# Patient Record
Sex: FEMALE | Race: WHITE | NOT HISPANIC OR LATINO | URBAN - METROPOLITAN AREA
[De-identification: names, ages, dates, MRNs, and addresses within clinical notes are randomized per-mention and may not be internally consistent; named-entity substitution may affect disease eponyms.]

---

## 2017-06-06 ENCOUNTER — EMERGENCY (EMERGENCY)
Facility: HOSPITAL | Age: 52
LOS: 1 days | Discharge: HOME | End: 2017-06-06
Attending: EMERGENCY MEDICINE
Payer: MEDICAID

## 2017-06-06 VITALS
SYSTOLIC BLOOD PRESSURE: 123 MMHG | OXYGEN SATURATION: 98 % | HEIGHT: 63 IN | RESPIRATION RATE: 20 BRPM | HEART RATE: 87 BPM | DIASTOLIC BLOOD PRESSURE: 73 MMHG | TEMPERATURE: 100 F | WEIGHT: 160.06 LBS

## 2017-06-06 DIAGNOSIS — B34.9 VIRAL INFECTION, UNSPECIFIED: ICD-10-CM

## 2017-06-06 DIAGNOSIS — R52 PAIN, UNSPECIFIED: ICD-10-CM

## 2017-06-06 DIAGNOSIS — R50.9 FEVER, UNSPECIFIED: ICD-10-CM

## 2017-06-06 DIAGNOSIS — R05 COUGH: ICD-10-CM

## 2017-06-06 DIAGNOSIS — Z88.0 ALLERGY STATUS TO PENICILLIN: ICD-10-CM

## 2017-06-06 PROCEDURE — 99284 EMERGENCY DEPT VISIT MOD MDM: CPT

## 2017-06-06 NOTE — ED ADULT NURSE NOTE - OBJECTIVE STATEMENT
pt came in with c/o hand, shoulder and back pain s/p suffering for flu weeks ago , not in any  distress ,took 2tabs of aleve about 815pm before coming to ER.

## 2017-06-07 VITALS
OXYGEN SATURATION: 97 % | HEART RATE: 80 BPM | SYSTOLIC BLOOD PRESSURE: 99 MMHG | TEMPERATURE: 97 F | DIASTOLIC BLOOD PRESSURE: 53 MMHG | RESPIRATION RATE: 18 BRPM

## 2017-06-07 LAB
ALBUMIN SERPL ELPH-MCNC: 3.5 G/DL — SIGNIFICANT CHANGE UP (ref 3.3–5)
ALP SERPL-CCNC: 79 U/L — SIGNIFICANT CHANGE UP (ref 40–120)
ALT FLD-CCNC: 32 U/L — SIGNIFICANT CHANGE UP (ref 12–78)
ANION GAP SERPL CALC-SCNC: 9 MMOL/L — SIGNIFICANT CHANGE UP (ref 5–17)
AST SERPL-CCNC: 17 U/L — SIGNIFICANT CHANGE UP (ref 15–37)
BASOPHILS # BLD AUTO: 0.2 K/UL — SIGNIFICANT CHANGE UP (ref 0–0.2)
BASOPHILS NFR BLD AUTO: 1.9 % — SIGNIFICANT CHANGE UP (ref 0–2)
BILIRUB SERPL-MCNC: 0.4 MG/DL — SIGNIFICANT CHANGE UP (ref 0.2–1.2)
BUN SERPL-MCNC: 8 MG/DL — SIGNIFICANT CHANGE UP (ref 7–23)
CALCIUM SERPL-MCNC: 8.1 MG/DL — LOW (ref 8.5–10.1)
CHLORIDE SERPL-SCNC: 108 MMOL/L — SIGNIFICANT CHANGE UP (ref 96–108)
CO2 SERPL-SCNC: 26 MMOL/L — SIGNIFICANT CHANGE UP (ref 22–31)
CREAT SERPL-MCNC: 0.55 MG/DL — SIGNIFICANT CHANGE UP (ref 0.5–1.3)
EOSINOPHIL # BLD AUTO: 0.2 K/UL — SIGNIFICANT CHANGE UP (ref 0–0.5)
EOSINOPHIL NFR BLD AUTO: 2.3 % — SIGNIFICANT CHANGE UP (ref 0–6)
GLUCOSE SERPL-MCNC: 129 MG/DL — HIGH (ref 70–99)
HCT VFR BLD CALC: 32.8 % — LOW (ref 34.5–45)
HGB BLD-MCNC: 11.6 G/DL — SIGNIFICANT CHANGE UP (ref 11.5–15.5)
LYMPHOCYTES # BLD AUTO: 1.9 K/UL — SIGNIFICANT CHANGE UP (ref 1–3.3)
LYMPHOCYTES # BLD AUTO: 20.8 % — SIGNIFICANT CHANGE UP (ref 13–44)
MCHC RBC-ENTMCNC: 30.3 PG — SIGNIFICANT CHANGE UP (ref 27–34)
MCHC RBC-ENTMCNC: 35.3 GM/DL — SIGNIFICANT CHANGE UP (ref 32–36)
MCV RBC AUTO: 85.8 FL — SIGNIFICANT CHANGE UP (ref 80–100)
MONOCYTES # BLD AUTO: 0.7 K/UL — SIGNIFICANT CHANGE UP (ref 0–0.9)
MONOCYTES NFR BLD AUTO: 7.8 % — SIGNIFICANT CHANGE UP (ref 2–14)
NEUTROPHILS # BLD AUTO: 6.1 K/UL — SIGNIFICANT CHANGE UP (ref 1.8–7.4)
NEUTROPHILS NFR BLD AUTO: 67.2 % — SIGNIFICANT CHANGE UP (ref 43–77)
PLATELET # BLD AUTO: 322 K/UL — SIGNIFICANT CHANGE UP (ref 150–400)
POTASSIUM SERPL-MCNC: 3.9 MMOL/L — SIGNIFICANT CHANGE UP (ref 3.5–5.3)
POTASSIUM SERPL-SCNC: 3.9 MMOL/L — SIGNIFICANT CHANGE UP (ref 3.5–5.3)
PROT SERPL-MCNC: 6.6 GM/DL — SIGNIFICANT CHANGE UP (ref 6–8.3)
RBC # BLD: 3.82 M/UL — SIGNIFICANT CHANGE UP (ref 3.8–5.2)
RBC # FLD: 11.2 % — SIGNIFICANT CHANGE UP (ref 11–15)
SODIUM SERPL-SCNC: 143 MMOL/L — SIGNIFICANT CHANGE UP (ref 135–145)
TSH SERPL-MCNC: 2.66 UU/ML — SIGNIFICANT CHANGE UP (ref 0.36–3.74)
WBC # BLD: 9.2 K/UL — SIGNIFICANT CHANGE UP (ref 3.8–10.5)
WBC # FLD AUTO: 9.2 K/UL — SIGNIFICANT CHANGE UP (ref 3.8–10.5)

## 2017-06-07 RX ORDER — KETOROLAC TROMETHAMINE 30 MG/ML
30 SYRINGE (ML) INJECTION ONCE
Qty: 0 | Refills: 0 | Status: DISCONTINUED | OUTPATIENT
Start: 2017-06-07 | End: 2017-06-07

## 2017-06-07 RX ORDER — SODIUM CHLORIDE 9 MG/ML
1000 INJECTION INTRAMUSCULAR; INTRAVENOUS; SUBCUTANEOUS ONCE
Qty: 0 | Refills: 0 | Status: COMPLETED | OUTPATIENT
Start: 2017-06-07 | End: 2017-06-07

## 2017-06-07 NOTE — ED PROVIDER NOTE - MEDICAL DECISION MAKING DETAILS
Patient with viral syndrome. labs reviewed. patient received toradol and ivfs. She is currently discharged with care instructions.

## 2017-06-07 NOTE — ED PROVIDER NOTE - OBJECTIVE STATEMENT
Pertinent PMH/PSH/FHx/SHx and Review of Systems contained within:  51 yo f with PMH of hyperthyroidism s/p thyroidectomy on synthroid presents in ED c/o 7 day history of generalized body ache associated with subjective fever and cough. Patient reports she no longer has cough. No aggravating or relieving factors, No fever/chills, No photophobia/eye pain/changes in vision, No ear pain/sore throat/dysphagia, No chest pain/palpitations, no SOB/cough/wheeze/stridor, No abdominal pain, No N/V/D, no dysuria/frequency/discharge, No neck/back pain, no rash, no changes in neurological status/function.

## 2017-08-10 ENCOUNTER — RECORD ABSTRACTING (OUTPATIENT)
Age: 52
End: 2017-08-10

## 2017-08-10 DIAGNOSIS — Z80.9 FAMILY HISTORY OF MALIGNANT NEOPLASM, UNSPECIFIED: ICD-10-CM

## 2017-08-10 DIAGNOSIS — Z78.9 OTHER SPECIFIED HEALTH STATUS: ICD-10-CM

## 2017-08-16 ENCOUNTER — APPOINTMENT (OUTPATIENT)
Dept: CARDIOLOGY | Facility: CLINIC | Age: 52
End: 2017-08-16
Payer: MEDICAID

## 2017-08-16 VITALS
HEART RATE: 78 BPM | BODY MASS INDEX: 26.42 KG/M2 | WEIGHT: 151 LBS | OXYGEN SATURATION: 98 % | RESPIRATION RATE: 17 BRPM | HEIGHT: 63.5 IN

## 2017-08-16 VITALS — DIASTOLIC BLOOD PRESSURE: 70 MMHG | SYSTOLIC BLOOD PRESSURE: 124 MMHG

## 2017-08-16 VITALS — SYSTOLIC BLOOD PRESSURE: 140 MMHG | DIASTOLIC BLOOD PRESSURE: 60 MMHG

## 2017-08-16 DIAGNOSIS — Z86.19 PERSONAL HISTORY OF OTHER INFECTIOUS AND PARASITIC DISEASES: ICD-10-CM

## 2017-08-16 DIAGNOSIS — Z87.2 PERSONAL HISTORY OF DISEASES OF THE SKIN AND SUBCUTANEOUS TISSUE: ICD-10-CM

## 2017-08-16 DIAGNOSIS — D23.9 OTHER BENIGN NEOPLASM OF SKIN, UNSPECIFIED: ICD-10-CM

## 2017-08-16 PROCEDURE — 99214 OFFICE O/P EST MOD 30 MIN: CPT

## 2017-08-16 PROCEDURE — 93000 ELECTROCARDIOGRAM COMPLETE: CPT

## 2017-08-16 RX ORDER — BLOOD SUGAR DIAGNOSTIC
STRIP MISCELLANEOUS
Qty: 50 | Refills: 0 | Status: DISCONTINUED | COMMUNITY
Start: 2017-03-16

## 2017-08-16 RX ORDER — DOXYCYCLINE HYCLATE 100 MG/1
100 CAPSULE ORAL
Qty: 14 | Refills: 0 | Status: DISCONTINUED | COMMUNITY
Start: 2017-07-08

## 2017-08-16 RX ORDER — AZITHROMYCIN 250 MG/1
250 TABLET, FILM COATED ORAL
Qty: 6 | Refills: 0 | Status: DISCONTINUED | COMMUNITY
Start: 2017-05-31

## 2017-08-16 RX ORDER — ACARBOSE 25 MG/1
25 TABLET ORAL
Refills: 0 | Status: DISCONTINUED | COMMUNITY
End: 2017-08-16

## 2017-08-16 RX ORDER — LEVOTHYROXINE SODIUM 112 UG/1
112 TABLET ORAL
Refills: 0 | Status: DISCONTINUED | COMMUNITY
End: 2017-08-16

## 2017-08-16 RX ORDER — CLARITHROMYCIN 500 MG/1
500 TABLET, FILM COATED ORAL
Qty: 20 | Refills: 0 | Status: DISCONTINUED | COMMUNITY
Start: 2017-07-06

## 2017-08-16 RX ORDER — OSELTAMIVIR PHOSPHATE 75 MG/1
75 CAPSULE ORAL
Qty: 10 | Refills: 0 | Status: DISCONTINUED | COMMUNITY
Start: 2017-04-07

## 2017-08-16 RX ORDER — BENZONATATE 100 MG/1
100 CAPSULE ORAL
Qty: 30 | Refills: 0 | Status: DISCONTINUED | COMMUNITY
Start: 2017-06-11

## 2017-09-01 LAB
BASOPHILS # BLD AUTO: 0.01 K/UL
BASOPHILS NFR BLD AUTO: 0.2 %
CHOLEST SERPL-MCNC: 157 MG/DL
CHOLEST/HDLC SERPL: 3.7 RATIO
EOSINOPHIL # BLD AUTO: 0.06 K/UL
EOSINOPHIL NFR BLD AUTO: 1.3 %
HBA1C MFR BLD HPLC: 6.3 %
HCT VFR BLD CALC: 36.3 %
HDLC SERPL-MCNC: 43 MG/DL
HGB BLD-MCNC: 11.3 G/DL
IMM GRANULOCYTES NFR BLD AUTO: 0.2 %
LDLC SERPL CALC-MCNC: 60 MG/DL
LYMPHOCYTES # BLD AUTO: 2.06 K/UL
LYMPHOCYTES NFR BLD AUTO: 43.8 %
MAN DIFF?: NORMAL
MCHC RBC-ENTMCNC: 28.3 PG
MCHC RBC-ENTMCNC: 31.1 GM/DL
MCV RBC AUTO: 91 FL
MONOCYTES # BLD AUTO: 0.49 K/UL
MONOCYTES NFR BLD AUTO: 10.4 %
NEUTROPHILS # BLD AUTO: 2.07 K/UL
NEUTROPHILS NFR BLD AUTO: 44.1 %
PLATELET # BLD AUTO: 259 K/UL
RBC # BLD: 3.99 M/UL
RBC # FLD: 13.9 %
TRIGL SERPL-MCNC: 268 MG/DL
WBC # FLD AUTO: 4.7 K/UL

## 2017-09-05 ENCOUNTER — APPOINTMENT (OUTPATIENT)
Dept: INTERNAL MEDICINE | Facility: CLINIC | Age: 52
End: 2017-09-05
Payer: MEDICAID

## 2017-09-05 VITALS
RESPIRATION RATE: 18 BRPM | HEIGHT: 63 IN | OXYGEN SATURATION: 98 % | SYSTOLIC BLOOD PRESSURE: 118 MMHG | BODY MASS INDEX: 26.75 KG/M2 | WEIGHT: 151 LBS | TEMPERATURE: 99 F | HEART RATE: 72 BPM | DIASTOLIC BLOOD PRESSURE: 52 MMHG

## 2017-09-05 PROBLEM — Z00.00 ENCOUNTER FOR PREVENTIVE HEALTH EXAMINATION: Noted: 2017-09-05

## 2017-09-05 LAB
ALBUMIN SERPL ELPH-MCNC: 4.2 G/DL
ALP BLD-CCNC: 70 U/L
ALT SERPL-CCNC: 31 U/L
ANION GAP SERPL CALC-SCNC: 15 MMOL/L
AST SERPL-CCNC: 23 U/L
BILIRUB SERPL-MCNC: 0.4 MG/DL
BUN SERPL-MCNC: 12 MG/DL
CALCIUM SERPL-MCNC: 8.7 MG/DL
CHLORIDE SERPL-SCNC: 101 MMOL/L
CO2 SERPL-SCNC: 25 MMOL/L
CREAT SERPL-MCNC: 0.57 MG/DL
GLUCOSE SERPL-MCNC: 110 MG/DL
POTASSIUM SERPL-SCNC: 5 MMOL/L
PROT SERPL-MCNC: 6.6 G/DL
SODIUM SERPL-SCNC: 141 MMOL/L

## 2017-09-05 PROCEDURE — 99214 OFFICE O/P EST MOD 30 MIN: CPT

## 2017-09-18 ENCOUNTER — APPOINTMENT (OUTPATIENT)
Dept: OTOLARYNGOLOGY | Facility: CLINIC | Age: 52
End: 2017-09-18
Payer: MEDICAID

## 2017-09-18 VITALS
DIASTOLIC BLOOD PRESSURE: 81 MMHG | HEIGHT: 63.5 IN | TEMPERATURE: 98.9 F | HEART RATE: 80 BPM | WEIGHT: 150 LBS | SYSTOLIC BLOOD PRESSURE: 135 MMHG | BODY MASS INDEX: 26.25 KG/M2

## 2017-09-18 PROCEDURE — 99203 OFFICE O/P NEW LOW 30 MIN: CPT

## 2017-09-27 ENCOUNTER — APPOINTMENT (OUTPATIENT)
Dept: CT IMAGING | Facility: CLINIC | Age: 52
End: 2017-09-27
Payer: MEDICAID

## 2017-09-27 ENCOUNTER — OUTPATIENT (OUTPATIENT)
Dept: OUTPATIENT SERVICES | Facility: HOSPITAL | Age: 52
LOS: 1 days | End: 2017-09-27
Payer: MEDICAID

## 2017-09-27 DIAGNOSIS — R59.0 LOCALIZED ENLARGED LYMPH NODES: ICD-10-CM

## 2017-09-27 PROCEDURE — 70491 CT SOFT TISSUE NECK W/DYE: CPT

## 2017-09-27 PROCEDURE — 70491 CT SOFT TISSUE NECK W/DYE: CPT | Mod: 26

## 2017-10-09 ENCOUNTER — APPOINTMENT (OUTPATIENT)
Dept: OTOLARYNGOLOGY | Facility: CLINIC | Age: 52
End: 2017-10-09

## 2017-10-09 ENCOUNTER — MEDICATION RENEWAL (OUTPATIENT)
Age: 52
End: 2017-10-09

## 2017-10-14 ENCOUNTER — RX RENEWAL (OUTPATIENT)
Age: 52
End: 2017-10-14

## 2017-10-24 ENCOUNTER — CHART COPY (OUTPATIENT)
Age: 52
End: 2017-10-24

## 2017-10-25 ENCOUNTER — OUTPATIENT (OUTPATIENT)
Dept: OUTPATIENT SERVICES | Facility: HOSPITAL | Age: 52
LOS: 1 days | End: 2017-10-25
Payer: COMMERCIAL

## 2017-10-25 ENCOUNTER — RESULT REVIEW (OUTPATIENT)
Age: 52
End: 2017-10-25

## 2017-10-25 PROCEDURE — 10022: CPT

## 2017-10-25 PROCEDURE — 88305 TISSUE EXAM BY PATHOLOGIST: CPT

## 2017-10-25 PROCEDURE — 76942 ECHO GUIDE FOR BIOPSY: CPT | Mod: 26

## 2017-10-25 PROCEDURE — 88173 CYTOPATH EVAL FNA REPORT: CPT

## 2017-10-25 PROCEDURE — 76942 ECHO GUIDE FOR BIOPSY: CPT

## 2017-12-04 ENCOUNTER — RX RENEWAL (OUTPATIENT)
Age: 52
End: 2017-12-04

## 2018-01-03 ENCOUNTER — RX RENEWAL (OUTPATIENT)
Age: 53
End: 2018-01-03

## 2018-01-24 ENCOUNTER — RX RENEWAL (OUTPATIENT)
Age: 53
End: 2018-01-24

## 2018-02-06 ENCOUNTER — MEDICATION RENEWAL (OUTPATIENT)
Age: 53
End: 2018-02-06

## 2018-04-02 ENCOUNTER — APPOINTMENT (OUTPATIENT)
Dept: OTOLARYNGOLOGY | Facility: CLINIC | Age: 53
End: 2018-04-02
Payer: COMMERCIAL

## 2018-04-02 VITALS
TEMPERATURE: 99 F | OXYGEN SATURATION: 99 % | SYSTOLIC BLOOD PRESSURE: 127 MMHG | DIASTOLIC BLOOD PRESSURE: 77 MMHG | HEART RATE: 66 BPM | WEIGHT: 152 LBS | BODY MASS INDEX: 26.93 KG/M2 | HEIGHT: 63 IN

## 2018-04-02 PROCEDURE — 99213 OFFICE O/P EST LOW 20 MIN: CPT

## 2018-05-14 ENCOUNTER — NON-APPOINTMENT (OUTPATIENT)
Age: 53
End: 2018-05-14

## 2018-05-14 ENCOUNTER — APPOINTMENT (OUTPATIENT)
Dept: CARDIOLOGY | Facility: CLINIC | Age: 53
End: 2018-05-14
Payer: COMMERCIAL

## 2018-05-14 VITALS — WEIGHT: 160 LBS | BODY MASS INDEX: 28.35 KG/M2 | HEIGHT: 63 IN | HEART RATE: 80 BPM | OXYGEN SATURATION: 98 %

## 2018-05-14 VITALS — DIASTOLIC BLOOD PRESSURE: 60 MMHG | SYSTOLIC BLOOD PRESSURE: 120 MMHG

## 2018-05-14 PROCEDURE — 93000 ELECTROCARDIOGRAM COMPLETE: CPT

## 2018-05-14 PROCEDURE — 99213 OFFICE O/P EST LOW 20 MIN: CPT

## 2018-05-21 LAB
25(OH)D3 SERPL-MCNC: 25.1 NG/ML
ALBUMIN SERPL ELPH-MCNC: 4.3 G/DL
ALP BLD-CCNC: 86 U/L
ALT SERPL-CCNC: 24 U/L
ANION GAP SERPL CALC-SCNC: 12 MMOL/L
APPEARANCE: CLEAR
AST SERPL-CCNC: 17 U/L
BACTERIA: NEGATIVE
BASOPHILS # BLD AUTO: 0.02 K/UL
BASOPHILS NFR BLD AUTO: 0.4 %
BILIRUB SERPL-MCNC: 0.4 MG/DL
BILIRUBIN URINE: NEGATIVE
BLOOD URINE: NEGATIVE
BUN SERPL-MCNC: 8 MG/DL
CALCIUM SERPL-MCNC: 9.1 MG/DL
CHLORIDE SERPL-SCNC: 105 MMOL/L
CHOLEST SERPL-MCNC: 176 MG/DL
CHOLEST/HDLC SERPL: 2.8 RATIO
CO2 SERPL-SCNC: 27 MMOL/L
COLOR: YELLOW
CREAT SERPL-MCNC: 0.62 MG/DL
EOSINOPHIL # BLD AUTO: 0.11 K/UL
EOSINOPHIL NFR BLD AUTO: 2.2 %
GLUCOSE QUALITATIVE U: NEGATIVE MG/DL
GLUCOSE SERPL-MCNC: 131 MG/DL
HBA1C MFR BLD HPLC: 6.3 %
HCT VFR BLD CALC: 40.8 %
HDLC SERPL-MCNC: 64 MG/DL
HGB BLD-MCNC: 12.8 G/DL
IMM GRANULOCYTES NFR BLD AUTO: 0 %
KETONES URINE: NEGATIVE
LDLC SERPL CALC-MCNC: 81 MG/DL
LEUKOCYTE ESTERASE URINE: NEGATIVE
LYMPHOCYTES # BLD AUTO: 2.24 K/UL
LYMPHOCYTES NFR BLD AUTO: 44.4 %
MAN DIFF?: NORMAL
MCHC RBC-ENTMCNC: 29.2 PG
MCHC RBC-ENTMCNC: 31.4 GM/DL
MCV RBC AUTO: 92.9 FL
MICROSCOPIC-UA: NORMAL
MONOCYTES # BLD AUTO: 0.55 K/UL
MONOCYTES NFR BLD AUTO: 10.9 %
NEUTROPHILS # BLD AUTO: 2.13 K/UL
NEUTROPHILS NFR BLD AUTO: 42.1 %
NITRITE URINE: NEGATIVE
PH URINE: 7
PLATELET # BLD AUTO: 246 K/UL
POTASSIUM SERPL-SCNC: 4.6 MMOL/L
PROT SERPL-MCNC: 6.7 G/DL
PROTEIN URINE: NEGATIVE MG/DL
RBC # BLD: 4.39 M/UL
RBC # FLD: 13.4 %
RED BLOOD CELLS URINE: 1 /HPF
SODIUM SERPL-SCNC: 144 MMOL/L
SPECIFIC GRAVITY URINE: 1.01
SQUAMOUS EPITHELIAL CELLS: 3 /HPF
TRIGL SERPL-MCNC: 156 MG/DL
TSH SERPL-ACNC: 0.48 UIU/ML
UROBILINOGEN URINE: NEGATIVE MG/DL
WBC # FLD AUTO: 5.05 K/UL
WHITE BLOOD CELLS URINE: 1 /HPF

## 2018-05-22 ENCOUNTER — APPOINTMENT (OUTPATIENT)
Dept: INTERNAL MEDICINE | Facility: CLINIC | Age: 53
End: 2018-05-22
Payer: COMMERCIAL

## 2018-05-22 VITALS
DIASTOLIC BLOOD PRESSURE: 70 MMHG | SYSTOLIC BLOOD PRESSURE: 140 MMHG | WEIGHT: 161 LBS | HEART RATE: 74 BPM | TEMPERATURE: 98.3 F | OXYGEN SATURATION: 98 % | BODY MASS INDEX: 28.53 KG/M2 | HEIGHT: 63 IN

## 2018-05-22 DIAGNOSIS — Z87.898 PERSONAL HISTORY OF OTHER SPECIFIED CONDITIONS: ICD-10-CM

## 2018-05-22 PROCEDURE — G0444 DEPRESSION SCREEN ANNUAL: CPT | Mod: 59

## 2018-05-22 PROCEDURE — 99214 OFFICE O/P EST MOD 30 MIN: CPT | Mod: 25

## 2018-05-22 NOTE — ASSESSMENT
[FreeTextEntry1] : A 53 yrs old  pt presents for F/U and discussion of recent labs\par 1) HTN; Controlled on current meds\par \par 2) Hypothyroid; On replacement Synthroid\par \par 3) Prediabetes; Will CTN diet\par Does not want to go on Metformin\par \par 4) Palpitations; Doing better on current meds\par \par 5) Anxiety; The pt is counselled re Psych F/U and she agreed to look into this\par Does not want to go on "meds"\par Alternate approaches such as meditation/essential ouils discussed

## 2018-05-22 NOTE — HEALTH RISK ASSESSMENT
[No falls in past year] : Patient reported no falls in the past year [3] : 2) Feeling down, depressed, or hopeless for nearly every day (3) [] : No [[id number]] : 6 [[id number]] : 11

## 2018-05-22 NOTE — HISTORY OF PRESENT ILLNESS
[de-identified] : The pt presents for F/U re recent labs\par Feels well overall\par \par Is noticing worsening anxiety re to personal and work issues

## 2018-05-22 NOTE — PHYSICAL EXAM
[Well Nourished] : well nourished [PERRL] : pupils equal round and reactive to light [Normal Oropharynx] : the oropharynx was normal [Supple] : supple [No Lymphadenopathy] : no lymphadenopathy [Normal Rate] : normal rate  [Regular Rhythm] : with a regular rhythm [Normal S1, S2] : normal S1 and S2

## 2018-05-22 NOTE — COUNSELING
[Behavioral health counseling provided] : behavioral health  [Low Salt Diet] : Low salt diet [Engage in a relaxing activity] : Engage in a relaxing activity [ - Annual Depression Screening] : Annual Depression Screening

## 2018-06-04 ENCOUNTER — MEDICATION RENEWAL (OUTPATIENT)
Age: 53
End: 2018-06-04

## 2018-06-04 RX ORDER — BLOOD SUGAR DIAGNOSTIC
STRIP MISCELLANEOUS DAILY
Qty: 1 | Refills: 1 | Status: ACTIVE | COMMUNITY
Start: 2018-06-04 | End: 1900-01-01

## 2018-06-11 ENCOUNTER — RX RENEWAL (OUTPATIENT)
Age: 53
End: 2018-06-11

## 2018-06-11 ENCOUNTER — MEDICATION RENEWAL (OUTPATIENT)
Age: 53
End: 2018-06-11

## 2018-06-12 ENCOUNTER — RX RENEWAL (OUTPATIENT)
Age: 53
End: 2018-06-12

## 2018-06-12 RX ORDER — BLOOD-GLUCOSE METER
KIT MISCELLANEOUS
Qty: 1 | Refills: 0 | Status: ACTIVE | COMMUNITY
Start: 2018-06-12 | End: 1900-01-01

## 2018-06-12 RX ORDER — LANCETS 28 GAUGE
EACH MISCELLANEOUS
Qty: 90 | Refills: 0 | Status: ACTIVE | COMMUNITY
Start: 2018-06-12 | End: 1900-01-01

## 2018-07-05 ENCOUNTER — MEDICATION RENEWAL (OUTPATIENT)
Age: 53
End: 2018-07-05

## 2018-07-06 ENCOUNTER — RX RENEWAL (OUTPATIENT)
Age: 53
End: 2018-07-06

## 2018-08-04 ENCOUNTER — RX RENEWAL (OUTPATIENT)
Age: 53
End: 2018-08-04

## 2018-08-07 ENCOUNTER — APPOINTMENT (OUTPATIENT)
Dept: INTERNAL MEDICINE | Facility: CLINIC | Age: 53
End: 2018-08-07
Payer: COMMERCIAL

## 2018-08-07 VITALS
WEIGHT: 162 LBS | HEIGHT: 63 IN | TEMPERATURE: 97.6 F | BODY MASS INDEX: 28.7 KG/M2 | DIASTOLIC BLOOD PRESSURE: 70 MMHG | SYSTOLIC BLOOD PRESSURE: 120 MMHG | HEART RATE: 64 BPM | OXYGEN SATURATION: 98 %

## 2018-08-07 PROCEDURE — G0444 DEPRESSION SCREEN ANNUAL: CPT

## 2018-08-07 PROCEDURE — 99396 PREV VISIT EST AGE 40-64: CPT | Mod: 25,Q5

## 2018-08-07 PROCEDURE — 36415 COLL VENOUS BLD VENIPUNCTURE: CPT

## 2018-08-07 RX ORDER — METOPROLOL SUCCINATE 100 MG/1
100 TABLET, EXTENDED RELEASE ORAL DAILY
Qty: 90 | Refills: 1 | Status: DISCONTINUED | COMMUNITY
Start: 2017-12-04 | End: 2018-08-07

## 2018-08-07 NOTE — PHYSICAL EXAM
[Well Nourished] : well nourished [PERRL] : pupils equal round and reactive to light [Normal Oropharynx] : the oropharynx was normal [No Lymphadenopathy] : no lymphadenopathy [Clear to Auscultation] : lungs were clear to auscultation bilaterally [Regular Rhythm] : with a regular rhythm [Normal S1, S2] : normal S1 and S2 [No Carotid Bruits] : no carotid bruits [No Abdominal Bruit] : a ~M bruit was not heard ~T in the abdomen [No Varicosities] : no varicosities [Pedal Pulses Present] : the pedal pulses are present [No Edema] : there was no peripheral edema [Normal Appearance] : normal in appearance [No Masses] : no palpable masses [No Nipple Discharge] : no nipple discharge [Soft] : abdomen soft [Non Tender] : non-tender [Non-distended] : non-distended [Normal Posterior Cervical Nodes] : no posterior cervical lymphadenopathy [Normal Anterior Cervical Nodes] : no anterior cervical lymphadenopathy [No CVA Tenderness] : no CVA  tenderness [No Spinal Tenderness] : no spinal tenderness [Grossly Normal Strength/Tone] : grossly normal strength/tone [de-identified] : Minimal bogginess noted in both inf turbinates [de-identified] : The Rt claviculosternal joint is enlarged but not tender [de-identified] : Rt sternoclavicular region is prominent with a knobby enlargement/Not painful

## 2018-08-07 NOTE — ASSESSMENT
[FreeTextEntry1] : A 53 yrs old pt presents for CPE\par Current active problems\par 1) Pre Diabetes' Has elevated  A1C over past year or so\par Has tried diet/exercise with little help\par Was on Metformin in the past\par Consider restarting on Metformin\par \par 2) Enlarged Rt Sternoclavicular joint; Was seen by Ortho\par Advised MRI which is pending\par \par 3) Hypothyroid; CTN current dose of Synthroid\par \par 4) Allergic rhinitis; CTN OTC nasal steroids/antihistamines\par \par 5) HTN; controlled\par \par 6) Hyperlipidemia; Controlled on current meds\par \par 7) Health maintenance; Discussed current USPSTF guidelines for preventative care [place]

## 2018-08-07 NOTE — HISTORY OF PRESENT ILLNESS
[de-identified] : a 53 yrs old pt presents for CPE\par Is noticing worsening C/O nasal congestion\par Is noticing recurrence of planter fascitis\par \par AM glucose is in 135mgs/range\par \par Has C/O re enlarging Rt claviculosternal joint

## 2018-08-07 NOTE — HEALTH RISK ASSESSMENT
[Fair] : ~his/her~ current health as fair  [Poor] : ~his/her~ mood as  poor [No falls in past year] : Patient reported no falls in the past year [1] : 2) Feeling down, depressed, or hopeless for several days (1) [Patient reported mammogram was normal] : Patient reported mammogram was normal [Patient reported PAP Smear was normal] : Patient reported PAP Smear was normal [Patient reported colonoscopy was normal] : Patient reported colonoscopy was normal [HIV test declined] : HIV test declined [Hepatitis C test declined] : Hepatitis C test declined [Learning/Retaining New Information] : difficulty learning/retaining new information [None] : None [With Significant Other] : lives with significant other [Employed] : employed [College] : College [Feels Safe at Home] : Feels safe at home [Fully functional (bathing, dressing, toileting, transferring, walking, feeding)] : Fully functional (bathing, dressing, toileting, transferring, walking, feeding) [Fully functional (using the telephone, shopping, preparing meals, housekeeping, doing laundry, using] : Fully functional and needs no help or supervision to perform IADLs (using the telephone, shopping, preparing meals, housekeeping, doing laundry, using transportation, managing medications and managing finances) [Reports changes in hearing] : Reports changes in hearing [Smoke Detector] : smoke detector [Carbon Monoxide Detector] : carbon monoxide detector [Safety elements used in home] : safety elements used in home [Seat Belt] :  uses seat belt [Designated Healthcare Proxy] : Designated healthcare proxy [Name: ___] : Health Care Proxy's Name: [unfilled]  [Relationship: ___] : Relationship: [unfilled] [] : No [[id number]] : 2 [Change in mental status noted] : No change in mental status noted [Language] : denies difficulty with language [Behavior] : denies difficulty with behavior [Handling Complex Tasks] : denies difficulty handling complex tasks [Reasoning] : denies difficulty with reasoning [Spatial Ability and Orientation] : denies difficulty with spatial ability and orientation [Reports changes in vision] : Reports no changes in vision [Reports changes in dental health] : Reports no changes in dental health [Sunscreen] : does not use sunscreen [Travel to Developing Areas] : does not  travel to developing areas [TB Exposure] : is not being exposed to tuberculosis [Caregiver Concerns] : does not have caregiver concerns [MammogramDate] : 12/2017 [PapSmearDate] : 01/2016 [BoneDensityComments] : Pt reports never having a bone scan. [ColonoscopyDate] : 01/2014 [FreeTextEntry3] : In in relationship [de-identified] : Pt reports the significant other reports a change in her hearing.

## 2018-08-07 NOTE — COUNSELING
[Weight management counseling provided] : Weight management [Healthy eating counseling provided] : healthy eating [Activity counseling provided] : activity [Low Salt Diet] : Low salt diet [ - Annual Depression Screening] : Annual Depression Screening

## 2018-08-07 NOTE — REVIEW OF SYSTEMS
[Joint Pain] : joint pain [Negative] : Heme/Lymph [FreeTextEntry2] : Notes stress re to her job [FreeTextEntry3] : Nasal congestion

## 2018-08-09 LAB — HBA1C MFR BLD HPLC: 6.5 %

## 2018-08-14 ENCOUNTER — RX RENEWAL (OUTPATIENT)
Age: 53
End: 2018-08-14

## 2018-09-10 ENCOUNTER — RX RENEWAL (OUTPATIENT)
Age: 53
End: 2018-09-10

## 2018-09-17 ENCOUNTER — APPOINTMENT (OUTPATIENT)
Dept: CARDIOLOGY | Facility: CLINIC | Age: 53
End: 2018-09-17
Payer: COMMERCIAL

## 2018-09-17 VITALS — DIASTOLIC BLOOD PRESSURE: 70 MMHG | SYSTOLIC BLOOD PRESSURE: 126 MMHG

## 2018-09-17 VITALS — OXYGEN SATURATION: 98 % | HEART RATE: 73 BPM | HEIGHT: 63 IN

## 2018-09-17 PROCEDURE — 99213 OFFICE O/P EST LOW 20 MIN: CPT

## 2018-09-19 ENCOUNTER — RX RENEWAL (OUTPATIENT)
Age: 53
End: 2018-09-19

## 2018-09-19 ENCOUNTER — MEDICATION RENEWAL (OUTPATIENT)
Age: 53
End: 2018-09-19

## 2018-10-26 ENCOUNTER — MEDICATION RENEWAL (OUTPATIENT)
Age: 53
End: 2018-10-26

## 2018-10-29 ENCOUNTER — APPOINTMENT (OUTPATIENT)
Dept: OTOLARYNGOLOGY | Facility: CLINIC | Age: 53
End: 2018-10-29
Payer: COMMERCIAL

## 2018-10-29 VITALS
OXYGEN SATURATION: 97 % | HEART RATE: 77 BPM | WEIGHT: 162 LBS | HEIGHT: 63 IN | DIASTOLIC BLOOD PRESSURE: 77 MMHG | BODY MASS INDEX: 28.7 KG/M2 | SYSTOLIC BLOOD PRESSURE: 127 MMHG

## 2018-10-29 PROCEDURE — 31575 DIAGNOSTIC LARYNGOSCOPY: CPT

## 2018-10-29 PROCEDURE — 99213 OFFICE O/P EST LOW 20 MIN: CPT | Mod: 25

## 2018-12-09 ENCOUNTER — RX RENEWAL (OUTPATIENT)
Age: 53
End: 2018-12-09

## 2019-01-08 ENCOUNTER — RX RENEWAL (OUTPATIENT)
Age: 54
End: 2019-01-08

## 2019-02-06 ENCOUNTER — RX RENEWAL (OUTPATIENT)
Age: 54
End: 2019-02-06

## 2019-02-21 ENCOUNTER — APPOINTMENT (OUTPATIENT)
Dept: CARDIOLOGY | Facility: CLINIC | Age: 54
End: 2019-02-21
Payer: COMMERCIAL

## 2019-02-21 ENCOUNTER — NON-APPOINTMENT (OUTPATIENT)
Age: 54
End: 2019-02-21

## 2019-02-21 VITALS
OXYGEN SATURATION: 98 % | WEIGHT: 160 LBS | HEART RATE: 77 BPM | BODY MASS INDEX: 28.35 KG/M2 | RESPIRATION RATE: 16 BRPM | HEIGHT: 63 IN

## 2019-02-21 VITALS — DIASTOLIC BLOOD PRESSURE: 74 MMHG | SYSTOLIC BLOOD PRESSURE: 120 MMHG

## 2019-02-21 PROCEDURE — 93000 ELECTROCARDIOGRAM COMPLETE: CPT

## 2019-02-21 PROCEDURE — 99214 OFFICE O/P EST MOD 30 MIN: CPT

## 2019-02-21 NOTE — DISCUSSION/SUMMARY
[FreeTextEntry1] : I told her to take metoprolol in the morning along with felodipine to see if it makes any difference with the palpitation.  If it persists then I may have to increase the dose of metoprolol.  Lymph node seems to be fairly benign and isolated.  If it does not go away, I told her to follow-up with her oncologist.  She had a thyroidectomy in the past along with parathyroidectomy for suspected thyroid cancer.  Biopsy of the thyroid and 2 lymph nodes was negative.\par \par She has not had a dental checkup a cleaning for a long time.  I told her that it would be a good idea to get it done particularly of the presence of lymph node.  She also has a mild earaches.  I also told her to follow-up with the ENT.

## 2019-02-21 NOTE — PHYSICAL EXAM
[General Appearance - Well Developed] : well developed [Normal Appearance] : normal appearance [Well Groomed] : well groomed [General Appearance - Well Nourished] : well nourished [No Deformities] : no deformities [General Appearance - In No Acute Distress] : no acute distress [Normal Conjunctiva] : the conjunctiva exhibited no abnormalities [Eyelids - No Xanthelasma] : the eyelids demonstrated no xanthelasmas [Normal Oral Mucosa] : normal oral mucosa [No Oral Pallor] : no oral pallor [No Oral Cyanosis] : no oral cyanosis [Normal Jugular Venous A Waves Present] : normal jugular venous A waves present [Normal Jugular Venous V Waves Present] : normal jugular venous V waves present [No Jugular Venous Sousa A Waves] : no jugular venous sousa A waves [FreeTextEntry1] : There is a small firm somewhat mobile lymph node in the upper cervical region.  It is not tender. [Respiration, Rhythm And Depth] : normal respiratory rhythm and effort [Exaggerated Use Of Accessory Muscles For Inspiration] : no accessory muscle use [Auscultation Breath Sounds / Voice Sounds] : lungs were clear to auscultation bilaterally [Heart Rate And Rhythm] : heart rate and rhythm were normal [Heart Sounds] : normal S1 and S2 [Murmurs] : no murmurs present [Abdomen Soft] : soft [Abdomen Tenderness] : non-tender [Abdomen Mass (___ Cm)] : no abdominal mass palpated [Abnormal Walk] : normal gait [Gait - Sufficient For Exercise Testing] : the gait was sufficient for exercise testing [Nail Clubbing] : no clubbing of the fingernails [Cyanosis, Localized] : no localized cyanosis [Petechial Hemorrhages (___cm)] : no petechial hemorrhages [Skin Color & Pigmentation] : normal skin color and pigmentation [] : no rash [No Venous Stasis] : no venous stasis [Skin Lesions] : no skin lesions [No Skin Ulcers] : no skin ulcer [No Xanthoma] : no  xanthoma was observed [Oriented To Time, Place, And Person] : oriented to person, place, and time [Affect] : the affect was normal [Mood] : the mood was normal [No Anxiety] : not feeling anxious

## 2019-02-21 NOTE — ASSESSMENT
[FreeTextEntry1] : Her blood pressure is well controlled.  Palpitations in the evening just prior to taking her metoprolol may be because of trough level of the medication at that time.

## 2019-02-21 NOTE — HISTORY OF PRESENT ILLNESS
[FreeTextEntry1] : Patient is doing well at this time. She states that this week she has noticed her palpitations more often. She noticed that she feels the palpitations more often when she finishes work, right before she takes the medications. She describes the palpitations as a "start and stop" sensation and feels as if her heart is skipping a beat. The palpitations last from 30 min to an hour. Previously, prior to starting Metoprolol she would feel the palpitations constantly, they have improved. \par \par She started yoga two weeks ago.\par \par She noticed a lymph node behind the angle of the jaw in November.  In January it had gone away.  She had frequent colds in December and January.  She noticed it recently once again.  It is approximately half to 1 cm in diameter.\par \par

## 2019-02-21 NOTE — REASON FOR VISIT
[Follow-Up - Clinic] : a clinic follow-up of [Hyperlipidemia] : hyperlipidemia [Hypertension] : hypertension [Palpitations] : palpitations

## 2019-03-25 ENCOUNTER — APPOINTMENT (OUTPATIENT)
Dept: OTOLARYNGOLOGY | Facility: CLINIC | Age: 54
End: 2019-03-25
Payer: COMMERCIAL

## 2019-03-25 VITALS
SYSTOLIC BLOOD PRESSURE: 115 MMHG | TEMPERATURE: 98.5 F | HEIGHT: 64 IN | BODY MASS INDEX: 27.31 KG/M2 | HEART RATE: 70 BPM | WEIGHT: 160 LBS | DIASTOLIC BLOOD PRESSURE: 75 MMHG

## 2019-03-25 PROCEDURE — 99213 OFFICE O/P EST LOW 20 MIN: CPT

## 2019-03-26 NOTE — HISTORY OF PRESENT ILLNESS
[de-identified] : 53 yo F here for evaluation of left occipital lymph node which her boyfriend first noticed in December of 2018.  She reports that it has waxed and waned and now has decreased to being barely palpable.  She has had no scalp or skin lesions, no new hair products, no recent URI, she does have associated left sided otalgia, no hearing loss or change. \par \par She reports that her sternoclavicular joint feels much better and she has little to no pain in that region

## 2019-03-26 NOTE — REVIEW OF SYSTEMS
[As Noted in HPI] : as noted in HPI [Negative] : Psychiatric [Hoarseness] : no hoarseness [Throat Pain] : no throat pain [Fever] : no fever [Chills] : no chills

## 2019-03-26 NOTE — ASSESSMENT
[FreeTextEntry1] : 55 yo F with resolving reactive appearing left occipital lymph node that is barely perceptible with normal EAC, tympanic membrane and middle ear exam\par -node has resolved, no indication for biopsy at this time, pt will return or call if the node returns to enlarged state\par

## 2019-03-26 NOTE — PHYSICAL EXAM
[Normal] : orientation to person, place, and time: normal [de-identified] : well healed thyroidectomy scar site [de-identified] : no erythema or tenderness of the auricle [de-identified] : normal TM b/l [de-identified] : there is small, mobile left occipital lymph node which is approximately 5mm

## 2019-04-25 ENCOUNTER — OTHER (OUTPATIENT)
Age: 54
End: 2019-04-25

## 2019-05-02 LAB — HBV SURFACE AB SER QL: NONREACTIVE

## 2019-05-03 LAB
MEV IGG FLD QL IA: 23.4 AU/ML
MEV IGG+IGM SER-IMP: NEGATIVE
MUV AB SER-ACNC: POSITIVE
MUV IGG SER QL IA: 21.6 AU/ML
RUBV IGG FLD-ACNC: 6.8 INDEX
RUBV IGG SER-IMP: POSITIVE

## 2019-05-04 ENCOUNTER — MED ADMIN CHARGE (OUTPATIENT)
Age: 54
End: 2019-05-04

## 2019-05-04 ENCOUNTER — APPOINTMENT (OUTPATIENT)
Dept: INTERNAL MEDICINE | Facility: CLINIC | Age: 54
End: 2019-05-04
Payer: COMMERCIAL

## 2019-05-04 VITALS
WEIGHT: 162 LBS | HEIGHT: 64 IN | OXYGEN SATURATION: 98 % | TEMPERATURE: 97.6 F | BODY MASS INDEX: 27.66 KG/M2 | HEART RATE: 71 BPM

## 2019-05-04 PROCEDURE — 99212 OFFICE O/P EST SF 10 MIN: CPT | Mod: 25

## 2019-05-04 PROCEDURE — 90707 MMR VACCINE SC: CPT

## 2019-05-04 PROCEDURE — 90471 IMMUNIZATION ADMIN: CPT

## 2019-05-04 PROCEDURE — 36415 COLL VENOUS BLD VENIPUNCTURE: CPT

## 2019-05-04 NOTE — HISTORY OF PRESENT ILLNESS
[FreeTextEntry1] : needs MMR [de-identified] : 54 yr old presents today for above, feels well overall. 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 Chronic medications noted, will check Thyroid level today.

## 2019-05-04 NOTE — ASSESSMENT
[FreeTextEntry1] : 54 yr old presents MMR\par #1 Needs MMR - Given today by myself, copy labs given\par #2 Hypothyroidism- Check Thyroid level\par Has upcoming CPE Dr. Luque

## 2019-05-04 NOTE — HISTORY OF PRESENT ILLNESS
[FreeTextEntry1] : needs MMR [de-identified] : 54 yr old presents today for above, feels well overall. 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 Chronic medications noted, will check Thyroid level today.

## 2019-05-05 ENCOUNTER — TRANSCRIPTION ENCOUNTER (OUTPATIENT)
Age: 54
End: 2019-05-05

## 2019-05-06 ENCOUNTER — RX RENEWAL (OUTPATIENT)
Age: 54
End: 2019-05-06

## 2019-05-06 LAB — TSH SERPL-ACNC: 0.56 UIU/ML

## 2019-05-07 ENCOUNTER — RX RENEWAL (OUTPATIENT)
Age: 54
End: 2019-05-07

## 2019-05-15 ENCOUNTER — APPOINTMENT (OUTPATIENT)
Dept: CARDIOLOGY | Facility: CLINIC | Age: 54
End: 2019-05-15

## 2019-06-08 ENCOUNTER — RX RENEWAL (OUTPATIENT)
Age: 54
End: 2019-06-08

## 2019-06-23 ENCOUNTER — RX RENEWAL (OUTPATIENT)
Age: 54
End: 2019-06-23

## 2019-08-05 ENCOUNTER — RX RENEWAL (OUTPATIENT)
Age: 54
End: 2019-08-05

## 2019-08-05 RX ORDER — BLOOD SUGAR DIAGNOSTIC
STRIP MISCELLANEOUS
Qty: 100 | Refills: 0 | Status: ACTIVE | COMMUNITY
Start: 2019-08-05 | End: 1900-01-01

## 2019-08-06 ENCOUNTER — RX RENEWAL (OUTPATIENT)
Age: 54
End: 2019-08-06

## 2019-08-13 ENCOUNTER — LABORATORY RESULT (OUTPATIENT)
Age: 54
End: 2019-08-13

## 2019-08-13 ENCOUNTER — APPOINTMENT (OUTPATIENT)
Dept: INTERNAL MEDICINE | Facility: CLINIC | Age: 54
End: 2019-08-13
Payer: COMMERCIAL

## 2019-08-13 VITALS
OXYGEN SATURATION: 99 % | DIASTOLIC BLOOD PRESSURE: 70 MMHG | WEIGHT: 160 LBS | BODY MASS INDEX: 27.31 KG/M2 | HEIGHT: 64 IN | HEART RATE: 82 BPM | TEMPERATURE: 97.8 F | SYSTOLIC BLOOD PRESSURE: 128 MMHG

## 2019-08-13 DIAGNOSIS — Z86.79 PERSONAL HISTORY OF OTHER DISEASES OF THE CIRCULATORY SYSTEM: ICD-10-CM

## 2019-08-13 DIAGNOSIS — Z87.898 PERSONAL HISTORY OF OTHER SPECIFIED CONDITIONS: ICD-10-CM

## 2019-08-13 PROCEDURE — 99408 AUDIT/DAST 15-30 MIN: CPT | Mod: 25

## 2019-08-13 PROCEDURE — 99396 PREV VISIT EST AGE 40-64: CPT | Mod: 25

## 2019-08-13 PROCEDURE — 99497 ADVNCD CARE PLAN 30 MIN: CPT | Mod: 25

## 2019-08-13 PROCEDURE — 36415 COLL VENOUS BLD VENIPUNCTURE: CPT

## 2019-08-13 NOTE — HEALTH RISK ASSESSMENT
[Good] : ~his/her~ current health as good [Monthly or less (1 pt)] : Monthly or less (1 point) [Yes] : Yes [1 or 2 (0 pts)] : 1 or 2 (0 points) [Never (0 pts)] : Never (0 points) [No falls in past year] : Patient reported no falls in the past year [0] : 1) Little interest or pleasure doing things: Not at all (0) [1] : 2) Feeling down, depressed, or hopeless for several days (1) [Patient reported colonoscopy was normal] : Patient reported colonoscopy was normal [Patient reported PAP Smear was normal] : Patient reported PAP Smear was normal [Hepatitis C test declined] : Hepatitis C test declined [HIV test declined] : HIV test declined [With Significant Other] : lives with significant other [Significant Other] : lives with significant other [Employed] : employed [Reports changes in hearing] : Reports changes in hearing [Feels Safe at Home] : Feels safe at home [Smoke Detector] : smoke detector [Carbon Monoxide Detector] : carbon monoxide detector [Seat Belt] :  uses seat belt [Sunscreen] : uses sunscreen [Patient reported mammogram was normal] : Patient reported mammogram was normal [Designated Healthcare Proxy] : Designated healthcare proxy [With Patient/Caregiver] : With Patient/Caregiver [Name: ___] : Health Care Proxy's Name: [unfilled]  [Relationship: ___] : Relationship: [unfilled] [] : No [Audit-CScore] : 1 [de-identified] : Bike riding [[id number]] : 1 [Reports changes in vision] : Reports no changes in vision [Reports changes in dental health] : Reports no changes in dental health [MammogramDate] : 05/19 [PapSmearDate] : 05/19 [ColonoscopyDate] : 01/14 [AdvancecareDate] : 08/19

## 2019-08-13 NOTE — ASSESSMENT
[FreeTextEntry1] : A 54 yrs old pt presents for CPE\par Discussed current USPSTF guidelines for preventative care

## 2019-08-13 NOTE — HISTORY OF PRESENT ILLNESS
[de-identified] : The pt presents for CPE\par WAs sick over Memorial Day and had  lingering cough that responded to Qvar\par The cough has now subsided\par Checks blood sugar at home and am readings are 130mg range\par Was advised to use Metformin for elevated A1C but she is not doing so

## 2019-08-14 ENCOUNTER — RX RENEWAL (OUTPATIENT)
Age: 54
End: 2019-08-14

## 2019-08-14 LAB
25(OH)D3 SERPL-MCNC: 29.5 NG/ML
ALBUMIN SERPL ELPH-MCNC: 4.8 G/DL
ALP BLD-CCNC: 88 U/L
ALT SERPL-CCNC: 31 U/L
ANION GAP SERPL CALC-SCNC: 14 MMOL/L
AST SERPL-CCNC: 20 U/L
BASOPHILS # BLD AUTO: 0.03 K/UL
BASOPHILS NFR BLD AUTO: 0.5 %
BILIRUB SERPL-MCNC: 0.4 MG/DL
BUN SERPL-MCNC: 12 MG/DL
CALCIUM SERPL-MCNC: 9.8 MG/DL
CHLORIDE SERPL-SCNC: 103 MMOL/L
CHOLEST SERPL-MCNC: 204 MG/DL
CHOLEST/HDLC SERPL: 3.5 RATIO
CO2 SERPL-SCNC: 26 MMOL/L
CREAT SERPL-MCNC: 0.64 MG/DL
EOSINOPHIL # BLD AUTO: 0.06 K/UL
EOSINOPHIL NFR BLD AUTO: 1 %
ESTIMATED AVERAGE GLUCOSE: 134 MG/DL
GLUCOSE SERPL-MCNC: 117 MG/DL
HBA1C MFR BLD HPLC: 6.3 %
HCT VFR BLD CALC: 42.4 %
HDLC SERPL-MCNC: 59 MG/DL
HGB BLD-MCNC: 13.3 G/DL
IMM GRANULOCYTES NFR BLD AUTO: 0.2 %
LDLC SERPL CALC-MCNC: 74 MG/DL
LYMPHOCYTES # BLD AUTO: 2.52 K/UL
LYMPHOCYTES NFR BLD AUTO: 40.6 %
MAN DIFF?: NORMAL
MCHC RBC-ENTMCNC: 29.6 PG
MCHC RBC-ENTMCNC: 31.4 GM/DL
MCV RBC AUTO: 94.4 FL
MONOCYTES # BLD AUTO: 0.51 K/UL
MONOCYTES NFR BLD AUTO: 8.2 %
NEUTROPHILS # BLD AUTO: 3.07 K/UL
NEUTROPHILS NFR BLD AUTO: 49.5 %
PLATELET # BLD AUTO: 275 K/UL
POTASSIUM SERPL-SCNC: 5.3 MMOL/L
PROT SERPL-MCNC: 7.2 G/DL
RBC # BLD: 4.49 M/UL
RBC # FLD: 13.2 %
SODIUM SERPL-SCNC: 143 MMOL/L
TRIGL SERPL-MCNC: 353 MG/DL
TSH SERPL-ACNC: 0.67 UIU/ML
WBC # FLD AUTO: 6.2 K/UL

## 2019-08-22 LAB
A ALTERNATA IGE QN: NORMAL
A FUMIGATUS IGE QN: <0.1 KUA/L
BERMUDA GRASS IGE QN: <0.1 KUA/L
BOXELDER IGE QN: <0.1 KUA/L
C HERBARUM IGE QN: 0.12 KUA/L
CALIF WALNUT IGE QN: NORMAL
CAT DANDER IGE QN: <0.1 KUA/L
CMN PIGWEED IGE QN: <0.1 KUA/L
COMMON RAGWEED IGE QN: <0.1 KUA/L
COTTONWOOD IGE QN: <0.1 KUA/L
D FARINAE IGE QN: <0.1 KUA/L
D PTERONYSS IGE QN: <0.1 KUA/L
DEPRECATED A ALTERNATA IGE RAST QL: NORMAL
DEPRECATED A FUMIGATUS IGE RAST QL: 0
DEPRECATED BERMUDA GRASS IGE RAST QL: 0
DEPRECATED BOXELDER IGE RAST QL: 0
DEPRECATED C HERBARUM IGE RAST QL: NORMAL
DEPRECATED CAT DANDER IGE RAST QL: 0
DEPRECATED COMMON PIGWEED IGE RAST QL: 0
DEPRECATED COMMON RAGWEED IGE RAST QL: 0
DEPRECATED COTTONWOOD IGE RAST QL: 0
DEPRECATED D FARINAE IGE RAST QL: 0
DEPRECATED D PTERONYSS IGE RAST QL: 0
DEPRECATED DOG DANDER IGE RAST QL: 0
DEPRECATED GOOSEFOOT IGE RAST QL: 0
DEPRECATED LONDON PLANE IGE RAST QL: NORMAL
DEPRECATED MUGWORT IGE RAST QL: NORMAL
DEPRECATED P NOTATUM IGE RAST QL: 0
DEPRECATED RED CEDAR IGE RAST QL: NORMAL
DEPRECATED ROACH IGE RAST QL: 0
DEPRECATED SHEEP SORREL IGE RAST QL: NORMAL
DEPRECATED SILVER BIRCH IGE RAST QL: 0
DEPRECATED TIMOTHY IGE RAST QL: NORMAL
DEPRECATED WHITE ASH IGE RAST QL: NORMAL
DEPRECATED WHITE OAK IGE RAST QL: NORMAL
DOG DANDER IGE QN: <0.1 KUA/L
GOOSEFOOT IGE QN: <0.1 KUA/L
LONDON PLANE IGE QN: NORMAL
MUGWORT IGE QN: NORMAL
MULBERRY (T70) CLASS: NORMAL
MULBERRY (T70) CONC: NORMAL
P NOTATUM IGE QN: <0.1 KUA/L
RED CEDAR IGE QN: NORMAL
ROACH IGE QN: <0.1 KUA/L
SHEEP SORREL IGE QN: NORMAL
SILVER BIRCH IGE QN: <0.1 KUA/L
TIMOTHY IGE QN: NORMAL
TREE ALLERG MIX1 IGE QL: NORMAL
WHITE ASH IGE QN: NORMAL
WHITE ELM IGE QN: 0
WHITE ELM IGE QN: <0.1 KUA/L
WHITE OAK IGE QN: NORMAL

## 2019-09-05 ENCOUNTER — RX RENEWAL (OUTPATIENT)
Age: 54
End: 2019-09-05

## 2019-10-02 ENCOUNTER — APPOINTMENT (OUTPATIENT)
Dept: CARDIOLOGY | Facility: CLINIC | Age: 54
End: 2019-10-02
Payer: COMMERCIAL

## 2019-10-02 ENCOUNTER — NON-APPOINTMENT (OUTPATIENT)
Age: 54
End: 2019-10-02

## 2019-10-02 VITALS — BODY MASS INDEX: 27.31 KG/M2 | HEIGHT: 64 IN | WEIGHT: 160 LBS | HEART RATE: 75 BPM | OXYGEN SATURATION: 98 %

## 2019-10-02 VITALS — DIASTOLIC BLOOD PRESSURE: 60 MMHG | SYSTOLIC BLOOD PRESSURE: 120 MMHG

## 2019-10-02 PROCEDURE — 99214 OFFICE O/P EST MOD 30 MIN: CPT

## 2019-10-02 PROCEDURE — 93000 ELECTROCARDIOGRAM COMPLETE: CPT

## 2019-10-02 NOTE — HISTORY OF PRESENT ILLNESS
[FreeTextEntry1] : Within the last of bloody she had 2 episodes of chest pain.  One was sharp and the other was dull lasting seconds.  It was unrelated to exertion or respiration. A few days ago she also had back pain in between shoulder blades lasting couple of minutes.  She has been having reflux. She has also been sweating profusely and feeling hot.  She thinks that she may be going through menopause.\par \par Palpitation is infrequent

## 2019-10-02 NOTE — PHYSICAL EXAM
[General Appearance - Well Developed] : well developed [Normal Appearance] : normal appearance [Well Groomed] : well groomed [General Appearance - Well Nourished] : well nourished [No Deformities] : no deformities [General Appearance - In No Acute Distress] : no acute distress [Normal Conjunctiva] : the conjunctiva exhibited no abnormalities [Eyelids - No Xanthelasma] : the eyelids demonstrated no xanthelasmas [Normal Oral Mucosa] : normal oral mucosa [No Oral Pallor] : no oral pallor [No Oral Cyanosis] : no oral cyanosis [Normal Jugular Venous A Waves Present] : normal jugular venous A waves present [Normal Jugular Venous V Waves Present] : normal jugular venous V waves present [No Jugular Venous Sousa A Waves] : no jugular venous sousa A waves [Respiration, Rhythm And Depth] : normal respiratory rhythm and effort [Exaggerated Use Of Accessory Muscles For Inspiration] : no accessory muscle use [Auscultation Breath Sounds / Voice Sounds] : lungs were clear to auscultation bilaterally [Heart Rate And Rhythm] : heart rate and rhythm were normal [Heart Sounds] : normal S1 and S2 [Murmurs] : no murmurs present [Abdomen Soft] : soft [Abdomen Tenderness] : non-tender [Abdomen Mass (___ Cm)] : no abdominal mass palpated [Gait - Sufficient For Exercise Testing] : the gait was sufficient for exercise testing [Abnormal Walk] : normal gait [Nail Clubbing] : no clubbing of the fingernails [Cyanosis, Localized] : no localized cyanosis [Petechial Hemorrhages (___cm)] : no petechial hemorrhages [Skin Color & Pigmentation] : normal skin color and pigmentation [] : no rash [Skin Lesions] : no skin lesions [No Venous Stasis] : no venous stasis [No Skin Ulcers] : no skin ulcer [No Xanthoma] : no  xanthoma was observed [Oriented To Time, Place, And Person] : oriented to person, place, and time [Affect] : the affect was normal [Mood] : the mood was normal [No Anxiety] : not feeling anxious [FreeTextEntry1] : There is a small firm somewhat mobile lymph node in the upper cervical region.  It is not tender.

## 2019-10-02 NOTE — ASSESSMENT
[FreeTextEntry1] : Cardiac wise she is stable.  She requests an echo to be done because of history of VPCs and also it has been a very long time since she had one done that there was no problem with left-ventricular size and function.

## 2019-10-02 NOTE — REASON FOR VISIT
[Follow-Up - Clinic] : a clinic follow-up of [Chest Pain] : chest pain [Hypertension] : hypertension [Palpitations] : palpitations [FreeTextEntry1] : VPCs,

## 2019-10-02 NOTE — DISCUSSION/SUMMARY
[FreeTextEntry1] : As she is stable, I didn't make any changes in her treatment.  I reassured her that her chest pain was atypical and unlikely to be from the heart.  Because of history of palpitation and VPCsI really get a follow-up echocardiogram.

## 2019-10-07 ENCOUNTER — RX RENEWAL (OUTPATIENT)
Age: 54
End: 2019-10-07

## 2019-10-17 ENCOUNTER — RX RENEWAL (OUTPATIENT)
Age: 54
End: 2019-10-17

## 2019-10-21 ENCOUNTER — APPOINTMENT (OUTPATIENT)
Dept: OTOLARYNGOLOGY | Facility: CLINIC | Age: 54
End: 2019-10-21

## 2019-10-22 ENCOUNTER — APPOINTMENT (OUTPATIENT)
Dept: CARDIOLOGY | Facility: CLINIC | Age: 54
End: 2019-10-22
Payer: COMMERCIAL

## 2019-10-22 PROCEDURE — 93306 TTE W/DOPPLER COMPLETE: CPT

## 2019-11-05 ENCOUNTER — RX RENEWAL (OUTPATIENT)
Age: 54
End: 2019-11-05

## 2019-11-11 ENCOUNTER — APPOINTMENT (OUTPATIENT)
Dept: OTOLARYNGOLOGY | Facility: CLINIC | Age: 54
End: 2019-11-11
Payer: COMMERCIAL

## 2019-11-11 VITALS
HEART RATE: 88 BPM | BODY MASS INDEX: 27.31 KG/M2 | HEIGHT: 64 IN | SYSTOLIC BLOOD PRESSURE: 109 MMHG | OXYGEN SATURATION: 98 % | WEIGHT: 160 LBS | DIASTOLIC BLOOD PRESSURE: 70 MMHG

## 2019-11-11 DIAGNOSIS — R59.0 LOCALIZED ENLARGED LYMPH NODES: ICD-10-CM

## 2019-11-11 PROCEDURE — 99213 OFFICE O/P EST LOW 20 MIN: CPT

## 2019-11-12 PROBLEM — R59.0 LYMPHADENOPATHY, CERVICAL: Status: RESOLVED | Noted: 2017-09-05 | Resolved: 2019-11-12

## 2019-11-12 NOTE — ASSESSMENT
[FreeTextEntry1] : 53 yo F with hx of reactive appearing left occipital lymph node that has resolved.\par \par - Advised for SC joint pain, treatment would be PT, NSAIDS, possible steroid injection.  If symptoms worsen, follow up with PCP.\par - f/u prn.\par - Pt verbalized understanding of above.

## 2019-11-12 NOTE — PHYSICAL EXAM
[Normal] : orientation to person, place, and time: normal [de-identified] : well healed thyroidectomy scar site

## 2019-11-22 ENCOUNTER — RX RENEWAL (OUTPATIENT)
Age: 54
End: 2019-11-22

## 2019-12-04 ENCOUNTER — RX RENEWAL (OUTPATIENT)
Age: 54
End: 2019-12-04

## 2019-12-06 ENCOUNTER — RX RENEWAL (OUTPATIENT)
Age: 54
End: 2019-12-06

## 2019-12-10 ENCOUNTER — RX RENEWAL (OUTPATIENT)
Age: 54
End: 2019-12-10

## 2019-12-16 ENCOUNTER — RX RENEWAL (OUTPATIENT)
Age: 54
End: 2019-12-16

## 2019-12-23 ENCOUNTER — RX RENEWAL (OUTPATIENT)
Age: 54
End: 2019-12-23

## 2019-12-31 ENCOUNTER — RX RENEWAL (OUTPATIENT)
Age: 54
End: 2019-12-31

## 2020-01-06 ENCOUNTER — RX RENEWAL (OUTPATIENT)
Age: 55
End: 2020-01-06

## 2020-01-06 ENCOUNTER — MOBILE ON CALL (OUTPATIENT)
Age: 55
End: 2020-01-06

## 2020-01-06 ENCOUNTER — CHART COPY (OUTPATIENT)
Age: 55
End: 2020-01-06

## 2020-01-08 ENCOUNTER — APPOINTMENT (OUTPATIENT)
Dept: INTERNAL MEDICINE | Facility: CLINIC | Age: 55
End: 2020-01-08
Payer: COMMERCIAL

## 2020-01-08 VITALS
WEIGHT: 160 LBS | HEART RATE: 81 BPM | TEMPERATURE: 97.7 F | SYSTOLIC BLOOD PRESSURE: 109 MMHG | DIASTOLIC BLOOD PRESSURE: 73 MMHG | BODY MASS INDEX: 27.31 KG/M2 | HEIGHT: 64 IN | OXYGEN SATURATION: 97 %

## 2020-01-08 DIAGNOSIS — E03.4 ATROPHY OF THYROID (ACQUIRED): ICD-10-CM

## 2020-01-08 DIAGNOSIS — I87.2 VENOUS INSUFFICIENCY (CHRONIC) (PERIPHERAL): ICD-10-CM

## 2020-01-08 DIAGNOSIS — M25.811 OTHER SPECIFIED JOINT DISORDERS, RIGHT SHOULDER: ICD-10-CM

## 2020-01-08 DIAGNOSIS — E78.00 PURE HYPERCHOLESTEROLEMIA, UNSPECIFIED: ICD-10-CM

## 2020-01-08 DIAGNOSIS — I49.3 VENTRICULAR PREMATURE DEPOLARIZATION: ICD-10-CM

## 2020-01-08 DIAGNOSIS — R07.89 OTHER CHEST PAIN: ICD-10-CM

## 2020-01-08 PROCEDURE — 99214 OFFICE O/P EST MOD 30 MIN: CPT

## 2020-01-08 RX ORDER — FELODIPINE 10 MG/1
10 TABLET, EXTENDED RELEASE ORAL
Qty: 30 | Refills: 1 | Status: COMPLETED | COMMUNITY
Start: 2018-01-03 | End: 2020-01-08

## 2020-01-08 NOTE — HISTORY OF PRESENT ILLNESS
[FreeTextEntry1] : says legs swell off and on fro 5+ years but never this bad. was ill in dec with gastroenteritis and says really bad since then [de-identified] : father had Varicose veins, swelling down in am, worse at night\par no h/o chf,live disease or renal disesase. labs reviewed\par also stoppe dt the atorvastitin and joint pain and muscle pain went away. not sure she wants to take it any longer but is worried about her triglycerides

## 2020-01-08 NOTE — ASSESSMENT
[FreeTextEntry1] : venous insuff, complicated by felodipine\par side effects ?? of atorvastatin , concern with hi trig

## 2020-01-08 NOTE — PHYSICAL EXAM
[Normal] : no respiratory distress, lungs were clear to auscultation bilaterally and no accessory muscle use [de-identified] : 2-3+ non pitting edema of lower ext to mid calf, no obvious varicoase veins from no calf tenderness

## 2020-01-08 NOTE — REVIEW OF SYSTEMS
[Negative] : Gastrointestinal [FreeTextEntry5] : says legs were swollen on the amlodipine and that metoprolol is for palps,  [FreeTextEntry9] : see above

## 2020-01-08 NOTE — PLAN
[FreeTextEntry1] : stop felodipine\par labs. reviewed\par lisinopril hctz, follow bp \par venous doppler\par consider vascular doc\par \par also advised her to reconsider the chol question by stopping the atorvastatin for a full 6 weeks and going for blood work off the emds and also follow sx of muscle pain.

## 2020-01-08 NOTE — HEALTH RISK ASSESSMENT
[No falls in past year] : Patient reported no falls in the past year [No] : No [0] : 2) Feeling down, depressed, or hopeless: Not at all (0) [] : No [de-identified] : cardiologist  [Audit-CScore] : 0 [de-identified] : none [de-identified] : no meat  [[id number]] : 0

## 2020-02-03 ENCOUNTER — RX RENEWAL (OUTPATIENT)
Age: 55
End: 2020-02-03

## 2020-02-04 ENCOUNTER — RX RENEWAL (OUTPATIENT)
Age: 55
End: 2020-02-04

## 2020-02-05 ENCOUNTER — OUTPATIENT (OUTPATIENT)
Dept: OUTPATIENT SERVICES | Facility: HOSPITAL | Age: 55
LOS: 1 days | End: 2020-02-05

## 2020-02-05 ENCOUNTER — APPOINTMENT (OUTPATIENT)
Dept: ULTRASOUND IMAGING | Facility: CLINIC | Age: 55
End: 2020-02-05

## 2020-02-05 DIAGNOSIS — Z00.8 ENCOUNTER FOR OTHER GENERAL EXAMINATION: ICD-10-CM

## 2020-02-24 LAB
CHOLEST SERPL-MCNC: 260 MG/DL
CHOLEST/HDLC SERPL: 5.4 RATIO
ESTIMATED AVERAGE GLUCOSE: 137 MG/DL
HBA1C MFR BLD HPLC: 6.4 %
HDLC SERPL-MCNC: 48 MG/DL
LDLC SERPL CALC-MCNC: 156 MG/DL
TRIGL SERPL-MCNC: 279 MG/DL

## 2020-03-02 ENCOUNTER — RX RENEWAL (OUTPATIENT)
Age: 55
End: 2020-03-02

## 2020-03-11 ENCOUNTER — TRANSCRIPTION ENCOUNTER (OUTPATIENT)
Age: 55
End: 2020-03-11

## 2020-03-12 ENCOUNTER — TRANSCRIPTION ENCOUNTER (OUTPATIENT)
Age: 55
End: 2020-03-12

## 2020-03-14 ENCOUNTER — RX RENEWAL (OUTPATIENT)
Age: 55
End: 2020-03-14

## 2020-03-25 ENCOUNTER — TRANSCRIPTION ENCOUNTER (OUTPATIENT)
Age: 55
End: 2020-03-25

## 2020-03-26 ENCOUNTER — TRANSCRIPTION ENCOUNTER (OUTPATIENT)
Age: 55
End: 2020-03-26

## 2020-03-30 ENCOUNTER — TRANSCRIPTION ENCOUNTER (OUTPATIENT)
Age: 55
End: 2020-03-30

## 2020-03-31 ENCOUNTER — TRANSCRIPTION ENCOUNTER (OUTPATIENT)
Age: 55
End: 2020-03-31

## 2020-04-01 ENCOUNTER — TRANSCRIPTION ENCOUNTER (OUTPATIENT)
Age: 55
End: 2020-04-01

## 2020-04-07 ENCOUNTER — TRANSCRIPTION ENCOUNTER (OUTPATIENT)
Age: 55
End: 2020-04-07

## 2020-04-14 ENCOUNTER — RX RENEWAL (OUTPATIENT)
Age: 55
End: 2020-04-14

## 2020-04-17 ENCOUNTER — TRANSCRIPTION ENCOUNTER (OUTPATIENT)
Age: 55
End: 2020-04-17

## 2020-04-20 ENCOUNTER — TRANSCRIPTION ENCOUNTER (OUTPATIENT)
Age: 55
End: 2020-04-20

## 2020-04-22 ENCOUNTER — TRANSCRIPTION ENCOUNTER (OUTPATIENT)
Age: 55
End: 2020-04-22

## 2020-04-24 ENCOUNTER — APPOINTMENT (OUTPATIENT)
Dept: CARDIOLOGY | Facility: CLINIC | Age: 55
End: 2020-04-24
Payer: COMMERCIAL

## 2020-04-24 PROCEDURE — 99442: CPT

## 2020-04-24 NOTE — HISTORY OF PRESENT ILLNESS
[Verbal consent obtained from patient] : the patient, [unfilled] [FreeTextEntry1] : Either on 21 March a few days earlier she started having high fever of up to 104 degrees, anosmia diarrhea, confusion and subsequently some shortness of breath.  Patient symptom got worse she went to [facility] on 29 March.  Blood pressure was 92/45.  Chest x-ray showed double pneumonia.  COVID test was done and she was told to stay in the hospital.  However she did not think it was a good idea for her to be in the hospital and she signed out from the ER.  2 days later she got report that her COVID test was positive.\par \par She did not think it was a good idea for her to take felodipine and she stopped taking it on 30 March.  She continued taking metoprolol.  She had been checking her blood pressure regularly since then and it has been normal.  This morning it was 110/70.\par \par At the recommendation of HR she had Covid test repeated 2 days ago.  She got the result today that it was inconclusive.  Apparently she has to take it once again.  If that test is positive she will not be allowed to go back to work.  She needed to become COVID negative.  It was not antibody test.\par \par She has been afebrile for 3 weeks.  She feels very weak.  She also has a lingering cough.  Otherwise she feels okay.  She had isolated herself for 3 weeks.\par \par I told her to stay off of felodipine for the time being.  I will follow-up in 1 month and then make further recommendations.  I told her to continue with metoprolol. [Time Spent: ___ minutes] : I have spent [unfilled] minutes with the patient on the telephone

## 2020-04-27 ENCOUNTER — TRANSCRIPTION ENCOUNTER (OUTPATIENT)
Age: 55
End: 2020-04-27

## 2020-04-28 ENCOUNTER — RX RENEWAL (OUTPATIENT)
Age: 55
End: 2020-04-28

## 2020-05-04 ENCOUNTER — TRANSCRIPTION ENCOUNTER (OUTPATIENT)
Age: 55
End: 2020-05-04

## 2020-05-19 ENCOUNTER — TRANSCRIPTION ENCOUNTER (OUTPATIENT)
Age: 55
End: 2020-05-19

## 2020-05-28 ENCOUNTER — TRANSCRIPTION ENCOUNTER (OUTPATIENT)
Age: 55
End: 2020-05-28

## 2020-06-06 ENCOUNTER — RX RENEWAL (OUTPATIENT)
Age: 55
End: 2020-06-06

## 2020-06-11 ENCOUNTER — TRANSCRIPTION ENCOUNTER (OUTPATIENT)
Age: 55
End: 2020-06-11

## 2020-06-12 ENCOUNTER — TRANSCRIPTION ENCOUNTER (OUTPATIENT)
Age: 55
End: 2020-06-12

## 2020-06-12 RX ORDER — BUDESONIDE AND FORMOTEROL FUMARATE DIHYDRATE 160; 4.5 UG/1; UG/1
160-4.5 AEROSOL RESPIRATORY (INHALATION) TWICE DAILY
Qty: 1 | Refills: 1 | Status: DISCONTINUED | COMMUNITY
Start: 2020-03-23 | End: 2020-06-12

## 2020-06-19 ENCOUNTER — NON-APPOINTMENT (OUTPATIENT)
Age: 55
End: 2020-06-19

## 2020-06-19 ENCOUNTER — APPOINTMENT (OUTPATIENT)
Dept: CARDIOLOGY | Facility: CLINIC | Age: 55
End: 2020-06-19
Payer: COMMERCIAL

## 2020-06-19 VITALS — SYSTOLIC BLOOD PRESSURE: 144 MMHG | DIASTOLIC BLOOD PRESSURE: 80 MMHG

## 2020-06-19 VITALS — HEIGHT: 64 IN | WEIGHT: 160 LBS | BODY MASS INDEX: 27.31 KG/M2 | OXYGEN SATURATION: 97 % | HEART RATE: 80 BPM

## 2020-06-19 VITALS — DIASTOLIC BLOOD PRESSURE: 80 MMHG | SYSTOLIC BLOOD PRESSURE: 144 MMHG

## 2020-06-19 DIAGNOSIS — I87.2 VENOUS INSUFFICIENCY (CHRONIC) (PERIPHERAL): ICD-10-CM

## 2020-06-19 PROCEDURE — 93000 ELECTROCARDIOGRAM COMPLETE: CPT

## 2020-06-19 PROCEDURE — 99214 OFFICE O/P EST MOD 30 MIN: CPT

## 2020-06-19 NOTE — HISTORY OF PRESENT ILLNESS
[FreeTextEntry1] : Towards the end of March she developed fever and shortness of breath and cough.  She went to the emergency room at [facility].  It was it was extremely hectic and chaotic.  She signed out AMA and 9 hours later.  Blood pressure in the ER was 92/45 and O2 saturation was 88% on room air.  With oxygen it had gone up to 100.\par \par Now she is having problem breathing if it is very humid.  She was given albuterol and Symbicort.  They did not help.  Now she is taking Qvar.\par \par Because of low blood pressure in the ER she stopped taking felodipine.  Once in a while she gets skipping of heartbeat.

## 2020-06-19 NOTE — ASSESSMENT
[FreeTextEntry1] : Her ambulatory blood pressure is high.  Apparently at home it is in the 116 range.  She also has a trigger finger in her left hand now.  Without any significant hemodynamic symptoms.

## 2020-06-19 NOTE — DISCUSSION/SUMMARY
[FreeTextEntry1] : I restarted felodipine but at the lower dose of 5 mg.  I will follow-up in a month.  At that time I will recheck the accuracy of her blood pressure machine.  If she has any symptoms on 5 mg of blood pressure goes too low then I will cut down the dose to 2.5 mg.

## 2020-06-19 NOTE — PHYSICAL EXAM
[General Appearance - Well Developed] : well developed [Normal Appearance] : normal appearance [Well Groomed] : well groomed [General Appearance - Well Nourished] : well nourished [No Deformities] : no deformities [General Appearance - In No Acute Distress] : no acute distress [Normal Conjunctiva] : the conjunctiva exhibited no abnormalities [Eyelids - No Xanthelasma] : the eyelids demonstrated no xanthelasmas [Normal Oral Mucosa] : normal oral mucosa [No Oral Pallor] : no oral pallor [No Oral Cyanosis] : no oral cyanosis [Normal Jugular Venous A Waves Present] : normal jugular venous A waves present [No Jugular Venous Sousa A Waves] : no jugular venous sousa A waves [Normal Jugular Venous V Waves Present] : normal jugular venous V waves present [FreeTextEntry1] : There is a small firm somewhat mobile lymph node in the upper cervical region.  It is not tender. [Respiration, Rhythm And Depth] : normal respiratory rhythm and effort [Exaggerated Use Of Accessory Muscles For Inspiration] : no accessory muscle use [Auscultation Breath Sounds / Voice Sounds] : lungs were clear to auscultation bilaterally [Heart Sounds] : normal S1 and S2 [Heart Rate And Rhythm] : heart rate and rhythm were normal [Murmurs] : no murmurs present [Abdomen Soft] : soft [Abdomen Tenderness] : non-tender [Abdomen Mass (___ Cm)] : no abdominal mass palpated [Abnormal Walk] : normal gait [Gait - Sufficient For Exercise Testing] : the gait was sufficient for exercise testing [Nail Clubbing] : no clubbing of the fingernails [Cyanosis, Localized] : no localized cyanosis [Petechial Hemorrhages (___cm)] : no petechial hemorrhages [Skin Color & Pigmentation] : normal skin color and pigmentation [] : no rash [No Venous Stasis] : no venous stasis [No Skin Ulcers] : no skin ulcer [Skin Lesions] : no skin lesions [No Xanthoma] : no  xanthoma was observed [Oriented To Time, Place, And Person] : oriented to person, place, and time [Affect] : the affect was normal [Mood] : the mood was normal [No Anxiety] : not feeling anxious

## 2020-06-23 ENCOUNTER — TRANSCRIPTION ENCOUNTER (OUTPATIENT)
Age: 55
End: 2020-06-23

## 2020-06-28 ENCOUNTER — TRANSCRIPTION ENCOUNTER (OUTPATIENT)
Age: 55
End: 2020-06-28

## 2020-06-29 ENCOUNTER — TRANSCRIPTION ENCOUNTER (OUTPATIENT)
Age: 55
End: 2020-06-29

## 2020-06-29 ENCOUNTER — RX RENEWAL (OUTPATIENT)
Age: 55
End: 2020-06-29

## 2020-06-30 ENCOUNTER — TRANSCRIPTION ENCOUNTER (OUTPATIENT)
Age: 55
End: 2020-06-30

## 2020-07-14 ENCOUNTER — RX RENEWAL (OUTPATIENT)
Age: 55
End: 2020-07-14

## 2020-07-22 ENCOUNTER — APPOINTMENT (OUTPATIENT)
Dept: CARDIOLOGY | Facility: CLINIC | Age: 55
End: 2020-07-22
Payer: COMMERCIAL

## 2020-07-22 VITALS — SYSTOLIC BLOOD PRESSURE: 118 MMHG | DIASTOLIC BLOOD PRESSURE: 60 MMHG

## 2020-07-22 VITALS — OXYGEN SATURATION: 98 % | BODY MASS INDEX: 26.98 KG/M2 | HEART RATE: 71 BPM | HEIGHT: 64 IN | WEIGHT: 158 LBS

## 2020-07-22 PROCEDURE — 99214 OFFICE O/P EST MOD 30 MIN: CPT

## 2020-07-22 NOTE — PHYSICAL EXAM
[General Appearance - Well Developed] : well developed [Normal Appearance] : normal appearance [Well Groomed] : well groomed [General Appearance - Well Nourished] : well nourished [No Deformities] : no deformities [General Appearance - In No Acute Distress] : no acute distress [Normal Conjunctiva] : the conjunctiva exhibited no abnormalities [Eyelids - No Xanthelasma] : the eyelids demonstrated no xanthelasmas [Normal Oral Mucosa] : normal oral mucosa [No Oral Pallor] : no oral pallor [No Oral Cyanosis] : no oral cyanosis [Normal Jugular Venous A Waves Present] : normal jugular venous A waves present [Normal Jugular Venous V Waves Present] : normal jugular venous V waves present [FreeTextEntry1] : There is a small firm somewhat mobile lymph node in the upper cervical region.  It is not tender. [No Jugular Venous Sousa A Waves] : no jugular venous sousa A waves [Respiration, Rhythm And Depth] : normal respiratory rhythm and effort [Exaggerated Use Of Accessory Muscles For Inspiration] : no accessory muscle use [Auscultation Breath Sounds / Voice Sounds] : lungs were clear to auscultation bilaterally [Heart Sounds] : normal S1 and S2 [Heart Rate And Rhythm] : heart rate and rhythm were normal [Murmurs] : no murmurs present [Abdomen Soft] : soft [Abdomen Mass (___ Cm)] : no abdominal mass palpated [Abdomen Tenderness] : non-tender [Gait - Sufficient For Exercise Testing] : the gait was sufficient for exercise testing [Abnormal Walk] : normal gait [Cyanosis, Localized] : no localized cyanosis [Petechial Hemorrhages (___cm)] : no petechial hemorrhages [Nail Clubbing] : no clubbing of the fingernails [Skin Color & Pigmentation] : normal skin color and pigmentation [No Venous Stasis] : no venous stasis [] : no rash [No Skin Ulcers] : no skin ulcer [Skin Lesions] : no skin lesions [Oriented To Time, Place, And Person] : oriented to person, place, and time [No Xanthoma] : no  xanthoma was observed [Affect] : the affect was normal [No Anxiety] : not feeling anxious [Mood] : the mood was normal

## 2020-07-22 NOTE — HISTORY OF PRESENT ILLNESS
[FreeTextEntry1] : She continues to get intermittent shortness of breath.  It is also difficult with the mask on.  Palpitation is better.  She also started taking 250 mg of magnesium daily.

## 2020-07-22 NOTE — ASSESSMENT
[FreeTextEntry1] : Her blood pressure is better with 5 mg of felodipine.  There are no obvious side effects.  It is important to rule out any lung condition following recent coinfection as she sometimes still gets exertional dyspnea.

## 2020-07-22 NOTE — REASON FOR VISIT
[Follow-Up - Clinic] : a clinic follow-up of [Hyperlipidemia] : hyperlipidemia [Hypertension] : hypertension [Dyspnea] : dyspnea [FreeTextEntry1] : preDM, h/o covid inf

## 2020-07-22 NOTE — DISCUSSION/SUMMARY
[FreeTextEntry1] : I did not make any changes at this time.  Chest x-ray was ordered to rule out any residual infiltrates or fibrosis etc.  She was keen to have pulmonary evaluation.  I agree and therefore I referred her to pulmonologist for evaluation.

## 2020-07-29 ENCOUNTER — RX RENEWAL (OUTPATIENT)
Age: 55
End: 2020-07-29

## 2020-08-15 ENCOUNTER — RX RENEWAL (OUTPATIENT)
Age: 55
End: 2020-08-15

## 2020-08-25 ENCOUNTER — TRANSCRIPTION ENCOUNTER (OUTPATIENT)
Age: 55
End: 2020-08-25

## 2020-08-25 ENCOUNTER — RX RENEWAL (OUTPATIENT)
Age: 55
End: 2020-08-25

## 2020-09-16 ENCOUNTER — APPOINTMENT (OUTPATIENT)
Dept: CARDIOLOGY | Facility: CLINIC | Age: 55
End: 2020-09-16
Payer: COMMERCIAL

## 2020-09-16 VITALS — DIASTOLIC BLOOD PRESSURE: 50 MMHG | SYSTOLIC BLOOD PRESSURE: 110 MMHG

## 2020-09-16 VITALS — WEIGHT: 156 LBS | HEART RATE: 78 BPM | OXYGEN SATURATION: 98 % | BODY MASS INDEX: 26.78 KG/M2

## 2020-09-16 PROCEDURE — 99214 OFFICE O/P EST MOD 30 MIN: CPT

## 2020-09-16 NOTE — REASON FOR VISIT
[Follow-Up - Clinic] : a clinic follow-up of [Hyperlipidemia] : hyperlipidemia [Hypertension] : hypertension [FreeTextEntry1] : Prediabetes, exertional dyspnea

## 2020-09-16 NOTE — HISTORY OF PRESENT ILLNESS
[FreeTextEntry1] : She gets short of breath if she climbs about 4 or 5 flights of steps.  Walking on flat ground is okay unless she is rushing.  She had seen Dr. Neff for exertional dyspnea.  At that time d-dimer was elevated and CTPA was done which was negative.  There has not been much change in her shortness of breath.  She had COVID infection in March.  Subsequent chest x-rays and pulmonary function tests were normal.  Pulmonologist suggested that she get cardiology follow-up and work-up.  Also considered possibility of her being out of shape causing the shortness of breath.\par \par He always has reflux.  She has nausea as well.  Upper endoscopy has been scheduled for 8 October.

## 2020-09-16 NOTE — DISCUSSION/SUMMARY
[FreeTextEntry1] : Because of above I schedule her for an echocardiogram.  I have no hesitation in clearing her for an upper endoscopy.

## 2020-09-16 NOTE — ASSESSMENT
[FreeTextEntry1] : Her exertional dyspnea could be from not being in good physical shape or residual effect of her recent COVID infection.  She is concerned about possibility of COVID involvement of the heart causing some of her symptoms and requested that an echocardiogram be done.

## 2020-09-16 NOTE — PHYSICAL EXAM
[General Appearance - Well Developed] : well developed [Normal Appearance] : normal appearance [Well Groomed] : well groomed [General Appearance - Well Nourished] : well nourished [No Deformities] : no deformities [General Appearance - In No Acute Distress] : no acute distress [Normal Conjunctiva] : the conjunctiva exhibited no abnormalities [Normal Oral Mucosa] : normal oral mucosa [Eyelids - No Xanthelasma] : the eyelids demonstrated no xanthelasmas [No Oral Pallor] : no oral pallor [No Oral Cyanosis] : no oral cyanosis [Normal Jugular Venous A Waves Present] : normal jugular venous A waves present [No Jugular Venous Sousa A Waves] : no jugular venous sousa A waves [Normal Jugular Venous V Waves Present] : normal jugular venous V waves present [FreeTextEntry1] : There is a small firm somewhat mobile lymph node in the upper cervical region.  It is not tender. [Respiration, Rhythm And Depth] : normal respiratory rhythm and effort [Exaggerated Use Of Accessory Muscles For Inspiration] : no accessory muscle use [Auscultation Breath Sounds / Voice Sounds] : lungs were clear to auscultation bilaterally [Heart Rate And Rhythm] : heart rate and rhythm were normal [Heart Sounds] : normal S1 and S2 [Murmurs] : no murmurs present [Abdomen Soft] : soft [Abdomen Tenderness] : non-tender [Abdomen Mass (___ Cm)] : no abdominal mass palpated [Gait - Sufficient For Exercise Testing] : the gait was sufficient for exercise testing [Abnormal Walk] : normal gait [Nail Clubbing] : no clubbing of the fingernails [Petechial Hemorrhages (___cm)] : no petechial hemorrhages [Cyanosis, Localized] : no localized cyanosis [Skin Color & Pigmentation] : normal skin color and pigmentation [No Venous Stasis] : no venous stasis [] : no rash [Skin Lesions] : no skin lesions [No Skin Ulcers] : no skin ulcer [No Xanthoma] : no  xanthoma was observed [Oriented To Time, Place, And Person] : oriented to person, place, and time [Affect] : the affect was normal [Mood] : the mood was normal [No Anxiety] : not feeling anxious

## 2020-09-25 ENCOUNTER — APPOINTMENT (OUTPATIENT)
Dept: CARDIOLOGY | Facility: CLINIC | Age: 55
End: 2020-09-25
Payer: COMMERCIAL

## 2020-09-25 PROCEDURE — 93306 TTE W/DOPPLER COMPLETE: CPT

## 2020-09-30 ENCOUNTER — RX RENEWAL (OUTPATIENT)
Age: 55
End: 2020-09-30

## 2020-10-05 ENCOUNTER — TRANSCRIPTION ENCOUNTER (OUTPATIENT)
Age: 55
End: 2020-10-05

## 2020-10-29 ENCOUNTER — TRANSCRIPTION ENCOUNTER (OUTPATIENT)
Age: 55
End: 2020-10-29

## 2020-11-01 ENCOUNTER — RX RENEWAL (OUTPATIENT)
Age: 55
End: 2020-11-01

## 2020-11-12 ENCOUNTER — APPOINTMENT (OUTPATIENT)
Dept: INTERNAL MEDICINE | Facility: CLINIC | Age: 55
End: 2020-11-12
Payer: COMMERCIAL

## 2020-11-12 VITALS
WEIGHT: 153 LBS | HEIGHT: 63.5 IN | DIASTOLIC BLOOD PRESSURE: 70 MMHG | OXYGEN SATURATION: 98 % | TEMPERATURE: 98.1 F | HEART RATE: 81 BPM | BODY MASS INDEX: 26.77 KG/M2 | SYSTOLIC BLOOD PRESSURE: 120 MMHG

## 2020-11-12 DIAGNOSIS — J45.991 COUGH VARIANT ASTHMA: ICD-10-CM

## 2020-11-12 DIAGNOSIS — U07.1 COVID-19: ICD-10-CM

## 2020-11-12 DIAGNOSIS — Z00.00 ENCOUNTER FOR GENERAL ADULT MEDICAL EXAMINATION W/OUT ABNORMAL FINDINGS: ICD-10-CM

## 2020-11-12 PROCEDURE — 99072 ADDL SUPL MATRL&STAF TM PHE: CPT

## 2020-11-12 PROCEDURE — 90732 PPSV23 VACC 2 YRS+ SUBQ/IM: CPT

## 2020-11-12 PROCEDURE — G0009: CPT

## 2020-11-12 PROCEDURE — 36415 COLL VENOUS BLD VENIPUNCTURE: CPT

## 2020-11-12 PROCEDURE — G0444 DEPRESSION SCREEN ANNUAL: CPT

## 2020-11-12 PROCEDURE — 99396 PREV VISIT EST AGE 40-64: CPT | Mod: 25

## 2020-11-12 RX ORDER — BLOOD SUGAR DIAGNOSTIC
STRIP MISCELLANEOUS 3 TIMES DAILY
Qty: 270 | Refills: 1 | Status: DISCONTINUED | COMMUNITY
Start: 2018-06-11 | End: 2020-11-12

## 2020-11-12 NOTE — HEALTH RISK ASSESSMENT
[Good] : ~his/her~  mood as  good [No] : In the past 12 months have you used drugs other than those required for medical reasons? No [One fall no injury in past year] : Patient reported one fall in the past year without injury [0] : 2) Feeling down, depressed, or hopeless: Not at all (0) [Patient reported mammogram was normal] : Patient reported mammogram was normal [Patient reported colonoscopy was normal] : Patient reported colonoscopy was normal [HIV test declined] : HIV test declined [Hepatitis C test declined] : Hepatitis C test declined [With Significant Other] : lives with significant other [# of Members in Household ___] :  household currently consist of [unfilled] member(s) [Employed] : employed [Significant Other] : lives with significant other [Feels Safe at Home] : Feels safe at home [Reports changes in dental health] : Reports changes in dental health [Smoke Detector] : smoke detector [Carbon Monoxide Detector] : carbon monoxide detector [Seat Belt] :  uses seat belt [Sunscreen] : uses sunscreen [With Patient/Caregiver] : With Patient/Caregiver [] : No [de-identified] : Pulmonology [Audit-CScore] : 0 [de-identified] : None [de-identified] : Normal [[id number]] : 0 [Patient reported PAP Smear was normal] : Patient reported PAP Smear was normal [Change in mental status noted] : No change in mental status noted [Reports changes in hearing] : Reports no changes in hearing [Reports changes in vision] : Reports no changes in vision [MammogramDate] : 05/19 [PapSmearDate] : 5/20 [ColonoscopyDate] : 11/19 [AdvancecareDate] : 11/20

## 2020-11-12 NOTE — ASSESSMENT
[FreeTextEntry1] : A 55 yrs old pt presents for CPE \par Discussed current USPSTF guidelines for preventative care \par Please see my impression of assessed problems

## 2020-11-12 NOTE — PHYSICAL EXAM
[Well Nourished] : well nourished [PERRL] : pupils equal round and reactive to light [Normal Oropharynx] : the oropharynx was normal [No Lymphadenopathy] : no lymphadenopathy [Supple] : supple [No Respiratory Distress] : no respiratory distress  [Clear to Auscultation] : lungs were clear to auscultation bilaterally [Normal Rate] : normal rate  [Regular Rhythm] : with a regular rhythm [Normal S1, S2] : normal S1 and S2 [No Murmur] : no murmur heard [No Carotid Bruits] : no carotid bruits [Pedal Pulses Present] : the pedal pulses are present [No Edema] : there was no peripheral edema [Soft] : abdomen soft [Non Tender] : non-tender [Non-distended] : non-distended [No Masses] : no abdominal mass palpated [No HSM] : no HSM [Normal Posterior Cervical Nodes] : no posterior cervical lymphadenopathy [Normal Anterior Cervical Nodes] : no anterior cervical lymphadenopathy [No CVA Tenderness] : no CVA  tenderness [No Spinal Tenderness] : no spinal tenderness [Grossly Normal Strength/Tone] : grossly normal strength/tone [No Rash] : no rash [No Focal Deficits] : no focal deficits [Normal Gait] : normal gait [Normal Affect] : the affect was normal [Normal Insight/Judgement] : insight and judgment were intact [de-identified] : Recent breast exam by another provider  [FreeTextEntry1] : No FHx of breast/ovarian/colon Ca

## 2020-11-12 NOTE — HISTORY OF PRESENT ILLNESS
[de-identified] : The pt presents for CPE\par Did experience COVID 19 early in the pandemic\par She CTNs to experience short term memory loss\par Has minimal exertional dyspnea\par Recent CT angio WNL\par Is working 3 1/2 days \par \par

## 2020-11-13 ENCOUNTER — TRANSCRIPTION ENCOUNTER (OUTPATIENT)
Age: 55
End: 2020-11-13

## 2020-11-13 LAB
25(OH)D3 SERPL-MCNC: 42.5 NG/ML
ALBUMIN SERPL ELPH-MCNC: 4.6 G/DL
ALP BLD-CCNC: 85 U/L
ALT SERPL-CCNC: 22 U/L
ANION GAP SERPL CALC-SCNC: 18 MMOL/L
AST SERPL-CCNC: 18 U/L
BASOPHILS # BLD AUTO: 0.03 K/UL
BASOPHILS NFR BLD AUTO: 0.4 %
BILIRUB SERPL-MCNC: 0.3 MG/DL
BUN SERPL-MCNC: 12 MG/DL
CALCIUM SERPL-MCNC: 9.7 MG/DL
CHLORIDE SERPL-SCNC: 103 MMOL/L
CHOLEST SERPL-MCNC: 195 MG/DL
CO2 SERPL-SCNC: 22 MMOL/L
CREAT SERPL-MCNC: 0.54 MG/DL
CRP SERPL-MCNC: 0.17 MG/DL
DEPRECATED D DIMER PPP IA-ACNC: 228 NG/ML DDU
EOSINOPHIL # BLD AUTO: 0.03 K/UL
EOSINOPHIL NFR BLD AUTO: 0.4 %
ESTIMATED AVERAGE GLUCOSE: 146 MG/DL
GLUCOSE SERPL-MCNC: 133 MG/DL
HBA1C MFR BLD HPLC: 6.7 %
HCT VFR BLD CALC: 42 %
HDLC SERPL-MCNC: 62 MG/DL
HGB BLD-MCNC: 13.4 G/DL
IMM GRANULOCYTES NFR BLD AUTO: 0.3 %
LDLC SERPL CALC-MCNC: 94 MG/DL
LYMPHOCYTES # BLD AUTO: 2.07 K/UL
LYMPHOCYTES NFR BLD AUTO: 30 %
MAN DIFF?: NORMAL
MCHC RBC-ENTMCNC: 30.3 PG
MCHC RBC-ENTMCNC: 31.9 GM/DL
MCV RBC AUTO: 95 FL
MONOCYTES # BLD AUTO: 0.42 K/UL
MONOCYTES NFR BLD AUTO: 6.1 %
NEUTROPHILS # BLD AUTO: 4.32 K/UL
NEUTROPHILS NFR BLD AUTO: 62.8 %
NONHDLC SERPL-MCNC: 132 MG/DL
PLATELET # BLD AUTO: 310 K/UL
POTASSIUM SERPL-SCNC: 4.2 MMOL/L
PROT SERPL-MCNC: 6.6 G/DL
RBC # BLD: 4.42 M/UL
RBC # FLD: 12.8 %
SARS-COV-2 IGG SERPL IA-ACNC: 137 INDEX
SARS-COV-2 IGG SERPL QL IA: POSITIVE
SODIUM SERPL-SCNC: 143 MMOL/L
TRIGL SERPL-MCNC: 193 MG/DL
TSH SERPL-ACNC: 0.58 UIU/ML
WBC # FLD AUTO: 6.89 K/UL

## 2020-11-30 ENCOUNTER — RX RENEWAL (OUTPATIENT)
Age: 55
End: 2020-11-30

## 2020-12-16 ENCOUNTER — APPOINTMENT (OUTPATIENT)
Dept: CARDIOLOGY | Facility: CLINIC | Age: 55
End: 2020-12-16

## 2020-12-21 ENCOUNTER — RX RENEWAL (OUTPATIENT)
Age: 55
End: 2020-12-21

## 2021-03-12 ENCOUNTER — TRANSCRIPTION ENCOUNTER (OUTPATIENT)
Age: 56
End: 2021-03-12

## 2021-03-17 ENCOUNTER — APPOINTMENT (OUTPATIENT)
Dept: INTERNAL MEDICINE | Facility: CLINIC | Age: 56
End: 2021-03-17
Payer: COMMERCIAL

## 2021-03-17 ENCOUNTER — NON-APPOINTMENT (OUTPATIENT)
Age: 56
End: 2021-03-17

## 2021-03-17 ENCOUNTER — APPOINTMENT (OUTPATIENT)
Dept: CARDIOLOGY | Facility: CLINIC | Age: 56
End: 2021-03-17
Payer: COMMERCIAL

## 2021-03-17 VITALS
BODY MASS INDEX: 24.27 KG/M2 | HEIGHT: 63 IN | WEIGHT: 137 LBS | SYSTOLIC BLOOD PRESSURE: 110 MMHG | OXYGEN SATURATION: 98 % | HEART RATE: 74 BPM | DIASTOLIC BLOOD PRESSURE: 60 MMHG

## 2021-03-17 VITALS
TEMPERATURE: 98.3 F | BODY MASS INDEX: 24.5 KG/M2 | SYSTOLIC BLOOD PRESSURE: 110 MMHG | HEIGHT: 63.5 IN | HEART RATE: 87 BPM | DIASTOLIC BLOOD PRESSURE: 70 MMHG | OXYGEN SATURATION: 98 % | WEIGHT: 140 LBS

## 2021-03-17 DIAGNOSIS — J12.82 COVID-19: ICD-10-CM

## 2021-03-17 DIAGNOSIS — R06.00 DYSPNEA, UNSPECIFIED: ICD-10-CM

## 2021-03-17 DIAGNOSIS — U07.1 COVID-19: ICD-10-CM

## 2021-03-17 PROCEDURE — 36415 COLL VENOUS BLD VENIPUNCTURE: CPT

## 2021-03-17 PROCEDURE — 99072 ADDL SUPL MATRL&STAF TM PHE: CPT

## 2021-03-17 PROCEDURE — 99213 OFFICE O/P EST LOW 20 MIN: CPT | Mod: 25

## 2021-03-17 PROCEDURE — 93000 ELECTROCARDIOGRAM COMPLETE: CPT

## 2021-03-17 PROCEDURE — 99214 OFFICE O/P EST MOD 30 MIN: CPT

## 2021-03-17 RX ORDER — METFORMIN HYDROCHLORIDE 1000 MG/1
1000 TABLET, FILM COATED, EXTENDED RELEASE ORAL DAILY
Qty: 30 | Refills: 0 | Status: DISCONTINUED | COMMUNITY
Start: 2020-11-13 | End: 2021-03-17

## 2021-03-17 RX ORDER — METOPROLOL SUCCINATE 50 MG/1
50 TABLET, EXTENDED RELEASE ORAL
Qty: 90 | Refills: 1 | Status: DISCONTINUED | COMMUNITY
Start: 2018-09-10 | End: 2021-03-17

## 2021-03-17 NOTE — HISTORY OF PRESENT ILLNESS
[FreeTextEntry8] : The pt has developed C/O GERD in Nov and worsening \par Was seen by GI and underwent Endoscopy\par Is using famotidine with little relief\par Is now on PPI with better control\par Lost 15 lbs since Nov as she is not earing as much due to GERD

## 2021-03-17 NOTE — DISCUSSION/SUMMARY
[FreeTextEntry1] : At her request I discontinued temporarily metoprolol 50 mg tablets so the dose was decreased from 150 to 100 mg daily.  She wanted to see if she will do okay with a lower dose.  There is a possibility that her palpitation may come back.

## 2021-03-17 NOTE — HISTORY OF PRESENT ILLNESS
[FreeTextEntry1] : She denies any cardiac complaints.  Over the last few months she is having severe issues with GERD.  Famotidine did not help.  Omeprazole seems to be helping.  She is going to have endoscopy done on 6 April.  Her gastroenterologist had an done upper endoscopy in November.  There was a question of beginning of Richter's esophagus.

## 2021-03-17 NOTE — ASSESSMENT
[FreeTextEntry1] : She is doing well from cardiovascular point of view.  Blood pressure is very well controlled.  She has no cardiac symptoms.

## 2021-03-17 NOTE — PHYSICAL EXAM
[Well Nourished] : well nourished [Normal Sclera/Conjunctiva] : normal sclera/conjunctiva [Normal Oropharynx] : the oropharynx was normal [No Lymphadenopathy] : no lymphadenopathy [Supple] : supple [Clear to Auscultation] : lungs were clear to auscultation bilaterally [Normal Rate] : normal rate  [Regular Rhythm] : with a regular rhythm [Normal S1, S2] : normal S1 and S2 [Soft] : abdomen soft [Non Tender] : non-tender [Non-distended] : non-distended

## 2021-03-18 LAB
ALBUMIN SERPL ELPH-MCNC: 4.5 G/DL
ALP BLD-CCNC: 87 U/L
ALT SERPL-CCNC: 19 U/L
ANION GAP SERPL CALC-SCNC: 16 MMOL/L
AST SERPL-CCNC: 17 U/L
BASOPHILS # BLD AUTO: 0.03 K/UL
BASOPHILS NFR BLD AUTO: 0.5 %
BILIRUB SERPL-MCNC: 0.4 MG/DL
BUN SERPL-MCNC: 12 MG/DL
CALCIUM SERPL-MCNC: 9.8 MG/DL
CHLORIDE SERPL-SCNC: 104 MMOL/L
CO2 SERPL-SCNC: 24 MMOL/L
CREAT SERPL-MCNC: 0.59 MG/DL
EOSINOPHIL # BLD AUTO: 0.05 K/UL
EOSINOPHIL NFR BLD AUTO: 0.8 %
ESTIMATED AVERAGE GLUCOSE: 137 MG/DL
GLUCOSE SERPL-MCNC: 129 MG/DL
HBA1C MFR BLD HPLC: 6.4 %
HCT VFR BLD CALC: 41.7 %
HGB BLD-MCNC: 13.3 G/DL
IMM GRANULOCYTES NFR BLD AUTO: 0.2 %
LYMPHOCYTES # BLD AUTO: 2.16 K/UL
LYMPHOCYTES NFR BLD AUTO: 36.2 %
MAN DIFF?: NORMAL
MCHC RBC-ENTMCNC: 29.9 PG
MCHC RBC-ENTMCNC: 31.9 GM/DL
MCV RBC AUTO: 93.7 FL
MONOCYTES # BLD AUTO: 0.56 K/UL
MONOCYTES NFR BLD AUTO: 9.4 %
NEUTROPHILS # BLD AUTO: 3.16 K/UL
NEUTROPHILS NFR BLD AUTO: 52.9 %
PLATELET # BLD AUTO: 289 K/UL
POTASSIUM SERPL-SCNC: 4.8 MMOL/L
PROT SERPL-MCNC: 6.7 G/DL
RBC # BLD: 4.45 M/UL
RBC # FLD: 13.2 %
SODIUM SERPL-SCNC: 144 MMOL/L
TSH SERPL-ACNC: 1.49 UIU/ML
WBC # FLD AUTO: 5.97 K/UL

## 2021-03-22 ENCOUNTER — TRANSCRIPTION ENCOUNTER (OUTPATIENT)
Age: 56
End: 2021-03-22

## 2021-04-13 ENCOUNTER — APPOINTMENT (OUTPATIENT)
Dept: CARDIOLOGY | Facility: CLINIC | Age: 56
End: 2021-04-13

## 2021-04-26 ENCOUNTER — APPOINTMENT (OUTPATIENT)
Dept: SURGERY | Facility: CLINIC | Age: 56
End: 2021-04-26

## 2021-04-26 ENCOUNTER — RX RENEWAL (OUTPATIENT)
Age: 56
End: 2021-04-26

## 2021-04-30 ENCOUNTER — RX RENEWAL (OUTPATIENT)
Age: 56
End: 2021-04-30

## 2021-05-08 ENCOUNTER — RX RENEWAL (OUTPATIENT)
Age: 56
End: 2021-05-08

## 2021-05-08 RX ORDER — BLOOD SUGAR DIAGNOSTIC
STRIP MISCELLANEOUS DAILY
Qty: 1 | Refills: 0 | Status: ACTIVE | COMMUNITY
Start: 2021-05-08 | End: 1900-01-01

## 2021-05-11 ENCOUNTER — RX RENEWAL (OUTPATIENT)
Age: 56
End: 2021-05-11

## 2021-05-12 ENCOUNTER — APPOINTMENT (OUTPATIENT)
Dept: CARDIOLOGY | Facility: CLINIC | Age: 56
End: 2021-05-12
Payer: COMMERCIAL

## 2021-05-12 ENCOUNTER — NON-APPOINTMENT (OUTPATIENT)
Age: 56
End: 2021-05-12

## 2021-05-12 VITALS — HEART RATE: 85 BPM | BODY MASS INDEX: 22.85 KG/M2 | WEIGHT: 129 LBS | OXYGEN SATURATION: 98 %

## 2021-05-12 VITALS — DIASTOLIC BLOOD PRESSURE: 60 MMHG | SYSTOLIC BLOOD PRESSURE: 104 MMHG

## 2021-05-12 PROCEDURE — 93000 ELECTROCARDIOGRAM COMPLETE: CPT

## 2021-05-12 PROCEDURE — 99072 ADDL SUPL MATRL&STAF TM PHE: CPT

## 2021-05-12 PROCEDURE — 99215 OFFICE O/P EST HI 40 MIN: CPT

## 2021-05-12 RX ORDER — FELODIPINE 5 MG/1
5 TABLET, EXTENDED RELEASE ORAL DAILY
Qty: 90 | Refills: 1 | Status: DISCONTINUED | COMMUNITY
Start: 2020-03-31 | End: 2021-05-12

## 2021-05-12 NOTE — REVIEW OF SYSTEMS
[Weight Loss (___ Lbs)] : [unfilled] ~Ulb weight loss [Nausea] : nausea [Change in Appetite] : change in appetite [Negative] : Heme/Lymph

## 2021-05-12 NOTE — ASSESSMENT
[FreeTextEntry1] : She is feeling so saying that she actually took medical leave.  In the last 3 months she lost about 25 pounds weight. I do not think her symptoms on a cardiac basis.  I wonder if gastritis and reflux playing a major role.  Her blood pressures through the lower side.

## 2021-05-12 NOTE — CARDIOLOGY SUMMARY
[de-identified] : Sinus rhythm with minor nonspecific ST change in the inferior leads. there is no significant change when compared to previous EKG.

## 2021-05-12 NOTE — DISCUSSION/SUMMARY
[FreeTextEntry1] : I think the lightheadedness on standing is probably due to blood pressure being on the low side.  I told her to discontinue felodipine and cut down the dose of metoprolol to 50 mg daily.  Initially she used to take then 50 mg daily and the dose was decreased to 50 mg without any adverse effects as far as palpitation is concerned.\par \par I told her to discuss with PCP and consider increasing dose of omeprazole to 40 mg daily.  Perhaps addition of sucralfate may be helpful.  I also told her to consider using blocks at the head end of the table to help with the reflux.\par \par HIDA scan was negative.  She is to get CT of abdomen with contrast.  If all these tests are negative, she should consider having a second opinion from another gastroenterologist regarding her persisting symptoms which have been debilitating to her.

## 2021-05-12 NOTE — HISTORY OF PRESENT ILLNESS
[FreeTextEntry1] : She continues to lose weight.  She has been frequent to constant nausea.  Her appetite is low.  She has got an extensive GI workup and so far everything is negative.  Endoscopy showed chronic gastritis and she has reflux. Cough drop in any event.  She gets bouts of them and once she starts coughing it is difficult for her to stop.  Presently she is on omeprazole 20 mg once a day.she says any discomfort soon after she eats.\par \par She also feels lightheaded for a couple of minutes when she stands up.  Intermittently over the last 1 month she had a squeezing in the left side of the chest on a couple of occasions lasting less than a minute.  She cannot tell if it was related to inspiration or movement.  She does not know if there are any localized tenderness at that time.  She was standing in the lab at work when this thing happened.

## 2021-06-01 ENCOUNTER — RX RENEWAL (OUTPATIENT)
Age: 56
End: 2021-06-01

## 2021-06-21 ENCOUNTER — APPOINTMENT (OUTPATIENT)
Dept: CARDIOLOGY | Facility: CLINIC | Age: 56
End: 2021-06-21
Payer: COMMERCIAL

## 2021-06-21 VITALS — DIASTOLIC BLOOD PRESSURE: 50 MMHG | SYSTOLIC BLOOD PRESSURE: 98 MMHG

## 2021-06-21 VITALS — OXYGEN SATURATION: 99 % | TEMPERATURE: 97.7 F | BODY MASS INDEX: 21.79 KG/M2 | WEIGHT: 123 LBS | HEART RATE: 85 BPM

## 2021-06-21 DIAGNOSIS — J30.9 ALLERGIC RHINITIS, UNSPECIFIED: ICD-10-CM

## 2021-06-21 PROCEDURE — 99072 ADDL SUPL MATRL&STAF TM PHE: CPT

## 2021-06-21 PROCEDURE — 99214 OFFICE O/P EST MOD 30 MIN: CPT

## 2021-06-21 RX ORDER — BECLOMETHASONE DIPROPIONATE HFA 80 UG/1
80 AEROSOL, METERED RESPIRATORY (INHALATION) TWICE DAILY
Qty: 3 | Refills: 1 | Status: DISCONTINUED | COMMUNITY
Start: 2020-06-12 | End: 2021-06-21

## 2021-06-21 NOTE — ASSESSMENT
[FreeTextEntry1] : She continues to cough.  She has lost about 30 pounds weight.  She is trying to get a second opinion with GI.  She also wanted to see ID.  I am not sure if the hypotension is entirely due to metoprolol.

## 2021-06-21 NOTE — DISCUSSION/SUMMARY
[FreeTextEntry1] : She feels exhausted all the time.  Because of her symptoms and low blood pressure I reduce the dose of metoprolol to 25 mg daily.  I told to call me if she continues to have symptoms in which case I will discontinue the medication.  I encouraged her to drink plenty of water and potassium consider some salty food if she feels dizzy.  She has not fully recovered from the Covid infection.  She was being referred to the cares program for long ashish.  At her request I gave her referral to Dr. Saravia of the ID specialist.

## 2021-06-21 NOTE — REASON FOR VISIT
[Symptom and Test Evaluation] : symptom and test evaluation [Hyperlipidemia] : hyperlipidemia [Hypertension] : hypertension [FreeTextEntry1] : She continues to get postural dizziness in spite of reducing metoprolol to 50 mg daily.  Sometimes she has to hold onto something to prevent falling down.  It passes very quickly on standing.\par \par She saw a new pulmonologist Dr. Mohan Cruz, affiliated with [facility].  He changed her inhaler to Symbicort BID with a spacer a few days ago.  Her  told her that she is coughing a little less.  Qvar was discontinued.  He was not sure as to the reason for the cough.  He felt there were multiple possibilities.\par \par

## 2021-06-23 RX ORDER — METOPROLOL SUCCINATE 25 MG/1
25 TABLET, EXTENDED RELEASE ORAL DAILY
Qty: 90 | Refills: 0 | Status: DISCONTINUED | COMMUNITY
Start: 2019-05-07 | End: 2021-06-23

## 2021-06-29 ENCOUNTER — APPOINTMENT (OUTPATIENT)
Dept: INTERNAL MEDICINE | Facility: CLINIC | Age: 56
End: 2021-06-29
Payer: COMMERCIAL

## 2021-06-29 VITALS
HEART RATE: 104 BPM | HEIGHT: 63 IN | DIASTOLIC BLOOD PRESSURE: 70 MMHG | BODY MASS INDEX: 21.79 KG/M2 | OXYGEN SATURATION: 98 % | SYSTOLIC BLOOD PRESSURE: 110 MMHG | TEMPERATURE: 98 F | WEIGHT: 123 LBS

## 2021-06-29 PROCEDURE — 99214 OFFICE O/P EST MOD 30 MIN: CPT | Mod: 25

## 2021-06-29 PROCEDURE — 36415 COLL VENOUS BLD VENIPUNCTURE: CPT

## 2021-06-29 PROCEDURE — 99072 ADDL SUPL MATRL&STAF TM PHE: CPT

## 2021-06-29 NOTE — PHYSICAL EXAM
[Normal Sclera/Conjunctiva] : normal sclera/conjunctiva [Normal Oropharynx] : the oropharynx was normal [No Lymphadenopathy] : no lymphadenopathy [Supple] : supple [Clear to Auscultation] : lungs were clear to auscultation bilaterally [Normal Rate] : normal rate  [Regular Rhythm] : with a regular rhythm [Normal S1, S2] : normal S1 and S2 [No Edema] : there was no peripheral edema [de-identified] : Appears chronically ill

## 2021-06-29 NOTE — ASSESSMENT
[FreeTextEntry1] : The pt presents with a # of complaints \par Please see my impression of assessed problems

## 2021-06-29 NOTE — HISTORY OF PRESENT ILLNESS
[de-identified] : The pt returns for a follow up \par States she is unable to eat as she feels "full" \par Has developed worsening constipation\par Followed by GI /EGD essentially normal Last Colonoscopy in 2019 \par Has lost 30 lbs since January\par She had COVID in March 2020 /She recovered went back to work \par However received COVID VACCINE in JAN and since then she has CTN to experience her current symptoms \par No diarrhea/vomiting\par Had to come off all BP meds due to low BP readings\par She feels tired

## 2021-06-30 LAB
25(OH)D3 SERPL-MCNC: 34.2 NG/ML
ALBUMIN SERPL ELPH-MCNC: 4.2 G/DL
ALP BLD-CCNC: 90 U/L
ALT SERPL-CCNC: 16 U/L
ANION GAP SERPL CALC-SCNC: 16 MMOL/L
AST SERPL-CCNC: 15 U/L
BASOPHILS # BLD AUTO: 0.04 K/UL
BASOPHILS NFR BLD AUTO: 0.6 %
BILIRUB SERPL-MCNC: 0.5 MG/DL
BUN SERPL-MCNC: 6 MG/DL
CALCIUM SERPL-MCNC: 9.5 MG/DL
CHLORIDE SERPL-SCNC: 101 MMOL/L
CHOLEST SERPL-MCNC: 168 MG/DL
CO2 SERPL-SCNC: 23 MMOL/L
COVID-19 SPIKE DOMAIN ANTIBODY INTERPRETATION: POSITIVE
CREAT SERPL-MCNC: 0.62 MG/DL
EOSINOPHIL # BLD AUTO: 0.04 K/UL
EOSINOPHIL NFR BLD AUTO: 0.6 %
ESTIMATED AVERAGE GLUCOSE: 140 MG/DL
GLUCOSE SERPL-MCNC: 106 MG/DL
HBA1C MFR BLD HPLC: 6.5 %
HCT VFR BLD CALC: 42.9 %
HDLC SERPL-MCNC: 58 MG/DL
HGB BLD-MCNC: 13.2 G/DL
HIV1+2 AB SPEC QL IA.RAPID: NONREACTIVE
IMM GRANULOCYTES NFR BLD AUTO: 0.2 %
LDLC SERPL CALC-MCNC: 80 MG/DL
LYMPHOCYTES # BLD AUTO: 2.18 K/UL
LYMPHOCYTES NFR BLD AUTO: 33.5 %
MAN DIFF?: NORMAL
MCHC RBC-ENTMCNC: 29.2 PG
MCHC RBC-ENTMCNC: 30.8 GM/DL
MCV RBC AUTO: 94.9 FL
MONOCYTES # BLD AUTO: 0.45 K/UL
MONOCYTES NFR BLD AUTO: 6.9 %
NEUTROPHILS # BLD AUTO: 3.79 K/UL
NEUTROPHILS NFR BLD AUTO: 58.2 %
NONHDLC SERPL-MCNC: 110 MG/DL
PLATELET # BLD AUTO: 259 K/UL
POTASSIUM SERPL-SCNC: 4.3 MMOL/L
PROT SERPL-MCNC: 6.1 G/DL
RBC # BLD: 4.52 M/UL
RBC # FLD: 13.3 %
SARS-COV-2 AB SERPL IA-ACNC: >250 U/ML
SODIUM SERPL-SCNC: 141 MMOL/L
TRIGL SERPL-MCNC: 152 MG/DL
TSH SERPL-ACNC: 1.58 UIU/ML
WBC # FLD AUTO: 6.51 K/UL

## 2021-07-09 ENCOUNTER — APPOINTMENT (OUTPATIENT)
Dept: CARDIOLOGY | Facility: CLINIC | Age: 56
End: 2021-07-09
Payer: COMMERCIAL

## 2021-07-09 VITALS — BODY MASS INDEX: 21.61 KG/M2 | WEIGHT: 122 LBS | HEART RATE: 128 BPM | OXYGEN SATURATION: 98 %

## 2021-07-09 VITALS — DIASTOLIC BLOOD PRESSURE: 60 MMHG | SYSTOLIC BLOOD PRESSURE: 90 MMHG

## 2021-07-09 VITALS — SYSTOLIC BLOOD PRESSURE: 90 MMHG | DIASTOLIC BLOOD PRESSURE: 60 MMHG

## 2021-07-09 PROCEDURE — 99072 ADDL SUPL MATRL&STAF TM PHE: CPT

## 2021-07-09 PROCEDURE — 99214 OFFICE O/P EST MOD 30 MIN: CPT

## 2021-07-15 ENCOUNTER — TRANSCRIPTION ENCOUNTER (OUTPATIENT)
Age: 56
End: 2021-07-15

## 2021-07-16 ENCOUNTER — TRANSCRIPTION ENCOUNTER (OUTPATIENT)
Age: 56
End: 2021-07-16

## 2021-07-19 ENCOUNTER — TRANSCRIPTION ENCOUNTER (OUTPATIENT)
Age: 56
End: 2021-07-19

## 2021-07-19 ENCOUNTER — LABORATORY RESULT (OUTPATIENT)
Age: 56
End: 2021-07-19

## 2021-07-22 ENCOUNTER — TRANSCRIPTION ENCOUNTER (OUTPATIENT)
Age: 56
End: 2021-07-22

## 2021-07-23 LAB
B BURGDOR AB SER-IMP: NEGATIVE
B BURGDOR IGM PATRN SER IB-IMP: NEGATIVE
B BURGDOR18KD IGG SER QL IB: NORMAL
B BURGDOR23KD IGG SER QL IB: NORMAL
B BURGDOR23KD IGM SER QL IB: NORMAL
B BURGDOR28KD IGG SER QL IB: NORMAL
B BURGDOR30KD IGG SER QL IB: NORMAL
B BURGDOR31KD IGG SER QL IB: NORMAL
B BURGDOR39KD IGG SER QL IB: NORMAL
B BURGDOR39KD IGM SER QL IB: NORMAL
B BURGDOR41KD IGG SER QL IB: NORMAL
B BURGDOR41KD IGM SER QL IB: NORMAL
B BURGDOR45KD IGG SER QL IB: NORMAL
B BURGDOR58KD IGG SER QL IB: NORMAL
B BURGDOR66KD IGG SER QL IB: NORMAL
B BURGDOR93KD IGG SER QL IB: NORMAL
EBV EA AB SER IA-ACNC: 51.1 U/ML
EBV EA AB TITR SER IF: NEGATIVE
EBV EA IGG SER QL IA: 6.8 U/ML
EBV EA IGG SER-ACNC: POSITIVE
EBV EA IGM SER IA-ACNC: NEGATIVE
EBV PATRN SPEC IB-IMP: NORMAL
EBV VCA IGG SER IA-ACNC: >750 U/ML
EBV VCA IGM SER QL IA: <10 U/ML
EPSTEIN-BARR VIRUS CAPSID ANTIGEN IGG: POSITIVE

## 2021-07-24 LAB
CELIAC DISEASE INTERPRETATION: NORMAL
CELIAC GENE PAIRS PRESENT: YES
DQ ALPHA 1: NORMAL
DQ BETA 1: NORMAL
IMMUNOGLOBULIN A (IGA): 63 MG/DL

## 2021-08-04 ENCOUNTER — APPOINTMENT (OUTPATIENT)
Dept: NEUROLOGY | Facility: CLINIC | Age: 56
End: 2021-08-04
Payer: COMMERCIAL

## 2021-08-04 VITALS — SYSTOLIC BLOOD PRESSURE: 59 MMHG | HEART RATE: 132 BPM | DIASTOLIC BLOOD PRESSURE: 41 MMHG

## 2021-08-04 VITALS — HEART RATE: 105 BPM | SYSTOLIC BLOOD PRESSURE: 102 MMHG | DIASTOLIC BLOOD PRESSURE: 69 MMHG

## 2021-08-04 VITALS — SYSTOLIC BLOOD PRESSURE: 83 MMHG | HEART RATE: 120 BPM | DIASTOLIC BLOOD PRESSURE: 57 MMHG

## 2021-08-04 VITALS
SYSTOLIC BLOOD PRESSURE: 94 MMHG | DIASTOLIC BLOOD PRESSURE: 65 MMHG | OXYGEN SATURATION: 97 % | WEIGHT: 120 LBS | BODY MASS INDEX: 21.26 KG/M2 | HEART RATE: 117 BPM | HEIGHT: 63 IN

## 2021-08-04 PROCEDURE — 99205 OFFICE O/P NEW HI 60 MIN: CPT

## 2021-08-04 NOTE — PHYSICAL EXAM
[FreeTextEntry1] : PHYSICAL EXAM\par Constitutional: Alert, no acute distress \par Neck: Full range of motion\par Psychiatric: appropriate affect and mood\par Pulmonary: No respiratory distress, stable on room air\par \par NEUROLOGICAL EXAM\par Mental status: The patient is alert, attentive, and oriented x 3.\par Speech/language: Clear and fluent with good repetition, comprehension, and naming\par Cranial nerves:\par CN II: Visual fields are full to confrontation. Pupil size equal and briskly reactive to light. \par CN III, IV, VI: EOMI, no nystagmus, no ptosis\par CN V: Facial sensation is intact to pinprick in all 3 divisions bilaterally.\par CN VII: Face is symmetric with normal eye closure and smile.\par CN VII: Hearing is normal to rubbing fingers\par CN IX, X: Palate elevates symmetrically. Phonation is normal.\par CN XI: Head turning and shoulder shrug are intact\par CN XII: Tongue is midline with normal movements and no atrophy.\par Motor: There is no pronator drift of out-stretched arms. Muscle bulk and tone are normal. Strength is full bilaterally. 5/5 muscle power at bilat: Deltoid, Biceps, Triceps, Wrist ext, Finger abd, Hip flex, Hip ext, Knee flex, Knee ext, Ankle flex, Ankle ext\par Reflexes: Reflexes are 2+ and symmetric at the biceps, knees, and ankles. Plantar responses are flexor.\par Sensory: Intact sensations to all sensory modalities in upper and lower extremities; vibration > 20 seconds at toes b/l\par Coordination: Rapid alternating movements and fine finger movements are intact. There is no dysmetria on finger-to-nose and heel-knee-shin. There are no abnormal or extraneous movements. \par Gait/Stance: Posture is normal. Gait is steady with normal steps, base, arm swing, and turning. Heel and toe walking are normal. Tandem gait is normal. Romberg is absent.\par \par \par \par \par

## 2021-08-04 NOTE — HISTORY OF PRESENT ILLNESS
[FreeTextEntry1] : HPI (initial visit Aug 04, 2021)- Tonia is a 56 year old RH female with a hx of HTN, HLD, PreDM, Prior COVID-19 infection 3/2020 is referred to neurology for post covid syndrome. \par \par She reports she had presented to ED in March for cough and high grade fevers x 1 week (103F). In ED she was found to be hypoxic (in 80's) but she left AMA. She does not recall the 2 weeks she was at home, "I had brain fog". "I had double pneumonia". 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 She had persistent fatigue and cough and exertional SOB. \par \par She thought she was turning the corner, "I was okay thanksgiving and [place]". IT all went down hill after COVID vaccine 2021. Tonia has been experiencing early satiety and bloating since Jan 2021 and has lost 30 pounds since. Her GI  symptoms began after received the COVID vaccine in Jan 2021. She denies any diarrhea or vomiting. She experiences constipation and can go 8 days without a bowel movement. She is followed by GI with no clear explanation of symptoms. Her symptoms were thought to be related to IBS and GERD. She is on amitriptyline, omeprazole and pepcid- this helps with bloating and eating. She has tried several therapies for constipation with only modest benefit. She has been tested for Celiac and was negative. \par \par She also reports postural dizziness x 2-3 months. Followed by Cardiology. Her blood pressure has been running low. She has stopped her Bp meds. She has also been tachycardic. There is a concern for possible POTS. She has had 1 pass out spell 1 week ago. Symptoms always occur when standing up. Symptoms are more frequent. She has recommended a high salt diet. \par \par She has been out of work since MAy 2021.\par \par HbA1c elevated 6.5 ( 6/30/2021)- has a referral to see endocrinologist end of August.

## 2021-08-04 NOTE — ASSESSMENT
[FreeTextEntry1] : Assessment/Plan:\par  56 year old RH female with a hx of HTN, HLD, PreDM, Prior COVID-19 infection 3/2020 and now with post COVID syndrome with long haul symptoms of brain fog, fatigue, altered sense of smell/taste, exertional shortness of breath and now worsening symptoms since receiving the COVID vaccination in 1/2021 with new onset early satiety, bloating, constipation, postural hypotension and tachycardia (? POTS) concerning for post COVID autonomic nervous system dysfunction. \par \par Neurological exam is normal.\par \par Given autonomic nervous system dysfunction, will refer her to the dysautonomia center at [facility] (Dr. Anshul Spann). \par \par Continue to follow with cardiology and GI specialist for symptomatic management of symptoms. \par \par Will order MRI brain w/o contrast for baseline\par \par Will call patient back with results of brain MRI.\par \par Available imaging studies and/or blood work up were reviewed. A detailed chart review was completed prior to visit.\par \par The above plan was discussed with ROLF MARQUEZ in great detail.  ROLF MARQUEZ verbalized understanding and agrees with plan as detailed above. Patient was provided education and counselling on current diagnosis/symptoms and diagnostic work up. She was advised to call our clinic at [phone] for any new or worsening symptoms, or with any questions or concerns. In case of acute onset of neurological symptoms or worsening presentation, patient was advised to present to nearest emergency room for further evaluation. ROLF MARQUEZ expressed understanding and all her questions/concerns were addressed.\par \par Shaunna Bruce M.D\par

## 2021-08-05 ENCOUNTER — TRANSCRIPTION ENCOUNTER (OUTPATIENT)
Age: 56
End: 2021-08-05

## 2021-08-12 ENCOUNTER — TRANSCRIPTION ENCOUNTER (OUTPATIENT)
Age: 56
End: 2021-08-12

## 2021-08-18 ENCOUNTER — APPOINTMENT (OUTPATIENT)
Dept: CARDIOLOGY | Facility: CLINIC | Age: 56
End: 2021-08-18
Payer: COMMERCIAL

## 2021-08-18 ENCOUNTER — NON-APPOINTMENT (OUTPATIENT)
Age: 56
End: 2021-08-18

## 2021-08-18 VITALS — WEIGHT: 118 LBS | OXYGEN SATURATION: 99 % | BODY MASS INDEX: 20.9 KG/M2 | HEART RATE: 113 BPM

## 2021-08-18 VITALS — SYSTOLIC BLOOD PRESSURE: 66 MMHG | DIASTOLIC BLOOD PRESSURE: 70 MMHG

## 2021-08-18 PROCEDURE — 99214 OFFICE O/P EST MOD 30 MIN: CPT

## 2021-08-18 PROCEDURE — 93000 ELECTROCARDIOGRAM COMPLETE: CPT

## 2021-08-18 NOTE — DISCUSSION/SUMMARY
[FreeTextEntry1] : She has been having postural hypotension.  I am also concerned about her tachycardia.  I do not know if this is some  variant of POTS. \par \par I told her to continue with the medication for irritable bowel syndrome and to get the endocrinology evaluation.  Her symptoms continue will refer to EP.  Encouraged to drink a lot of water

## 2021-08-18 NOTE — DISCUSSION/SUMMARY
pt c/o midsternal chest pain x yesterday. pt stated she went to urgent care, had blood test done d-dimer elevated. pt also stated she is +covid
[FreeTextEntry1] : For treatment of above which has been disabling, I added Florinef 0.1 mg daily and also prescribed compression stockings.  I will follow-up in 2 weeks.  If necessary I will increase the dose of Florinef.  If condition persists then I may add midodrine.

## 2021-08-18 NOTE — REVIEW OF SYSTEMS
[Change in Appetite] : change in appetite [Constipation] : constipation [Dizziness] : dizziness [Negative] : Heme/Lymph [FreeTextEntry7] : Abdominal fullness with early satiety etc.

## 2021-08-18 NOTE — HISTORY OF PRESENT ILLNESS
[FreeTextEntry1] : She continues to feel very tired and dizzy.  She cannot be for more than an hour before she starts feeling weak and tired.  The only time she feels good is when she is supine.\par \par She saw her neurologist Dr. Bruce, who felt that she had autonomic disorder.  She was noted to have severe postural hypotension where supine blood pressure was around 102/69 with pulse of 105, sitting was 83/57 with pulse of 120.  On standing it dropped to 59/41 with pulse of 132.  She was referred to dysautonomia center at [facility].  She is awaiting an appointment.  No medications were prescribed.  MRI of the head is pending.

## 2021-08-18 NOTE — ASSESSMENT
[FreeTextEntry1] : She does seem to have autonomic disorder following Covid infection.  There is significant postural hypotension.  She has been off on antihypertensive medications.

## 2021-08-18 NOTE — ASSESSMENT
[FreeTextEntry1] : With her blood pressure medications her blood pressure still on the low side.  She is still getting postural dizziness.  Heart rate has slightly increased.  I wonder if it is due to amitriptyline.  Echocardiogram showed ejection fraction of 55 to 60%.\par \par CT abdomen and pelvis found 2 cm focus of mildly diminished attenuation in the right hepatic lobe abutting the falciform ligament.  It is a characteristic location for focal fatty infiltration.  If needed MRI of abdomen was recommended.  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

## 2021-08-18 NOTE — HISTORY OF PRESENT ILLNESS
[FreeTextEntry1] : Manually I got blood pressure of 90/60 in both arms.  With her digital manometer she got 93/70 and when I readjusted the cuff she got 98/72.  Her pulse was 116.\par \par She saw another gastroenterologist in second opinion and he felt that she probably has irritable bowel syndrome and gave her an antidepressant.  She discussed his recommendations with her regular gastroenterologist.  He was waiting to get give it a try.  She just started taking amitriptyline 25 mg at bedtime 2 nights ago.  It is too early to see any difference.  In the past she could not tolerate Linzess.  It was giving her watery diarrhea.\par \par TSH was 1.58.  She also referred to an endocrinologist because hemoglobin A1c.

## 2021-08-23 ENCOUNTER — APPOINTMENT (OUTPATIENT)
Dept: ENDOCRINOLOGY | Facility: CLINIC | Age: 56
End: 2021-08-23
Payer: COMMERCIAL

## 2021-08-23 ENCOUNTER — LABORATORY RESULT (OUTPATIENT)
Age: 56
End: 2021-08-23

## 2021-08-23 VITALS
HEART RATE: 105 BPM | DIASTOLIC BLOOD PRESSURE: 60 MMHG | HEIGHT: 63 IN | WEIGHT: 118.5 LBS | BODY MASS INDEX: 21 KG/M2 | SYSTOLIC BLOOD PRESSURE: 96 MMHG | OXYGEN SATURATION: 98 %

## 2021-08-23 LAB — GLUCOSE BLDC GLUCOMTR-MCNC: 111

## 2021-08-23 PROCEDURE — 99204 OFFICE O/P NEW MOD 45 MIN: CPT | Mod: 25

## 2021-08-23 PROCEDURE — 36415 COLL VENOUS BLD VENIPUNCTURE: CPT

## 2021-08-23 PROCEDURE — 82962 GLUCOSE BLOOD TEST: CPT

## 2021-08-23 RX ORDER — MONTELUKAST 10 MG/1
10 TABLET, FILM COATED ORAL
Qty: 90 | Refills: 1 | Status: DISCONTINUED | COMMUNITY
Start: 2017-12-04 | End: 2021-08-23

## 2021-08-23 NOTE — REASON FOR VISIT
[Initial Evaluation] : an initial evaluation [Hypothyroidism] : hypothyroidism [FreeTextEntry2] : Dr. Luque

## 2021-08-23 NOTE — ASSESSMENT
[Carbohydrate Consistent Diet] : carbohydrate consistent diet [Importance of Diet and Exercise] : importance of diet and exercise to improve glycemic control, achieve weight loss and improve cardiovascular health [Self Monitoring of Blood Glucose] : self monitoring of blood glucose [Injection Technique, Storage, Sharps Disposal] : injection technique, storage, and sharps disposal [Levothyroxine] : The patient was instructed to take Levothyroxine on an empty stomach, separate from vitamins, and wait at least 30 minutes before eating [FreeTextEntry1] : 1. Pre-diabetes - The weight loss she experienced is due to the lack of appetite. However, when she does eat she does tend to have more carbs than she did in the past, as it is easier on her stomach and she is not as nauseous. I explained to her that this is the likely cause of her A1C remaining stable. Also, she is more sedentary. Will check insulin level and c-peptide just in case. Continue following a carb balanced diet.\par 2. Hypothyroidism - She will get me a copy of her pathology, surgical report, sono, etc. Will check TFTs and Thyroid Ab. \par 3. History of parathyroid surgery - Will review operative report. Will check calcium and pth level. \par \par Will also check for any causes of her postural hypotension and autonomic nervous dysfunction. \par \par Labs done today in the office, will call with results. \par \par f/u in 3 months \par Can f/u via telehealth in between if needed.

## 2021-08-23 NOTE — PHYSICAL EXAM
[Alert] : alert [Well Nourished] : well nourished [No Acute Distress] : no acute distress [Well Developed] : well developed [Normal Sclera/Conjunctiva] : normal sclera/conjunctiva [EOMI] : extra ocular movement intact [No Proptosis] : no proptosis [Thyroid Not Enlarged] : the thyroid was not enlarged [No Thyroid Nodules] : no palpable thyroid nodules [Well Healed Scar] : well healed scar [No Respiratory Distress] : no respiratory distress [No Accessory Muscle Use] : no accessory muscle use [Clear to Auscultation] : lungs were clear to auscultation bilaterally [Normal S1, S2] : normal S1 and S2 [Normal Rate] : heart rate was normal [Regular Rhythm] : with a regular rhythm [No Edema] : no peripheral edema [Pedal Pulses Normal] : the pedal pulses are present [Normal Bowel Sounds] : normal bowel sounds [Not Tender] : non-tender [Not Distended] : not distended [Soft] : abdomen soft [No Spinal Tenderness] : no spinal tenderness [Spine Straight] : spine straight [No Rash] : no rash [Acanthosis Nigricans] : no acanthosis nigricans [Oriented x3] : oriented to person, place, and time

## 2021-08-23 NOTE — PAST MEDICAL HISTORY
[Postmenopausal] : The patient is postmenopausal [Menarche Age ____] : age at menarche was [unfilled] [Menopause Age____] : age at menopause was [unfilled] [Irregular Cycle Intervals] : are  irregular [Total Preg ___] : G[unfilled]

## 2021-08-23 NOTE — REVIEW OF SYSTEMS
[Fatigue] : fatigue [Recent Weight Loss (___ Lbs)] : recent weight loss: [unfilled] lbs [Chest Pain] : chest pain [SOB on Exertion] : shortness of breath on exertion [Constipation] : constipation [Joint Pain] : joint pain [Joint Stiffness] : joint stiffness [Muscle Cramps] : muscle cramps [Dizziness] : dizziness [Pain/Numbness of Digits] : pain/numbness of digits [Negative] : Heme/Lymph [All other systems negative] : All other systems negative [FreeTextEntry2] : over the last 6 months

## 2021-08-24 ENCOUNTER — LABORATORY RESULT (OUTPATIENT)
Age: 56
End: 2021-08-24

## 2021-08-25 ENCOUNTER — OUTPATIENT (OUTPATIENT)
Dept: OUTPATIENT SERVICES | Facility: HOSPITAL | Age: 56
LOS: 1 days | End: 2021-08-25
Payer: COMMERCIAL

## 2021-08-25 ENCOUNTER — APPOINTMENT (OUTPATIENT)
Dept: MRI IMAGING | Facility: CLINIC | Age: 56
End: 2021-08-25
Payer: COMMERCIAL

## 2021-08-25 DIAGNOSIS — Z00.8 ENCOUNTER FOR OTHER GENERAL EXAMINATION: ICD-10-CM

## 2021-08-25 PROCEDURE — 70551 MRI BRAIN STEM W/O DYE: CPT | Mod: 26

## 2021-08-25 PROCEDURE — 70551 MRI BRAIN STEM W/O DYE: CPT

## 2021-08-27 ENCOUNTER — TRANSCRIPTION ENCOUNTER (OUTPATIENT)
Age: 56
End: 2021-08-27

## 2021-08-28 ENCOUNTER — TRANSCRIPTION ENCOUNTER (OUTPATIENT)
Age: 56
End: 2021-08-28

## 2021-08-31 LAB
25(OH)D3 SERPL-MCNC: 31.8 NG/ML
ALBUMIN SERPL ELPH-MCNC: 4.2 G/DL
ALP BLD-CCNC: 77 U/L
ALT SERPL-CCNC: 27 U/L
ANION GAP SERPL CALC-SCNC: 9 MMOL/L
APPEARANCE: CLEAR
AST SERPL-CCNC: 19 U/L
BASOPHILS # BLD AUTO: 0.04 K/UL
BASOPHILS NFR BLD AUTO: 0.9 %
BILIRUB SERPL-MCNC: 0.6 MG/DL
BILIRUBIN URINE: NEGATIVE
BLOOD URINE: NEGATIVE
BUN SERPL-MCNC: 8 MG/DL
C PEPTIDE SERPL-MCNC: 2.3 NG/ML
CALCIUM SERPL-MCNC: 9.4 MG/DL
CALCIUM SERPL-MCNC: 9.4 MG/DL
CHLORIDE SERPL-SCNC: 100 MMOL/L
CO2 SERPL-SCNC: 28 MMOL/L
COLOR: NORMAL
CREAT SERPL-MCNC: 0.65 MG/DL
CREAT SPEC-SCNC: 127 MG/DL
EOSINOPHIL # BLD AUTO: 0.03 K/UL
EOSINOPHIL NFR BLD AUTO: 0.6 %
FOLATE SERPL-MCNC: >20 NG/ML
GLUCOSE QUALITATIVE U: NEGATIVE
GLUCOSE SERPL-MCNC: 111 MG/DL
HCT VFR BLD CALC: 38.6 %
HGB BLD-MCNC: 12.8 G/DL
IMM GRANULOCYTES NFR BLD AUTO: 0 %
INSULIN SERPL-MCNC: 12.9 UU/ML
KETONES URINE: NEGATIVE
LEUKOCYTE ESTERASE URINE: ABNORMAL
LYMPHOCYTES # BLD AUTO: 1.83 K/UL
LYMPHOCYTES NFR BLD AUTO: 39.5 %
MAN DIFF?: NORMAL
MCHC RBC-ENTMCNC: 30 PG
MCHC RBC-ENTMCNC: 33.2 GM/DL
MCV RBC AUTO: 90.6 FL
MICROALBUMIN 24H UR DL<=1MG/L-MCNC: 1.8 MG/DL
MICROALBUMIN/CREAT 24H UR-RTO: 14 MG/G
MONOCYTES # BLD AUTO: 0.5 K/UL
MONOCYTES NFR BLD AUTO: 10.8 %
NEUTROPHILS # BLD AUTO: 2.23 K/UL
NEUTROPHILS NFR BLD AUTO: 48.2 %
NITRITE URINE: NEGATIVE
PARATHYROID HORMONE INTACT: 28 PG/ML
PH URINE: 7
PLATELET # BLD AUTO: 295 K/UL
POTASSIUM SERPL-SCNC: 5.1 MMOL/L
PROT SERPL-MCNC: 6 G/DL
PROTEIN URINE: NORMAL
RBC # BLD: 4.26 M/UL
RBC # FLD: 12.2 %
SODIUM SERPL-SCNC: 137 MMOL/L
SPECIFIC GRAVITY URINE: 1.01
T4 FREE SERPL-MCNC: 1.8 NG/DL
T4 SERPL-MCNC: 10.1 UG/DL
THYROGLOB AB SERPL-ACNC: <20 IU/ML
THYROPEROXIDASE AB SERPL IA-ACNC: <10 IU/ML
TSH SERPL-ACNC: 1.38 UIU/ML
UROBILINOGEN URINE: NORMAL
VIT B12 SERPL-MCNC: 379 PG/ML
WBC # FLD AUTO: 4.63 K/UL

## 2021-09-01 ENCOUNTER — RX RENEWAL (OUTPATIENT)
Age: 56
End: 2021-09-01

## 2021-09-01 ENCOUNTER — APPOINTMENT (OUTPATIENT)
Dept: CARDIOLOGY | Facility: CLINIC | Age: 56
End: 2021-09-01
Payer: COMMERCIAL

## 2021-09-01 VITALS — HEART RATE: 95 BPM | OXYGEN SATURATION: 97 % | WEIGHT: 116 LBS | BODY MASS INDEX: 20.55 KG/M2

## 2021-09-01 VITALS — DIASTOLIC BLOOD PRESSURE: 60 MMHG | SYSTOLIC BLOOD PRESSURE: 86 MMHG

## 2021-09-01 VITALS — DIASTOLIC BLOOD PRESSURE: 70 MMHG | SYSTOLIC BLOOD PRESSURE: 100 MMHG

## 2021-09-01 PROCEDURE — 99214 OFFICE O/P EST MOD 30 MIN: CPT

## 2021-09-01 NOTE — HISTORY OF PRESENT ILLNESS
[FreeTextEntry1] : He continues to have severe postural dizziness.  She is still awaiting appointment with Dr. Anshul Hays.  She has sent all the paperwork and is awaiting a call from his office.\par \par So far she has not noticed any significant improvement with 0.1 mg of Florinef.\par \par She is now getting metallic taste in the mouth and the food does not taste good and she is losing her appetite.  She is on the Cares program.\par \par Because of above she has not been driving much.  She has been lying in bed most of the time.  That is when she feels better.

## 2021-09-01 NOTE — ASSESSMENT
[FreeTextEntry1] : He continues to be very symptomatic.  Postural dizziness continues.  She has not had adequate response with 0.1 mg of Florinef.

## 2021-09-01 NOTE — DISCUSSION/SUMMARY
[FreeTextEntry1] : I reviewed the case with Dr. Healy and he was able to give her 2 additional names of doctors who specializes in autonomic dysfunction.  In the meantime I told her to increase Florinef to 0.2 mg daily and in addition I gave her a trial of low-dose midodrine 2.5 mg 3 times a day.  Based upon her response further adjustments will follow.

## 2021-09-09 RX ORDER — MIDODRINE HYDROCHLORIDE 2.5 MG/1
2.5 TABLET ORAL 3 TIMES DAILY
Qty: 30 | Refills: 0 | Status: DISCONTINUED | COMMUNITY
Start: 2021-09-01 | End: 2021-09-09

## 2021-09-13 ENCOUNTER — APPOINTMENT (OUTPATIENT)
Dept: INTERNAL MEDICINE | Facility: CLINIC | Age: 56
End: 2021-09-13
Payer: COMMERCIAL

## 2021-09-13 VITALS — SYSTOLIC BLOOD PRESSURE: 60 MMHG | DIASTOLIC BLOOD PRESSURE: 40 MMHG

## 2021-09-13 VITALS
OXYGEN SATURATION: 97 % | HEART RATE: 107 BPM | WEIGHT: 116 LBS | TEMPERATURE: 98.1 F | BODY MASS INDEX: 20.55 KG/M2 | SYSTOLIC BLOOD PRESSURE: 108 MMHG | HEIGHT: 63 IN | DIASTOLIC BLOOD PRESSURE: 70 MMHG

## 2021-09-13 VITALS — DIASTOLIC BLOOD PRESSURE: 60 MMHG | SYSTOLIC BLOOD PRESSURE: 78 MMHG

## 2021-09-13 PROCEDURE — 99214 OFFICE O/P EST MOD 30 MIN: CPT

## 2021-09-13 NOTE — HISTORY OF PRESENT ILLNESS
[de-identified] : The pt presents for F/U \par The pts symptoms of dysautonomia are worsening \par She has had 3falls in past month \par She is essentially in bed most of the times due to orthostatic hypotension\par Was recently started on Midronon as well\par Notes a metallic taste in her mouth for past month or so\par Also has lost 30 lbs in past 4/5 months

## 2021-09-13 NOTE — PHYSICAL EXAM
[Ill-Appearing] : ill-appearing [Normal Sclera/Conjunctiva] : normal sclera/conjunctiva [Normal Oropharynx] : the oropharynx was normal [No Lymphadenopathy] : no lymphadenopathy [Clear to Auscultation] : lungs were clear to auscultation bilaterally [Normal Rate] : normal rate  [Regular Rhythm] : with a regular rhythm [Normal S1, S2] : normal S1 and S2 [de-identified] : The pt is in emotional distress re to her present medical condition

## 2021-09-13 NOTE — ASSESSMENT
[FreeTextEntry1] : A 56n yrs old pt who has developed progressive dysautonomia which has essentially lead to near total disability \par I will get her enrolled into [facility] program for further help with dysautonomia

## 2021-09-27 ENCOUNTER — RX RENEWAL (OUTPATIENT)
Age: 56
End: 2021-09-27

## 2021-10-01 ENCOUNTER — TRANSCRIPTION ENCOUNTER (OUTPATIENT)
Age: 56
End: 2021-10-01

## 2021-10-05 ENCOUNTER — APPOINTMENT (OUTPATIENT)
Dept: CARDIOLOGY | Facility: CLINIC | Age: 56
End: 2021-10-05
Payer: COMMERCIAL

## 2021-10-05 VITALS — DIASTOLIC BLOOD PRESSURE: 50 MMHG | SYSTOLIC BLOOD PRESSURE: 60 MMHG

## 2021-10-05 VITALS
BODY MASS INDEX: 19.67 KG/M2 | HEIGHT: 63 IN | OXYGEN SATURATION: 98 % | HEART RATE: 111 BPM | TEMPERATURE: 97.7 F | WEIGHT: 111 LBS

## 2021-10-05 VITALS — DIASTOLIC BLOOD PRESSURE: 70 MMHG | SYSTOLIC BLOOD PRESSURE: 114 MMHG

## 2021-10-05 PROCEDURE — 99214 OFFICE O/P EST MOD 30 MIN: CPT

## 2021-10-05 NOTE — ASSESSMENT
[FreeTextEntry1] : Continues to have significant postural hypotension.  In the office when the BP dropped to 60s she had minimal symptoms.  She seems to have side effects from midodrine.\par \par She had to cancel the original appointment with autonomic disease specialist because he did not accept her insurance and wanted $2400 down payment.  He has made an appointment with another doctor and was available appointment is a virtual appointment in January 2022.

## 2021-10-05 NOTE — HISTORY OF PRESENT ILLNESS
[FreeTextEntry1] : He continues to have significant postural dizziness and hypotension.  She is now taking 10 mg of midodrine 3 times a day and 2 mg of Florinef.  There is no difference or perhaps minimal improvement.  She is getting a metallic taste from the midodrine.

## 2021-10-05 NOTE — DISCUSSION/SUMMARY
[FreeTextEntry1] : I told her to progressively increase the dose of Florinef.  Also because of side effects of midodrine I told her to go back to 5 mg 3 times a day.  I will discontinue it once she goes to 1.5 mg of Florinef daily.\par \par I also told her to do try physical therapy and also to see if there are any specialists available in [place] or [place] that she can go and see before the third week of January.\par \par Also told her to switch from knee-high compression stockings to mid thigh are even all the way up to the waist.  I also recommend that she consider using a high compression insert of medium.

## 2021-10-06 PROBLEM — U07.1 PNEUMONIA DUE TO COVID-19 VIRUS: Status: RESOLVED | Noted: 2020-04-24 | Resolved: 2021-03-17

## 2021-10-21 ENCOUNTER — TRANSCRIPTION ENCOUNTER (OUTPATIENT)
Age: 56
End: 2021-10-21

## 2021-10-24 ENCOUNTER — TRANSCRIPTION ENCOUNTER (OUTPATIENT)
Age: 56
End: 2021-10-24

## 2021-11-02 ENCOUNTER — RX RENEWAL (OUTPATIENT)
Age: 56
End: 2021-11-02

## 2021-11-08 ENCOUNTER — LABORATORY RESULT (OUTPATIENT)
Age: 56
End: 2021-11-08

## 2021-11-08 ENCOUNTER — RESULT CHARGE (OUTPATIENT)
Age: 56
End: 2021-11-08

## 2021-11-08 ENCOUNTER — APPOINTMENT (OUTPATIENT)
Dept: ENDOCRINOLOGY | Facility: CLINIC | Age: 56
End: 2021-11-08
Payer: COMMERCIAL

## 2021-11-08 VITALS — SYSTOLIC BLOOD PRESSURE: 94 MMHG | DIASTOLIC BLOOD PRESSURE: 67 MMHG | HEART RATE: 103 BPM

## 2021-11-08 VITALS
BODY MASS INDEX: 19.31 KG/M2 | WEIGHT: 109 LBS | SYSTOLIC BLOOD PRESSURE: 113 MMHG | DIASTOLIC BLOOD PRESSURE: 75 MMHG | HEART RATE: 103 BPM | HEIGHT: 63 IN | OXYGEN SATURATION: 96 %

## 2021-11-08 LAB
GLUCOSE BLDC GLUCOMTR-MCNC: 139
HBA1C MFR BLD HPLC: 5.9

## 2021-11-08 PROCEDURE — 99214 OFFICE O/P EST MOD 30 MIN: CPT | Mod: 25

## 2021-11-08 PROCEDURE — 36415 COLL VENOUS BLD VENIPUNCTURE: CPT

## 2021-11-08 PROCEDURE — 82962 GLUCOSE BLOOD TEST: CPT

## 2021-11-09 ENCOUNTER — NON-APPOINTMENT (OUTPATIENT)
Age: 56
End: 2021-11-09

## 2021-11-09 ENCOUNTER — APPOINTMENT (OUTPATIENT)
Dept: CARDIOLOGY | Facility: CLINIC | Age: 56
End: 2021-11-09
Payer: COMMERCIAL

## 2021-11-09 VITALS — DIASTOLIC BLOOD PRESSURE: 60 MMHG | SYSTOLIC BLOOD PRESSURE: 100 MMHG

## 2021-11-09 VITALS — SYSTOLIC BLOOD PRESSURE: 60 MMHG | DIASTOLIC BLOOD PRESSURE: 40 MMHG

## 2021-11-09 VITALS
HEIGHT: 63 IN | HEART RATE: 97 BPM | WEIGHT: 109 LBS | OXYGEN SATURATION: 97 % | TEMPERATURE: 98.1 F | BODY MASS INDEX: 19.31 KG/M2

## 2021-11-09 VITALS — SYSTOLIC BLOOD PRESSURE: 108 MMHG | DIASTOLIC BLOOD PRESSURE: 60 MMHG

## 2021-11-09 PROCEDURE — 99214 OFFICE O/P EST MOD 30 MIN: CPT

## 2021-11-09 PROCEDURE — 93000 ELECTROCARDIOGRAM COMPLETE: CPT

## 2021-11-09 NOTE — ASSESSMENT
[Carbohydrate Consistent Diet] : carbohydrate consistent diet [Importance of Diet and Exercise] : importance of diet and exercise to improve glycemic control, achieve weight loss and improve cardiovascular health [Self Monitoring of Blood Glucose] : self monitoring of blood glucose [Injection Technique, Storage, Sharps Disposal] : injection technique, storage, and sharps disposal [Levothyroxine] : The patient was instructed to take Levothyroxine on an empty stomach, separate from vitamins, and wait at least 30 minutes before eating [FreeTextEntry1] : 1. Pre-diabetes - Continue to follow a healthy lifestyle. It seems the changes she made to her diet, helped improve her A1C. \par 2. Hypothyroidism - She will get me a copy of her pathology, surgical report, sono, etc. TFTs were previously stable, Thyoid Ab were negative. Will repeat. \par 3. History of parathyroid surgery - Will review operative report. PTH and calcium previously stable.\par 4. UTI - Will check UA and UCx\par \par \par Labs done today in the office, will call with results. \par \par f/u in 3 months \par Can f/u via telehealth in between if needed.

## 2021-11-09 NOTE — HISTORY OF PRESENT ILLNESS
[FreeTextEntry1] : 56 year old female presents for follow up of pre-diabetes and hypothyroidism. She reports that about 5 years ago she was diagnosed with pre-diabetes and was started on Metformin. She experienced severe GI upset and it was stopped. She has been controlling it with diet and exercise. Recently she did lose 20+ lbs. Her A1C is now down to 5.9, from 6.5.\par Of note, she was found to have thyroid cancer in 12/2010 and underwent a total thyroidectomy and partial parathyroidectomy in 2/2011. Final pathology was benign according to the pt. She did not receive any further treatment. Presently she is on Levothyroxine 112 mcg daily.\par She was recently diagnosed with autonomic nervous system dysfunction. She reports that she had COVID 3/2020 and had long haul symptoms, which seemed to have worsened since her COVID vaccination 1/2021. She is currently being evaluated by Neurology. She has a difficult time sitting up and can only be supine for 1 hour before she begins to experience hypotension and dizziness. Had episode of hypotension today and fell. Denies injury. \par Reports burning and discomfort when urinating. Was rx Cipro for 3 days, but feels the sx are back.

## 2021-11-09 NOTE — PHYSICAL EXAM
[Alert] : alert [Well Nourished] : well nourished [No Acute Distress] : no acute distress [Well Developed] : well developed [Normal Sclera/Conjunctiva] : normal sclera/conjunctiva [EOMI] : extra ocular movement intact [No Proptosis] : no proptosis [Thyroid Not Enlarged] : the thyroid was not enlarged [No Thyroid Nodules] : no palpable thyroid nodules [Well Healed Scar] : well healed scar [No Respiratory Distress] : no respiratory distress [No Accessory Muscle Use] : no accessory muscle use [Clear to Auscultation] : lungs were clear to auscultation bilaterally [Normal S1, S2] : normal S1 and S2 [Normal Rate] : heart rate was normal [Regular Rhythm] : with a regular rhythm [No Edema] : no peripheral edema [Pedal Pulses Normal] : the pedal pulses are present [Normal Bowel Sounds] : normal bowel sounds [Not Tender] : non-tender [Not Distended] : not distended [Soft] : abdomen soft [No Spinal Tenderness] : no spinal tenderness [Spine Straight] : spine straight [No Rash] : no rash [Oriented x3] : oriented to person, place, and time [Acanthosis Nigricans] : no acanthosis nigricans

## 2021-11-14 LAB
25(OH)D3 SERPL-MCNC: 29.2 NG/ML
ALBUMIN SERPL ELPH-MCNC: 3.8 G/DL
ALP BLD-CCNC: 62 U/L
ALT SERPL-CCNC: 19 U/L
ANION GAP SERPL CALC-SCNC: 9 MMOL/L
APPEARANCE: ABNORMAL
AST SERPL-CCNC: 14 U/L
BACTERIA UR CULT: NORMAL
BASOPHILS # BLD AUTO: 0.04 K/UL
BASOPHILS NFR BLD AUTO: 0.7 %
BILIRUB SERPL-MCNC: 0.5 MG/DL
BILIRUBIN URINE: NEGATIVE
BLOOD URINE: ABNORMAL
BUN SERPL-MCNC: 5 MG/DL
CALCIUM SERPL-MCNC: 9.1 MG/DL
CHLORIDE SERPL-SCNC: 97 MMOL/L
CHOLEST SERPL-MCNC: 123 MG/DL
CO2 SERPL-SCNC: 29 MMOL/L
COLOR: ABNORMAL
CREAT SERPL-MCNC: 0.56 MG/DL
CREAT SPEC-SCNC: 17 MG/DL
EOSINOPHIL # BLD AUTO: 0.04 K/UL
EOSINOPHIL NFR BLD AUTO: 0.7 %
GLUCOSE QUALITATIVE U: NEGATIVE
GLUCOSE SERPL-MCNC: 115 MG/DL
HCT VFR BLD CALC: 35.1 %
HDLC SERPL-MCNC: 56 MG/DL
HGB BLD-MCNC: 11.6 G/DL
IMM GRANULOCYTES NFR BLD AUTO: 0.2 %
KETONES URINE: NEGATIVE
LDLC SERPL CALC-MCNC: 48 MG/DL
LEUKOCYTE ESTERASE URINE: ABNORMAL
LYMPHOCYTES # BLD AUTO: 1.23 K/UL
LYMPHOCYTES NFR BLD AUTO: 22.2 %
MAN DIFF?: NORMAL
MCHC RBC-ENTMCNC: 29.7 PG
MCHC RBC-ENTMCNC: 33 GM/DL
MCV RBC AUTO: 90 FL
MICROALBUMIN 24H UR DL<=1MG/L-MCNC: 2.4 MG/DL
MICROALBUMIN/CREAT 24H UR-RTO: 142 MG/G
MONOCYTES # BLD AUTO: 0.45 K/UL
MONOCYTES NFR BLD AUTO: 8.1 %
NEUTROPHILS # BLD AUTO: 3.78 K/UL
NEUTROPHILS NFR BLD AUTO: 68.1 %
NITRITE URINE: NEGATIVE
NONHDLC SERPL-MCNC: 67 MG/DL
PH URINE: 7
PLATELET # BLD AUTO: 264 K/UL
POTASSIUM SERPL-SCNC: 3.6 MMOL/L
PROT SERPL-MCNC: 5.4 G/DL
PROTEIN URINE: ABNORMAL
RBC # BLD: 3.9 M/UL
RBC # FLD: 12.7 %
SODIUM SERPL-SCNC: 135 MMOL/L
SPECIFIC GRAVITY URINE: 1
T4 FREE SERPL-MCNC: 1.8 NG/DL
T4 SERPL-MCNC: 9.9 UG/DL
TRIGL SERPL-MCNC: 96 MG/DL
TSH SERPL-ACNC: 2.88 UIU/ML
UROBILINOGEN URINE: NORMAL
WBC # FLD AUTO: 5.55 K/UL

## 2021-11-15 ENCOUNTER — TRANSCRIPTION ENCOUNTER (OUTPATIENT)
Age: 56
End: 2021-11-15

## 2021-11-16 ENCOUNTER — NON-APPOINTMENT (OUTPATIENT)
Age: 56
End: 2021-11-16

## 2021-11-16 DIAGNOSIS — R82.90 UNSPECIFIED ABNORMAL FINDINGS IN URINE: ICD-10-CM

## 2021-11-17 ENCOUNTER — NON-APPOINTMENT (OUTPATIENT)
Age: 56
End: 2021-11-17

## 2021-11-18 ENCOUNTER — TRANSCRIPTION ENCOUNTER (OUTPATIENT)
Age: 56
End: 2021-11-18

## 2021-11-22 ENCOUNTER — APPOINTMENT (OUTPATIENT)
Dept: UROLOGY | Facility: CLINIC | Age: 56
End: 2021-11-22
Payer: COMMERCIAL

## 2021-11-22 VITALS
OXYGEN SATURATION: 96 % | DIASTOLIC BLOOD PRESSURE: 66 MMHG | HEART RATE: 106 BPM | SYSTOLIC BLOOD PRESSURE: 99 MMHG | WEIGHT: 107 LBS | BODY MASS INDEX: 18.96 KG/M2 | RESPIRATION RATE: 15 BRPM | HEIGHT: 63 IN | TEMPERATURE: 97.8 F

## 2021-11-22 LAB
APPEARANCE: CLEAR
BILIRUBIN URINE: NEGATIVE
BLOOD URINE: NEGATIVE
COLOR: NORMAL
GLUCOSE QUALITATIVE U: NEGATIVE
KETONES URINE: NEGATIVE
LEUKOCYTE ESTERASE URINE: NEGATIVE
NITRITE URINE: NEGATIVE
PH URINE: 7
PROTEIN URINE: NEGATIVE
SPECIFIC GRAVITY URINE: 1.01
UROBILINOGEN URINE: NORMAL

## 2021-11-22 PROCEDURE — 99203 OFFICE O/P NEW LOW 30 MIN: CPT

## 2021-11-22 RX ORDER — CIPROFLOXACIN HYDROCHLORIDE 250 MG/1
250 TABLET, FILM COATED ORAL
Qty: 6 | Refills: 0 | Status: DISCONTINUED | COMMUNITY
Start: 2021-10-21 | End: 2021-11-22

## 2021-11-22 NOTE — DISCUSSION/SUMMARY
[FreeTextEntry1] : I told her to continue with progressively increasing Florinef as prescribed earlier.  I also told her to get better fitting compression stockings and to keep up an appointment with autonomic dysfunction specialist.  Based on her response from adjustments of medications will follow.

## 2021-11-22 NOTE — PHYSICAL EXAM
[General Appearance - Well Developed] : well developed [Normal Appearance] : normal appearance [Well Groomed] : well groomed [General Appearance - In No Acute Distress] : no acute distress [Edema] : no peripheral edema [Respiration, Rhythm And Depth] : normal respiratory rhythm and effort [Exaggerated Use Of Accessory Muscles For Inspiration] : no accessory muscle use [Abdomen Soft] : soft [Abdomen Tenderness] : non-tender [Costovertebral Angle Tenderness] : no ~M costovertebral angle tenderness [FreeTextEntry1] : Bladder not distended [] : no rash [Oriented To Time, Place, And Person] : oriented to person, place, and time [Affect] : the affect was normal [Mood] : the mood was normal [Not Anxious] : not anxious [Inguinal Lymph Nodes Enlarged Bilaterally] : inguinal

## 2021-11-22 NOTE — REVIEW OF SYSTEMS
[Chills] : chills [Feeling Tired] : feeling tired [Chest Pain] : chest pain [Palpitations] : palpitations [Shortness Of Breath] : shortness of breath [Cough] : cough [Abdominal Pain] : abdominal pain [Vomiting] : vomiting [Constipation] : constipation [Diarrhea] : diarrhea [Heartburn] : heartburn [Urine Infection (bladder/kidney)] : bladder/kidney infection [Pain during urination] : pain during urination [Wake up at night to urinate  How many times?  ___] : wakes up to urinate [unfilled] times during the night [Strong urge to urinate] : strong urge to urinate [Interrupted urine stream] : interrupted urine stream [Leakage of urine with straining, coughing, laughing] : leakage of urine with straining, coughing, laughing [Joint Pain] : joint pain [Dizziness] : dizziness [Hot Flashes] : hot flashes [Feelings Of Weakness] : feelings of weakness [Swollen Glands] : swollen glands [Negative] : Psychiatric

## 2021-11-22 NOTE — ASSESSMENT
[FreeTextEntry1] : It appears that she had a urinary tract infection with Enterococcus which was treated with Cipro.  Then urinalysis showed white blood cells and urine culture was contaminated.  Repeat urinalysis was normal and urine culture is pending.  She does not have significant symptoms at this time.  Residual urine volume in the office today appears minimal.  She tries to remain hydrated and therefore has nocturia 3-4 times which does not bother her significantly.  Recommend renal ultrasound for work-up of UTI and pyuria.  If symptoms recur or pyuria returns may consider cystoscopy also for completion of work-up.  She will contact me for the results by the phone.\par \par Jose Vital MD, FACS\par [facility]\par  of Urology\par \par [address], [address]\[place] [address]\par \par [address], [address]\[place] [address]\par \par Tel: [phone]\par Fax: [phone]

## 2021-11-22 NOTE — ASSESSMENT
[FreeTextEntry1] : She continues to have significant postural hypotension.  However her symptoms seem to have decreased a little bit.  Currently she is taking 0.8 mg of fludrocortisone.  She is not taking midodrine because of the metallic taste.

## 2021-11-22 NOTE — HISTORY OF PRESENT ILLNESS
[FreeTextEntry1] : On the current medication she felt a little better.  The dizziness was a little less.  She did well for 3 weeks and yesterday she had a fall in the driveway.\par \par She has an appointment to see autonomic disease specialist in January.  Recently she saw a neurologist in [place] and she ordered EEG and a carotid Doppler.  I am not sure about the indications.\par \par I recommended that she get compression stockings.  The one she got were  too loose and she did not get smaller size so far.\par \par While sitting BP was 108 systolic.  Subsequently I had her lie down and his supine blood pressure was 100/60.  On standing she felt a "rush".  She did not feel dizzy or faint.  BP was systolic 60.  Diastolic was probably around 40.  I am not certain the sounds were faint.

## 2021-11-22 NOTE — HISTORY OF PRESENT ILLNESS
[FreeTextEntry1] : She is a 56-year-old woman who is seen today for initial visit.  Patient states that in August 2021, routine laboratory values showed urinary tract infection.  At that time she was asymptomatic.  Urine culture had shown Enterococcus.  Then in October 2021 she had dysuria and cloudy urine.  She was treated with Cipro.  Then urinalysis was repeated in November 2021 which showed significant amount of white blood cells but urine culture was contaminated.  Repeat urinalysis in November 2021 was normal and she believes that urine culture result is still pending.  She tries to remain hydrated and therefore nocturia 3-4 times.  It generally does not bother her significantly.  Residual urine volume was minimal today.  She does not have history of kidney stones.

## 2021-11-26 LAB — BACTERIA UR CULT: ABNORMAL

## 2021-11-27 ENCOUNTER — APPOINTMENT (OUTPATIENT)
Dept: ULTRASOUND IMAGING | Facility: CLINIC | Age: 56
End: 2021-11-27
Payer: COMMERCIAL

## 2021-11-27 PROCEDURE — 76775 US EXAM ABDO BACK WALL LIM: CPT

## 2021-11-30 ENCOUNTER — TRANSCRIPTION ENCOUNTER (OUTPATIENT)
Age: 56
End: 2021-11-30

## 2021-12-06 ENCOUNTER — APPOINTMENT (OUTPATIENT)
Dept: INTERNAL MEDICINE | Facility: CLINIC | Age: 56
End: 2021-12-06
Payer: COMMERCIAL

## 2021-12-06 VITALS — SYSTOLIC BLOOD PRESSURE: 90 MMHG | DIASTOLIC BLOOD PRESSURE: 60 MMHG

## 2021-12-06 VITALS
HEIGHT: 63 IN | BODY MASS INDEX: 19.14 KG/M2 | WEIGHT: 108 LBS | SYSTOLIC BLOOD PRESSURE: 92 MMHG | DIASTOLIC BLOOD PRESSURE: 70 MMHG | HEART RATE: 98 BPM | OXYGEN SATURATION: 100 % | TEMPERATURE: 97.9 F

## 2021-12-06 DIAGNOSIS — U09.9 POST COVID-19 CONDITION, UNSPECIFIED: ICD-10-CM

## 2021-12-06 PROCEDURE — 99214 OFFICE O/P EST MOD 30 MIN: CPT | Mod: 25

## 2021-12-06 PROCEDURE — 36415 COLL VENOUS BLD VENIPUNCTURE: CPT

## 2021-12-06 RX ORDER — MIDODRINE HYDROCHLORIDE 5 MG/1
5 TABLET ORAL 3 TIMES DAILY
Qty: 90 | Refills: 1 | Status: DISCONTINUED | COMMUNITY
Start: 2021-09-09 | End: 2021-12-06

## 2021-12-06 NOTE — HISTORY OF PRESENT ILLNESS
[Spouse] : spouse [FreeTextEntry1] : Post Covid syndrome [de-identified] : The pt presents for ongoing C/O re to her post Covid syndrome\par Has lost more wt re to lack of taste/bad taste in her mouth\par Has CTN complaints re to dysautonomia\par Will F/U with Neuro specializing in this \par Will be also starting a home PT program next week

## 2021-12-06 NOTE — ASSESSMENT
[FreeTextEntry1] : A 56 yrs old pt presents with her spouse for C/O ongoing complaints re to Post Covid syndrome\par Please see my impression of assessed problems

## 2021-12-06 NOTE — REVIEW OF SYSTEMS
[Fatigue] : fatigue [Recent Change In Weight] : ~T recent weight change [Dizziness] : dizziness [Negative] : Heme/Lymph [FreeTextEntry9] : Leg cramps

## 2021-12-06 NOTE — PHYSICAL EXAM
[Normal Sclera/Conjunctiva] : normal sclera/conjunctiva [Normal Oropharynx] : the oropharynx was normal [No Lymphadenopathy] : no lymphadenopathy [Supple] : supple [Thyroid Normal, No Nodules] : the thyroid was normal and there were no nodules present [Clear to Auscultation] : lungs were clear to auscultation bilaterally [Normal Rate] : normal rate  [Regular Rhythm] : with a regular rhythm [Normal S1, S2] : normal S1 and S2 [No Murmur] : no murmur heard [de-identified] : The pt appears frail

## 2021-12-06 NOTE — HEALTH RISK ASSESSMENT
[No] : In the past 12 months have you used drugs other than those required for medical reasons? No [No falls in past year] : Patient reported no falls in the past year [0] : 2) Feeling down, depressed, or hopeless: Not at all (0) [] : No [de-identified] : No [de-identified] : Cardiologist Dr. Dr. Rebel Wolfe, Neurologist Dr. Jerry, Urologist Dr. Vital  [de-identified] : None [de-identified] : eating little,  Patient can not  taste  food  [[id number]] : 0

## 2021-12-07 ENCOUNTER — LABORATORY RESULT (OUTPATIENT)
Age: 56
End: 2021-12-07

## 2021-12-07 LAB
CD16+CD56+ CELLS # BLD: 112 /UL
CD16+CD56+ CELLS NFR BLD: 8 %
CD19 CELLS NFR BLD: 81 /UL
CD3 CELLS # BLD: 1203 /UL
CD3 CELLS NFR BLD: 85 %
CD3+CD4+ CELLS # BLD: 650 /UL
CD3+CD4+ CELLS NFR BLD: 46 %
CD3+CD4+ CELLS/CD3+CD8+ CLL SPEC: 1.3 RATIO
CD3+CD8+ CELLS # SPEC: 499 /UL
CD3+CD8+ CELLS NFR BLD: 35 %
CELLS.CD3-CD19+/CELLS IN BLOOD: 6 %
DEPRECATED KAPPA LC FREE/LAMBDA SER: 0.11 RATIO
IGA SER QL IEP: 53 MG/DL
IGG SER QL IEP: 552 MG/DL
IGM SER QL IEP: 16 MG/DL
KAPPA LC CSF-MCNC: 8.73 MG/DL
KAPPA LC SERPL-MCNC: 0.92 MG/DL

## 2021-12-08 ENCOUNTER — TRANSCRIPTION ENCOUNTER (OUTPATIENT)
Age: 56
End: 2021-12-08

## 2021-12-08 ENCOUNTER — LABORATORY RESULT (OUTPATIENT)
Age: 56
End: 2021-12-08

## 2021-12-10 ENCOUNTER — TRANSCRIPTION ENCOUNTER (OUTPATIENT)
Age: 56
End: 2021-12-10

## 2021-12-27 ENCOUNTER — RX RENEWAL (OUTPATIENT)
Age: 56
End: 2021-12-27

## 2021-12-28 LAB
ALBUMIN MFR SERPL ELPH: 62.1 %
ALBUMIN SERPL-MCNC: 3.7 G/DL
ALBUMIN/GLOB SERPL: 1.6 RATIO
ALBUPE: 46.6 %
ALPHA1 GLOB MFR SERPL ELPH: 5.8 %
ALPHA1 GLOB SERPL ELPH-MCNC: 0.3 G/DL
ALPHA1UPE: 24.4 %
ALPHA2 GLOB MFR SERPL ELPH: 12.7 %
ALPHA2 GLOB SERPL ELPH-MCNC: 0.8 G/DL
ALPHA2UPE: 15.8 %
B-GLOBULIN MFR SERPL ELPH: 11.1 %
B-GLOBULIN SERPL ELPH-MCNC: 0.7 G/DL
BETAUPE: 9.6 %
CREAT 24H UR-MCNC: NORMAL G/24 H
CREATININE UR (MAYO): 32 MG/DL
GAMMA GLOB FLD ELPH-MCNC: 0.5 G/DL
GAMMA GLOB MFR SERPL ELPH: 8.3 %
GAMMAUPE: 3.6 %
IGA 24H UR QL IFE: NORMAL
INTERPRETATION SERPL IEP-IMP: NORMAL
KAPPA LC 24H UR QL: NORMAL
M PROTEIN SPEC IFE-MCNC: NORMAL
PROT PATTERN 24H UR ELPH-IMP: NORMAL
PROT SERPL-MCNC: 6 G/DL
PROT SERPL-MCNC: 6 G/DL
PROT UR-MCNC: 9 MG/DL
PROT UR-MCNC: 9 MG/DL
SPECIMEN VOL 24H UR: NORMAL ML

## 2022-01-17 ENCOUNTER — OUTPATIENT (OUTPATIENT)
Dept: OUTPATIENT SERVICES | Facility: HOSPITAL | Age: 57
LOS: 1 days | Discharge: HOME | End: 2022-01-17

## 2022-01-17 DIAGNOSIS — D47.2 MONOCLONAL GAMMOPATHY: ICD-10-CM

## 2022-01-19 ENCOUNTER — APPOINTMENT (OUTPATIENT)
Dept: HEMATOLOGY ONCOLOGY | Facility: CLINIC | Age: 57
End: 2022-01-19
Payer: COMMERCIAL

## 2022-01-19 PROCEDURE — 99205 OFFICE O/P NEW HI 60 MIN: CPT | Mod: 95

## 2022-01-19 PROCEDURE — 99204 OFFICE O/P NEW MOD 45 MIN: CPT | Mod: 95

## 2022-01-19 RX ORDER — LINACLOTIDE 145 UG/1
145 CAPSULE, GELATIN COATED ORAL
Refills: 0 | Status: DISCONTINUED | COMMUNITY
End: 2022-01-19

## 2022-01-19 RX ORDER — ATORVASTATIN CALCIUM 20 MG/1
20 TABLET, FILM COATED ORAL
Qty: 90 | Refills: 1 | Status: DISCONTINUED | COMMUNITY
Start: 2018-01-03 | End: 2022-01-19

## 2022-01-19 RX ORDER — INHALER,ASSIST DEVICE,LG MASK
SPACER (EA) MISCELLANEOUS
Qty: 1 | Refills: 0 | Status: DISCONTINUED | COMMUNITY
Start: 2021-06-15 | End: 2022-01-19

## 2022-01-19 RX ORDER — BUDESONIDE AND FORMOTEROL FUMARATE DIHYDRATE 80; 4.5 UG/1; UG/1
80-4.5 AEROSOL RESPIRATORY (INHALATION)
Qty: 10 | Refills: 0 | Status: DISCONTINUED | COMMUNITY
Start: 2021-06-15 | End: 2022-01-19

## 2022-01-19 RX ORDER — AMITRIPTYLINE HYDROCHLORIDE 25 MG/1
25 TABLET, FILM COATED ORAL
Refills: 0 | Status: DISCONTINUED | COMMUNITY
End: 2022-01-19

## 2022-01-19 NOTE — HISTORY OF PRESENT ILLNESS
[Home] : at home, [unfilled] , at the time of the visit. [Medical Office: ([facility])___] : at the medical office located in  [Verbal consent obtained from patient] : the patient, [unfilled] [de-identified] : 55 y/o F with hx of thyroidectomy in 2011 (benign pathology) on thyroid replacement since then, and with COVID pneumonia back in March of 2020 ultimately signed herself out AMA, chronic issues thereafter, ended up going back to work in July 2020. In January-February 2021 had Pfizer vaccines. Started having issues starting in March 2021, she was having GI issues, endoscopy showed chronic gastritis. Additionally diagnosed with IBS at that point. She has been having issues with Postural Orthostatic Tachycardia Syndrome - her blood pressure goes down when she stands up from a seated position. She has fainted "too many times to count." She ends up with bruises here and there but nothing severe where she would need to go to the hospital. She saw two neurologists who did extensive evaluations without any success in finding diagnostic lesions. She reports she is seeing specialist for dysautonomia, Dr. Colón at [facility] (neurology). She said overall her POTS has improved but she has a had a couple of episodes of fainting in the last couple of weeks. She started fludrocortisone in September 2021 and has titrated up the dose. She is now on 1 mg total (probably 0.5 mg twice daily). The only way she feels completely normal is when she is laying down. BP gets very low on standing up, it has registered as low as 50s over 40s. She reports when she is standing or sitting for too long, she will get a chest tightness, but not quite a pain. It gets to the point where she feels like she is having trouble breathing and then she has to sit down. She has muscle aches in her legs which improves with warm bath, and then some burning pain in her arms which reminds her of a sunburn. She also now is having issues with her mouth - very difficult to eat as it feels like she has burnt her mouth. She has lost 47 pounds since March 2021. She is not really able to get much down due to these feelings, although she does have an appetite. She denies any night-sweats, she does always feel cold though. Her bowel movements "swing" from constipation to diarrhea. She was on Linzess recently but was taken off it. She reports that she is urinating normally, in fact a lot because she drinks a fair amount of water daily. She has no swelling in her legs at this time. She had in the past at work when she was standing a lot and was on amlodipine.

## 2022-01-19 NOTE — PHYSICAL EXAM
[de-identified] : Cannot perform physical exam due to nature of telemedicine visit, however on telemedicine patient appears to not be in any acute distress

## 2022-01-19 NOTE — REVIEW OF SYSTEMS
[Fatigue] : fatigue [Recent Change In Weight] : ~T recent weight change [SOB on Exertion] : shortness of breath during exertion [Constipation] : constipation [Diarrhea] : diarrhea [Dizziness] : dizziness [Fainting] : fainting [Negative] : Psychiatric [Fever] : no fever [Chills] : no chills [Night Sweats] : no night sweats [Chest Pain] : no chest pain [Lower Ext Edema] : no lower extremity edema [Wheezing] : no wheezing [Cough] : no cough [Dysuria] : no dysuria [FreeTextEntry2] : weight loss as per HPI.  [FreeTextEntry4] : mouth burning - as if scalded her tongue. No taste.  [de-identified] : SEE HPI

## 2022-01-19 NOTE — ASSESSMENT
[FreeTextEntry1] : 57 y/o F previously healthy but with recently developing chronic postural orthostatic tachycardia syndrome (POTS) and autonomic postural hypotension, here for finding of elevated free light chains. \par \par -On review of patient's recent illness, defer to neurology regarding the full extent of the differential diagnosis, however certainly autoimmune etiologies should be considered. \par -Serum SPEP without a monoclonal fraction, Urine SPEP without monoclonal protein, immunofixation normal. \par -However Lambda serum FLCs 8.73 and kappa 0.92, for a ratio of 0.11 (or 9.5). \par -Monoclonal gammopathy workup has been unremarkable at this time outside of this finding. However, given the elevated ratio this is concerning for a monoclonal process. \par -Constellation of symptoms is suspicious for systemic light chain amyloidosis. Recently saw cardiology and had echocardiogram done, will send us results. Renal ultrasound recently showed right parapelvic cyst versus renal pelvic fullness without adis hydronephrosis.\par -Recommend abdominal fat pad biopsy to rule out systemic light chain amyloidosis. \par -In addition, will check 24 hour urine to quantify urine light chain ratio and protein / bence richard protein content. \par -Follow up in 1 month. \par -Patient understands and agrees with plan. All information explained to the best of my ability.\par \par \par The visit was completed via tele-medicine visit due to the restrictions of the COVID-19 pandemic. All issues as above were discussed and addressed but no physical exam was performed. If it was felt that the patient should be evaluated in the clinic then they were directed there. The patient verbally consented to visit.\par \par

## 2022-02-02 ENCOUNTER — LABORATORY RESULT (OUTPATIENT)
Age: 57
End: 2022-02-02

## 2022-02-04 LAB
ALBUPE: 27.2 %
ALPHA1UPE: 26.2 %
ALPHA2UPE: 20.5 %
BENCE JONES EXCRETION: 0 MG/24HR
BETAUPE: 11.8 %
CREAT 24H UR-MCNC: 0.6 G/24 H
CREAT 24H UR-MCNC: 0.6 G/24 H
CREAT ?TM UR-MCNC: 20 MG/DL
CREATININE UR (MAYO): 20 MG/DL
FREE KAPPA URINE: 0.6 MG/L
FREE KAPPA/LAMDA RATIO: NORMAL
FREE LAMDA URINE: <0.74 MG/L
GAMMAUPE: 14.3 %
IGA 24H UR QL IFE: NORMAL
KAPPA LC 24H UR QL: 0 MG/DL
M PROTEIN 24H MFR UR ELPH: 0 %
PROT 24H UR-MRATE: 6 MG/DL
PROT ?TM UR-MCNC: 24 HR
PROT ?TM UR-MCNC: 24 HR
PROT PATTERN 24H UR ELPH-IMP: NORMAL
PROT UR-MCNC: 180 MG/24 H
PROT UR-MCNC: 5 MG/DL
PROT UR-MCNC: 5 MG/DL
SPECIMEN VOL 24H UR: 3000 ML
SPECIMEN VOL 24H UR: 3000 ML
U PROTEIN QNT CALCULATION: 150 MG/24 H

## 2022-02-07 ENCOUNTER — APPOINTMENT (OUTPATIENT)
Dept: ENDOCRINOLOGY | Facility: CLINIC | Age: 57
End: 2022-02-07
Payer: COMMERCIAL

## 2022-02-07 VITALS — WEIGHT: 100 LBS | BODY MASS INDEX: 17.71 KG/M2

## 2022-02-07 PROCEDURE — 99442: CPT

## 2022-02-07 RX ORDER — FLUDROCORTISONE ACETATE 0.1 MG/1
0.1 TABLET ORAL DAILY
Qty: 900 | Refills: 1 | Status: DISCONTINUED | COMMUNITY
Start: 2021-08-18 | End: 2022-02-07

## 2022-02-07 NOTE — HISTORY OF PRESENT ILLNESS
[FreeTextEntry1] : 56 year old female presents for follow up of pre-diabetes and hypothyroidism. She reports that about 5 years ago she was diagnosed with pre-diabetes and was started on Metformin. She experienced severe GI upset and it was stopped. She has been controlling it with diet and exercise. Recently she did lose 20+ lbs. Her A1C WAS down to 5.9, from 6.5.\par Of note, she was thought to have thyroid cancer in 12/2010 and underwent a total thyroidectomy and partial parathyroidectomy in 2/2011. Final pathology was benign according to the pt. She did not receive any further treatment. Presently she is on Levothyroxine 112 mcg daily. Has lost more weight and is currently around 100 lbs. \par She was recently diagnosed with autonomic nervous system dysfunction. She reports that she had COVID 3/2020 and had long haul symptoms, which seemed to have worsened since her COVID vaccination 1/2021. She is under Neurology care for POTS. She has a difficult time sitting up and can only be supine for 1 hour before she begins to experience hypotension and dizziness. Reports 2 episodes already this morning. Denies injury. Reports fludrocortisone was stopped and she was restarted on Midodrine. \par Saw Urology who felt that her symptoms of UTI were r/t incomplete bladder emptying. \par She is also now having an evaluation with [facility] for abnormal Light chains.

## 2022-02-07 NOTE — ASSESSMENT
[Carbohydrate Consistent Diet] : carbohydrate consistent diet [Importance of Diet and Exercise] : importance of diet and exercise to improve glycemic control, achieve weight loss and improve cardiovascular health [Self Monitoring of Blood Glucose] : self monitoring of blood glucose [Injection Technique, Storage, Sharps Disposal] : injection technique, storage, and sharps disposal [Levothyroxine] : The patient was instructed to take Levothyroxine on an empty stomach, separate from vitamins, and wait at least 30 minutes before eating [FreeTextEntry1] : 1. Pre-diabetes - Continue to follow a healthy lifestyle. She can have more protein and fat to help with weight. \par 2. Hypothyroidism - She will get me a copy of her pathology, surgical report, sono, etc. TFTs were previously stable, Thyoid Ab were negative. Will repeat, especially since she had lost more weight.\par 3. History of parathyroid surgery - Will review operative report. PTH and calcium previously stable.\par \par Patient to complete labs, will call with the results. \par \par f/u in 3 months \par Can f/u via telehealth in between if needed.

## 2022-02-07 NOTE — REASON FOR VISIT
[Follow - Up] : a follow-up visit [Hypothyroidism] : hypothyroidism [Home] : at home, [unfilled] , at the time of the visit. [Medical Office: ([facility])___] : at the medical office located in  [Verbal consent obtained from patient] : the patient, [unfilled]

## 2022-02-08 ENCOUNTER — RESULT REVIEW (OUTPATIENT)
Age: 57
End: 2022-02-08

## 2022-02-08 ENCOUNTER — APPOINTMENT (OUTPATIENT)
Dept: ULTRASOUND IMAGING | Facility: IMAGING CENTER | Age: 57
End: 2022-02-08
Payer: COMMERCIAL

## 2022-02-08 ENCOUNTER — OUTPATIENT (OUTPATIENT)
Dept: OUTPATIENT SERVICES | Facility: HOSPITAL | Age: 57
LOS: 1 days | End: 2022-02-08
Payer: COMMERCIAL

## 2022-02-08 DIAGNOSIS — R76.8 OTHER SPECIFIED ABNORMAL IMMUNOLOGICAL FINDINGS IN SERUM: ICD-10-CM

## 2022-02-08 PROCEDURE — 88305 TISSUE EXAM BY PATHOLOGIST: CPT

## 2022-02-08 PROCEDURE — 88313 SPECIAL STAINS GROUP 2: CPT | Mod: 26

## 2022-02-08 PROCEDURE — 20206 BIOPSY MUSCLE PERQ NEEDLE: CPT

## 2022-02-08 PROCEDURE — 88305 TISSUE EXAM BY PATHOLOGIST: CPT | Mod: 26

## 2022-02-08 PROCEDURE — 76942 ECHO GUIDE FOR BIOPSY: CPT

## 2022-02-08 PROCEDURE — 88313 SPECIAL STAINS GROUP 2: CPT

## 2022-02-08 PROCEDURE — 76942 ECHO GUIDE FOR BIOPSY: CPT | Mod: 26

## 2022-02-09 LAB — SURGICAL PATHOLOGY STUDY: SIGNIFICANT CHANGE UP

## 2022-02-10 ENCOUNTER — NON-APPOINTMENT (OUTPATIENT)
Age: 57
End: 2022-02-10

## 2022-02-14 ENCOUNTER — NON-APPOINTMENT (OUTPATIENT)
Age: 57
End: 2022-02-14

## 2022-02-15 LAB
CHOLEST SERPL-MCNC: 213 MG/DL
HDLC SERPL-MCNC: 63 MG/DL
LDLC SERPL CALC-MCNC: 118 MG/DL
NONHDLC SERPL-MCNC: 151 MG/DL
TRIGL SERPL-MCNC: 164 MG/DL

## 2022-02-18 ENCOUNTER — TRANSCRIPTION ENCOUNTER (OUTPATIENT)
Age: 57
End: 2022-02-18

## 2022-02-21 ENCOUNTER — OUTPATIENT (OUTPATIENT)
Dept: OUTPATIENT SERVICES | Facility: HOSPITAL | Age: 57
LOS: 1 days | Discharge: HOME | End: 2022-02-21

## 2022-02-21 DIAGNOSIS — D47.2 MONOCLONAL GAMMOPATHY: ICD-10-CM

## 2022-02-23 ENCOUNTER — TRANSCRIPTION ENCOUNTER (OUTPATIENT)
Age: 57
End: 2022-02-23

## 2022-02-23 LAB
25(OH)D3 SERPL-MCNC: 25 NG/ML
ALBUMIN SERPL ELPH-MCNC: 4.1 G/DL
ALP BLD-CCNC: 62 U/L
ALT SERPL-CCNC: 25 U/L
ANION GAP SERPL CALC-SCNC: 10 MMOL/L
AST SERPL-CCNC: 16 U/L
BILIRUB SERPL-MCNC: 0.9 MG/DL
BUN SERPL-MCNC: 7 MG/DL
CALCIUM SERPL-MCNC: 9.2 MG/DL
CHLORIDE SERPL-SCNC: 97 MMOL/L
CHOLEST SERPL-MCNC: 203 MG/DL
CO2 SERPL-SCNC: 27 MMOL/L
CREAT SERPL-MCNC: 0.57 MG/DL
ESTIMATED AVERAGE GLUCOSE: 120 MG/DL
FOLATE SERPL-MCNC: 13.1 NG/ML
GLUCOSE SERPL-MCNC: 97 MG/DL
HBA1C MFR BLD HPLC: 5.8 %
HDLC SERPL-MCNC: 61 MG/DL
LDLC SERPL CALC-MCNC: 111 MG/DL
NONHDLC SERPL-MCNC: 143 MG/DL
POTASSIUM SERPL-SCNC: 4.8 MMOL/L
PROT SERPL-MCNC: 5.7 G/DL
SODIUM SERPL-SCNC: 134 MMOL/L
T4 FREE SERPL-MCNC: 1.8 NG/DL
T4 SERPL-MCNC: 9.4 UG/DL
TRIGL SERPL-MCNC: 158 MG/DL
TSH SERPL-ACNC: 13.2 UIU/ML
VIT B12 SERPL-MCNC: 321 PG/ML

## 2022-02-24 ENCOUNTER — RESULT REVIEW (OUTPATIENT)
Age: 57
End: 2022-02-24

## 2022-02-24 ENCOUNTER — APPOINTMENT (OUTPATIENT)
Dept: HEMATOLOGY ONCOLOGY | Facility: CLINIC | Age: 57
End: 2022-02-24
Payer: COMMERCIAL

## 2022-02-24 VITALS
BODY MASS INDEX: 18.12 KG/M2 | HEART RATE: 70 BPM | RESPIRATION RATE: 13 BRPM | OXYGEN SATURATION: 100 % | DIASTOLIC BLOOD PRESSURE: 75 MMHG | SYSTOLIC BLOOD PRESSURE: 120 MMHG | HEIGHT: 63 IN | WEIGHT: 102.29 LBS | TEMPERATURE: 97.7 F

## 2022-02-24 LAB
BASOPHILS # BLD AUTO: 0.04 K/UL — SIGNIFICANT CHANGE UP (ref 0–0.2)
BASOPHILS NFR BLD AUTO: 0.5 % — SIGNIFICANT CHANGE UP (ref 0–2)
EOSINOPHIL # BLD AUTO: 0.04 K/UL — SIGNIFICANT CHANGE UP (ref 0–0.5)
EOSINOPHIL NFR BLD AUTO: 0.5 % — SIGNIFICANT CHANGE UP (ref 0–6)
HCT VFR BLD CALC: 38.3 % — SIGNIFICANT CHANGE UP (ref 34.5–45)
HGB BLD-MCNC: 12.9 G/DL — SIGNIFICANT CHANGE UP (ref 11.5–15.5)
IMM GRANULOCYTES NFR BLD AUTO: 0.4 % — SIGNIFICANT CHANGE UP (ref 0–1.5)
LYMPHOCYTES # BLD AUTO: 1.65 K/UL — SIGNIFICANT CHANGE UP (ref 1–3.3)
LYMPHOCYTES # BLD AUTO: 20.5 % — SIGNIFICANT CHANGE UP (ref 13–44)
MCHC RBC-ENTMCNC: 30.7 PG — SIGNIFICANT CHANGE UP (ref 27–34)
MCHC RBC-ENTMCNC: 33.7 G/DL — SIGNIFICANT CHANGE UP (ref 32–36)
MCV RBC AUTO: 91.2 FL — SIGNIFICANT CHANGE UP (ref 80–100)
MONOCYTES # BLD AUTO: 0.41 K/UL — SIGNIFICANT CHANGE UP (ref 0–0.9)
MONOCYTES NFR BLD AUTO: 5.1 % — SIGNIFICANT CHANGE UP (ref 2–14)
NEUTROPHILS # BLD AUTO: 5.86 K/UL — SIGNIFICANT CHANGE UP (ref 1.8–7.4)
NEUTROPHILS NFR BLD AUTO: 73 % — SIGNIFICANT CHANGE UP (ref 43–77)
NRBC # BLD: 0 /100 WBCS — SIGNIFICANT CHANGE UP (ref 0–0)
PLATELET # BLD AUTO: 252 K/UL — SIGNIFICANT CHANGE UP (ref 150–400)
RBC # BLD: 4.2 M/UL — SIGNIFICANT CHANGE UP (ref 3.8–5.2)
RBC # FLD: 12.3 % — SIGNIFICANT CHANGE UP (ref 10.3–14.5)
WBC # BLD: 8.03 K/UL — SIGNIFICANT CHANGE UP (ref 3.8–10.5)
WBC # FLD AUTO: 8.03 K/UL — SIGNIFICANT CHANGE UP (ref 3.8–10.5)

## 2022-02-24 PROCEDURE — 99215 OFFICE O/P EST HI 40 MIN: CPT

## 2022-02-25 ENCOUNTER — RX RENEWAL (OUTPATIENT)
Age: 57
End: 2022-02-25

## 2022-02-26 NOTE — REVIEW OF SYSTEMS
[Fatigue] : fatigue [Recent Change In Weight] : ~T recent weight change [SOB on Exertion] : shortness of breath during exertion [Constipation] : constipation [Diarrhea] : diarrhea [Dizziness] : dizziness [Fainting] : fainting [Negative] : Psychiatric [Fever] : no fever [Chills] : no chills [Night Sweats] : no night sweats [Chest Pain] : no chest pain [Lower Ext Edema] : no lower extremity edema [Wheezing] : no wheezing [Cough] : no cough [Dysuria] : no dysuria [FreeTextEntry2] : weight loss as per HPI.  [FreeTextEntry4] : mouth burning - as if scalded her tongue. No taste.  [de-identified] : SEE HPI

## 2022-02-26 NOTE — PHYSICAL EXAM
[Thin] : thin [Normal] : affect appropriate [de-identified] : possible macroglossia, with lateral scalloping of tongue.

## 2022-02-26 NOTE — REASON FOR VISIT
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

## 2022-02-26 NOTE — HISTORY OF PRESENT ILLNESS
[de-identified] : 55 y/o F with hx of thyroidectomy in 2011 (benign pathology) on thyroid replacement since then, and with COVID pneumonia back in March of 2020 ultimately signed herself out AMA, chronic issues thereafter, ended up going back to work in July 2020. In January-February 2021 had Pfizer vaccines. Started having issues starting in March 2021, she was having GI issues, endoscopy showed chronic gastritis. Additionally diagnosed with IBS at that point. She has been having issues with Postural Orthostatic Tachycardia Syndrome - her blood pressure goes down when she stands up from a seated position. She has fainted "too many times to count." She ends up with bruises here and there but nothing severe where she would need to go to the hospital. She saw two neurologists who did extensive evaluations without any success in finding diagnostic lesions. She reports she is seeing specialist for dysautonomia, Dr. Colón at [facility] (neurology). She said overall her POTS has improved but she has a had a couple of episodes of fainting in the last couple of weeks. She started fludrocortisone in September 2021 and has titrated up the dose. She is now on 1 mg total (probably 0.5 mg twice daily). The only way she feels completely normal is when she is laying down. BP gets very low on standing up, it has registered as low as 50s over 40s. She reports when she is standing or sitting for too long, she will get a chest tightness, but not quite a pain. It gets to the point where she feels like she is having trouble breathing and then she has to sit down. She has muscle aches in her legs which improves with warm bath, and then some burning pain in her arms which reminds her of a sunburn. She also now is having issues with her mouth - very difficult to eat as it feels like she has burnt her mouth. She has lost 47 pounds since March 2021. She is not really able to get much down due to these feelings, although she does have an appetite. She denies any night-sweats, she does always feel cold though. Her bowel movements "swing" from constipation to diarrhea. She was on Linzess recently but was taken off it. She reports that she is urinating normally, in fact a lot because she drinks a fair amount of water daily. She has no swelling in her legs at this time. She had in the past at work when she was standing a lot and was on amlodipine. She was sent to me for evaluation of elevated free light chains. 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 She was found with no monoclonal protein on SPEP and hemoglobin normal, normal renal function, and no hypercalcemia. Fat pad biopsy showed positive congo red staining in rare blood vessels and positive for polarization. 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 [de-identified] : Patient reports that since the biopsy results came back she is very anxious about her diagnosis. She reported no major changes in her constellation of symptoms otherwise. Still with a lot of pain in her mouth which makes it very difficult for her to gain weight. Is aware she will need a bone marrow biopsy and was initially interested in doing it under anesthesia, but after prolonged discussion of the unnecessary risks of anesthesia willing to proceed with BMBx with benzodiazepene. She otherwise has no new complaints today.

## 2022-02-26 NOTE — ASSESSMENT
[FreeTextEntry1] : 57 y/o F previously healthy but with recently developing chronic postural orthostatic tachycardia syndrome (POTS) and autonomic postural hypotension, here for finding of elevated free light chains. \par \par -Serum SPEP without a monoclonal fraction, Urine SPEP without monoclonal protein, immunofixation normal. \par -However Lambda serum FLCs 8.73 and kappa 0.92, for a ratio of 0.11 (or 9.5). \par -Monoclonal gammopathy workup has been unremarkable at this time outside of this finding. However, given the elevated ratio this was concerning for a monoclonal process. Remainder of workup had been unremarkable, however fat pad biopsy done and was POSITIVE for congo red, with positive polarization.  \par -Constellation of symptoms plus fat pad biopsy results are suspicious for systemic light chain amyloidosis. Recently saw cardiology and had echocardiogram done which was largely unremarkable outside of a thickened interventricular septum. Renal ultrasound recently showed right parapelvic cyst versus renal pelvic fullness without adis hydronephrosis.\par -Urine showing proteinuria (although not nephrotic range)\par -Systemic light chain amyloidosis is classically associated with a thickened interventricular septum and proteinuria. Also associated with neurologic sequelae such as that she has. \par -Patient will be following up with GI for EGD/colonoscopy given persistent GI symptoms, will also check for congo red on those biopsies as well. Discussed with gastroenterologist. \par -In addition, will schedule bone marrow biopsy to rule out underlying MM. \par -Discussed likely plan with patient, which will be daratumumab plus CyBorD with a plan for autologous bone marrow transplant. Will discuss further on follow up after bone marrow biopsy. \par -Follow up in 2 weeks .\par -Patient understands and agrees with plan. All information explained to the best of my ability.

## 2022-03-01 ENCOUNTER — LABORATORY RESULT (OUTPATIENT)
Age: 57
End: 2022-03-01

## 2022-03-01 ENCOUNTER — RESULT REVIEW (OUTPATIENT)
Age: 57
End: 2022-03-01

## 2022-03-01 ENCOUNTER — APPOINTMENT (OUTPATIENT)
Dept: HEMATOLOGY ONCOLOGY | Facility: CLINIC | Age: 57
End: 2022-03-01
Payer: COMMERCIAL

## 2022-03-01 ENCOUNTER — TRANSCRIPTION ENCOUNTER (OUTPATIENT)
Age: 57
End: 2022-03-01

## 2022-03-01 VITALS
BODY MASS INDEX: 18.08 KG/M2 | DIASTOLIC BLOOD PRESSURE: 79 MMHG | SYSTOLIC BLOOD PRESSURE: 121 MMHG | RESPIRATION RATE: 14 BRPM | WEIGHT: 102.07 LBS | OXYGEN SATURATION: 99 % | HEART RATE: 72 BPM | TEMPERATURE: 97 F

## 2022-03-01 LAB
BASOPHILS # BLD AUTO: 0.05 K/UL — SIGNIFICANT CHANGE UP (ref 0–0.2)
BASOPHILS NFR BLD AUTO: 0.9 % — SIGNIFICANT CHANGE UP (ref 0–2)
EOSINOPHIL # BLD AUTO: 0.03 K/UL — SIGNIFICANT CHANGE UP (ref 0–0.5)
EOSINOPHIL NFR BLD AUTO: 0.6 % — SIGNIFICANT CHANGE UP (ref 0–6)
HCT VFR BLD CALC: 33.6 % — LOW (ref 34.5–45)
HGB BLD-MCNC: 11.6 G/DL — SIGNIFICANT CHANGE UP (ref 11.5–15.5)
IMM GRANULOCYTES NFR BLD AUTO: 0.4 % — SIGNIFICANT CHANGE UP (ref 0–1.5)
LYMPHOCYTES # BLD AUTO: 1.71 K/UL — SIGNIFICANT CHANGE UP (ref 1–3.3)
LYMPHOCYTES # BLD AUTO: 31.8 % — SIGNIFICANT CHANGE UP (ref 13–44)
MCHC RBC-ENTMCNC: 30.1 PG — SIGNIFICANT CHANGE UP (ref 27–34)
MCHC RBC-ENTMCNC: 34.5 G/DL — SIGNIFICANT CHANGE UP (ref 32–36)
MCV RBC AUTO: 87.3 FL — SIGNIFICANT CHANGE UP (ref 80–100)
MONOCYTES # BLD AUTO: 0.41 K/UL — SIGNIFICANT CHANGE UP (ref 0–0.9)
MONOCYTES NFR BLD AUTO: 7.6 % — SIGNIFICANT CHANGE UP (ref 2–14)
NEUTROPHILS # BLD AUTO: 3.15 K/UL — SIGNIFICANT CHANGE UP (ref 1.8–7.4)
NEUTROPHILS NFR BLD AUTO: 58.7 % — SIGNIFICANT CHANGE UP (ref 43–77)
NRBC # BLD: 0 /100 WBCS — SIGNIFICANT CHANGE UP (ref 0–0)
PLATELET # BLD AUTO: 228 K/UL — SIGNIFICANT CHANGE UP (ref 150–400)
RBC # BLD: 3.85 M/UL — SIGNIFICANT CHANGE UP (ref 3.8–5.2)
RBC # FLD: 11.9 % — SIGNIFICANT CHANGE UP (ref 10.3–14.5)
WBC # BLD: 5.37 K/UL — SIGNIFICANT CHANGE UP (ref 3.8–10.5)
WBC # FLD AUTO: 5.37 K/UL — SIGNIFICANT CHANGE UP (ref 3.8–10.5)

## 2022-03-01 PROCEDURE — 38222 DX BONE MARROW BX & ASPIR: CPT | Mod: LT

## 2022-03-01 NOTE — REASON FOR VISIT
[Bone Marrow Biopsy] : bone marrow biopsy [Bone Marrow Aspiration] : bone marrow aspiration [Spouse] : spouse [FreeTextEntry2] : 57 yo F with PMHx of serum free light chains, fat pad biopsy (+) congo red stain r/o systemic light chain amyloidosis

## 2022-03-01 NOTE — PROCEDURE
[Bone Marrow Biopsy] : bone marrow biopsy [Bone Marrow Aspiration] : bone marrow aspiration  [Patient] : the patient [Verbal Consent Obtained] : verbal consent was obtained prior to the procedure [Patient identification verified] : patient identification verified [Procedure verified and consent obtained] : procedure verified and consent obtained [Laterality verified and correct site marked] : laterality verified and correct site marked [Correct positioning] : correct positioning [Correct implant and/ or special equipment obtained] : correct impact and/ or special equipment obtained [Prone] : prone [Superior iliac spine was identified] : the superior iliac spine was identified. [Lidocaine was injected and into the periosteum overlying the site.] : Lidocaine was injected and into the periosteum overlying the site. [Aspirate] : aspirate [Cytogenetics] : cytogenetics [FISH] : FISH [Biopsy] : biopsy [Flow Cytometry] : flow cytometry [] : The patient was instructed to remove the bandage the following AM. The patient may bathe. Acetaminophen may be taken for discomfort, as per package directions.If there are any other problems, the patient was instructed to call the office. The patient verbalized understanding, and is aware of the office contact numbers. [Left] : site: left [The left posterior iliac crest was prepped with betadine and draped, using sterile technique.] : The left posterior iliac crest was prepped with betadine and draped, using sterile technique. [FreeTextEntry1] : 55 yo F with PMHx of serum free light chains, fat pad biopsy (+) congo red stain r/o systemic light chain amyloidosis [FreeTextEntry2] : WBC: 5.37\par Hgb: 11.6\par PLT: 228K\par \par Bone marrow biopsy and aspiration. Patient tolerated procedure well.

## 2022-03-02 ENCOUNTER — LABORATORY RESULT (OUTPATIENT)
Age: 57
End: 2022-03-02

## 2022-03-02 RX ORDER — ALPRAZOLAM 0.5 MG/1
0.5 TABLET ORAL DAILY
Qty: 10 | Refills: 0 | Status: DISCONTINUED | COMMUNITY
Start: 2022-02-24 | End: 2022-03-02

## 2022-03-03 ENCOUNTER — NON-APPOINTMENT (OUTPATIENT)
Age: 57
End: 2022-03-03

## 2022-03-08 LAB
ALBUMIN MFR SERPL ELPH: 64.7 %
ALBUMIN SERPL ELPH-MCNC: 4.5 G/DL
ALBUMIN SERPL-MCNC: 4 G/DL
ALBUMIN/GLOB SERPL: 1.8 RATIO
ALP BLD-CCNC: 76 U/L
ALPHA1 GLOB MFR SERPL ELPH: 5.7 %
ALPHA1 GLOB SERPL ELPH-MCNC: 0.4 G/DL
ALPHA2 GLOB MFR SERPL ELPH: 11 %
ALPHA2 GLOB SERPL ELPH-MCNC: 0.7 G/DL
ALT SERPL-CCNC: 33 U/L
ANION GAP SERPL CALC-SCNC: 13 MMOL/L
AST SERPL-CCNC: 15 U/L
B-GLOBULIN MFR SERPL ELPH: 10.9 %
B-GLOBULIN SERPL ELPH-MCNC: 0.7 G/DL
BILIRUB SERPL-MCNC: 0.8 MG/DL
BUN SERPL-MCNC: 10 MG/DL
CALCIUM SERPL-MCNC: 9.8 MG/DL
CHLORIDE SERPL-SCNC: 98 MMOL/L
CO2 SERPL-SCNC: 26 MMOL/L
CREAT SERPL-MCNC: 0.6 MG/DL
DEPRECATED KAPPA LC FREE/LAMBDA SER: 0.09 RATIO
GAMMA GLOB FLD ELPH-MCNC: 0.5 G/DL
GAMMA GLOB MFR SERPL ELPH: 7.7 %
GLUCOSE SERPL-MCNC: 101 MG/DL
IGA SER QL IEP: 54 MG/DL
IGG SER QL IEP: 510 MG/DL
IGM SER QL IEP: 15 MG/DL
INTERPRETATION SERPL IEP-IMP: NORMAL
KAPPA LC CSF-MCNC: 10.92 MG/DL
KAPPA LC SERPL-MCNC: 1 MG/DL
M PROTEIN SPEC IFE-MCNC: NORMAL
POTASSIUM SERPL-SCNC: 4.8 MMOL/L
PROT SERPL-MCNC: 6.2 G/DL
SODIUM SERPL-SCNC: 137 MMOL/L

## 2022-03-15 ENCOUNTER — RESULT REVIEW (OUTPATIENT)
Age: 57
End: 2022-03-15

## 2022-03-15 ENCOUNTER — APPOINTMENT (OUTPATIENT)
Dept: HEMATOLOGY ONCOLOGY | Facility: CLINIC | Age: 57
End: 2022-03-15
Payer: COMMERCIAL

## 2022-03-15 VITALS — SYSTOLIC BLOOD PRESSURE: 87 MMHG | DIASTOLIC BLOOD PRESSURE: 52 MMHG

## 2022-03-15 VITALS
HEART RATE: 75 BPM | DIASTOLIC BLOOD PRESSURE: 50 MMHG | BODY MASS INDEX: 17.85 KG/M2 | OXYGEN SATURATION: 99 % | SYSTOLIC BLOOD PRESSURE: 78 MMHG | HEIGHT: 63 IN | TEMPERATURE: 97.9 F | WEIGHT: 100.75 LBS | RESPIRATION RATE: 14 BRPM

## 2022-03-15 LAB
BASOPHILS # BLD AUTO: 0.05 K/UL — SIGNIFICANT CHANGE UP (ref 0–0.2)
BASOPHILS NFR BLD AUTO: 0.8 % — SIGNIFICANT CHANGE UP (ref 0–2)
EOSINOPHIL # BLD AUTO: 0.04 K/UL — SIGNIFICANT CHANGE UP (ref 0–0.5)
EOSINOPHIL NFR BLD AUTO: 0.6 % — SIGNIFICANT CHANGE UP (ref 0–6)
HCT VFR BLD CALC: 35.4 % — SIGNIFICANT CHANGE UP (ref 34.5–45)
HGB BLD-MCNC: 12.1 G/DL — SIGNIFICANT CHANGE UP (ref 11.5–15.5)
IMM GRANULOCYTES NFR BLD AUTO: 0.5 % — SIGNIFICANT CHANGE UP (ref 0–1.5)
LYMPHOCYTES # BLD AUTO: 1.7 K/UL — SIGNIFICANT CHANGE UP (ref 1–3.3)
LYMPHOCYTES # BLD AUTO: 25.8 % — SIGNIFICANT CHANGE UP (ref 13–44)
MCHC RBC-ENTMCNC: 29.9 PG — SIGNIFICANT CHANGE UP (ref 27–34)
MCHC RBC-ENTMCNC: 34.2 G/DL — SIGNIFICANT CHANGE UP (ref 32–36)
MCV RBC AUTO: 87.4 FL — SIGNIFICANT CHANGE UP (ref 80–100)
MONOCYTES # BLD AUTO: 0.39 K/UL — SIGNIFICANT CHANGE UP (ref 0–0.9)
MONOCYTES NFR BLD AUTO: 5.9 % — SIGNIFICANT CHANGE UP (ref 2–14)
NEUTROPHILS # BLD AUTO: 4.38 K/UL — SIGNIFICANT CHANGE UP (ref 1.8–7.4)
NEUTROPHILS NFR BLD AUTO: 66.4 % — SIGNIFICANT CHANGE UP (ref 43–77)
NRBC # BLD: 0 /100 WBCS — SIGNIFICANT CHANGE UP (ref 0–0)
PLATELET # BLD AUTO: 242 K/UL — SIGNIFICANT CHANGE UP (ref 150–400)
RBC # BLD: 4.05 M/UL — SIGNIFICANT CHANGE UP (ref 3.8–5.2)
RBC # FLD: 12.2 % — SIGNIFICANT CHANGE UP (ref 10.3–14.5)
WBC # BLD: 6.59 K/UL — SIGNIFICANT CHANGE UP (ref 3.8–10.5)
WBC # FLD AUTO: 6.59 K/UL — SIGNIFICANT CHANGE UP (ref 3.8–10.5)

## 2022-03-15 PROCEDURE — 99214 OFFICE O/P EST MOD 30 MIN: CPT

## 2022-03-18 NOTE — PHYSICAL EXAM
[Thin] : thin [Normal] : affect appropriate [de-identified] : possible macroglossia, with lateral scalloping of tongue.

## 2022-03-18 NOTE — REVIEW OF SYSTEMS
[Fatigue] : fatigue [Recent Change In Weight] : ~T recent weight change [SOB on Exertion] : shortness of breath during exertion [Constipation] : constipation [Diarrhea] : diarrhea [Dizziness] : dizziness [Fainting] : fainting [Negative] : Psychiatric [Fever] : no fever [Chills] : no chills [Night Sweats] : no night sweats [Chest Pain] : no chest pain [Lower Ext Edema] : no lower extremity edema [Wheezing] : no wheezing [Cough] : no cough [Dysuria] : no dysuria [FreeTextEntry2] : weight loss as per HPI.  [de-identified] : SEE HPI [FreeTextEntry4] : mouth burning - as if scalded her tongue. No taste.

## 2022-03-18 NOTE — ASSESSMENT
[FreeTextEntry1] : 57 y/o F previously healthy but with recently developing chronic postural orthostatic tachycardia syndrome (POTS) and autonomic postural hypotension, here for finding of elevated free light chains. \par \par -Serum SPEP without a monoclonal fraction, Urine SPEP without monoclonal protein, immunofixation normal. \par -However Lambda serum FLCs 8.73 and kappa 0.92, for a ratio of 0.11 (or 9.5). \par -Monoclonal gammopathy workup has been unremarkable at this time outside of this finding. However, given the elevated ratio this was concerning for a monoclonal process. Remainder of workup had been unremarkable, however fat pad biopsy done and was POSITIVE for congo red, with positive polarization. \par -As per discussion with pathology, unable to send this for mass spectrometry to confirm amyloid type. However, now s/p BMBx which is showing ~35% plasmacytosis (monoclonal) c/w plasma cell neoplasm. 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 Is NOT meeting MM criteria. \par -Recently saw cardiology and had echocardiogram done which was largely unremarkable outside of a thickened interventricular septum. Recommend continued cardiology follow up. Renal ultrasound recently showed right parapelvic cyst versus renal pelvic fullness without adis hydronephrosis.\par -Urine showing proteinuria (although not nephrotic range)\par -Systemic light chain amyloidosis is classically associated with a thickened interventricular septum and proteinuria. Also associated with neurologic sequelae such as that she has. \par -Would forgo further pathologic workup with EGD/colonoscopy at this point. \par -Currently with C. Diff colitis. Will follow up next week to see if resolved symptoms. At that point, if resolved, plan will be for treatment with poncho-CyBorD.  \par -Discussed likely plan with patient, which will be daratumumab plus CyBorD with a plan for autologous bone marrow transplant. Discussed possible side effects including fatigue and neuropathy, as well as toxicity such as pancytopenia and increased susceptibility to infection. \par -Patient signed consent in the office. \par -Patient understands and agrees with plan. All information explained to the best of my ability.

## 2022-03-18 NOTE — HISTORY OF PRESENT ILLNESS
[de-identified] : 55 y/o F with hx of thyroidectomy in 2011 (benign pathology) on thyroid replacement since then, and with COVID pneumonia back in March of 2020 ultimately signed herself out AMA, chronic issues thereafter, ended up going back to work in July 2020. In January-February 2021 had Pfizer vaccines. Started having issues starting in March 2021, she was having GI issues, endoscopy showed chronic gastritis. Additionally diagnosed with IBS at that point. She has been having issues with Postural Orthostatic Tachycardia Syndrome - her blood pressure goes down when she stands up from a seated position. She has fainted "too many times to count." She ends up with bruises here and there but nothing severe where she would need to go to the hospital. She saw two neurologists who did extensive evaluations without any success in finding diagnostic lesions. She reports she is seeing specialist for dysautonomia, Dr. Colón at [place] [facility] (neurology). She said overall her POTS has improved but she has a had a couple of episodes of fainting in the last couple of weeks. She started fludrocortisone in September 2021 and has titrated up the dose. She is now on 1 mg total (probably 0.5 mg twice daily). The only way she feels completely normal is when she is laying down. BP gets very low on standing up, it has registered as low as 50s over 40s. She reports when she is standing or sitting for too long, she will get a chest tightness, but not quite a pain. It gets to the point where she feels like she is having trouble breathing and then she has to sit down. She has muscle aches in her legs which improves with warm bath, and then some burning pain in her arms which reminds her of a sunburn. She also now is having issues with her mouth - very difficult to eat as it feels like she has burnt her mouth. She has lost 47 pounds since March 2021. She is not really able to get much down due to these feelings, although she does have an appetite. She denies any night-sweats, she does always feel cold though. Her bowel movements "swing" from constipation to diarrhea. She was on Linzess recently but was taken off it. She reports that she is urinating normally, in fact a lot because she drinks a fair amount of water daily. She has no swelling in her legs at this time. She had in the past at work when she was standing a lot and was on amlodipine. She was sent to me for evaluation of elevated free light chains. 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 She was found with no monoclonal protein on SPEP and hemoglobin normal, normal renal function, and no hypercalcemia. Fat pad biopsy showed positive congo red staining in rare blood vessels and positive for polarization. 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 [de-identified] : Patient has had a bone marrow biopsy since her initial visit which showed evidence of plasma cell neoplasm. She does NOT meet criteria for MM at this time. She reported that since her last visit she had been diagnosed with C. Diff colitis and she was started on oral vancomycin. During day of this follow up visit she had been on 4 days of therapy with interval improvement of diarrhea (not complete resolution). Denied any fevers or chills. Otherwise she feels stable. BP soft in office today.

## 2022-03-18 NOTE — RESULTS/DATA
[FreeTextEntry1] : BMBx 3/1/2022\par Final Diagnosis :\par 1, 2. Bone marrow biopsy and bone marrow aspirate\par      - Plasma cell myeloma, 25-35% of cellularity by immunohistochemistry\par      - Trilineage hematopoiesis with maturation, mild eosinophilia, serous\par  atrophy, and iron stores present\par \par Diagnostic Note:\par As per chart review, the patient was referred for elevated free light\par  chains. Her light chain ratio was found to be very low at 0.11 and was\par  found to have hypogammaglobulinemia with no monoclonal protein on SPEP,\par  hemoglobin normal, normal renal function, and no hypercalcemia. Fat\par  pad biopsy showed positive congo red staining in rare blood vessels\par  and positive for polarization. Bone marrow to evaluate for plasma cell\par  myeloma. The bone marrow shows a significant plasma cell infiltrate by\par  immunohistochemistry (25-35% of cellularity), also positive with cyclin\par  D1, with monotypic plasma cells by flow cytometry. Congo red stain is\par  negative.\par Correlation with studies for myeloma-related organ dysfunction is\par  necessary for definitive classification (smoldering multiple myeloma\par \par  versus plasma cell myeloma).\par \par Please note findings of a   normal female karyotype and positive MULTIPLE\par  MYELOMA FISH panel, atypical CCND1/IGH fusion pattern detected (3.5%)    .\par  Based on the additional findings, the diagnosis has not changed.\par

## 2022-03-22 ENCOUNTER — NON-APPOINTMENT (OUTPATIENT)
Age: 57
End: 2022-03-22

## 2022-03-22 ENCOUNTER — OUTPATIENT (OUTPATIENT)
Dept: OUTPATIENT SERVICES | Facility: HOSPITAL | Age: 57
LOS: 1 days | Discharge: HOME | End: 2022-03-22

## 2022-03-22 DIAGNOSIS — E85.9 AMYLOIDOSIS, UNSPECIFIED: ICD-10-CM

## 2022-03-22 DIAGNOSIS — D47.2 MONOCLONAL GAMMOPATHY: ICD-10-CM

## 2022-03-23 NOTE — HISTORY OF PRESENT ILLNESS
[de-identified] : 55 y/o F with hx of thyroidectomy in 2011 (benign pathology) on thyroid replacement since then, and with COVID pneumonia back in March of 2020 ultimately signed herself out AMA, chronic issues thereafter, ended up going back to work in July 2020. In January-February 2021 had Pfizer vaccines. Started having issues starting in March 2021, she was having GI issues, endoscopy showed chronic gastritis. Additionally diagnosed with IBS at that point. She has been having issues with Postural Orthostatic Tachycardia Syndrome - her blood pressure goes down when she stands up from a seated position. She has fainted "too many times to count." She ends up with bruises here and there but nothing severe where she would need to go to the hospital. She saw two neurologists who did extensive evaluations without any success in finding diagnostic lesions. She reports she is seeing specialist for dysautonomia, Dr. Colón at [facility] (neurology). She said overall her POTS has improved but she has a had a couple of episodes of fainting in the last couple of weeks. She started fludrocortisone in September 2021 and has titrated up the dose. She is now on 1 mg total (probably 0.5 mg twice daily). The only way she feels completely normal is when she is laying down. BP gets very low on standing up, it has registered as low as 50s over 40s. She reports when she is standing or sitting for too long, she will get a chest tightness, but not quite a pain. It gets to the point where she feels like she is having trouble breathing and then she has to sit down. She has muscle aches in her legs which improves with warm bath, and then some burning pain in her arms which reminds her of a sunburn. She also now is having issues with her mouth - very difficult to eat as it feels like she has burnt her mouth. She has lost 47 pounds since March 2021. She is not really able to get much down due to these feelings, although she does have an appetite. She denies any night-sweats, she does always feel cold though. Her bowel movements "swing" from constipation to diarrhea. She was on Linzess recently but was taken off it. She reports that she is urinating normally, in fact a lot because she drinks a fair amount of water daily. She has no swelling in her legs at this time. She had in the past at work when she was standing a lot and was on amlodipine. She was sent to me for evaluation of elevated free light chains. 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 She was found with no monoclonal protein on SPEP and hemoglobin normal, normal renal function, and no hypercalcemia. Fat pad biopsy showed positive congo red staining in rare blood vessels and positive for polarization. 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 [de-identified] : Patient has had a bone marrow biopsy since her initial visit which showed evidence of plasma cell neoplasm. She does NOT meet criteria for MM at this time. She reported that since her last visit she had been diagnosed with C. Diff colitis and she was started on oral vancomycin. During day of this follow up visit she had been on 4 days of therapy with interval improvement of diarrhea (not complete resolution). Denied any fevers or chills. Otherwise she feels stable. BP soft in office today.

## 2022-03-23 NOTE — REVIEW OF SYSTEMS
[Fever] : no fever [Chills] : no chills [Night Sweats] : no night sweats [Fatigue] : fatigue [Recent Change In Weight] : ~T recent weight change [Chest Pain] : no chest pain [Lower Ext Edema] : no lower extremity edema [Wheezing] : no wheezing [Cough] : no cough [SOB on Exertion] : shortness of breath during exertion [Constipation] : constipation [Diarrhea] : diarrhea [Dysuria] : no dysuria [Dizziness] : dizziness [Fainting] : fainting [Negative] : Psychiatric [FreeTextEntry2] : weight loss as per HPI.  [FreeTextEntry4] : mouth burning - as if scalded her tongue. No taste.  [de-identified] : SEE HPI

## 2022-03-23 NOTE — ASSESSMENT
[FreeTextEntry1] : 55 y/o F previously healthy but with recently developing chronic postural orthostatic tachycardia syndrome (POTS) and autonomic postural hypotension, here for finding of elevated free light chains. \par \par -Serum SPEP without a monoclonal fraction, Urine SPEP without monoclonal protein, immunofixation normal. \par -However Lambda serum FLCs 8.73 and kappa 0.92, for a ratio of 0.11 (or 9.5). \par -Monoclonal gammopathy workup has been unremarkable at this time outside of this finding. However, given the elevated ratio this was concerning for a monoclonal process. Remainder of workup had been unremarkable, however fat pad biopsy done and was POSITIVE for congo red, with positive polarization. \par -As per discussion with pathology, unable to send this for mass spectrometry to confirm amyloid type. However, now s/p BMBx which is showing ~35% plasmacytosis (monoclonal) c/w plasma cell neoplasm. 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 Is NOT meeting MM criteria. \par -Recently saw cardiology and had echocardiogram done which was largely unremarkable outside of a thickened interventricular septum. Recommend continued cardiology follow up. Renal ultrasound recently showed right parapelvic cyst versus renal pelvic fullness without adis hydronephrosis.\par -Urine showing proteinuria (although not nephrotic range)\par -Systemic light chain amyloidosis is classically associated with a thickened interventricular septum and proteinuria. Also associated with neurologic sequelae such as that she has. \par -Would forgo further pathologic workup with EGD/colonoscopy at this point. \par -Currently with C. Diff colitis. Will follow up next week to see if resolved symptoms. At that point, if resolved, plan will be for treatment with poncho-CyBorD.  \par -Discussed likely plan with patient, which will be daratumumab plus CyBorD with a plan for autologous bone marrow transplant. Discussed possible side effects including fatigue and neuropathy, as well as toxicity such as pancytopenia and increased susceptibility to infection. \par -Patient signed consent in the office. \par -Patient understands and agrees with plan. All information explained to the best of my ability.

## 2022-03-24 ENCOUNTER — APPOINTMENT (OUTPATIENT)
Dept: HEMATOLOGY ONCOLOGY | Facility: CLINIC | Age: 57
End: 2022-03-24

## 2022-03-25 ENCOUNTER — INPATIENT (INPATIENT)
Facility: HOSPITAL | Age: 57
LOS: 9 days | Discharge: HOME | DRG: 982 | End: 2022-04-04
Attending: INTERNAL MEDICINE | Admitting: HOSPITALIST
Payer: COMMERCIAL

## 2022-03-25 VITALS
DIASTOLIC BLOOD PRESSURE: 77 MMHG | WEIGHT: 116.4 LBS | RESPIRATION RATE: 17 BRPM | HEIGHT: 63 IN | OXYGEN SATURATION: 94 % | HEART RATE: 81 BPM | SYSTOLIC BLOOD PRESSURE: 126 MMHG | TEMPERATURE: 98 F

## 2022-03-25 DIAGNOSIS — R22.9 LOCALIZED SWELLING, MASS AND LUMP, UNSPECIFIED: ICD-10-CM

## 2022-03-25 PROCEDURE — 99223 1ST HOSP IP/OBS HIGH 75: CPT

## 2022-03-25 RX ORDER — LACTOBACILLUS ACIDOPHILUS 100MM CELL
1 CAPSULE ORAL DAILY
Refills: 0 | Status: DISCONTINUED | OUTPATIENT
Start: 2022-03-26 | End: 2022-03-30

## 2022-03-25 RX ORDER — ATORVASTATIN CALCIUM 80 MG/1
40 TABLET, FILM COATED ORAL AT BEDTIME
Refills: 0 | Status: DISCONTINUED | OUTPATIENT
Start: 2022-03-25 | End: 2022-04-04

## 2022-03-25 RX ORDER — LORATADINE 10 MG/1
10 TABLET ORAL DAILY
Refills: 0 | Status: DISCONTINUED | OUTPATIENT
Start: 2022-03-26 | End: 2022-04-04

## 2022-03-25 RX ORDER — FLUTICASONE PROPIONATE 50 MCG
1 SPRAY, SUSPENSION NASAL
Refills: 0 | Status: DISCONTINUED | OUTPATIENT
Start: 2022-03-25 | End: 2022-04-04

## 2022-03-25 RX ORDER — MAGNESIUM HYDROXIDE 400 MG/1
30 TABLET, CHEWABLE ORAL AT BEDTIME
Refills: 0 | Status: DISCONTINUED | OUTPATIENT
Start: 2022-03-25 | End: 2022-03-29

## 2022-03-25 RX ORDER — FAMOTIDINE 10 MG/ML
20 INJECTION INTRAVENOUS
Refills: 0 | Status: DISCONTINUED | OUTPATIENT
Start: 2022-03-25 | End: 2022-04-04

## 2022-03-25 RX ORDER — INFLUENZA VIRUS VACCINE 15; 15; 15; 15 UG/.5ML; UG/.5ML; UG/.5ML; UG/.5ML
0.5 SUSPENSION INTRAMUSCULAR ONCE
Refills: 0 | Status: DISCONTINUED | OUTPATIENT
Start: 2022-03-25 | End: 2022-04-04

## 2022-03-25 RX ORDER — MIDODRINE HYDROCHLORIDE 2.5 MG/1
20 TABLET ORAL EVERY 8 HOURS
Refills: 0 | Status: DISCONTINUED | OUTPATIENT
Start: 2022-03-25 | End: 2022-03-25

## 2022-03-25 RX ORDER — ACETAMINOPHEN 500 MG
650 TABLET ORAL EVERY 6 HOURS
Refills: 0 | Status: DISCONTINUED | OUTPATIENT
Start: 2022-03-25 | End: 2022-03-29

## 2022-03-25 RX ORDER — SIMETHICONE 80 MG/1
80 TABLET, CHEWABLE ORAL EVERY 6 HOURS
Refills: 0 | Status: DISCONTINUED | OUTPATIENT
Start: 2022-03-25 | End: 2022-04-04

## 2022-03-25 RX ORDER — VANCOMYCIN HCL 1 G
125 VIAL (EA) INTRAVENOUS EVERY 6 HOURS
Refills: 0 | Status: COMPLETED | OUTPATIENT
Start: 2022-03-25 | End: 2022-03-26

## 2022-03-25 RX ORDER — FLUDROCORTISONE ACETATE 0.1 MG/1
0.2 TABLET ORAL DAILY
Refills: 0 | Status: DISCONTINUED | OUTPATIENT
Start: 2022-03-26 | End: 2022-04-04

## 2022-03-25 RX ORDER — GABAPENTIN 400 MG/1
100 CAPSULE ORAL EVERY 8 HOURS
Refills: 0 | Status: DISCONTINUED | OUTPATIENT
Start: 2022-03-25 | End: 2022-04-04

## 2022-03-25 RX ORDER — ENOXAPARIN SODIUM 100 MG/ML
40 INJECTION SUBCUTANEOUS EVERY 24 HOURS
Refills: 0 | Status: DISCONTINUED | OUTPATIENT
Start: 2022-03-26 | End: 2022-03-29

## 2022-03-25 RX ORDER — ASPIRIN/CALCIUM CARB/MAGNESIUM 324 MG
81 TABLET ORAL DAILY
Refills: 0 | Status: DISCONTINUED | OUTPATIENT
Start: 2022-03-26 | End: 2022-04-04

## 2022-03-25 RX ORDER — LEVOTHYROXINE SODIUM 125 MCG
137 TABLET ORAL DAILY
Refills: 0 | Status: DISCONTINUED | OUTPATIENT
Start: 2022-03-25 | End: 2022-04-04

## 2022-03-25 RX ORDER — MIDODRINE HYDROCHLORIDE 2.5 MG/1
20 TABLET ORAL EVERY 8 HOURS
Refills: 0 | Status: DISCONTINUED | OUTPATIENT
Start: 2022-03-25 | End: 2022-03-29

## 2022-03-25 RX ADMIN — ATORVASTATIN CALCIUM 40 MILLIGRAM(S): 80 TABLET, FILM COATED ORAL at 23:36

## 2022-03-25 RX ADMIN — Medication 125 MILLIGRAM(S): at 23:49

## 2022-03-25 RX ADMIN — SIMETHICONE 80 MILLIGRAM(S): 80 TABLET, CHEWABLE ORAL at 23:36

## 2022-03-25 RX ADMIN — MIDODRINE HYDROCHLORIDE 20 MILLIGRAM(S): 2.5 TABLET ORAL at 23:36

## 2022-03-25 RX ADMIN — GABAPENTIN 100 MILLIGRAM(S): 400 CAPSULE ORAL at 23:36

## 2022-03-25 NOTE — H&P ADULT - PROBLEM SELECTOR PLAN 1
Pt presented to OSH for multiple recurrent syncopal episodes.   Multifactorial in s/o known POTS, orthostatic hypotension associated with likely amyloidosis/myeloma autonomic dysfunction, hypovolemia 2/2 c.diff colitis and diarrhea    Work up from OSH:   - CT head no acute pathology   - TTE (non bubble) normal LV size and function with EF 55%-60%. No regional wall motion abnormalities. Moderate concentric LVH.  - ECG 1st degree AV block  - Tele with sinus pauses 2/2 vagal episodes. Bradycardia possibly 2/2 midodrine and or autonomic dysfunction associated with amyloidosis and myeloma Pt underwent fat pad bx in Feb 2022 and diagnosed with amyloidosis. 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 No typing of amyloidosis was done given as sample was not enough for mass spec.     - Will discuss with onc team re plans for inpt chemo. Potentially starting daratumumab plus CyBorD +/- autologous bone marrow transplant.    # Autonomic dysfunction   - Suspect autonomic dysfunction and AV block may be associated with amyloidosis   - Tx orthostatic hypotension and POTS (see above/below)     # Rule out cardiac involvement  - TTE 3/16/2022 with concentric LVH. Unclear if this is associated with cardiac amyloidosis and deposition? Pt reports TTE in 01/2022 was normal with mild valvular regurgitation   - LVH is very classically seen in light chain amyloidosis and plasma cell neoplasm   - Repeat ECG here in [facility]   - Cards and EP consult. Likely need Cardiac MRI & PPM eval   - C/w tele monitoring

## 2022-03-25 NOTE — H&P ADULT - NSHPSOCIALHISTORY_GEN_ALL_CORE
. 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   Denies drug/ETOH/tobacco use.

## 2022-03-25 NOTE — PATIENT PROFILE ADULT - FALL HARM RISK - HARM RISK INTERVENTIONS
Assistance with ambulation/Assistance OOB with selected safe patient handling equipment/Communicate Risk of Fall with Harm to all staff/Discuss with provider need for PT consult/Monitor gait and stability/Reinforce activity limits and safety measures with patient and family/Sit up slowly, dangle for a short time, stand at bedside before walking/Tailored Fall Risk Interventions/Visual Cue: Yellow wristband and red socks/Bed in lowest position, wheels locked, appropriate side rails in place/Call bell, personal items and telephone in reach/Instruct patient to call for assistance before getting out of bed or chair/Non-slip footwear when patient is out of bed/[place] to call system/Physically safe environment - no spills, clutter or unnecessary equipment/Purposeful Proactive Rounding/Room/bathroom lighting operational, light cord in reach

## 2022-03-25 NOTE — H&P ADULT - PROBLEM/PLAN-8
Father states observes \"mild anxiety\" - reports mom and sister have been known to have anxiety    Denies antipyretic use   DISPLAY PLAN FREE TEXT

## 2022-03-25 NOTE — H&P ADULT - NSICDXPASTMEDICALHX_GEN_ALL_CORE_FT
PAST MEDICAL HISTORY:  Amyloidosis     Clostridium difficile colitis     Hypothyroidism     POTS (postural orthostatic tachycardia syndrome)     Smoldering myeloma

## 2022-03-25 NOTE — H&P ADULT - NSHPLABSRESULTS_GEN_ALL_CORE
OSH records:     3/24/2022  CBC: WBC 5.7 Hgb 9.6 Plt 263  BMP: Na 139 K 3.8 Cl 107 Co2 27.6 BUN 7 Cr 0.66 Glucose 122 Ca 8.1    SARSCOV2/ Flu A&B/ RSV PCR neg    CT head 3/16/2022   NO acute pathology    CXR 3/16/2022  Clear lungs    TTE 3/16/2022   normal LV size and function with EF 55%-60%. No regional wall motion abnormalities. Moderate concentric LVH.

## 2022-03-25 NOTE — H&P ADULT - NSHPPHYSICALEXAM_GEN_ALL_CORE
T(C): 36.8 (03-25-22 @ 19:45), Max: 36.8 (03-25-22 @ 19:45)  HR: 80 (03-25-22 @ 23:32) (80 - 81)  BP: 102/60 (03-25-22 @ 23:32) (102/60 - 126/77)  RR: 17 (03-25-22 @ 23:32) (17 - 17)  SpO2: 94% (03-25-22 @ 23:32) (94% - 94%)  General: NAD, comfortable, thin   Eyes: no conjunctival erythema  ENT: MMM  Neck: Neck supple, No JVD  Respiratory: Bibasilar crackles, No wheezing, rales, rhonchi  CV: RRR, mid-systolic murmur   Abdominal: Soft, NT, ND +BS  MSK: no focal weakness  Extremities: No edema, 2+ peripheral pulses  Neurology: A&Ox3, nonfocal, MARX x 4  Skin: No Rashes, Hematoma, Ecchymosis  Psych: Calm and appropriate

## 2022-03-25 NOTE — H&P ADULT - ASSESSMENT
57 y.o. F with PMH of POTS, orthostatic hypotension, recently diagnosed with smoldering myeloma (March 2022) and amyloidosis (Feb 2022), mouth burning syndrome, recently diagnosed c.diff colitis (March 2022), hyperthyroidism s/p thyroidectomy, admitted to [facility] for multiple recurrent syncopal episodes thought to be associated with autonomic dysfunction 2/2 amyloidosis, transferred to [facility] for further amyloidosis management.

## 2022-03-25 NOTE — H&P ADULT - PROBLEM SELECTOR PLAN 6
Pt found to have c.diff colitis per outpt GI in March, presented to OSH and began oral vanco 3/16/2022   - Complete 10 day course 3/26/2022   - Monitor for stool, currently more formed   - No leukocytosis

## 2022-03-25 NOTE — H&P ADULT - PROBLEM SELECTOR PLAN 7
S/p thyroidectomy for hyperthyroidism  - Outpt TSH elevated but FT4 wnl   - Will c/w Levothyroxine 137 mcg qD  - Repeat TFTs 6-8 wks (May 2022)

## 2022-03-25 NOTE — H&P ADULT - PROBLEM SELECTOR PLAN 3
Pt underwent fat pad bx in Feb 2022 and diagnosed with amyloidosis  - Suspect autonomic dysfunction and AV block may be associated with amyloidosis   - Tx orthostatic hypotension and POTS (see above/below)   - Will discuss with onc team re plans for inpt chemo? as pt states Dr. Goldberg recommended transfer to [facility] for tx   - TTE 3/16/2022 with concentric LVH. Unclear if this is associated with cardiac amyloidosis and deposition? Pt reports TTE in 01/2022 was normal with mild valvular regurgitation   - Repeat ECG and TTE here in [facility]   - Cardiac MRI  - C/w tele monitoring Pt underwent fat pad bx in Feb 2022 and diagnosed with amyloidosis (amyloid type not confirmed)  - Suspect autonomic dysfunction and AV block may be associated with amyloidosis   - Tx orthostatic hypotension and POTS (see above/below)   - Will discuss with onc team re plans for inpt chemo? as pt states Dr. Goldberg recommended transfer to [facility] for tx   - TTE 3/16/2022 with concentric LVH. Unclear if this is associated with cardiac amyloidosis and deposition? Pt reports TTE in 01/2022 was normal with mild valvular regurgitation   - Repeat ECG and TTE here in [facility]   - Cardiac MRI  - C/w tele monitoring Pt underwent fat pad bx in Feb 2022 and diagnosed with amyloidosis. 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 No typing of amyloidosis was done given as sample was not enough for mass spec.     - Will discuss with onc team re plans for inpt chemo. Potentially starting daratumumab plus CyBorD +/- autologous bone marrow transplant.    # Autonomic dysfunction   - Suspect autonomic dysfunction and AV block may be associated with amyloidosis   - Tx orthostatic hypotension and POTS (see above/below)     # Rule out cardiac involvement  - TTE 3/16/2022 with concentric LVH. Unclear if this is associated with cardiac amyloidosis and deposition? Pt reports TTE in 01/2022 was normal with mild valvular regurgitation   - Per ourpt oncologist, LVH is very classically seen in light chain amyloidosis and plasma cell neoplasm   - Repeat ECG and TTE here in [facility]   - Consider Cardiac MRI  - C/w tele monitoring C/w midodrine 20mg with more lenient parameters (SBP >150 and HR <40) given pt had multiple RRT in OSH when midodrine was held  - If midodrine needs to be held, consider decreasing dose to 10 or 5mg instead of omiting dose entirely   - C/w with fludrocortisone 0.2mg qD   - BP monitoring

## 2022-03-25 NOTE — H&P ADULT - ATTENDING COMMENTS
This is a 57 y.o. F with PMH of POTS, orthostatic hypotension, recently diagnosed with smoldering myeloma (March 2022) and amyloidosis (Feb 2022), mouth burning syndrome, recently diagnosed c.diff colitis (March 2022), hyperthyroidism s/p thyroidectomy, admitted to [facility] for multiple recurrent syncopal episodes. The patient had a complicated stay in the CCU and ICU for work up of syncope. The patient had multiple episodes of syncope and episodes of hypotension requiring pressors and close monitoring, had sinus pauses on tele monitoring. On their work up, cause of syncope is likely due to amyloidosis causing conduction abnormalities. EP evaluation at [facility] did not feel the patient needed a PPM at that time. The patient also had c diff colitis and had been on treatment for it since the 16th. Today is the last day. The patient reports some loose stools but better than before. Denies abdominal pain. Her oncologist wanted the patient to be transferred to [facility] for possible inpatient chemotherapy for the amyloidosis and MM.    Physical exam shows an elderly female, NAD, comfortably sleeping at bedside, cardiac s1s2 RRR no murmurs, Lungs CTA b/l no wheezing, abdomen is soft nontender to palpation and nondistended, no LE edema noted    Labs significant for hyperkalemia. EKG pending. per outpatient records, found to have LVH on echo.     The patient is admitted for syncope secondary to amyloidosis. Given hx of LVH on echo, likely cardiac conduction is affected in the setting of amyloidosis causing syncope. Will continue with fludrocortisone and midodrine for BP support. The patient will benefit from EP evaluation for pacemaker. The patient was deemed not a candidate at [facility] for PPM however I disagree since she has symptomatic syncope secondary to possible conduction abnormality. Will obtain EKG, c/w tele monitoring. Today is the patient's last day of treatment of C diff colitis. Will maintain contact precautions and complete PO vancomycin. Would call oncology consult for MM and amyloidosis. Will call cardiologist consult for continued work up of cardiac amyloidosis. C/w synthroid.

## 2022-03-25 NOTE — H&P ADULT - PROBLEM SELECTOR PLAN 5
Detail Level: Zone Include Location In Plan?: No Last saw Dr. 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 Last saw Dr. 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   Potentially starting daratumumab plus CyBorD +/- autologous bone marrow transplant.

## 2022-03-25 NOTE — H&P ADULT - PROBLEM SELECTOR PLAN 2
C/w midodrine 20mg with more lenient parameters (SBP >150 and HR <40) given pt had multiple RRT in OSH when midodrine was held  - If midodrine needs to be held, consider decreasing dose to 10 or 5mg instead of omiting dose entirely   - C/w with fludrocortisone 0.2mg qD   - BP monitoring Pt presented to OSH for multiple recurrent syncopal episodes.   Multifactorial in s/o known POTS, orthostatic hypotension associated with likely amyloidosis/myeloma autonomic dysfunction, hypovolemia 2/2 c.diff colitis and diarrhea    Work up from OSH:   - CT head no acute pathology   - TTE (non bubble) normal LV size and function with EF 55%-60%. No regional wall motion abnormalities. Moderate concentric LVH.  - ECG 1st degree AV block  - Tele with sinus pauses 2/2 vagal episodes. Bradycardia possibly 2/2 midodrine and or autonomic dysfunction associated with amyloidosis and myeloma

## 2022-03-25 NOTE — H&P ADULT - NSHPREVIEWOFSYSTEMS_GEN_ALL_CORE
REVIEW OF SYSTEMS:    CONSTITUTIONAL: No weakness, fevers or chills  EYES: no blurry vision or eye pain.   ENT: No throat pain. No dysphagia.    NECK: No pain or stiffness  RESPIRATORY: No cough, wheezing, hemoptysis; No shortness of breath  CARDIOVASCULAR: No chest pain or palpitations.  GASTROINTESTINAL: No abdominal pain. No nausea or vomiting; No diarrhea or constipation. No melena or hematochezia.  GENITOURINARY: No dysuria, frequency or hematuria  NEUROLOGICAL: No numbness or weakness. (+) dizziness (+) syncope    SKIN: No itching, burning, rashes, or lesions.   LYMPHATIC: No masses or swelling.   All other review of systems is negative unless indicated above.

## 2022-03-26 ENCOUNTER — TRANSCRIPTION ENCOUNTER (OUTPATIENT)
Age: 57
End: 2022-03-26

## 2022-03-26 DIAGNOSIS — R55 SYNCOPE AND COLLAPSE: ICD-10-CM

## 2022-03-26 DIAGNOSIS — I95.1 ORTHOSTATIC HYPOTENSION: ICD-10-CM

## 2022-03-26 DIAGNOSIS — E85.9 AMYLOIDOSIS, UNSPECIFIED: ICD-10-CM

## 2022-03-26 DIAGNOSIS — I49.8 OTHER SPECIFIED CARDIAC ARRHYTHMIAS: ICD-10-CM

## 2022-03-26 DIAGNOSIS — D47.2 MONOCLONAL GAMMOPATHY: ICD-10-CM

## 2022-03-26 DIAGNOSIS — Z29.9 ENCOUNTER FOR PROPHYLACTIC MEASURES, UNSPECIFIED: ICD-10-CM

## 2022-03-26 DIAGNOSIS — I10 ESSENTIAL (PRIMARY) HYPERTENSION: ICD-10-CM

## 2022-03-26 DIAGNOSIS — E03.9 HYPOTHYROIDISM, UNSPECIFIED: ICD-10-CM

## 2022-03-26 DIAGNOSIS — A04.72 ENTEROCOLITIS DUE TO CLOSTRIDIUM DIFFICILE, NOT SPECIFIED AS RECURRENT: ICD-10-CM

## 2022-03-26 LAB
ALBUMIN SERPL ELPH-MCNC: 3.2 G/DL — LOW (ref 3.3–5)
ALBUMIN SERPL ELPH-MCNC: 3.2 G/DL — LOW (ref 3.3–5)
ALP SERPL-CCNC: 72 U/L — SIGNIFICANT CHANGE UP (ref 40–120)
ALP SERPL-CCNC: 76 U/L — SIGNIFICANT CHANGE UP (ref 40–120)
ALT FLD-CCNC: 48 U/L — HIGH (ref 10–45)
ALT FLD-CCNC: 49 U/L — HIGH (ref 10–45)
ANION GAP SERPL CALC-SCNC: 10 MMOL/L — SIGNIFICANT CHANGE UP (ref 5–17)
ANION GAP SERPL CALC-SCNC: 12 MMOL/L — SIGNIFICANT CHANGE UP (ref 5–17)
APTT BLD: 25.1 SEC — LOW (ref 27.5–35.5)
AST SERPL-CCNC: 22 U/L — SIGNIFICANT CHANGE UP (ref 10–40)
AST SERPL-CCNC: 23 U/L — SIGNIFICANT CHANGE UP (ref 10–40)
BASOPHILS # BLD AUTO: 0.08 K/UL — SIGNIFICANT CHANGE UP (ref 0–0.2)
BASOPHILS NFR BLD AUTO: 1.3 % — SIGNIFICANT CHANGE UP (ref 0–2)
BILIRUB SERPL-MCNC: 0.6 MG/DL — SIGNIFICANT CHANGE UP (ref 0.2–1.2)
BILIRUB SERPL-MCNC: 0.6 MG/DL — SIGNIFICANT CHANGE UP (ref 0.2–1.2)
BLD GP AB SCN SERPL QL: NEGATIVE — SIGNIFICANT CHANGE UP
BUN SERPL-MCNC: 5 MG/DL — LOW (ref 7–23)
BUN SERPL-MCNC: 7 MG/DL — SIGNIFICANT CHANGE UP (ref 7–23)
CALCIUM SERPL-MCNC: 8.3 MG/DL — LOW (ref 8.4–10.5)
CALCIUM SERPL-MCNC: 8.3 MG/DL — LOW (ref 8.4–10.5)
CHLORIDE SERPL-SCNC: 102 MMOL/L — SIGNIFICANT CHANGE UP (ref 96–108)
CHLORIDE SERPL-SCNC: 102 MMOL/L — SIGNIFICANT CHANGE UP (ref 96–108)
CO2 SERPL-SCNC: 24 MMOL/L — SIGNIFICANT CHANGE UP (ref 22–31)
CO2 SERPL-SCNC: 28 MMOL/L — SIGNIFICANT CHANGE UP (ref 22–31)
CREAT SERPL-MCNC: 0.55 MG/DL — SIGNIFICANT CHANGE UP (ref 0.5–1.3)
CREAT SERPL-MCNC: 0.89 MG/DL — SIGNIFICANT CHANGE UP (ref 0.5–1.3)
EGFR: 106 ML/MIN/1.73M2 — SIGNIFICANT CHANGE UP
EGFR: 75 ML/MIN/1.73M2 — SIGNIFICANT CHANGE UP
EOSINOPHIL # BLD AUTO: 0.1 K/UL — SIGNIFICANT CHANGE UP (ref 0–0.5)
EOSINOPHIL NFR BLD AUTO: 1.7 % — SIGNIFICANT CHANGE UP (ref 0–6)
GLUCOSE SERPL-MCNC: 117 MG/DL — HIGH (ref 70–99)
GLUCOSE SERPL-MCNC: 138 MG/DL — HIGH (ref 70–99)
HCT VFR BLD CALC: 30.2 % — LOW (ref 34.5–45)
HCT VFR BLD CALC: 31.3 % — LOW (ref 34.5–45)
HCV AB S/CO SERPL IA: 0.08 S/CO — SIGNIFICANT CHANGE UP (ref 0–0.99)
HCV AB SERPL-IMP: SIGNIFICANT CHANGE UP
HGB BLD-MCNC: 10.3 G/DL — LOW (ref 11.5–15.5)
HGB BLD-MCNC: 9.9 G/DL — LOW (ref 11.5–15.5)
IMM GRANULOCYTES NFR BLD AUTO: 0.2 % — SIGNIFICANT CHANGE UP (ref 0–1.5)
INR BLD: 1.01 RATIO — SIGNIFICANT CHANGE UP (ref 0.88–1.16)
LYMPHOCYTES # BLD AUTO: 1.8 K/UL — SIGNIFICANT CHANGE UP (ref 1–3.3)
LYMPHOCYTES # BLD AUTO: 29.8 % — SIGNIFICANT CHANGE UP (ref 13–44)
MAGNESIUM SERPL-MCNC: 1.8 MG/DL — SIGNIFICANT CHANGE UP (ref 1.6–2.6)
MAGNESIUM SERPL-MCNC: 1.9 MG/DL — SIGNIFICANT CHANGE UP (ref 1.6–2.6)
MCHC RBC-ENTMCNC: 29.6 PG — SIGNIFICANT CHANGE UP (ref 27–34)
MCHC RBC-ENTMCNC: 29.7 PG — SIGNIFICANT CHANGE UP (ref 27–34)
MCHC RBC-ENTMCNC: 32.8 GM/DL — SIGNIFICANT CHANGE UP (ref 32–36)
MCHC RBC-ENTMCNC: 32.9 GM/DL — SIGNIFICANT CHANGE UP (ref 32–36)
MCV RBC AUTO: 90.2 FL — SIGNIFICANT CHANGE UP (ref 80–100)
MCV RBC AUTO: 90.4 FL — SIGNIFICANT CHANGE UP (ref 80–100)
MONOCYTES # BLD AUTO: 0.46 K/UL — SIGNIFICANT CHANGE UP (ref 0–0.9)
MONOCYTES NFR BLD AUTO: 7.6 % — SIGNIFICANT CHANGE UP (ref 2–14)
NEUTROPHILS # BLD AUTO: 3.59 K/UL — SIGNIFICANT CHANGE UP (ref 1.8–7.4)
NEUTROPHILS NFR BLD AUTO: 59.4 % — SIGNIFICANT CHANGE UP (ref 43–77)
NRBC # BLD: 0 /100 WBCS — SIGNIFICANT CHANGE UP (ref 0–0)
NRBC # BLD: 0 /100 WBCS — SIGNIFICANT CHANGE UP (ref 0–0)
PHOSPHATE SERPL-MCNC: 3.1 MG/DL — SIGNIFICANT CHANGE UP (ref 2.5–4.5)
PHOSPHATE SERPL-MCNC: 3.6 MG/DL — SIGNIFICANT CHANGE UP (ref 2.5–4.5)
PLATELET # BLD AUTO: 279 K/UL — SIGNIFICANT CHANGE UP (ref 150–400)
PLATELET # BLD AUTO: 322 K/UL — SIGNIFICANT CHANGE UP (ref 150–400)
POTASSIUM SERPL-MCNC: 3.2 MMOL/L — LOW (ref 3.5–5.3)
POTASSIUM SERPL-MCNC: 3.5 MMOL/L — SIGNIFICANT CHANGE UP (ref 3.5–5.3)
POTASSIUM SERPL-SCNC: 3.2 MMOL/L — LOW (ref 3.5–5.3)
POTASSIUM SERPL-SCNC: 3.5 MMOL/L — SIGNIFICANT CHANGE UP (ref 3.5–5.3)
PROT SERPL-MCNC: 4.7 G/DL — LOW (ref 6–8.3)
PROT SERPL-MCNC: 4.8 G/DL — LOW (ref 6–8.3)
PROTHROM AB SERPL-ACNC: 11.7 SEC — SIGNIFICANT CHANGE UP (ref 10.5–13.4)
RBC # BLD: 3.34 M/UL — LOW (ref 3.8–5.2)
RBC # BLD: 3.47 M/UL — LOW (ref 3.8–5.2)
RBC # FLD: 12.9 % — SIGNIFICANT CHANGE UP (ref 10.3–14.5)
RBC # FLD: 13.1 % — SIGNIFICANT CHANGE UP (ref 10.3–14.5)
RH IG SCN BLD-IMP: POSITIVE — SIGNIFICANT CHANGE UP
SODIUM SERPL-SCNC: 138 MMOL/L — SIGNIFICANT CHANGE UP (ref 135–145)
SODIUM SERPL-SCNC: 140 MMOL/L — SIGNIFICANT CHANGE UP (ref 135–145)
WBC # BLD: 6.04 K/UL — SIGNIFICANT CHANGE UP (ref 3.8–10.5)
WBC # BLD: 7.18 K/UL — SIGNIFICANT CHANGE UP (ref 3.8–10.5)
WBC # FLD AUTO: 6.04 K/UL — SIGNIFICANT CHANGE UP (ref 3.8–10.5)
WBC # FLD AUTO: 7.18 K/UL — SIGNIFICANT CHANGE UP (ref 3.8–10.5)

## 2022-03-26 PROCEDURE — 99233 SBSQ HOSP IP/OBS HIGH 50: CPT | Mod: GC

## 2022-03-26 PROCEDURE — 99222 1ST HOSP IP/OBS MODERATE 55: CPT | Mod: GC

## 2022-03-26 PROCEDURE — 99223 1ST HOSP IP/OBS HIGH 75: CPT | Mod: GC

## 2022-03-26 RX ORDER — POTASSIUM CHLORIDE 20 MEQ
10 PACKET (EA) ORAL
Refills: 0 | Status: COMPLETED | OUTPATIENT
Start: 2022-03-26 | End: 2022-03-26

## 2022-03-26 RX ORDER — SODIUM CHLORIDE 9 MG/ML
1000 INJECTION, SOLUTION INTRAVENOUS
Refills: 0 | Status: DISCONTINUED | OUTPATIENT
Start: 2022-03-26 | End: 2022-03-29

## 2022-03-26 RX ORDER — POTASSIUM CHLORIDE 20 MEQ
40 PACKET (EA) ORAL ONCE
Refills: 0 | Status: DISCONTINUED | OUTPATIENT
Start: 2022-03-26 | End: 2022-03-26

## 2022-03-26 RX ADMIN — FAMOTIDINE 20 MILLIGRAM(S): 10 INJECTION INTRAVENOUS at 04:27

## 2022-03-26 RX ADMIN — Medication 100 MILLIEQUIVALENT(S): at 04:29

## 2022-03-26 RX ADMIN — ENOXAPARIN SODIUM 40 MILLIGRAM(S): 100 INJECTION SUBCUTANEOUS at 05:57

## 2022-03-26 RX ADMIN — GABAPENTIN 100 MILLIGRAM(S): 400 CAPSULE ORAL at 04:25

## 2022-03-26 RX ADMIN — MIDODRINE HYDROCHLORIDE 20 MILLIGRAM(S): 2.5 TABLET ORAL at 04:24

## 2022-03-26 RX ADMIN — SIMETHICONE 80 MILLIGRAM(S): 80 TABLET, CHEWABLE ORAL at 22:12

## 2022-03-26 RX ADMIN — SODIUM CHLORIDE 75 MILLILITER(S): 9 INJECTION, SOLUTION INTRAVENOUS at 12:44

## 2022-03-26 RX ADMIN — Medication 125 MILLIGRAM(S): at 04:26

## 2022-03-26 RX ADMIN — Medication 125 MILLIGRAM(S): at 12:43

## 2022-03-26 RX ADMIN — LORATADINE 10 MILLIGRAM(S): 10 TABLET ORAL at 12:43

## 2022-03-26 RX ADMIN — MIDODRINE HYDROCHLORIDE 20 MILLIGRAM(S): 2.5 TABLET ORAL at 12:43

## 2022-03-26 RX ADMIN — Medication 137 MICROGRAM(S): at 04:25

## 2022-03-26 RX ADMIN — Medication 1 TABLET(S): at 12:43

## 2022-03-26 RX ADMIN — Medication 81 MILLIGRAM(S): at 12:43

## 2022-03-26 RX ADMIN — FLUDROCORTISONE ACETATE 0.2 MILLIGRAM(S): 0.1 TABLET ORAL at 04:25

## 2022-03-26 RX ADMIN — MIDODRINE HYDROCHLORIDE 20 MILLIGRAM(S): 2.5 TABLET ORAL at 22:11

## 2022-03-26 RX ADMIN — ATORVASTATIN CALCIUM 40 MILLIGRAM(S): 80 TABLET, FILM COATED ORAL at 22:10

## 2022-03-26 RX ADMIN — Medication 100 MILLIEQUIVALENT(S): at 03:48

## 2022-03-26 RX ADMIN — FAMOTIDINE 20 MILLIGRAM(S): 10 INJECTION INTRAVENOUS at 17:39

## 2022-03-26 RX ADMIN — SIMETHICONE 80 MILLIGRAM(S): 80 TABLET, CHEWABLE ORAL at 17:39

## 2022-03-26 RX ADMIN — GABAPENTIN 100 MILLIGRAM(S): 400 CAPSULE ORAL at 12:43

## 2022-03-26 RX ADMIN — SIMETHICONE 80 MILLIGRAM(S): 80 TABLET, CHEWABLE ORAL at 12:43

## 2022-03-26 RX ADMIN — Medication 1 SPRAY(S): at 17:39

## 2022-03-26 RX ADMIN — SIMETHICONE 80 MILLIGRAM(S): 80 TABLET, CHEWABLE ORAL at 04:26

## 2022-03-26 RX ADMIN — Medication 125 MILLIGRAM(S): at 17:39

## 2022-03-26 RX ADMIN — Medication 100 MILLIEQUIVALENT(S): at 05:57

## 2022-03-26 RX ADMIN — GABAPENTIN 100 MILLIGRAM(S): 400 CAPSULE ORAL at 22:10

## 2022-03-26 NOTE — PROGRESS NOTE ADULT - PROBLEM SELECTOR PLAN 3
C/w midodrine 20mg with more lenient parameters (SBP >150 and HR <40) given pt had multiple RRT in OSH when midodrine was held  - If midodrine needs to be held, consider decreasing dose to 10 or 5mg instead of omiting dose entirely   - C/w with fludrocortisone 0.2mg qD   - BP monitoring C/w midodrine 20mg with more lenient parameters (SBP >150 and HR <40) given pt had multiple RRT in OSH when midodrine was held  - If midodrine needs to be held, consider decreasing dose to 10 or 5mg instead of omiting dose entirely   - C/w with fludrocortisone 0.2mg qD   - mIVF LR @ 75  - BP monitoring

## 2022-03-26 NOTE — DISCHARGE NOTE PROVIDER - NSDCFUSCHEDAPPT_GEN_ALL_CORE_FT
ROLF MARQUEZ ; 03/28/2022 ; FLACO [facility]  ROLF MARQUEZ ; 05/02/2022 ; FLACO Intmed [id number] [facility] ROLF MARQUEZ ; 04/11/2022 ; [facility] [address] ROLF Tsang ; 05/02/2022 ; NPP Intmed [id number] [facility] ROLF MARQUEZ ; 04/06/2022 ; FLACO [facility]  ROLF MARQUEZ ; 04/11/2022 ; [facility] [address] ROLF Tsang ; 05/02/2022 ; NPP Intmed [id number] [facility]

## 2022-03-26 NOTE — CONSULT NOTE ADULT - SUBJECTIVE AND OBJECTIVE BOX
Patient seen and evaluated at bedside    Chief Complaint: admitted for chemotherapy initiation     HPI:  56 yo F with PMH of POTS, orthostatic hypotension with frequent and persistent episodes, recent dx of smoldering myeloma and amyloidosis on fat pad bx (+ congo red, no sufficient tissue for differentiation) c/b neurologic sequelaes, who was recently admitted to [facility] for recurrent syncopal episode. During her hospitalization, she had RRTs and was admitted to CCU for levophed support briefly. It was further c/b CDiff infection for which she is recovering and remains on PO antibiotic. During her hospitalization, telemetry revealed frequent sinus pauses correlated to her symptoms. EP consulted for PPM evaluation.       PMHx:   No pertinent past medical history    Hypothyroidism    POTS (postural orthostatic tachycardia syndrome)    Clostridium difficile colitis    Syncope    Amyloidosis    Smoldering myeloma    Need for prophylactic measure        PSHx:   No significant past surgical history        Allergies:  penicillins (Unknown)      Home Meds:    Current Medications:   acetaminophen     Tablet .. 650 milliGRAM(s) Oral every 6 hours PRN  aluminum hydroxide/magnesium hydroxide/simethicone Suspension 30 milliLiter(s) Oral every 4 hours PRN  aspirin  chewable 81 milliGRAM(s) Oral daily  atorvastatin 40 milliGRAM(s) Oral at bedtime  enoxaparin Injectable 40 milliGRAM(s) SubCutaneous every 24 hours  famotidine    Tablet 20 milliGRAM(s) Oral two times a day  fludroCORTISONE 0.2 milliGRAM(s) Oral daily  fluticasone propionate 50 MICROgram(s)/spray Nasal Spray 1 Spray(s) Both Nostrils two times a day  gabapentin 100 milliGRAM(s) Oral every 8 hours  influenza   Vaccine 0.5 milliLiter(s) IntraMuscular once  lactobacillus acidophilus 1 Tablet(s) Oral daily  levothyroxine 137 MICROGram(s) Oral daily  loratadine 10 milliGRAM(s) Oral daily  magnesium hydroxide Suspension 30 milliLiter(s) Oral at bedtime PRN  midodrine 20 milliGRAM(s) Oral every 8 hours  simethicone 80 milliGRAM(s) Chew every 6 hours  vancomycin    Solution 125 milliGRAM(s) Oral every 6 hours      FAMILY HISTORY:      Social History: Personally reviewed   No tobacco, EtOH or IVDU     REVIEW OF SYSTEMS:  Constitutional:     [x ] negative [ ] fevers [ ] chills [ ] weight loss [ ] weight gain  HEENT:                  [x ] negative [ ] dry eyes [ ] eye irritation [ ] postnasal drip [ ] nasal congestion  CV:                         [ x] negative  [ ] chest pain [ ] orthopnea [ ] palpitations [ ] murmur  Resp:                     [x ] negative [ ] cough [ ] shortness of breath [ ] dyspnea [ ] wheezing [ ] sputum [ ]hemoptysis  GI:                          [ x] negative [ ] nausea [ ] vomiting [ ] diarrhea [ ] constipation [ ] abd pain [ ] dysphagia   :                        [ x] negative [ ] dysuria [ ] nocturia [ ] hematuria [ ] increased urinary frequency  Musculoskeletal: [x ] negative [ ] back pain [ ] myalgias [ ] arthralgias [ ] fracture  Skin:                       [ x] negative [ ] rash [ ] itch  Neurological:        [ x] negative [ ] headache [ ] dizziness [ ] syncope [ ] weakness [ ] numbness  Psychiatric:           [ x] negative [ ] anxiety [ ] depression  Endocrine:            [ x] negative [ ] diabetes [ ] thyroid problem  Heme/Lymph:      [ x] negative [ ] anemia [ ] bleeding problem  Allergic/Immune: [ x] negative [ ] itchy eyes [ ] nasal discharge [ ] hives [ ] angioedema    [ x] All other systems negative  [ ] Unable to assess ROS due to      Physical Exam:  T(F): 98 (03-26), Max: 98.2 (03-25)  HR: 79 (03-26) (64 - 81)  BP: 110/67 (03-26) (70/48 - 126/77)  RR: 18 (03-26)  SpO2: 92% (03-26)    GENERAL: No acute distress, well-developed  HEAD:  Atraumatic, Normocephalic  ENT: EOMI, PERRLA, conjunctiva and sclera clear, Neck supple, No JVD, moist mucosa  CHEST/LUNG: Clear to auscultation bilaterally; No wheeze, equal breath sounds bilaterally   BACK: No spinal tenderness  HEART: Regular rate and rhythm; holosystolic murmur at the apex   ABDOMEN: Soft, Nontender, Nondistended; Bowel sounds present  EXTREMITIES:  No clubbing, cyanosis, or edema  PSYCH: Nl behavior, nl affect  NEUROLOGY: AAOx3, non-focal, cranial nerves intact  SKIN: Normal color, No rashes or lesions      Cardiovascular Diagnostic Testing:      Imaging:      Labs: Personally reviewed                        10.3   7.18  )-----------( 322      ( 26 Mar 2022 05:20 )             31.3     03-26    138  |  102  |  7   ----------------------------<  138<H>  3.5   |  24  |  0.89    Ca    8.3<L>      26 Mar 2022 05:20  Phos  3.6     03-26  Mg     1.8     03-26    TPro  4.8<L>  /  Alb  3.2<L>  /  TBili  0.6  /  DBili  x   /  AST  23  /  ALT  48<H>  /  AlkPhos  76  03-26    PT/INR - ( 26 Mar 2022 05:20 )   PT: 11.7 sec;   INR: 1.01 ratio         PTT - ( 26 Mar 2022 05:20 )  PTT:25.1 sec

## 2022-03-26 NOTE — CONSULT NOTE ADULT - ATTENDING COMMENTS
58 yo F with PMH of POTS, orthostatic hypotension with frequent and persistent episodes, recent dx of smoldering myeloma and amyloidosis on fat pad bx (+ congo red, no sufficient tissue for differentiation) c/b neurologic sequelae, who was recently admitted to [facility] for recurrent syncopal episode. During her hospitalization, she had RRTs and was admitted to CCU for levophed support briefly. It was further c/b CDiff infection for which she is recovering and remains on PO antibiotic. During her hospitalization, telemetry revealed frequent sinus pauses correlated to her symptoms. EP consulted for PPM evaluation. Given her recurrent and reproducible symptoms (mostly triggered by BMs and positional changes) that is refractory to medical therapy, would consider PPM; the decision with regard to dual chamber versus Micra PPM will be challenging given with pacemaker with lead will be more susceptible to infection, but more functional with regard to maintain AV synchrony.  Leadless pacemaker is unlikely to become infected, however. Given her recent CDiff infection and decreased PO intake, would ensure she is adequately hydrated with +/- IVF.    Cont current midodrine 20 mg TID and Florinef 0.2 mg QD and avoid triggers. cMRI pending to further evaluate for amyloidosis. Telemetry monitoring and maintain goal K~4.5 and Mg~2.5. Chemotherapy initiation per Heme/Onc team.
Patient with newly diagnosed light chain amyloid transferred from [facility] for cardiac management.    Patient c/o diarrhea; being treated for C. diff.    Treatment for underlying amyloid on hold pending cardiac interventions and clearance of active infection.    Supportive management.      Butch Lam MD (Weekend Coverage)   [phone]

## 2022-03-26 NOTE — PROGRESS NOTE ADULT - PROBLEM SELECTOR PLAN 2
Pt presented to OSH for multiple recurrent syncopal episodes.   Multifactorial in s/o known POTS, orthostatic hypotension associated with likely amyloidosis/myeloma autonomic dysfunction, hypovolemia 2/2 c.diff colitis and diarrhea    Work up from OSH:   - CT head no acute pathology   - TTE (non bubble) normal LV size and function with EF 55%-60%. No regional wall motion abnormalities. Moderate concentric LVH.  - ECG 1st degree AV block  - Tele with sinus pauses 2/2 vagal episodes. Bradycardia possibly 2/2 midodrine and or autonomic dysfunction associated with amyloidosis and myeloma

## 2022-03-26 NOTE — PROGRESS NOTE ADULT - SUBJECTIVE AND OBJECTIVE BOX
OVERNIGHT EVENTS: No acute events overnight.    SUBJECTIVE: Patient seen and examined at bedside. Patient reports no complaints at this time.       MEDICATIONS  (STANDING):  aspirin  chewable 81 milliGRAM(s) Oral daily  atorvastatin 40 milliGRAM(s) Oral at bedtime  enoxaparin Injectable 40 milliGRAM(s) SubCutaneous every 24 hours  famotidine    Tablet 20 milliGRAM(s) Oral two times a day  fludroCORTISONE 0.2 milliGRAM(s) Oral daily  fluticasone propionate 50 MICROgram(s)/spray Nasal Spray 1 Spray(s) Both Nostrils two times a day  gabapentin 100 milliGRAM(s) Oral every 8 hours  influenza   Vaccine 0.5 milliLiter(s) IntraMuscular once  lactobacillus acidophilus 1 Tablet(s) Oral daily  levothyroxine 137 MICROGram(s) Oral daily  loratadine 10 milliGRAM(s) Oral daily  midodrine 20 milliGRAM(s) Oral every 8 hours  simethicone 80 milliGRAM(s) Chew every 6 hours  vancomycin    Solution 125 milliGRAM(s) Oral every 6 hours    MEDICATIONS  (PRN):  acetaminophen     Tablet .. 650 milliGRAM(s) Oral every 6 hours PRN Temp greater or equal to 38C (100.4F), Mild Pain (1 - 3), Moderate Pain (4 - 6)  aluminum hydroxide/magnesium hydroxide/simethicone Suspension 30 milliLiter(s) Oral every 4 hours PRN Dyspepsia  magnesium hydroxide Suspension 30 milliLiter(s) Oral at bedtime PRN Constipation      T(F): 98 (03-26-22 @ 04:27), Max: 98.2 (03-25-22 @ 19:45)  HR: 79 (03-26-22 @ 05:14) (64 - 81)  BP: 110/67 (03-26-22 @ 05:14) (70/48 - 126/77)  BP(mean): --  RR: 17 (03-26-22 @ 05:14) (17 - 18)  SpO2: 92% (03-26-22 @ 05:14) (92% - 98%)    PHYSICAL EXAM:   General: thin, middle-aged woman in NAD; awake, lying comfortably in bed  HEENT: nc/at, clear conjunctiva, moist mucous membranes  Neck: supple, no JVD  Heart: RRR, +systolic murmur   Chest/lungs: bibasilar rales; no wheezing or rhonchi  ABD: soft, non-tender, non-distended; no guarding or rebound; bowel sounds present  Extremities: no edema, erythema, or tenderness to palpation  Skin: clear, dry  Neuro: A&Ox3, FROM of all 4 extremities; no focal deficits; grossly intact      LABS:                        10.3   7.18  )-----------( 322      ( 26 Mar 2022 05:20 )             31.3     03-26    138  |  102  |  7   ----------------------------<  138<H>  3.5   |  24  |  0.89    Ca    8.3<L>      26 Mar 2022 05:20  Phos  3.6     03-26  Mg     1.8     03-26    TPro  4.8<L>  /  Alb  3.2<L>  /  TBili  0.6  /  DBili  x   /  AST  23  /  ALT  48<H>  /  AlkPhos  76  03-26      PT/INR - ( 26 Mar 2022 05:20 )   PT: 11.7 sec;   INR: 1.01 ratio         PTT - ( 26 Mar 2022 05:20 )  PTT:25.1 sec    Creatinine Trend: 0.89<--, 0.55<--  I&O's Summary    25 Mar 2022 07:01  -  26 Mar 2022 07:00  --------------------------------------------------------  IN: 490 mL / OUT: 0 mL / NET: 490 mL        RADIOLOGY & ADDITIONAL STUDIES: Reviewed   OVERNIGHT EVENTS: No acute events overnight.    SUBJECTIVE: Patient seen and examined at bedside. Patient reports feeling well, and has no complaints at this time other than chronic diarrhea 2/2 C diff. Denies chest pain, SOB, cough, nausea/vomiting, abdominal pain, or lower extremity pain.       MEDICATIONS  (STANDING):  aspirin  chewable 81 milliGRAM(s) Oral daily  atorvastatin 40 milliGRAM(s) Oral at bedtime  enoxaparin Injectable 40 milliGRAM(s) SubCutaneous every 24 hours  famotidine    Tablet 20 milliGRAM(s) Oral two times a day  fludroCORTISONE 0.2 milliGRAM(s) Oral daily  fluticasone propionate 50 MICROgram(s)/spray Nasal Spray 1 Spray(s) Both Nostrils two times a day  gabapentin 100 milliGRAM(s) Oral every 8 hours  influenza   Vaccine 0.5 milliLiter(s) IntraMuscular once  lactobacillus acidophilus 1 Tablet(s) Oral daily  levothyroxine 137 MICROGram(s) Oral daily  loratadine 10 milliGRAM(s) Oral daily  midodrine 20 milliGRAM(s) Oral every 8 hours  simethicone 80 milliGRAM(s) Chew every 6 hours  vancomycin    Solution 125 milliGRAM(s) Oral every 6 hours    MEDICATIONS  (PRN):  acetaminophen     Tablet .. 650 milliGRAM(s) Oral every 6 hours PRN Temp greater or equal to 38C (100.4F), Mild Pain (1 - 3), Moderate Pain (4 - 6)  aluminum hydroxide/magnesium hydroxide/simethicone Suspension 30 milliLiter(s) Oral every 4 hours PRN Dyspepsia  magnesium hydroxide Suspension 30 milliLiter(s) Oral at bedtime PRN Constipation      T(F): 98 (03-26-22 @ 04:27), Max: 98.2 (03-25-22 @ 19:45)  HR: 79 (03-26-22 @ 05:14) (64 - 81)  BP: 110/67 (03-26-22 @ 05:14) (70/48 - 126/77)  BP(mean): --  RR: 17 (03-26-22 @ 05:14) (17 - 18)  SpO2: 92% (03-26-22 @ 05:14) (92% - 98%)    PHYSICAL EXAM:   General: thin, middle-aged woman in NAD; awake, lying comfortably in bed  HEENT: nc/at, clear conjunctiva, moist mucous membranes  Neck: supple, no JVD  Heart: RRR, +systolic murmur   Chest/lungs: mild bibasilar rales; no wheezing or rhonchi  ABD: soft, non-tender, non-distended; no guarding or rebound; bowel sounds present  Extremities: no edema, erythema, or tenderness to palpation  Skin: clear, dry  Neuro: A&Ox3, FROM of all 4 extremities; no focal deficits; grossly intact      LABS:                        10.3   7.18  )-----------( 322      ( 26 Mar 2022 05:20 )             31.3     03-26    138  |  102  |  7   ----------------------------<  138<H>  3.5   |  24  |  0.89    Ca    8.3<L>      26 Mar 2022 05:20  Phos  3.6     03-26  Mg     1.8     03-26    TPro  4.8<L>  /  Alb  3.2<L>  /  TBili  0.6  /  DBili  x   /  AST  23  /  ALT  48<H>  /  AlkPhos  76  03-26      PT/INR - ( 26 Mar 2022 05:20 )   PT: 11.7 sec;   INR: 1.01 ratio         PTT - ( 26 Mar 2022 05:20 )  PTT:25.1 sec    Creatinine Trend: 0.89<--, 0.55<--  I&O's Summary    25 Mar 2022 07:01  -  26 Mar 2022 07:00  --------------------------------------------------------  IN: 490 mL / OUT: 0 mL / NET: 490 mL        RADIOLOGY & ADDITIONAL STUDIES: Reviewed

## 2022-03-26 NOTE — DISCHARGE NOTE PROVIDER - NSDCCPTREATMENT_GEN_ALL_CORE_FT
PRINCIPAL PROCEDURE  Procedure: MRI cardiac function  Findings and Treatment: PROCEDURE DATE:  03/28/2022    INTERPRETATION:  EXAMINATION:  MR CARDIAC MORPHOLOGY FUNCTION WITHOUT AND   WITH IV CONTRAST  CLINICAL INDICATION: amyloidosis  TECHNIQUE:  Multi-sequential, multi-planar cardiac MR was performed   before and after the intravenous administration of 7.5 ml  Gadavist.   Resting myocardial perfusion imaging was performed.   Delayed viability   imaging was performed. The indexed values were calculated based on a BSA   of 2.08 m2.  COMPARISON: There is no prior study available for comparison.  FINDINGS:  CHAMBERS:  Normal right ventricular size and function. Normal biventricular size and   function. Inferior lateral basal myocardial wall with subtle late   gadolinium enhancement.  Biatrial enlargement with associated late gadolinium enhancement.  LV EDV = 160 ml  LV EDVi = 77 ml/m2  LV ESV = 60 ml  LV ESVi = 29 ml/m2  LV SV = 99 ml  LV EF = 62 %  VALVES:  There is no evidence of valvular abnormality.  VESSELS:  The thoracic aorta has normal caliber.  There is a left aortic arch with   typical branching.  The central pulmonary arteries are normal in caliber.  There is typical pulmonary venous return.  There istypical systemic venous return.  THORAX:  There is no adenopathy in the thorax.  Small pericardial and bilateral pleural effusions.  UPPER ABDOMEN:  Images of the upper abdomen demonstrate no abnormality.  IMPRESSION:  Small pericardial and bilateral pleural effusions. Subtle inferior   lateral basal myocardial wall with gadolinium enhancement.  Biatrial   enlargement with associated late gadolinium enhancement. These findings   may represent amyloidosis.  --- End of Report ---

## 2022-03-26 NOTE — CONSULT NOTE ADULT - SUBJECTIVE AND OBJECTIVE BOX
56 yo F with a PMH of POTS with orthostatic hypotension, hyperthyroidism s/p thyroidectomy leading to hypothyroidism, mouth burning syndrome, recently diagnosed light chain amyloidosis, and recently diagnosed C diff colitis who was admitted on 3/15/22 to [facility] for multiple recurrent syncopal episodes, transferred for further cardiac workup and for possible chemotherapy (per patient).    While admitted at [facility], pt's course was complicated by multiple unresponsive episodes prompting RRTs, during which pt found to be hypotensive and bradycardic. She required Levophed for BP support and was in CCU as well as ICU with each RRT. BP remained stable while on Midodrine 20mg q8 and Fludrocortisone 0.2mg q24. CT head was negative on admission. Pt noted to intermittently have sinus pauses (2-3.8 seconds) thought to be 2/2 vagal episodes, and asymptomatic bradycardia overnight on tele. ECG noted NSR but 1st degree AV block. EP was consulted, no PPM indications at that time. TTE 3/16/22 showed normal LV size and function with EF 55%-60% and no regional wall motion abnormalities, but did have moderate concentric LVH. Pt was pending cardiac MRI for cardiac amyloidosis work up. Pt was also seen by Endocrinology for elevated TSH (normal FT4), and Levothyroxine was up-titrated to 137 mcg.  Patient was also diagnosed with C diff shortly before hospitalization and had been taking her PO Vancomycin once or twice a day instead of QID, but has been on QID since hospitalization. However, she is still havign non-bloody diarrhea, including 3 BMs yesterday and 2 today thus far.  She completed ceftriaxone for UTI at [facility].    Of note, she was diagnosed with LC amyloidosis after being followed for elevated free light chains. SPEP/UPEP and serum/urine immunofixation did not show a monoclonal band/M-spike and immunoglobulins were normal, but lambda free light chains were elevated at 8.73 with a K/L ratio of 0.11 on 12/6/21, and 10.92 with a ratio of 0.09 on repeat on 2/24/22. She had an abdominal fat pat biopsy on 2/8/22 showing Congo Red positivity in rare small blood vessels and positive for polarization. She had a bone marrow biopsy on 3/1/22 showing 25-35% monoclonal plasmacytosis. However, patient did not meet CRAB criteria for multiple myeloma. Congo Red stain was negative on the BMBx, and there was not enough tissue from the fat bad to type the amyloidosis, but given the clinical evidence and free light chain studies, this was consistent with light chain amyloidosis.        Allergies  penicillins (Unknown)        MEDICATIONS  (STANDING):  aspirin  chewable 81 milliGRAM(s) Oral daily  atorvastatin 40 milliGRAM(s) Oral at bedtime  enoxaparin Injectable 40 milliGRAM(s) SubCutaneous every 24 hours  famotidine    Tablet 20 milliGRAM(s) Oral two times a day  fludroCORTISONE 0.2 milliGRAM(s) Oral daily  fluticasone propionate 50 MICROgram(s)/spray Nasal Spray 1 Spray(s) Both Nostrils two times a day  gabapentin 100 milliGRAM(s) Oral every 8 hours  influenza   Vaccine 0.5 milliLiter(s) IntraMuscular once  lactated ringers. 1000 milliLiter(s) (75 mL/Hr) IV Continuous <Continuous>  lactobacillus acidophilus 1 Tablet(s) Oral daily  levothyroxine 137 MICROGram(s) Oral daily  loratadine 10 milliGRAM(s) Oral daily  midodrine 20 milliGRAM(s) Oral every 8 hours  simethicone 80 milliGRAM(s) Chew every 6 hours  vancomycin    Solution 125 milliGRAM(s) Oral every 6 hours    MEDICATIONS  (PRN):  acetaminophen     Tablet .. 650 milliGRAM(s) Oral every 6 hours PRN Temp greater or equal to 38C (100.4F), Mild Pain (1 - 3), Moderate Pain (4 - 6)  aluminum hydroxide/magnesium hydroxide/simethicone Suspension 30 milliLiter(s) Oral every 4 hours PRN Dyspepsia  magnesium hydroxide Suspension 30 milliLiter(s) Oral at bedtime PRN Constipation      PAST MEDICAL & SURGICAL HISTORY:  Hyperthyroidism s/p thyroidectomy --> Hypothyroidism    POTS (postural orthostatic tachycardia syndrome)    Clostridium difficile colitis    Amyloidosis    Total Thyroidectomy        FAMILY HISTORY:  Mother - lung cancer (smoker)  Maternal Grandfather - colon cancer (age 41)  Maternal Grandmother - unknown cancer  Paternal aunts - breast cancer, lung cancer, uterine cancer      SOCIAL HISTORY: No alcohol, tobacco, or drug use history    REVIEW OF SYSTEMS:  CONSTITUTIONAL: no fever  EYES/ENT: No visual changes; no throat pain   NECK: No pain or stiffness  RESPIRATORY: no SOB  CARDIOVASCULAR: No chest pain or palpitations  GASTROINTESTINAL: + diarrhea. No abdominal pain. No N/V/C or blood in stool  GENITOURINARY: No dysuria or hematuria  NEUROLOGICAL: No numbness or focal weakness  SKIN: No itching, burning, rashes, or lesions   Psych: No depression  MSK: no joint pain  Allergy: no urticaria     Height (cm): 160 (03-25 @ 19:45)  Weight (kg): 52.8 (03-25 @ 19:45)  BMI (kg/m2): 20.6 (03-25 @ 19:45)  BSA (m2): 1.54 (03-25 @ 19:45)    T(F): 98.4 (03-26-22 @ 12:15), Max: 98.4 (03-26-22 @ 12:15)  HR: 80 (03-26-22 @ 12:15)  BP: 134/78 (03-26-22 @ 12:15)  RR: 18 (03-26-22 @ 12:15)  SpO2: 92% (03-26-22 @ 12:15)  Wt(kg): --    GENERAL: NAD  HEENT: EOMI, MMM, no oropharyngeal lesions or erythema appreciated  Pulm: no increased WOB, CTAB/L  CV: RRR, S1, S2, no m/g/r  ABDOMEN: soft, NT, ND, no masses felt, no HSM  MSK: nl ROM  EXTREMITIES: no appreciable edema in b/l LE  Neuro: A&Ox3, no focal deficits  SKIN: warm and dry, no visible rash                          10.3   7.18  )-----------( 322      ( 26 Mar 2022 05:20 )             31.3       03-26    138  |  102  |  7   ----------------------------<  138<H>  3.5   |  24  |  0.89    Ca    8.3<L>      26 Mar 2022 05:20  Phos  3.6     03-26  Mg     1.8     03-26    TPro  4.8<L>  /  Alb  3.2<L>  /  TBili  0.6  /  DBili  x   /  AST  23  /  ALT  48<H>  /  AlkPhos  76  03-26      Magnesium, Serum: 1.8 mg/dL (03-26 @ 05:20)  Phosphorus Level, Serum: 3.6 mg/dL (03-26 @ 05:20)  Magnesium, Serum: 1.9 mg/dL (03-25 @ 23:51)  Phosphorus Level, Serum: 3.1 mg/dL (03-25 @ 23:51)       58 yo F with a PMH of POTS with orthostatic hypotension, hyperthyroidism s/p thyroidectomy leading to hypothyroidism, mouth burning syndrome, recently diagnosed light chain amyloidosis, and recently diagnosed C diff colitis who was admitted on 3/15/22 to [facility] for multiple recurrent syncopal episodes, transferred for further cardiac workup and for possible chemotherapy (per patient).    While admitted at [facility], pt's course was complicated by multiple unresponsive episodes prompting RRTs, during which pt found to be hypotensive and bradycardic. She required Levophed for BP support and was in CCU as well as ICU with each RRT. BP remained stable while on Midodrine 20mg q8 and Fludrocortisone 0.2mg q24. CT head was negative on admission. Pt noted to intermittently have sinus pauses (2-3.8 seconds) thought to be 2/2 vagal episodes, and asymptomatic bradycardia overnight on tele. ECG noted NSR but 1st degree AV block. EP was consulted, no PPM indications at that time. TTE 3/16/22 showed normal LV size and function with EF 55%-60% and no regional wall motion abnormalities, but did have moderate concentric LVH. Pt was pending cardiac MRI for cardiac amyloidosis work up. Pt was also seen by Endocrinology for elevated TSH (normal FT4), and Levothyroxine was up-titrated to 137 mcg.  Patient was also diagnosed with C diff shortly before hospitalization and had been taking her PO Vancomycin once or twice a day instead of QID, but has been on QID since hospitalization. However, she is still havign non-bloody diarrhea, including 3 BMs yesterday and 2 today thus far.  She completed Ceftriaxone for UTI at [facility].    Of note, she was diagnosed with LC amyloidosis after being followed for elevated free light chains. SPEP/UPEP and serum/urine immunofixation did not show a monoclonal band/M-spike and immunoglobulins were normal, but lambda free light chains were elevated at 8.73 with a K/L ratio of 0.11 on 12/6/21, and 10.92 with a ratio of 0.09 on repeat on 2/24/22. She had an abdominal fat pat biopsy on 2/8/22 showing Congo Red positivity in rare small blood vessels and positive for polarization. 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 However, patient did not meet CRAB criteria for multiple myeloma. Congo Red stain was negative on the BMBx, and there was not enough tissue from the fat bad to type the amyloidosis, but given the clinical evidence and free light chain studies, this was consistent with light chain amyloidosis.        Allergies  penicillins (Unknown)        MEDICATIONS  (STANDING):  aspirin  chewable 81 milliGRAM(s) Oral daily  atorvastatin 40 milliGRAM(s) Oral at bedtime  enoxaparin Injectable 40 milliGRAM(s) SubCutaneous every 24 hours  famotidine    Tablet 20 milliGRAM(s) Oral two times a day  fludroCORTISONE 0.2 milliGRAM(s) Oral daily  fluticasone propionate 50 MICROgram(s)/spray Nasal Spray 1 Spray(s) Both Nostrils two times a day  gabapentin 100 milliGRAM(s) Oral every 8 hours  influenza   Vaccine 0.5 milliLiter(s) IntraMuscular once  lactated ringers. 1000 milliLiter(s) (75 mL/Hr) IV Continuous <Continuous>  lactobacillus acidophilus 1 Tablet(s) Oral daily  levothyroxine 137 MICROGram(s) Oral daily  loratadine 10 milliGRAM(s) Oral daily  midodrine 20 milliGRAM(s) Oral every 8 hours  simethicone 80 milliGRAM(s) Chew every 6 hours  vancomycin    Solution 125 milliGRAM(s) Oral every 6 hours    MEDICATIONS  (PRN):  acetaminophen     Tablet .. 650 milliGRAM(s) Oral every 6 hours PRN Temp greater or equal to 38C (100.4F), Mild Pain (1 - 3), Moderate Pain (4 - 6)  aluminum hydroxide/magnesium hydroxide/simethicone Suspension 30 milliLiter(s) Oral every 4 hours PRN Dyspepsia  magnesium hydroxide Suspension 30 milliLiter(s) Oral at bedtime PRN Constipation      PAST MEDICAL & SURGICAL HISTORY:  Hyperthyroidism s/p thyroidectomy --> Hypothyroidism    POTS (postural orthostatic tachycardia syndrome)    Clostridium difficile colitis    Amyloidosis    Total Thyroidectomy        FAMILY HISTORY:  Mother - lung cancer (smoker)  Maternal Grandfather - colon cancer (age 41)  Maternal Grandmother - unknown cancer  Paternal aunts - breast cancer, lung cancer, uterine cancer      SOCIAL HISTORY: No alcohol, tobacco, or drug use history    REVIEW OF SYSTEMS:  CONSTITUTIONAL: no fever  EYES/ENT: No visual changes; no throat pain   NECK: No pain or stiffness  RESPIRATORY: no SOB  CARDIOVASCULAR: No chest pain or palpitations  GASTROINTESTINAL: + diarrhea. No abdominal pain. No N/V/C or blood in stool  GENITOURINARY: No dysuria or hematuria  NEUROLOGICAL: No numbness or focal weakness  SKIN: No itching, burning, rashes, or lesions   Psych: No depression  MSK: no joint pain  Allergy: no urticaria     Height (cm): 160 (03-25 @ 19:45)  Weight (kg): 52.8 (03-25 @ 19:45)  BMI (kg/m2): 20.6 (03-25 @ 19:45)  BSA (m2): 1.54 (03-25 @ 19:45)    T(F): 98.4 (03-26-22 @ 12:15), Max: 98.4 (03-26-22 @ 12:15)  HR: 80 (03-26-22 @ 12:15)  BP: 134/78 (03-26-22 @ 12:15)  RR: 18 (03-26-22 @ 12:15)  SpO2: 92% (03-26-22 @ 12:15)  Wt(kg): --    GENERAL: NAD  HEENT: EOMI, MMM, no oropharyngeal lesions or erythema appreciated  Pulm: no increased WOB, CTAB/L  CV: RRR, S1, S2, no m/g/r  ABDOMEN: soft, NT, ND, no masses felt, no HSM  MSK: nl ROM  EXTREMITIES: no appreciable edema in b/l LE  Neuro: A&Ox3, no focal deficits  SKIN: warm and dry, no visible rash                          10.3   7.18  )-----------( 322      ( 26 Mar 2022 05:20 )             31.3       03-26    138  |  102  |  7   ----------------------------<  138<H>  3.5   |  24  |  0.89    Ca    8.3<L>      26 Mar 2022 05:20  Phos  3.6     03-26  Mg     1.8     03-26    TPro  4.8<L>  /  Alb  3.2<L>  /  TBili  0.6  /  DBili  x   /  AST  23  /  ALT  48<H>  /  AlkPhos  76  03-26      Magnesium, Serum: 1.8 mg/dL (03-26 @ 05:20)  Phosphorus Level, Serum: 3.6 mg/dL (03-26 @ 05:20)  Magnesium, Serum: 1.9 mg/dL (03-25 @ 23:51)  Phosphorus Level, Serum: 3.1 mg/dL (03-25 @ 23:51)       58 yo F with a PMH of POTS with orthostatic hypotension, hyperthyroidism s/p thyroidectomy leading to hypothyroidism, mouth burning syndrome, recently diagnosed light chain amyloidosis, and recently diagnosed C diff colitis who was admitted on 3/15/22 to [facility] for multiple recurrent syncopal episodes, transferred for further cardiac workup and for possible chemotherapy (per patient).    While admitted at [facility], pt's course was complicated by multiple unresponsive episodes prompting RRTs, during which pt found to be hypotensive and bradycardic. She required Levophed for BP support and was in CCU as well as ICU with each RRT. BP remained stable while on Midodrine 20mg q8 and Fludrocortisone 0.2mg q24. CT head was negative on admission. Pt noted to intermittently have sinus pauses (2-3.8 seconds) thought to be 2/2 vagal episodes, and asymptomatic bradycardia overnight on tele. ECG noted NSR but 1st degree AV block. EP was consulted, no PPM indications at that time. TTE 3/16/22 showed normal LV size and function with EF 55%-60% and no regional wall motion abnormalities, but did have moderate concentric LVH. Pt was pending cardiac MRI for cardiac amyloidosis work up. Pt was also seen by Endocrinology for elevated TSH (normal FT4), and Levothyroxine was up-titrated to 137 mcg.  Patient was also diagnosed with C diff shortly before hospitalization and had been taking her PO Vancomycin once or twice a day instead of QID, but has been on QID since hospitalization. However, she is still havign non-bloody diarrhea, including 3 BMs yesterday and 2 today thus far.  She completed Ceftriaxone for UTI at [facility].    Of note, she was diagnosed with LC amyloidosis after being followed for elevated free light chains. SPEP/UPEP and serum/urine immunofixation did not show a monoclonal band/M-spike and immunoglobulins were normal, but lambda free light chains were elevated at 8.73 with a K/L ratio of 0.11 on 12/6/21, and 10.92 with a ratio of 0.09 on repeat on 2/24/22. She had an abdominal fat pat biopsy on 2/8/22 showing Congo Red positivity in rare small blood vessels and positive for polarization. 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 However, patient did not meet CRAB criteria for multiple myeloma. Congo Red stain was negative on the BMBx, and there was not enough tissue from the fat bad to type the amyloidosis, but given the clinical evidence and free light chain studies, this was consistent with light chain amyloidosis.    Allergies  penicillins (Unknown)    MEDICATIONS  (STANDING):  aspirin  chewable 81 milliGRAM(s) Oral daily  atorvastatin 40 milliGRAM(s) Oral at bedtime  enoxaparin Injectable 40 milliGRAM(s) SubCutaneous every 24 hours  famotidine    Tablet 20 milliGRAM(s) Oral two times a day  fludroCORTISONE 0.2 milliGRAM(s) Oral daily  fluticasone propionate 50 MICROgram(s)/spray Nasal Spray 1 Spray(s) Both Nostrils two times a day  gabapentin 100 milliGRAM(s) Oral every 8 hours  influenza   Vaccine 0.5 milliLiter(s) IntraMuscular once  lactated ringers. 1000 milliLiter(s) (75 mL/Hr) IV Continuous <Continuous>  lactobacillus acidophilus 1 Tablet(s) Oral daily  levothyroxine 137 MICROGram(s) Oral daily  loratadine 10 milliGRAM(s) Oral daily  midodrine 20 milliGRAM(s) Oral every 8 hours  simethicone 80 milliGRAM(s) Chew every 6 hours  vancomycin    Solution 125 milliGRAM(s) Oral every 6 hours    MEDICATIONS  (PRN):  acetaminophen     Tablet .. 650 milliGRAM(s) Oral every 6 hours PRN Temp greater or equal to 38C (100.4F), Mild Pain (1 - 3), Moderate Pain (4 - 6)  aluminum hydroxide/magnesium hydroxide/simethicone Suspension 30 milliLiter(s) Oral every 4 hours PRN Dyspepsia  magnesium hydroxide Suspension 30 milliLiter(s) Oral at bedtime PRN Constipation      PAST MEDICAL & SURGICAL HISTORY:  Hyperthyroidism s/p thyroidectomy --> Hypothyroidism    POTS (postural orthostatic tachycardia syndrome)    Clostridium difficile colitis    Amyloidosis    Total Thyroidectomy        FAMILY HISTORY:  Mother - lung cancer (smoker)  Maternal Grandfather - colon cancer (age 41)  Maternal Grandmother - unknown cancer  Paternal aunts - breast cancer, lung cancer, uterine cancer      SOCIAL HISTORY: No alcohol, tobacco, or drug use history    REVIEW OF SYSTEMS:  CONSTITUTIONAL: no fever  EYES/ENT: No visual changes; no throat pain   NECK: No pain or stiffness  RESPIRATORY: no SOB  CARDIOVASCULAR: No chest pain or palpitations  GASTROINTESTINAL: + diarrhea. No abdominal pain. No N/V/C or blood in stool  GENITOURINARY: No dysuria or hematuria  NEUROLOGICAL: No numbness or focal weakness  SKIN: No itching, burning, rashes, or lesions   Psych: No depression  MSK: no joint pain  Allergy: no urticaria     Height (cm): 160 (03-25 @ 19:45)  Weight (kg): 52.8 (03-25 @ 19:45)  BMI (kg/m2): 20.6 (03-25 @ 19:45)  BSA (m2): 1.54 (03-25 @ 19:45)    T(F): 98.4 (03-26-22 @ 12:15), Max: 98.4 (03-26-22 @ 12:15)  HR: 80 (03-26-22 @ 12:15)  BP: 134/78 (03-26-22 @ 12:15)  RR: 18 (03-26-22 @ 12:15)  SpO2: 92% (03-26-22 @ 12:15)  Wt(kg): --    GENERAL: NAD  HEENT: EOMI, MMM, no oropharyngeal lesions or erythema appreciated  Pulm: no increased WOB, CTAB/L  CV: RRR, S1, S2, no m/g/r  ABDOMEN: soft, NT, ND, no masses felt, no HSM  MSK: nl ROM  EXTREMITIES: no appreciable edema in b/l LE  Neuro: A&Ox3, no focal deficits  SKIN: warm and dry, no visible rash                          10.3   7.18  )-----------( 322      ( 26 Mar 2022 05:20 )             31.3       03-26    138  |  102  |  7   ----------------------------<  138<H>  3.5   |  24  |  0.89    Ca    8.3<L>      26 Mar 2022 05:20  Phos  3.6     03-26  Mg     1.8     03-26    TPro  4.8<L>  /  Alb  3.2<L>  /  TBili  0.6  /  DBili  x   /  AST  23  /  ALT  48<H>  /  AlkPhos  76  03-26      Magnesium, Serum: 1.8 mg/dL (03-26 @ 05:20)  Phosphorus Level, Serum: 3.6 mg/dL (03-26 @ 05:20)  Magnesium, Serum: 1.9 mg/dL (03-25 @ 23:51)  Phosphorus Level, Serum: 3.1 mg/dL (03-25 @ 23:51)

## 2022-03-26 NOTE — PROGRESS NOTE ADULT - PROBLEM SELECTOR PLAN 5
Last saw Dr. 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   Potentially starting daratumumab plus CyBorD +/- autologous bone marrow transplant.

## 2022-03-26 NOTE — PROGRESS NOTE ADULT - ASSESSMENT
57 y.o. F with PMH of POTS, orthostatic hypotension, recently diagnosed with smoldering myeloma (March 2022) and amyloidosis (Feb 2022), mouth burning syndrome, recently diagnosed c.diff colitis (March 2022), hyperthyroidism s/p thyroidectomy, admitted to [facility] for multiple recurrent syncopal episodes thought to be associated with autonomic dysfunction 2/2 amyloidosis, transferred to [facility] for further amyloidosis management.

## 2022-03-26 NOTE — DISCHARGE NOTE PROVIDER - NSDCMRMEDTOKEN_GEN_ALL_CORE_FT
acetaminophen 325 mg oral tablet: 2 tab(s) orally every 6 hours, As Needed  aluminum hydroxide-magnesium hydroxide 200 mg-200 mg/5 mL oral suspension: 30 milliliter(s) orally every 4 hours, As Needed  aspirin 81 mg oral tablet: 1 tab(s) orally once a day  atorvastatin 40 mg oral tablet: 1 tab(s) orally once a day  enoxaparin 40 mg/0.4 mL injectable solution: 40 milligram(s) injectable once a day  famotidine 20 mg oral tablet: 1 tab(s) orally 2 times a day  fludrocortisone 0.1 mg oral tablet: 2 tab(s) orally once a day  fluticasone 0.5 mg/2 mL inhalation suspension: 1 spray(s) inhaled 2 times a day  gabapentin 100 mg oral tablet: 1 tab(s) orally every 8 hours  lactobacillus acidophilus oral tablet: 2 tab(s) orally 2 times a day  levothyroxine 112 mcg (0.112 mg) oral tablet: 1 tab(s) orally once a day  levothyroxine 25 mcg (0.025 mg) oral tablet: 1 tab(s) orally once a day  loratadine 10 mg oral tablet: 1 tab(s) orally once a day  magnesium hydroxide 8% oral suspension: 30 milliliter(s) orally once a day (at bedtime), As Needed  midodrine 10 mg oral tablet: 2 tab(s) orally every 8 hours  simethicone 80 mg oral tablet: 1 tab(s) orally every 6 hours  vancomycin 250 mg/5 mL oral liquid: 250 milligram(s) orally 4 times a day   acetaminophen 325 mg oral tablet: 2 tab(s) orally every 6 hours, As needed, Mild Pain (1 - 3), Moderate Pain (4 - 6)  Acidophilus oral capsule: 1 cap(s) orally 2 times a day   aluminum hydroxide-magnesium hydroxide 200 mg-200 mg/5 mL oral suspension: 30 milliliter(s) orally every 4 hours, As Needed  aspirin 81 mg oral tablet, chewable: 1 tab(s) orally once a day  atorvastatin 40 mg oral tablet: 1 tab(s) orally once a day (at bedtime)  famotidine 20 mg oral tablet: 1 tab(s) orally 2 times a day  Flonase 50 mcg/inh nasal spray: 1 spray(s) nasal 2 times a day  fludrocortisone 0.1 mg oral tablet: 2 tab(s) orally once a day  gabapentin 100 mg oral tablet: 1 tab(s) orally every 8 hours  levothyroxine 137 mcg (0.137 mg) oral tablet: 1 tab(s) orally once a day  loratadine 10 mg oral tablet: 1 tab(s) orally once a day  midodrine 10 mg oral tablet: 2 tab(s) orally 3 times a day  simethicone 80 mg oral tablet, chewable: 1 tab(s) orally every 6 hours  Valtrex 500 mg oral tablet: 1 tab(s) orally 2 times a day

## 2022-03-26 NOTE — DISCHARGE NOTE PROVIDER - HOSPITAL COURSE
57 y.o. F with PMH of POTS, orthostatic hypotension, recently diagnosed with smoldering myeloma (March 2022) and amyloidosis (Feb 2022), mouth burning syndrome, recently diagnosed c.diff colitis (March 2022), hyperthyroidism s/p thyroidectomy, admitted to [facility] for multiple recurrent syncopal episodes.     While admitted, pt's course was complicated by multiple unresponsive episodes prompting RRTs, during which pt found to be hypotensive and bradycardic. She required levophed for BP support and was in CCU as well as ICU with each RRT. BP remained stable while on Midodrine 20mg q8 and Fludrocortisone 0.2mg q24. CT head was negative on admission. Pt noted to intermittently have sinus pauses (2-3.8 seconds) thought to be 2/2 vagal episodes, and asymptomatic bradycardia overnight on tele. ECG noted NSR but 1st degree AV block. EP was consulted, no PPM indications. TTE 3/16/22 showed normal LV size and function with EF 55%-60%. No regional wall motion abnormalities. Moderate concentric LVH. Pt was pending cardiac MRI for cardiac amyloidosis work up. Pt was also seen by Endocrinology for elevated TSH (normal FT4), levothyroxine was uptitrated to 137mcg. Pt also started on oral vanco 250mg q6 (3/16- ) and completed ceftriaxone for UTI.     Pt states her outpatient oncologist Dr. Goldberg recommended transfer to [facility] for inpatient chemo given concerns for cardiac and autonomic dysfunction 2/2 amyloidosis. Cardiology and EP consulted, recommended Micra PPM placement.    Cardiac MRI showed ______    Oncology consulted, recommended 57 y.o. F with PMH of POTS, orthostatic hypotension, recently diagnosed with smoldering myeloma (March 2022) and amyloidosis (Feb 2022), mouth burning syndrome, recently diagnosed c.diff colitis (March 2022), hyperthyroidism s/p thyroidectomy, admitted to [facility] for multiple recurrent syncopal episodes.     While admitted, pt's course was complicated by multiple unresponsive episodes prompting RRTs, during which pt found to be hypotensive and bradycardic. She required levophed for BP support and was in CCU as well as ICU with each RRT. BP remained stable while on Midodrine 20mg q8 and Fludrocortisone 0.2mg q24. CT head was negative on admission. Pt noted to intermittently have sinus pauses (2-3.8 seconds) thought to be 2/2 vagal episodes, and asymptomatic bradycardia overnight on tele. ECG noted NSR but 1st degree AV block. EP was consulted, no PPM indications. TTE 3/16/22 showed normal LV size and function with EF 55%-60%. No regional wall motion abnormalities. Moderate concentric LVH. Pt was pending cardiac MRI for cardiac amyloidosis work up. Pt was also seen by Endocrinology for elevated TSH (normal FT4), levothyroxine was uptitrated to 137mcg. Pt also started on oral vanco 250mg q6 (3/16- ) and completed ceftriaxone for UTI.     Pt states her outpatient oncologist Dr. Goldberg recommended transfer to [facility] for inpatient chemo given concerns for cardiac and autonomic dysfunction 2/2 amyloidosis. Cardiology and EP consulted, recommended Micra PPM placement.    Cardiac MRI showed ______    Oncology consulted, recommended ______ 57 y.o. F with PMH of POTS, orthostatic hypotension, recently diagnosed with smoldering myeloma (March 2022) and amyloidosis (Feb 2022), mouth burning syndrome, recently diagnosed c.diff colitis (March 2022), hyperthyroidism s/p thyroidectomy, admitted to [facility] for multiple recurrent syncopal episodes.     While admitted, pt's course was complicated by multiple unresponsive episodes prompting RRTs, during which pt found to be hypotensive and bradycardic. She required levophed for BP support and was in CCU as well as ICU with each RRT. BP remained stable while on Midodrine 20mg q8 and Fludrocortisone 0.2mg q24. CT head was negative on admission. Pt noted to intermittently have sinus pauses (2-3.8 seconds) thought to be 2/2 vagal episodes, and asymptomatic bradycardia overnight on tele. ECG noted NSR but 1st degree AV block. EP was consulted, no PPM indications. TTE 3/16/22 showed normal LV size and function with EF 55%-60%. No regional wall motion abnormalities. Moderate concentric LVH. Pt was pending cardiac MRI for cardiac amyloidosis work up. Pt was also seen by Endocrinology for elevated TSH (normal FT4), levothyroxine was uptitrated to 137mcg. Pt also started on oral vanco 250mg q6 (3/16- ) and completed ceftriaxone for UTI.     Pt states her outpatient oncologist Dr. Goldberg recommended transfer to [facility] for inpatient chemo given concerns for cardiac and autonomic dysfunction 2/2 amyloidosis. Cardiology and EP consulted, recommended Micra PPM placement.    Cardiac MRI showed ______    Oncology consulted. Although the patient was planned to start chemotherapy, it was held at the moment given the patient's persistent diarrhea suspected 2/2 C diff. Upon improvement of the diarrhea, the patient is planned to be started on Daratumumab + CyBorD (inpatient vs outpatient), following up with her hematologist. Dr. Goldberg. 57 y.o. F with PMH of POTS, orthostatic hypotension, recently diagnosed with smoldering myeloma (March 2022) and amyloidosis (Feb 2022), mouth burning syndrome, recently diagnosed c.diff colitis (March 2022), hyperthyroidism s/p thyroidectomy, admitted to [facility] for multiple recurrent syncopal episodes.     While admitted at [facility], pt's course was complicated by multiple unresponsive episodes prompting RRTs, during which pt found to be hypotensive and bradycardic. She required levophed for BP support and was in CCU as well as ICU with each RRT. BP remained stable while on Midodrine 20mg q8 and Fludrocortisone 0.2mg q24. CT head was negative on admission. Pt noted to intermittently have sinus pauses (2-3.8 seconds) thought to be 2/2 vagal episodes, and asymptomatic bradycardia overnight on tele. ECG noted NSR but 1st degree AV block. EP was consulted, no PPM indications. TTE 3/16/22 showed normal LV size and function with EF 55%-60%. No regional wall motion abnormalities. Moderate concentric LVH. Pt was pending cardiac MRI for cardiac amyloidosis work up. Pt was also seen by Endocrinology for elevated TSH (normal FT4), levothyroxine was uptitrated to 137mcg. Pt also started on oral vanco 250mg q6 (3/16- ) for C.diff and completed ceftriaxone for UTI.     Pt states her outpatient oncologist Dr. Goldberg recommended transfer to [facility] for inpatient chemo given concerns for cardiac and autonomic dysfunction 2/2 amyloidosis. Cardiology and EP consulted, recommended Micra PPM placement. Cardiac MRI showed amyloid involvement. Pt had an RRT for hypotension and symptomatic bradycardia to 30s, was on pressor support until she received Micra PPM. Pt also continued her C.diff treatment and chemotherapy was placed on hold until the diarrhea cleared. She was then started on Daratumumab + CyBorD inpatient, and tolerated it well. She was placed on Valcyclovir for prophylaxis. As she tolerated her first chemotherapy treatment, she was discharged to continue treatment outpatient.     At time of discharge, pt was stable and her symptoms were well controlled.      57 y.o. F with PMH of POTS, orthostatic hypotension, recently diagnosed with smoldering myeloma (March 2022) and amyloidosis (Feb 2022), mouth burning syndrome, recently diagnosed c.diff colitis (March 2022), hyperthyroidism s/p thyroidectomy, admitted to [facility] for multiple recurrent syncopal episodes.     While admitted at [facility], pt's course was complicated by multiple unresponsive episodes prompting RRTs, during which pt found to be hypotensive and bradycardic. She required levophed for BP support and was in CCU as well as ICU with each RRT. BP remained stable while on Midodrine 20mg q8 and Fludrocortisone 0.2mg q24. CT head was negative on admission. Pt noted to intermittently have sinus pauses (2-3.8 seconds) thought to be 2/2 vagal episodes, and asymptomatic bradycardia overnight on tele. ECG noted NSR but 1st degree AV block. EP was consulted, no PPM indications. TTE 3/16/22 showed normal LV size and function with EF 55%-60%. No regional wall motion abnormalities. Moderate concentric LVH. Pt was pending cardiac MRI for cardiac amyloidosis work up. Pt was also seen by Endocrinology for elevated TSH (normal FT4), levothyroxine was uptitrated to 137mcg. Pt also started on oral vanco 250mg q6 (3/16- ) for C.diff and completed ceftriaxone for UTI.     Pt states her outpatient oncologist Dr. Goldberg recommended transfer to [facility] for inpatient chemo given concerns for cardiac and autonomic dysfunction 2/2 amyloidosis. Cardiology and EP consulted, recommended Micra PPM placement. Cardiac MRI showed amyloid involvement. Pt had an RRT for hypotension and symptomatic bradycardia to 30s, was on pressor support until she received a dual chamber PPM. Pt also continued her C.diff treatment and chemotherapy was placed on hold until the diarrhea cleared. She was then started on Daratumumab + CyBorD inpatient, and tolerated it well ; she completed 2 cycles of chemotherapy with plans to follow up with her hematologist/oncologist outpatient. She was placed on Valcyclovir for prophylaxis.  Patient is now hemodynamically stable and medically optimized for discharge home with referrals to appropriate clinics.

## 2022-03-26 NOTE — CONSULT NOTE ADULT - ASSESSMENT
58 yo F with PMH of POTS, orthostatic hypotension with frequent and persistent episodes, recent dx of smoldering myeloma and amyloidosis on fat pad bx (+ congo red, no sufficient tissue for differentiation) c/b neurologic sequelaes, who was recently admitted to [facility] for recurrent syncopal episode. During her hospitalization, she had RRTs and was admitted to CCU for levophed support briefly. It was further c/b CDiff infection for which she is recovering and remains on PO antibiotic. During her hospitalization, telemetry revealed frequent sinus pauses correlated to her symptoms. EP consulted for PPM evaluation.     Given her recurrent and reproducible symptoms (mostly triggered by BMs and positional changes) that is refractory to medical therapy, would favor PPM, preferentially Micra PPM. Will discuss the timing with EP attending   Given her recent CDiff infection and decreased PO intake, would ensure she is adequately hydrated with +/- IVF    Cont current midodrine 20 mg TID and Florinef 0.2 mg QD and avoid triggers   cMRI pending to further evaluate for amyloidosis    Telemetry monitoring and maintain goal K~4.5 and Mg~2.5  Chemotherapy initiation per Heme/Onc team     Discussed with Dr. Healy   Thank you for allowing us to participate in the care of your patient. If you have any questions or concerns please do not hesitate to contact us 24/7.   All Cardiology service information can be found 24/7 on [email or web address], password: [email or web address] Da Silva MD  PGY-5 Cardiology Fellow, [facility]MARIA T

## 2022-03-26 NOTE — PROGRESS NOTE ADULT - PROBLEM SELECTOR PLAN 1
Pt underwent fat pad bx in Feb 2022 and diagnosed with amyloidosis. 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 No typing of amyloidosis was done given as sample was not enough for mass spec.     - Will discuss with onc team re plans for inpt chemo. Potentially starting daratumumab plus CyBorD +/- autologous bone marrow transplant.    # Autonomic dysfunction   - Suspect autonomic dysfunction and AV block may be associated with amyloidosis   - Tx orthostatic hypotension and POTS (see above/below)     # Rule out cardiac involvement  - TTE 3/16/2022 with concentric LVH. Unclear if this is associated with cardiac amyloidosis and deposition? Pt reports TTE in 01/2022 was normal with mild valvular regurgitation   - LVH is very classically seen in light chain amyloidosis and plasma cell neoplasm   - Repeat ECG here in [facility]   - Cards and EP consult. Likely need Cardiac MRI & PPM eval   - C/w tele monitoring

## 2022-03-26 NOTE — DISCHARGE NOTE PROVIDER - CARE PROVIDER_API CALL
Goldberg, Bradley H (MD)  Hematology; Internal Medicine; Medical Oncology  [address] [place] [address]  Phone: [phone]  Fax: [phone]  Follow Up Time:     Rebel Wolfe  Phone: (   )    -  Fax: (   )    -  Follow Up Time:

## 2022-03-26 NOTE — CONSULT NOTE ADULT - ASSESSMENT
56 yo F with a PMH of POTS with orthostatic hypotension, hyperthyroidism s/p thyroidectomy leading to hypothyroidism, mouth burning syndrome, recently diagnosed light chain amyloidosis, and recently diagnosed C diff colitis who was admitted on 3/15/22 to [facility] for multiple recurrent syncopal episodes, transferred for further cardiac workup and for possible chemotherapy.    #Light Chain Amyloidosis  - Patient was diagnosed based on multiple correlative tests  - No monoclonal protein found on SPEP/UPEP/S-MANUEL/U-MANUEL or elevated immunoglobulin, but with elevated lambda FLC, 10.92 with K/L ratio 0.09 on 2/24  - 2/8/22 abdominal fat pad biopsy showed positive Congo Red staining in rare some vessels with positive polarization  - 3/1/22 BMBx showed 25-35% plasma cells, positive for cyclin D1, with monotypic plasma cells by flow cytometry. Congo Red negative on BMBx. Atypical CCND1/IGH fusion pattern detected (3.5%) on FISH  - Does not meet CRAB criteria for multiple myeloma  - Therefore, diagnosis consistent with light chain amyloidosis  - May have cardiac involvement as noted below, possibly contributing to patient's recurrent syncope, heart block, and POTS  - Was planned to start chemotherapy, but will likely hold at least for now, given patient has persistent diarrhea suspected to be from C diff  - If diarrhea improves, will likely plan for Daratumumab + CyBorD (inpatient vs outpatient). Will f/u with Dr. Goldberg (patient's Hematologist)    #POTS/Syncope  - Patient has had POTS with orthostatic hypotension and now has recurrent syncopal episodes (required multiple RRTs) and also has at least first degree heart block  - Seen by EP, likely plan for PPM placement  - Obtaining cardiac MRI to evaluate for amyloid deposits  - Otherwise, amyloid treatment as above      Aryles Hedjar, MD, PGY-4  Hematology/Oncology Fellow  [facility]  Pager: [phone]  Covering only for 3/26/22 56 yo F with a PMH of POTS with orthostatic hypotension, hyperthyroidism s/p thyroidectomy leading to hypothyroidism, mouth burning syndrome, recently diagnosed lambda light chain amyloidosis, and recently diagnosed C diff colitis who was admitted on 3/15/22 to [facility] for multiple recurrent syncopal episodes, transferred for further cardiac workup and for possible chemotherapy.    #Light Chain Amyloidosis  - Patient was diagnosed based on multiple correlative tests  - No monoclonal protein found on SPEP/UPEP/S-MANUEL/U-MANUEL or elevated immunoglobulin, but with elevated lambda FLC, 10.92 with K/L ratio 0.09 on 2/24  - 2/8/22 abdominal fat pad biopsy showed positive Congo Red staining in rare some vessels with positive polarization  - 3/1/22 BMBx showed 25-35% plasma cells, positive for cyclin D1, with monotypic plasma cells by flow cytometry. Congo Red negative on BMBx. Atypical CCND1/IGH fusion pattern detected (3.5%) on FISH  - Does not meet CRAB criteria for multiple myeloma  - Therefore, diagnosis consistent with lambda light chain amyloidosis  - May have cardiac involvement as noted below, possibly contributing to patient's recurrent syncope, heart block, and POTS  - Was planned to start chemotherapy, but will likely hold at least for now, given patient has persistent diarrhea suspected to be from C diff  - If diarrhea improves, will likely plan for Daratumumab + CyBorD (inpatient vs outpatient). Will f/u with Dr. Goldberg (patient's Hematologist)    #POTS/Syncope  - Patient has had POTS with orthostatic hypotension and now has recurrent syncopal episodes (required multiple RRTs) and also has at least first degree heart block  - Seen by EP, likely plan for PPM placement  - Obtaining cardiac MRI to evaluate for amyloid deposits  - Otherwise, amyloid treatment as above      Aryles Hedjar, MD, PGY-4  Hematology/Oncology Fellow  [facility]  Pager: [phone]  Covering only for 3/26/22

## 2022-03-26 NOTE — PROGRESS NOTE ADULT - PROBLEM SELECTOR PLAN 4
No tachycardia on tele at this time, continue to monitor  - PO & salt intake encouraged No tachycardia on tele at this time, continue to monitor  - PO & salt intake encouraged  - mIVF LR @ 75

## 2022-03-26 NOTE — DISCHARGE NOTE PROVIDER - PROVIDER TOKENS
PROVIDER:[TOKEN:[[id number]]],FREE:[LAST:[Aiden],FIRST:[Rebel],PHONE:[(   )    -],FAX:[(   )    -]]

## 2022-03-26 NOTE — DISCHARGE NOTE PROVIDER - NSDCCPCAREPLAN_GEN_ALL_CORE_FT
PRINCIPAL DISCHARGE DIAGNOSIS  Diagnosis: Smoldering myeloma  Assessment and Plan of Treatment:       SECONDARY DISCHARGE DIAGNOSES  Diagnosis: History of amyloidosis  Assessment and Plan of Treatment:     Diagnosis: Orthostatic hypotension  Assessment and Plan of Treatment:      PRINCIPAL DISCHARGE DIAGNOSIS  Diagnosis: Smoldering myeloma  Assessment and Plan of Treatment: You were diagnosed with smoldering myeloma in March 2022 after confirmation with several blood tests and a bone marrow biopsy. Following your hospitalization at [facility] for your low blood pressure and heart rate, you were transferred to [facility] per the recommendation of Dr. Goldberg to start inpatient chemotherapy. You were seen by the oncology team at this hospital and _____      SECONDARY DISCHARGE DIAGNOSES  Diagnosis: History of amyloidosis  Assessment and Plan of Treatment: You were diagnosed with amyloidosis in February 2022 after undergoing a fat pad biopsy, which subsequently led to the bone marrow biopsy and myeloma diagnosis. Following your hospitalization at [facility] for your low blood pressure and heart rate, you were transferred to [facility] per the recommendation of Dr. Goldberg to start inpatient chemotherapy, as the amyloidosis may be a contributing factor to your recent cardiac conditions. You underwent a cardiac MRI, which showed _____    Diagnosis: Orthostatic hypotension  Assessment and Plan of Treatment:   You were hospitalized in the Cardiac Care Unit (CCU) at [facility] for low blood pressure and low heart rate, likely contributed in part by your recent diagnoses of amyloidosis and myeloma. Following a course of IV pressor and steroid medications to raise your blood pressure and heart rate, your medications were transitioned to pill forms, known as Midodrine and Fludrocortisone. Please continue to take these medications, and return to the hospital if you experience any more fainting/near-fainting episodes, dizziness, or weakness.     PRINCIPAL DISCHARGE DIAGNOSIS  Diagnosis: Smoldering myeloma  Assessment and Plan of Treatment: You were diagnosed with smoldering myeloma in March 2022 after confirmation with several blood tests and a bone marrow biopsy. Following your hospitalization at [facility] for your low blood pressure and heart rate, you were transferred to [facility] per the recommendation of Dr. Goldberg to start chemotherapy either in the inpatient or outpatient setting. You were seen by the oncology team at this hospital, and will be started on chemotherapy. Please return to the hospital for any weakness, nausea/vomiting, fever, night sweats, or weight loss.      SECONDARY DISCHARGE DIAGNOSES  Diagnosis: History of amyloidosis  Assessment and Plan of Treatment: You were diagnosed with amyloidosis in February 2022 after undergoing a fat pad biopsy, which subsequently led to the bone marrow biopsy and myeloma diagnosis. Following your hospitalization at [facility] for your low blood pressure and heart rate, you were transferred to [facility] per the recommendation of Dr. Goldberg to start inpatient chemotherapy, as the amyloidosis may be a contributing factor to your recent cardiac conditions. You underwent a cardiac MRI, which showed _____    Diagnosis: Orthostatic hypotension  Assessment and Plan of Treatment:   You were hospitalized in the Cardiac Care Unit (CCU) at [facility] for low blood pressure and low heart rate, likely contributed in part by your recent diagnoses of amyloidosis and myeloma. Following a course of IV pressor and steroid medications to raise your blood pressure and heart rate, your medications were transitioned to pill forms, known as Midodrine and Fludrocortisone. Please continue to take these medications, and return to the hospital if you experience any more fainting/near-fainting episodes, dizziness, or weakness.     PRINCIPAL DISCHARGE DIAGNOSIS  Diagnosis: Smoldering myeloma  Assessment and Plan of Treatment: You were diagnosed with smoldering myeloma in March 2022 after confirmation with several blood tests and a bone marrow biopsy. Following your hospitalization at [facility] for your low blood pressure and heart rate, you were transferred to [facility] per the recommendation of Dr. Goldberg to start chemotherapy either in the inpatient or outpatient setting. You were seen by the oncology team at this hospital and were started on chemotherapy inpatient. You tolerated it well, so you are able to further complete the treatment outpatient.  Please seek medical care for weakness, nausea/vomiting, fever, night sweats, or weight loss.      SECONDARY DISCHARGE DIAGNOSES  Diagnosis: History of amyloidosis  Assessment and Plan of Treatment: You were diagnosed with amyloidosis in February 2022 after undergoing a fat pad biopsy, which subsequently led to the bone marrow biopsy and myeloma diagnosis. Following your hospitalization at [facility] for your low blood pressure and heart rate, you were transferred to [facility] per the recommendation of Dr. Goldberg to start inpatient chemotherapy, as the amyloidosis may be a contributing factor to your recent cardiac conditions. You underwent a cardiac MRI, which showed amyloid deposits in your heart. These were likely contributing to your low heart rate and blood pressure, so you received a pacemaker to prevent your heart rate from becoming too low. You were started on a chemotherapy regimen inpatient and will continue your regimen outpatient after discharge.       Diagnosis: Orthostatic hypotension  Assessment and Plan of Treatment: You were hospitalized in the Cardiac Care Unit (CCU) at [facility] for low blood pressure and low heart rate, likely contributed in part by your recent diagnoses of amyloidosis and myeloma. Following a course of IV pressor and steroid medications to raise your blood pressure and heart rate, your medications were transitioned to pill forms, known as Midodrine and Fludrocortisone. Please continue to take these medications, and return to the hospital if you experience any more fainting/near-fainting episodes, dizziness, or weakness.

## 2022-03-27 LAB
ALBUMIN SERPL ELPH-MCNC: 3.1 G/DL — LOW (ref 3.3–5)
ALP SERPL-CCNC: 65 U/L — SIGNIFICANT CHANGE UP (ref 40–120)
ALT FLD-CCNC: 42 U/L — SIGNIFICANT CHANGE UP (ref 10–45)
ANION GAP SERPL CALC-SCNC: 7 MMOL/L — SIGNIFICANT CHANGE UP (ref 5–17)
AST SERPL-CCNC: 17 U/L — SIGNIFICANT CHANGE UP (ref 10–40)
BILIRUB SERPL-MCNC: 0.5 MG/DL — SIGNIFICANT CHANGE UP (ref 0.2–1.2)
BUN SERPL-MCNC: 6 MG/DL — LOW (ref 7–23)
CALCIUM SERPL-MCNC: 8.4 MG/DL — SIGNIFICANT CHANGE UP (ref 8.4–10.5)
CHLORIDE SERPL-SCNC: 105 MMOL/L — SIGNIFICANT CHANGE UP (ref 96–108)
CO2 SERPL-SCNC: 30 MMOL/L — SIGNIFICANT CHANGE UP (ref 22–31)
CREAT SERPL-MCNC: 0.73 MG/DL — SIGNIFICANT CHANGE UP (ref 0.5–1.3)
EGFR: 95 ML/MIN/1.73M2 — SIGNIFICANT CHANGE UP
GLUCOSE SERPL-MCNC: 102 MG/DL — HIGH (ref 70–99)
HCT VFR BLD CALC: 30.6 % — LOW (ref 34.5–45)
HGB BLD-MCNC: 10.1 G/DL — LOW (ref 11.5–15.5)
MAGNESIUM SERPL-MCNC: 1.9 MG/DL — SIGNIFICANT CHANGE UP (ref 1.6–2.6)
MCHC RBC-ENTMCNC: 29.8 PG — SIGNIFICANT CHANGE UP (ref 27–34)
MCHC RBC-ENTMCNC: 33 GM/DL — SIGNIFICANT CHANGE UP (ref 32–36)
MCV RBC AUTO: 90.3 FL — SIGNIFICANT CHANGE UP (ref 80–100)
NRBC # BLD: 0 /100 WBCS — SIGNIFICANT CHANGE UP (ref 0–0)
PHOSPHATE SERPL-MCNC: 4.2 MG/DL — SIGNIFICANT CHANGE UP (ref 2.5–4.5)
PLATELET # BLD AUTO: 272 K/UL — SIGNIFICANT CHANGE UP (ref 150–400)
POTASSIUM SERPL-MCNC: 3.5 MMOL/L — SIGNIFICANT CHANGE UP (ref 3.5–5.3)
POTASSIUM SERPL-SCNC: 3.5 MMOL/L — SIGNIFICANT CHANGE UP (ref 3.5–5.3)
PROT SERPL-MCNC: 4.7 G/DL — LOW (ref 6–8.3)
RBC # BLD: 3.39 M/UL — LOW (ref 3.8–5.2)
RBC # FLD: 13.1 % — SIGNIFICANT CHANGE UP (ref 10.3–14.5)
SODIUM SERPL-SCNC: 142 MMOL/L — SIGNIFICANT CHANGE UP (ref 135–145)
WBC # BLD: 4.82 K/UL — SIGNIFICANT CHANGE UP (ref 3.8–10.5)
WBC # FLD AUTO: 4.82 K/UL — SIGNIFICANT CHANGE UP (ref 3.8–10.5)

## 2022-03-27 PROCEDURE — 99232 SBSQ HOSP IP/OBS MODERATE 35: CPT | Mod: GC

## 2022-03-27 PROCEDURE — 99233 SBSQ HOSP IP/OBS HIGH 50: CPT | Mod: GC

## 2022-03-27 RX ADMIN — Medication 1 SPRAY(S): at 06:59

## 2022-03-27 RX ADMIN — GABAPENTIN 100 MILLIGRAM(S): 400 CAPSULE ORAL at 06:59

## 2022-03-27 RX ADMIN — Medication 1 TABLET(S): at 13:27

## 2022-03-27 RX ADMIN — MIDODRINE HYDROCHLORIDE 20 MILLIGRAM(S): 2.5 TABLET ORAL at 13:26

## 2022-03-27 RX ADMIN — MIDODRINE HYDROCHLORIDE 20 MILLIGRAM(S): 2.5 TABLET ORAL at 06:59

## 2022-03-27 RX ADMIN — SIMETHICONE 80 MILLIGRAM(S): 80 TABLET, CHEWABLE ORAL at 17:15

## 2022-03-27 RX ADMIN — LORATADINE 10 MILLIGRAM(S): 10 TABLET ORAL at 13:27

## 2022-03-27 RX ADMIN — SIMETHICONE 80 MILLIGRAM(S): 80 TABLET, CHEWABLE ORAL at 13:27

## 2022-03-27 RX ADMIN — FAMOTIDINE 20 MILLIGRAM(S): 10 INJECTION INTRAVENOUS at 17:15

## 2022-03-27 RX ADMIN — SIMETHICONE 80 MILLIGRAM(S): 80 TABLET, CHEWABLE ORAL at 23:45

## 2022-03-27 RX ADMIN — Medication 137 MICROGRAM(S): at 06:59

## 2022-03-27 RX ADMIN — GABAPENTIN 100 MILLIGRAM(S): 400 CAPSULE ORAL at 21:11

## 2022-03-27 RX ADMIN — ATORVASTATIN CALCIUM 40 MILLIGRAM(S): 80 TABLET, FILM COATED ORAL at 21:11

## 2022-03-27 RX ADMIN — MIDODRINE HYDROCHLORIDE 20 MILLIGRAM(S): 2.5 TABLET ORAL at 21:11

## 2022-03-27 RX ADMIN — GABAPENTIN 100 MILLIGRAM(S): 400 CAPSULE ORAL at 13:28

## 2022-03-27 RX ADMIN — FAMOTIDINE 20 MILLIGRAM(S): 10 INJECTION INTRAVENOUS at 06:58

## 2022-03-27 RX ADMIN — ENOXAPARIN SODIUM 40 MILLIGRAM(S): 100 INJECTION SUBCUTANEOUS at 06:58

## 2022-03-27 RX ADMIN — Medication 81 MILLIGRAM(S): at 13:27

## 2022-03-27 RX ADMIN — FLUDROCORTISONE ACETATE 0.2 MILLIGRAM(S): 0.1 TABLET ORAL at 06:58

## 2022-03-27 RX ADMIN — SIMETHICONE 80 MILLIGRAM(S): 80 TABLET, CHEWABLE ORAL at 06:59

## 2022-03-27 NOTE — PROGRESS NOTE ADULT - ATTENDING COMMENTS
57 y.o. F with PMH of POTS, orthostatic hypotension, recently diagnosed smoldering myeloma (March 2022) and amyloidosis (Feb 2022), mouth burning syndrome, recently diagnosed c.diff colitis (March 2022), hyperthyroidism s/p thyroidectomy, admitted to [facility] for multiple recurrent syncopal episodes thought to be associated with autonomic dysfunction 2/2 amyloidosis, transferred to [facility] for further amyloidosis management.     The patient had multiple episodes of syncope and episodes of hypotension there requiring pressors and close monitoring, had sinus pauses on tele monitoring. On their work up, cause of syncope is likely due to amyloidosis causing conduction abnormalities. EP evaluation at [facility] did not feel the patient needed a PPM at that time.     Given hx of LVH on echo, likely cardiac conduction is affected in the setting of amyloidosis causing syncope. Continue with fludrocortisone and midodrine for BP support. Cardiac MRI pending.     Completed C diff treatment.

## 2022-03-27 NOTE — PROGRESS NOTE ADULT - PROBLEM SELECTOR PLAN 6
Pt found to have c.diff colitis per outpt GI in March, presented to OSH and began oral vanco 3/16/2022   - Complete 10 day course 3/26/2022   - Monitor for stool, currently more formed   - No leukocytosis Pt found to have c.diff colitis per outpt GI in March, presented to OSH and began oral vanco 3/16/2022   - s/p 10 day course 3/26/2022   - Monitor for stool, currently more formed   - No leukocytosis

## 2022-03-27 NOTE — PROGRESS NOTE ADULT - ASSESSMENT
57 y.o. F with PMH of POTS, orthostatic hypotension, recently diagnosed with smoldering myeloma (March 2022) and amyloidosis (Feb 2022), mouth burning syndrome, recently diagnosed c.diff colitis (March 2022), hyperthyroidism s/p thyroidectomy, admitted to [facility] for multiple recurrent syncopal episodes thought to be associated with autonomic dysfunction 2/2 amyloidosis, transferred to [facility] for further amyloidosis management.

## 2022-03-27 NOTE — PROGRESS NOTE ADULT - SUBJECTIVE AND OBJECTIVE BOX
OVERNIGHT EVENTS: No acute events overnight.    SUBJECTIVE: Patient seen and examined at bedside. Patient reports feeling well, and has no complaints other than persistent diarrhea.       MEDICATIONS  (STANDING):  aspirin  chewable 81 milliGRAM(s) Oral daily  atorvastatin 40 milliGRAM(s) Oral at bedtime  enoxaparin Injectable 40 milliGRAM(s) SubCutaneous every 24 hours  famotidine    Tablet 20 milliGRAM(s) Oral two times a day  fludroCORTISONE 0.2 milliGRAM(s) Oral daily  fluticasone propionate 50 MICROgram(s)/spray Nasal Spray 1 Spray(s) Both Nostrils two times a day  gabapentin 100 milliGRAM(s) Oral every 8 hours  influenza   Vaccine 0.5 milliLiter(s) IntraMuscular once  lactated ringers. 1000 milliLiter(s) (75 mL/Hr) IV Continuous <Continuous>  lactobacillus acidophilus 1 Tablet(s) Oral daily  levothyroxine 137 MICROGram(s) Oral daily  loratadine 10 milliGRAM(s) Oral daily  midodrine 20 milliGRAM(s) Oral every 8 hours  simethicone 80 milliGRAM(s) Chew every 6 hours    MEDICATIONS  (PRN):  acetaminophen     Tablet .. 650 milliGRAM(s) Oral every 6 hours PRN Temp greater or equal to 38C (100.4F), Mild Pain (1 - 3), Moderate Pain (4 - 6)  aluminum hydroxide/magnesium hydroxide/simethicone Suspension 30 milliLiter(s) Oral every 4 hours PRN Dyspepsia  magnesium hydroxide Suspension 30 milliLiter(s) Oral at bedtime PRN Constipation      T(F): 98 (03-27-22 @ 06:02), Max: 98.4 (03-26-22 @ 12:15)  HR: 65 (03-27-22 @ 06:02) (65 - 80)  BP: 110/60 (03-27-22 @ 06:02) (98/57 - 134/78)  BP(mean): --  RR: 16 (03-27-22 @ 06:02) (16 - 18)  SpO2: 93% (03-27-22 @ 06:02) (92% - 93%)    PHYSICAL EXAM:   General: thin, middle-aged woman in NAD; awake, lying comfortably in bed  HEENT: nc/at, clear conjunctiva, moist mucous membranes  Neck: supple, no JVD  Heart: RRR, +systolic murmur   Chest/lungs: mild bibasilar rales; no wheezing or rhonchi  ABD: soft, non-tender, non-distended; no guarding or rebound; bowel sounds present  Extremities: no edema, erythema, or tenderness to palpation  Skin: clear, dry  Neuro: A&Ox3, FROM of all 4 extremities; no focal deficits; grossly intact      LABS:                        10.1   4.82  )-----------( 272      ( 27 Mar 2022 05:56 )             30.6     03-27    142  |  105  |  6<L>  ----------------------------<  102<H>  3.5   |  30  |  0.73    Ca    8.4      27 Mar 2022 05:56  Phos  4.2     03-27  Mg     1.9     03-27    TPro  4.7<L>  /  Alb  3.1<L>  /  TBili  0.5  /  DBili  x   /  AST  17  /  ALT  42  /  AlkPhos  65  03-27        PT/INR - ( 26 Mar 2022 05:20 )   PT: 11.7 sec;   INR: 1.01 ratio         PTT - ( 26 Mar 2022 05:20 )  PTT:25.1 sec    Creatinine Trend: 0.73<--, 0.89<--, 0.55<--  I&O's Summary    26 Mar 2022 07:01  -  27 Mar 2022 07:00  --------------------------------------------------------  IN: 1515 mL / OUT: 800 mL / NET: 715 mL      BNP    RADIOLOGY & ADDITIONAL STUDIES: Reviewed   OVERNIGHT EVENTS: No acute events overnight.    SUBJECTIVE: Patient seen and examined at bedside. Patient reports feeling well, and has no complaints. Denies weakness or diarrhea.       MEDICATIONS  (STANDING):  aspirin  chewable 81 milliGRAM(s) Oral daily  atorvastatin 40 milliGRAM(s) Oral at bedtime  enoxaparin Injectable 40 milliGRAM(s) SubCutaneous every 24 hours  famotidine    Tablet 20 milliGRAM(s) Oral two times a day  fludroCORTISONE 0.2 milliGRAM(s) Oral daily  fluticasone propionate 50 MICROgram(s)/spray Nasal Spray 1 Spray(s) Both Nostrils two times a day  gabapentin 100 milliGRAM(s) Oral every 8 hours  influenza   Vaccine 0.5 milliLiter(s) IntraMuscular once  lactated ringers. 1000 milliLiter(s) (75 mL/Hr) IV Continuous <Continuous>  lactobacillus acidophilus 1 Tablet(s) Oral daily  levothyroxine 137 MICROGram(s) Oral daily  loratadine 10 milliGRAM(s) Oral daily  midodrine 20 milliGRAM(s) Oral every 8 hours  simethicone 80 milliGRAM(s) Chew every 6 hours    MEDICATIONS  (PRN):  acetaminophen     Tablet .. 650 milliGRAM(s) Oral every 6 hours PRN Temp greater or equal to 38C (100.4F), Mild Pain (1 - 3), Moderate Pain (4 - 6)  aluminum hydroxide/magnesium hydroxide/simethicone Suspension 30 milliLiter(s) Oral every 4 hours PRN Dyspepsia  magnesium hydroxide Suspension 30 milliLiter(s) Oral at bedtime PRN Constipation      T(F): 98 (03-27-22 @ 06:02), Max: 98.4 (03-26-22 @ 12:15)  HR: 65 (03-27-22 @ 06:02) (65 - 80)  BP: 110/60 (03-27-22 @ 06:02) (98/57 - 134/78)  BP(mean): --  RR: 16 (03-27-22 @ 06:02) (16 - 18)  SpO2: 93% (03-27-22 @ 06:02) (92% - 93%)    PHYSICAL EXAM:   General: thin, middle-aged woman in NAD; awake, sitting comfortably in bed  HEENT: nc/at, clear conjunctiva, moist mucous membranes  Neck: supple, no JVD  Heart: RRR, +systolic murmur   Chest/lungs: mild bibasilar rales; no wheezing or rhonchi  ABD: soft, non-tender, non-distended; no guarding or rebound; bowel sounds present  Extremities: no edema, erythema, or tenderness to palpation  Skin: clear, dry  Neuro: A&Ox3, FROM of all 4 extremities; no focal deficits; grossly intact      LABS:                        10.1   4.82  )-----------( 272      ( 27 Mar 2022 05:56 )             30.6     03-27    142  |  105  |  6<L>  ----------------------------<  102<H>  3.5   |  30  |  0.73    Ca    8.4      27 Mar 2022 05:56  Phos  4.2     03-27  Mg     1.9     03-27    TPro  4.7<L>  /  Alb  3.1<L>  /  TBili  0.5  /  DBili  x   /  AST  17  /  ALT  42  /  AlkPhos  65  03-27        PT/INR - ( 26 Mar 2022 05:20 )   PT: 11.7 sec;   INR: 1.01 ratio         PTT - ( 26 Mar 2022 05:20 )  PTT:25.1 sec    Creatinine Trend: 0.73<--, 0.89<--, 0.55<--  I&O's Summary    26 Mar 2022 07:01  -  27 Mar 2022 07:00  --------------------------------------------------------  IN: 1515 mL / OUT: 800 mL / NET: 715 mL      BNP    RADIOLOGY & ADDITIONAL STUDIES: Reviewed   OVERNIGHT EVENTS: No acute events overnight.    SUBJECTIVE: Patient seen and examined at bedside. Patient reports feeling well, and has no complaints. Denies weakness or diarrhea.       MEDICATIONS  (STANDING):  aspirin  chewable 81 milliGRAM(s) Oral daily  atorvastatin 40 milliGRAM(s) Oral at bedtime  enoxaparin Injectable 40 milliGRAM(s) SubCutaneous every 24 hours  famotidine    Tablet 20 milliGRAM(s) Oral two times a day  fludroCORTISONE 0.2 milliGRAM(s) Oral daily  fluticasone propionate 50 MICROgram(s)/spray Nasal Spray 1 Spray(s) Both Nostrils two times a day  gabapentin 100 milliGRAM(s) Oral every 8 hours  influenza   Vaccine 0.5 milliLiter(s) IntraMuscular once  lactated ringers. 1000 milliLiter(s) (75 mL/Hr) IV Continuous <Continuous>  lactobacillus acidophilus 1 Tablet(s) Oral daily  levothyroxine 137 MICROGram(s) Oral daily  loratadine 10 milliGRAM(s) Oral daily  midodrine 20 milliGRAM(s) Oral every 8 hours  simethicone 80 milliGRAM(s) Chew every 6 hours    MEDICATIONS  (PRN):  acetaminophen     Tablet .. 650 milliGRAM(s) Oral every 6 hours PRN Temp greater or equal to 38C (100.4F), Mild Pain (1 - 3), Moderate Pain (4 - 6)  aluminum hydroxide/magnesium hydroxide/simethicone Suspension 30 milliLiter(s) Oral every 4 hours PRN Dyspepsia  magnesium hydroxide Suspension 30 milliLiter(s) Oral at bedtime PRN Constipation      T(F): 98 (03-27-22 @ 06:02), Max: 98.4 (03-26-22 @ 12:15)  HR: 65 (03-27-22 @ 06:02) (65 - 80)  BP: 110/60 (03-27-22 @ 06:02) (98/57 - 134/78)  BP(mean): --  RR: 16 (03-27-22 @ 06:02) (16 - 18)  SpO2: 93% (03-27-22 @ 06:02) (92% - 93%)    PHYSICAL EXAM:   General: thin, middle-aged woman in NAD; awake, sitting comfortably in bed  HEENT: nc/at, clear conjunctiva, moist mucous membranes  Neck: supple, no JVD  Heart: RRR, +systolic murmur   Chest/lungs: mild bibasilar rales; no wheezing or rhonchi  ABD: soft, non-tender, non-distended; no guarding or rebound; bowel sounds present  Extremities: no edema, erythema, or tenderness to palpation  Skin: clear, dry  Neuro: A&Ox4, FROM of all 4 extremities; no focal deficits; grossly intact      LABS:                        10.1   4.82  )-----------( 272      ( 27 Mar 2022 05:56 )             30.6     03-27    142  |  105  |  6<L>  ----------------------------<  102<H>  3.5   |  30  |  0.73    Ca    8.4      27 Mar 2022 05:56  Phos  4.2     03-27  Mg     1.9     03-27    TPro  4.7<L>  /  Alb  3.1<L>  /  TBili  0.5  /  DBili  x   /  AST  17  /  ALT  42  /  AlkPhos  65  03-27        PT/INR - ( 26 Mar 2022 05:20 )   PT: 11.7 sec;   INR: 1.01 ratio         PTT - ( 26 Mar 2022 05:20 )  PTT:25.1 sec    Creatinine Trend: 0.73<--, 0.89<--, 0.55<--  I&O's Summary    26 Mar 2022 07:01  -  27 Mar 2022 07:00  --------------------------------------------------------  IN: 1515 mL / OUT: 800 mL / NET: 715 mL      BNP    RADIOLOGY & ADDITIONAL STUDIES: Reviewed

## 2022-03-27 NOTE — PROGRESS NOTE ADULT - PROBLEM SELECTOR PLAN 3
C/w midodrine 20mg with more lenient parameters (SBP >150 and HR <40) given pt had multiple RRT in OSH when midodrine was held  - If midodrine needs to be held, consider decreasing dose to 10 or 5mg instead of omiting dose entirely   - C/w with fludrocortisone 0.2mg qD   - mIVF LR @ 75  - BP monitoring

## 2022-03-27 NOTE — PROGRESS NOTE ADULT - PROBLEM SELECTOR PLAN 5
Last saw Dr. 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   Potentially starting daratumumab plus CyBorD +/- autologous bone marrow transplant.    Plan:  -heme/onc consulted, recs appreciated: was planned to start chemotherapy, but will likely hold at least for now, given patient has persistent diarrhea suspected to be from C diff  -if diarrhea improves, will likely plan for Daratumumab + CyBorD (inpatient vs outpatient). Will f/u with Dr. Goldberg (patient's Hematologist)

## 2022-03-27 NOTE — PROGRESS NOTE ADULT - PROBLEM SELECTOR PLAN 4
No tachycardia on tele at this time, continue to monitor  - PO & salt intake encouraged  - mIVF LR @ 75

## 2022-03-27 NOTE — PROGRESS NOTE ADULT - SUBJECTIVE AND OBJECTIVE BOX
still has diarrhea, noticed LE edema, no syncope    VS reviewed  Exam:  Gen: in NAD  CVS: nl S1/S2, no mrg, rrr  Lungs CTAB  LE 3+ edema     Tele sinus rhythm, at night intermittent sinus bradycardia with HR in 40-50s but no pauses, no sustained arrhythmias  Labs/Imaging reviewed

## 2022-03-27 NOTE — PROGRESS NOTE ADULT - ATTENDING COMMENTS
This is a 58 yo F with PMH of ?possible POTS, orthostatic hypotension, frequent episodes of syncope, recent dx of smoldering myeloma and amyloidosis on fat pad bx (+ congo red, no sufficient tissue for differentiation) c/b neurologic sequelaes. She was recently admitted to [facility] for recurrent syncopal episodes, had RRTs and was admitted to CCU for levophed support briefly. It was further c/b CDiff infection for which she is recovering and remains on PO antibiotic. During her hospitalization, telemetry revealed sinus pauses correlated to her symptoms. EP consulted for PPM evaluation.     # Possible POTS vs vasovagal syncope:  - her frequent symptoms and syncope with having bowel movement and positional changes could suggest vasovagal syncope, but will have to review how she was diagnosed with POTS. The patient thinks her symptoms of low blood pressure and pre-syncope have improved on the current medical regimen. We discussed the pathophysiology of both POTS and vasovagal syncope and the challenges with diagnosis and treatment options. She is not interested in pacemaker implant at this time especially as she is will probably start chemotherapy and be immunocompromised soon. She is in favor of trying more medical therapy for now and defer pacemaker implant.   - she is still recovering from C.Diff, encouraged water and salt intake, and resume current Midodrine and Florinef dose. She has LE edema, might need different dosing of Florinef, and please consider compression stockings.  - cMRI pending, Telemetry monitoring and maintain goal K~4.5 and Mg~2.5  - Chemotherapy initiation per Heme/Onc team

## 2022-03-27 NOTE — PROGRESS NOTE ADULT - ASSESSMENT
This is a 58 yo F with PMH of ?possible POTS, orthostatic hypotension, frequent episodes of syncope, recent dx of smoldering myeloma and amyloidosis on fat pad bx (+ congo red, no sufficient tissue for differentiation) c/b neurologic sequelaes. She was recently admitted to [facility] for recurrent syncopal episodes, had RRTs and was admitted to CCU for levophed support briefly. It was further c/b CDiff infection for which she is recovering and remains on PO antibiotic. During her hospitalization, telemetry revealed sinus pauses correlated to her symptoms. EP consulted for PPM evaluation.     # Possible POTS vs vasovagal syncope:  - her frequent symptoms and syncope with having bowel movement and positional changes could suggest vasovagal syncope, but will have to review how she was diagnosed with POTS. The patient thinks her symptoms of low blood pressure and pre-syncope have improved on the current medical regimen. We discussed the pathophysiology of both POTS and vasovagal syncope adn the challenges with diagnosis and treatment options. She is not interested in pacemaker implant at this time especially as she is will probably start chemotherapy and be immunocompromised soon. She is in favor of trying more medical therapy for now and defer pacemaker implant.   - she is still recovering from C.Diff, encouraged water and salt intake, and resume current Midodrine and FLorinef dose. She has LE edema, might need different dosing of Florinef, and please consider compression stockings.  - cMRI pending, Telemetry monitoring and maintain goal K~4.5 and Mg~2.5  - Chemotherapy initiation per Heme/Onc team     d/w Dr. Healy

## 2022-03-27 NOTE — PROGRESS NOTE ADULT - PROBLEM SELECTOR PLAN 2
Pt presented to OSH for multiple recurrent syncopal episodes.   Multifactorial in s/o known POTS, orthostatic hypotension associated with likely amyloidosis/myeloma autonomic dysfunction, hypovolemia 2/2 c.diff colitis and diarrhea    Work up from OSH:   - CT head no acute pathology   - TTE (non bubble) normal LV size and function with EF 55%-60%. No regional wall motion abnormalities. Moderate concentric LVH.  - ECG 1st degree AV block  - Tele with sinus pauses 2/2 vagal episodes. Bradycardia possibly 2/2 midodrine and or autonomic dysfunction associated with amyloidosis and myeloma    Plan:  -EP consulted: patient is a good candidate for Micra PPM  -C/w tele monitoring  -f/u cardiac MRI

## 2022-03-27 NOTE — PROGRESS NOTE ADULT - PROBLEM SELECTOR PLAN 1
Pt underwent fat pad bx in Feb 2022 and diagnosed with amyloidosis. 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 No typing of amyloidosis was done given as sample was not enough for mass spec.     - onc team consulted re plans for inpt chemo. Potentially starting daratumumab plus CyBorD +/- autologous bone marrow transplant.    # Autonomic dysfunction   - Suspect autonomic dysfunction and AV block may be associated with amyloidosis   - Tx orthostatic hypotension and POTS (see above/below)     # Rule out cardiac involvement  - TTE 3/16/2022 with concentric LVH. Unclear if this is associated with cardiac amyloidosis and deposition? Pt reports TTE in 01/2022 was normal with mild valvular regurgitation   - LVH is very classically seen in light chain amyloidosis and plasma cell neoplasm   - f/u cardiac MRI  - Cards and EP consult, recs appreciated: good candidate for Micra PPM  - C/w tele monitoring

## 2022-03-28 ENCOUNTER — NON-APPOINTMENT (OUTPATIENT)
Age: 57
End: 2022-03-28

## 2022-03-28 ENCOUNTER — APPOINTMENT (OUTPATIENT)
Dept: HEMATOLOGY ONCOLOGY | Facility: CLINIC | Age: 57
End: 2022-03-28

## 2022-03-28 LAB
ALBUMIN SERPL ELPH-MCNC: 3.2 G/DL — LOW (ref 3.3–5)
ALP SERPL-CCNC: 69 U/L — SIGNIFICANT CHANGE UP (ref 40–120)
ALT FLD-CCNC: 46 U/L — HIGH (ref 10–45)
ANION GAP SERPL CALC-SCNC: 9 MMOL/L — SIGNIFICANT CHANGE UP (ref 5–17)
AST SERPL-CCNC: 28 U/L — SIGNIFICANT CHANGE UP (ref 10–40)
BASOPHILS # BLD AUTO: 0.06 K/UL — SIGNIFICANT CHANGE UP (ref 0–0.2)
BASOPHILS NFR BLD AUTO: 1.3 % — SIGNIFICANT CHANGE UP (ref 0–2)
BILIRUB SERPL-MCNC: 0.8 MG/DL — SIGNIFICANT CHANGE UP (ref 0.2–1.2)
BUN SERPL-MCNC: 5 MG/DL — LOW (ref 7–23)
CALCIUM SERPL-MCNC: 8.5 MG/DL — SIGNIFICANT CHANGE UP (ref 8.4–10.5)
CHLORIDE SERPL-SCNC: 103 MMOL/L — SIGNIFICANT CHANGE UP (ref 96–108)
CO2 SERPL-SCNC: 29 MMOL/L — SIGNIFICANT CHANGE UP (ref 22–31)
CREAT SERPL-MCNC: 0.6 MG/DL — SIGNIFICANT CHANGE UP (ref 0.5–1.3)
EGFR: 104 ML/MIN/1.73M2 — SIGNIFICANT CHANGE UP
EOSINOPHIL # BLD AUTO: 0.12 K/UL — SIGNIFICANT CHANGE UP (ref 0–0.5)
EOSINOPHIL NFR BLD AUTO: 2.6 % — SIGNIFICANT CHANGE UP (ref 0–6)
GLUCOSE BLDC GLUCOMTR-MCNC: 124 MG/DL — HIGH (ref 70–99)
GLUCOSE SERPL-MCNC: 89 MG/DL — SIGNIFICANT CHANGE UP (ref 70–99)
HCT VFR BLD CALC: 28.1 % — LOW (ref 34.5–45)
HGB BLD-MCNC: 9.4 G/DL — LOW (ref 11.5–15.5)
IMM GRANULOCYTES NFR BLD AUTO: 0.4 % — SIGNIFICANT CHANGE UP (ref 0–1.5)
LYMPHOCYTES # BLD AUTO: 1.86 K/UL — SIGNIFICANT CHANGE UP (ref 1–3.3)
LYMPHOCYTES # BLD AUTO: 40.1 % — SIGNIFICANT CHANGE UP (ref 13–44)
MAGNESIUM SERPL-MCNC: 1.9 MG/DL — SIGNIFICANT CHANGE UP (ref 1.6–2.6)
MCHC RBC-ENTMCNC: 29.6 PG — SIGNIFICANT CHANGE UP (ref 27–34)
MCHC RBC-ENTMCNC: 33.5 GM/DL — SIGNIFICANT CHANGE UP (ref 32–36)
MCV RBC AUTO: 88.4 FL — SIGNIFICANT CHANGE UP (ref 80–100)
MONOCYTES # BLD AUTO: 0.35 K/UL — SIGNIFICANT CHANGE UP (ref 0–0.9)
MONOCYTES NFR BLD AUTO: 7.5 % — SIGNIFICANT CHANGE UP (ref 2–14)
NEUTROPHILS # BLD AUTO: 2.23 K/UL — SIGNIFICANT CHANGE UP (ref 1.8–7.4)
NEUTROPHILS NFR BLD AUTO: 48.1 % — SIGNIFICANT CHANGE UP (ref 43–77)
NRBC # BLD: 0 /100 WBCS — SIGNIFICANT CHANGE UP (ref 0–0)
PHOSPHATE SERPL-MCNC: 3.9 MG/DL — SIGNIFICANT CHANGE UP (ref 2.5–4.5)
PLATELET # BLD AUTO: 274 K/UL — SIGNIFICANT CHANGE UP (ref 150–400)
POTASSIUM SERPL-MCNC: 3.5 MMOL/L — SIGNIFICANT CHANGE UP (ref 3.5–5.3)
POTASSIUM SERPL-SCNC: 3.5 MMOL/L — SIGNIFICANT CHANGE UP (ref 3.5–5.3)
PROT SERPL-MCNC: 4.7 G/DL — LOW (ref 6–8.3)
RBC # BLD: 3.18 M/UL — LOW (ref 3.8–5.2)
RBC # FLD: 12.9 % — SIGNIFICANT CHANGE UP (ref 10.3–14.5)
SODIUM SERPL-SCNC: 141 MMOL/L — SIGNIFICANT CHANGE UP (ref 135–145)
WBC # BLD: 4.64 K/UL — SIGNIFICANT CHANGE UP (ref 3.8–10.5)
WBC # FLD AUTO: 4.64 K/UL — SIGNIFICANT CHANGE UP (ref 3.8–10.5)

## 2022-03-28 PROCEDURE — 99232 SBSQ HOSP IP/OBS MODERATE 35: CPT

## 2022-03-28 PROCEDURE — 99232 SBSQ HOSP IP/OBS MODERATE 35: CPT | Mod: GC

## 2022-03-28 PROCEDURE — 99233 SBSQ HOSP IP/OBS HIGH 50: CPT | Mod: GC

## 2022-03-28 PROCEDURE — 93010 ELECTROCARDIOGRAM REPORT: CPT

## 2022-03-28 PROCEDURE — 75561 CARDIAC MRI FOR MORPH W/DYE: CPT | Mod: 26

## 2022-03-28 RX ORDER — POTASSIUM CHLORIDE 20 MEQ
40 PACKET (EA) ORAL ONCE
Refills: 0 | Status: DISCONTINUED | OUTPATIENT
Start: 2022-03-28 | End: 2022-03-28

## 2022-03-28 RX ORDER — POTASSIUM CHLORIDE 20 MEQ
40 PACKET (EA) ORAL ONCE
Refills: 0 | Status: COMPLETED | OUTPATIENT
Start: 2022-03-28 | End: 2022-03-28

## 2022-03-28 RX ORDER — POTASSIUM CHLORIDE 20 MEQ
10 PACKET (EA) ORAL
Refills: 0 | Status: COMPLETED | OUTPATIENT
Start: 2022-03-28 | End: 2022-03-29

## 2022-03-28 RX ADMIN — MIDODRINE HYDROCHLORIDE 20 MILLIGRAM(S): 2.5 TABLET ORAL at 13:36

## 2022-03-28 RX ADMIN — MIDODRINE HYDROCHLORIDE 20 MILLIGRAM(S): 2.5 TABLET ORAL at 05:03

## 2022-03-28 RX ADMIN — ENOXAPARIN SODIUM 40 MILLIGRAM(S): 100 INJECTION SUBCUTANEOUS at 05:03

## 2022-03-28 RX ADMIN — Medication 137 MICROGRAM(S): at 05:03

## 2022-03-28 RX ADMIN — FAMOTIDINE 20 MILLIGRAM(S): 10 INJECTION INTRAVENOUS at 05:02

## 2022-03-28 RX ADMIN — Medication 100 MILLIEQUIVALENT(S): at 23:08

## 2022-03-28 RX ADMIN — GABAPENTIN 100 MILLIGRAM(S): 400 CAPSULE ORAL at 21:27

## 2022-03-28 RX ADMIN — SIMETHICONE 80 MILLIGRAM(S): 80 TABLET, CHEWABLE ORAL at 21:30

## 2022-03-28 RX ADMIN — FAMOTIDINE 20 MILLIGRAM(S): 10 INJECTION INTRAVENOUS at 17:06

## 2022-03-28 RX ADMIN — Medication 1 TABLET(S): at 12:16

## 2022-03-28 RX ADMIN — SIMETHICONE 80 MILLIGRAM(S): 80 TABLET, CHEWABLE ORAL at 12:17

## 2022-03-28 RX ADMIN — GABAPENTIN 100 MILLIGRAM(S): 400 CAPSULE ORAL at 05:02

## 2022-03-28 RX ADMIN — SIMETHICONE 80 MILLIGRAM(S): 80 TABLET, CHEWABLE ORAL at 17:06

## 2022-03-28 RX ADMIN — SIMETHICONE 80 MILLIGRAM(S): 80 TABLET, CHEWABLE ORAL at 05:03

## 2022-03-28 RX ADMIN — Medication 81 MILLIGRAM(S): at 12:17

## 2022-03-28 RX ADMIN — FLUDROCORTISONE ACETATE 0.2 MILLIGRAM(S): 0.1 TABLET ORAL at 05:02

## 2022-03-28 RX ADMIN — ATORVASTATIN CALCIUM 40 MILLIGRAM(S): 80 TABLET, FILM COATED ORAL at 21:27

## 2022-03-28 RX ADMIN — GABAPENTIN 100 MILLIGRAM(S): 400 CAPSULE ORAL at 13:36

## 2022-03-28 RX ADMIN — MIDODRINE HYDROCHLORIDE 20 MILLIGRAM(S): 2.5 TABLET ORAL at 21:27

## 2022-03-28 NOTE — PROGRESS NOTE ADULT - PROBLEM SELECTOR PLAN 5
Last saw Dr. 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   Potentially starting daratumumab plus CyBorD +/- autologous bone marrow transplant.    Plan:  -heme/onc consulted, recs appreciated: was planned to start chemotherapy, but will likely hold at least for now, given patient has persistent diarrhea suspected to be from C diff  -if diarrhea improves, will likely plan for Daratumumab + CyBorD (inpatient vs outpatient). Will f/u with Dr. Goldberg (patient's Hematologist) Last saw Dr. 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   Potentially starting daratumumab plus CyBorD +/- autologous bone marrow transplant.    Plan:  -heme/onc consulted, recs appreciated: will likely start Daratumumab + CyBorD inpatient possibly Wed or Thurs, pending insurance approval   - Will f/u with Dr. Goldberg after discharge (patient's Hematologist)

## 2022-03-28 NOTE — PROGRESS NOTE ADULT - PROBLEM SELECTOR PLAN 1
Pt underwent fat pad bx in Feb 2022 and diagnosed with amyloidosis. 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 No typing of amyloidosis was done given as sample was not enough for mass spec.     - onc team consulted re plans for inpt chemo. Potentially starting daratumumab plus CyBorD +/- autologous bone marrow transplant.    # Autonomic dysfunction   - Suspect autonomic dysfunction and AV block may be associated with amyloidosis   - Tx orthostatic hypotension and POTS (see above/below)     # Rule out cardiac involvement  - TTE 3/16/2022 with concentric LVH. Unclear if this is associated with cardiac amyloidosis and deposition? Pt reports TTE in 01/2022 was normal with mild valvular regurgitation   - LVH is very classically seen in light chain amyloidosis and plasma cell neoplasm   - f/u cardiac MRI  - Cards and EP consult, recs appreciated: good candidate for Micra PPM  - C/w tele monitoring Pt underwent fat pad bx in Feb 2022 and diagnosed with amyloidosis. 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 No typing of amyloidosis was done given as sample was not enough for mass spec.     - onc team consulted re plans for inpt chemo. Potentially starting daratumumab plus CyBorD +/- autologous bone marrow transplant.    # Autonomic dysfunction   - Suspect autonomic dysfunction and AV block may be associated with amyloidosis   - Tx orthostatic hypotension and POTS (see above/below)     # Rule out cardiac involvement  - TTE 3/16/2022 with concentric LVH. Unclear if this is associated with cardiac amyloidosis and deposition? Pt reports TTE in 01/2022 was normal with mild valvular regurgitation   - LVH is very classically seen in light chain amyloidosis and plasma cell neoplasm   - Cardiac MRI 3/28 w evidence of cardiac amyloidosis   - Cards and EP consult, recs appreciated: good candidate for Micra PPM  - C/w tele monitoring

## 2022-03-28 NOTE — PROGRESS NOTE ADULT - NS ATTEND AMEND GEN_ALL_CORE FT
Agree with above ACP note. Recurrent syncope in the setting of likely cardiac amyloid and likely dysautonomia. Episodes undoubtedly exacerbated by UTI and resolving C Diff infection. cMRI with biatrial LGE and subtle basal LGE, as well. Preserved LV function. Will monitor on telemetry for associated bradyarrhythmic source with plans for extended outpt monitoring should none be seen.

## 2022-03-28 NOTE — PROGRESS NOTE ADULT - SUBJECTIVE AND OBJECTIVE BOX
SUBJECTIVE AND OBJECTIVE:  56 yo F with a PMH of POTS with orthostatic hypotension, hyperthyroidism s/p thyroidectomy leading to hypothyroidism, mouth burning syndrome, recently diagnosed light chain amyloidosis, and recently diagnosed C diff colitis who was admitted on 3/15/22 to [facility] for multiple recurrent syncopal episodes, transferred for further cardiac workup and for possible chemotherapy (per patient).    While admitted at [facility], pt's course was complicated by multiple unresponsive episodes prompting RRTs, during which pt found to be hypotensive and bradycardic. She required Levophed for BP support and was in CCU as well as ICU with each RRT. BP remained stable while on Midodrine 20mg q8 and Fludrocortisone 0.2mg q24. CT head was negative on admission. Pt noted to intermittently have sinus pauses (2-3.8 seconds) thought to be 2/2 vagal episodes, and asymptomatic bradycardia overnight on tele. ECG noted NSR but 1st degree AV block. EP was consulted, no PPM indications at that time. TTE 3/16/22 showed normal LV size and function with EF 55%-60% and no regional wall motion abnormalities, but did have moderate concentric LVH. Pt was pending cardiac MRI for cardiac amyloidosis work up. Pt was also seen by Endocrinology for elevated TSH (normal FT4), and Levothyroxine was up-titrated to 137 mcg.  Patient was also diagnosed with C diff shortly before hospitalization and had been taking her PO Vancomycin once or twice a day instead of QID, but has been on QID since hospitalization. However, she is still havign non-bloody diarrhea, including 3 BMs yesterday and 2 today thus far.  She completed Ceftriaxone for UTI at [facility].    Of note, she was diagnosed with LC amyloidosis after being followed for elevated free light chains. SPEP/UPEP and serum/urine immunofixation did not show a monoclonal band/M-spike and immunoglobulins were normal, but lambda free light chains were elevated at 8.73 with a K/L ratio of 0.11 on 12/6/21, and 10.92 with a ratio of 0.09 on repeat on 2/24/22. She had an abdominal fat pat biopsy on 2/8/22 showing Congo Red positivity in rare small blood vessels and positive for polarization. 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 However, patient did not meet CRAB criteria for multiple myeloma. Congo Red stain was negative on the BMBx, and there was not enough tissue from the fat bad to type the amyloidosis, but given the clinical evidence and free light chain studies, this was consistent with light chain amyloidosis.    INTERVAL HPI/OVERNIGHT EVENTS:  No overnight events.     MEDICATIONS  (STANDING):  aspirin  chewable 81 milliGRAM(s) Oral daily  atorvastatin 40 milliGRAM(s) Oral at bedtime  enoxaparin Injectable 40 milliGRAM(s) SubCutaneous every 24 hours  famotidine    Tablet 20 milliGRAM(s) Oral two times a day  fludroCORTISONE 0.2 milliGRAM(s) Oral daily  fluticasone propionate 50 MICROgram(s)/spray Nasal Spray 1 Spray(s) Both Nostrils two times a day  gabapentin 100 milliGRAM(s) Oral every 8 hours  influenza   Vaccine 0.5 milliLiter(s) IntraMuscular once  lactated ringers. 1000 milliLiter(s) (75 mL/Hr) IV Continuous <Continuous>  lactobacillus acidophilus 1 Tablet(s) Oral daily  levothyroxine 137 MICROGram(s) Oral daily  loratadine 10 milliGRAM(s) Oral daily  midodrine 20 milliGRAM(s) Oral every 8 hours  simethicone 80 milliGRAM(s) Chew every 6 hours    MEDICATIONS  (PRN):  acetaminophen     Tablet .. 650 milliGRAM(s) Oral every 6 hours PRN Temp greater or equal to 38C (100.4F), Mild Pain (1 - 3), Moderate Pain (4 - 6)  aluminum hydroxide/magnesium hydroxide/simethicone Suspension 30 milliLiter(s) Oral every 4 hours PRN Dyspepsia  magnesium hydroxide Suspension 30 milliLiter(s) Oral at bedtime PRN Constipation    Allergies    penicillins (Unknown)    Intolerances          VITAL SIGNS:  T(F): 98.1 (03-28-22 @ 04:44)  HR: 75 (03-28-22 @ 04:44)  BP: 116/70 (03-28-22 @ 04:44)  RR: 18 (03-28-22 @ 04:44)  SpO2: 94% (03-28-22 @ 04:44)  Wt(kg): --    PHYSICAL EXAM:    Constitutional: NAD, lying comfortably in bed  Eyes: EOMI, PERRLA  Neck: supple, no masses, no JVD  Respiratory: CTAB; no r/r/w  Cardiovascular: RRR, no M/R/G  Gastrointestinal: +BS, soft, NTND, no hepatosplenomegaly  Extremities: no c/c/e  Neurological: AAOx3, nonfocal    LABS:                        9.4    4.64  )-----------( 274      ( 28 Mar 2022 06:36 )             28.1     03-28    141  |  103  |  5<L>  ----------------------------<  89  3.5   |  29  |  0.60    Ca    8.5      28 Mar 2022 06:35  Phos  3.9     03-28  Mg     1.9     03-28    TPro  4.7<L>  /  Alb  3.2<L>  /  TBili  0.8  /  DBili  x   /  AST  28  /  ALT  46<H>  /  AlkPhos  69  03-28          RADIOLOGY & ADDITIONAL TESTS:  Studies reviewed.     SUBJECTIVE AND OBJECTIVE:  Patient seen this morning at bedside. She is no longer on contact precautions for C diff and had a formed bowel movement yesterday. She continues to have poor PO intake due to burning sensation in her mouth and food tasting metallic. Patient completed cMRI today. She denies syncope, CP, SOB, headache, abdominal pain, urinary symptoms, diarrhea, constipation.     HPI:  56 yo F with a PMH of POTS with orthostatic hypotension, hyperthyroidism s/p thyroidectomy leading to hypothyroidism, mouth burning syndrome, recently diagnosed light chain amyloidosis, and recently diagnosed C diff colitis who was admitted on 3/15/22 to [facility] for multiple recurrent syncopal episodes, transferred for further cardiac workup and for possible chemotherapy (per patient).    While admitted at [facility], pt's course was complicated by multiple unresponsive episodes prompting RRTs, during which pt found to be hypotensive and bradycardic. She required Levophed for BP support and was in CCU as well as ICU with each RRT. BP remained stable while on Midodrine 20mg q8 and Fludrocortisone 0.2mg q24. CT head was negative on admission. Pt noted to intermittently have sinus pauses (2-3.8 seconds) thought to be 2/2 vagal episodes, and asymptomatic bradycardia overnight on tele. ECG noted NSR but 1st degree AV block. EP was consulted, no PPM indications at that time. TTE 3/16/22 showed normal LV size and function with EF 55%-60% and no regional wall motion abnormalities, but did have moderate concentric LVH. Pt was pending cardiac MRI for cardiac amyloidosis work up. Pt was also seen by Endocrinology for elevated TSH (normal FT4), and Levothyroxine was up-titrated to 137 mcg.    Patient was also diagnosed with C diff shortly before hospitalization and had been taking her PO Vancomycin once or twice a day instead of QID, but has been on QID since hospitalization. However, she is still havign non-bloody diarrhea, including 3 BMs yesterday and 2 today thus far.  She completed Ceftriaxone for UTI at [facility].    Of note, she was diagnosed with LC amyloidosis after being followed for elevated free light chains. SPEP/UPEP and serum/urine immunofixation did not show a monoclonal band/M-spike and immunoglobulins were normal, but lambda free light chains were elevated at 8.73 with a K/L ratio of 0.11 on 12/6/21, and 10.92 with a ratio of 0.09 on repeat on 2/24/22. She had an abdominal fat pat biopsy on 2/8/22 showing Congo Red positivity in rare small blood vessels and positive for polarization. 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 However, patient did not meet CRAB criteria for multiple myeloma. Congo Red stain was negative on the BMBx, and there was not enough tissue from the fat bad to type the amyloidosis, but given the clinical evidence and free light chain studies, this was consistent with light chain amyloidosis.    INTERVAL HPI/OVERNIGHT EVENTS:  No overnight events.     MEDICATIONS  (STANDING):  aspirin  chewable 81 milliGRAM(s) Oral daily  atorvastatin 40 milliGRAM(s) Oral at bedtime  enoxaparin Injectable 40 milliGRAM(s) SubCutaneous every 24 hours  famotidine    Tablet 20 milliGRAM(s) Oral two times a day  fludroCORTISONE 0.2 milliGRAM(s) Oral daily  fluticasone propionate 50 MICROgram(s)/spray Nasal Spray 1 Spray(s) Both Nostrils two times a day  gabapentin 100 milliGRAM(s) Oral every 8 hours  influenza   Vaccine 0.5 milliLiter(s) IntraMuscular once  lactated ringers. 1000 milliLiter(s) (75 mL/Hr) IV Continuous <Continuous>  lactobacillus acidophilus 1 Tablet(s) Oral daily  levothyroxine 137 MICROGram(s) Oral daily  loratadine 10 milliGRAM(s) Oral daily  midodrine 20 milliGRAM(s) Oral every 8 hours  simethicone 80 milliGRAM(s) Chew every 6 hours    MEDICATIONS  (PRN):  acetaminophen     Tablet .. 650 milliGRAM(s) Oral every 6 hours PRN Temp greater or equal to 38C (100.4F), Mild Pain (1 - 3), Moderate Pain (4 - 6)  aluminum hydroxide/magnesium hydroxide/simethicone Suspension 30 milliLiter(s) Oral every 4 hours PRN Dyspepsia  magnesium hydroxide Suspension 30 milliLiter(s) Oral at bedtime PRN Constipation    Allergies    penicillins (Unknown)    Intolerances          VITAL SIGNS:  T(F): 98.1 (03-28-22 @ 04:44)  HR: 75 (03-28-22 @ 04:44)  BP: 116/70 (03-28-22 @ 04:44)  RR: 18 (03-28-22 @ 04:44)  SpO2: 94% (03-28-22 @ 04:44)  Wt(kg): --    PHYSICAL EXAM:    Constitutional: NAD, lying comfortably in bed  Eyes: EOMI, PERRLA  Neck: supple, no masses, no JVD  Respiratory: CTAB; no r/r/w  Cardiovascular: RRR, no M/R/G  Gastrointestinal: +BS, soft, NTND, no hepatosplenomegaly  Extremities: no c/c/e  Neurological: AAOx3, nonfocal    LABS:                        9.4    4.64  )-----------( 274      ( 28 Mar 2022 06:36 )             28.1     03-28    141  |  103  |  5<L>  ----------------------------<  89  3.5   |  29  |  0.60    Ca    8.5      28 Mar 2022 06:35  Phos  3.9     03-28  Mg     1.9     03-28    TPro  4.7<L>  /  Alb  3.2<L>  /  TBili  0.8  /  DBili  x   /  AST  28  /  ALT  46<H>  /  AlkPhos  69  03-28          RADIOLOGY & ADDITIONAL TESTS:  Studies reviewed.

## 2022-03-28 NOTE — PROGRESS NOTE ADULT - ASSESSMENT
57 y.o. F with PMH of POTS, orthostatic hypotension, recently diagnosed with smoldering myeloma (March 2022) and amyloidosis (Feb 2022), mouth burning syndrome, recently diagnosed c.diff colitis (March 2022), hyperthyroidism s/p thyroidectomy, admitted to [facility] for multiple recurrent syncopal episodes thought to be associated with autonomic dysfunction 2/2 amyloidosis, transferred to [facility] for further amyloidosis management.

## 2022-03-28 NOTE — PROGRESS NOTE ADULT - PROBLEM SELECTOR PLAN 2
Pt presented to OSH for multiple recurrent syncopal episodes.   Multifactorial in s/o known POTS, orthostatic hypotension associated with likely amyloidosis/myeloma autonomic dysfunction, hypovolemia 2/2 c.diff colitis and diarrhea    Work up from OSH:   - CT head no acute pathology   - TTE (non bubble) normal LV size and function with EF 55%-60%. No regional wall motion abnormalities. Moderate concentric LVH.  - ECG 1st degree AV block  - Tele with sinus pauses 2/2 vagal episodes. Bradycardia possibly 2/2 midodrine and or autonomic dysfunction associated with amyloidosis and myeloma    Plan:  -EP consulted: patient is a good candidate for Micra PPM  -C/w tele monitoring  -f/u cardiac MRI Pt presented to OSH for multiple recurrent syncopal episodes.   Multifactorial in s/o known POTS, orthostatic hypotension associated with likely amyloidosis/myeloma autonomic dysfunction, hypovolemia 2/2 c.diff colitis and diarrhea    Work up from OSH:   - CT head no acute pathology   - TTE (non bubble) normal LV size and function with EF 55%-60%. No regional wall motion abnormalities. Moderate concentric LVH.  - ECG 1st degree AV block  - Tele with sinus pauses 2/2 vagal episodes. Bradycardia possibly 2/2 midodrine and or autonomic dysfunction associated with amyloidosis and myeloma    Plan:  -EP consulted: patient is a good candidate for Micra PPM  -C/w tele monitoring

## 2022-03-28 NOTE — PROGRESS NOTE ADULT - PROBLEM SELECTOR PLAN 3
C/w midodrine 20mg with more lenient parameters (SBP >150 and HR <40) given pt had multiple RRT in OSH when midodrine was held  - If midodrine needs to be held, consider decreasing dose to 10 or 5mg instead of omiting dose entirely   - C/w with fludrocortisone 0.2mg qD   - mIVF LR @ 75  - BP monitoring C/w midodrine 20mg with more lenient parameters (SBP >150 and HR <40) given pt had multiple RRT in OSH when midodrine was held  - If midodrine needs to be held, consider decreasing dose to 10 or 5mg instead of omiting dose entirely   - C/w with fludrocortisone   - mIVF LR @ 75  - BP monitoring

## 2022-03-28 NOTE — PROGRESS NOTE ADULT - SUBJECTIVE AND OBJECTIVE BOX
OVERNIGHT EVENTS: No acute events overnight.    SUBJECTIVE: Patient seen and examined at bedside. Patient reports feeling well, and has no complaints. Denies weakness or diarrhea.       MEDICATIONS  (STANDING):  aspirin  chewable 81 milliGRAM(s) Oral daily  atorvastatin 40 milliGRAM(s) Oral at bedtime  enoxaparin Injectable 40 milliGRAM(s) SubCutaneous every 24 hours  famotidine    Tablet 20 milliGRAM(s) Oral two times a day  fludroCORTISONE 0.2 milliGRAM(s) Oral daily  fluticasone propionate 50 MICROgram(s)/spray Nasal Spray 1 Spray(s) Both Nostrils two times a day  gabapentin 100 milliGRAM(s) Oral every 8 hours  influenza   Vaccine 0.5 milliLiter(s) IntraMuscular once  lactated ringers. 1000 milliLiter(s) (75 mL/Hr) IV Continuous <Continuous>  lactobacillus acidophilus 1 Tablet(s) Oral daily  levothyroxine 137 MICROGram(s) Oral daily  loratadine 10 milliGRAM(s) Oral daily  midodrine 20 milliGRAM(s) Oral every 8 hours  simethicone 80 milliGRAM(s) Chew every 6 hours    MEDICATIONS  (PRN):  acetaminophen     Tablet .. 650 milliGRAM(s) Oral every 6 hours PRN Temp greater or equal to 38C (100.4F), Mild Pain (1 - 3), Moderate Pain (4 - 6)  aluminum hydroxide/magnesium hydroxide/simethicone Suspension 30 milliLiter(s) Oral every 4 hours PRN Dyspepsia  magnesium hydroxide Suspension 30 milliLiter(s) Oral at bedtime PRN Constipation      T(F): 98 (03-27-22 @ 06:02), Max: 98.4 (03-26-22 @ 12:15)  HR: 65 (03-27-22 @ 06:02) (65 - 80)  BP: 110/60 (03-27-22 @ 06:02) (98/57 - 134/78)  BP(mean): --  RR: 16 (03-27-22 @ 06:02) (16 - 18)  SpO2: 93% (03-27-22 @ 06:02) (92% - 93%)    PHYSICAL EXAM:   General: thin, middle-aged woman in NAD; awake, sitting comfortably in bed  HEENT: nc/at, clear conjunctiva, moist mucous membranes  Neck: supple, no JVD  Heart: RRR, +systolic murmur   Chest/lungs: mild bibasilar rales; no wheezing or rhonchi  ABD: soft, non-tender, non-distended; no guarding or rebound; bowel sounds present  Extremities: no edema, erythema, or tenderness to palpation  Skin: clear, dry  Neuro: A&Ox4, FROM of all 4 extremities; no focal deficits; grossly intact      LABS:                        10.1   4.82  )-----------( 272      ( 27 Mar 2022 05:56 )             30.6     03-27    142  |  105  |  6<L>  ----------------------------<  102<H>  3.5   |  30  |  0.73    Ca    8.4      27 Mar 2022 05:56  Phos  4.2     03-27  Mg     1.9     03-27    TPro  4.7<L>  /  Alb  3.1<L>  /  TBili  0.5  /  DBili  x   /  AST  17  /  ALT  42  /  AlkPhos  65  03-27        PT/INR - ( 26 Mar 2022 05:20 )   PT: 11.7 sec;   INR: 1.01 ratio         PTT - ( 26 Mar 2022 05:20 )  PTT:25.1 sec    Creatinine Trend: 0.73<--, 0.89<--, 0.55<--  I&O's Summary    26 Mar 2022 07:01  -  27 Mar 2022 07:00  --------------------------------------------------------  IN: 1515 mL / OUT: 800 mL / NET: 715 mL      BNP    RADIOLOGY & ADDITIONAL STUDIES: Reviewed   OVERNIGHT EVENTS: No acute events overnight.    SUBJECTIVE: Patient seen and examined at bedside. Patient reports feeling well, and has no complaints. Denies weakness. Stools more formed now.     MEDICATIONS  (STANDING):  aspirin  chewable 81 milliGRAM(s) Oral daily  atorvastatin 40 milliGRAM(s) Oral at bedtime  enoxaparin Injectable 40 milliGRAM(s) SubCutaneous every 24 hours  famotidine    Tablet 20 milliGRAM(s) Oral two times a day  fludroCORTISONE 0.2 milliGRAM(s) Oral daily  fluticasone propionate 50 MICROgram(s)/spray Nasal Spray 1 Spray(s) Both Nostrils two times a day  gabapentin 100 milliGRAM(s) Oral every 8 hours  influenza   Vaccine 0.5 milliLiter(s) IntraMuscular once  lactated ringers. 1000 milliLiter(s) (75 mL/Hr) IV Continuous <Continuous>  lactobacillus acidophilus 1 Tablet(s) Oral daily  levothyroxine 137 MICROGram(s) Oral daily  loratadine 10 milliGRAM(s) Oral daily  midodrine 20 milliGRAM(s) Oral every 8 hours  simethicone 80 milliGRAM(s) Chew every 6 hours    MEDICATIONS  (PRN):  acetaminophen     Tablet .. 650 milliGRAM(s) Oral every 6 hours PRN Temp greater or equal to 38C (100.4F), Mild Pain (1 - 3), Moderate Pain (4 - 6)  aluminum hydroxide/magnesium hydroxide/simethicone Suspension 30 milliLiter(s) Oral every 4 hours PRN Dyspepsia  magnesium hydroxide Suspension 30 milliLiter(s) Oral at bedtime PRN Constipation      Vital Signs Last 24 Hrs  T(C): 36.7 (28 Mar 2022 12:27), Max: 36.7 (27 Mar 2022 21:11)  T(F): 98 (28 Mar 2022 12:27), Max: 98.1 (27 Mar 2022 21:11)  HR: 77 (28 Mar 2022 13:53) (71 - 89)  BP: 122/73 (28 Mar 2022 13:53) (108/71 - 126/77)  BP(mean): --  RR: 18 (28 Mar 2022 12:27) (16 - 18)  SpO2: 98% (28 Mar 2022 12:27) (91% - 98%)    PHYSICAL EXAM:   General: thin, middle-aged woman in NAD; awake, sitting comfortably in bed  HEENT: nc/at, clear conjunctiva, moist mucous membranes  Neck: supple, no JVD  Heart: RRR, +systolic murmur   Chest/lungs: mild bibasilar rales; no wheezing or rhonchi  ABD: soft, non-tender, non-distended; no guarding or rebound; bowel sounds present  Extremities: no edema, erythema, or tenderness to palpation  Skin: clear, dry  Neuro: A&Ox4, FROM of all 4 extremities; no focal deficits; grossly intact      LABS:                        9.4    4.64  )-----------( 274      ( 28 Mar 2022 06:36 )             28.1     03-28    141  |  103  |  5<L>  ----------------------------<  89  3.5   |  29  |  0.60    Ca    8.5      28 Mar 2022 06:35  Phos  3.9     03-28  Mg     1.9     03-28    TPro  4.7<L>  /  Alb  3.2<L>  /  TBili  0.8  /  DBili  x   /  AST  28  /  ALT  46<H>  /  AlkPhos  69  03-28

## 2022-03-28 NOTE — PROGRESS NOTE ADULT - ATTENDING COMMENTS
57 y.o. F with PMH of POTS, orthostatic hypotension, recently diagnosed smoldering myeloma (March 2022) and amyloidosis (Feb 2022), mouth burning syndrome, recently diagnosed c.diff colitis (March 2022), hyperthyroidism s/p thyroidectomy, admitted to [facility] for multiple recurrent syncopal episodes thought to be associated with autonomic dysfunction 2/2 amyloidosis, transferred to [facility] for further amyloidosis management.     The patient had multiple episodes of syncope and episodes of hypotension there requiring pressors and close monitoring, had sinus pauses on tele monitoring. On their work up, cause of syncope is likely due to amyloidosis causing conduction abnormalities. EP evaluation at [facility] did not feel the patient needed a PPM at that time.     Given hx of LVH on echo, likely cardiac conduction is affected in the setting of amyloidosis causing syncope. Continue with fludrocortisone and midodrine for BP support. Cardiac MRI was completed today results pending    Completed C diff treatment. 57 y.o. F with PMH of POTS, orthostatic hypotension, recently diagnosed smoldering myeloma (March 2022) and amyloidosis (Feb 2022), mouth burning syndrome, recently diagnosed c.diff colitis (March 2022), hyperthyroidism s/p thyroidectomy, admitted to [facility] for multiple recurrent syncopal episodes thought to be associated with autonomic dysfunction 2/2 amyloidosis, transferred to [facility] for further amyloidosis management.     The patient had multiple episodes of syncope and episodes of hypotension there requiring pressors and close monitoring, had sinus pauses on tele monitoring. On their work up, cause of syncope is likely due to amyloidosis causing conduction abnormalities. EP evaluation at [facility] did not feel the patient needed a PPM at that time.     Given hx of LVH on echo, likely cardiac conduction is affected in the setting of amyloidosis causing syncope. Continue with fludrocortisone and midodrine for BP support. Cardiac MRI was completed today results pending    Completed C diff treatment.  Will get heme f/u to see it the still plan to start chemo therapy; inpaltient

## 2022-03-28 NOTE — PROGRESS NOTE ADULT - ASSESSMENT
58 yo F with a PMH of POTS with orthostatic hypotension, hyperthyroidism s/p thyroidectomy leading to hypothyroidism, mouth burning syndrome, recently diagnosed lambda light chain amyloidosis, and recently diagnosed C diff colitis who was admitted on 3/15/22 to [facility] for multiple recurrent syncopal episodes, transferred for further cardiac workup and for possible chemotherapy.    #Light Chain Amyloidosis  - Patient was diagnosed based on multiple correlative tests  - No monoclonal protein found on SPEP/UPEP/S-MANUEL/U-MANUEL or elevated immunoglobulin, but with elevated lambda FLC, 10.92 with K/L ratio 0.09 on 2/24  - 2/8/22 abdominal fat pad biopsy showed positive Congo Red staining in rare some vessels with positive polarization  - 3/1/22 BMBx showed 25-35% plasma cells, positive for cyclin D1, with monotypic plasma cells by flow cytometry. Congo Red negative on BMBx. Atypical CCND1/IGH fusion pattern detected (3.5%) on FISH  - Does not meet CRAB criteria for multiple myeloma  - Therefore, diagnosis consistent with lambda light chain amyloidosis  - May have cardiac involvement as noted below, possibly contributing to patient's recurrent syncope, heart block, and POTS  - Was planned to start chemotherapy, but will likely hold at least for now, given patient has persistent diarrhea suspected to be from C diff  - If diarrhea improves, will likely plan for Daratumumab + CyBorD (inpatient vs outpatient). Will f/u with Dr. Goldberg (patient's Hematologist)    #POTS/Syncope  - Patient has had POTS with orthostatic hypotension and now has recurrent syncopal episodes (required multiple RRTs) and also has at least first degree heart block  - Seen by EP, likely plan for PPM placement  - Obtaining cardiac MRI to evaluate for amyloid deposits  - Otherwise, amyloid treatment as above    ***************************************************************  Sydni Bueno, PGY2  Internal Medicine   pager: NS: [phone] [facility]: [phone]  ***************************************************************     56 yo F with a PMH of POTS with orthostatic hypotension, hyperthyroidism s/p thyroidectomy leading to hypothyroidism, mouth burning syndrome, recently diagnosed lambda light chain amyloidosis, and recently diagnosed C diff colitis who was admitted on 3/15/22 to [facility] for multiple recurrent syncopal episodes, transferred for further cardiac workup and for possible chemotherapy.    #Light Chain Amyloidosis  - Patient was diagnosed based on multiple correlative tests  - No monoclonal protein found on SPEP/UPEP/S-MANUEL/U-MANUEL or elevated immunoglobulin, but with elevated lambda FLC, 10.92 with K/L ratio 0.11 on 3/1  - 2/8/22 abdominal fat pad biopsy showed positive Congo Red staining in rare some vessels with positive polarization  - 3/1/22 BMBx showed 25-35% plasma cells, positive for cyclin D1, with monotypic plasma cells by flow cytometry c/w plasma cell neoplasm. Congo Red negative on BMBx. Atypical CCND1/IGH fusion pattern detected (3.5%) on FISH  - Does not meet CRAB criteria for multiple myeloma  - Therefore, diagnosis consistent with lambda light chain amyloidosis  - May have cardiac involvement as noted below, possibly contributing to patient's recurrent syncope, first-degree heart block, and POTS (on fludrocortisone 0.5 mg BID at home)  - Renal ultrasound showed R parapelvic cyst vs fullness without hydronephrosis  - Was planned to start chemotherapy, but will likely hold at least for now, given patient has persistent diarrhea suspected to be from C diff  - If diarrhea improves, will likely plan for Daratumumab + CyBorD (inpatient vs outpatient). Will f/u with Dr. Goldberg (patient's Hematologist)  - Patient aware of side effects of chemotherapy including fatigue, neuropathy, pancytopenias, susceptibility to infection    #POTS/Syncope  - Patient has had POTS with orthostatic hypotension and now has recurrent syncopal episodes (required multiple RRTs) and also has at least first degree heart block  - Seen by EP, likely plan for PPM placement  - Obtaining cardiac MRI to evaluate for amyloid deposits  - Otherwise, amyloid treatment as above    ***************************************************************  Sydni Bueno, PGY2  Internal Medicine   pager: NS: [phone] [facility]: [phone]  ***************************************************************     56 yo F with a PMH of POTS with orthostatic hypotension, hyperthyroidism s/p thyroidectomy leading to hypothyroidism, mouth burning syndrome, recently diagnosed lambda light chain amyloidosis, and recently diagnosed C diff colitis who was admitted on 3/15/22 to [facility] for multiple recurrent syncopal episodes, transferred for further cardiac workup and for possible chemotherapy.    #Light Chain Amyloidosis  - Patient was diagnosed based on multiple correlative tests  - No monoclonal protein found on SPEP/UPEP/S-MANUEL/U-MANUEL or elevated immunoglobulin, but with elevated lambda FLC, 10.92 with K/L ratio 0.11 on 3/1  - 2/8/22 abdominal fat pad biopsy showed positive Congo Red staining in rare some vessels with positive polarization  - 3/1/22 BMBx showed 25-35% plasma cells, positive for cyclin D1, with monotypic plasma cells by flow cytometry c/w plasma cell neoplasm. Congo Red negative on BMBx. Atypical CCND1/IGH fusion pattern detected (3.5%) on FISH  - Does not meet CRAB criteria for multiple myeloma  - Therefore, diagnosis consistent with lambda light chain amyloidosis  - May have cardiac involvement as noted below, possibly contributing to patient's recurrent syncope, first-degree heart block, and POTS (on fludrocortisone 0.5 mg BID at home, now on 0.2 mg daily)-patient deferring PPM placement at this time in favor of GDMT  - Renal ultrasound showed R parapelvic cyst vs fullness without hydronephrosis  - Was planned to start chemotherapy, but will likely hold at least for now, given patient has persistent diarrhea suspected to be from C diff  - If diarrhea improves, will likely plan for Daratumumab + CyBorD (inpatient vs outpatient). Will f/u with Dr. Goldberg (patient's Hematologist)  - Patient aware of side effects of chemotherapy including fatigue, neuropathy, pancytopenias, susceptibility to infection  - c/w medical management of hypotension with midodrine 20 mg TID and fludrocortisone 0.2 mg daily per primary team    #POTS/Syncope  - Patient has had POTS with orthostatic hypotension and now has recurrent syncopal episodes (required multiple RRTs) and also has at least first degree heart block  - Seen by EP, likely plan for PPM placement  - Obtaining cardiac MRI to evaluate for amyloid deposits  - Otherwise, amyloid treatment as above    ***************************************************************  Sydni Bueno, PGY2  Internal Medicine   pager: NS: [phone] [facility]: [id number]  ***************************************************************     58 yo F with a PMH of POTS with orthostatic hypotension, hyperthyroidism s/p thyroidectomy leading to hypothyroidism, mouth burning syndrome, recently diagnosed lambda light chain amyloidosis, and recently diagnosed C diff colitis who was admitted on 3/15/22 to [facility] for multiple recurrent syncopal episodes, transferred for further cardiac workup and for possible chemotherapy.    #Light Chain Amyloidosis  - Patient was diagnosed based on multiple correlative tests  - No monoclonal protein found on SPEP/UPEP/S-MANUEL/U-MANUEL or elevated immunoglobulin, but with elevated lambda FLC, 10.92 with K/L ratio 0.11 on 3/1  - 2/8/22 abdominal fat pad biopsy showed positive Congo Red staining in rare some vessels with positive polarization  - 3/1/22 BMBx showed 25-35% plasma cells, positive for cyclin D1, with monotypic plasma cells by flow cytometry c/w plasma cell neoplasm. Congo Red negative on BMBx. Atypical CCND1/IGH fusion pattern detected (3.5%) on FISH  - Does not meet CRAB criteria for multiple myeloma  - Therefore, diagnosis consistent with lambda light chain amyloidosis  - May have cardiac involvement as noted below, possibly contributing to patient's recurrent syncope, first-degree heart block, and POTS (on fludrocortisone 0.5 mg BID at home, now on 0.2 mg daily)-patient deferring PPM placement at this time in favor of GDMT. cMRI pending.  - Renal ultrasound showed R parapelvic cyst vs fullness without hydronephrosis  - Was planned to start chemotherapy, but will likely hold at least for now, given patient has persistent diarrhea suspected to be from C diff  - If diarrhea improves, will likely plan for Daratumumab + CyBorD (inpatient vs outpatient). Will f/u with Dr. Goldberg (patient's Hematologist)  - Patient aware of side effects of chemotherapy including fatigue, neuropathy, pancytopenias, susceptibility to infection  - c/w medical management of hypotension with midodrine 20 mg TID and fludrocortisone 0.2 mg daily per primary team    #POTS/Syncope  - Patient has had POTS with orthostatic hypotension and now has recurrent syncopal episodes (required multiple RRTs) and also has at least first degree heart block  - Seen by EP, likely plan for PPM placement  - Obtaining cardiac MRI to evaluate for amyloid deposits  - Otherwise, amyloid treatment as above    ***************************************************************  Sydni Bueno, PGY2  Internal Medicine   pager: NS: [phone] [facility]: [phone]  ***************************************************************     58 yo F with a PMH of POTS with orthostatic hypotension, hyperthyroidism s/p thyroidectomy leading to hypothyroidism, mouth burning syndrome, recently diagnosed lambda light chain amyloidosis, and recently diagnosed C diff colitis who was admitted on 3/15/22 to [facility] for multiple recurrent syncopal episodes, transferred for further cardiac workup and for possible chemotherapy.    #Light Chain Amyloidosis  - Patient was diagnosed based on multiple correlative tests  - No monoclonal protein found on SPEP/UPEP/S-MANUEL/U-MANUEL or elevated immunoglobulin, but with elevated lambda FLC, 10.92 with K/L ratio 0.11 on 3/1  - 2/8/22 abdominal fat pad biopsy showed positive Congo Red staining in rare some vessels with positive polarization  - 3/1/22 BMBx showed 25-35% plasma cells, positive for cyclin D1, with monotypic plasma cells by flow cytometry c/w plasma cell neoplasm. Congo Red negative on BMBx. Atypical CCND1/IGH fusion pattern detected (3.5%) on FISH  - Does not meet CRAB criteria for multiple myeloma  - Therefore, diagnosis consistent with lambda light chain amyloidosis  - May have cardiac involvement as noted below, possibly contributing to patient's recurrent syncope, first-degree heart block, and POTS (on fludrocortisone 0.5 mg BID at home, now on 0.2 mg daily)-patient deferring PPM placement at this time in favor of GDMT. cMRI completed 3/28, results pending.  - Renal ultrasound showed R parapelvic cyst vs fullness without hydronephrosis  - Was planned to start chemotherapy inpatient - will plan for Daratumumab + CyBorD.   - Will f/u with Dr. Goldberg (patient's Hematologist)  - Patient aware of side effects of chemotherapy including fatigue, neuropathy, pancytopenias, susceptibility to infection.  - c/w medical management of hypotension with midodrine 20 mg TID and fludrocortisone 0.2 mg daily per primary team    #POTS/Syncope  - Patient has had POTS with orthostatic hypotension and now has recurrent syncopal episodes (required multiple RRTs at outside hospital) and also has at least first degree heart block  - Telemetry monitoring and maintain goal K~4.5 and Mg~2.5 per EP  - Seen by EP, likely plan for PPM placement  - Obtaining cardiac MRI to evaluate for amyloid deposits  - Otherwise, amyloid treatment as above    ***************************************************************  Sydni Bueno, PGY2  Internal Medicine   pager: NS: [phone] [facility]: [phone]  ***************************************************************     58 yo F with a PMH of POTS with orthostatic hypotension, hyperthyroidism s/p thyroidectomy leading to hypothyroidism, mouth burning syndrome, recently diagnosed lambda light chain amyloidosis, and recently diagnosed C diff colitis who was admitted on 3/15/22 to [facility] for multiple recurrent syncopal episodes, transferred for further cardiac workup and for possible chemotherapy.    #Light Chain Amyloidosis  - Patient was diagnosed based on multiple correlative tests  - No monoclonal protein found on SPEP/UPEP/S-MANUEL/U-MANUEL or elevated immunoglobulin, but with elevated lambda FLC, 10.92 with K/L ratio 0.11 on 3/1  - 2/8/22 abdominal fat pad biopsy showed positive Congo Red staining in rare some vessels with positive polarization  - 3/1/22 BMBx showed 25-35% plasma cells, positive for cyclin D1, with monotypic plasma cells by flow cytometry c/w plasma cell neoplasm. Congo Red negative on BMBx. Atypical CCND1/IGH fusion pattern detected (3.5%) on FISH  - Does not meet CRAB criteria for multiple myeloma  - Therefore, diagnosis consistent with lambda light chain amyloidosis  - May have cardiac involvement as noted below, possibly contributing to patient's recurrent syncope, first-degree heart block, and POTS (on fludrocortisone 0.5 mg BID at home, now on 0.2 mg daily)-patient deferring PPM placement at this time in favor of GDMT. cMRI completed 3/28, results pending.  - Renal ultrasound showed R parapelvic cyst vs fullness without hydronephrosis  - Was planned to start chemotherapy inpatient - will plan for Daratumumab + CyBorD w/ plan for autologous bone marrow transplant later  - Will f/u with Dr. Goldberg (patient's Hematologist)  - Patient aware of side effects of chemotherapy including fatigue, neuropathy, pancytopenias, susceptibility to infection.  - c/w medical management of hypotension with midodrine 20 mg TID and fludrocortisone 0.2 mg daily per primary team    #POTS/Syncope  - Patient has had POTS with orthostatic hypotension and now has recurrent syncopal episodes (required multiple RRTs at outside hospital) and also has at least first degree heart block  - Telemetry monitoring and maintain goal K~4.5 and Mg~2.5 per EP  - Seen by EP, likely plan for PPM placement  - Obtaining cardiac MRI to evaluate for amyloid deposits  - Otherwise, amyloid treatment as above    ***************************************************************  Sydni Ximena, PGY2  Internal Medicine   pager: NS: [phone] [facility]: [id number]  ***************************************************************

## 2022-03-28 NOTE — PROGRESS NOTE ADULT - ATTENDING COMMENTS
MARIBELL Hassan is a 57 year old female recent hypotension and evaluation of cardiac status. She has had cardiac MR today; she refused pacemaker placement.  In the outpatient setting she has met with Dr Goldberg and has had discussion with him regarding treatment with cyclophosphamide bortezomib dexamethasone and daratumumab. She is currently awaiting approval for daratumumab treatment as an inpatient.  Patient's current physical examination show her to be normotensive at bed rest and to have clear lungs and regular cardiac rhythm.  She has completed treatment for C decile

## 2022-03-28 NOTE — PROGRESS NOTE ADULT - ASSESSMENT
This is a 58 yo F with PMH of ?possible POTS, orthostatic hypotension, frequent episodes of syncope, recent dx of smoldering myeloma and amyloidosis on fat pad bx (+ congo red, no sufficient tissue for differentiation) c/b neurologic sequelaes. She was recently admitted to [facility] for recurrent syncopal episodes, had RRTs and was admitted to CCU for levophed support briefly. It was further c/b CDiff infection for which she is recovering and remains on PO antibiotic. During her hospitalization, telemetry revealed sinus pauses correlated to her symptoms. EP consulted for PPM evaluation.     #Autonomic dysfunction  - her frequent symptoms and syncope with having bowel movement and positional changes could suggest vasovagal syncope, but will have to review how she was diagnosed with POTS.   - Tele with SR, brief junctional rhythm overnight. Pt asymptomatic. Rates ~70-100s. No further pauses on tele.   #Light chain amyloidosis  - cMRI today, will f/u on any cardiac involvement.   - TTE 3/16/2022 with concentric LVH. EF 55-60%, no SWMA.   - Plans to initiate chemotherapy inpatient - Daratumumab + CyBorD w/ plan for autologous bone marrow transplant later. Pt's outpatient heme: Dr. Goldberg.   - she is still recovering from C.Diff, states diarrhea has improved.   #?POTS  - On Midodrine and Fludrocortisone   - Orthostatics negative   - Telemetry monitoring and maintain goal K~4.5 and Mg~2.5         This is a 58 yo F with PMH of ?possible POTS, orthostatic hypotension, frequent episodes of syncope, recent dx of smoldering myeloma and amyloidosis on fat pad bx (+ congo red, no sufficient tissue for differentiation) c/b neurologic sequelaes. She was recently admitted to [facility] for recurrent syncopal episodes, had RRTs and was admitted to CCU for levophed support briefly. It was further c/b CDiff infection for which she is recovering and remains on PO antibiotic. During her hospitalization, telemetry revealed sinus pauses correlated to her symptoms. EP consulted for PPM evaluation.     #Autonomic dysfunction  - her frequent symptoms and syncope with having bowel movement and positional changes could suggest vasovagal syncope, but will have to review how she was diagnosed with POTS.   - Tele with SR, brief junctional rhythm overnight. Pt asymptomatic. Rates ~70-100s. No further pauses on tele.   #Light chain amyloidosis  - cMRI today, will f/u on any cardiac involvement.   - TTE 3/16/2022 with concentric LVH. EF 55-60%, no SWMA.   - Plans to initiate chemotherapy inpatient - Daratumumab + CyBorD w/ plan for autologous bone marrow transplant later. Pt's outpatient heme: Dr. Goldberg.   - she is still recovering from C.Diff, states diarrhea has improved.   #?POTS  - On Midodrine and Fludrocortisone   - Orthostatics negative   - Telemetry monitoring and maintain goal K~4.5 and Mg~2.5    Addendum:  cMRI results:  IMPRESSION:  Small pericardial and bilateral pleural effusions. Subtle inferior   lateral basal myocardial wall with gadolinium enhancement.  Biatrial   enlargement with associated late gadolinium enhancement. These findings   may represent amyloidosis.  LVEF 62%    Will continue monitoring on tele. No further pauses on tele here.   Pt's syncopal episodes are frequent (>10 episodes/month). Would recommend 30-day event monitor on discharge to evaluate any arrhythmia during syncopal episodes.

## 2022-03-28 NOTE — PROGRESS NOTE ADULT - PROBLEM SELECTOR PLAN 6
Pt found to have c.diff colitis per outpt GI in March, presented to OSH and began oral vanco 3/16/2022   - s/p 10 day course 3/26/2022   - Monitor for stool, currently more formed   - No leukocytosis Pt found to have c.diff colitis per outpt GI in March  - s/p 10 day course ending 3/26/2022   - Monitor for stool, currently more formed   - No leukocytosis

## 2022-03-28 NOTE — PROGRESS NOTE ADULT - SUBJECTIVE AND OBJECTIVE BOX
24H hour events:     MEDICATIONS:  aspirin  chewable 81 milliGRAM(s) Oral daily  enoxaparin Injectable 40 milliGRAM(s) SubCutaneous every 24 hours  midodrine 20 milliGRAM(s) Oral every 8 hours  loratadine 10 milliGRAM(s) Oral daily  acetaminophen     Tablet .. 650 milliGRAM(s) Oral every 6 hours PRN  gabapentin 100 milliGRAM(s) Oral every 8 hours  aluminum hydroxide/magnesium hydroxide/simethicone Suspension 30 milliLiter(s) Oral every 4 hours PRN  famotidine    Tablet 20 milliGRAM(s) Oral two times a day  magnesium hydroxide Suspension 30 milliLiter(s) Oral at bedtime PRN  simethicone 80 milliGRAM(s) Chew every 6 hours  atorvastatin 40 milliGRAM(s) Oral at bedtime  fludroCORTISONE 0.2 milliGRAM(s) Oral daily  levothyroxine 137 MICROGram(s) Oral daily  fluticasone propionate 50 MICROgram(s)/spray Nasal Spray 1 Spray(s) Both Nostrils two times a day  influenza   Vaccine 0.5 milliLiter(s) IntraMuscular once  lactated ringers. 1000 milliLiter(s) IV Continuous <Continuous>  potassium chloride    Tablet ER 40 milliEquivalent(s) Oral once    REVIEW OF SYSTEMS:  Complete 10point ROS negative.    PHYSICAL EXAM:  T(C): 36.7 (03-28-22 @ 04:44), Max: 36.7 (03-27-22 @ 21:11)  HR: 75 (03-28-22 @ 04:44) (71 - 81)  BP: 116/70 (03-28-22 @ 04:44) (103/63 - 126/77)  RR: 18 (03-28-22 @ 04:44) (16 - 18)  SpO2: 94% (03-28-22 @ 04:44) (91% - 96%)  Wt(kg): --  I&O's Summary    27 Mar 2022 07:01  -  28 Mar 2022 07:00  --------------------------------------------------------  IN: 360 mL / OUT: 500 mL / NET: -140 mL        Appearance: Normal	  Cardiovascular: Normal S1 S2, No JVD, No murmurs  Respiratory: Lungs clear to auscultation	  Psychiatry: A & O x 3, Mood & affect appropriate    Neurologic: Non-focal  Extremities: Normal range of motion, No clubbing, cyanosis or edema  Vascular: Peripheral pulses palpable 2+ bilaterally        LABS:	 	    CBC Full  -  ( 28 Mar 2022 06:36 )  WBC Count : 4.64 K/uL  Hemoglobin : 9.4 g/dL  Hematocrit : 28.1 %  Platelet Count - Automated : 274 K/uL  Mean Cell Volume : 88.4 fl  Mean Cell Hemoglobin : 29.6 pg  Mean Cell Hemoglobin Concentration : 33.5 gm/dL  Auto Neutrophil # : 2.23 K/uL  Auto Lymphocyte # : 1.86 K/uL  Auto Monocyte # : 0.35 K/uL  Auto Eosinophil # : 0.12 K/uL  Auto Basophil # : 0.06 K/uL  Auto Neutrophil % : 48.1 %  Auto Lymphocyte % : 40.1 %  Auto Monocyte % : 7.5 %  Auto Eosinophil % : 2.6 %  Auto Basophil % : 1.3 %    03-28    141  |  103  |  5<L>  ----------------------------<  89  3.5   |  29  |  0.60  03-27    142  |  105  |  6<L>  ----------------------------<  102<H>  3.5   |  30  |  0.73    Ca    8.5      28 Mar 2022 06:35  Ca    8.4      27 Mar 2022 05:56  Phos  3.9     03-28  Phos  4.2     03-27  Mg     1.9     03-28  Mg     1.9     03-27    TPro  4.7<L>  /  Alb  3.2<L>  /  TBili  0.8  /  DBili  x   /  AST  28  /  ALT  46<H>  /  AlkPhos  69  03-28  TPro  4.7<L>  /  Alb  3.1<L>  /  TBili  0.5  /  DBili  x   /  AST  17  /  ALT  42  /  AlkPhos  65  03-27      proBNP:   Lipid Profile:   HgA1c:   TSH:       CARDIAC MARKERS:          TELEMETRY: 	    ECG:  	  RADIOLOGY:  OTHER: 	    PREVIOUS DIAGNOSTIC TESTING:    [ ] Echocardiogram:    [ ]  Catheterization:  [ ] Stress Test:  	  	  ASSESSMENT/PLAN: 	     24H hour events: Tele with SR ~70-100s, junctional escape at ~48bpm overnight. Pt denies palpitations, SOB, dizziness. She is unsure if she was awake or sleeping during event overnight but denies any symptoms.     MEDICATIONS:  aspirin  chewable 81 milliGRAM(s) Oral daily  enoxaparin Injectable 40 milliGRAM(s) SubCutaneous every 24 hours  midodrine 20 milliGRAM(s) Oral every 8 hours  loratadine 10 milliGRAM(s) Oral daily  acetaminophen     Tablet .. 650 milliGRAM(s) Oral every 6 hours PRN  gabapentin 100 milliGRAM(s) Oral every 8 hours  aluminum hydroxide/magnesium hydroxide/simethicone Suspension 30 milliLiter(s) Oral every 4 hours PRN  famotidine    Tablet 20 milliGRAM(s) Oral two times a day  magnesium hydroxide Suspension 30 milliLiter(s) Oral at bedtime PRN  simethicone 80 milliGRAM(s) Chew every 6 hours  atorvastatin 40 milliGRAM(s) Oral at bedtime  fludroCORTISONE 0.2 milliGRAM(s) Oral daily  levothyroxine 137 MICROGram(s) Oral daily  fluticasone propionate 50 MICROgram(s)/spray Nasal Spray 1 Spray(s) Both Nostrils two times a day  influenza   Vaccine 0.5 milliLiter(s) IntraMuscular once  lactated ringers. 1000 milliLiter(s) IV Continuous <Continuous>  potassium chloride    Tablet ER 40 milliEquivalent(s) Oral once    REVIEW OF SYSTEMS:  Complete 10point ROS negative.    PHYSICAL EXAM:  T(C): 36.7 (03-28-22 @ 04:44), Max: 36.7 (03-27-22 @ 21:11)  HR: 75 (03-28-22 @ 04:44) (71 - 81)  BP: 116/70 (03-28-22 @ 04:44) (103/63 - 126/77)  RR: 18 (03-28-22 @ 04:44) (16 - 18)  SpO2: 94% (03-28-22 @ 04:44) (91% - 96%)  Wt(kg): --  I&O's Summary    27 Mar 2022 07:01  -  28 Mar 2022 07:00  --------------------------------------------------------  IN: 360 mL / OUT: 500 mL / NET: -140 mL        Appearance: Normal	  Cardiovascular: Normal S1 S2, No JVD, No murmurs  Respiratory: Lungs clear to auscultation	  Psychiatry: A & O x 3, Mood & affect appropriate  Neurologic: Non-focal  Extremities: No BLE edema    LABS:	 	    CBC Full  -  ( 28 Mar 2022 06:36 )  WBC Count : 4.64 K/uL  Hemoglobin : 9.4 g/dL  Hematocrit : 28.1 %  Platelet Count - Automated : 274 K/uL  Mean Cell Volume : 88.4 fl  Mean Cell Hemoglobin : 29.6 pg  Mean Cell Hemoglobin Concentration : 33.5 gm/dL  Auto Neutrophil # : 2.23 K/uL  Auto Lymphocyte # : 1.86 K/uL  Auto Monocyte # : 0.35 K/uL  Auto Eosinophil # : 0.12 K/uL  Auto Basophil # : 0.06 K/uL  Auto Neutrophil % : 48.1 %  Auto Lymphocyte % : 40.1 %  Auto Monocyte % : 7.5 %  Auto Eosinophil % : 2.6 %  Auto Basophil % : 1.3 %    03-28    141  |  103  |  5<L>  ----------------------------<  89  3.5   |  29  |  0.60  03-27    142  |  105  |  6<L>  ----------------------------<  102<H>  3.5   |  30  |  0.73    Ca    8.5      28 Mar 2022 06:35  Ca    8.4      27 Mar 2022 05:56  Phos  3.9     03-28  Phos  4.2     03-27  Mg     1.9     03-28  Mg     1.9     03-27    TPro  4.7<L>  /  Alb  3.2<L>  /  TBili  0.8  /  DBili  x   /  AST  28  /  ALT  46<H>  /  AlkPhos  69  03-28  TPro  4.7<L>  /  Alb  3.1<L>  /  TBili  0.5  /  DBili  x   /  AST  17  /  ALT  42  /  AlkPhos  65  03-27      TSH: Thyroid Stimulating Hormone, Serum (06.07.17 @ 01:02)    Thyroid Stimulating Hormone, Serum: 2.660 uU/mL      TELEMETRY: 	 SR ~70-100s

## 2022-03-29 LAB
ALBUMIN SERPL ELPH-MCNC: 3.3 G/DL — SIGNIFICANT CHANGE UP (ref 3.3–5)
ALP SERPL-CCNC: 72 U/L — SIGNIFICANT CHANGE UP (ref 40–120)
ALT FLD-CCNC: 49 U/L — HIGH (ref 10–45)
ANION GAP SERPL CALC-SCNC: 9 MMOL/L — SIGNIFICANT CHANGE UP (ref 5–17)
AST SERPL-CCNC: 24 U/L — SIGNIFICANT CHANGE UP (ref 10–40)
BASOPHILS # BLD AUTO: 0.05 K/UL — SIGNIFICANT CHANGE UP (ref 0–0.2)
BASOPHILS NFR BLD AUTO: 1 % — SIGNIFICANT CHANGE UP (ref 0–2)
BILIRUB SERPL-MCNC: 0.6 MG/DL — SIGNIFICANT CHANGE UP (ref 0.2–1.2)
BUN SERPL-MCNC: 6 MG/DL — LOW (ref 7–23)
CALCIUM SERPL-MCNC: 8.8 MG/DL — SIGNIFICANT CHANGE UP (ref 8.4–10.5)
CHLORIDE SERPL-SCNC: 102 MMOL/L — SIGNIFICANT CHANGE UP (ref 96–108)
CO2 SERPL-SCNC: 30 MMOL/L — SIGNIFICANT CHANGE UP (ref 22–31)
CREAT SERPL-MCNC: 0.66 MG/DL — SIGNIFICANT CHANGE UP (ref 0.5–1.3)
EGFR: 102 ML/MIN/1.73M2 — SIGNIFICANT CHANGE UP
EOSINOPHIL # BLD AUTO: 0.11 K/UL — SIGNIFICANT CHANGE UP (ref 0–0.5)
EOSINOPHIL NFR BLD AUTO: 2.1 % — SIGNIFICANT CHANGE UP (ref 0–6)
FERRITIN SERPL-MCNC: 86 NG/ML — SIGNIFICANT CHANGE UP (ref 15–150)
GLUCOSE BLDC GLUCOMTR-MCNC: 146 MG/DL — HIGH (ref 70–99)
GLUCOSE BLDC GLUCOMTR-MCNC: 147 MG/DL — HIGH (ref 70–99)
GLUCOSE SERPL-MCNC: 137 MG/DL — HIGH (ref 70–99)
HAV IGM SER-ACNC: SIGNIFICANT CHANGE UP
HBV CORE AB SER-ACNC: SIGNIFICANT CHANGE UP
HBV CORE IGM SER-ACNC: SIGNIFICANT CHANGE UP
HBV SURFACE AG SER-ACNC: SIGNIFICANT CHANGE UP
HCT VFR BLD CALC: 29.9 % — LOW (ref 34.5–45)
HCV AB S/CO SERPL IA: 0.06 S/CO — SIGNIFICANT CHANGE UP (ref 0–0.99)
HCV AB SERPL-IMP: SIGNIFICANT CHANGE UP
HGB BLD-MCNC: 10.1 G/DL — LOW (ref 11.5–15.5)
IMM GRANULOCYTES NFR BLD AUTO: 0.4 % — SIGNIFICANT CHANGE UP (ref 0–1.5)
IRON SATN MFR SERPL: 14 % — SIGNIFICANT CHANGE UP (ref 14–50)
IRON SATN MFR SERPL: 32 UG/DL — SIGNIFICANT CHANGE UP (ref 30–160)
LYMPHOCYTES # BLD AUTO: 1.63 K/UL — SIGNIFICANT CHANGE UP (ref 1–3.3)
LYMPHOCYTES # BLD AUTO: 31.6 % — SIGNIFICANT CHANGE UP (ref 13–44)
MAGNESIUM SERPL-MCNC: 1.8 MG/DL — SIGNIFICANT CHANGE UP (ref 1.6–2.6)
MCHC RBC-ENTMCNC: 30.1 PG — SIGNIFICANT CHANGE UP (ref 27–34)
MCHC RBC-ENTMCNC: 33.8 GM/DL — SIGNIFICANT CHANGE UP (ref 32–36)
MCV RBC AUTO: 89.3 FL — SIGNIFICANT CHANGE UP (ref 80–100)
MONOCYTES # BLD AUTO: 0.45 K/UL — SIGNIFICANT CHANGE UP (ref 0–0.9)
MONOCYTES NFR BLD AUTO: 8.7 % — SIGNIFICANT CHANGE UP (ref 2–14)
NEUTROPHILS # BLD AUTO: 2.9 K/UL — SIGNIFICANT CHANGE UP (ref 1.8–7.4)
NEUTROPHILS NFR BLD AUTO: 56.2 % — SIGNIFICANT CHANGE UP (ref 43–77)
NRBC # BLD: 0 /100 WBCS — SIGNIFICANT CHANGE UP (ref 0–0)
PHOSPHATE SERPL-MCNC: 4.1 MG/DL — SIGNIFICANT CHANGE UP (ref 2.5–4.5)
PLATELET # BLD AUTO: 333 K/UL — SIGNIFICANT CHANGE UP (ref 150–400)
POTASSIUM SERPL-MCNC: 3.9 MMOL/L — SIGNIFICANT CHANGE UP (ref 3.5–5.3)
POTASSIUM SERPL-SCNC: 3.9 MMOL/L — SIGNIFICANT CHANGE UP (ref 3.5–5.3)
PROT SERPL-MCNC: 4.8 G/DL — LOW (ref 6–8.3)
RBC # BLD: 3.35 M/UL — LOW (ref 3.8–5.2)
RBC # FLD: 13.2 % — SIGNIFICANT CHANGE UP (ref 10.3–14.5)
SODIUM SERPL-SCNC: 141 MMOL/L — SIGNIFICANT CHANGE UP (ref 135–145)
TIBC SERPL-MCNC: 228 UG/DL — SIGNIFICANT CHANGE UP (ref 220–430)
TRANSFERRIN SERPL-MCNC: 218 MG/DL — SIGNIFICANT CHANGE UP (ref 200–360)
UIBC SERPL-MCNC: 196 UG/DL — SIGNIFICANT CHANGE UP (ref 110–370)
WBC # BLD: 5.16 K/UL — SIGNIFICANT CHANGE UP (ref 3.8–10.5)
WBC # FLD AUTO: 5.16 K/UL — SIGNIFICANT CHANGE UP (ref 3.8–10.5)

## 2022-03-29 PROCEDURE — 99233 SBSQ HOSP IP/OBS HIGH 50: CPT | Mod: GC

## 2022-03-29 PROCEDURE — 99292 CRITICAL CARE ADDL 30 MIN: CPT

## 2022-03-29 PROCEDURE — 99291 CRITICAL CARE FIRST HOUR: CPT | Mod: 25

## 2022-03-29 PROCEDURE — 93010 ELECTROCARDIOGRAM REPORT: CPT | Mod: 76

## 2022-03-29 PROCEDURE — 93010 ELECTROCARDIOGRAM REPORT: CPT | Mod: 77

## 2022-03-29 RX ORDER — MIDODRINE HYDROCHLORIDE 2.5 MG/1
20 TABLET ORAL THREE TIMES A DAY
Refills: 0 | Status: DISCONTINUED | OUTPATIENT
Start: 2022-03-29 | End: 2022-03-31

## 2022-03-29 RX ORDER — MIDODRINE HYDROCHLORIDE 2.5 MG/1
20 TABLET ORAL THREE TIMES A DAY
Refills: 0 | Status: DISCONTINUED | OUTPATIENT
Start: 2022-03-29 | End: 2022-03-29

## 2022-03-29 RX ORDER — MAGNESIUM SULFATE 500 MG/ML
2 VIAL (ML) INJECTION ONCE
Refills: 0 | Status: COMPLETED | OUTPATIENT
Start: 2022-03-29 | End: 2022-03-29

## 2022-03-29 RX ORDER — DOPAMINE HYDROCHLORIDE 40 MG/ML
2.5 INJECTION, SOLUTION, CONCENTRATE INTRAVENOUS
Qty: 400 | Refills: 0 | Status: DISCONTINUED | OUTPATIENT
Start: 2022-03-29 | End: 2022-03-30

## 2022-03-29 RX ORDER — CHLORHEXIDINE GLUCONATE 213 G/1000ML
1 SOLUTION TOPICAL
Refills: 0 | Status: DISCONTINUED | OUTPATIENT
Start: 2022-03-29 | End: 2022-03-30

## 2022-03-29 RX ADMIN — Medication 25 GRAM(S): at 13:44

## 2022-03-29 RX ADMIN — Medication 137 MICROGRAM(S): at 05:13

## 2022-03-29 RX ADMIN — SIMETHICONE 80 MILLIGRAM(S): 80 TABLET, CHEWABLE ORAL at 12:43

## 2022-03-29 RX ADMIN — LORATADINE 10 MILLIGRAM(S): 10 TABLET ORAL at 12:46

## 2022-03-29 RX ADMIN — FAMOTIDINE 20 MILLIGRAM(S): 10 INJECTION INTRAVENOUS at 19:11

## 2022-03-29 RX ADMIN — SIMETHICONE 80 MILLIGRAM(S): 80 TABLET, CHEWABLE ORAL at 05:14

## 2022-03-29 RX ADMIN — DOPAMINE HYDROCHLORIDE 4.95 MICROGRAM(S)/KG/MIN: 40 INJECTION, SOLUTION, CONCENTRATE INTRAVENOUS at 21:32

## 2022-03-29 RX ADMIN — ENOXAPARIN SODIUM 40 MILLIGRAM(S): 100 INJECTION SUBCUTANEOUS at 05:14

## 2022-03-29 RX ADMIN — FAMOTIDINE 20 MILLIGRAM(S): 10 INJECTION INTRAVENOUS at 05:13

## 2022-03-29 RX ADMIN — Medication 100 MILLIEQUIVALENT(S): at 00:50

## 2022-03-29 RX ADMIN — Medication 1 SPRAY(S): at 19:11

## 2022-03-29 RX ADMIN — FLUDROCORTISONE ACETATE 0.2 MILLIGRAM(S): 0.1 TABLET ORAL at 05:13

## 2022-03-29 RX ADMIN — Medication 100 MILLIEQUIVALENT(S): at 02:19

## 2022-03-29 RX ADMIN — GABAPENTIN 100 MILLIGRAM(S): 400 CAPSULE ORAL at 21:31

## 2022-03-29 RX ADMIN — MIDODRINE HYDROCHLORIDE 20 MILLIGRAM(S): 2.5 TABLET ORAL at 05:13

## 2022-03-29 RX ADMIN — ATORVASTATIN CALCIUM 40 MILLIGRAM(S): 80 TABLET, FILM COATED ORAL at 21:31

## 2022-03-29 RX ADMIN — MIDODRINE HYDROCHLORIDE 20 MILLIGRAM(S): 2.5 TABLET ORAL at 13:13

## 2022-03-29 RX ADMIN — Medication 81 MILLIGRAM(S): at 12:44

## 2022-03-29 RX ADMIN — Medication 1 TABLET(S): at 12:43

## 2022-03-29 RX ADMIN — SIMETHICONE 80 MILLIGRAM(S): 80 TABLET, CHEWABLE ORAL at 19:11

## 2022-03-29 RX ADMIN — MIDODRINE HYDROCHLORIDE 20 MILLIGRAM(S): 2.5 TABLET ORAL at 21:35

## 2022-03-29 RX ADMIN — GABAPENTIN 100 MILLIGRAM(S): 400 CAPSULE ORAL at 13:14

## 2022-03-29 RX ADMIN — GABAPENTIN 100 MILLIGRAM(S): 400 CAPSULE ORAL at 05:14

## 2022-03-29 RX ADMIN — MAGNESIUM HYDROXIDE 30 MILLILITER(S): 400 TABLET, CHEWABLE ORAL at 12:44

## 2022-03-29 NOTE — PROGRESS NOTE ADULT - ATTENDING COMMENTS
57 y.o. F with PMH of POTS, orthostatic hypotension, recently diagnosed smoldering myeloma (March 2022) and amyloidosis (Feb 2022), mouth burning syndrome, recently diagnosed c.diff colitis (March 2022), hyperthyroidism s/p thyroidectomy, admitted to [facility] for multiple recurrent syncopal episodes thought to be associated with autonomic dysfunction 2/2 amyloidosis, transferred to [facility] for further amyloidosis management.     The patient had multiple episodes of syncope and episodes of hypotension there requiring pressors and close monitoring, had sinus pauses on tele monitoring. On their work up, cause of syncope is likely due to amyloidosis causing conduction abnormalities. EP evaluation at [facility] did not feel the patient needed a PPM at that time. Pt seen today by EP team, MICHAEL Godoy,  they do not believe that the patient will require a PPM at this time.    Given hx of LVH on echo, likely cardiac conduction is affected in the setting of amyloidosis causing syncope. Continue with fludrocortisone and midodrine for BP support. Cardiac MRI was completed with results c/w Amyloid.  Will get Card Amyloid specialist to see the patient, Dr. Nieves will see her in AM.    Completed C diff treatment.    Myeloma: heme awaiting approval to start Daratumumab before starting chemo therapy.

## 2022-03-29 NOTE — PROGRESS NOTE ADULT - SUBJECTIVE AND OBJECTIVE BOX
ROLF MARQUEZ  MRN-[id number]  Patient is a 57y old  Female who presents with a chief complaint of syncope (29 Mar 2022 09:40)    HPI:  57 y.o. F with PMH of POTS, orthostatic hypotension, recently diagnosed with smoldering myeloma (March 2022) and amyloidosis (Feb 2022), mouth burning syndrome, recently diagnosed c.diff colitis (March 2022), hyperthyroidism s/p thyroidectomy, admitted to [facility] for multiple recurrent syncopal episodes.     While admitted, pt's course was complicated by multiple unresponsive episodes prompting RRTs, during which pt found to be hypotensive and bradycardic. She required levophed for BP support and was in CCU as well as ICU with each RRT. BP remained stable while on Midodrine 20mg q8 and Fludrocortisone 0.2mg q24. CT head was negative on admission. Pt noted to intermittently have sinus pauses (2-3.8 seconds) thought to be 2/2 vagal episodes, and asymptomatic bradycardia overnight on tele. ECG noted NSR but 1st degree AV block. EP was consulted, no PPM indications. TTE 3/16/22 showed normal LV size and function with EF 55%-60%. No regional wall motion abnormalities. Moderate concentric LVH. Pt was pending cardiac MRI for cardiac amyloidosis work up. Pt was also seen by Endocrinology for elevated TSH (normal FT4), levothyroxine was uptitrated to 137mcg. Pt also started on oral vanco 250mg q6 (3/16- ) and completed ceftriaxone for UTI.     Pt states her outpatient oncologist Dr. Goldberg recommended transfer to [facility] for inpatient chemo given concerns for cardiac and autonomic dysfunction 2/2 amyloidosis.    (25 Mar 2022 22:36)      24 HOUR EVENTS:    REVIEW OF SYSTEMS:    CONSTITUTIONAL: No weakness, fevers or chills  EYES/ENT: No visual changes;  No vertigo or throat pain   NECK: No pain or stiffness  RESPIRATORY: No cough, wheezing, hemoptysis; No shortness of breath  CARDIOVASCULAR: No chest pain or palpitations  GASTROINTESTINAL: No abdominal or epigastric pain. No nausea, vomiting, or hematemesis; No diarrhea or constipation. No melena or hematochezia.  GENITOURINARY: No dysuria, frequency or hematuria  NEUROLOGICAL: No numbness or weakness  SKIN: No itching, rashes      ICU Vital Signs Last 24 Hrs  T(C): 36.7 (29 Mar 2022 17:42), Max: 37 (29 Mar 2022 04:45)  T(F): 98 (29 Mar 2022 17:42), Max: 98.6 (29 Mar 2022 04:45)  HR: 91 (29 Mar 2022 18:00) (48 - 91)  BP: 112/62 (29 Mar 2022 18:00) (53/38 - 112/62)  BP(mean): 82 (29 Mar 2022 18:00) (72 - 82)  ABP: --  ABP(mean): --  RR: 23 (29 Mar 2022 18:00) (16 - 35)  SpO2: 95% (29 Mar 2022 18:00) (87% - 95%)      CVP(mm Hg): --  CO: --  CI: --  PA: --  PA(mean): --  PA(direct): --  PCWP: --  LA: --  RA: --  SVR: --  SVRI: --  PVR: --  PVRI: --  I&O's Summary    28 Mar 2022 07:01  -  29 Mar 2022 07:00  --------------------------------------------------------  IN: 725 mL / OUT: 500 mL / NET: 225 mL    29 Mar 2022 07:01  -  29 Mar 2022 19:39  --------------------------------------------------------  IN: 620 mL / OUT: 1050 mL / NET: -430 mL        CAPILLARY BLOOD GLUCOSE    CAPILLARY BLOOD GLUCOSE      POCT Blood Glucose.: 147 mg/dL (29 Mar 2022 16:49)      PHYSICAL EXAM:  GENERAL: No acute distress, well-developed  HEAD:  Atraumatic, Normocephalic  EYES: EOMI, PERRLA, conjunctiva and sclera clear  NECK: Supple, no lymphadenopathy, no JVD  CHEST/LUNG: CTAB; No wheezes, rales, or rhonchi  HEART: Regular rate and rhythm. Normal S1/S2. No murmurs, rubs, or gallops  ABDOMEN: Soft, non-tender, non-distended; normal bowel sounds, no organomegaly  EXTREMITIES:  2+ peripheral pulses b/l, No clubbing, cyanosis, or edema  NEUROLOGY: A&O x 3, no focal deficits  SKIN: No rashes or lesions    ============================I/O===========================   I&O's Detail    28 Mar 2022 07:01  -  29 Mar 2022 07:00  --------------------------------------------------------  IN:    Oral Fluid: 725 mL  Total IN: 725 mL    OUT:    Voided (mL): 500 mL  Total OUT: 500 mL    Total NET: 225 mL      29 Mar 2022 07:01  -  29 Mar 2022 19:39  --------------------------------------------------------  IN:    DOPamine Infusion: 5 mL    Lactated Ringers: 250 mL    Oral Fluid: 365 mL  Total IN: 620 mL    OUT:    Voided (mL): 1050 mL  Total OUT: 1050 mL    Total NET: -430 mL        ============================ LABS =========================                        10.1   5.16  )-----------( 333      ( 29 Mar 2022 06:13 )             29.9     03-29    141  |  102  |  6<L>  ----------------------------<  137<H>  3.9   |  30  |  0.66    Ca    8.8      29 Mar 2022 06:13  Phos  4.1     03-29  Mg     1.8     03-29    TPro  4.8<L>  /  Alb  3.3  /  TBili  0.6  /  DBili  x   /  AST  24  /  ALT  49<H>  /  AlkPhos  72  03-29                LIVER FUNCTIONS - ( 29 Mar 2022 06:13 )  Alb: 3.3 g/dL / Pro: 4.8 g/dL / ALK PHOS: 72 U/L / ALT: 49 U/L / AST: 24 U/L / GGT: x                   ======================Micro/Rad/Cardio=================  Telemetry: Reviewed   EKG: Reviewed  CXR: Reviewed  Culture: Reviewed   Echo:   Cath:   ======================================================  PAST MEDICAL & SURGICAL HISTORY:  Hypothyroidism    POTS (postural orthostatic tachycardia syndrome)    Clostridium difficile colitis    Amyloidosis    Smoldering myeloma    No significant past surgical history            ======================= LINES/TUBES  =====================  [place]: (Date placed)  Central Line: (Date placed)  HD Catheter: (Date placed)  Arterial Line: (Date placed)  Endotracheal Tube: (Date placed)  Vila: (Date placed)  ======================= DISPOSITION  =====================  [X] Critical   [ ] Guarded    [ ] Stable    [X] Maintain in CICU  [ ] Downgrade to Telemtry  [ ] Discharge Home    Patient requires continuous monitoring with bedside rhythm monitoring, pulse ox monitoring, and intermittent blood gas analysis. Care plan discussed with ICU care team. Patient remained critical and at risk for life threatening decompensation.  Patient seen, examined and plan discussed with CCU team during rounds.     I have personally provided 30 minutes of critical care time excluding time spent on separate procedures, in addition to initial critical care time provided by the CICU Attending, Dr. Pérez/ Gwyn/ Chavo/ Ezequiel/ Dinorah/ William .       [facility] [phone]      ROLF MARQUEZ  MRN-[id number]  Patient is a 57y old  Female who presents with a chief complaint of syncope (29 Mar 2022 09:40)    HPI: 57F Hx POTS, orthostatic hypotension, recently diagnosed with smoldering myeloma (March 2022) and amyloidosis (Feb 2022), mouth burning syndrome, recently diagnosed c.diff colitis (March 2022), hyperthyroidism s/p thyroidectomy, admitted to [facility] for multiple recurrent syncopal episodes.     While admitted at [facility], pt's course was complicated by multiple unresponsive episodes prompting RRTs, during which pt found to be hypotensive and bradycardic. She required levophed for BP support and was in CCU as well as ICU with each RRT. BP remained stable while on Midodrine 20mg q8 and Fludrocortisone 0.2mg q24. CT head was negative on admission. Pt noted to intermittently have sinus pauses (2-3.8 seconds) thought to be 2/2 vagal episodes, and asymptomatic bradycardia overnight on tele. ECG noted NSR but 1st degree AV block. EP was consulted, no PPM indications. TTE 3/16/22 showed normal LV size and function with EF 55%-60%. No regional wall motion abnormalities. Moderate concentric LVH. Pt was pending cardiac MRI for cardiac amyloidosis work up. Pt was also seen by Endocrinology for elevated TSH (normal FT4), levothyroxine was uptitrated to 137mcg. Pt completed oral vanco 250mg q6 (3/16-3/26) and completed ceftriaxone for UTI.     Pt states her outpatient oncologist Dr. Goldberg recommended transfer to [facility] for inpatient chemo given concerns for cardiac and autonomic dysfunction 2/2 amyloidosis.      Hospital Course:  Cardiology and EP consulted, recommended Micra PPM placement, although patient initially deferred pacemaker in favor of medical therapy. Cardiac MRI showed inferolateral enhancement and late biatrial enlargement with late gadolinium uptake, may suggest cardiac amyloid. Oncology consulted. Although the patient was planned to start chemotherapy, it was held at the moment given the patient's persistent diarrhea suspected 2/2 C diff. Upon improvement of the diarrhea, the patient is planned to be started on Daratumumab + CyBorD (inpatient vs outpatient), following up with her hematologist. Dr. Goldberg.    Transferred to CICU after RRT earlier today for bradycardia and hypotension, requiring atropine x2 and initiation of dopamine gtt. Assessed at bedside. The patient is currently asymptomatic. She reports remembering events from rapid response, says she felt a little nauseous at that time, but no lightheadedness, dizziness, chest pain, shortness of breath, or palpitations. She reports symptoms of dizziness usually occur during bowel movements. She has been independently ambulatory this admission. The patient reports she would be amenable to PPM if recommended by EP cardiology team.   REVIEW OF SYSTEMS:  CONSTITUTIONAL: No weakness, fevers or chills  EYES/ENT: No visual changes;  No vertigo or throat pain   NECK: No pain or stiffness  RESPIRATORY: No cough, wheezing, hemoptysis; No shortness of breath  CARDIOVASCULAR: No chest pain or palpitations  GASTROINTESTINAL: No abdominal or epigastric pain  No nausea, vomiting, or hematemesis; No diarrhea or constipation. No melena or hematochezia.  GENITOURINARY: No dysuria, frequency or hematuria  NEUROLOGICAL: No numbness or weakness  SKIN: No itching, rashes      ICU Vital Signs Last 24 Hrs  T(C): 36.7 (29 Mar 2022 17:42), Max: 37 (29 Mar 2022 04:45)  T(F): 98 (29 Mar 2022 17:42), Max: 98.6 (29 Mar 2022 04:45)  HR: 91 (29 Mar 2022 18:00) (48 - 91)  BP: 112/62 (29 Mar 2022 18:00) (53/38 - 112/62)  BP(mean): 82 (29 Mar 2022 18:00) (72 - 82)  RR: 23 (29 Mar 2022 18:00) (16 - 35)  SpO2: 95% (29 Mar 2022 18:00) (87% - 95%)      I&O's Summary    28 Mar 2022 07:01  -  29 Mar 2022 07:00  --------------------------------------------------------  IN: 725 mL / OUT: 500 mL / NET: 225 mL    29 Mar 2022 07:01  -  29 Mar 2022 19:39  --------------------------------------------------------  IN: 620 mL / OUT: 1050 mL / NET: -430 mL        CAPILLARY BLOOD GLUCOSE  POCT Blood Glucose.: 147 mg/dL (29 Mar 2022 16:49)  ============================I/O===========================   I&O's Detail    28 Mar 2022 07:01  -  29 Mar 2022 07:00  --------------------------------------------------------  IN:    Oral Fluid: 725 mL  Total IN: 725 mL    OUT:    Voided (mL): 500 mL  Total OUT: 500 mL    Total NET: 225 mL      29 Mar 2022 07:01  -  29 Mar 2022 19:39  --------------------------------------------------------  IN:    DOPamine Infusion: 5 mL    Lactated Ringers: 250 mL    Oral Fluid: 365 mL  Total IN: 620 mL    OUT:    Voided (mL): 1050 mL  Total OUT: 1050 mL    Total NET: -430 mL  ============================ LABS =========================                        10.1   5.16  )-----------( 333      ( 29 Mar 2022 06:13 )             29.9     03-29    141  |  102  |  6<L>  ----------------------------<  137<H>  3.9   |  30  |  0.66    Ca    8.8      29 Mar 2022 06:13  Phos  4.1     03-29  Mg     1.8     03-29    TPro  4.8<L>  /  Alb  3.3  /  TBili  0.6  /  DBili  x   /  AST  24  /  ALT  49<H>  /  AlkPhos  72  03-29    LIVER FUNCTIONS - ( 29 Mar 2022 06:13 )  Alb: 3.3 g/dL / Pro: 4.8 g/dL / ALK PHOS: 72 U/L / ALT: 49 U/L / AST: 24 U/L / GGT: x         ======================Micro/Rad/Cardio=================  Telemetry: Reviewed   EKG: Reviewed  CXR: Reviewed  Culture: Reviewed   ======================================================  PAST MEDICAL & SURGICAL HISTORY:  Hypothyroidism    POTS (postural orthostatic tachycardia syndrome)    Clostridium difficile colitis    Amyloidosis    Smoldering myeloma    No significant past surgical history

## 2022-03-29 NOTE — PROGRESS NOTE ADULT - SUBJECTIVE AND OBJECTIVE BOX
24H hour events: Syncopal episodes overnight after BM    MEDICATIONS:  aspirin  chewable 81 milliGRAM(s) Oral daily  enoxaparin Injectable 40 milliGRAM(s) SubCutaneous every 24 hours  midodrine 20 milliGRAM(s) Oral every 8 hours  loratadine 10 milliGRAM(s) Oral daily  acetaminophen     Tablet .. 650 milliGRAM(s) Oral every 6 hours PRN  gabapentin 100 milliGRAM(s) Oral every 8 hours  aluminum hydroxide/magnesium hydroxide/simethicone Suspension 30 milliLiter(s) Oral every 4 hours PRN  famotidine    Tablet 20 milliGRAM(s) Oral two times a day  magnesium hydroxide Suspension 30 milliLiter(s) Oral at bedtime PRN  simethicone 80 milliGRAM(s) Chew every 6 hours  atorvastatin 40 milliGRAM(s) Oral at bedtime  fludroCORTISONE 0.2 milliGRAM(s) Oral daily  levothyroxine 137 MICROGram(s) Oral daily  fluticasone propionate 50 MICROgram(s)/spray Nasal Spray 1 Spray(s) Both Nostrils two times a day  influenza   Vaccine 0.5 milliLiter(s) IntraMuscular once  lactated ringers. 1000 milliLiter(s) IV Continuous <Continuous>      REVIEW OF SYSTEMS:  See HPI, otherwise ROS negative.    PHYSICAL EXAM:  T(C): 37 (03-29-22 @ 04:45), Max: 37 (03-29-22 @ 04:45)  HR: 71 (03-29-22 @ 04:45) (48 - 89)  BP: 99/61 (03-29-22 @ 04:45) (53/38 - 122/73)  RR: 17 (03-29-22 @ 04:45) (16 - 18)  SpO2: 94% (03-29-22 @ 04:45) (92% - 98%)  Wt(kg): --  I&O's Summary    28 Mar 2022 07:01  -  29 Mar 2022 07:00  --------------------------------------------------------  IN: 725 mL / OUT: 500 mL / NET: 225 mL        Appearance: Alert. NAD	  Cardiovascular: +S1S2 RRR no m/g/r  Respiratory: CTA B/L	  Extremities: No edema BLE  Vascular: Peripheral pulses palpable 2+ bilaterally      LABS:	 	    CBC Full  -  ( 29 Mar 2022 06:13 )  WBC Count : 5.16 K/uL  Hemoglobin : 10.1 g/dL  Hematocrit : 29.9 %  Platelet Count - Automated : 333 K/uL  Mean Cell Volume : 89.3 fl  Mean Cell Hemoglobin : 30.1 pg  Mean Cell Hemoglobin Concentration : 33.8 gm/dL  Auto Neutrophil # : 2.90 K/uL  Auto Lymphocyte # : 1.63 K/uL  Auto Monocyte # : 0.45 K/uL  Auto Eosinophil # : 0.11 K/uL  Auto Basophil # : 0.05 K/uL  Auto Neutrophil % : 56.2 %  Auto Lymphocyte % : 31.6 %  Auto Monocyte % : 8.7 %  Auto Eosinophil % : 2.1 %  Auto Basophil % : 1.0 %    03-29    141  |  102  |  6<L>  ----------------------------<  137<H>  3.9   |  30  |  0.66  03-28    141  |  103  |  5<L>  ----------------------------<  89  3.5   |  29  |  0.60    Ca    8.8      29 Mar 2022 06:13  Ca    8.5      28 Mar 2022 06:35  Phos  4.1     03-29  Phos  3.9     03-28  Mg     1.8     03-29  Mg     1.9     03-28    TPro  4.8<L>  /  Alb  3.3  /  TBili  0.6  /  DBili  x   /  AST  24  /  ALT  49<H>  /  AlkPhos  72  03-29  TPro  4.7<L>  /  Alb  3.2<L>  /  TBili  0.8  /  DBili  x   /  AST  28  /  ALT  46<H>  /  AlkPhos  69  03-28      TELEMETRY: SR 70-80 BPM  	    ECG:  SR @ 84 BPM

## 2022-03-29 NOTE — PROGRESS NOTE ADULT - PROBLEM SELECTOR PLAN 3
C/w midodrine 20mg with more lenient parameters (SBP >150 and HR <40) given pt had multiple RRT in OSH when midodrine was held  - If midodrine needs to be held, consider decreasing dose to 10 or 5mg instead of omiting dose entirely   - C/w with fludrocortisone   - mIVF LR @ 75  - BP monitoring

## 2022-03-29 NOTE — PROGRESS NOTE ADULT - PROBLEM SELECTOR PLAN 5
Last saw Dr. 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   Potentially starting daratumumab plus CyBorD +/- autologous bone marrow transplant.    Plan:  -heme/onc consulted, recs appreciated: will likely start Daratumumab + CyBorD inpatient possibly Wed or Thurs, pending insurance approval   - Will f/u with Dr. Goldberg after discharge (patient's Hematologist)

## 2022-03-29 NOTE — RAPID RESPONSE TEAM SUMMARY - NSSITUATIONBACKGROUNDRRT_GEN_ALL_CORE
57F cardiac amyloid with bradycardia to 30s, BP 60/30, RR 12, SaO2 91% on 2L NC with slight dizziness. EKG appears junctional rhythm. Atropine 0.5 x 2 attempted, dopamine gtt started and titrated to 10 mcg/kg/min before HR > 60. BP improved when HR improved. CCU consulted and accepted. 
This is a 57yr old female with PMHx of POTS, orthostatic hypotension, recently diagnosed with smoldering myeloma (March 2022) and amyloidosis (Feb 2022), recently diagnosed c.diff colitis (March 2022), hyperthyroidism s/p thyroidectomy, admitted to [facility] for multiple recurrent syncopal episodes thought to be associated with autonomic dysfunction 2/2 amyloidosis, transferred to [facility] for further amyloidosis management. RRT called for syncopal episode. On arrival to the RRT, pt in bed A&O x 3. As per bedside RN, pt was on the bedside commode moving bowels and had a witnessed syncopal episode. Pt acknowledges that she had these episodes in the past and being worked upon. Pt hemodynamically stable, afebrile, BG in 120s. Possible vasovagal episode. EKG with no significant change. EP on board. 
This is a 57yr old female with PMHx of POTS, orthostatic hypotension, recently diagnosed with smoldering myeloma (March 2022) and amyloidosis (Feb 2022), recently diagnosed c.diff colitis (March 2022), hyperthyroidism s/p thyroidectomy, admitted to [facility] for multiple recurrent syncopal episodes thought to be associated with autonomic dysfunction 2/2 amyloidosis, transferred to [facility] for further amyloidosis management. RRT called for hypotension SBP in 50s and 60s. On arrival to RRT, pt in bed A&O x3. When BP recycled, SBP in 100s and 120s. Pt Hemodynamically stable. RRT ended.

## 2022-03-29 NOTE — PROGRESS NOTE ADULT - ASSESSMENT
This is a 58 yo F with PMH of ?possible POTS, orthostatic hypotension, frequent episodes of syncope, recent dx of smoldering myeloma and amyloidosis on fat pad bx (+ congo red, no sufficient tissue for differentiation) c/b neurologic sequelaes. She was recently admitted to [facility] for recurrent syncopal episodes, had RRTs and was admitted to CCU for levophed support briefly. It was further c/b CDiff infection for which she is recovering and remains on PO antibiotic. During her hospitalization, telemetry revealed sinus pauses correlated to her symptoms. EP consulted for PPM evaluation.     #Autonomic dysfunction  - her frequent symptoms and syncope with having bowel movement and positional changes could suggest vasovagal syncope  - Tele - SR @70-100s. No further pauses on tele.   - Syncopal episode overnight 3/28 during BM, telemetry reviewed    #Light chain amyloidosis  - Plans to initiate chemotherapy inpatient - Daratumumab + CyBorD w/ plan for autologous bone marrow transplant later. Pt's outpatient heme: Dr. Goldberg.     MICHAEL KruegerC  #[id number]         This is a 58 yo F with PMH of ?possible POTS, orthostatic hypotension, frequent episodes of syncope, recent dx of smoldering myeloma and amyloidosis on fat pad bx (+ congo red, no sufficient tissue for differentiation) c/b neurologic sequelaes. She was recently admitted to [facility] for recurrent syncopal episodes, had RRTs and was admitted to CCU for levophed support briefly. It was further c/b CDiff infection for which she is recovering and remains on PO antibiotic. During her hospitalization, telemetry revealed sinus pauses correlated to her symptoms. EP consulted for PPM evaluation.     #Autonomic dysfunction    - Frequent symptoms and syncope with having bowel movement and positional changes could suggest vasovagal syncope  - Tele - SR @70-100s. No further pauses on tele.   - Syncopal episode overnight 3/28 during BM, telemetry reviewed - SR with PACs @ 58 BPM. No evidence of bradyarrhythmia on telemetry.  - No indication for PPM at this time  - EP will sign off. Reconsult PRN.    Jayne Rose PA-C  #[id number]         This is a 58 yo F with PMH of ?possible POTS, orthostatic hypotension, frequent episodes of syncope, recent dx of smoldering myeloma and amyloidosis on fat pad bx (+ congo red, no sufficient tissue for differentiation) c/b neurologic sequelaes. She was recently admitted to [facility] for recurrent syncopal episodes, had RRTs and was admitted to CCU for levophed support briefly. It was further c/b CDiff infection for which she is recovering and remains on PO antibiotic. During her hospitalization, telemetry revealed sinus pauses correlated to her symptoms. EP consulted for PPM evaluation.     #Autonomic dysfunction    - Frequent symptoms and syncope with having bowel movement and positional changes could suggest vasovagal syncope  - Tele - SR @70-100s. No further pauses on tele.   - Syncopal episode overnight 3/28 during BM, telemetry reviewed - SR with PACs @ 58 BPM. No evidence of bradyarrhythmia on telemetry.  - Episode of symptomatic junctional/sinus bradycardia, HR dropped to 28 BPM with associated hypotension and lightheadedness  - S/p Atropine 1 without response, heart rates improved now on dopamine gtt @ 5mg/kg  - Continue IVF  - Transfer to CICU for closer monitoring  - Keep patient NPO after MN for possible PPM    Jayne Rose PA-C  #[id number]

## 2022-03-29 NOTE — CHART NOTE - NSCHARTNOTEFT_GEN_A_CORE
CCU Accept Note    Transfer from: [facility] floor, previously at [facility] for syncopal episodes    Accepting physician: Dr. Pérez    HPI:  57 y.o. F with PMH of POTS, orthostatic hypotension, recently diagnosed with smoldering myeloma (March 2022) and amyloidosis (Feb 2022), mouth burning syndrome, recently diagnosed c.diff colitis (March 2022), hyperthyroidism s/p thyroidectomy, admitted to [facility] for multiple recurrent syncopal episodes.     While admitted, pt's course was complicated by multiple unresponsive episodes prompting RRTs, during which pt found to be hypotensive and bradycardic. She required levophed for BP support and was in CCU as well as ICU with each RRT. BP remained stable while on Midodrine 20mg q8 and Fludrocortisone 0.2mg q24. CT head was negative on admission. Pt noted to intermittently have sinus pauses (2-3.8 seconds) thought to be 2/2 vagal episodes, and asymptomatic bradycardia overnight on tele. ECG noted NSR but 1st degree AV block. EP was consulted, no PPM indications. TTE 3/16/22 showed normal LV size and function with EF 55%-60%. No regional wall motion abnormalities. Moderate concentric LVH. Pt was pending cardiac MRI for cardiac amyloidosis work up. Pt was also seen by Endocrinology for elevated TSH (normal FT4), levothyroxine was uptitrated to 137mcg. Pt also started on oral vanco 250mg q6 (3/16- ) and completed ceftriaxone for UTI.     Pt states her outpatient oncologist Dr. Goldberg recommended transfer to [facility] for inpatient chemo given concerns for cardiac and autonomic dysfunction 2/2 amyloidosis.          Hospital Course:    OBJECTIVE DATA:    Vital Signs Last 24 Hrs  T(C): 37 (29 Mar 2022 04:45), Max: 37 (29 Mar 2022 04:45)  T(F): 98.6 (29 Mar 2022 04:45), Max: 98.6 (29 Mar 2022 04:45)  HR: 63 (29 Mar 2022 13:32) (48 - 81)  BP: 106/72 (29 Mar 2022 13:32) (53/38 - 106/72)  BP(mean): --  RR: 17 (29 Mar 2022 04:45) (16 - 18)  SpO2: 94% (29 Mar 2022 04:45) (92% - 94%)  I&O's Summary    28 Mar 2022 07:01  -  29 Mar 2022 07:00  --------------------------------------------------------  IN: 725 mL / OUT: 500 mL / NET: 225 mL    29 Mar 2022 07:01  -  29 Mar 2022 17:39  --------------------------------------------------------  IN: 365 mL / OUT: 400 mL / NET: -35 mL        Allergies:      MEDICATIONS  (STANDING):  aspirin  chewable 81 milliGRAM(s) Oral daily  atorvastatin 40 milliGRAM(s) Oral at bedtime  enoxaparin Injectable 40 milliGRAM(s) SubCutaneous every 24 hours  famotidine    Tablet 20 milliGRAM(s) Oral two times a day  fludroCORTISONE 0.2 milliGRAM(s) Oral daily  fluticasone propionate 50 MICROgram(s)/spray Nasal Spray 1 Spray(s) Both Nostrils two times a day  gabapentin 100 milliGRAM(s) Oral every 8 hours  influenza   Vaccine 0.5 milliLiter(s) IntraMuscular once  lactated ringers. 1000 milliLiter(s) (75 mL/Hr) IV Continuous <Continuous>  lactobacillus acidophilus 1 Tablet(s) Oral daily  levothyroxine 137 MICROGram(s) Oral daily  loratadine 10 milliGRAM(s) Oral daily  midodrine 20 milliGRAM(s) Oral every 8 hours  simethicone 80 milliGRAM(s) Chew every 6 hours    MEDICATIONS  (PRN):  acetaminophen     Tablet .. 650 milliGRAM(s) Oral every 6 hours PRN Temp greater or equal to 38C (100.4F), Mild Pain (1 - 3), Moderate Pain (4 - 6)  aluminum hydroxide/magnesium hydroxide/simethicone Suspension 30 milliLiter(s) Oral every 4 hours PRN Dyspepsia  magnesium hydroxide Suspension 30 milliLiter(s) Oral at bedtime PRN Constipation      LABS                                            10.1                  Neurophils% (auto):   56.2   (03-29 @ 06:13):    5.16 )-----------(333          Lymphocytes% (auto):  31.6                                          29.9                   Eosinphils% (auto):   2.1      Manual%: Neutrophils x    ; Lymphocytes x    ; Eosinophils x    ; Bands%: x    ; Blasts x                                    141    |  102    |  6                   Calcium: 8.8   / iCa: x      (03-29 @ 06:13)    ----------------------------<  137       Magnesium: 1.8                              3.9     |  30     |  0.66             Phosphorous: 4.1      TPro  4.8    /  Alb  3.3    /  TBili  0.6    /  DBili  x      /  AST  24     /  ALT  49     /  AlkPhos  72     29 Mar 2022 06:13        EKG:  Telemetry:  Echo:  Cardiac Cath:  Imaging    ASSESSMENT & PLAN: CCU Accept Note    Transfer from: [facility] floor, previously at [facility] for syncopal episodes    Accepting physician: Dr. Pérez    HPI:  57 y.o. F with PMH of POTS, orthostatic hypotension, recently diagnosed with smoldering myeloma (March 2022) and amyloidosis (Feb 2022), mouth burning syndrome, recently diagnosed c.diff colitis (March 2022), hyperthyroidism s/p thyroidectomy, admitted to [facility] for multiple recurrent syncopal episodes.     While admitted at [facility], pt's course was complicated by multiple unresponsive episodes prompting RRTs, during which pt found to be hypotensive and bradycardic. She required levophed for BP support and was in CCU as well as ICU with each RRT. BP remained stable while on Midodrine 20mg q8 and Fludrocortisone 0.2mg q24. CT head was negative on admission. Pt noted to intermittently have sinus pauses (2-3.8 seconds) thought to be 2/2 vagal episodes, and asymptomatic bradycardia overnight on tele. ECG noted NSR but 1st degree AV block. EP was consulted, no PPM indications. TTE 3/16/22 showed normal LV size and function with EF 55%-60%. No regional wall motion abnormalities. Moderate concentric LVH. Pt was pending cardiac MRI for cardiac amyloidosis work up. Pt was also seen by Endocrinology for elevated TSH (normal FT4), levothyroxine was uptitrated to 137mcg. Pt completed oral vanco 250mg q6 (3/16-3/26) and completed ceftriaxone for UTI.     Pt states her outpatient oncologist Dr. Goldberg recommended transfer to [facility] for inpatient chemo given concerns for cardiac and autonomic dysfunction 2/2 amyloidosis.      Hospital Course:  Cardiology and EP consulted, recommended Micra PPM placement, although patient initially deferred pacemaker in favor of medical therapy. Cardiac MRI showed inferolateral enhancement and late biatrial enlargement with late gadolinium uptake, may suggest cardiac amyloid. Oncology consulted. Although the patient was planned to start chemotherapy, it was held at the moment given the patient's persistent diarrhea suspected 2/2 C diff. Upon improvement of the diarrhea, the patient is planned to be started on Daratumumab + CyBorD (inpatient vs outpatient), following up with her hematologist. Dr. Goldberg.    Transferred to CICU after RRT earlier today for bradycardia and hypotension, requiring atropine x2 and initiation of dopamine gtt. Assessed at bedside. The patient is currently asymptomatic. She reports remembering events from rapid response, says she felt a little nauseous at that time, but no lightheadedness, dizziness, chest pain, shortness of breath, or palpitations. She reports symptoms of dizziness usually occur during bowel movements. She has been independently ambulatory this admission. The patient reports she would be amenable to PPM if recommended by EP cardiology team.       OBJECTIVE DATA:    Vital Signs Last 24 Hrs  T(C): 37 (29 Mar 2022 04:45), Max: 37 (29 Mar 2022 04:45)  T(F): 98.6 (29 Mar 2022 04:45), Max: 98.6 (29 Mar 2022 04:45)  HR: 63 (29 Mar 2022 13:32) (48 - 81)  BP: 106/72 (29 Mar 2022 13:32) (53/38 - 106/72)  BP(mean): --  RR: 17 (29 Mar 2022 04:45) (16 - 18)  SpO2: 94% (29 Mar 2022 04:45) (92% - 94%)  I&O's Summary    28 Mar 2022 07:01  -  29 Mar 2022 07:00  --------------------------------------------------------  IN: 725 mL / OUT: 500 mL / NET: 225 mL    29 Mar 2022 07:01  -  29 Mar 2022 17:39  --------------------------------------------------------  IN: 365 mL / OUT: 400 mL / NET: -35 mL        Allergies:      MEDICATIONS  (STANDING):  aspirin  chewable 81 milliGRAM(s) Oral daily  atorvastatin 40 milliGRAM(s) Oral at bedtime  enoxaparin Injectable 40 milliGRAM(s) SubCutaneous every 24 hours  famotidine    Tablet 20 milliGRAM(s) Oral two times a day  fludroCORTISONE 0.2 milliGRAM(s) Oral daily  fluticasone propionate 50 MICROgram(s)/spray Nasal Spray 1 Spray(s) Both Nostrils two times a day  gabapentin 100 milliGRAM(s) Oral every 8 hours  influenza   Vaccine 0.5 milliLiter(s) IntraMuscular once  lactated ringers. 1000 milliLiter(s) (75 mL/Hr) IV Continuous <Continuous>  lactobacillus acidophilus 1 Tablet(s) Oral daily  levothyroxine 137 MICROGram(s) Oral daily  loratadine 10 milliGRAM(s) Oral daily  midodrine 20 milliGRAM(s) Oral every 8 hours  simethicone 80 milliGRAM(s) Chew every 6 hours    MEDICATIONS  (PRN):  acetaminophen     Tablet .. 650 milliGRAM(s) Oral every 6 hours PRN Temp greater or equal to 38C (100.4F), Mild Pain (1 - 3), Moderate Pain (4 - 6)  aluminum hydroxide/magnesium hydroxide/simethicone Suspension 30 milliLiter(s) Oral every 4 hours PRN Dyspepsia  magnesium hydroxide Suspension 30 milliLiter(s) Oral at bedtime PRN Constipation      LABS                                            10.1                  Neurophils% (auto):   56.2   (03-29 @ 06:13):    5.16 )-----------(333          Lymphocytes% (auto):  31.6                                          29.9                   Eosinphils% (auto):   2.1      Manual%: Neutrophils x    ; Lymphocytes x    ; Eosinophils x    ; Bands%: x    ; Blasts x                                    141    |  102    |  6                   Calcium: 8.8   / iCa: x      (03-29 @ 06:13)    ----------------------------<  137       Magnesium: 1.8                              3.9     |  30     |  0.66             Phosphorous: 4.1      TPro  4.8    /  Alb  3.3    /  TBili  0.6    /  DBili  x      /  AST  24     /  ALT  49     /  AlkPhos  72     29 Mar 2022 06:13        EKG: NSR, low voltage  Telemetry:  Echo:   Cardiac Cath:  Imaging    ASSESSMENT & PLAN:  57 y.o. F with PMH of POTS, orthostatic hypotension, recently diagnosed with smoldering myeloma (March 2022) and amyloidosis (Feb 2022), mouth burning syndrome, recently diagnosed c.diff colitis (March 2022), hyperthyroidism s/p thyroidectomy, admitted to [facility] for multiple recurrent syncopal episodes thought to be associated with autonomic dysfunction 2/2 amyloidosis, transferred to [facility] for further amyloidosis management and evaluation for PPM, course complicated by episodes of bradycardia and hypotension, now transferred to CICU on dopamine gtt pending further PPM evaluation.    Neuro  - AOx3, no issues    Respiratory  - currently on 2L NC saturating 90s  - monitor O2 sat    Cardiac  #Possible cardiac amyloidosis  - confirmed amyloidosis s/p fat pad biopsy 2/2022  - OSH workup with TTE (non bubble) normal LV size and function with EF 55%-60%. No regional wall motion abnormalities. Moderate concentric LVH  - cMRI indicative of cardiac amyloid  - f/u closely with EP Cardiology plans for PPM placement    # Orthostatic hypotension  - C/w with fludrocortisone   - mIVF LR @ 75  - BP monitoring    # POTS  - PO & salt intake encouraged    GI  - regular diet  - has mouth burning syndrome which limits PO intake of certain foods  - continue with mIVF LR 75cc/hr for now  - NPO at MN for possible PPM tomorrow      - normal kidney function    Endo  - s/p thyroidectomy for hyperthyroidism  - outpt TSH elevated but FT4 wnl   - continue with levothyroxine 137 mcg qD  - Repeat TFTs 6-8 wks (May 2022)    Heme/Onc  #Amyloidosis  - initially planning for Daratumumab + CyBorD inpatient possibly Wed or Thurs, pending insurance approval, unsure of plan since now transferred to CICU  - Will f/u with Dr. Goldberg after discharge (patient's Hematologist)  - hold lovenox DVT ppx tonight pending decision for PPM placement    ID  - s/p 10 day course PO vanc for c. diff finished 3/26  - s/p ceftriaxone for UTI    Ethics  - spoke with family and best friend at bedside 3/29 CCU Accept Note    Transfer from: [facility] floor, previously at [facility] for syncopal episodes    Accepting physician: Dr. Pérez    HPI:  57 y.o. F with PMH of POTS, orthostatic hypotension, recently diagnosed with smoldering myeloma (March 2022) and amyloidosis (Feb 2022), mouth burning syndrome, recently diagnosed c.diff colitis (March 2022), hyperthyroidism s/p thyroidectomy, admitted to [facility] for multiple recurrent syncopal episodes.     While admitted at [facility], pt's course was complicated by multiple unresponsive episodes prompting RRTs, during which pt found to be hypotensive and bradycardic. She required levophed for BP support and was in CCU as well as ICU with each RRT. BP remained stable while on Midodrine 20mg q8 and Fludrocortisone 0.2mg q24. CT head was negative on admission. Pt noted to intermittently have sinus pauses (2-3.8 seconds) thought to be 2/2 vagal episodes, and asymptomatic bradycardia overnight on tele. ECG noted NSR but 1st degree AV block. EP was consulted, no PPM indications. TTE 3/16/22 showed normal LV size and function with EF 55%-60%. No regional wall motion abnormalities. Moderate concentric LVH. Pt was pending cardiac MRI for cardiac amyloidosis work up. Pt was also seen by Endocrinology for elevated TSH (normal FT4), levothyroxine was uptitrated to 137mcg. Pt completed oral vanco 250mg q6 (3/16-3/26) and completed ceftriaxone for UTI.     Pt states her outpatient oncologist Dr. Goldberg recommended transfer to [facility] for inpatient chemo given concerns for cardiac and autonomic dysfunction 2/2 amyloidosis.      Hospital Course:  Cardiology and EP consulted, recommended Micra PPM placement, although patient initially deferred pacemaker in favor of medical therapy. Cardiac MRI showed inferolateral enhancement and late biatrial enlargement with late gadolinium uptake, may suggest cardiac amyloid. Oncology consulted. Although the patient was planned to start chemotherapy, it was held at the moment given the patient's persistent diarrhea suspected 2/2 C diff. Upon improvement of the diarrhea, the patient is planned to be started on Daratumumab + CyBorD (inpatient vs outpatient), following up with her hematologist. Dr. Goldberg.    Transferred to CICU after RRT earlier today for bradycardia and hypotension, requiring atropine x2 and initiation of dopamine gtt. Assessed at bedside. The patient is currently asymptomatic. She reports remembering events from rapid response, says she felt a little nauseous at that time, but no lightheadedness, dizziness, chest pain, shortness of breath, or palpitations. She reports symptoms of dizziness usually occur during bowel movements. She has been independently ambulatory this admission. The patient reports she would be amenable to PPM if recommended by EP cardiology team.       OBJECTIVE DATA:    Vital Signs Last 24 Hrs  T(C): 37 (29 Mar 2022 04:45), Max: 37 (29 Mar 2022 04:45)  T(F): 98.6 (29 Mar 2022 04:45), Max: 98.6 (29 Mar 2022 04:45)  HR: 63 (29 Mar 2022 13:32) (48 - 81)  BP: 106/72 (29 Mar 2022 13:32) (53/38 - 106/72)  BP(mean): --  RR: 17 (29 Mar 2022 04:45) (16 - 18)  SpO2: 94% (29 Mar 2022 04:45) (92% - 94%)  I&O's Summary    28 Mar 2022 07:01  -  29 Mar 2022 07:00  --------------------------------------------------------  IN: 725 mL / OUT: 500 mL / NET: 225 mL    29 Mar 2022 07:01  -  29 Mar 2022 17:39  --------------------------------------------------------  IN: 365 mL / OUT: 400 mL / NET: -35 mL        Allergies:      MEDICATIONS  (STANDING):  aspirin  chewable 81 milliGRAM(s) Oral daily  atorvastatin 40 milliGRAM(s) Oral at bedtime  enoxaparin Injectable 40 milliGRAM(s) SubCutaneous every 24 hours  famotidine    Tablet 20 milliGRAM(s) Oral two times a day  fludroCORTISONE 0.2 milliGRAM(s) Oral daily  fluticasone propionate 50 MICROgram(s)/spray Nasal Spray 1 Spray(s) Both Nostrils two times a day  gabapentin 100 milliGRAM(s) Oral every 8 hours  influenza   Vaccine 0.5 milliLiter(s) IntraMuscular once  lactated ringers. 1000 milliLiter(s) (75 mL/Hr) IV Continuous <Continuous>  lactobacillus acidophilus 1 Tablet(s) Oral daily  levothyroxine 137 MICROGram(s) Oral daily  loratadine 10 milliGRAM(s) Oral daily  midodrine 20 milliGRAM(s) Oral every 8 hours  simethicone 80 milliGRAM(s) Chew every 6 hours    MEDICATIONS  (PRN):  acetaminophen     Tablet .. 650 milliGRAM(s) Oral every 6 hours PRN Temp greater or equal to 38C (100.4F), Mild Pain (1 - 3), Moderate Pain (4 - 6)  aluminum hydroxide/magnesium hydroxide/simethicone Suspension 30 milliLiter(s) Oral every 4 hours PRN Dyspepsia  magnesium hydroxide Suspension 30 milliLiter(s) Oral at bedtime PRN Constipation      LABS                                            10.1                  Neurophils% (auto):   56.2   (03-29 @ 06:13):    5.16 )-----------(333          Lymphocytes% (auto):  31.6                                          29.9                   Eosinphils% (auto):   2.1      Manual%: Neutrophils x    ; Lymphocytes x    ; Eosinophils x    ; Bands%: x    ; Blasts x                                    141    |  102    |  6                   Calcium: 8.8   / iCa: x      (03-29 @ 06:13)    ----------------------------<  137       Magnesium: 1.8                              3.9     |  30     |  0.66             Phosphorous: 4.1      TPro  4.8    /  Alb  3.3    /  TBili  0.6    /  DBili  x      /  AST  24     /  ALT  49     /  AlkPhos  72     29 Mar 2022 06:13        EKG: NSR, low voltage  Telemetry:  Echo:   Cardiac Cath:  Imaging    ASSESSMENT & PLAN:  57 y.o. F with PMH of POTS, orthostatic hypotension, recently diagnosed with smoldering myeloma (March 2022) and amyloidosis (Feb 2022), mouth burning syndrome, recently diagnosed c.diff colitis (March 2022), hyperthyroidism s/p thyroidectomy, admitted to [facility] for multiple recurrent syncopal episodes thought to be associated with autonomic dysfunction 2/2 amyloidosis, transferred to [facility] for further amyloidosis management and evaluation for PPM, course complicated by episodes of bradycardia and hypotension, now transferred to CICU on dopamine gtt pending further PPM evaluation.    Neuro  - AOx3, no issues    Respiratory  - currently on 2L NC saturating 90s  - monitor O2 sat    Cardiac  #Possible cardiac amyloidosis  - confirmed amyloidosis s/p fat pad biopsy 2/2022  - OSH workup with TTE (non bubble) normal LV size and function with EF 55%-60%. No regional wall motion abnormalities. Moderate concentric LVH  - cMRI indicative of cardiac amyloid  - f/u closely with EP Cardiology plans for PPM placement    # Orthostatic hypotension  - C/w with fludrocortisone   - mIVF LR @ 75  - BP monitoring    # POTS  - PO & salt intake encouraged    GI  - regular diet  - has mouth burning syndrome which limits PO intake of certain foods  - continue with mIVF LR 75cc/hr for now  - NPO at MN for possible PPM tomorrow      - normal kidney function    Endo  - s/p thyroidectomy for hyperthyroidism  - outpt TSH elevated but FT4 wnl   - continue with levothyroxine 137 mcg qD  - Repeat TFTs 6-8 wks (May 2022)    Heme/Onc  #Amyloidosis  - initially planning for Daratumumab + CyBorD inpatient possibly Wed or Thurs, pending insurance approval, unsure of plan since now transferred to CICU  - Will f/u with Dr. Goldberg after discharge (patient's Hematologist)  - hold lovenox DVT ppx tonight pending decision for PPM placement    ID  - s/p 10 day course PO vanc for c. diff finished 3/26  - s/p ceftriaxone for UTI    Ethics  - spoke with family and best friend at bedside 3/29    Attending Addendum:    I have personally seen, examined and participated in the care of this patient. I have reviewed all pertinent clinical information, including history, physical exam, plan and the resident's note. I agree with the resident's note with the following additions:    History of POTS/orthostatic hypotension with recently diagnosed amyloid and multiple myeloma  Rapid response called on the floor for sinus pause requiring Dopamine infusion, EP at bedside  Transferred to CICU  A+Ox3  Hemodynamics acceptable with 1st degree AV block and HRs 90s on Dopamine infusion  EP following, likely will need PPM in AM  Continue Midodrine   O2 sats mid 90s on room air  NPO for potential PPM in AM  Normal renal function, compensated on exam  H/H low but acceptable  Afebrile, no antibiotics  Sugars controlled  No central access    The patient required critical care management and I personally provided 35 minutes of non-continuous care to the patient concurrently with the resident/fellow/nurse practitioner, excluding separate procedures and time spent teaching, in addition to discussing the patient and plan at length with the CICU staff and helping coordinate care.

## 2022-03-29 NOTE — PROGRESS NOTE ADULT - PROBLEM SELECTOR PLAN 4
No tachycardia on tele at this time, continue to monitor  - PO & salt intake encouraged  - mIVF LR @ 75 No tachycardia on tele at this time, continue to monitor  - PO & salt intake encouraged

## 2022-03-29 NOTE — PROGRESS NOTE ADULT - ASSESSMENT
A/P: 4F Hx POTS, orthostatic hypotension, recently diagnosed with smoldering myeloma (March 2022) and amyloidosis (Feb 2022), mouth burning syndrome, recently diagnosed c.diff colitis (March 2022), hyperthyroidism s/p thyroidectomy, admitted to [facility] for multiple recurrent syncopal episodes thought to be associated with autonomic dysfunction 2/2 amyloidosis, transferred to [facility] for further amyloidosis management and evaluation for PPM, course complicated by episodes of bradycardia and hypotension, now transferred to CICU on dopamine gtt pending further PPM evaluation.    Neuro  - AOx3, no issues    Respiratory  - currently on 2L NC saturating 90s  - monitor O2 sat    Cardiac  #Possible cardiac amyloidosis  - confirmed amyloidosis s/p fat pad biopsy 2/2022  - OSH workup with TTE (non bubble) normal LV size and function with EF 55%-60%. No regional wall motion abnormalities. Moderate concentric LVH  - cMRI indicative of cardiac amyloid  - NPO after MN for possible Micra, hold Lovenox     #Orthostatic hypotension  - C/w with fludrocortisone     #POTS  - PO & salt intake encouraged    GI  - regular diet  - has mouth burning syndrome which limits PO intake of certain foods  - continue with mIVF LR 75cc/hr for now  - NPO at MN for possible PPM tomorrow      - normal kidney function    Endo  - s/p thyroidectomy for hyperthyroidism  - outpt TSH elevated but FT4 wnl   - continue with levothyroxine 137 mcg qD  - Repeat TFTs 6-8 wks (May 2022)    Heme/Onc  #Amyloidosis  - initially planning for Daratumumab + CyBorD inpatient possibly Wed or Thurs, pending insurance approval, unsure of plan since now transferred to CICU  - Will f/u with Dr. Goldberg after discharge (patient's Hematologist)  - hold lovenox DVT ppx tonight pending decision for PPM placement    ID  - s/p 10 day course PO vanc for c. diff finished 3/26  - s/p ceftriaxone for UTI  ======================= DISPOSITION  =====================  [X] Critical   [ ] Guarded    [ ] Stable    [X] Maintain in CICU  [ ] Downgrade to Telemetry  [ ] Discharge Home    Patient requires continuous monitoring with bedside rhythm monitoring, pulse ox monitoring, and intermittent blood gas analysis. Care plan discussed with ICU care team. Patient remained critical and at risk for life threatening decompensation.  Patient seen, examined and plan discussed with CCU team during rounds.     I have personally provided 30 minutes of critical care time excluding time spent on separate procedures, in addition to initial critical care time provided by the CICU Attending, Dr. Pérez/ Gwyn/ Chavo/ Ezequiel/ Dinorah/ William .       Evelyn Naranjo [facility] [phone]

## 2022-03-29 NOTE — PROGRESS NOTE ADULT - ASSESSMENT
57 y.o. F with PMH of POTS, orthostatic hypotension, recently diagnosed with smoldering myeloma (March 2022) and amyloidosis (Feb 2022), mouth burning syndrome, recently diagnosed c.diff colitis (March 2022), hyperthyroidism s/p thyroidectomy, admitted to [facility] for multiple recurrent syncopal episodes thought to be associated with autonomic dysfunction 2/2 amyloidosis, transferred to [facility] for further amyloidosis management.

## 2022-03-29 NOTE — PROGRESS NOTE ADULT - PROBLEM SELECTOR PLAN 2
Pt presented to OSH for multiple recurrent syncopal episodes.   Multifactorial in s/o known POTS, orthostatic hypotension associated with likely amyloidosis/myeloma autonomic dysfunction, hypovolemia 2/2 c.diff colitis and diarrhea    Work up from OSH:   - CT head no acute pathology   - TTE (non bubble) normal LV size and function with EF 55%-60%. No regional wall motion abnormalities. Moderate concentric LVH.  - ECG 1st degree AV block  - Tele with sinus pauses 2/2 vagal episodes. Bradycardia possibly 2/2 midodrine and or autonomic dysfunction associated with amyloidosis and myeloma    Plan:  -EP consulted: patient is a good candidate for Micra PPM  -C/w tele monitoring Pt presented to OSH for multiple recurrent syncopal episodes.   Multifactorial in s/o known POTS, orthostatic hypotension associated with likely amyloidosis/myeloma autonomic dysfunction, hypovolemia 2/2 c.diff colitis and diarrhea    Work up from OSH:   - CT head no acute pathology   - TTE (non bubble) normal LV size and function with EF 55%-60%. No regional wall motion abnormalities. Moderate concentric LVH.  - ECG 1st degree AV block  - Tele with sinus pauses 2/2 vagal episodes. Bradycardia possibly 2/2 midodrine and or autonomic dysfunction associated with amyloidosis and myeloma    Plan:  -EP consulted: not a candidate for PPM  -C/w tele monitoring

## 2022-03-29 NOTE — PROGRESS NOTE ADULT - SUBJECTIVE AND OBJECTIVE BOX
OVERNIGHT EVENTS: No acute events overnight.    SUBJECTIVE: Patient seen and examined at bedside. Patient reports feeling well, and has no complaints. Denies weakness. Stools more formed now.     MEDICATIONS  (STANDING):  aspirin  chewable 81 milliGRAM(s) Oral daily  atorvastatin 40 milliGRAM(s) Oral at bedtime  enoxaparin Injectable 40 milliGRAM(s) SubCutaneous every 24 hours  famotidine    Tablet 20 milliGRAM(s) Oral two times a day  fludroCORTISONE 0.2 milliGRAM(s) Oral daily  fluticasone propionate 50 MICROgram(s)/spray Nasal Spray 1 Spray(s) Both Nostrils two times a day  gabapentin 100 milliGRAM(s) Oral every 8 hours  influenza   Vaccine 0.5 milliLiter(s) IntraMuscular once  lactated ringers. 1000 milliLiter(s) (75 mL/Hr) IV Continuous <Continuous>  lactobacillus acidophilus 1 Tablet(s) Oral daily  levothyroxine 137 MICROGram(s) Oral daily  loratadine 10 milliGRAM(s) Oral daily  midodrine 20 milliGRAM(s) Oral every 8 hours  simethicone 80 milliGRAM(s) Chew every 6 hours    MEDICATIONS  (PRN):  acetaminophen     Tablet .. 650 milliGRAM(s) Oral every 6 hours PRN Temp greater or equal to 38C (100.4F), Mild Pain (1 - 3), Moderate Pain (4 - 6)  aluminum hydroxide/magnesium hydroxide/simethicone Suspension 30 milliLiter(s) Oral every 4 hours PRN Dyspepsia  magnesium hydroxide Suspension 30 milliLiter(s) Oral at bedtime PRN Constipation      Vital Signs Last 24 Hrs  T(C): 36.7 (28 Mar 2022 12:27), Max: 36.7 (27 Mar 2022 21:11)  T(F): 98 (28 Mar 2022 12:27), Max: 98.1 (27 Mar 2022 21:11)  HR: 77 (28 Mar 2022 13:53) (71 - 89)  BP: 122/73 (28 Mar 2022 13:53) (108/71 - 126/77)  BP(mean): --  RR: 18 (28 Mar 2022 12:27) (16 - 18)  SpO2: 98% (28 Mar 2022 12:27) (91% - 98%)    PHYSICAL EXAM:   General: thin, middle-aged woman in NAD; awake, sitting comfortably in bed  HEENT: nc/at, clear conjunctiva, moist mucous membranes  Neck: supple, no JVD  Heart: RRR, +systolic murmur   Chest/lungs: mild bibasilar rales; no wheezing or rhonchi  ABD: soft, non-tender, non-distended; no guarding or rebound; bowel sounds present  Extremities: no edema, erythema, or tenderness to palpation  Skin: clear, dry  Neuro: A&Ox4, FROM of all 4 extremities; no focal deficits; grossly intact      LABS:                        9.4    4.64  )-----------( 274      ( 28 Mar 2022 06:36 )             28.1     03-28    141  |  103  |  5<L>  ----------------------------<  89  3.5   |  29  |  0.60    Ca    8.5      28 Mar 2022 06:35  Phos  3.9     03-28  Mg     1.9     03-28    TPro  4.7<L>  /  Alb  3.2<L>  /  TBili  0.8  /  DBili  x   /  AST  28  /  ALT  46<H>  /  AlkPhos  69  03-28             OVERNIGHT EVENTS: No acute events overnight.    SUBJECTIVE: One RRT for syncope and one RRT for hypotension    MEDICATIONS  (STANDING):  aspirin  chewable 81 milliGRAM(s) Oral daily  atorvastatin 40 milliGRAM(s) Oral at bedtime  enoxaparin Injectable 40 milliGRAM(s) SubCutaneous every 24 hours  famotidine    Tablet 20 milliGRAM(s) Oral two times a day  fludroCORTISONE 0.2 milliGRAM(s) Oral daily  fluticasone propionate 50 MICROgram(s)/spray Nasal Spray 1 Spray(s) Both Nostrils two times a day  gabapentin 100 milliGRAM(s) Oral every 8 hours  influenza   Vaccine 0.5 milliLiter(s) IntraMuscular once  lactated ringers. 1000 milliLiter(s) (75 mL/Hr) IV Continuous <Continuous>  lactobacillus acidophilus 1 Tablet(s) Oral daily  levothyroxine 137 MICROGram(s) Oral daily  loratadine 10 milliGRAM(s) Oral daily  midodrine 20 milliGRAM(s) Oral every 8 hours  simethicone 80 milliGRAM(s) Chew every 6 hours    MEDICATIONS  (PRN):  acetaminophen     Tablet .. 650 milliGRAM(s) Oral every 6 hours PRN Temp greater or equal to 38C (100.4F), Mild Pain (1 - 3), Moderate Pain (4 - 6)  aluminum hydroxide/magnesium hydroxide/simethicone Suspension 30 milliLiter(s) Oral every 4 hours PRN Dyspepsia  magnesium hydroxide Suspension 30 milliLiter(s) Oral at bedtime PRN Constipation      Vital Signs Last 24 Hrs  T(C): 36.7 (28 Mar 2022 12:27), Max: 36.7 (27 Mar 2022 21:11)  T(F): 98 (28 Mar 2022 12:27), Max: 98.1 (27 Mar 2022 21:11)  HR: 77 (28 Mar 2022 13:53) (71 - 89)  BP: 122/73 (28 Mar 2022 13:53) (108/71 - 126/77)  BP(mean): --  RR: 18 (28 Mar 2022 12:27) (16 - 18)  SpO2: 98% (28 Mar 2022 12:27) (91% - 98%)    PHYSICAL EXAM:   General: thin, middle-aged woman in NAD; awake, sitting comfortably in bed  HEENT: nc/at, clear conjunctiva, moist mucous membranes  Neck: supple, no JVD  Heart: RRR, +systolic murmur   Chest/lungs: mild bibasilar rales; no wheezing or rhonchi  ABD: soft, non-tender, non-distended; no guarding or rebound; bowel sounds present  Extremities: no edema, erythema, or tenderness to palpation  Skin: clear, dry  Neuro: A&Ox4, FROM of all 4 extremities; no focal deficits; grossly intact      LABS:                        9.4    4.64  )-----------( 274      ( 28 Mar 2022 06:36 )             28.1     03-28    141  |  103  |  5<L>  ----------------------------<  89  3.5   |  29  |  0.60    Ca    8.5      28 Mar 2022 06:35  Phos  3.9     03-28  Mg     1.9     03-28    TPro  4.7<L>  /  Alb  3.2<L>  /  TBili  0.8  /  DBili  x   /  AST  28  /  ALT  46<H>  /  AlkPhos  69  03-28             OVERNIGHT EVENTS: No acute events overnight.    SUBJECTIVE: Pt had an RRT for syncope and one RRT for hypotension. For the syncope, pt had sinus rhythm prior to the episode and premature atrial beats during the time period. During the RRT for hypotension, BP normalized from SBP 50s/60s to 100s/120s.     MEDICATIONS  (STANDING):  aspirin  chewable 81 milliGRAM(s) Oral daily  atorvastatin 40 milliGRAM(s) Oral at bedtime  enoxaparin Injectable 40 milliGRAM(s) SubCutaneous every 24 hours  famotidine    Tablet 20 milliGRAM(s) Oral two times a day  fludroCORTISONE 0.2 milliGRAM(s) Oral daily  fluticasone propionate 50 MICROgram(s)/spray Nasal Spray 1 Spray(s) Both Nostrils two times a day  gabapentin 100 milliGRAM(s) Oral every 8 hours  influenza   Vaccine 0.5 milliLiter(s) IntraMuscular once  lactated ringers. 1000 milliLiter(s) (75 mL/Hr) IV Continuous <Continuous>  lactobacillus acidophilus 1 Tablet(s) Oral daily  levothyroxine 137 MICROGram(s) Oral daily  loratadine 10 milliGRAM(s) Oral daily  midodrine 20 milliGRAM(s) Oral every 8 hours  simethicone 80 milliGRAM(s) Chew every 6 hours    MEDICATIONS  (PRN):  acetaminophen     Tablet .. 650 milliGRAM(s) Oral every 6 hours PRN Temp greater or equal to 38C (100.4F), Mild Pain (1 - 3), Moderate Pain (4 - 6)  aluminum hydroxide/magnesium hydroxide/simethicone Suspension 30 milliLiter(s) Oral every 4 hours PRN Dyspepsia  magnesium hydroxide Suspension 30 milliLiter(s) Oral at bedtime PRN Constipation      Vital Signs Last 24 Hrs  T(C): 37 (29 Mar 2022 04:45), Max: 37 (29 Mar 2022 04:45)  T(F): 98.6 (29 Mar 2022 04:45), Max: 98.6 (29 Mar 2022 04:45)  HR: 63 (29 Mar 2022 13:32) (48 - 81)  BP: 106/72 (29 Mar 2022 13:32) (53/38 - 106/72)  BP(mean): --  RR: 17 (29 Mar 2022 04:45) (16 - 18)  SpO2: 94% (29 Mar 2022 04:45) (92% - 94%)    PHYSICAL EXAM:   General: thin, middle-aged woman in NAD; awake, sitting comfortably in bed  HEENT: nc/at, clear conjunctiva, moist mucous membranes  Neck: supple, no JVD  Heart: RRR, +systolic murmur   Chest/lungs: mild bibasilar rales; no wheezing or rhonchi  ABD: soft, non-tender, non-distended; no guarding or rebound; bowel sounds present  Extremities: no edema, erythema, or tenderness to palpation  Skin: clear, dry  Neuro: A&Ox4, FROM of all 4 extremities; no focal deficits; grossly intact    LABS:                        10.1   5.16  )-----------( 333      ( 29 Mar 2022 06:13 )             29.9     03-29    141  |  102  |  6<L>  ----------------------------<  137<H>  3.9   |  30  |  0.66    Ca    8.8      29 Mar 2022 06:13  Phos  4.1     03-29  Mg     1.8     03-29    TPro  4.8<L>  /  Alb  3.3  /  TBili  0.6  /  DBili  x   /  AST  24  /  ALT  49<H>  /  AlkPhos  72  03-29

## 2022-03-29 NOTE — PROGRESS NOTE ADULT - PROBLEM SELECTOR PLAN 1
Pt underwent fat pad bx in Feb 2022 and diagnosed with amyloidosis. 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 No typing of amyloidosis was done given as sample was not enough for mass spec.     - onc team consulted re plans for inpt chemo. Potentially starting daratumumab plus CyBorD +/- autologous bone marrow transplant.    # Autonomic dysfunction   - Suspect autonomic dysfunction and AV block may be associated with amyloidosis   - Tx orthostatic hypotension and POTS (see above/below)     # Rule out cardiac involvement  - TTE 3/16/2022 with concentric LVH. Unclear if this is associated with cardiac amyloidosis and deposition? Pt reports TTE in 01/2022 was normal with mild valvular regurgitation   - LVH is very classically seen in light chain amyloidosis and plasma cell neoplasm   - Cardiac MRI 3/28 w evidence of cardiac amyloidosis   - Cards and EP consult, recs appreciated: good candidate for Micra PPM  - C/w tele monitoring Pt underwent fat pad bx in Feb 2022 and diagnosed with amyloidosis. 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 No typing of amyloidosis was done given as sample was not enough for mass spec.     - onc team consulted re plans for inpt chemo. Potentially starting daratumumab plus CyBorD +/- autologous bone marrow transplant.    # Autonomic dysfunction   - Suspect autonomic dysfunction and AV block may be associated with amyloidosis   - Tx orthostatic hypotension and POTS (see above/below)     # Rule out cardiac involvement  - TTE 3/16/2022 with concentric LVH. Unclear if this is associated with cardiac amyloidosis and deposition? Pt reports TTE in 01/2022 was normal with mild valvular regurgitation   - LVH is very classically seen in light chain amyloidosis and plasma cell neoplasm   - Cardiac MRI 3/28 w evidence of cardiac amyloidosis   - Cards and EP consult, recs appreciated: not a candidate for PPM   - C/w tele monitoring

## 2022-03-29 NOTE — PROGRESS NOTE ADULT - PROBLEM SELECTOR PLAN 6
Pt found to have c.diff colitis per outpt GI in March  - s/p 10 day course ending 3/26/2022   - Monitor for stool, currently more formed   - No leukocytosis Pt found to have c.diff colitis per outpt GI in March  - s/p 10 day course ending 3/26/2022   - Stools currently formed   - No leukocytosis

## 2022-03-30 PROBLEM — D47.2 MONOCLONAL GAMMOPATHY: Chronic | Status: ACTIVE | Noted: 2022-03-26

## 2022-03-30 PROBLEM — I49.8 OTHER SPECIFIED CARDIAC ARRHYTHMIAS: Chronic | Status: ACTIVE | Noted: 2022-03-26

## 2022-03-30 PROBLEM — E03.9 HYPOTHYROIDISM, UNSPECIFIED: Chronic | Status: ACTIVE | Noted: 2022-03-26

## 2022-03-30 PROBLEM — E85.9 AMYLOIDOSIS, UNSPECIFIED: Chronic | Status: ACTIVE | Noted: 2022-03-26

## 2022-03-30 PROBLEM — A04.72 ENTEROCOLITIS DUE TO CLOSTRIDIUM DIFFICILE, NOT SPECIFIED AS RECURRENT: Chronic | Status: ACTIVE | Noted: 2022-03-26

## 2022-03-30 LAB
ALBUMIN SERPL ELPH-MCNC: 3.6 G/DL — SIGNIFICANT CHANGE UP (ref 3.3–5)
ALP SERPL-CCNC: 78 U/L — SIGNIFICANT CHANGE UP (ref 40–120)
ALT FLD-CCNC: 61 U/L — HIGH (ref 10–45)
ANION GAP SERPL CALC-SCNC: 10 MMOL/L — SIGNIFICANT CHANGE UP (ref 5–17)
APTT BLD: 25.9 SEC — LOW (ref 27.5–35.5)
AST SERPL-CCNC: 34 U/L — SIGNIFICANT CHANGE UP (ref 10–40)
BASOPHILS # BLD AUTO: 0.07 K/UL — SIGNIFICANT CHANGE UP (ref 0–0.2)
BASOPHILS NFR BLD AUTO: 1.3 % — SIGNIFICANT CHANGE UP (ref 0–2)
BILIRUB SERPL-MCNC: 0.8 MG/DL — SIGNIFICANT CHANGE UP (ref 0.2–1.2)
BLD GP AB SCN SERPL QL: NEGATIVE — SIGNIFICANT CHANGE UP
BUN SERPL-MCNC: 5 MG/DL — LOW (ref 7–23)
CALCIUM SERPL-MCNC: 8.5 MG/DL — SIGNIFICANT CHANGE UP (ref 8.4–10.5)
CHLORIDE SERPL-SCNC: 103 MMOL/L — SIGNIFICANT CHANGE UP (ref 96–108)
CO2 SERPL-SCNC: 28 MMOL/L — SIGNIFICANT CHANGE UP (ref 22–31)
CREAT SERPL-MCNC: 0.52 MG/DL — SIGNIFICANT CHANGE UP (ref 0.5–1.3)
EGFR: 108 ML/MIN/1.73M2 — SIGNIFICANT CHANGE UP
EOSINOPHIL # BLD AUTO: 0.07 K/UL — SIGNIFICANT CHANGE UP (ref 0–0.5)
EOSINOPHIL NFR BLD AUTO: 1.3 % — SIGNIFICANT CHANGE UP (ref 0–6)
GLUCOSE SERPL-MCNC: 103 MG/DL — HIGH (ref 70–99)
HCT VFR BLD CALC: 31 % — LOW (ref 34.5–45)
HGB BLD-MCNC: 10.4 G/DL — LOW (ref 11.5–15.5)
IMM GRANULOCYTES NFR BLD AUTO: 0.4 % — SIGNIFICANT CHANGE UP (ref 0–1.5)
INR BLD: 1 RATIO — SIGNIFICANT CHANGE UP (ref 0.88–1.16)
LYMPHOCYTES # BLD AUTO: 1.68 K/UL — SIGNIFICANT CHANGE UP (ref 1–3.3)
LYMPHOCYTES # BLD AUTO: 30.3 % — SIGNIFICANT CHANGE UP (ref 13–44)
MAGNESIUM SERPL-MCNC: 2.2 MG/DL — SIGNIFICANT CHANGE UP (ref 1.6–2.6)
MCHC RBC-ENTMCNC: 29.8 PG — SIGNIFICANT CHANGE UP (ref 27–34)
MCHC RBC-ENTMCNC: 33.5 GM/DL — SIGNIFICANT CHANGE UP (ref 32–36)
MCV RBC AUTO: 88.8 FL — SIGNIFICANT CHANGE UP (ref 80–100)
MONOCYTES # BLD AUTO: 0.43 K/UL — SIGNIFICANT CHANGE UP (ref 0–0.9)
MONOCYTES NFR BLD AUTO: 7.7 % — SIGNIFICANT CHANGE UP (ref 2–14)
NEUTROPHILS # BLD AUTO: 3.28 K/UL — SIGNIFICANT CHANGE UP (ref 1.8–7.4)
NEUTROPHILS NFR BLD AUTO: 59 % — SIGNIFICANT CHANGE UP (ref 43–77)
NRBC # BLD: 0 /100 WBCS — SIGNIFICANT CHANGE UP (ref 0–0)
PHOSPHATE SERPL-MCNC: 3.7 MG/DL — SIGNIFICANT CHANGE UP (ref 2.5–4.5)
PLATELET # BLD AUTO: 321 K/UL — SIGNIFICANT CHANGE UP (ref 150–400)
POTASSIUM SERPL-MCNC: 3.4 MMOL/L — LOW (ref 3.5–5.3)
POTASSIUM SERPL-SCNC: 3.4 MMOL/L — LOW (ref 3.5–5.3)
PROT SERPL-MCNC: 5.3 G/DL — LOW (ref 6–8.3)
PROTHROM AB SERPL-ACNC: 11.6 SEC — SIGNIFICANT CHANGE UP (ref 10.5–13.4)
RBC # BLD: 3.49 M/UL — LOW (ref 3.8–5.2)
RBC # FLD: 13.2 % — SIGNIFICANT CHANGE UP (ref 10.3–14.5)
RH IG SCN BLD-IMP: POSITIVE — SIGNIFICANT CHANGE UP
SARS-COV-2 RNA SPEC QL NAA+PROBE: SIGNIFICANT CHANGE UP
SODIUM SERPL-SCNC: 141 MMOL/L — SIGNIFICANT CHANGE UP (ref 135–145)
WBC # BLD: 5.55 K/UL — SIGNIFICANT CHANGE UP (ref 3.8–10.5)
WBC # FLD AUTO: 5.55 K/UL — SIGNIFICANT CHANGE UP (ref 3.8–10.5)

## 2022-03-30 PROCEDURE — 93356 MYOCRD STRAIN IMG SPCKL TRCK: CPT

## 2022-03-30 PROCEDURE — 33208 INSRT HEART PM ATRIAL & VENT: CPT

## 2022-03-30 PROCEDURE — 93306 TTE W/DOPPLER COMPLETE: CPT | Mod: 26

## 2022-03-30 PROCEDURE — 99292 CRITICAL CARE ADDL 30 MIN: CPT | Mod: 25

## 2022-03-30 PROCEDURE — 99232 SBSQ HOSP IP/OBS MODERATE 35: CPT

## 2022-03-30 PROCEDURE — 99233 SBSQ HOSP IP/OBS HIGH 50: CPT | Mod: GC

## 2022-03-30 PROCEDURE — 93010 ELECTROCARDIOGRAM REPORT: CPT

## 2022-03-30 PROCEDURE — 71045 X-RAY EXAM CHEST 1 VIEW: CPT | Mod: 26

## 2022-03-30 PROCEDURE — 99223 1ST HOSP IP/OBS HIGH 75: CPT

## 2022-03-30 PROCEDURE — 93010 ELECTROCARDIOGRAM REPORT: CPT | Mod: 77

## 2022-03-30 PROCEDURE — 99291 CRITICAL CARE FIRST HOUR: CPT | Mod: 25

## 2022-03-30 RX ORDER — VANCOMYCIN HCL 1 G
750 VIAL (EA) INTRAVENOUS ONCE
Refills: 0 | Status: COMPLETED | OUTPATIENT
Start: 2022-03-30 | End: 2022-03-30

## 2022-03-30 RX ORDER — LACTOBACILLUS ACIDOPHILUS 100MM CELL
2 CAPSULE ORAL DAILY
Refills: 0 | Status: DISCONTINUED | OUTPATIENT
Start: 2022-03-30 | End: 2022-04-04

## 2022-03-30 RX ORDER — VANCOMYCIN HCL 1 G
1000 VIAL (EA) INTRAVENOUS ONCE
Refills: 0 | Status: COMPLETED | OUTPATIENT
Start: 2022-03-30 | End: 2022-03-30

## 2022-03-30 RX ORDER — POTASSIUM CHLORIDE 20 MEQ
20 PACKET (EA) ORAL
Refills: 0 | Status: COMPLETED | OUTPATIENT
Start: 2022-03-30 | End: 2022-03-30

## 2022-03-30 RX ADMIN — GABAPENTIN 100 MILLIGRAM(S): 400 CAPSULE ORAL at 06:06

## 2022-03-30 RX ADMIN — FAMOTIDINE 20 MILLIGRAM(S): 10 INJECTION INTRAVENOUS at 18:08

## 2022-03-30 RX ADMIN — Medication 250 MILLIGRAM(S): at 08:46

## 2022-03-30 RX ADMIN — Medication 81 MILLIGRAM(S): at 12:59

## 2022-03-30 RX ADMIN — GABAPENTIN 100 MILLIGRAM(S): 400 CAPSULE ORAL at 14:26

## 2022-03-30 RX ADMIN — GABAPENTIN 100 MILLIGRAM(S): 400 CAPSULE ORAL at 21:07

## 2022-03-30 RX ADMIN — FLUDROCORTISONE ACETATE 0.2 MILLIGRAM(S): 0.1 TABLET ORAL at 06:07

## 2022-03-30 RX ADMIN — SIMETHICONE 80 MILLIGRAM(S): 80 TABLET, CHEWABLE ORAL at 21:12

## 2022-03-30 RX ADMIN — Medication 50 MILLIEQUIVALENT(S): at 08:47

## 2022-03-30 RX ADMIN — SIMETHICONE 80 MILLIGRAM(S): 80 TABLET, CHEWABLE ORAL at 12:59

## 2022-03-30 RX ADMIN — Medication 2 TABLET(S): at 14:26

## 2022-03-30 RX ADMIN — SIMETHICONE 80 MILLIGRAM(S): 80 TABLET, CHEWABLE ORAL at 06:06

## 2022-03-30 RX ADMIN — ATORVASTATIN CALCIUM 40 MILLIGRAM(S): 80 TABLET, FILM COATED ORAL at 21:07

## 2022-03-30 RX ADMIN — MIDODRINE HYDROCHLORIDE 20 MILLIGRAM(S): 2.5 TABLET ORAL at 18:08

## 2022-03-30 RX ADMIN — MIDODRINE HYDROCHLORIDE 20 MILLIGRAM(S): 2.5 TABLET ORAL at 12:59

## 2022-03-30 RX ADMIN — SIMETHICONE 80 MILLIGRAM(S): 80 TABLET, CHEWABLE ORAL at 18:08

## 2022-03-30 RX ADMIN — FAMOTIDINE 20 MILLIGRAM(S): 10 INJECTION INTRAVENOUS at 06:06

## 2022-03-30 RX ADMIN — Medication 50 MILLIEQUIVALENT(S): at 12:59

## 2022-03-30 RX ADMIN — CHLORHEXIDINE GLUCONATE 1 APPLICATION(S): 213 SOLUTION TOPICAL at 05:31

## 2022-03-30 RX ADMIN — LORATADINE 10 MILLIGRAM(S): 10 TABLET ORAL at 12:59

## 2022-03-30 RX ADMIN — MIDODRINE HYDROCHLORIDE 20 MILLIGRAM(S): 2.5 TABLET ORAL at 06:07

## 2022-03-30 RX ADMIN — Medication 137 MICROGRAM(S): at 06:06

## 2022-03-30 RX ADMIN — Medication 250 MILLIGRAM(S): at 21:08

## 2022-03-30 NOTE — PROGRESS NOTE ADULT - ATTENDING COMMENTS
The patient is seen in CCU post pacemaker placement; we discussed treatment of amyloidosis light chain with the patient and her family in attendance.  family members have brought up possibility of a clinical study using an experimental agent offered at another hospital in [facility].  At this time the patient has states that she does not wish to enter into a study drug program at another hospital and that she would elect to receive her treatment at [facility].  management with chemotherapy discussed and plan of care will be on Friday April 2 2022 ; chemotherapy regimen of cyclophosphamide, bortezomib dexamethasone and daratumumab

## 2022-03-30 NOTE — CHART NOTE - NSCHARTNOTEFT_GEN_A_CORE
CCU Transfer Note  Transfer from: CCU    Transfer to: ( x ) Telemetry 4 CHANDA 429 D    Accepting Physician: Dr. Rosalio Bah   Team covering on floor: Resident service   Signout given to: Hospitalist and MAR [id number]    HPI:  57 y.o. F with PMH of POTS, orthostatic hypotension, recently diagnosed with smoldering myeloma (March 2022) and amyloidosis (Feb 2022), mouth burning syndrome, recently diagnosed c.diff colitis (March 2022), hyperthyroidism s/p thyroidectomy, admitted to [facility] for multiple recurrent syncopal episodes.   While admitted, pt's course was complicated by multiple unresponsive episodes prompting RRTs, during which pt found to be hypotensive and bradycardic. She required levophed for BP support and was in CCU as well as ICU with each RRT. BP remained stable while on Midodrine 20mg q8 and Fludrocortisone 0.2mg q24. CT head was negative on admission. Pt noted to intermittently have sinus pauses (2-3.8 seconds) thought to be 2/2 vagal episodes, and asymptomatic bradycardia overnight on tele. ECG noted NSR but 1st degree AV block. EP was consulted, no PPM indications. TTE 3/16/22 showed normal LV size and function with EF 55%-60%. No regional wall motion abnormalities. Moderate concentric LVH. Pt was pending cardiac MRI for cardiac amyloidosis work up. Pt was also seen by Endocrinology for elevated TSH (normal FT4), levothyroxine was uptitrated to 137mcg. Pt also started on oral vanco 250mg q6 (3/16- ) and completed ceftriaxone for UTI.   Pt states her outpatient oncologist Dr. Goldberg recommended transfer to [facility] for inpatient chemo given concerns for cardiac and autonomic dysfunction 2/2 amyloidosis.     Hospital Course:  Cardiology and EP consulted, recommended Micra PPM placement, although patient initially deferred pacemaker in favor of medical therapy. Cardiac MRI showed inferolateral enhancement and late biatrial enlargement with late gadolinium uptake, may suggest cardiac amyloid. Oncology consulted. Although the patient was planned to start chemotherapy, it was held given the patient's persistent diarrhea suspected 2/2 C diff. Upon improvement of the diarrhea, the patient is planned to be started on Daratumumab + CyBorD (inpatient vs outpatient), following up with her hematologist. Dr. Goldberg.    CICU course:   Patient s/p RRT 3/29 for hypotension and bradycardia. Patient was bradycardic to the 30s (junctional) and hypotensive to SBP 60s. Atropine was given x2 and pt started on dopamine gtt. Patient underwent [employer] dual chamber pacemaker via left cephalic vein without issues, Lower rate at 70. Post CXR without pneumothorax. Per heme/onc plan, Planning to start chemotherapy inpatient, first dose to be given Friday - Daratumumab + CyBorD w/ plan for autologous bone marrow transplant later - consent is in chart for chemotherapy treatment. Stable for tele transfer.         PAST MEDICAL & SURGICAL HISTORY:  Hypothyroidism  POTS postural orthostatic tachycardia syndrome)  Clostridium difficile colitis  Amyloidosis  Smoldering myeloma  No significant past surgical history        Vital Signs Last 24 Hrs  T(C): 36.5 (30 Mar 2022 19:30), Max: 36.9 (30 Mar 2022 16:00)  T(F): 97.7 (30 Mar 2022 19:30), Max: 98.4 (30 Mar 2022 16:00)  HR: 73 (30 Mar 2022 20:00) (69 - 85)  BP: 112/64 (30 Mar 2022 20:00) (88/51 - 141/78)  BP(mean): 83 (30 Mar 2022 20:00) (64 - 102)  RR: 17 (30 Mar 2022 20:00) (12 - 39)  SpO2: 94% (30 Mar 2022 20:00) (90% - 99%)  I&O's Summary    29 Mar 2022 07:01  -  30 Mar 2022 07:00  --------------------------------------------------------  IN: 730 mL / OUT: 2400 mL / NET: -1670 mL    30 Mar 2022 07:01  -  30 Mar 2022 21:19  --------------------------------------------------------  IN: 605 mL / OUT: 1050 mL / NET: -445 mL        MEDICATIONS  (STANDING):  aspirin  chewable 81 milliGRAM(s) Oral daily  atorvastatin 40 milliGRAM(s) Oral at bedtime  famotidine    Tablet 20 milliGRAM(s) Oral two times a day  fludroCORTISONE 0.2 milliGRAM(s) Oral daily  fluticasone propionate 50 MICROgram(s)/spray Nasal Spray 1 Spray(s) Both Nostrils two times a day  gabapentin 100 milliGRAM(s) Oral every 8 hours  influenza   Vaccine 0.5 milliLiter(s) IntraMuscular once  lactobacillus acidophilus 2 Tablet(s) Oral daily  levothyroxine 137 MICROGram(s) Oral daily  loratadine 10 milliGRAM(s) Oral daily  midodrine. 20 milliGRAM(s) Oral three times a day  simethicone 80 milliGRAM(s) Chew every 6 hours                                10.4   5.55  )-----------( 321      ( 30 Mar 2022 04:23 )             31.0     03-30    141  |  103  |  5<L>  ----------------------------<  103<H>  3.4<L>   |  28  |  0.52    Ca    8.5      30 Mar 2022 04:23  Phos  3.7     03-30  Mg     2.2     03-30    TPro  5.3<L>  /  Alb  3.6  /  TBili  0.8  /  DBili  x   /  AST  34  /  ALT  61<H>  /  AlkPhos  78  03-30    PT/INR - ( 30 Mar 2022 04:23 )   PT: 11.6 sec;   INR: 1.00 ratio         PTT - ( 30 Mar 2022 04:23 )  PTT:25.9 sec      ASSESSMENT & PLAN:   57F PMH of POTS, orthostatic hypotension, recently diagnosed with smoldering myeloma (March 2022) and amyloidosis (Feb 2022), mouth burning syndrome, recently diagnosed c.diff colitis (March 2022), hyperthyroidism s/p thyroidectomy, admitted to [facility] for multiple recurrent syncopal episodes thought to be associated with autonomic dysfunction 2/2 amyloidosis, transferred to [facility] for further amyloidosis management and evaluation for PPM, course complicated by episodes of bradycardia and hypotension, transferred to CICU on dopamine gtt, s/p dual chamber PPM placement 3/30.    Neuro  - AOx3, no issues  - Neuro checks per protocol     Respiratory  - SPO2 94% on room air, no acute issues. CXR post PPM without pneumothorax   - f/u PA/lateral CXR in AM     Cardiac  # Cardiac amyloidosis, Symptomatic Junctional/Sinus bradycardia   - confirmed amyloidosis s/p fat pad biopsy 2/2022, concern that current conduction abnormality is secondary to cardiac amyloid involvement  - OSH with EKG showing 3.8sec pause  - OSH workup with TTE (non bubble) normal LV size and function with EF 55%-60%. No regional wall motion abnormalities. Moderate concentric LVH.   - Repeat TTE here with EF 60%, severe right atrial enlargement  - cMRI indicative of cardiac amyloid  - Episode of symptomatic junctional/sinus bradycardia, HR dropped to 28 BPM with associated hypotension and lightheadedness. S/p Atropine 1 without response, heart rates improved now on dopamine gtt @ 5mg/kg. Pt transferred to CCU on dopamine gtt   - s/p PPM 3/30, now stable off dopamine gtt    # Orthostatic hypotension  - C/w with fludrocortisone   - resumed midodrine 20 TID, consider downtitrating to 10 TID and observing patient response  - BP monitoring    # POTS  - PO & salt intake encouraged      GI  - resumed pesco vegetarian diet  - has mouth burning syndrome which limits PO intake of certain foods      - normal kidney function, voiding without issue     Endo  - s/p thyroidectomy for hyperthyroidism  - outpt TSH elevated but FT4 wnl   - continue with levothyroxine 137 mcg qD  - Repeat TFTs 6-8 wks (May 2022)    Heme/Onc  # Light chain amyloidosis with cardiac involvement   - Patient was diagnosed based on multiple correlative tests and does not meet CRAB criteria for multiple myeloma  - Planning to start chemotherapy inpatient, first dose to be given Friday - Daratumumab + CyBorD w/ plan for autologous bone marrow transplant later - consent is in chart for chemotherapy treatment  - f/u heme/onc recs  - Will f/u with Dr. Goldberg after discharge (patient's Hematologist)  - resume DVT ppx after PPM placement once cleared by EP     ID  - s/p 10 day course PO vanc for c. diff finished 3/26  - s/p ceftriaxone for UTI      FOR FOLLOW UP:  []  PA/Lateral CXR in AM post PPM placement   [] Chemo initiation Friday, per heme onc recs   [] f/u EP for resuming DVT ppx       Sydni DeTommaso, CCU PA [phone]

## 2022-03-30 NOTE — PROGRESS NOTE ADULT - ASSESSMENT
57F PMH of POTS, orthostatic hypotension, recently diagnosed with smoldering myeloma (March 2022) and amyloidosis (Feb 2022), mouth burning syndrome, recently diagnosed c.diff colitis (March 2022), hyperthyroidism s/p thyroidectomy, admitted to [facility] for multiple recurrent syncopal episodes thought to be associated with autonomic dysfunction 2/2 amyloidosis, transferred to [facility] for further amyloidosis management and evaluation for PPM, course complicated by episodes of bradycardia and hypotension, transferred to CICU on dopamine gtt, s/p PPM placement 3/30.    Neuro  - AOx3, no issues    Respiratory  - SPO2 94% on room air, no acute issues. CXR post PPM without pneumothorax     Cardiac  #Possible cardiac amyloidosis, Symptomatic Junctional/Sinus bradycardia   - confirmed amyloidosis s/p fat pad biopsy 2/2022, concern that current conduction abnormality is secondary to cardiac amyloid involvement  - OSH with EKG showing 3.8sec pause  - OSH workup with TTE (non bubble) normal LV size and function with EF 55%-60%. No regional wall motion abnormalities. Moderate concentric LVH.   - Repeat TTE here with EF 60%, severe right atrial enlargement  - cMRI indicative of cardiac amyloid  - Episode of symptomatic junctional/sinus bradycardia, HR dropped to 28 BPM with associated hypotension and lightheadedness. S/p Atropine 1 without response, heart rates improved now on dopamine gtt @ 5mg/kg. Pt transferred to CCU on dopamine gtt   - s/p PPM 3/30, now stable off dopamine gtt    # Orthostatic hypotension  - C/w with fludrocortisone   - resumed midodrine 20 TID, consider downtitrating to 10 TID and observing patient response  - BP monitoring    # POTS  - PO & salt intake encouraged      GI  - resumed pesco vegetarian diet  - has mouth burning syndrome which limits PO intake of certain foods      - normal kidney function    Endo  - s/p thyroidectomy for hyperthyroidism  - outpt TSH elevated but FT4 wnl   - continue with levothyroxine 137 mcg qD  - Repeat TFTs 6-8 wks (May 2022)    Heme/Onc  #Amyloidosis  - initially planning for Daratumumab + CyBorD inpatient possibly Wed or Thurs, pending insurance approval  - f/u heme/onc recs  - Will f/u with Dr. Goldberg after discharge (patient's Hematologist)  - resume DVT ppx after PPM placement once cleared by EP     ID  - s/p 10 day course PO vanc for c. diff finished 3/26  - s/p ceftriaxone for UTI      I, Sydni Ann, have personally provided 35 minutes of critical care time excluding time spent on separate procedures, in addition to initial critical care time provided by the CICU Attending, Dr. Pérez.   57F PMH of POTS, orthostatic hypotension, recently diagnosed with smoldering myeloma (March 2022) and amyloidosis (Feb 2022), mouth burning syndrome, recently diagnosed c.diff colitis (March 2022), hyperthyroidism s/p thyroidectomy, admitted to [facility] for multiple recurrent syncopal episodes thought to be associated with autonomic dysfunction 2/2 amyloidosis, transferred to [facility] for further amyloidosis management and evaluation for PPM, course complicated by episodes of bradycardia and hypotension, transferred to CICU on dopamine gtt, s/p PPM placement 3/30.    Neuro  - AOx3, no issues    Respiratory  - SPO2 94% on room air, no acute issues. CXR post PPM without pneumothorax     Cardiac  #Possible cardiac amyloidosis, Symptomatic Junctional/Sinus bradycardia   - confirmed amyloidosis s/p fat pad biopsy 2/2022, concern that current conduction abnormality is secondary to cardiac amyloid involvement  - OSH with EKG showing 3.8sec pause  - OSH workup with TTE (non bubble) normal LV size and function with EF 55%-60%. No regional wall motion abnormalities. Moderate concentric LVH.   - Repeat TTE here with EF 60%, severe right atrial enlargement  - cMRI indicative of cardiac amyloid  - Episode of symptomatic junctional/sinus bradycardia, HR dropped to 28 BPM with associated hypotension and lightheadedness. S/p Atropine 1 without response, heart rates improved now on dopamine gtt @ 5mg/kg. Pt transferred to CCU on dopamine gtt   - s/p PPM 3/30, now stable off dopamine gtt    # Orthostatic hypotension  - C/w with fludrocortisone   - resumed midodrine 20 TID, consider downtitrating to 10 TID and observing patient response  - BP monitoring    # POTS  - PO & salt intake encouraged      GI  - resumed pesco vegetarian diet  - has mouth burning syndrome which limits PO intake of certain foods      - normal kidney function    Endo  - s/p thyroidectomy for hyperthyroidism  - outpt TSH elevated but FT4 wnl   - continue with levothyroxine 137 mcg qD  - Repeat TFTs 6-8 wks (May 2022)    Heme/Onc  #Amyloidosis  - initially planning for Daratumumab + CyBorD inpatient possibly Wed or Thurs, pending insurance approval  - f/u heme/onc recs  - Will f/u with Dr. Goldberg after discharge (patient's Hematologist)  - resume DVT ppx after PPM placement once cleared by EP     ID  - s/p 10 day course PO vanc for c. diff finished 3/26  - s/p ceftriaxone for UTI

## 2022-03-30 NOTE — PROGRESS NOTE ADULT - ATTENDING COMMENTS
History of POTS/orthostatic hypotension with recently diagnosed amyloid and multiple myeloma  Rapid response called on the floor for sinus pause requiring Dopamine infusion, EP at bedside  Transferred to CICU  A+Ox3  Hemodynamics acceptable with 1st degree AV block and HRs 70-80s on Dopamine infusion  EP following, for PPM today  Continue Midodrine   TTE with preserved LVEF  O2 sats mid 90s on room air  NPO for PPM   Normal renal function, compensated on exam  H/H low but acceptable  Afebrile, no antibiotics  Sugars controlled  No central access

## 2022-03-30 NOTE — PROGRESS NOTE ADULT - SUBJECTIVE AND OBJECTIVE BOX
INTERVAL HPI/OVERNIGHT EVENTS:  Last night, patient became bradycardic and hypotensive likely 2/2 cardiac amyloidosis. A RRT was called and she was given atropine 0.5 x 2 and started on a dopamine ggt. She was transferred to the CICU and pending PPM placement per EP.    MEDICATIONS  (STANDING):  aspirin  chewable 81 milliGRAM(s) Oral daily  atorvastatin 40 milliGRAM(s) Oral at bedtime  chlorhexidine 4% Liquid 1 Application(s) Topical <User Schedule>  DOPamine Infusion 2.5 MICROgram(s)/kG/Min (4.95 mL/Hr) IV Continuous <Continuous>  famotidine    Tablet 20 milliGRAM(s) Oral two times a day  fludroCORTISONE 0.2 milliGRAM(s) Oral daily  fluticasone propionate 50 MICROgram(s)/spray Nasal Spray 1 Spray(s) Both Nostrils two times a day  gabapentin 100 milliGRAM(s) Oral every 8 hours  influenza   Vaccine 0.5 milliLiter(s) IntraMuscular once  lactobacillus acidophilus 1 Tablet(s) Oral daily  levothyroxine 137 MICROGram(s) Oral daily  loratadine 10 milliGRAM(s) Oral daily  midodrine. 20 milliGRAM(s) Oral three times a day  potassium chloride  20 mEq/100 mL IVPB 20 milliEquivalent(s) IV Intermittent every 2 hours  simethicone 80 milliGRAM(s) Chew every 6 hours    MEDICATIONS  (PRN):    Allergies    penicillins (Unknown)    Intolerances          VITAL SIGNS:  T(F): 98.3 (03-30-22 @ 04:00)  HR: 85 (03-30-22 @ 07:00)  BP: 120/70 (03-30-22 @ 06:45)  RR: 14 (03-30-22 @ 07:00)  SpO2: 94% (03-30-22 @ 07:00)  Wt(kg): --    PHYSICAL EXAM:    Constitutional: NAD, lying comfortably in bed  Eyes: EOMI, PERRLA  Neck: supple, no masses, no JVD  Respiratory: CTAB; no r/r/w  Cardiovascular: RRR, no M/R/G  Gastrointestinal: +BS, soft, NTND, no hepatosplenomegaly  Extremities: no c/c/e  Neurological: AAOx3, nonfocal    LABS:                        10.4   5.55  )-----------( 321      ( 30 Mar 2022 04:23 )             31.0     03-30    141  |  103  |  5<L>  ----------------------------<  103<H>  3.4<L>   |  28  |  0.52    Ca    8.5      30 Mar 2022 04:23  Phos  3.7     03-30  Mg     2.2     03-30    TPro  5.3<L>  /  Alb  3.6  /  TBili  0.8  /  DBili  x   /  AST  34  /  ALT  61<H>  /  AlkPhos  78  03-30    PT/INR - ( 30 Mar 2022 04:23 )   PT: 11.6 sec;   INR: 1.00 ratio         PTT - ( 30 Mar 2022 04:23 )  PTT:25.9 sec      RADIOLOGY & ADDITIONAL TESTS:  Studies reviewed.   INTERVAL HPI/OVERNIGHT EVENTS:  Last night, patient became bradycardic and hypotensive likely 2/2 cardiac amyloidosis. A RRT was called and she was given atropine 0.5 x 2 and started on a dopamine ggt. She was transferred to the CICU and now s/p PPM placement per EP today. Patient seen after procedure and tolerated it well. Denies fevers, chills, SOB, chest pain, abdominal pain, urinary symptoms.    MEDICATIONS  (STANDING):  aspirin  chewable 81 milliGRAM(s) Oral daily  atorvastatin 40 milliGRAM(s) Oral at bedtime  chlorhexidine 4% Liquid 1 Application(s) Topical <User Schedule>  DOPamine Infusion 2.5 MICROgram(s)/kG/Min (4.95 mL/Hr) IV Continuous <Continuous>  famotidine    Tablet 20 milliGRAM(s) Oral two times a day  fludroCORTISONE 0.2 milliGRAM(s) Oral daily  fluticasone propionate 50 MICROgram(s)/spray Nasal Spray 1 Spray(s) Both Nostrils two times a day  gabapentin 100 milliGRAM(s) Oral every 8 hours  influenza   Vaccine 0.5 milliLiter(s) IntraMuscular once  lactobacillus acidophilus 1 Tablet(s) Oral daily  levothyroxine 137 MICROGram(s) Oral daily  loratadine 10 milliGRAM(s) Oral daily  midodrine. 20 milliGRAM(s) Oral three times a day  potassium chloride  20 mEq/100 mL IVPB 20 milliEquivalent(s) IV Intermittent every 2 hours  simethicone 80 milliGRAM(s) Chew every 6 hours    MEDICATIONS  (PRN):    Allergies    penicillins (Unknown)    Intolerances          VITAL SIGNS:  T(F): 98.3 (03-30-22 @ 04:00)  HR: 85 (03-30-22 @ 07:00)  BP: 120/70 (03-30-22 @ 06:45)  RR: 14 (03-30-22 @ 07:00)  SpO2: 94% (03-30-22 @ 07:00)  Wt(kg): --    PHYSICAL EXAM:    Constitutional: NAD, lying comfortably in bed  Eyes: EOMI, PERRLA  Neck: supple, no masses, no JVD  Respiratory: CTAB; no r/r/w  Cardiovascular: RRR, no M/R/G  Gastrointestinal: +BS, soft, NTND, no hepatosplenomegaly  Extremities: no c/c/e  Neurological: AAOx3, nonfocal    LABS:                        10.4   5.55  )-----------( 321      ( 30 Mar 2022 04:23 )             31.0     03-30    141  |  103  |  5<L>  ----------------------------<  103<H>  3.4<L>   |  28  |  0.52    Ca    8.5      30 Mar 2022 04:23  Phos  3.7     03-30  Mg     2.2     03-30    TPro  5.3<L>  /  Alb  3.6  /  TBili  0.8  /  DBili  x   /  AST  34  /  ALT  61<H>  /  AlkPhos  78  03-30    PT/INR - ( 30 Mar 2022 04:23 )   PT: 11.6 sec;   INR: 1.00 ratio         PTT - ( 30 Mar 2022 04:23 )  PTT:25.9 sec      RADIOLOGY & ADDITIONAL TESTS:  Studies reviewed.

## 2022-03-30 NOTE — PROGRESS NOTE ADULT - SUBJECTIVE AND OBJECTIVE BOX
24H hour events:     MEDICATIONS:  aspirin  chewable 81 milliGRAM(s) Oral daily  DOPamine Infusion 2.5 MICROgram(s)/kG/Min IV Continuous <Continuous>  midodrine. 20 milliGRAM(s) Oral three times a day  loratadine 10 milliGRAM(s) Oral daily  gabapentin 100 milliGRAM(s) Oral every 8 hours  famotidine    Tablet 20 milliGRAM(s) Oral two times a day  simethicone 80 milliGRAM(s) Chew every 6 hours  atorvastatin 40 milliGRAM(s) Oral at bedtime  fludroCORTISONE 0.2 milliGRAM(s) Oral daily  levothyroxine 137 MICROGram(s) Oral daily  chlorhexidine 4% Liquid 1 Application(s) Topical <User Schedule>  fluticasone propionate 50 MICROgram(s)/spray Nasal Spray 1 Spray(s) Both Nostrils two times a day  influenza   Vaccine 0.5 milliLiter(s) IntraMuscular once  potassium chloride  20 mEq/100 mL IVPB 20 milliEquivalent(s) IV Intermittent every 2 hours      REVIEW OF SYSTEMS:  Complete 10point ROS negative.    PHYSICAL EXAM:  T(C): 36.8 (03-30-22 @ 07:30), Max: 36.9 (03-29-22 @ 17:40)  HR: 82 (03-30-22 @ 08:00) (63 - 91)  BP: 118/70 (03-30-22 @ 08:00) (88/51 - 137/56)  RR: 20 (03-30-22 @ 08:00) (12 - 40)  SpO2: 95% (03-30-22 @ 08:00) (87% - 99%)  Wt(kg): --  I&O's Summary    29 Mar 2022 07:01  -  30 Mar 2022 07:00  --------------------------------------------------------  IN: 730 mL / OUT: 2400 mL / NET: -1670 mL    30 Mar 2022 07:01  -  30 Mar 2022 08:23  --------------------------------------------------------  IN: 0 mL / OUT: 450 mL / NET: -450 mL        Appearance: Normal	  HEENT:   Normal oral mucosa, PERRL, EOMI	  Lymphatic: No lymphadenopathy  Cardiovascular: Normal S1 S2, No JVD, No murmurs, No edema  Respiratory: Lungs clear to auscultation	  Psychiatry: A & O x 3, Mood & affect appropriate  Gastrointestinal:  Soft, Non-tender, + BS	  Skin: No rashes, No ecchymoses, No cyanosis	  Neurologic: Non-focal  Extremities: Normal range of motion, No clubbing, cyanosis or edema  Vascular: Peripheral pulses palpable 2+ bilaterally        LABS:	 	    CBC Full  -  ( 30 Mar 2022 04:23 )  WBC Count : 5.55 K/uL  Hemoglobin : 10.4 g/dL  Hematocrit : 31.0 %  Platelet Count - Automated : 321 K/uL  Mean Cell Volume : 88.8 fl  Mean Cell Hemoglobin : 29.8 pg  Mean Cell Hemoglobin Concentration : 33.5 gm/dL  Auto Neutrophil # : 3.28 K/uL  Auto Lymphocyte # : 1.68 K/uL  Auto Monocyte # : 0.43 K/uL  Auto Eosinophil # : 0.07 K/uL  Auto Basophil # : 0.07 K/uL  Auto Neutrophil % : 59.0 %  Auto Lymphocyte % : 30.3 %  Auto Monocyte % : 7.7 %  Auto Eosinophil % : 1.3 %  Auto Basophil % : 1.3 %    03-30    141  |  103  |  5<L>  ----------------------------<  103<H>  3.4<L>   |  28  |  0.52  03-29    141  |  102  |  6<L>  ----------------------------<  137<H>  3.9   |  30  |  0.66    Ca    8.5      30 Mar 2022 04:23  Ca    8.8      29 Mar 2022 06:13  Phos  3.7     03-30  Phos  4.1     03-29  Mg     2.2     03-30  Mg     1.8     03-29    TPro  5.3<L>  /  Alb  3.6  /  TBili  0.8  /  DBili  x   /  AST  34  /  ALT  61<H>  /  AlkPhos  78  03-30  TPro  4.8<L>  /  Alb  3.3  /  TBili  0.6  /  DBili  x   /  AST  24  /  ALT  49<H>  /  AlkPhos  72  03-29      proBNP:   Lipid Profile:   HgA1c:   TSH:       CARDIAC MARKERS:          TELEMETRY: 	    ECG:  	  RADIOLOGY:  OTHER: 	    PREVIOUS DIAGNOSTIC TESTING:    [ ] Echocardiogram:    [ ]  Catheterization:  [ ] Stress Test:  	  	  ASSESSMENT/PLAN: 	     24H hour events: Tele with SR ~70-80s. Pt currently on Dopamine drip. Feeling well. Denies chest pain, SOB, or palpitations.     MEDICATIONS:  aspirin  chewable 81 milliGRAM(s) Oral daily  DOPamine Infusion 2.5 MICROgram(s)/kG/Min IV Continuous <Continuous>  midodrine. 20 milliGRAM(s) Oral three times a day  loratadine 10 milliGRAM(s) Oral daily  gabapentin 100 milliGRAM(s) Oral every 8 hours  famotidine    Tablet 20 milliGRAM(s) Oral two times a day  simethicone 80 milliGRAM(s) Chew every 6 hours  atorvastatin 40 milliGRAM(s) Oral at bedtime  fludroCORTISONE 0.2 milliGRAM(s) Oral daily  levothyroxine 137 MICROGram(s) Oral daily  chlorhexidine 4% Liquid 1 Application(s) Topical <User Schedule>  fluticasone propionate 50 MICROgram(s)/spray Nasal Spray 1 Spray(s) Both Nostrils two times a day  influenza   Vaccine 0.5 milliLiter(s) IntraMuscular once  potassium chloride  20 mEq/100 mL IVPB 20 milliEquivalent(s) IV Intermittent every 2 hours      REVIEW OF SYSTEMS:  Complete 10point ROS negative.    PHYSICAL EXAM:  T(C): 36.8 (03-30-22 @ 07:30), Max: 36.9 (03-29-22 @ 17:40)  HR: 82 (03-30-22 @ 08:00) (63 - 91)  BP: 118/70 (03-30-22 @ 08:00) (88/51 - 137/56)  RR: 20 (03-30-22 @ 08:00) (12 - 40)  SpO2: 95% (03-30-22 @ 08:00) (87% - 99%)  Wt(kg): --  I&O's Summary    29 Mar 2022 07:01  -  30 Mar 2022 07:00  --------------------------------------------------------  IN: 730 mL / OUT: 2400 mL / NET: -1670 mL    30 Mar 2022 07:01  -  30 Mar 2022 08:23  --------------------------------------------------------  IN: 0 mL / OUT: 450 mL / NET: -450 mL        Appearance: Normal	  Cardiovascular: Normal S1 S2, No JVD, No murmurs  Respiratory: Lungs clear to auscultation	  Psychiatry: A & O x 3, Mood & affect appropriate	  Neurologic: Non-focal  Extremities: No BLE edema      LABS:	 	    CBC Full  -  ( 30 Mar 2022 04:23 )  WBC Count : 5.55 K/uL  Hemoglobin : 10.4 g/dL  Hematocrit : 31.0 %  Platelet Count - Automated : 321 K/uL  Mean Cell Volume : 88.8 fl  Mean Cell Hemoglobin : 29.8 pg  Mean Cell Hemoglobin Concentration : 33.5 gm/dL  Auto Neutrophil # : 3.28 K/uL  Auto Lymphocyte # : 1.68 K/uL  Auto Monocyte # : 0.43 K/uL  Auto Eosinophil # : 0.07 K/uL  Auto Basophil # : 0.07 K/uL  Auto Neutrophil % : 59.0 %  Auto Lymphocyte % : 30.3 %  Auto Monocyte % : 7.7 %  Auto Eosinophil % : 1.3 %  Auto Basophil % : 1.3 %    03-30    141  |  103  |  5<L>  ----------------------------<  103<H>  3.4<L>   |  28  |  0.52  03-29    141  |  102  |  6<L>  ----------------------------<  137<H>  3.9   |  30  |  0.66    Ca    8.5      30 Mar 2022 04:23  Ca    8.8      29 Mar 2022 06:13  Phos  3.7     03-30  Phos  4.1     03-29  Mg     2.2     03-30  Mg     1.8     03-29    TPro  5.3<L>  /  Alb  3.6  /  TBili  0.8  /  DBili  x   /  AST  34  /  ALT  61<H>  /  AlkPhos  78  03-30  TPro  4.8<L>  /  Alb  3.3  /  TBili  0.6  /  DBili  x   /  AST  24  /  ALT  49<H>  /  AlkPhos  72  03-29      TSH: Thyroid Stimulating Hormone, Serum (06.07.17 @ 01:02)    Thyroid Stimulating Hormone, Serum: 2.660 uU/mL    TELEMETRY: 	SR ~70-80s  	    < from: MR Cardiac w/wo IV Cont (03.28.22 @ 09:53) >  IMPRESSION:  Small pericardial and bilateral pleural effusions. Subtle inferior   lateral basal myocardial wall with gadolinium enhancement.  Biatrial   enlargement with associated late gadolinium enhancement. These findings   may represent amyloidosis.    < end of copied text >

## 2022-03-30 NOTE — CONSULT NOTE ADULT - ASSESSMENT
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  Patient admitted with syncope and noted to have symptomatic bradycardia on tele.  Plan for PPM today by EP - likely dual chamber device implant.    Follow-up TTE with strain imaging.   PO vanco for C. diff.    Start myeloma/amyloid therapies per hematology.

## 2022-03-30 NOTE — PRE-ANESTHESIA EVALUATION ADULT - NSANTHRISKSRD_GEN_ALL_CORE
ASA of 4, 4E, 5 or 5E [phone] [facility] - [phone]

## 2022-03-30 NOTE — PROGRESS NOTE ADULT - SUBJECTIVE AND OBJECTIVE BOX
Armani Foster, PGY-1  CCU Service  Internal Medicine    PATIENT: ROLF MARQUEZ, MRN: [id number]    CHIEF COMPLAINT: Patient is a 57y old  Female who presents with a chief complaint of syncope (29 Mar 2022 19:38)      INTERVAL HISTORY/OVERNIGHT EVENTS: No overnight events. At bedside, patient has been sleeping and eating well. Denies N/V/D/C. Last BM x1 regular yesterday. Denies abdominal pain. Denies chest pain or SOB, cough. Has been ambulating with assistance. Oriented to person, place, and time. Breathing comfortably on room air.      MEDICATIONS:  MEDICATIONS  (STANDING):  aspirin  chewable 81 milliGRAM(s) Oral daily  atorvastatin 40 milliGRAM(s) Oral at bedtime  chlorhexidine 4% Liquid 1 Application(s) Topical <User Schedule>  DOPamine Infusion 2.5 MICROgram(s)/kG/Min (4.95 mL/Hr) IV Continuous <Continuous>  famotidine    Tablet 20 milliGRAM(s) Oral two times a day  fludroCORTISONE 0.2 milliGRAM(s) Oral daily  fluticasone propionate 50 MICROgram(s)/spray Nasal Spray 1 Spray(s) Both Nostrils two times a day  gabapentin 100 milliGRAM(s) Oral every 8 hours  influenza   Vaccine 0.5 milliLiter(s) IntraMuscular once  lactobacillus acidophilus 1 Tablet(s) Oral daily  levothyroxine 137 MICROGram(s) Oral daily  loratadine 10 milliGRAM(s) Oral daily  midodrine. 20 milliGRAM(s) Oral three times a day  simethicone 80 milliGRAM(s) Chew every 6 hours    MEDICATIONS  (PRN):      ALLERGIES: Allergies    penicillins (Unknown)    Intolerances        OBJECTIVE:  ICU Vital Signs Last 24 Hrs  T(C): 36.8 (30 Mar 2022 04:00), Max: 36.9 (29 Mar 2022 17:40)  T(F): 98.3 (30 Mar 2022 04:00), Max: 98.5 (29 Mar 2022 17:40)  HR: 74 (30 Mar 2022 06:00) (63 - 91)  BP: 119/73 (30 Mar 2022 06:00) (88/51 - 137/56)  BP(mean): 91 (30 Mar 2022 06:00) (64 - 94)  ABP: --  ABP(mean): --  RR: 16 (30 Mar 2022 06:00) (12 - 40)  SpO2: 96% (30 Mar 2022 06:00) (87% - 99%)      Adult Advanced Hemodynamics Last 24 Hrs  CVP(mm Hg): --  CVP(cm H2O): --  CO: --  CI: --  PA: --  PA(mean): --  PCWP: --  SVR: --  SVRI: --  PVR: --  PVRI: --  CAPILLARY BLOOD GLUCOSE      POCT Blood Glucose.: 147 mg/dL (29 Mar 2022 16:49)    CAPILLARY BLOOD GLUCOSE      POCT Blood Glucose.: 147 mg/dL (29 Mar 2022 16:49)    I&O's Summary    28 Mar 2022 07:01  -  29 Mar 2022 07:00  --------------------------------------------------------  IN: 725 mL / OUT: 500 mL / NET: 225 mL    29 Mar 2022 07:01  -  30 Mar 2022 06:39  --------------------------------------------------------  IN: 725 mL / OUT: 2400 mL / NET: -1675 mL      Daily     Daily Weight in k.9 (30 Mar 2022 05:15)    PHYSICAL EXAMINATION:  General: Comfortable, no acute distress, cooperative with exam.  HEENT: PERRLA, EOMI, moist mucous membranes.  Respiratory: CTAB, normal respiratory effort, no coughing, wheezes, crackles, or rales.  CV: RRR, S1S2, no murmurs, rubs or gallops. No JVD. Distal pulses intact.  Abdominal: Soft, nontender, nondistended, no rebound or guarding, normal bowel sounds.  Neurology: AOx3, no focal neuro defects, MARX x 4.  Extremities: No pitting edema, + Peripheral pulses.  Incisions:   Tubes:    LABS:                          10.4   5.55  )-----------( 321      ( 30 Mar 2022 04:23 )             31.0     03-30    141  |  103  |  5<L>  ----------------------------<  103<H>  3.4<L>   |  28  |  0.52    Ca    8.5      30 Mar 2022 04:23  Phos  3.7     03-30  Mg     2.2     0330    TPro  5.3<L>  /  Alb  3.6  /  TBili  0.8  /  DBili  x   /  AST  34  /  ALT  61<H>  /  AlkPhos  78  03-30    LIVER FUNCTIONS - ( 30 Mar 2022 04:23 )  Alb: 3.6 g/dL / Pro: 5.3 g/dL / ALK PHOS: 78 U/L / ALT: 61 U/L / AST: 34 U/L / GGT: x           PT/INR - ( 30 Mar 2022 04:23 )   PT: 11.6 sec;   INR: 1.00 ratio         PTT - ( 30 Mar 2022 04:23 )  PTT:25.9 sec            TELEMETRY:     EKG:     IMAGING:  Armani Foster, PGY-1  CCU Service  Internal Medicine    PATIENT: ROLF MARQUEZ, MRN: [id number]    CHIEF COMPLAINT: Patient is a 57y old  Female who presents with a chief complaint of syncope (29 Mar 2022 19:38)      INTERVAL HISTORY/OVERNIGHT EVENTS: Restarted midodrine overnight at 20 TID. At bedside, patient has been sleeping and eating well. Currently asymptomatic, no chest pain, shortness of breath, or palpitations. Currently on dopamine gtt. Oriented to person, place, and time. Breathing comfortably on 2L NC. No events on tele.     Patient had pacemaker placed this morning.       MEDICATIONS:  MEDICATIONS  (STANDING):  aspirin  chewable 81 milliGRAM(s) Oral daily  atorvastatin 40 milliGRAM(s) Oral at bedtime  chlorhexidine 4% Liquid 1 Application(s) Topical <User Schedule>  DOPamine Infusion 2.5 MICROgram(s)/kG/Min (4.95 mL/Hr) IV Continuous <Continuous>  famotidine    Tablet 20 milliGRAM(s) Oral two times a day  fludroCORTISONE 0.2 milliGRAM(s) Oral daily  fluticasone propionate 50 MICROgram(s)/spray Nasal Spray 1 Spray(s) Both Nostrils two times a day  gabapentin 100 milliGRAM(s) Oral every 8 hours  influenza   Vaccine 0.5 milliLiter(s) IntraMuscular once  lactobacillus acidophilus 1 Tablet(s) Oral daily  levothyroxine 137 MICROGram(s) Oral daily  loratadine 10 milliGRAM(s) Oral daily  midodrine. 20 milliGRAM(s) Oral three times a day  simethicone 80 milliGRAM(s) Chew every 6 hours    MEDICATIONS  (PRN):      ALLERGIES: Allergies    penicillins (Unknown)    Intolerances      OBJECTIVE:  ICU Vital Signs Last 24 Hrs  T(C): 36.8 (30 Mar 2022 04:00), Max: 36.9 (29 Mar 2022 17:40)  T(F): 98.3 (30 Mar 2022 04:00), Max: 98.5 (29 Mar 2022 17:40)  HR: 74 (30 Mar 2022 06:00) (63 - 91)  BP: 119/73 (30 Mar 2022 06:00) (88/51 - 137/56)  BP(mean): 91 (30 Mar 2022 06:00) (64 - 94)  ABP: --  ABP(mean): --  RR: 16 (30 Mar 2022 06:00) (12 - 40)  SpO2: 96% (30 Mar 2022 06:00) (87% - 99%)      Adult Advanced Hemodynamics Last 24 Hrs  CVP(mm Hg): --  CVP(cm H2O): --  CO: --  CI: --  PA: --  PA(mean): --  PCWP: --  SVR: --  SVRI: --  PVR: --  PVRI: --  CAPILLARY BLOOD GLUCOSE      POCT Blood Glucose.: 147 mg/dL (29 Mar 2022 16:49)    CAPILLARY BLOOD GLUCOSE      POCT Blood Glucose.: 147 mg/dL (29 Mar 2022 16:49)    I&O's Summary    28 Mar 2022 07:01  -  29 Mar 2022 07:00  --------------------------------------------------------  IN: 725 mL / OUT: 500 mL / NET: 225 mL    29 Mar 2022 07:01  -  30 Mar 2022 06:39  --------------------------------------------------------  IN: 725 mL / OUT: 2400 mL / NET: -1675 mL      Daily     Daily Weight in k.9 (30 Mar 2022 05:15)    PHYSICAL EXAMINATION:  General: Comfortable, no acute distress, cooperative with exam.  HEENT: PERRLA, EOMI, moist mucous membranes.  Respiratory: CTAB, normal respiratory effort, no coughing, wheezes, crackles, or rales.  CV: RRR, S1S2, no murmurs, rubs or gallops. No JVD. Distal pulses intact.  Abdominal: Soft, nontender, nondistended, no rebound or guarding, normal bowel sounds.  Neurology: AOx3, no focal neuro defects, MARX x 4.  Extremities: No pitting edema, + Peripheral pulses.    LABS:                          10.4   5.55  )-----------( 321      ( 30 Mar 2022 04:23 )             31.0     03-30    141  |  103  |  5<L>  ----------------------------<  103<H>  3.4<L>   |  28  |  0.52    Ca    8.5      30 Mar 2022 04:23  Phos  3.7     03-30  Mg     2.2     03-30    TPro  5.3<L>  /  Alb  3.6  /  TBili  0.8  /  DBili  x   /  AST  34  /  ALT  61<H>  /  AlkPhos  78  03-30    LIVER FUNCTIONS - ( 30 Mar 2022 04:23 )  Alb: 3.6 g/dL / Pro: 5.3 g/dL / ALK PHOS: 78 U/L / ALT: 61 U/L / AST: 34 U/L / GGT: x           PT/INR - ( 30 Mar 2022 04:23 )   PT: 11.6 sec;   INR: 1.00 ratio         PTT - ( 30 Mar 2022 04:23 )  PTT:25.9 sec            TELEMETRY:     EKG:     IMAGING:

## 2022-03-30 NOTE — PRE-ANESTHESIA EVALUATION ADULT - NSANTHOSAYNRD_GEN_A_CORE
No. LETI screening performed.  STOP BANG Legend: 0-2 = LOW Risk; 3-4 = INTERMEDIATE Risk; 5-8 = HIGH Risk

## 2022-03-30 NOTE — PROGRESS NOTE ADULT - ASSESSMENT
58 yo F with a PMH of POTS with orthostatic hypotension, hyperthyroidism s/p thyroidectomy leading to hypothyroidism, mouth burning syndrome, recently diagnosed lambda light chain amyloidosis, and recently diagnosed C diff colitis who was admitted on 3/15/22 to [facility] for multiple recurrent syncopal episodes, transferred for further cardiac workup and for possible chemotherapy.    #Light Chain Amyloidosis  - Patient was diagnosed based on multiple correlative tests  - No monoclonal protein found on SPEP/UPEP/S-MANUEL/U-MANUEL or elevated immunoglobulin, but with elevated lambda FLC, 10.92 with K/L ratio 0.11 on 3/1  - 2/8/22 abdominal fat pad biopsy showed positive Congo Red staining in rare some vessels with positive polarization  - 3/1/22 BMBx showed 25-35% plasma cells, positive for cyclin D1, with monotypic plasma cells by flow cytometry c/w plasma cell neoplasm. Congo Red negative on BMBx. Atypical CCND1/IGH fusion pattern detected (3.5%) on FISH  - Does not meet CRAB criteria for multiple myeloma  - Therefore, diagnosis consistent with lambda light chain amyloidosis  - May have cardiac involvement as noted below, possibly contributing to patient's recurrent syncope, first-degree heart block, and POTS (on fludrocortisone 0.5 mg BID at home, now on 0.2 mg daily)-patient pending possible PPM placement. cMRI completed 3/28, likely c/w amyloidosis.  - Renal ultrasound showed R parapelvic cyst vs fullness without hydronephrosis  - Was planned to start chemotherapy inpatient - will plan for Daratumumab + CyBorD w/ plan for autologous bone marrow transplant later - consent is in chart for inpatient chemotherapy treatment  - Will f/u with Dr. Goldberg (patient's Hematologist)  - Patient aware of side effects of chemotherapy including fatigue, neuropathy, pancytopenias, susceptibility to infection.  - c/w medical management of hypotension with midodrine 20 mg TID and fludrocortisone 0.2 mg daily per primary team    #POTS/Syncope  - Patient has had POTS with orthostatic hypotension and now has recurrent syncopal episodes (required multiple RRTs at outside hospital) and two RRTs at [facility] for hypotension 2/2 bradycardia likely due to cardiac amyloidosis, also has at least first degree heart block, now in CICU  - Continue excellent care in CICU  - Telemetry monitoring and maintain goal K~4.5 and Mg~2.5 per EP  - Seen by EP, likely plan for PPM placement, patient amenable to placement  - cardiac MRI (3/28) likely cardiac amyloidosis  - Treatment for cardiac amyloidosis per heart failure and cardiology - now on dopamine ggt    ***************************************************************  Sydni Bueno, PGY2  Internal Medicine   pager: NS: [phone] [facility]: [id number]  ***************************************************************     58 yo F with a PMH of POTS with orthostatic hypotension, hyperthyroidism s/p thyroidectomy leading to hypothyroidism, mouth burning syndrome, recently diagnosed lambda light chain amyloidosis, and recently diagnosed C diff colitis who was admitted on 3/15/22 to [facility] for multiple recurrent syncopal episodes, transferred for further cardiac workup and for possible chemotherapy.    #Light Chain Amyloidosis  - Patient was diagnosed based on multiple correlative tests  - No monoclonal protein found on SPEP/UPEP/S-MANUEL/U-MANUEL or elevated immunoglobulin, but with elevated lambda FLC, 10.92 with K/L ratio 0.11 on 3/1  - 2/8/22 abdominal fat pad biopsy showed positive Congo Red staining in rare some vessels with positive polarization  - 3/1/22 BMBx showed 25-35% plasma cells, positive for cyclin D1, with monotypic plasma cells by flow cytometry c/w plasma cell neoplasm. Congo Red negative on BMBx. Atypical CCND1/IGH fusion pattern detected (3.5%) on FISH  - Does not meet CRAB criteria for multiple myeloma  - Therefore, diagnosis consistent with lambda light chain amyloidosis  - May have cardiac involvement as noted below, possibly contributing to patient's recurrent syncope, first-degree heart block, and POTS (on fludrocortisone 0.5 mg BID at home, now on 0.2 mg daily)-patient pending possible PPM placement. cMRI completed 3/28, likely c/w amyloidosis  - Renal ultrasound showed R parapelvic cyst vs fullness without hydronephrosis  - Planning to start chemotherapy inpatient, first dose to be given Friday - Daratumumab + CyBorD w/ plan for autologous bone marrow transplant later - consent is in chart for chemotherapy treatment  - Will f/u with Dr. Goldberg (patient's Hematologist)  - Patient aware of side effects of chemotherapy including fatigue, neuropathy, pancytopenias, susceptibility to infection.  - Patient endorsed only wanting to receive FDA approved treatments  - c/w medical management of hypotension with midodrine 20 mg TID and fludrocortisone 0.2 mg daily per primary team    #POTS/Syncope  - Patient has had POTS with orthostatic hypotension and now has recurrent syncopal episodes (required multiple RRTs at outside hospital) and two RRTs at [facility] for hypotension 2/2 bradycardia likely due to cardiac amyloidosis, also has at least first degree heart block, now in CICU  - Continue excellent care in CICU  - Telemetry monitoring and maintain goal K~4.5 and Mg~2.5 per EP  - Seen by EP, s/p PPM (3/30)  - cardiac MRI (3/28) c/f cardiac amyloidosis  - Treatment for cardiac amyloidosis per heart failure and cardiology - s/p dopamine ggt    ***************************************************************  Sydni Bueno, PGY2  Internal Medicine   pager: [phone] [facility]: [phone]  ***************************************************************

## 2022-03-30 NOTE — CONSULT NOTE ADULT - SUBJECTIVE AND OBJECTIVE BOX
CHIEF COMPLAINT: Patient is a 57y old  Female who presents with a chief complaint of syncope (29 Mar 2022 19:38)      INTERVAL HISTORY/OVERNIGHT EVENTS: Restarted midodrine overnight at 20 TID. At bedside, patient has been sleeping and eating well. Currently asymptomatic, no chest pain, shortness of breath, or palpitations. Currently on dopamine gtt. Oriented to person, place, and time. Breathing comfortably on 2L NC. No events on tele.     Patient had pacemaker placed this morning.       MEDICATIONS:  MEDICATIONS  (STANDING):  aspirin  chewable 81 milliGRAM(s) Oral daily  atorvastatin 40 milliGRAM(s) Oral at bedtime  chlorhexidine 4% Liquid 1 Application(s) Topical <User Schedule>  DOPamine Infusion 2.5 MICROgram(s)/kG/Min (4.95 mL/Hr) IV Continuous <Continuous>  famotidine    Tablet 20 milliGRAM(s) Oral two times a day  fludroCORTISONE 0.2 milliGRAM(s) Oral daily  fluticasone propionate 50 MICROgram(s)/spray Nasal Spray 1 Spray(s) Both Nostrils two times a day  gabapentin 100 milliGRAM(s) Oral every 8 hours  influenza   Vaccine 0.5 milliLiter(s) IntraMuscular once  lactobacillus acidophilus 1 Tablet(s) Oral daily  levothyroxine 137 MICROGram(s) Oral daily  loratadine 10 milliGRAM(s) Oral daily  midodrine. 20 milliGRAM(s) Oral three times a day  simethicone 80 milliGRAM(s) Chew every 6 hours    MEDICATIONS  (PRN):      ALLERGIES: Allergies    penicillins (Unknown)    Intolerances      OBJECTIVE:  ICU Vital Signs Last 24 Hrs  T(C): 36.8 (30 Mar 2022 04:00), Max: 36.9 (29 Mar 2022 17:40)  T(F): 98.3 (30 Mar 2022 04:00), Max: 98.5 (29 Mar 2022 17:40)  HR: 74 (30 Mar 2022 06:00) (63 - 91)  BP: 119/73 (30 Mar 2022 06:00) (88/51 - 137/56)  BP(mean): 91 (30 Mar 2022 06:00) (64 - 94)  ABP: --  ABP(mean): --  RR: 16 (30 Mar 2022 06:00) (12 - 40)  SpO2: 96% (30 Mar 2022 06:00) (87% - 99%)      Adult Advanced Hemodynamics Last 24 Hrs  CVP(mm Hg): --  CVP(cm H2O): --  CO: --  CI: --  PA: --  PA(mean): --  PCWP: --  SVR: --  SVRI: --  PVR: --  PVRI: --  CAPILLARY BLOOD GLUCOSE      POCT Blood Glucose.: 147 mg/dL (29 Mar 2022 16:49)    CAPILLARY BLOOD GLUCOSE      POCT Blood Glucose.: 147 mg/dL (29 Mar 2022 16:49)    I&O's Summary    28 Mar 2022 07:01  -  29 Mar 2022 07:00  --------------------------------------------------------  IN: 725 mL / OUT: 500 mL / NET: 225 mL    29 Mar 2022 07:01  -  30 Mar 2022 06:39  --------------------------------------------------------  IN: 725 mL / OUT: 2400 mL / NET: -1675 mL      Daily     Daily Weight in k.9 (30 Mar 2022 05:15)    PHYSICAL EXAMINATION:  General: Comfortable, no acute distress, cooperative with exam.  HEENT: PERRLA, EOMI, moist mucous membranes.  Respiratory: CTAB, normal respiratory effort, no coughing, wheezes, crackles, or rales.  CV: RRR, S1S2, no murmurs, rubs or gallops. No JVD. Distal pulses intact.  Abdominal: Soft, nontender, nondistended, no rebound or guarding, normal bowel sounds.  Neurology: AOx3, no focal neuro defects, MARX x 4.  Extremities: No pitting edema, + Peripheral pulses.    LABS:                          10.4   5.55  )-----------( 321      ( 30 Mar 2022 04:23 )             31.0     03-30    141  |  103  |  5<L>  ----------------------------<  103<H>  3.4<L>   |  28  |  0.52    Ca    8.5      30 Mar 2022 04:23  Phos  3.7     -  Mg     2.2         TPro  5.3<L>  /  Alb  3.6  /  TBili  0.8  /  DBili  x   /  AST  34  /  ALT  61<H>  /  AlkPhos  78      LIVER FUNCTIONS - ( 30 Mar 2022 04:23 )  Alb: 3.6 g/dL / Pro: 5.3 g/dL / ALK PHOS: 78 U/L / ALT: 61 U/L / AST: 34 U/L / GGT: x           PT/INR - ( 30 Mar 2022 04:23 )   PT: 11.6 sec;   INR: 1.00 ratio         PTT - ( 30 Mar 2022 04:23 )  PTT:25.9 sec            TELEMETRY:     EKG:     IMAGING:     Assessment and Plan:   · Assessment	  57 y.o. F with PMH of POTS, orthostatic hypotension, recently diagnosed with smoldering myeloma (2022) and amyloidosis (2022), mouth burning syndrome, recently diagnosed c.diff colitis (2022), hyperthyroidism s/p thyroidectomy, admitted to [facility] for multiple recurrent syncopal episodes thought to be associated with autonomic dysfunction / amyloidosis, transferred to [facility] for further amyloidosis management and evaluation for PPM, course complicated by episodes of bradycardia and hypotension, now transferred to CICU, stable on dopamine gtt, s/p PPM placement 3/30, now stable off dopamine.     Neuro  - AOx3, no issues    Respiratory  - currently on 2L NC saturating 90s, wean as tolerated  - monitor O2 sat    Cardiac  #Possible cardiac amyloidosis  - confirmed amyloidosis s/p fat pad biopsy 2022, concern that current conduction abnormality is secondary to cardiac amyloid involvement  - OSH with EKG showing 3.8sec pause  - OSH workup with TTE (non bubble) normal LV size and function with EF 55%-60%. No regional wall motion abnormalities. Moderate concentric LVH.   - Repeat TTE here with EF 60%, severe right atrial enlargement  - cMRI indicative of cardiac amyloid  - for PPM 3/30, now stable off dopamine gtt    # Orthostatic hypotension  - C/w with fludrocortisone   - resumed midodrine 20 TID, consider downtitrating to 10 TID and observing patient response  - BP monitoring    # POTS  - PO & salt intake encouraged      GI  - resumed pesco vegetarian diet  - has mouth burning syndrome which limits PO intake of certain foods      - normal kidney function    Endo  - s/p thyroidectomy for hyperthyroidism  - outpt TSH elevated but FT4 wnl   - continue with levothyroxine 137 mcg qD  - Repeat TFTs 6-8 wks (May 2022)    Heme/Onc  #Amyloidosis  - initially planning for Daratumumab + CyBorD inpatient possibly Wed or Thurs, pending insurance approval  - f/u heme/onc recs  - Will f/u with Dr. Goldberg after discharge (patient's Hematologist)  - resume DVT ppx after PPM placement    ID  - s/p 10 day course PO vanc for c. diff finished 3/26  - s/p ceftriaxone for UTI    Ethics  - spoke with family bedside 3/30

## 2022-03-30 NOTE — PROGRESS NOTE ADULT - ASSESSMENT
This is a 56 yo F with PMH of ?possible POTS, orthostatic hypotension, frequent episodes of syncope, recent dx of smoldering myeloma and amyloidosis on fat pad bx 2/8/22 (+ congo red, no sufficient tissue for differentiation) c/b neurologic sequelaes. She was recently admitted to [facility] for recurrent syncopal episodes, had RRTs and was admitted to CCU for levophed support briefly. It was further c/b CDiff infection for which she is recovering and remains on PO antibiotic. During her hospitalization, telemetry revealed sinus pauses correlated to her symptoms. EP consulted for PPM evaluation.     #Autonomic dysfunction  - Frequent symptoms and syncope with having bowel movement and positional changes could suggest vasovagal syncope  - Syncopal episode overnight 3/28 during BM, telemetry reviewed - SR with PACs @ 58 BPM. No evidence of bradyarrhythmia on telemetry.  - Episode of symptomatic junctional/sinus bradycardia, HR dropped to 28 BPM with associated hypotension and lightheadedness. S/p Atropine 1 without response, heart rates improved now on dopamine gtt @ 5mg/kg. Pt transferred to CCU  - Tele - SR @70-80s today. No further episodes of junctional rhythm/sinus muna - currently on dopamine gtt 2.5mcg  - K 3.4 this AM s/p IV potassium  - Details/risks/benefits of PPM implant was discussed with patient. All questions/concerns answered. Consent obtained, in chart.   - Keep patient NPO. T&S, COVID active.     -GUADALUPE Torres  [phone]       This is a 56 yo F with PMH of ?possible POTS, orthostatic hypotension, frequent episodes of syncope, recent dx of smoldering myeloma and amyloidosis on fat pad bx 2/8/22 (+ congo red, no sufficient tissue for differentiation) c/b neurologic sequelaes. She was recently admitted to [facility] for recurrent syncopal episodes, had RRTs and was admitted to CCU for levophed support briefly. It was further c/b CDiff infection for which she is recovering and remains on PO antibiotic. During her hospitalization, telemetry revealed sinus pauses correlated to her symptoms. EP consulted for PPM evaluation.     #Autonomic dysfunction  - Frequent symptoms and syncope with having bowel movement and positional changes could suggest vasovagal syncope  - cMRI with likely cardiac amyloidosis - biatrial LGE, subtle basal LGE, normal LVEF. Eventual plan for chemotherapy inpatient - Daratumumab + CyBorD w/ plan for autologous bone marrow transplant later. Heme/onc following  - TTE with strain done today  - Syncopal episode overnight 3/28 during BM, telemetry reviewed - SR with PACs @ 58 BPM. No evidence of bradyarrhythmia on telemetry.  - Episode of symptomatic junctional/sinus bradycardia, HR dropped to 28 BPM with associated hypotension and lightheadedness. S/p Atropine 1 without response, heart rates improved now on dopamine gtt @ 5mg/kg. Pt transferred to CCU  - Tele - SR @70-80s today. No further episodes of junctional rhythm/sinus muna - currently on dopamine gtt 2.5mcg  - K 3.4 this AM s/p IV potassium  - Details/risks/benefits of PPM implant was discussed with patient. All questions/concerns answered. Consent obtained, in chart.   - Keep patient NPO. T&S, COVID active.     -GUADALUPE Torres  [phone]

## 2022-03-30 NOTE — PROGRESS NOTE ADULT - ASSESSMENT
57 y.o. F with PMH of POTS, orthostatic hypotension, recently diagnosed with smoldering myeloma (March 2022) and amyloidosis (Feb 2022), mouth burning syndrome, recently diagnosed c.diff colitis (March 2022), hyperthyroidism s/p thyroidectomy, admitted to [facility] for multiple recurrent syncopal episodes thought to be associated with autonomic dysfunction 2/2 amyloidosis, transferred to [facility] for further amyloidosis management and evaluation for PPM, course complicated by episodes of bradycardia and hypotension, now transferred to CICU on dopamine gtt pending further PPM evaluation.    Neuro  - AOx3, no issues    Respiratory  - currently on 2L NC saturating 90s  - monitor O2 sat    Cardiac  #Possible cardiac amyloidosis  - confirmed amyloidosis s/p fat pad biopsy 2/2022  - OSH workup with TTE (non bubble) normal LV size and function with EF 55%-60%. No regional wall motion abnormalities. Moderate concentric LVH  - cMRI indicative of cardiac amyloid  - f/u closely with EP Cardiology plans for PPM placement    # Orthostatic hypotension  - C/w with fludrocortisone   - mIVF LR @ 75  - BP monitoring    # POTS  - PO & salt intake encouraged    GI  - regular diet  - has mouth burning syndrome which limits PO intake of certain foods  - continue with mIVF LR 75cc/hr for now  - NPO at MN for possible PPM tomorrow      - normal kidney function    Endo  - s/p thyroidectomy for hyperthyroidism  - outpt TSH elevated but FT4 wnl   - continue with levothyroxine 137 mcg qD  - Repeat TFTs 6-8 wks (May 2022)    Heme/Onc  #Amyloidosis  - initially planning for Daratumumab + CyBorD inpatient possibly Wed or Thurs, pending insurance approval, unsure of plan since now transferred to CICU  - Will f/u with Dr. Goldberg after discharge (patient's Hematologist)  - hold lovenox DVT ppx tonight pending decision for PPM placement    ID  - s/p 10 day course PO vanc for c. diff finished 3/26  - s/p ceftriaxone for UTI    Ethics  - spoke with family and best friend at bedside 3/29. 57 y.o. F with PMH of POTS, orthostatic hypotension, recently diagnosed with smoldering myeloma (March 2022) and amyloidosis (Feb 2022), mouth burning syndrome, recently diagnosed c.diff colitis (March 2022), hyperthyroidism s/p thyroidectomy, admitted to [facility] for multiple recurrent syncopal episodes thought to be associated with autonomic dysfunction 2/2 amyloidosis, transferred to [facility] for further amyloidosis management and evaluation for PPM, course complicated by episodes of bradycardia and hypotension, now transferred to CICU, stable on dopamine gtt, s/p PPM placement 3/30, now stable off dopamine.     Neuro  - AOx3, no issues    Respiratory  - currently on 2L NC saturating 90s, wean as tolerated  - monitor O2 sat    Cardiac  #Possible cardiac amyloidosis  - confirmed amyloidosis s/p fat pad biopsy 2/2022, concern that current conduction abnormality is secondary to cardiac amyloid involvement  - OSH with EKG showing 3.8sec pause  - OSH workup with TTE (non bubble) normal LV size and function with EF 55%-60%. No regional wall motion abnormalities. Moderate concentric LVH.   - Repeat TTE here with EF 60%, severe right atrial enlargement  - cMRI indicative of cardiac amyloid  - s/p PPM 3/30, now stable off dopamine gtt    # Orthostatic hypotension  - C/w with fludrocortisone   - resumed midodrine 20 TID, consider downtitrating to 10 TID and observing patient response  - BP monitoring    # POTS  - PO & salt intake encouraged      GI  - resumed pesco vegetarian diet  - has mouth burning syndrome which limits PO intake of certain foods      - normal kidney function    Endo  - s/p thyroidectomy for hyperthyroidism  - outpt TSH elevated but FT4 wnl   - continue with levothyroxine 137 mcg qD  - Repeat TFTs 6-8 wks (May 2022)    Heme/Onc  #Amyloidosis  - initially planning for Daratumumab + CyBorD inpatient possibly Wed or Thurs, pending insurance approval  - f/u heme/onc recs  - Will f/u with Dr. Goldberg after discharge (patient's Hematologist)  - resume DVT ppx after PPM placement    ID  - s/p 10 day course PO vanc for c. diff finished 3/26  - s/p ceftriaxone for UTI    Ethics  - spoke with family bedside 3/30

## 2022-03-30 NOTE — PRE-ANESTHESIA EVALUATION ADULT - NSANTHPMHFT_GEN_ALL_CORE
58 yo F w/ PMHx of POTS, orthostatic hypotension, recently diagnosed with smoldering myeloma (March 2022) and amyloidosis (Feb 2022), mouth burning syndrome, recently diagnosed c.diff colitis (March 2022), hyperthyroidism s/p thyroidectomy, admitted to [facility] for multiple recurrent syncopal episodes thought to be associated with autonomic dysfunction 2/2 amyloidosis, transferred to [facility] for further amyloidosis management and evaluation for PPM, course complicated by episodes of bradycardia and hypotension, now in CICU on dopamine gtt. Presents now for PPM placement.    Denies active CP/SOB/Orthopnea  Denies hx of MI/CHF

## 2022-03-30 NOTE — PROGRESS NOTE ADULT - SUBJECTIVE AND OBJECTIVE BOX
ROLF MARQUEZ  MRN-[id number]  Patient is a 57y old  Female who presents with a chief complaint of syncope (30 Mar 2022 08:23)    HPI:  57 y.o. F with PMH of POTS, orthostatic hypotension, recently diagnosed with smoldering myeloma (March 2022) and amyloidosis (Feb 2022), mouth burning syndrome, recently diagnosed c.diff colitis (March 2022), hyperthyroidism s/p thyroidectomy, admitted to [facility] for multiple recurrent syncopal episodes.     While admitted, pt's course was complicated by multiple unresponsive episodes prompting RRTs, during which pt found to be hypotensive and bradycardic. She required levophed for BP support and was in CCU as well as ICU with each RRT. BP remained stable while on Midodrine 20mg q8 and Fludrocortisone 0.2mg q24. CT head was negative on admission. Pt noted to intermittently have sinus pauses (2-3.8 seconds) thought to be 2/2 vagal episodes, and asymptomatic bradycardia overnight on tele. ECG noted NSR but 1st degree AV block. EP was consulted, no PPM indications. TTE 3/16/22 showed normal LV size and function with EF 55%-60%. No regional wall motion abnormalities. Moderate concentric LVH. Pt was pending cardiac MRI for cardiac amyloidosis work up. Pt was also seen by Endocrinology for elevated TSH (normal FT4), levothyroxine was uptitrated to 137mcg. Pt also started on oral vanco 250mg q6 (3/16- ) and completed ceftriaxone for UTI.     Pt states her outpatient oncologist Dr. Goldberg recommended transfer to [facility] for inpatient chemo given concerns for cardiac and autonomic dysfunction 2/2 amyloidosis.    (25 Mar 2022 22:36)      Overnight events: s/p dual chamber PPM implant. No acute issues. Will receive vanc x1 tonight at 10 PM.       REVIEW OF SYSTEMS:    CONSTITUTIONAL: No weakness, fevers or chills  EYES/ENT: No visual changes;  No vertigo or throat pain   NECK: No pain or stiffness  RESPIRATORY: No cough, wheezing, hemoptysis; No shortness of breath  CARDIOVASCULAR: No chest pain or palpitations  GASTROINTESTINAL: No abdominal or epigastric pain. No nausea, vomiting, or hematemesis; No diarrhea or constipation. No melena or hematochezia.  GENITOURINARY: No dysuria, frequency or hematuria  NEUROLOGICAL: No numbness or weakness  SKIN: No itching, rashes      Vital Signs Last 24 Hrs  T(C): 36.5 (30 Mar 2022 19:30), Max: 36.9 (30 Mar 2022 16:00)  T(F): 97.7 (30 Mar 2022 19:30), Max: 98.4 (30 Mar 2022 16:00)  HR: 73 (30 Mar 2022 20:00) (69 - 85)  BP: 112/64 (30 Mar 2022 20:00) (88/51 - 141/78)  BP(mean): 83 (30 Mar 2022 20:00) (64 - 102)  RR: 17 (30 Mar 2022 20:00) (12 - 39)  SpO2: 94% (30 Mar 2022 20:00) (90% - 99%)      ============================I/O===========================   I&O's Detail    29 Mar 2022 07:01  -  30 Mar 2022 07:00  --------------------------------------------------------  IN:    DOPamine Infusion: 55 mL    Lactated Ringers: 250 mL    Oral Fluid: 425 mL  Total IN: 730 mL    OUT:    Voided (mL): 2400 mL  Total OUT: 2400 mL    Total NET: -1670 mL      30 Mar 2022 07:01  -  30 Mar 2022 20:23  --------------------------------------------------------  IN:    DOPamine Infusion: 15 mL    IV PiggyBack: 350 mL    Oral Fluid: 240 mL  Total IN: 605 mL    OUT:    Voided (mL): 1050 mL  Total OUT: 1050 mL    Total NET: -445 mL        ============================ LABS =========================                        10.4   5.55  )-----------( 321      ( 30 Mar 2022 04:23 )             31.0     03-30    141  |  103  |  5<L>  ----------------------------<  103<H>  3.4<L>   |  28  |  0.52    Ca    8.5      30 Mar 2022 04:23  Phos  3.7     03-30  Mg     2.2     03-30    TPro  5.3<L>  /  Alb  3.6  /  TBili  0.8  /  DBili  x   /  AST  34  /  ALT  61<H>  /  AlkPhos  78  03-30    LIVER FUNCTIONS - ( 30 Mar 2022 04:23 )  Alb: 3.6 g/dL / Pro: 5.3 g/dL / ALK PHOS: 78 U/L / ALT: 61 U/L / AST: 34 U/L / GGT: x           PT/INR - ( 30 Mar 2022 04:23 )   PT: 11.6 sec;   INR: 1.00 ratio         PTT - ( 30 Mar 2022 04:23 )  PTT:25.9 sec      ======================Micro/Rad/Cardio=================  Culture: Reviewed   CXR: Reviewed  Echo:Reviewed  ======================================================  PAST MEDICAL & SURGICAL HISTORY:  Hypothyroidism    POTS (postural orthostatic tachycardia syndrome)    Clostridium difficile colitis    Amyloidosis    Smoldering myeloma    No significant past surgical history

## 2022-03-31 LAB
ANION GAP SERPL CALC-SCNC: 8 MMOL/L — SIGNIFICANT CHANGE UP (ref 5–17)
BUN SERPL-MCNC: 4 MG/DL — LOW (ref 7–23)
CALCIUM SERPL-MCNC: 8.3 MG/DL — LOW (ref 8.4–10.5)
CHLORIDE SERPL-SCNC: 103 MMOL/L — SIGNIFICANT CHANGE UP (ref 96–108)
CO2 SERPL-SCNC: 29 MMOL/L — SIGNIFICANT CHANGE UP (ref 22–31)
CREAT SERPL-MCNC: 0.68 MG/DL — SIGNIFICANT CHANGE UP (ref 0.5–1.3)
EGFR: 101 ML/MIN/1.73M2 — SIGNIFICANT CHANGE UP
GLUCOSE SERPL-MCNC: 100 MG/DL — HIGH (ref 70–99)
HCT VFR BLD CALC: 31.6 % — LOW (ref 34.5–45)
HGB BLD-MCNC: 10.2 G/DL — LOW (ref 11.5–15.5)
MAGNESIUM SERPL-MCNC: 2 MG/DL — SIGNIFICANT CHANGE UP (ref 1.6–2.6)
MCHC RBC-ENTMCNC: 29.2 PG — SIGNIFICANT CHANGE UP (ref 27–34)
MCHC RBC-ENTMCNC: 32.3 GM/DL — SIGNIFICANT CHANGE UP (ref 32–36)
MCV RBC AUTO: 90.5 FL — SIGNIFICANT CHANGE UP (ref 80–100)
NRBC # BLD: 0 /100 WBCS — SIGNIFICANT CHANGE UP (ref 0–0)
PHOSPHATE SERPL-MCNC: 3.7 MG/DL — SIGNIFICANT CHANGE UP (ref 2.5–4.5)
PLATELET # BLD AUTO: 285 K/UL — SIGNIFICANT CHANGE UP (ref 150–400)
POTASSIUM SERPL-MCNC: 3.9 MMOL/L — SIGNIFICANT CHANGE UP (ref 3.5–5.3)
POTASSIUM SERPL-SCNC: 3.9 MMOL/L — SIGNIFICANT CHANGE UP (ref 3.5–5.3)
RBC # BLD: 3.49 M/UL — LOW (ref 3.8–5.2)
RBC # FLD: 13.3 % — SIGNIFICANT CHANGE UP (ref 10.3–14.5)
SODIUM SERPL-SCNC: 140 MMOL/L — SIGNIFICANT CHANGE UP (ref 135–145)
WBC # BLD: 6.97 K/UL — SIGNIFICANT CHANGE UP (ref 3.8–10.5)
WBC # FLD AUTO: 6.97 K/UL — SIGNIFICANT CHANGE UP (ref 3.8–10.5)

## 2022-03-31 PROCEDURE — 99233 SBSQ HOSP IP/OBS HIGH 50: CPT | Mod: GC

## 2022-03-31 PROCEDURE — 71046 X-RAY EXAM CHEST 2 VIEWS: CPT | Mod: 26

## 2022-03-31 RX ORDER — ACETAMINOPHEN 500 MG
650 TABLET ORAL EVERY 6 HOURS
Refills: 0 | Status: DISCONTINUED | OUTPATIENT
Start: 2022-03-31 | End: 2022-04-04

## 2022-03-31 RX ORDER — DIPHENHYDRAMINE HYDROCHLORIDE AND LIDOCAINE HYDROCHLORIDE AND ALUMINUM HYDROXIDE AND MAGNESIUM HYDRO
10 KIT
Refills: 0 | Status: DISCONTINUED | OUTPATIENT
Start: 2022-03-31 | End: 2022-04-04

## 2022-03-31 RX ORDER — MIDODRINE HYDROCHLORIDE 2.5 MG/1
10 TABLET ORAL THREE TIMES A DAY
Refills: 0 | Status: DISCONTINUED | OUTPATIENT
Start: 2022-03-31 | End: 2022-04-01

## 2022-03-31 RX ORDER — MIDODRINE HYDROCHLORIDE 2.5 MG/1
10 TABLET ORAL ONCE
Refills: 0 | Status: COMPLETED | OUTPATIENT
Start: 2022-03-31 | End: 2022-03-31

## 2022-03-31 RX ORDER — VALACYCLOVIR 500 MG/1
500 TABLET, FILM COATED ORAL
Refills: 0 | Status: DISCONTINUED | OUTPATIENT
Start: 2022-03-31 | End: 2022-04-04

## 2022-03-31 RX ADMIN — MIDODRINE HYDROCHLORIDE 10 MILLIGRAM(S): 2.5 TABLET ORAL at 23:56

## 2022-03-31 RX ADMIN — SIMETHICONE 80 MILLIGRAM(S): 80 TABLET, CHEWABLE ORAL at 11:57

## 2022-03-31 RX ADMIN — SIMETHICONE 80 MILLIGRAM(S): 80 TABLET, CHEWABLE ORAL at 23:07

## 2022-03-31 RX ADMIN — Medication 1 SPRAY(S): at 17:45

## 2022-03-31 RX ADMIN — SIMETHICONE 80 MILLIGRAM(S): 80 TABLET, CHEWABLE ORAL at 05:55

## 2022-03-31 RX ADMIN — GABAPENTIN 100 MILLIGRAM(S): 400 CAPSULE ORAL at 21:39

## 2022-03-31 RX ADMIN — ATORVASTATIN CALCIUM 40 MILLIGRAM(S): 80 TABLET, FILM COATED ORAL at 21:39

## 2022-03-31 RX ADMIN — VALACYCLOVIR 500 MILLIGRAM(S): 500 TABLET, FILM COATED ORAL at 10:49

## 2022-03-31 RX ADMIN — VALACYCLOVIR 500 MILLIGRAM(S): 500 TABLET, FILM COATED ORAL at 20:46

## 2022-03-31 RX ADMIN — Medication 81 MILLIGRAM(S): at 11:57

## 2022-03-31 RX ADMIN — Medication 650 MILLIGRAM(S): at 12:45

## 2022-03-31 RX ADMIN — MIDODRINE HYDROCHLORIDE 10 MILLIGRAM(S): 2.5 TABLET ORAL at 17:44

## 2022-03-31 RX ADMIN — Medication 2 TABLET(S): at 11:57

## 2022-03-31 RX ADMIN — GABAPENTIN 100 MILLIGRAM(S): 400 CAPSULE ORAL at 05:55

## 2022-03-31 RX ADMIN — Medication 650 MILLIGRAM(S): at 11:59

## 2022-03-31 RX ADMIN — FLUDROCORTISONE ACETATE 0.2 MILLIGRAM(S): 0.1 TABLET ORAL at 05:55

## 2022-03-31 RX ADMIN — MIDODRINE HYDROCHLORIDE 10 MILLIGRAM(S): 2.5 TABLET ORAL at 11:57

## 2022-03-31 RX ADMIN — FAMOTIDINE 20 MILLIGRAM(S): 10 INJECTION INTRAVENOUS at 17:44

## 2022-03-31 RX ADMIN — GABAPENTIN 100 MILLIGRAM(S): 400 CAPSULE ORAL at 13:46

## 2022-03-31 RX ADMIN — MIDODRINE HYDROCHLORIDE 20 MILLIGRAM(S): 2.5 TABLET ORAL at 05:55

## 2022-03-31 RX ADMIN — FAMOTIDINE 20 MILLIGRAM(S): 10 INJECTION INTRAVENOUS at 05:55

## 2022-03-31 RX ADMIN — SIMETHICONE 80 MILLIGRAM(S): 80 TABLET, CHEWABLE ORAL at 17:44

## 2022-03-31 RX ADMIN — LORATADINE 10 MILLIGRAM(S): 10 TABLET ORAL at 11:57

## 2022-03-31 RX ADMIN — Medication 137 MICROGRAM(S): at 05:55

## 2022-03-31 NOTE — PROGRESS NOTE ADULT - PROBLEM SELECTOR PLAN 8
DVT PPx: Lovenox  Diet: Regular  Full Code  Dispo: home DVT PPx: SCDs  Diet: Regular  Full Code  Dispo: TBD

## 2022-03-31 NOTE — PROGRESS NOTE ADULT - ASSESSMENT
57 y.o. F with PMH of POTS, orthostatic hypotension, recently diagnosed with smoldering myeloma (March 2022) and amyloidosis (Feb 2022), mouth burning syndrome, recently diagnosed c.diff colitis (March 2022), hyperthyroidism s/p thyroidectomy, admitted to [facility] for multiple recurrent syncopal episodes thought to be associated with autonomic dysfunction 2/2 amyloidosis, transferred to [facility] for further amyloidosis management.

## 2022-03-31 NOTE — PROGRESS NOTE ADULT - PROBLEM SELECTOR PLAN 3
C/w midodrine 20mg with more lenient parameters (SBP >150 and HR <40) given pt had multiple RRT in OSH when midodrine was held  - If midodrine needs to be held, consider decreasing dose to 10 or 5mg instead of omiting dose entirely   - C/w with fludrocortisone   - BP monitoring C/w midodrine 20mg with more lenient parameters (SBP >150 and HR <40) given pt had multiple RRT in OSH when midodrine was held  - decreased midodrine to 10mg q8 as pt with pacemaker   - C/w with fludrocortisone   - BP monitoring

## 2022-03-31 NOTE — PROGRESS NOTE ADULT - ASSESSMENT
This is a 58 yo F with PMH of ?possible POTS, orthostatic hypotension, frequent episodes of syncope, recent dx of smoldering myeloma and amyloidosis on fat pad bx 2/8/22 (+ congo red, no sufficient tissue for differentiation) c/b neurologic sequelaes. She was recently admitted to [facility] for recurrent syncopal episodes, had RRTs and was admitted to CCU for levophed support briefly. It was further c/b CDiff infection for which she is recovering and remains on PO antibiotic. During her hospitalization, telemetry revealed sinus pauses correlated to her symptoms. EP consulted for PPM evaluation.     #Autonomic dysfunction  #Amyloidosis w/ likely cardiac involvement    - cMRI with likely cardiac amyloidosis - biatrial LGE, subtle basal LGE, normal LVEF. Eventual plan for chemotherapy inpatient - Daratumumab + CyBorD w/ plan for autologous bone marrow transplant later. Heme/onc following  - Episode of symptomatic junctional/sinus bradycardia, HR dropped to 28 BPM.   - Pt is s/p DC PPM (MDT His lead) 3/30/22. Tele SR ~70-90s.   -- Top dressing removed today. Steri-strips in place, DO NOT REMOVE. Post-procedural instructions reviewed with patient. Written instructions given.   - Pending CXR PA/lat today.   - Keep K>4, Mg>2.   - Pt has wound check appointment scheduled for 4/11/22 at 240PM.     -GUADALUPE Torres  [phone]  [id number]       This is a 58 yo F with PMH of ?possible POTS, orthostatic hypotension, frequent episodes of syncope, recent dx of smoldering myeloma and amyloidosis on fat pad bx 2/8/22 (+ congo red, no sufficient tissue for differentiation) c/b neurologic sequelaes. She was recently admitted to [facility] for recurrent syncopal episodes, had RRTs and was admitted to CCU for levophed support briefly. It was further c/b CDiff infection for which she is recovering and remains on PO antibiotic. During her hospitalization, telemetry revealed sinus pauses correlated to her symptoms. EP consulted for PPM evaluation.     #Autonomic dysfunction  #Amyloidosis w/ likely cardiac involvement    - cMRI with likely cardiac amyloidosis - biatrial LGE, subtle basal LGE, normal LVEF. Eventual plan for chemotherapy inpatient - Daratumumab + CyBorD w/ plan for autologous bone marrow transplant later. Heme/onc following  - Episode of symptomatic junctional/sinus bradycardia, HR dropped to 28 BPM.   - Pt is s/p DC PPM (MDT His lead) 3/30/22. Tele SR ~70-90s.   -- Top dressing removed today. Steri-strips in place, DO NOT REMOVE. Post-procedural instructions reviewed with patient. Written instructions given.   -- Mild tenderness at site. Please give Tylenol now.   - Pending CXR PA/lat today.   - Keep K>4, Mg>2.   - Pt has wound check appointment scheduled for 4/11/22 at 240PM.     -GUADALUPE Torres  [phone]  [phone]       This is a 58 yo F with PMH of ?possible POTS, orthostatic hypotension, frequent episodes of syncope, recent dx of smoldering myeloma and amyloidosis on fat pad bx 2/8/22 (+ congo red, no sufficient tissue for differentiation) c/b neurologic sequelaes. She was recently admitted to [facility] for recurrent syncopal episodes, had RRTs and was admitted to CCU for levophed support briefly. It was further c/b CDiff infection for which she is recovering and remains on PO antibiotic. During her hospitalization, telemetry revealed sinus pauses correlated to her symptoms. EP consulted for PPM evaluation.     #Autonomic dysfunction  #Amyloidosis w/ likely cardiac involvement    - cMRI with likely cardiac amyloidosis - biatrial LGE, subtle basal LGE, normal LVEF. Eventual plan for chemotherapy inpatient - Daratumumab + CyBorD w/ plan for autologous bone marrow transplant later. Heme/onc following  - Episode of symptomatic junctional/sinus bradycardia, HR dropped to 28 BPM.   - Pt is s/p DC PPM (MDT His lead) 3/30/22. Tele SR ~70-90s.   -- Top dressing removed today. Steri-strips in place, DO NOT REMOVE. Post-procedural instructions reviewed with patient. Written instructions given.   -- Mild tenderness at site. Please give Tylenol now.   - Pending CXR PA/lat today.   - Device interrogated, all parameters WNL.  - Keep K>4, Mg>2.   - Pt has wound check appointment scheduled for 4/11/22 at 240PM.     -GUADALUPE Torres  [phone]       This is a 56 yo F with PMH of ?possible POTS, orthostatic hypotension, frequent episodes of syncope, recent dx of smoldering myeloma and amyloidosis on fat pad bx 2/8/22 (+ congo red, no sufficient tissue for differentiation) c/b neurologic sequelaes. She was recently admitted to [facility] for recurrent syncopal episodes, had RRTs and was admitted to CCU for levophed support briefly. It was further c/b CDiff infection for which she is recovering and remains on PO antibiotic. During her hospitalization, telemetry revealed sinus pauses correlated to her symptoms. EP consulted for PPM evaluation.     #Autonomic dysfunction  #Amyloidosis w/ likely cardiac involvement    - cMRI with likely cardiac amyloidosis - biatrial LGE, subtle basal LGE, normal LVEF. Eventual plan for chemotherapy inpatient - Daratumumab + CyBorD w/ plan for autologous bone marrow transplant later. Heme/onc following  - Episode of symptomatic junctional/sinus bradycardia, HR dropped to 28 BPM.   - Pt is s/p DC PPM (MDT His lead) 3/30/22. Tele SR ~70-90s.   -- Top dressing removed today. Steri-strips in place, DO NOT REMOVE. Post-procedural instructions reviewed with patient. Written instructions given.   -- Mild tenderness at site. Please give Tylenol now.   - CXR with leads in appropriate position.   - Device interrogated, all parameters WNL.  - Keep K>4, Mg>2.   - Pt has wound check appointment scheduled for 4/11/22 at 240PM.   - No heparin/lovenox products. SCDs for DVT ppx if needed.   - EP to sign off, please call with any questions.     -GUADALUPE Torres  [phone]

## 2022-03-31 NOTE — PROGRESS NOTE ADULT - ATTENDING COMMENTS
57 y.o. F with PMH of POTS, orthostatic hypotension, recently diagnosed smoldering myeloma (March 2022) and amyloidosis (Feb 2022), mouth burning syndrome, recently diagnosed c.diff colitis (March 2022), hyperthyroidism s/p thyroidectomy, admitted to [facility] for multiple recurrent syncopal episodes thought to be associated with autonomic dysfunction 2/2 amyloidosis, transferred to [facility] for further amyloidosis management.     #Cardiac amyloidosis, Symptomatic Junctional/Sinus bradycardia   - confirmed amyloidosis s/p fat pad biopsy 2/2022, concern that current conduction abnormality is secondary to cardiac amyloid involvement  - OSH workup with TTE (non bubble) normal LV size and function with EF 55%-60%. No regional wall motion abnormalities. Moderate concentric LVH.   - Repeat TTE here with EF 60%, severe right atrial enlargement  - cMRI indicative of cardiac amyloid  - Episode of symptomatic junctional/sinus bradycardia, HR dropped to 28 BPM with associated hypotension and lightheadedness.   - s/p PPM placement    #Light Chain Amyloidosis  - Patient was diagnosed based on multiple correlative tests  - No monoclonal protein found on SPEP/UPEP/S-MANUEL/U-MANUEL or elevated immunoglobulin, but with elevated lambda FLC, 10.92 with K/L ratio 0.11 on 3/1  - 2/8/22 abdominal fat pad biopsy showed positive Congo Red staining in rare some vessels with positive polarization  - 3/1/22 BMBx showed 25-35% plasma cells, positive for cyclin D1, with monotypic plasma cells by flow cytometry c/w plasma cell neoplasm. Congo Red negative on BMBx. Atypical CCND1/IGH fusion pattern detected (3.5%) on FISH  - Heme planning to start chemotherapy inpatient, first dose to be given Friday - Daratumumab + CyBorD w/ plan for autologous bone marrow transplant later - consent is in chart for chemotherapy treatment    # Orthostatic hypotension  - C/w with fludrocortisone   - will try to decrease midodrine to 10mg TID  - BP monitoring

## 2022-03-31 NOTE — PROGRESS NOTE ADULT - PROBLEM SELECTOR PLAN 2
Pt presented to OSH for multiple recurrent syncopal episodes.   Multifactorial in s/o known POTS, orthostatic hypotension associated with likely amyloidosis/myeloma autonomic dysfunction, hypovolemia 2/2 c.diff colitis and diarrhea  - CT head no acute pathology   - TTE (non bubble) normal LV size and function with EF 55%-60%. No regional wall motion abnormalities. Moderate concentric LVH.  - ECG 1st degree AV block  - s/p Micra PPM implant Pt presented to OSH for multiple recurrent syncopal episodes.   Multifactorial in s/o known POTS, orthostatic hypotension associated with likely amyloidosis/myeloma autonomic dysfunction, hypovolemia 2/2 c.diff colitis and diarrhea  - CT head no acute pathology   - TTE (non bubble) normal LV size and function with EF 55%-60%. No regional wall motion abnormalities. Moderate concentric LVH.  - ECG 1st degree AV block, now paced  - s/p Micra PPM implant

## 2022-03-31 NOTE — PROGRESS NOTE ADULT - SUBJECTIVE AND OBJECTIVE BOX
OVERNIGHT EVENTS: No acute events overnight.    SUBJECTIVE:   CICU Course: Patient s/p RRT 3/29 for hypotension and bradycardia. Patient was bradycardic to the 30s (junctional) and hypotensive to SBP 60s. Atropine was given x2 and pt started on dopamine gtt. Patient underwent [employer] dual chamber pacemaker via left cephalic vein without issues, Lower rate at 70. Post CXR without pneumothorax. Per heme/onc plan, Planning to start chemotherapy inpatient, first dose to be given Friday - Daratumumab + CyBorD w/ plan for autologous bone marrow transplant later.      MEDICATIONS  (STANDING):  aspirin  chewable 81 milliGRAM(s) Oral daily  atorvastatin 40 milliGRAM(s) Oral at bedtime  enoxaparin Injectable 40 milliGRAM(s) SubCutaneous every 24 hours  famotidine    Tablet 20 milliGRAM(s) Oral two times a day  fludroCORTISONE 0.2 milliGRAM(s) Oral daily  fluticasone propionate 50 MICROgram(s)/spray Nasal Spray 1 Spray(s) Both Nostrils two times a day  gabapentin 100 milliGRAM(s) Oral every 8 hours  influenza   Vaccine 0.5 milliLiter(s) IntraMuscular once  lactated ringers. 1000 milliLiter(s) (75 mL/Hr) IV Continuous <Continuous>  lactobacillus acidophilus 1 Tablet(s) Oral daily  levothyroxine 137 MICROGram(s) Oral daily  loratadine 10 milliGRAM(s) Oral daily  midodrine 20 milliGRAM(s) Oral every 8 hours  simethicone 80 milliGRAM(s) Chew every 6 hours    MEDICATIONS  (PRN):  acetaminophen     Tablet .. 650 milliGRAM(s) Oral every 6 hours PRN Temp greater or equal to 38C (100.4F), Mild Pain (1 - 3), Moderate Pain (4 - 6)  aluminum hydroxide/magnesium hydroxide/simethicone Suspension 30 milliLiter(s) Oral every 4 hours PRN Dyspepsia  magnesium hydroxide Suspension 30 milliLiter(s) Oral at bedtime PRN Constipation      Vital Signs Last 24 Hrs  T(C): 37 (31 Mar 2022 04:53), Max: 37 (31 Mar 2022 04:53)  T(F): 98.6 (31 Mar 2022 04:53), Max: 98.6 (31 Mar 2022 04:53)  HR: 83 (31 Mar 2022 04:53) (69 - 85)  BP: 99/59 (31 Mar 2022 04:53) (89/53 - 141/78)  BP(mean): 78 (30 Mar 2022 21:00) (66 - 102)  RR: 18 (31 Mar 2022 04:53) (15 - 39)  SpO2: 95% (31 Mar 2022 04:53) (93% - 98%)    PHYSICAL EXAM:   General: thin, middle-aged woman in NAD; awake, sitting comfortably in bed  HEENT: nc/at, clear conjunctiva, moist mucous membranes  Neck: supple, no JVD  Heart: RRR, +systolic murmur   Chest/lungs: mild bibasilar rales; no wheezing or rhonchi  ABD: soft, non-tender, non-distended; no guarding or rebound; bowel sounds present  Extremities: no edema, erythema, or tenderness to palpation  Skin: clear, dry  Neuro: A&Ox4, FROM of all 4 extremities; no focal deficits; grossly intact    LABS:                        10.2   6.97  )-----------( 285      ( 31 Mar 2022 05:19 )             31.6     03-31    140  |  103  |  4<L>  ----------------------------<  100<H>  3.9   |  29  |  0.68    Ca    8.3<L>      31 Mar 2022 05:19  Phos  3.7     03-31  Mg     2.0     03-31    TPro  5.3<L>  /  Alb  3.6  /  TBili  0.8  /  DBili  x   /  AST  34  /  ALT  61<H>  /  AlkPhos  78  03-30    PT/INR - ( 30 Mar 2022 04:23 )   PT: 11.6 sec;   INR: 1.00 ratio         PTT - ( 30 Mar 2022 04:23 )  PTT:25.9 sec

## 2022-03-31 NOTE — CHART NOTE - NSCHARTNOTEFT_GEN_A_CORE
MAR Accept Note  Transfer to:  Medicine Tele  Accepting Attending Physician: Dr. Rosalio Bah   Assigned Room:  [address] [place] [address]    Assessed patient at bedside, patient hemodynamically stable, alert oriented x3, no chest pain/shortness of breath/ lightheadedness, pacemaker incision site dressing clean/dry/intact.     HPI/CCU COURSE:   Please refer to MICU transfer note for full details. Briefly, this is a 57 y.o. F with PMH of POTS, orthostatic hypotension, recently diagnosed with smoldering myeloma (March 2022) and amyloidosis (Feb 2022), mouth burning syndrome, recently diagnosed c.diff colitis (March 2022), hyperthyroidism s/p thyroidectomy, admitted to [facility] for multiple recurrent syncopal episodes. While admitted, pt's course was complicated by multiple unresponsive episodes prompting RRTs, during which pt found to be hypotensive and bradycardic. Patient was transfer to [facility] for inpatient chemo given concerns for cardiac and autonomic dysfunction 2/2 amyloidosis.   While in [facility], Cardiology and EP consulted, recommended Micra PPM placement, although patient initially deferred pacemaker in favor of medical therapy. Cardiac MRI showed inferolateral enhancement and late biatrial enlargement with late gadolinium uptake, may suggest cardiac amyloid. Oncology consulted. Although the patient was planned to start chemotherapy, it was held given the patient's persistent diarrhea suspected 2/2 C diff. Upon improvement of the diarrhea, the patient is planned to be started on Daratumumab + CyBorD (inpatient vs outpatient), following up with her hematologist. Dr. Goldberg.    CICU course:   Patient s/p RRT 3/29 for hypotension and bradycardia. Patient was bradycardic to the 30s (junctional) and hypotensive to SBP 60s. Atropine was given x2 and pt started on dopamine gtt. Patient underwent [employer] dual chamber pacemaker via left cephalic vein without issues, Lower rate at 70. Post CXR without pneumothorax. Per heme/onc plan, Planning to start chemotherapy inpatient, first dose to be given Friday - Daratumumab + CyBorD w/ plan for autologous bone marrow transplant later - consent is in chart for chemotherapy treatment. Stable for tele transfer.     FOR FOLLOW-UP:  []  PA/Lateral CXR in AM post PPM placement   [] Chemo initiation Friday, per heme onc recs   [] f/u EP for resuming DVT ppx

## 2022-03-31 NOTE — PROGRESS NOTE ADULT - SUBJECTIVE AND OBJECTIVE BOX
24H hour events:     MEDICATIONS:  aspirin  chewable 81 milliGRAM(s) Oral daily  midodrine. 10 milliGRAM(s) Oral three times a day  valACYclovir 500 milliGRAM(s) Oral two times a day  loratadine 10 milliGRAM(s) Oral daily  acetaminophen     Tablet .. 650 milliGRAM(s) Oral every 6 hours PRN  gabapentin 100 milliGRAM(s) Oral every 8 hours  famotidine    Tablet 20 milliGRAM(s) Oral two times a day  simethicone 80 milliGRAM(s) Chew every 6 hours  atorvastatin 40 milliGRAM(s) Oral at bedtime  fludroCORTISONE 0.2 milliGRAM(s) Oral daily  levothyroxine 137 MICROGram(s) Oral daily  fluticasone propionate 50 MICROgram(s)/spray Nasal Spray 1 Spray(s) Both Nostrils two times a day  influenza   Vaccine 0.5 milliLiter(s) IntraMuscular once    REVIEW OF SYSTEMS:  Complete 10point ROS negative.    PHYSICAL EXAM:  T(C): 37 (03-31-22 @ 04:53), Max: 37 (03-31-22 @ 04:53)  HR: 83 (03-31-22 @ 04:53) (69 - 83)  BP: 99/59 (03-31-22 @ 04:53) (89/53 - 141/78)  RR: 18 (03-31-22 @ 04:53) (15 - 39)  SpO2: 95% (03-31-22 @ 04:53) (93% - 98%)  Wt(kg): --  I&O's Summary    30 Mar 2022 07:01  -  31 Mar 2022 07:00  --------------------------------------------------------  IN: 605 mL / OUT: 1650 mL / NET: -1045 mL        Appearance: Normal	  Cardiovascular: Normal S1 S2, No JVD, No murmurs  Respiratory: Lungs clear to auscultation	  Psychiatry: A & O x 3, Mood & affect appropriate  Skin: LCW with mild tenderness to palpation. No discharge/swelling/bleeding.  Neurologic: Non-focal  Extremities: No BLE edema        LABS:	 	    CBC Full  -  ( 31 Mar 2022 05:19 )  WBC Count : 6.97 K/uL  Hemoglobin : 10.2 g/dL  Hematocrit : 31.6 %  Platelet Count - Automated : 285 K/uL  Mean Cell Volume : 90.5 fl  Mean Cell Hemoglobin : 29.2 pg  Mean Cell Hemoglobin Concentration : 32.3 gm/dL    03-31    140  |  103  |  4<L>  ----------------------------<  100<H>  3.9   |  29  |  0.68  03-30    141  |  103  |  5<L>  ----------------------------<  103<H>  3.4<L>   |  28  |  0.52    Ca    8.3<L>      31 Mar 2022 05:19  Ca    8.5      30 Mar 2022 04:23  Phos  3.7     03-31  Phos  3.7     03-30  Mg     2.0     03-31  Mg     2.2     03-30    TPro  5.3<L>  /  Alb  3.6  /  TBili  0.8  /  DBili  x   /  AST  34  /  ALT  61<H>  /  AlkPhos  78  03-30      TELEMETRY: SR ~70-90s	    ECG:  	  RADIOLOGY: < from: Xray Chest 1 View- PORTABLE-Urgent (Xray Chest 1 View- PORTABLE-Urgent .) (03.30.22 @ 12:15) >  FINDINGS:  Left chest wall dual-lead pacemaker with lead tips in the right atrium   and right ventricle.  Normal heart size.    No focal infiltrates. No sizable pleural effusion. No pneumothorax.    Impression:  No pneumothorax.    < end of copied text >	    PREVIOUS DIAGNOSTIC TESTING:    [ ] Echocardiogram: < from: TTE with Doppler (w/Cont) (03.30.22 @ 07:58) >  Dimensions:    Normal Values:  LA:     3.6    2.0 - 4.0 cm  Ao:     3.1    2.0 - 3.8 cm  SEPTUM: 1.2    0.6 - 1.2 cm  PWT:    1.2    0.6 - 1.1 cm  LVIDd:  3.0    3.0 - 5.6 cm  LVIDs:  2.5    1.8 - 4.0 cm  Derived variables:  LVMI: 69 g/m2  RWT: 0.80  EF (Visual Estimate): 60 %  Doppler Peak Velocity (m/sec): AoV=1.2 TV=2.2  ------------------------------------------------------------------------  Observations:  Mitral Valve: Normal mitral valve.  Aortic Valve/Aorta: Mildly calcified trileaflet aortic  valve with normal opening. Minimal aortic regurgitation.  Normal aortic root size.  Left Atrium: Moderately dilated left atrium.  Left Ventricle: Normal left ventricular internal dimensions  and wall thickness.  Normal left ventricular systolic function. No segmental  wall motion abnormalities.  Left atrial filling pressure is elevated.  Right Heart: Normal right atrium. Normal right ventricular  size and function.  Normal tricuspid valve. Mild tricuspid regurgitation.  Normal pulmonic valve.  Pericardium/Pleura: Normal pericardium with no pericardial  effusion.  Bilateral pleural effusions.  Hemodynamic: Estimated right atrial pressure is severely  elevated.  No evidence of pulmonary hypertension.  ------------------------------------------------------------------------  Conclusions:  Normal left ventricular systolic function. No segmental  wall motion abnormalities.  Average global longitudinal strain = -14.6%; pattern is  homogeneous (i.e. not suggestive of amyloid).    < end of copied text >     24H hour events: Tele with SR ~70-90s. Pt reports mild tenderness at incision site. Denies palpitations, chest pain, or SOB.     MEDICATIONS:  aspirin  chewable 81 milliGRAM(s) Oral daily  midodrine. 10 milliGRAM(s) Oral three times a day  valACYclovir 500 milliGRAM(s) Oral two times a day  loratadine 10 milliGRAM(s) Oral daily  acetaminophen     Tablet .. 650 milliGRAM(s) Oral every 6 hours PRN  gabapentin 100 milliGRAM(s) Oral every 8 hours  famotidine    Tablet 20 milliGRAM(s) Oral two times a day  simethicone 80 milliGRAM(s) Chew every 6 hours  atorvastatin 40 milliGRAM(s) Oral at bedtime  fludroCORTISONE 0.2 milliGRAM(s) Oral daily  levothyroxine 137 MICROGram(s) Oral daily  fluticasone propionate 50 MICROgram(s)/spray Nasal Spray 1 Spray(s) Both Nostrils two times a day  influenza   Vaccine 0.5 milliLiter(s) IntraMuscular once    REVIEW OF SYSTEMS:  Complete 10point ROS negative.    PHYSICAL EXAM:  T(C): 37 (03-31-22 @ 04:53), Max: 37 (03-31-22 @ 04:53)  HR: 83 (03-31-22 @ 04:53) (69 - 83)  BP: 99/59 (03-31-22 @ 04:53) (89/53 - 141/78)  RR: 18 (03-31-22 @ 04:53) (15 - 39)  SpO2: 95% (03-31-22 @ 04:53) (93% - 98%)  Wt(kg): --  I&O's Summary    30 Mar 2022 07:01  -  31 Mar 2022 07:00  --------------------------------------------------------  IN: 605 mL / OUT: 1650 mL / NET: -1045 mL        Appearance: Normal	  Cardiovascular: Normal S1 S2, No JVD, No murmurs  Respiratory: Lungs clear to auscultation	  Psychiatry: A & O x 3, Mood & affect appropriate  Skin: LCW with mild tenderness to palpation. No discharge/swelling/bleeding.  Neurologic: Non-focal  Extremities: No BLE edema        LABS:	 	    CBC Full  -  ( 31 Mar 2022 05:19 )  WBC Count : 6.97 K/uL  Hemoglobin : 10.2 g/dL  Hematocrit : 31.6 %  Platelet Count - Automated : 285 K/uL  Mean Cell Volume : 90.5 fl  Mean Cell Hemoglobin : 29.2 pg  Mean Cell Hemoglobin Concentration : 32.3 gm/dL    03-31    140  |  103  |  4<L>  ----------------------------<  100<H>  3.9   |  29  |  0.68  03-30    141  |  103  |  5<L>  ----------------------------<  103<H>  3.4<L>   |  28  |  0.52    Ca    8.3<L>      31 Mar 2022 05:19  Ca    8.5      30 Mar 2022 04:23  Phos  3.7     03-31  Phos  3.7     03-30  Mg     2.0     03-31  Mg     2.2     03-30    TPro  5.3<L>  /  Alb  3.6  /  TBili  0.8  /  DBili  x   /  AST  34  /  ALT  61<H>  /  AlkPhos  78  03-30      TELEMETRY: SR ~70-90s	    ECG:  	  RADIOLOGY: < from: Xray Chest 1 View- PORTABLE-Urgent (Xray Chest 1 View- PORTABLE-Urgent .) (03.30.22 @ 12:15) >  FINDINGS:  Left chest wall dual-lead pacemaker with lead tips in the right atrium   and right ventricle.  Normal heart size.    No focal infiltrates. No sizable pleural effusion. No pneumothorax.    Impression:  No pneumothorax.    < end of copied text >	    PREVIOUS DIAGNOSTIC TESTING:    [ ] Echocardiogram: < from: TTE with Doppler (w/Cont) (03.30.22 @ 07:58) >  Dimensions:    Normal Values:  LA:     3.6    2.0 - 4.0 cm  Ao:     3.1    2.0 - 3.8 cm  SEPTUM: 1.2    0.6 - 1.2 cm  PWT:    1.2    0.6 - 1.1 cm  LVIDd:  3.0    3.0 - 5.6 cm  LVIDs:  2.5    1.8 - 4.0 cm  Derived variables:  LVMI: 69 g/m2  RWT: 0.80  EF (Visual Estimate): 60 %  Doppler Peak Velocity (m/sec): AoV=1.2 TV=2.2  ------------------------------------------------------------------------  Observations:  Mitral Valve: Normal mitral valve.  Aortic Valve/Aorta: Mildly calcified trileaflet aortic  valve with normal opening. Minimal aortic regurgitation.  Normal aortic root size.  Left Atrium: Moderately dilated left atrium.  Left Ventricle: Normal left ventricular internal dimensions  and wall thickness.  Normal left ventricular systolic function. No segmental  wall motion abnormalities.  Left atrial filling pressure is elevated.  Right Heart: Normal right atrium. Normal right ventricular  size and function.  Normal tricuspid valve. Mild tricuspid regurgitation.  Normal pulmonic valve.  Pericardium/Pleura: Normal pericardium with no pericardial  effusion.  Bilateral pleural effusions.  Hemodynamic: Estimated right atrial pressure is severely  elevated.  No evidence of pulmonary hypertension.  ------------------------------------------------------------------------  Conclusions:  Normal left ventricular systolic function. No segmental  wall motion abnormalities.  Average global longitudinal strain = -14.6%; pattern is  homogeneous (i.e. not suggestive of amyloid).    < end of copied text >

## 2022-03-31 NOTE — PROGRESS NOTE ADULT - PROBLEM SELECTOR PLAN 6
Pt found to have c.diff colitis per outpt GI in March  - s/p 10 day course ending 3/26/2022   - Stools currently formed   - No leukocytosis

## 2022-03-31 NOTE — PROGRESS NOTE ADULT - PROBLEM SELECTOR PLAN 1
Pt underwent fat pad bx in Feb 2022 and diagnosed with amyloidosis. 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 No typing of amyloidosis was done given as sample was not enough for mass spec.     - onc team consulted re plans for inpt chemo. Potentially starting daratumumab plus CyBorD +/- autologous bone marrow transplant.    # Autonomic dysfunction   - Suspect autonomic dysfunction and AV block may be associated with amyloidosis   - Tx orthostatic hypotension and POTS (see above/below)     # Rule out cardiac involvement  - TTE 3/16/2022 with concentric LVH. Unclear if this is associated with cardiac amyloidosis and deposition? Pt reports TTE in 01/2022 was normal with mild valvular regurgitation   - LVH is very classically seen in light chain amyloidosis and plasma cell neoplasm   - Cardiac MRI 3/28 w evidence of cardiac amyloidosis   - S/p micra PPM   - C/w tele monitoring Pt underwent fat pad bx in Feb 2022 and diagnosed with amyloidosis. 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 No typing of amyloidosis was done given as sample was not enough for mass spec.     - onc team consulted re plans for inpt chemo. Potentially starting daratumumab plus CyBorD +/- autologous bone marrow transplant.    # Autonomic dysfunction   - Suspect autonomic dysfunction and AV block may be associated with amyloidosis   - Tx orthostatic hypotension and POTS (see above/below)     # Rule out cardiac involvement  - TTE 3/16/2022 with concentric LVH. Unclear if this is associated with cardiac amyloidosis and deposition? Pt reports TTE in 01/2022 was normal with mild valvular regurgitation   - LVH is very classically seen in light chain amyloidosis and plasma cell neoplasm   - Cardiac MRI 3/28 w evidence of cardiac amyloidosis   - S/p micra PPM   - plan for inpatient chemo with Daratumumab + CyBorD  Friday with valcyclovir prophylaxis

## 2022-03-31 NOTE — PROGRESS NOTE ADULT - PROBLEM SELECTOR PLAN 5
Last saw Dr. 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   Potentially starting daratumumab plus CyBorD +/- autologous bone marrow transplant.    Plan:  -heme/onc consulted, recs appreciated: will likely start Daratumumab + CyBorD inpatient Friday  - Will f/u with Dr. Goldberg after discharge (patient's Hematologist) Last saw Dr. 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   Potentially starting daratumumab plus CyBorD +/- autologous bone marrow transplant.    Plan:  -heme/onc  will start Daratumumab + CyBorD inpatient Friday  - Will f/u with Dr. Goldberg after discharge (patient's Hematologist)

## 2022-04-01 LAB
ALBUMIN SERPL ELPH-MCNC: 3 G/DL — LOW (ref 3.3–5)
ALP SERPL-CCNC: 57 U/L — SIGNIFICANT CHANGE UP (ref 40–120)
ALT FLD-CCNC: 34 U/L — SIGNIFICANT CHANGE UP (ref 10–45)
ANION GAP SERPL CALC-SCNC: 8 MMOL/L — SIGNIFICANT CHANGE UP (ref 5–17)
AST SERPL-CCNC: 11 U/L — SIGNIFICANT CHANGE UP (ref 10–40)
BASOPHILS # BLD AUTO: 0.07 K/UL — SIGNIFICANT CHANGE UP (ref 0–0.2)
BASOPHILS NFR BLD AUTO: 1.2 % — SIGNIFICANT CHANGE UP (ref 0–2)
BILIRUB SERPL-MCNC: 0.9 MG/DL — SIGNIFICANT CHANGE UP (ref 0.2–1.2)
BUN SERPL-MCNC: 7 MG/DL — SIGNIFICANT CHANGE UP (ref 7–23)
CALCIUM SERPL-MCNC: 8.4 MG/DL — SIGNIFICANT CHANGE UP (ref 8.4–10.5)
CHLORIDE SERPL-SCNC: 101 MMOL/L — SIGNIFICANT CHANGE UP (ref 96–108)
CO2 SERPL-SCNC: 28 MMOL/L — SIGNIFICANT CHANGE UP (ref 22–31)
CREAT SERPL-MCNC: 0.72 MG/DL — SIGNIFICANT CHANGE UP (ref 0.5–1.3)
EGFR: 97 ML/MIN/1.73M2 — SIGNIFICANT CHANGE UP
EOSINOPHIL # BLD AUTO: 0.11 K/UL — SIGNIFICANT CHANGE UP (ref 0–0.5)
EOSINOPHIL NFR BLD AUTO: 1.9 % — SIGNIFICANT CHANGE UP (ref 0–6)
GLUCOSE SERPL-MCNC: 91 MG/DL — SIGNIFICANT CHANGE UP (ref 70–99)
HCT VFR BLD CALC: 28.3 % — LOW (ref 34.5–45)
HGB BLD-MCNC: 9.5 G/DL — LOW (ref 11.5–15.5)
IMM GRANULOCYTES NFR BLD AUTO: 0.2 % — SIGNIFICANT CHANGE UP (ref 0–1.5)
LYMPHOCYTES # BLD AUTO: 1.56 K/UL — SIGNIFICANT CHANGE UP (ref 1–3.3)
LYMPHOCYTES # BLD AUTO: 27.4 % — SIGNIFICANT CHANGE UP (ref 13–44)
MAGNESIUM SERPL-MCNC: 1.9 MG/DL — SIGNIFICANT CHANGE UP (ref 1.6–2.6)
MCHC RBC-ENTMCNC: 30.2 PG — SIGNIFICANT CHANGE UP (ref 27–34)
MCHC RBC-ENTMCNC: 33.6 GM/DL — SIGNIFICANT CHANGE UP (ref 32–36)
MCV RBC AUTO: 89.8 FL — SIGNIFICANT CHANGE UP (ref 80–100)
MONOCYTES # BLD AUTO: 0.43 K/UL — SIGNIFICANT CHANGE UP (ref 0–0.9)
MONOCYTES NFR BLD AUTO: 7.5 % — SIGNIFICANT CHANGE UP (ref 2–14)
NEUTROPHILS # BLD AUTO: 3.52 K/UL — SIGNIFICANT CHANGE UP (ref 1.8–7.4)
NEUTROPHILS NFR BLD AUTO: 61.8 % — SIGNIFICANT CHANGE UP (ref 43–77)
NRBC # BLD: 0 /100 WBCS — SIGNIFICANT CHANGE UP (ref 0–0)
PHOSPHATE SERPL-MCNC: 3.7 MG/DL — SIGNIFICANT CHANGE UP (ref 2.5–4.5)
PLATELET # BLD AUTO: 274 K/UL — SIGNIFICANT CHANGE UP (ref 150–400)
POTASSIUM SERPL-MCNC: 3.4 MMOL/L — LOW (ref 3.5–5.3)
POTASSIUM SERPL-SCNC: 3.4 MMOL/L — LOW (ref 3.5–5.3)
PROT SERPL-MCNC: 4.5 G/DL — LOW (ref 6–8.3)
RBC # BLD: 3.15 M/UL — LOW (ref 3.8–5.2)
RBC # FLD: 13.6 % — SIGNIFICANT CHANGE UP (ref 10.3–14.5)
SODIUM SERPL-SCNC: 137 MMOL/L — SIGNIFICANT CHANGE UP (ref 135–145)
WBC # BLD: 5.7 K/UL — SIGNIFICANT CHANGE UP (ref 3.8–10.5)
WBC # FLD AUTO: 5.7 K/UL — SIGNIFICANT CHANGE UP (ref 3.8–10.5)

## 2022-04-01 PROCEDURE — 99233 SBSQ HOSP IP/OBS HIGH 50: CPT | Mod: GC

## 2022-04-01 PROCEDURE — 99233 SBSQ HOSP IP/OBS HIGH 50: CPT

## 2022-04-01 RX ORDER — POTASSIUM CHLORIDE 20 MEQ
40 PACKET (EA) ORAL EVERY 4 HOURS
Refills: 0 | Status: COMPLETED | OUTPATIENT
Start: 2022-04-01 | End: 2022-04-01

## 2022-04-01 RX ORDER — MONTELUKAST 4 MG/1
10 TABLET, CHEWABLE ORAL ONCE
Refills: 0 | Status: COMPLETED | OUTPATIENT
Start: 2022-04-01 | End: 2022-04-01

## 2022-04-01 RX ORDER — MIDODRINE HYDROCHLORIDE 2.5 MG/1
20 TABLET ORAL THREE TIMES A DAY
Refills: 0 | Status: DISCONTINUED | OUTPATIENT
Start: 2022-04-01 | End: 2022-04-04

## 2022-04-01 RX ORDER — ENOXAPARIN SODIUM 100 MG/ML
40 INJECTION SUBCUTANEOUS EVERY 24 HOURS
Refills: 0 | Status: DISCONTINUED | OUTPATIENT
Start: 2022-04-02 | End: 2022-04-04

## 2022-04-01 RX ORDER — DEXAMETHASONE 0.5 MG/5ML
20 ELIXIR ORAL ONCE
Refills: 0 | Status: COMPLETED | OUTPATIENT
Start: 2022-04-01 | End: 2022-04-01

## 2022-04-01 RX ORDER — MIDODRINE HYDROCHLORIDE 2.5 MG/1
20 TABLET ORAL ONCE
Refills: 0 | Status: COMPLETED | OUTPATIENT
Start: 2022-04-01 | End: 2022-04-01

## 2022-04-01 RX ORDER — DIPHENHYDRAMINE HCL 50 MG
50 CAPSULE ORAL ONCE
Refills: 0 | Status: COMPLETED | OUTPATIENT
Start: 2022-04-01 | End: 2022-04-01

## 2022-04-01 RX ORDER — BORTEZOMIB 2.5 MG/1
2 INJECTION INTRAVENOUS ONCE
Refills: 0 | Status: COMPLETED | OUTPATIENT
Start: 2022-04-01 | End: 2022-04-01

## 2022-04-01 RX ORDER — ACETAMINOPHEN 500 MG
650 TABLET ORAL ONCE
Refills: 0 | Status: COMPLETED | OUTPATIENT
Start: 2022-04-01 | End: 2022-04-01

## 2022-04-01 RX ORDER — DIPHENHYDRAMINE HCL 50 MG
50 CAPSULE ORAL ONCE
Refills: 0 | Status: DISCONTINUED | OUTPATIENT
Start: 2022-04-01 | End: 2022-04-04

## 2022-04-01 RX ORDER — DARATUMUMAB 100 MG/5ML
422 INJECTION, SOLUTION, CONCENTRATE INTRAVENOUS ONCE
Refills: 0 | Status: COMPLETED | OUTPATIENT
Start: 2022-04-01 | End: 2022-04-01

## 2022-04-01 RX ORDER — HYDROCORTISONE 20 MG
100 TABLET ORAL ONCE
Refills: 0 | Status: DISCONTINUED | OUTPATIENT
Start: 2022-04-01 | End: 2022-04-04

## 2022-04-01 RX ORDER — MAGNESIUM SULFATE 500 MG/ML
2 VIAL (ML) INJECTION ONCE
Refills: 0 | Status: COMPLETED | OUTPATIENT
Start: 2022-04-01 | End: 2022-04-01

## 2022-04-01 RX ORDER — MEPERIDINE HYDROCHLORIDE 50 MG/ML
12.5 INJECTION INTRAMUSCULAR; INTRAVENOUS; SUBCUTANEOUS ONCE
Refills: 0 | Status: DISCONTINUED | OUTPATIENT
Start: 2022-04-01 | End: 2022-04-04

## 2022-04-01 RX ORDER — PSYLLIUM SEED (WITH DEXTROSE)
1 POWDER (GRAM) ORAL
Refills: 0 | Status: COMPLETED | OUTPATIENT
Start: 2022-04-01 | End: 2022-04-04

## 2022-04-01 RX ORDER — CYCLOPHOSPHAMIDE 100 MG
450 VIAL (EA) INTRAVENOUS ONCE
Refills: 0 | Status: COMPLETED | OUTPATIENT
Start: 2022-04-01 | End: 2022-04-01

## 2022-04-01 RX ADMIN — ATORVASTATIN CALCIUM 40 MILLIGRAM(S): 80 TABLET, FILM COATED ORAL at 21:24

## 2022-04-01 RX ADMIN — VALACYCLOVIR 500 MILLIGRAM(S): 500 TABLET, FILM COATED ORAL at 17:42

## 2022-04-01 RX ADMIN — Medication 25 GRAM(S): at 07:57

## 2022-04-01 RX ADMIN — BORTEZOMIB 2 MILLIGRAM(S): 2.5 INJECTION INTRAVENOUS at 16:13

## 2022-04-01 RX ADMIN — Medication 450 MILLIGRAM(S): at 16:12

## 2022-04-01 RX ADMIN — Medication 110 MILLIGRAM(S): at 10:27

## 2022-04-01 RX ADMIN — Medication 137 MICROGRAM(S): at 05:26

## 2022-04-01 RX ADMIN — Medication 110 MILLIGRAM(S): at 16:12

## 2022-04-01 RX ADMIN — Medication 81 MILLIGRAM(S): at 11:15

## 2022-04-01 RX ADMIN — Medication 650 MILLIGRAM(S): at 10:27

## 2022-04-01 RX ADMIN — MIDODRINE HYDROCHLORIDE 20 MILLIGRAM(S): 2.5 TABLET ORAL at 11:14

## 2022-04-01 RX ADMIN — SIMETHICONE 80 MILLIGRAM(S): 80 TABLET, CHEWABLE ORAL at 11:15

## 2022-04-01 RX ADMIN — LORATADINE 10 MILLIGRAM(S): 10 TABLET ORAL at 11:15

## 2022-04-01 RX ADMIN — MONTELUKAST 10 MILLIGRAM(S): 4 TABLET, CHEWABLE ORAL at 10:30

## 2022-04-01 RX ADMIN — DIPHENHYDRAMINE HYDROCHLORIDE AND LIDOCAINE HYDROCHLORIDE AND ALUMINUM HYDROXIDE AND MAGNESIUM HYDRO 10 MILLILITER(S): KIT at 18:32

## 2022-04-01 RX ADMIN — SIMETHICONE 80 MILLIGRAM(S): 80 TABLET, CHEWABLE ORAL at 17:42

## 2022-04-01 RX ADMIN — Medication 40 MILLIEQUIVALENT(S): at 07:58

## 2022-04-01 RX ADMIN — SIMETHICONE 80 MILLIGRAM(S): 80 TABLET, CHEWABLE ORAL at 23:33

## 2022-04-01 RX ADMIN — DARATUMUMAB 125 MILLIGRAM(S): 100 INJECTION, SOLUTION, CONCENTRATE INTRAVENOUS at 12:01

## 2022-04-01 RX ADMIN — FLUDROCORTISONE ACETATE 0.2 MILLIGRAM(S): 0.1 TABLET ORAL at 05:25

## 2022-04-01 RX ADMIN — Medication 1 SPRAY(S): at 05:25

## 2022-04-01 RX ADMIN — GABAPENTIN 100 MILLIGRAM(S): 400 CAPSULE ORAL at 21:23

## 2022-04-01 RX ADMIN — FAMOTIDINE 20 MILLIGRAM(S): 10 INJECTION INTRAVENOUS at 05:26

## 2022-04-01 RX ADMIN — Medication 50 MILLIGRAM(S): at 10:26

## 2022-04-01 RX ADMIN — VALACYCLOVIR 500 MILLIGRAM(S): 500 TABLET, FILM COATED ORAL at 05:26

## 2022-04-01 RX ADMIN — Medication 40 MILLIEQUIVALENT(S): at 10:19

## 2022-04-01 RX ADMIN — MIDODRINE HYDROCHLORIDE 10 MILLIGRAM(S): 2.5 TABLET ORAL at 05:26

## 2022-04-01 RX ADMIN — FAMOTIDINE 20 MILLIGRAM(S): 10 INJECTION INTRAVENOUS at 17:46

## 2022-04-01 RX ADMIN — GABAPENTIN 100 MILLIGRAM(S): 400 CAPSULE ORAL at 05:25

## 2022-04-01 RX ADMIN — MIDODRINE HYDROCHLORIDE 20 MILLIGRAM(S): 2.5 TABLET ORAL at 00:43

## 2022-04-01 RX ADMIN — GABAPENTIN 100 MILLIGRAM(S): 400 CAPSULE ORAL at 14:44

## 2022-04-01 RX ADMIN — Medication 2 TABLET(S): at 11:15

## 2022-04-01 RX ADMIN — MIDODRINE HYDROCHLORIDE 20 MILLIGRAM(S): 2.5 TABLET ORAL at 17:42

## 2022-04-01 RX ADMIN — SIMETHICONE 80 MILLIGRAM(S): 80 TABLET, CHEWABLE ORAL at 05:25

## 2022-04-01 NOTE — PROGRESS NOTE ADULT - SUBJECTIVE AND OBJECTIVE BOX
HPI:  Patient seen and examined at bedside on 4 Jomar.  Plan for initiation of Cy-Bor-D and daratumumab today.    Review Of Systems:           Respiratory: No shortness of breath, cough, or wheezing  Cardiovascular: No chest pain or palpitations  10 point review of systems is otherwise negative except as mentioned above        Medications:  acetaminophen     Tablet .. 650 milliGRAM(s) Oral every 6 hours PRN  aspirin  chewable 81 milliGRAM(s) Oral daily  atorvastatin 40 milliGRAM(s) Oral at bedtime  diphenhydrAMINE Injectable 50 milliGRAM(s) IV Push once PRN  famotidine    Tablet 20 milliGRAM(s) Oral two times a day  FIRST- Mouthwash  BLM 10 milliLiter(s) Swish and Spit four times a day PRN  fludroCORTISONE 0.2 milliGRAM(s) Oral daily  fluticasone propionate 50 MICROgram(s)/spray Nasal Spray 1 Spray(s) Both Nostrils two times a day  gabapentin 100 milliGRAM(s) Oral every 8 hours  hydrocortisone sodium succinate Injectable 100 milliGRAM(s) IV Push once PRN  influenza   Vaccine 0.5 milliLiter(s) IntraMuscular once  lactobacillus acidophilus 2 Tablet(s) Oral daily  levothyroxine 137 MICROGram(s) Oral daily  loratadine 10 milliGRAM(s) Oral daily  meperidine     Injectable 12.5 milliGRAM(s) IV Push once PRN  midodrine. 20 milliGRAM(s) Oral three times a day  psyllium Powder 1 Packet(s) Oral two times a day  simethicone 80 milliGRAM(s) Chew every 6 hours  valACYclovir 500 milliGRAM(s) Oral two times a day    PAST MEDICAL & SURGICAL HISTORY:  Hypothyroidism    POTS (postural orthostatic tachycardia syndrome)    Clostridium difficile colitis    Amyloidosis    Smoldering myeloma    No significant past surgical history      Vitals:  T(C): 36.9 (04-01-22 @ 17:41), Max: 37.2 (04-01-22 @ 10:00)  HR: 76 (04-01-22 @ 17:41) (64 - 84)  BP: 107/65 (04-01-22 @ 17:41) (95/57 - 122/72)  BP(mean): --  RR: 18 (04-01-22 @ 17:41) (18 - 18)  SpO2: 93% (04-01-22 @ 17:41) (90% - 95%)  Wt(kg): --  Daily     Daily   I&O's Summary    31 Mar 2022 07:01  -  01 Apr 2022 07:00  --------------------------------------------------------  IN: 240 mL / OUT: 420 mL / NET: -180 mL    01 Apr 2022 07:01  -  01 Apr 2022 21:10  --------------------------------------------------------  IN: 610 mL / OUT: 1170 mL / NET: -560 mL        Physical Exam:  Appearance: No acute distress; well appearing  Eyes: PERRL, EOMI, pink conjunctiva  HENT: Normal oral mucosa  Cardiovascular: RRR, S1, S2, no murmurs, rubs, or gallops; no edema; no JVD  Respiratory: Clear to auscultation bilaterally  Gastrointestinal: soft, non-tender, non-distended with normal bowel sounds  Musculoskeletal: No clubbing; no joint deformity   Neurologic: Non-focal  Lymphatic: No lymphadenopathy  Psychiatry: AAOx3, mood & affect appropriate  Skin: No rashes, ecchymoses, or cyanosis                          9.5    5.70  )-----------( 274      ( 01 Apr 2022 05:48 )             28.3     04-01    137  |  101  |  7   ----------------------------<  91  3.4<L>   |  28  |  0.72    Ca    8.4      01 Apr 2022 05:51  Phos  3.7     04-01  Mg     1.9     04-01    TPro  4.5<L>  /  Alb  3.0<L>  /  TBili  0.9  /  DBili  x   /  AST  11  /  ALT  34  /  AlkPhos  57  04-01      ECG: sinus 75 bpm, low voltage    Echo:  < from: TTE with Doppler (w/Cont) (03.30.22 @ 07:58) >  ------------------------------------------------------------------------  Dimensions:    Normal Values:  LA:     3.6    2.0 - 4.0 cm  Ao:     3.1    2.0 - 3.8 cm  SEPTUM: 1.2    0.6 - 1.2 cm  PWT:    1.2    0.6 - 1.1 cm  LVIDd:  3.0    3.0 - 5.6 cm  LVIDs:  2.5    1.8 - 4.0 cm  Derived variables:  LVMI: 69 g/m2  RWT: 0.80  EF (Visual Estimate): 60 %  Doppler Peak Velocity (m/sec): AoV=1.2 TV=2.2  ------------------------------------------------------------------------  Observations:  Mitral Valve: Normal mitral valve.  Aortic Valve/Aorta: Mildly calcified trileaflet aortic  valve with normal opening. Minimal aortic regurgitation.  Normal aortic root size.  Left Atrium: Moderately dilated left atrium.  Left Ventricle: Normal left ventricular internal dimensions  and wall thickness.  Normal left ventricular systolic function. No segmental  wall motion abnormalities.  Left atrial filling pressure is elevated.  Right Heart: Normal right atrium. Normal right ventricular  size and function.  Normal tricuspid valve. Mild tricuspid regurgitation.  Normal pulmonic valve.  Pericardium/Pleura: Normal pericardium with no pericardial  effusion.  Bilateral pleural effusions.  Hemodynamic: Estimated right atrial pressure is severely  elevated.  No evidence of pulmonary hypertension.  ------------------------------------------------------------------------  Conclusions:  Normal left ventricular systolic function. No segmental  wall motion abnormalities.  Average global longitudinal strain = -14.6%; pattern is  homogeneous (i.e. not suggestive of amyloid).  ------------------------------------------------------------------------  Confirmed on  3/30/2022 - 10:49:49 by Michael Mcdonald MD, FASE  ------------------------------------------------------------------------    < end of copied text >    Interpretation of Telemetry: JOSÉ ANTONIO

## 2022-04-01 NOTE — PROGRESS NOTE ADULT - PROBLEM SELECTOR PLAN 8
DVT PPx: SCDs  Diet: Regular  Full Code  Dispo: TBD DVT PPx: SCDs, DVT prophylaxis to resume tomorrow   Diet: Regular  Full Code  Dispo: TBD

## 2022-04-01 NOTE — PROGRESS NOTE ADULT - SUBJECTIVE AND OBJECTIVE BOX
OVERNIGHT EVENTS: No acute events overnight.    SUBJECTIVE:   CICU Course: Patient s/p RRT 3/29 for hypotension and bradycardia. Patient was bradycardic to the 30s (junctional) and hypotensive to SBP 60s. Atropine was given x2 and pt started on dopamine gtt. Patient underwent [employer] dual chamber pacemaker via left cephalic vein without issues, Lower rate at 70. Post CXR without pneumothorax. Per heme/onc plan, Planning to start chemotherapy inpatient, first dose to be given Friday - Daratumumab + CyBorD w/ plan for autologous bone marrow transplant later.      MEDICATIONS  (STANDING):  aspirin  chewable 81 milliGRAM(s) Oral daily  atorvastatin 40 milliGRAM(s) Oral at bedtime  enoxaparin Injectable 40 milliGRAM(s) SubCutaneous every 24 hours  famotidine    Tablet 20 milliGRAM(s) Oral two times a day  fludroCORTISONE 0.2 milliGRAM(s) Oral daily  fluticasone propionate 50 MICROgram(s)/spray Nasal Spray 1 Spray(s) Both Nostrils two times a day  gabapentin 100 milliGRAM(s) Oral every 8 hours  influenza   Vaccine 0.5 milliLiter(s) IntraMuscular once  lactated ringers. 1000 milliLiter(s) (75 mL/Hr) IV Continuous <Continuous>  lactobacillus acidophilus 1 Tablet(s) Oral daily  levothyroxine 137 MICROGram(s) Oral daily  loratadine 10 milliGRAM(s) Oral daily  midodrine 20 milliGRAM(s) Oral every 8 hours  simethicone 80 milliGRAM(s) Chew every 6 hours    MEDICATIONS  (PRN):  acetaminophen     Tablet .. 650 milliGRAM(s) Oral every 6 hours PRN Temp greater or equal to 38C (100.4F), Mild Pain (1 - 3), Moderate Pain (4 - 6)  aluminum hydroxide/magnesium hydroxide/simethicone Suspension 30 milliLiter(s) Oral every 4 hours PRN Dyspepsia  magnesium hydroxide Suspension 30 milliLiter(s) Oral at bedtime PRN Constipation      Vital Signs Last 24 Hrs  T(C): 37 (31 Mar 2022 04:53), Max: 37 (31 Mar 2022 04:53)  T(F): 98.6 (31 Mar 2022 04:53), Max: 98.6 (31 Mar 2022 04:53)  HR: 83 (31 Mar 2022 04:53) (69 - 85)  BP: 99/59 (31 Mar 2022 04:53) (89/53 - 141/78)  BP(mean): 78 (30 Mar 2022 21:00) (66 - 102)  RR: 18 (31 Mar 2022 04:53) (15 - 39)  SpO2: 95% (31 Mar 2022 04:53) (93% - 98%)    PHYSICAL EXAM:   General: thin, middle-aged woman in NAD; awake, sitting comfortably in bed  HEENT: nc/at, clear conjunctiva, moist mucous membranes  Neck: supple, no JVD  Heart: RRR, +systolic murmur   Chest/lungs: mild bibasilar rales; no wheezing or rhonchi  ABD: soft, non-tender, non-distended; no guarding or rebound; bowel sounds present  Extremities: no edema, erythema, or tenderness to palpation  Skin: clear, dry  Neuro: A&Ox4, FROM of all 4 extremities; no focal deficits; grossly intact    LABS:                        10.2   6.97  )-----------( 285      ( 31 Mar 2022 05:19 )             31.6     03-31    140  |  103  |  4<L>  ----------------------------<  100<H>  3.9   |  29  |  0.68    Ca    8.3<L>      31 Mar 2022 05:19  Phos  3.7     03-31  Mg     2.0     03-31    TPro  5.3<L>  /  Alb  3.6  /  TBili  0.8  /  DBili  x   /  AST  34  /  ALT  61<H>  /  AlkPhos  78  03-30    PT/INR - ( 30 Mar 2022 04:23 )   PT: 11.6 sec;   INR: 1.00 ratio         PTT - ( 30 Mar 2022 04:23 )  PTT:25.9 sec                               OVERNIGHT EVENTS: No acute events overnight.    SUBJECTIVE:   Overnight, pt became hypotensive to 63/40s, asymptomatic. Received 30mg midodrine, after which BP improved. Says she is having loose bowel movements since yesterday. Otherwise denies F/C/N/V, chest pain, palpitations, SOB.     MEDICATIONS  (STANDING):  aspirin  chewable 81 milliGRAM(s) Oral daily  atorvastatin 40 milliGRAM(s) Oral at bedtime  enoxaparin Injectable 40 milliGRAM(s) SubCutaneous every 24 hours  famotidine    Tablet 20 milliGRAM(s) Oral two times a day  fludroCORTISONE 0.2 milliGRAM(s) Oral daily  fluticasone propionate 50 MICROgram(s)/spray Nasal Spray 1 Spray(s) Both Nostrils two times a day  gabapentin 100 milliGRAM(s) Oral every 8 hours  influenza   Vaccine 0.5 milliLiter(s) IntraMuscular once  lactated ringers. 1000 milliLiter(s) (75 mL/Hr) IV Continuous <Continuous>  lactobacillus acidophilus 1 Tablet(s) Oral daily  levothyroxine 137 MICROGram(s) Oral daily  loratadine 10 milliGRAM(s) Oral daily  midodrine 20 milliGRAM(s) Oral every 8 hours  simethicone 80 milliGRAM(s) Chew every 6 hours    MEDICATIONS  (PRN):  acetaminophen     Tablet .. 650 milliGRAM(s) Oral every 6 hours PRN Temp greater or equal to 38C (100.4F), Mild Pain (1 - 3), Moderate Pain (4 - 6)  aluminum hydroxide/magnesium hydroxide/simethicone Suspension 30 milliLiter(s) Oral every 4 hours PRN Dyspepsia  magnesium hydroxide Suspension 30 milliLiter(s) Oral at bedtime PRN Constipation      Vital Signs Last 24 Hrs  T(C): 37.2 (01 Apr 2022 13:00), Max: 37.2 (01 Apr 2022 10:00)  T(F): 98.9 (01 Apr 2022 13:00), Max: 99 (01 Apr 2022 10:00)  HR: 64 (01 Apr 2022 13:00) (64 - 81)  BP: 107/69 (01 Apr 2022 13:00) (63/41 - 122/72)  BP(mean): --  RR: 18 (01 Apr 2022 13:00) (18 - 18)  SpO2: 90% (01 Apr 2022 13:00) (90% - 95%)    PHYSICAL EXAM:   General: thin, middle-aged woman in NAD; awake, sitting comfortably in bed  HEENT: nc/at, clear conjunctiva, moist mucous membranes  Neck: supple, no JVD  Heart: RRR, +systolic murmur   Chest/lungs: mild bibasilar rales; no wheezing or rhonchi  ABD: soft, non-tender, non-distended; no guarding or rebound; bowel sounds present  Extremities: no edema, erythema, or tenderness to palpation  Skin: clear, dry  Neuro: A&Ox4, FROM of all 4 extremities; no focal deficits; grossly intact    LABS:                        9.5    5.70  )-----------( 274      ( 01 Apr 2022 05:48 )             28.3     04-01    137  |  101  |  7   ----------------------------<  91  3.4<L>   |  28  |  0.72    Ca    8.4      01 Apr 2022 05:51  Phos  3.7     04-01  Mg     1.9     04-01    TPro  4.5<L>  /  Alb  3.0<L>  /  TBili  0.9  /  DBili  x   /  AST  11  /  ALT  34  /  AlkPhos  57  04-01

## 2022-04-01 NOTE — PROGRESS NOTE ADULT - SUBJECTIVE AND OBJECTIVE BOX
Hematology Oncology Follow-up    INTERVAL HPI/OVERNIGHT EVENTS:  No o/n events, patient resting comfortably.     VITAL SIGNS:  T(F): 98.3 (04-01-22 @ 04:58)  HR: 80 (04-01-22 @ 04:58)  BP: 107/62 (04-01-22 @ 04:58)  RR: 18 (04-01-22 @ 04:58)  SpO2: 95% (04-01-22 @ 04:58)  Wt(kg): --    03-31-22 @ 07:01  -  04-01-22 @ 07:00  --------------------------------------------------------  IN: 240 mL / OUT: 420 mL / NET: -180 mL        PHYSICAL EXAM:    Constitutional: AAOx3, NAD  Eyes: PERRL, EOMI, sclera non-icteric  Neck: supple, no masses, no JVD, no lymphadenopathy; incision from PPM placement healing well.   Respiratory: CTA b/l, no wheezing, rhonchi, rales, with normal respiratory effort  Cardiovascular: RRR, normal S1S2, no M/R/G  Gastrointestinal: soft, NTND, no masses palpable, BS normal in all four quadrants, no HSM  Extremities:  no edema  MSK: no obvious abnormalities, normal ROM, no lymphadenopathy  Neurological: Grossly intact  Skin: Normal temperature, no rash, no echymoses, no petichiae  Psych: normal affect    MEDICATIONS  (STANDING):  acetaminophen     Tablet .. 650 milliGRAM(s) Oral once  aspirin  chewable 81 milliGRAM(s) Oral daily  atorvastatin 40 milliGRAM(s) Oral at bedtime  daratumumab IVPB (eMAR) 422 milliGRAM(s) IV Intermittent once  dexAMETHasone  IVPB 20 milliGRAM(s) IV Intermittent once  diphenhydrAMINE 50 milliGRAM(s) Oral once  famotidine    Tablet 20 milliGRAM(s) Oral two times a day  fludroCORTISONE 0.2 milliGRAM(s) Oral daily  fluticasone propionate 50 MICROgram(s)/spray Nasal Spray 1 Spray(s) Both Nostrils two times a day  gabapentin 100 milliGRAM(s) Oral every 8 hours  influenza   Vaccine 0.5 milliLiter(s) IntraMuscular once  lactobacillus acidophilus 2 Tablet(s) Oral daily  levothyroxine 137 MICROGram(s) Oral daily  loratadine 10 milliGRAM(s) Oral daily  midodrine. 20 milliGRAM(s) Oral three times a day  montelukast 10 milliGRAM(s) Oral once  potassium chloride    Tablet ER 40 milliEquivalent(s) Oral every 4 hours  simethicone 80 milliGRAM(s) Chew every 6 hours  valACYclovir 500 milliGRAM(s) Oral two times a day    MEDICATIONS  (PRN):  acetaminophen     Tablet .. 650 milliGRAM(s) Oral every 6 hours PRN Mild Pain (1 - 3), Moderate Pain (4 - 6)  diphenhydrAMINE Injectable 50 milliGRAM(s) IV Push once PRN Daratumumab reaction  FIRST- Mouthwash  BLM 10 milliLiter(s) Swish and Spit four times a day PRN 15 minutes before meals  hydrocortisone sodium succinate Injectable 100 milliGRAM(s) IV Push once PRN Daratumumab reaction  meperidine     Injectable 12.5 milliGRAM(s) IV Push once PRN Daratumumab reaction      penicillins (Unknown)      LABS:                        9.5    5.70  )-----------( 274      ( 01 Apr 2022 05:48 )             28.3     04-01    137  |  101  |  7   ----------------------------<  91  3.4<L>   |  28  |  0.72    Ca    8.4      01 Apr 2022 05:51  Phos  3.7     04-01  Mg     1.9     04-01    TPro  4.5<L>  /  Alb  3.0<L>  /  TBili  0.9  /  DBili  x   /  AST  11  /  ALT  34  /  AlkPhos  57  04-01           Ferritin, Serum: 86 ng/mL (03-29-22 @ 10:43)  Iron Total, Serum: 32 ug/dL (03-29-22 @ 08:56)  Unsaturated Iron Binding Capacity: 196 ug/dL (03-29-22 @ 08:56)  Iron - Total Binding Capacity.: 228 ug/dL (03-29-22 @ 08:56)  % Saturation, Iron: 14 % (03-29-22 @ 08:56)            RADIOLOGY & ADDITIONAL TESTS:  Studies reviewed.

## 2022-04-01 NOTE — PROGRESS NOTE ADULT - PROBLEM SELECTOR PLAN 5
Last saw Dr. 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   Potentially starting daratumumab plus CyBorD +/- autologous bone marrow transplant.    Plan:  -heme/onc  will start Daratumumab + CyBorD inpatient Friday  - Will f/u with Dr. Goldberg after discharge (patient's Hematologist) Last saw Dr. 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       Plan:  -heme/onc will start Daratumumab + CyBorD today  - Will f/u with Dr. Goldberg after discharge (patient's Hematologist)

## 2022-04-01 NOTE — PROGRESS NOTE ADULT - PROBLEM SELECTOR PLAN 3
C/w midodrine 20mg with more lenient parameters (SBP >150 and HR <40) given pt had multiple RRT in OSH when midodrine was held  - decreased midodrine to 10mg q8 as pt with pacemaker   - C/w with fludrocortisone   - BP monitoring C/w midodrine 20mg with more lenient parameters (SBP >150 and HR <40) given pt had multiple RRT in OSH when midodrine was held  - c/w midodrine 20mg q8  - C/w with fludrocortisone   - BP monitoring

## 2022-04-01 NOTE — PROGRESS NOTE ADULT - ATTENDING COMMENTS
The patient is seen with family in attendance; she is currently on daratumumab first infusion half dosing regimen today and remainder tomorrow; cyclophosphamide, Steroid and bortezomib also in plan  No nausea no rash and no reactivity to medication at the current time. Patient reminded to keep carbohydrate and sugar intake (candy at bed side) at a minimal level during steroid treatment

## 2022-04-01 NOTE — PROGRESS NOTE ADULT - ASSESSMENT
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  Patient admitted with syncope and noted to have symptomatic bradycardia on tele.  Dual chamber PPM by EP.  PO vanco for C. diff.    Start myeloma/amyloid therapies per hematology.

## 2022-04-01 NOTE — PROGRESS NOTE ADULT - ATTENDING COMMENTS
57F PMH of POTS, orthostatic hypotension, recently diagnosed smoldering myeloma (March 2022) and amyloidosis (Feb 2022), mouth burning syndrome, recently diagnosed c.diff colitis (March 2022), hyperthyroidism s/p thyroidectomy, admitted to [facility] for multiple recurrent syncopal episodes thought to be associated with autonomic dysfunction 2/2 amyloidosis, transferred to [facility] for further amyloidosis management.     #Cardiac amyloidosis, Symptomatic Junctional/Sinus bradycardia   - confirmed amyloidosis s/p fat pad biopsy 2/2022, concern that current conduction abnormality is secondary to cardiac amyloid involvement  - OSH workup with TTE (non bubble) normal LV size and function with EF 55%-60%. No regional wall motion abnormalities. Moderate concentric LVH.   - Repeat TTE here with EF 60%, severe right atrial enlargement  - cMRI indicative of cardiac amyloid  - Episode of symptomatic junctional/sinus bradycardia, HR dropped to 28 BPM with associated hypotension and lightheadedness.   - s/p PPM placement    #Light Chain Amyloidosis  - Patient was diagnosed based on multiple correlative tests  - No monoclonal protein found on SPEP/UPEP/S-MANUEL/U-MANUEL or elevated immunoglobulin, but with elevated lambda FLC, 10.92 with K/L ratio 0.11 on 3/1  - 2/8/22 abdominal fat pad biopsy showed positive Congo Red staining in rare some vessels with positive polarization  - 3/1/22 BMBx showed 25-35% plasma cells, positive for cyclin D1, with monotypic plasma cells by flow cytometry c/w plasma cell neoplasm. Congo Red negative on BMBx. Atypical CCND1/IGH fusion pattern detected (3.5%) on FISH  - Heme planning to start chemotherapy inpatient, first dose to be given today- Daratumumab + CyBorD w/ plan for autologous bone marrow transplant later    # Orthostatic hypotension  - C/w with fludrocortisone   - we tried to decrease midodrine to 10mg TID but she had an episode of hypotension --> will increase back to 20mg TID  - BP monitoring    Dispo: Discharge pending Heme Clearance, likely discharge after chemo is completed.

## 2022-04-01 NOTE — PROGRESS NOTE ADULT - ASSESSMENT
58 yo F with a PMH of POTS with orthostatic hypotension, hyperthyroidism s/p thyroidectomy leading to hypothyroidism, mouth burning syndrome, recently diagnosed lambda light chain amyloidosis, and recently diagnosed C diff colitis who was admitted on 3/15/22 to [facility] for multiple recurrent syncopal episodes, transferred for further cardiac workup and for possible chemotherapy.    #Light Chain Amyloidosis  - Follows with Dr Goldberg at [facility]   - No monoclonal protein found on SPEP/UPEP/S-MANUEL/U-MANUEL or elevated immunoglobulin, but with elevated lambda FLC, 10.92 with K/L ratio 0.11 on 3/1  - 2/8/22 abdominal fat pad biopsy showed positive Congo Red staining in rare some vessels with positive polarization  - 3/1/22 BMBx showed 25-35% plasma cells, positive for cyclin D1, with monotypic plasma cells by flow cytometry c/w plasma cell neoplasm. Congo Red negative on BMBx. Atypical CCND1/IGH fusion pattern detected (3.5%) on FISH  - Diagnosis consistent with lambda light chain amyloidosis  - Cardiac involvement, cMRI completed 3/28, likely c/w amyloidosis. Likely contributing to patient's recurrent syncope, first-degree heart block, and POTS S/P PPM on 3/30  - Renal ultrasound showed R parapelvic cyst vs fullness without hydronephrosis  - Planning to start chemotherapy inpatient today, Daratumumab (16mg split into 2 doses on Day 1 and Day2 given recent PPM placement to decrease risk of reaction) + CyBorD ( given on Day 1). Consent is in chart for chemotherapy treatment  - Will f/u with Dr. Goldberg (patient's Hematologist) as outpatient for remainder of cycle and treatment.   - Patient endorsed only wanting to receive FDA approved treatments at this time,     #POTS/Syncope  - Patient has had POTS with orthostatic hypotension and now has recurrent syncopal episodes (required multiple RRTs at outside hospital) and two RRTs at [facility] for hypotension 2/2 bradycardia likely due to cardiac amyloidosis, also has at least first degree heart block, now in CICU  - c/w medical management of hypotension with midodrine 20 mg TID and fludrocortisone 0.2 mg daily per primary team  - Continue excellent care in CICU  - Telemetry monitoring and maintain goal K~4.5 and Mg~2.5 per EP  - Seen by EP, s/p PPM (3/30)   - Treatment for cardiac amyloidosis per heart failure and cardiology - s/p dopamine ggt    Charlene Tirado MD  Hematology Oncology fellow  pager: NS: [phone]; please contact oncall fellow from 5pm to 8am and weekends.        58 yo F with a PMH of POTS with orthostatic hypotension, hyperthyroidism s/p thyroidectomy leading to hypothyroidism, mouth burning syndrome, recently diagnosed lambda light chain amyloidosis, and recently diagnosed C diff colitis who was admitted on 3/15/22 to [facility] for multiple recurrent syncopal episodes, transferred for further cardiac workup and for possible chemotherapy.    #Light Chain Amyloidosis  - Follows with Dr Goldberg at [facility]   - No monoclonal protein found on SPEP/UPEP/S-MANUEL/U-MANUEL or elevated immunoglobulin, but with elevated lambda FLC, 10.92 with K/L ratio 0.11 on 3/1  - 2/8/22 abdominal fat pad biopsy showed positive Congo Red staining in rare some vessels with positive polarization  - 3/1/22 BMBx showed 25-35% plasma cells, positive for cyclin D1, with monotypic plasma cells by flow cytometry c/w plasma cell neoplasm. Congo Red negative on BMBx. Atypical CCND1/IGH fusion pattern detected (3.5%) on FISH  - Diagnosis consistent with lambda light chain amyloidosis  - Cardiac involvement, cMRI completed 3/28, likely c/w amyloidosis. Likely contributing to patient's recurrent syncope, first-degree heart block, and POTS S/P PPM on 3/30  - Renal ultrasound showed R parapelvic cyst vs fullness without hydronephrosis  - Planning to start chemotherapy inpatient today, Daratumumab (16mg split into 2 doses on Day 1 and Day2 given recent PPM placement to decrease risk of reaction) + CyBorD ( given on Day 1). Additionally, will receive 20mg dexamethasone PO post Danyell infusion on 4/3 and 4/4. Consent is in chart for chemotherapy treatment.   - Will f/u with Dr. Goldberg (patient's Hematologist) as outpatient for remainder of cycle and treatment.   - Patient endorsed only wanting to receive FDA approved treatments at this time,     #POTS/Syncope  - Patient has had POTS with orthostatic hypotension and now has recurrent syncopal episodes (required multiple RRTs at outside hospital) and two RRTs at [facility] for hypotension 2/2 bradycardia likely due to cardiac amyloidosis, also has at least first degree heart block, now in CICU  - c/w medical management of hypotension with midodrine 20 mg TID and fludrocortisone 0.2 mg daily per primary team  - Continue excellent care in CICU  - Telemetry monitoring and maintain goal K~4.5 and Mg~2.5 per EP  - Seen by EP, s/p PPM (3/30)   - Treatment for cardiac amyloidosis per heart failure and cardiology - s/p dopamine ggt    Charlene Tirado MD  Hematology Oncology fellow  pager: NS: [phone]; please contact oncall fellow from 5pm to 8am and weekends.        58 yo F with a PMH of POTS with orthostatic hypotension, hyperthyroidism s/p thyroidectomy leading to hypothyroidism, mouth burning syndrome, recently diagnosed lambda light chain amyloidosis, and recently diagnosed C diff colitis who was admitted on 3/15/22 to [facility] for multiple recurrent syncopal episodes, transferred for further cardiac workup and for possible chemotherapy.    #Light Chain Amyloidosis  - Follows with Dr Goldberg at [facility]   - No monoclonal protein found on SPEP/UPEP/S-MANUEL/U-MANUEL or elevated immunoglobulin, but with elevated lambda FLC, 10.92 with K/L ratio 0.11 on 3/1  - 2/8/22 abdominal fat pad biopsy showed positive Congo Red staining in rare some vessels with positive polarization  - 3/1/22 BMBx showed 25-35% plasma cells, positive for cyclin D1, with monotypic plasma cells by flow cytometry c/w plasma cell neoplasm. Congo Red negative on BMBx. Atypical CCND1/IGH fusion pattern detected (3.5%) on FISH  - Diagnosis consistent with lambda light chain amyloidosis  - Cardiac involvement, cMRI completed 3/28, likely c/w amyloidosis. Likely contributing to patient's recurrent syncope, first-degree heart block, and POTS S/P PPM on 3/30  - Renal ultrasound showed R parapelvic cyst vs fullness without hydronephrosis  - Planning to start chemotherapy inpatient today, Daratumumab (16mg split into 2 doses on Day 1 and Day2 given recent PPM placement to decrease risk of reaction) + CyBorD ( given on Day 1). Additionally, will receive 20mg prednisone PO post Danyell infusion on 4/3 and 4/4. Consent is in chart for chemotherapy treatment.   - Will f/u with Dr. Goldberg (patient's Hematologist) as outpatient for remainder of cycle and treatment.   - Patient endorsed only wanting to receive FDA approved treatments at this time,     #POTS/Syncope  - Patient has had POTS with orthostatic hypotension and now has recurrent syncopal episodes (required multiple RRTs at outside hospital) and two RRTs at [facility] for hypotension 2/2 bradycardia likely due to cardiac amyloidosis, also has at least first degree heart block, now in CICU  - c/w medical management of hypotension with midodrine 20 mg TID and fludrocortisone 0.2 mg daily per primary team  - Continue excellent care in CICU  - Telemetry monitoring and maintain goal K~4.5 and Mg~2.5 per EP  - Seen by EP, s/p PPM (3/30)   - Treatment for cardiac amyloidosis per heart failure and cardiology - s/p dopamine ggt    Charlene Tirado MD  Hematology Oncology fellow  pager: NS: [phone]; please contact oncall fellow from 5pm to 8am and weekends.

## 2022-04-01 NOTE — PROGRESS NOTE ADULT - ASSESSMENT
57 y.o. F with PMH of POTS, orthostatic hypotension, recently diagnosed with smoldering myeloma (March 2022) and amyloidosis (Feb 2022), mouth burning syndrome, recently diagnosed c.diff colitis (March 2022), hyperthyroidism s/p thyroidectomy, admitted to [facility] for multiple recurrent syncopal episodes thought to be associated with autonomic dysfunction 2/2 amyloidosis, transferred to [facility] for further amyloidosis management.

## 2022-04-01 NOTE — ADVANCED PRACTICE NURSE CONSULT - ASSESSMENT
Patient received in bed, alert and oriented x 4, capable of expressing all needs to staff. Cycle 1, day 1/2,Height and weight verified. Consent in chart. Lab results as per Md shiela aware of same. Vital signs stable prior to chemotherapy and within acceptable parameters, see sunrise. Pt and  educated on the importance of saving urine, verbalizes good understanding. Pt and  education done re chemo regimen, drug effects and potential side effects, written materials provided, pt states understanding. Pt with left 20g PIV line, site free from signs and symptoms of infection. Pre- medicated with Decadron 20mg IV; Benadryl 50mg PO; Tylenol 650mg PO; Singular 10mg PO. Daratumamab 8mg/kg = 422mg IV administered per protocol. Infusing started at a rate of 50cc/hr and increased every hour to a max rate of 200cc/hr. Vital signs monitored every hour prior to rate increase. Completed at 1600. Cytoxan 300mg/m2 = 462mg (Rounded to 450mg) given PO x 1 dose. Velcade 1.3mg/m2 = 2mg capped dose given SubQ to RLQ. Decadron 20mg IV x 1 dose given over 30min. Pt tolerated well No adverse reaction noted. Completed at 1630. Report given to area nurse.

## 2022-04-01 NOTE — PROGRESS NOTE ADULT - PROBLEM SELECTOR PLAN 2
Pt presented to OSH for multiple recurrent syncopal episodes.   Multifactorial in s/o known POTS, orthostatic hypotension associated with likely amyloidosis/myeloma autonomic dysfunction, hypovolemia 2/2 c.diff colitis and diarrhea  - CT head no acute pathology   - TTE (non bubble) normal LV size and function with EF 55%-60%. No regional wall motion abnormalities. Moderate concentric LVH.  - ECG 1st degree AV block, now paced  - s/p Micra PPM implant Pt presented to OSH for multiple recurrent syncopal episodes.   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 No regional wall motion abnormalities. Moderate concentric LVH.  - ECG 1st degree AV block, now paced  - Cardiac MRI w/ amyloid deposits   - s/p Micra PPM implant

## 2022-04-01 NOTE — PROGRESS NOTE ADULT - PROBLEM SELECTOR PLAN 1
Pt underwent fat pad bx in Feb 2022 and diagnosed with amyloidosis. 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 No typing of amyloidosis was done given as sample was not enough for mass spec.     - onc team consulted re plans for inpt chemo. Potentially starting daratumumab plus CyBorD +/- autologous bone marrow transplant.    # Autonomic dysfunction   - Suspect autonomic dysfunction and AV block may be associated with amyloidosis   - Tx orthostatic hypotension and POTS (see above/below)     # Rule out cardiac involvement  - TTE 3/16/2022 with concentric LVH. Unclear if this is associated with cardiac amyloidosis and deposition? Pt reports TTE in 01/2022 was normal with mild valvular regurgitation   - LVH is very classically seen in light chain amyloidosis and plasma cell neoplasm   - Cardiac MRI 3/28 w evidence of cardiac amyloidosis   - S/p micra PPM   - plan for inpatient chemo with Daratumumab + CyBorD  Friday with valcyclovir prophylaxis Pt underwent fat pad bx in Feb 2022 and diagnosed with amyloidosis. 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 No typing of amyloidosis was done given as sample was not enough for mass spec.     # Autonomic dysfunction   - Suspect autonomic dysfunction and AV block may be associated with amyloidosis   - Tx orthostatic hypotension and POTS (see above/below)     # Rule out cardiac involvement  - TTE 3/16/2022 with concentric LVH. LVH is classically seen in light chain amyloidosis and plasma cell neoplasm   - Cardiac MRI 3/28 w evidence of cardiac amyloidosis   - S/p micra PPM   - Day 1 daratumumab and CyBorD today and day 2 tomorrow.   - will receive 20mg prednisone PO post Danyell infusion on 4/3 and 4/4  - If pt tolerates chemo, will d/c next week and continue as outpatient

## 2022-04-02 LAB
ALBUMIN SERPL ELPH-MCNC: 3.8 G/DL — SIGNIFICANT CHANGE UP (ref 3.3–5)
ALP SERPL-CCNC: 69 U/L — SIGNIFICANT CHANGE UP (ref 40–120)
ALT FLD-CCNC: 33 U/L — SIGNIFICANT CHANGE UP (ref 10–45)
ANION GAP SERPL CALC-SCNC: 13 MMOL/L — SIGNIFICANT CHANGE UP (ref 5–17)
AST SERPL-CCNC: 14 U/L — SIGNIFICANT CHANGE UP (ref 10–40)
BILIRUB SERPL-MCNC: 0.6 MG/DL — SIGNIFICANT CHANGE UP (ref 0.2–1.2)
BLD GP AB SCN SERPL QL: POSITIVE — SIGNIFICANT CHANGE UP
BUN SERPL-MCNC: 10 MG/DL — SIGNIFICANT CHANGE UP (ref 7–23)
CALCIUM SERPL-MCNC: 9.3 MG/DL — SIGNIFICANT CHANGE UP (ref 8.4–10.5)
CHLORIDE SERPL-SCNC: 101 MMOL/L — SIGNIFICANT CHANGE UP (ref 96–108)
CO2 SERPL-SCNC: 24 MMOL/L — SIGNIFICANT CHANGE UP (ref 22–31)
CREAT SERPL-MCNC: 0.52 MG/DL — SIGNIFICANT CHANGE UP (ref 0.5–1.3)
EGFR: 108 ML/MIN/1.73M2 — SIGNIFICANT CHANGE UP
GLUCOSE SERPL-MCNC: 200 MG/DL — HIGH (ref 70–99)
HCT VFR BLD CALC: 36.2 % — SIGNIFICANT CHANGE UP (ref 34.5–45)
HGB BLD-MCNC: 11.6 G/DL — SIGNIFICANT CHANGE UP (ref 11.5–15.5)
MAGNESIUM SERPL-MCNC: 2.3 MG/DL — SIGNIFICANT CHANGE UP (ref 1.6–2.6)
MCHC RBC-ENTMCNC: 29.1 PG — SIGNIFICANT CHANGE UP (ref 27–34)
MCHC RBC-ENTMCNC: 32 GM/DL — SIGNIFICANT CHANGE UP (ref 32–36)
MCV RBC AUTO: 90.7 FL — SIGNIFICANT CHANGE UP (ref 80–100)
NRBC # BLD: 0 /100 WBCS — SIGNIFICANT CHANGE UP (ref 0–0)
PHOSPHATE SERPL-MCNC: 3.7 MG/DL — SIGNIFICANT CHANGE UP (ref 2.5–4.5)
PLATELET # BLD AUTO: 349 K/UL — SIGNIFICANT CHANGE UP (ref 150–400)
POTASSIUM SERPL-MCNC: 5.1 MMOL/L — SIGNIFICANT CHANGE UP (ref 3.5–5.3)
POTASSIUM SERPL-SCNC: 5.1 MMOL/L — SIGNIFICANT CHANGE UP (ref 3.5–5.3)
PROT SERPL-MCNC: 5.9 G/DL — LOW (ref 6–8.3)
RBC # BLD: 3.99 M/UL — SIGNIFICANT CHANGE UP (ref 3.8–5.2)
RBC # FLD: 13.8 % — SIGNIFICANT CHANGE UP (ref 10.3–14.5)
RH IG SCN BLD-IMP: POSITIVE — SIGNIFICANT CHANGE UP
SODIUM SERPL-SCNC: 138 MMOL/L — SIGNIFICANT CHANGE UP (ref 135–145)
WBC # BLD: 7.56 K/UL — SIGNIFICANT CHANGE UP (ref 3.8–10.5)
WBC # FLD AUTO: 7.56 K/UL — SIGNIFICANT CHANGE UP (ref 3.8–10.5)

## 2022-04-02 PROCEDURE — 86077 PHYS BLOOD BANK SERV XMATCH: CPT

## 2022-04-02 PROCEDURE — 99233 SBSQ HOSP IP/OBS HIGH 50: CPT

## 2022-04-02 PROCEDURE — 99233 SBSQ HOSP IP/OBS HIGH 50: CPT | Mod: GC

## 2022-04-02 RX ORDER — ACETAMINOPHEN 500 MG
650 TABLET ORAL ONCE
Refills: 0 | Status: COMPLETED | OUTPATIENT
Start: 2022-04-02 | End: 2022-04-02

## 2022-04-02 RX ORDER — DEXAMETHASONE 0.5 MG/5ML
20 ELIXIR ORAL ONCE
Refills: 0 | Status: COMPLETED | OUTPATIENT
Start: 2022-04-02 | End: 2022-04-02

## 2022-04-02 RX ORDER — DIPHENHYDRAMINE HCL 50 MG
50 CAPSULE ORAL ONCE
Refills: 0 | Status: COMPLETED | OUTPATIENT
Start: 2022-04-02 | End: 2022-04-02

## 2022-04-02 RX ORDER — MONTELUKAST 4 MG/1
10 TABLET, CHEWABLE ORAL ONCE
Refills: 0 | Status: COMPLETED | OUTPATIENT
Start: 2022-04-02 | End: 2022-04-02

## 2022-04-02 RX ORDER — DARATUMUMAB 100 MG/5ML
422 INJECTION, SOLUTION, CONCENTRATE INTRAVENOUS ONCE
Refills: 0 | Status: COMPLETED | OUTPATIENT
Start: 2022-04-02 | End: 2022-04-02

## 2022-04-02 RX ADMIN — MIDODRINE HYDROCHLORIDE 20 MILLIGRAM(S): 2.5 TABLET ORAL at 13:17

## 2022-04-02 RX ADMIN — DIPHENHYDRAMINE HYDROCHLORIDE AND LIDOCAINE HYDROCHLORIDE AND ALUMINUM HYDROXIDE AND MAGNESIUM HYDRO 10 MILLILITER(S): KIT at 10:57

## 2022-04-02 RX ADMIN — GABAPENTIN 100 MILLIGRAM(S): 400 CAPSULE ORAL at 21:42

## 2022-04-02 RX ADMIN — MIDODRINE HYDROCHLORIDE 20 MILLIGRAM(S): 2.5 TABLET ORAL at 18:48

## 2022-04-02 RX ADMIN — Medication 50 MILLIGRAM(S): at 11:13

## 2022-04-02 RX ADMIN — GABAPENTIN 100 MILLIGRAM(S): 400 CAPSULE ORAL at 05:26

## 2022-04-02 RX ADMIN — FAMOTIDINE 20 MILLIGRAM(S): 10 INJECTION INTRAVENOUS at 18:48

## 2022-04-02 RX ADMIN — Medication 137 MICROGRAM(S): at 05:26

## 2022-04-02 RX ADMIN — ATORVASTATIN CALCIUM 40 MILLIGRAM(S): 80 TABLET, FILM COATED ORAL at 21:42

## 2022-04-02 RX ADMIN — GABAPENTIN 100 MILLIGRAM(S): 400 CAPSULE ORAL at 13:18

## 2022-04-02 RX ADMIN — DARATUMUMAB 422 MILLIGRAM(S): 100 INJECTION, SOLUTION, CONCENTRATE INTRAVENOUS at 13:13

## 2022-04-02 RX ADMIN — SIMETHICONE 80 MILLIGRAM(S): 80 TABLET, CHEWABLE ORAL at 13:18

## 2022-04-02 RX ADMIN — SIMETHICONE 80 MILLIGRAM(S): 80 TABLET, CHEWABLE ORAL at 18:49

## 2022-04-02 RX ADMIN — VALACYCLOVIR 500 MILLIGRAM(S): 500 TABLET, FILM COATED ORAL at 18:50

## 2022-04-02 RX ADMIN — MIDODRINE HYDROCHLORIDE 20 MILLIGRAM(S): 2.5 TABLET ORAL at 05:27

## 2022-04-02 RX ADMIN — MONTELUKAST 10 MILLIGRAM(S): 4 TABLET, CHEWABLE ORAL at 11:13

## 2022-04-02 RX ADMIN — LORATADINE 10 MILLIGRAM(S): 10 TABLET ORAL at 13:17

## 2022-04-02 RX ADMIN — FLUDROCORTISONE ACETATE 0.2 MILLIGRAM(S): 0.1 TABLET ORAL at 05:25

## 2022-04-02 RX ADMIN — FAMOTIDINE 20 MILLIGRAM(S): 10 INJECTION INTRAVENOUS at 05:26

## 2022-04-02 RX ADMIN — SIMETHICONE 80 MILLIGRAM(S): 80 TABLET, CHEWABLE ORAL at 05:25

## 2022-04-02 RX ADMIN — Medication 650 MILLIGRAM(S): at 11:12

## 2022-04-02 RX ADMIN — Medication 110 MILLIGRAM(S): at 11:13

## 2022-04-02 RX ADMIN — SIMETHICONE 80 MILLIGRAM(S): 80 TABLET, CHEWABLE ORAL at 22:35

## 2022-04-02 RX ADMIN — VALACYCLOVIR 500 MILLIGRAM(S): 500 TABLET, FILM COATED ORAL at 05:27

## 2022-04-02 RX ADMIN — Medication 81 MILLIGRAM(S): at 13:17

## 2022-04-02 RX ADMIN — Medication 2 TABLET(S): at 13:17

## 2022-04-02 NOTE — PROGRESS NOTE ADULT - PROBLEM SELECTOR PLAN 5
Last saw Dr. 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       Plan:  -heme/onc will start Daratumumab + CyBorD today  - Will f/u with Dr. Goldberg after discharge (patient's Hematologist) Last saw Dr. 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       Plan:  -heme/onc start Daratumumab + CyBorD yesterday with second dose of Daratumumab today  - Will f/u with Dr. Goldberg after discharge (patient's Hematologist)

## 2022-04-02 NOTE — PROGRESS NOTE ADULT - PROBLEM SELECTOR PLAN 1
Pt underwent fat pad bx in Feb 2022 and diagnosed with amyloidosis. 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 No typing of amyloidosis was done given as sample was not enough for mass spec.     # Autonomic dysfunction   - Suspect autonomic dysfunction and AV block may be associated with amyloidosis   - Tx orthostatic hypotension and POTS (see above/below)     # Rule out cardiac involvement  - TTE 3/16/2022 with concentric LVH. LVH is classically seen in light chain amyloidosis and plasma cell neoplasm   - Cardiac MRI 3/28 w evidence of cardiac amyloidosis   - S/p micra PPM   - Day 1 daratumumab and CyBorD today and day 2 tomorrow.   - will receive 20mg prednisone PO post Danyell infusion on 4/3 and 4/4  - If pt tolerates chemo, will d/c next week and continue as outpatient Pt underwent fat pad bx in Feb 2022 and diagnosed with amyloidosis. 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 No typing of amyloidosis was done given as sample was not enough for mass spec.     # Autonomic dysfunction   - Suspect autonomic dysfunction and AV block may be associated with amyloidosis   - Tx orthostatic hypotension and POTS (see above/below)     # Rule out cardiac involvement  - TTE 3/16/2022 with concentric LVH. LVH is classically seen in light chain amyloidosis and plasma cell neoplasm   - Cardiac MRI 3/28 w evidence of cardiac amyloidosis   - S/p micra PPM   - Day 1 daratumumab and CyBorD (4/1) and day 2 daratumumab today (4/2)  - will receive 20mg prednisone PO post Danyell infusion on 4/3 and 4/4  - If pt tolerates chemo, will d/c next week and continue as outpatient

## 2022-04-02 NOTE — PROGRESS NOTE ADULT - SUBJECTIVE AND OBJECTIVE BOX
Hematology Follow-up    INTERVAL HPI/OVERNIGHT EVENTS: tolerated chemotherapy: C1 D1 of treatment. Did notice more neuropathy over the toes after treatment but has history of numbness over the toes for years and denies any affect on activities. She denies any cough or SOB sensation. Only 1 BM yesterday: small.     VITAL SIGNS:  T(F): 98.7 (04-02-22 @ 15:14)  HR: 83 (04-02-22 @ 15:14)  BP: 116/68 (04-02-22 @ 15:14)  RR: 18 (04-02-22 @ 15:14)  SpO2: 93% (04-02-22 @ 15:14)  Wt(kg): --    PHYSICAL EXAM:    Constitutional: NAD, lying in bed   Neck: supple  Respiratory: CTA anteriorly   Cardiovascular: RRR, normal S1S2  Gastrointestinal: soft, NTND, no masses palpable, BS normal  Extremities:  1+ pedal edema   Neurological: Grossly intact  Skin: No rash     MEDICATIONS  (STANDING):  aspirin  chewable 81 milliGRAM(s) Oral daily  atorvastatin 40 milliGRAM(s) Oral at bedtime  enoxaparin Injectable 40 milliGRAM(s) SubCutaneous every 24 hours  famotidine    Tablet 20 milliGRAM(s) Oral two times a day  fludroCORTISONE 0.2 milliGRAM(s) Oral daily  fluticasone propionate 50 MICROgram(s)/spray Nasal Spray 1 Spray(s) Both Nostrils two times a day  gabapentin 100 milliGRAM(s) Oral every 8 hours  influenza   Vaccine 0.5 milliLiter(s) IntraMuscular once  lactobacillus acidophilus 2 Tablet(s) Oral daily  levothyroxine 137 MICROGram(s) Oral daily  loratadine 10 milliGRAM(s) Oral daily  midodrine. 20 milliGRAM(s) Oral three times a day  psyllium Powder 1 Packet(s) Oral two times a day  simethicone 80 milliGRAM(s) Chew every 6 hours  valACYclovir 500 milliGRAM(s) Oral two times a day    MEDICATIONS  (PRN):  acetaminophen     Tablet .. 650 milliGRAM(s) Oral every 6 hours PRN Mild Pain (1 - 3), Moderate Pain (4 - 6)  diphenhydrAMINE Injectable 50 milliGRAM(s) IV Push once PRN Daratumumab reaction  FIRST- Mouthwash  BLM 10 milliLiter(s) Swish and Spit four times a day PRN 15 minutes before meals  hydrocortisone sodium succinate Injectable 100 milliGRAM(s) IV Push once PRN Daratumumab reaction  meperidine     Injectable 12.5 milliGRAM(s) IV Push once PRN Daratumumab reaction      penicillins (Unknown)      LABS:                        11.6   7.56  )-----------( 349      ( 02 Apr 2022 07:41 )             36.2     04-02    138  |  101  |  10  ----------------------------<  200<H>  5.1   |  24  |  0.52    Ca    9.3      02 Apr 2022 07:41  Phos  3.7     04-02  Mg     2.3     04-02    TPro  5.9<L>  /  Alb  3.8  /  TBili  0.6  /  DBili  x   /  AST  14  /  ALT  33  /  AlkPhos  69  04-02           RADIOLOGY & ADDITIONAL TESTS:  Studies reviewed.

## 2022-04-02 NOTE — PROGRESS NOTE ADULT - ATTENDING COMMENTS
Found to have amyloid due to MM  Starting chemo inpt  For 2nd half of poncho today  DC planning once stable per heme perspective

## 2022-04-02 NOTE — PROGRESS NOTE ADULT - ASSESSMENT
58 y/o F with recent diagnosis of lambda chain amyloidosis who had C difficile colitis s/p completion of oral vancomycin. Due to syncope and hypotension, pt was started on treatment for amyloidosis: currently C1 D2 today.

## 2022-04-02 NOTE — PROGRESS NOTE ADULT - SUBJECTIVE AND OBJECTIVE BOX
***************************************************************  Sydni Balderasmann, PGY2  Internal Medicine   pager: NS: [phone] [facility]: [phone]  ***************************************************************    PROGRESS NOTE:     Patient is a 57y old  Female who presents with a chief complaint of syncope (01 Apr 2022 10:05)      SUBJECTIVE / OVERNIGHT EVENTS:      MEDICATIONS  (STANDING):  aspirin  chewable 81 milliGRAM(s) Oral daily  atorvastatin 40 milliGRAM(s) Oral at bedtime  enoxaparin Injectable 40 milliGRAM(s) SubCutaneous every 24 hours  famotidine    Tablet 20 milliGRAM(s) Oral two times a day  fludroCORTISONE 0.2 milliGRAM(s) Oral daily  fluticasone propionate 50 MICROgram(s)/spray Nasal Spray 1 Spray(s) Both Nostrils two times a day  gabapentin 100 milliGRAM(s) Oral every 8 hours  influenza   Vaccine 0.5 milliLiter(s) IntraMuscular once  lactobacillus acidophilus 2 Tablet(s) Oral daily  levothyroxine 137 MICROGram(s) Oral daily  loratadine 10 milliGRAM(s) Oral daily  midodrine. 20 milliGRAM(s) Oral three times a day  psyllium Powder 1 Packet(s) Oral two times a day  simethicone 80 milliGRAM(s) Chew every 6 hours  valACYclovir 500 milliGRAM(s) Oral two times a day    MEDICATIONS  (PRN):  acetaminophen     Tablet .. 650 milliGRAM(s) Oral every 6 hours PRN Mild Pain (1 - 3), Moderate Pain (4 - 6)  diphenhydrAMINE Injectable 50 milliGRAM(s) IV Push once PRN Daratumumab reaction  FIRST- Mouthwash  BLM 10 milliLiter(s) Swish and Spit four times a day PRN 15 minutes before meals  hydrocortisone sodium succinate Injectable 100 milliGRAM(s) IV Push once PRN Daratumumab reaction  meperidine     Injectable 12.5 milliGRAM(s) IV Push once PRN Daratumumab reaction      CAPILLARY BLOOD GLUCOSE        I&O's Summary    31 Mar 2022 07:01  -  01 Apr 2022 07:00  --------------------------------------------------------  IN: 240 mL / OUT: 420 mL / NET: -180 mL    01 Apr 2022 07:01  -  02 Apr 2022 05:52  --------------------------------------------------------  IN: 610 mL / OUT: 1870 mL / NET: -1260 mL        PHYSICAL EXAM:  Vital Signs Last 24 Hrs  T(C): 36.8 (02 Apr 2022 04:41), Max: 37.2 (01 Apr 2022 10:00)  T(F): 98.2 (02 Apr 2022 04:41), Max: 99 (01 Apr 2022 10:00)  HR: 80 (02 Apr 2022 04:41) (64 - 84)  BP: 110/70 (02 Apr 2022 04:41) (95/57 - 122/72)  BP(mean): --  RR: 18 (02 Apr 2022 04:41) (18 - 18)  SpO2: 92% (02 Apr 2022 04:41) (90% - 95%)    CONSTITUTIONAL: NAD, well-developed  RESPIRATORY: Normal respiratory effort; lungs are clear to auscultation bilaterally  CARDIOVASCULAR: Regular rate and rhythm, normal S1 and S2, no murmur/rub/gallop; No lower extremity edema; Peripheral pulses are 2+ bilaterally  ABDOMEN: Nontender to palpation, normoactive bowel sounds, no rebound/guarding; No hepatosplenomegaly  MUSCLOSKELETAL: no clubbing or cyanosis of digits; no joint swelling or tenderness to palpation  NEURO: CN 2-12 grossly intact, moves all limbs spontaneously  PSYCH: A+O to person, place, and time; affect appropriate    LABS:                        9.5    5.70  )-----------( 274      ( 01 Apr 2022 05:48 )             28.3     04-01    137  |  101  |  7   ----------------------------<  91  3.4<L>   |  28  |  0.72    Ca    8.4      01 Apr 2022 05:51  Phos  3.7     04-01  Mg     1.9     04-01    TPro  4.5<L>  /  Alb  3.0<L>  /  TBili  0.9  /  DBili  x   /  AST  11  /  ALT  34  /  AlkPhos  57  04-01                RADIOLOGY & ADDITIONAL TESTS:  Results Reviewed:   Imaging Personally Reviewed:  Electrocardiogram Personally Reviewed:    COORDINATION OF CARE:  Care Discussed with Consultants/Other Providers [Y/N]:  Prior or Outpatient Records Reviewed [Y/N]:   ***************************************************************  Sydni Bueno, PGY2  Internal Medicine   pager: NS: [phone] [facility]: [id number]  ***************************************************************    PROGRESS NOTE:     Patient is a 57y old  Female who presents with a chief complaint of syncope (01 Apr 2022 10:05)      SUBJECTIVE / OVERNIGHT EVENTS:  No acute events overnight. Patient received daratumumab, cyclophosphamide, bortezomib and 20 mg decadron yesterday, her first chemotherapy session for amyloidosis and tolerated it well. She will receive her second dose of daratumumab today.    MEDICATIONS  (STANDING):  aspirin  chewable 81 milliGRAM(s) Oral daily  atorvastatin 40 milliGRAM(s) Oral at bedtime  enoxaparin Injectable 40 milliGRAM(s) SubCutaneous every 24 hours  famotidine    Tablet 20 milliGRAM(s) Oral two times a day  fludroCORTISONE 0.2 milliGRAM(s) Oral daily  fluticasone propionate 50 MICROgram(s)/spray Nasal Spray 1 Spray(s) Both Nostrils two times a day  gabapentin 100 milliGRAM(s) Oral every 8 hours  influenza   Vaccine 0.5 milliLiter(s) IntraMuscular once  lactobacillus acidophilus 2 Tablet(s) Oral daily  levothyroxine 137 MICROGram(s) Oral daily  loratadine 10 milliGRAM(s) Oral daily  midodrine. 20 milliGRAM(s) Oral three times a day  psyllium Powder 1 Packet(s) Oral two times a day  simethicone 80 milliGRAM(s) Chew every 6 hours  valACYclovir 500 milliGRAM(s) Oral two times a day    MEDICATIONS  (PRN):  acetaminophen     Tablet .. 650 milliGRAM(s) Oral every 6 hours PRN Mild Pain (1 - 3), Moderate Pain (4 - 6)  diphenhydrAMINE Injectable 50 milliGRAM(s) IV Push once PRN Daratumumab reaction  FIRST- Mouthwash  BLM 10 milliLiter(s) Swish and Spit four times a day PRN 15 minutes before meals  hydrocortisone sodium succinate Injectable 100 milliGRAM(s) IV Push once PRN Daratumumab reaction  meperidine     Injectable 12.5 milliGRAM(s) IV Push once PRN Daratumumab reaction      CAPILLARY BLOOD GLUCOSE        I&O's Summary    31 Mar 2022 07:01  -  01 Apr 2022 07:00  --------------------------------------------------------  IN: 240 mL / OUT: 420 mL / NET: -180 mL    01 Apr 2022 07:01  -  02 Apr 2022 05:52  --------------------------------------------------------  IN: 610 mL / OUT: 1870 mL / NET: -1260 mL        PHYSICAL EXAM:  Vital Signs Last 24 Hrs  T(C): 36.8 (02 Apr 2022 04:41), Max: 37.2 (01 Apr 2022 10:00)  T(F): 98.2 (02 Apr 2022 04:41), Max: 99 (01 Apr 2022 10:00)  HR: 80 (02 Apr 2022 04:41) (64 - 84)  BP: 110/70 (02 Apr 2022 04:41) (95/57 - 122/72)  BP(mean): --  RR: 18 (02 Apr 2022 04:41) (18 - 18)  SpO2: 92% (02 Apr 2022 04:41) (90% - 95%)    General: thin, middle-aged woman in NAD; awake, sitting comfortably in bed  HEENT: nc/at, clear conjunctiva, moist mucous membranes  Neck: supple, no JVD  Heart: RRR, +systolic murmur   Chest/lungs: mild bibasilar rales; no wheezing or rhonchi  ABD: soft, non-tender, non-distended; no guarding or rebound; bowel sounds present  Extremities: no edema, erythema, or tenderness to palpation  Skin: clear, dry  Neuro: A&Ox4, FROM of all 4 extremities; no focal deficits; grossly intact    LABS:                        9.5    5.70  )-----------( 274      ( 01 Apr 2022 05:48 )             28.3     04-01    137  |  101  |  7   ----------------------------<  91  3.4<L>   |  28  |  0.72    Ca    8.4      01 Apr 2022 05:51  Phos  3.7     04-01  Mg     1.9     04-01    TPro  4.5<L>  /  Alb  3.0<L>  /  TBili  0.9  /  DBili  x   /  AST  11  /  ALT  34  /  AlkPhos  57  04-01                RADIOLOGY & ADDITIONAL TESTS:  Results Reviewed:   Imaging Personally Reviewed:  Electrocardiogram Personally Reviewed:    COORDINATION OF CARE:  Care Discussed with Consultants/Other Providers [Y/N]:  Prior or Outpatient Records Reviewed [Y/N]:   ***************************************************************  Sydni Bueno, PGY2  Internal Medicine   pager: NS: [phone] [facility]: [phone]  ***************************************************************    PROGRESS NOTE:     Patient is a 57y old  Female who presents with a chief complaint of syncope (01 Apr 2022 10:05)      SUBJECTIVE / OVERNIGHT EVENTS:  No acute events overnight. Patient received daratumumab, cyclophosphamide, bortezomib and 20 mg decadron yesterday, her first chemotherapy session for amyloidosis and tolerated it well. No nausea no rash and no reactivity to medication at the current time. She will receive her second dose of daratumumab today.     MEDICATIONS  (STANDING):  aspirin  chewable 81 milliGRAM(s) Oral daily  atorvastatin 40 milliGRAM(s) Oral at bedtime  enoxaparin Injectable 40 milliGRAM(s) SubCutaneous every 24 hours  famotidine    Tablet 20 milliGRAM(s) Oral two times a day  fludroCORTISONE 0.2 milliGRAM(s) Oral daily  fluticasone propionate 50 MICROgram(s)/spray Nasal Spray 1 Spray(s) Both Nostrils two times a day  gabapentin 100 milliGRAM(s) Oral every 8 hours  influenza   Vaccine 0.5 milliLiter(s) IntraMuscular once  lactobacillus acidophilus 2 Tablet(s) Oral daily  levothyroxine 137 MICROGram(s) Oral daily  loratadine 10 milliGRAM(s) Oral daily  midodrine. 20 milliGRAM(s) Oral three times a day  psyllium Powder 1 Packet(s) Oral two times a day  simethicone 80 milliGRAM(s) Chew every 6 hours  valACYclovir 500 milliGRAM(s) Oral two times a day    MEDICATIONS  (PRN):  acetaminophen     Tablet .. 650 milliGRAM(s) Oral every 6 hours PRN Mild Pain (1 - 3), Moderate Pain (4 - 6)  diphenhydrAMINE Injectable 50 milliGRAM(s) IV Push once PRN Daratumumab reaction  FIRST- Mouthwash  BLM 10 milliLiter(s) Swish and Spit four times a day PRN 15 minutes before meals  hydrocortisone sodium succinate Injectable 100 milliGRAM(s) IV Push once PRN Daratumumab reaction  meperidine     Injectable 12.5 milliGRAM(s) IV Push once PRN Daratumumab reaction      CAPILLARY BLOOD GLUCOSE        I&O's Summary    31 Mar 2022 07:01  -  01 Apr 2022 07:00  --------------------------------------------------------  IN: 240 mL / OUT: 420 mL / NET: -180 mL    01 Apr 2022 07:01  -  02 Apr 2022 05:52  --------------------------------------------------------  IN: 610 mL / OUT: 1870 mL / NET: -1260 mL        PHYSICAL EXAM:  Vital Signs Last 24 Hrs  T(C): 36.8 (02 Apr 2022 04:41), Max: 37.2 (01 Apr 2022 10:00)  T(F): 98.2 (02 Apr 2022 04:41), Max: 99 (01 Apr 2022 10:00)  HR: 80 (02 Apr 2022 04:41) (64 - 84)  BP: 110/70 (02 Apr 2022 04:41) (95/57 - 122/72)  BP(mean): --  RR: 18 (02 Apr 2022 04:41) (18 - 18)  SpO2: 92% (02 Apr 2022 04:41) (90% - 95%)    General: thin, middle-aged woman in NAD; awake, sitting comfortably in bed  HEENT: nc/at, clear conjunctiva, moist mucous membranes  Neck: supple, no JVD  Heart: RRR, +systolic murmur   Chest/lungs: mild bibasilar rales; no wheezing or rhonchi  ABD: soft, non-tender, non-distended; no guarding or rebound; bowel sounds present  Extremities: no edema, erythema, or tenderness to palpation  Skin: clear, dry  Neuro: A&Ox4, FROM of all 4 extremities; no focal deficits; grossly intact    LABS:                        9.5    5.70  )-----------( 274      ( 01 Apr 2022 05:48 )             28.3     04-01    137  |  101  |  7   ----------------------------<  91  3.4<L>   |  28  |  0.72    Ca    8.4      01 Apr 2022 05:51  Phos  3.7     04-01  Mg     1.9     04-01    TPro  4.5<L>  /  Alb  3.0<L>  /  TBili  0.9  /  DBili  x   /  AST  11  /  ALT  34  /  AlkPhos  57  04-01                RADIOLOGY & ADDITIONAL TESTS:  Results Reviewed:   Imaging Personally Reviewed:  Electrocardiogram Personally Reviewed:    COORDINATION OF CARE:  Care Discussed with Consultants/Other Providers [Y/N]:  Prior or Outpatient Records Reviewed [Y/N]:   ***************************************************************  Sydni Bueno, PGY2  Internal Medicine   pager: NS: [phone] [facility]: [phone]  ***************************************************************    PROGRESS NOTE:     Patient is a 57y old  Female who presents with a chief complaint of syncope (01 Apr 2022 10:05)      SUBJECTIVE / OVERNIGHT EVENTS:  No acute events overnight. She had some neuropathic pain in the feet and received gabapentin which improved her pain. Patient received daratumumab, cyclophosphamide, bortezomib and 20 mg decadron yesterday, her first chemotherapy session for amyloidosis and tolerated it well. No nausea no rash although she had some swelling at the infusion site and will ask the nurse to have her infusion today at a different site. She will receive her second dose of daratumumab today along with benadryl, decadron 20 mg x1 and tylenol 650 mg.     MEDICATIONS  (STANDING):  aspirin  chewable 81 milliGRAM(s) Oral daily  atorvastatin 40 milliGRAM(s) Oral at bedtime  enoxaparin Injectable 40 milliGRAM(s) SubCutaneous every 24 hours  famotidine    Tablet 20 milliGRAM(s) Oral two times a day  fludroCORTISONE 0.2 milliGRAM(s) Oral daily  fluticasone propionate 50 MICROgram(s)/spray Nasal Spray 1 Spray(s) Both Nostrils two times a day  gabapentin 100 milliGRAM(s) Oral every 8 hours  influenza   Vaccine 0.5 milliLiter(s) IntraMuscular once  lactobacillus acidophilus 2 Tablet(s) Oral daily  levothyroxine 137 MICROGram(s) Oral daily  loratadine 10 milliGRAM(s) Oral daily  midodrine. 20 milliGRAM(s) Oral three times a day  psyllium Powder 1 Packet(s) Oral two times a day  simethicone 80 milliGRAM(s) Chew every 6 hours  valACYclovir 500 milliGRAM(s) Oral two times a day    MEDICATIONS  (PRN):  acetaminophen     Tablet .. 650 milliGRAM(s) Oral every 6 hours PRN Mild Pain (1 - 3), Moderate Pain (4 - 6)  diphenhydrAMINE Injectable 50 milliGRAM(s) IV Push once PRN Daratumumab reaction  FIRST- Mouthwash  BLM 10 milliLiter(s) Swish and Spit four times a day PRN 15 minutes before meals  hydrocortisone sodium succinate Injectable 100 milliGRAM(s) IV Push once PRN Daratumumab reaction  meperidine     Injectable 12.5 milliGRAM(s) IV Push once PRN Daratumumab reaction      CAPILLARY BLOOD GLUCOSE        I&O's Summary    31 Mar 2022 07:01  -  01 Apr 2022 07:00  --------------------------------------------------------  IN: 240 mL / OUT: 420 mL / NET: -180 mL    01 Apr 2022 07:01  -  02 Apr 2022 05:52  --------------------------------------------------------  IN: 610 mL / OUT: 1870 mL / NET: -1260 mL        PHYSICAL EXAM:  Vital Signs Last 24 Hrs  T(C): 36.8 (02 Apr 2022 04:41), Max: 37.2 (01 Apr 2022 10:00)  T(F): 98.2 (02 Apr 2022 04:41), Max: 99 (01 Apr 2022 10:00)  HR: 80 (02 Apr 2022 04:41) (64 - 84)  BP: 110/70 (02 Apr 2022 04:41) (95/57 - 122/72)  BP(mean): --  RR: 18 (02 Apr 2022 04:41) (18 - 18)  SpO2: 92% (02 Apr 2022 04:41) (90% - 95%)    General: thin, middle-aged woman in NAD; awake, sitting comfortably in bed  HEENT: nc/at, clear conjunctiva, moist mucous membranes  Neck: supple, no JVD  Heart: RRR, +systolic murmur   Chest/lungs: mild bibasilar rales; no wheezing or rhonchi  ABD: soft, non-tender, non-distended; no guarding or rebound; bowel sounds present  Extremities: no edema, erythema, or tenderness to palpation  Skin: clear, dry  Neuro: A&Ox4, FROM of all 4 extremities; no focal deficits; grossly intact    LABS:                        9.5    5.70  )-----------( 274      ( 01 Apr 2022 05:48 )             28.3     04-01    137  |  101  |  7   ----------------------------<  91  3.4<L>   |  28  |  0.72    Ca    8.4      01 Apr 2022 05:51  Phos  3.7     04-01  Mg     1.9     04-01    TPro  4.5<L>  /  Alb  3.0<L>  /  TBili  0.9  /  DBili  x   /  AST  11  /  ALT  34  /  AlkPhos  57  04-01                RADIOLOGY & ADDITIONAL TESTS:  Results Reviewed:   Imaging Personally Reviewed:  Electrocardiogram Personally Reviewed:    COORDINATION OF CARE:  Care Discussed with Consultants/Other Providers [Y/N]:  Prior or Outpatient Records Reviewed [Y/N]:   ***************************************************************  Sydni Bueno, PGY2  Internal Medicine   pager: NS: [phone] [facility]: [phone]  ***************************************************************    PROGRESS NOTE:     Patient is a 57y old  Female who presents with a chief complaint of syncope (01 Apr 2022 10:05)      SUBJECTIVE / OVERNIGHT EVENTS:  No acute events overnight. On tele, NSR 60s to 70s overnight. She had some neuropathic pain in the feet and received gabapentin which improved her pain. Patient received daratumumab, cyclophosphamide, bortezomib and 20 mg decadron yesterday, her first chemotherapy session for amyloidosis and tolerated it well. No nausea no rash although she had some swelling at the infusion site and will ask the nurse to have her infusion today at a different site. She will receive her second dose of daratumumab today along with benadryl, decadron 20 mg x1 and tylenol 650 mg.     MEDICATIONS  (STANDING):  aspirin  chewable 81 milliGRAM(s) Oral daily  atorvastatin 40 milliGRAM(s) Oral at bedtime  enoxaparin Injectable 40 milliGRAM(s) SubCutaneous every 24 hours  famotidine    Tablet 20 milliGRAM(s) Oral two times a day  fludroCORTISONE 0.2 milliGRAM(s) Oral daily  fluticasone propionate 50 MICROgram(s)/spray Nasal Spray 1 Spray(s) Both Nostrils two times a day  gabapentin 100 milliGRAM(s) Oral every 8 hours  influenza   Vaccine 0.5 milliLiter(s) IntraMuscular once  lactobacillus acidophilus 2 Tablet(s) Oral daily  levothyroxine 137 MICROGram(s) Oral daily  loratadine 10 milliGRAM(s) Oral daily  midodrine. 20 milliGRAM(s) Oral three times a day  psyllium Powder 1 Packet(s) Oral two times a day  simethicone 80 milliGRAM(s) Chew every 6 hours  valACYclovir 500 milliGRAM(s) Oral two times a day    MEDICATIONS  (PRN):  acetaminophen     Tablet .. 650 milliGRAM(s) Oral every 6 hours PRN Mild Pain (1 - 3), Moderate Pain (4 - 6)  diphenhydrAMINE Injectable 50 milliGRAM(s) IV Push once PRN Daratumumab reaction  FIRST- Mouthwash  BLM 10 milliLiter(s) Swish and Spit four times a day PRN 15 minutes before meals  hydrocortisone sodium succinate Injectable 100 milliGRAM(s) IV Push once PRN Daratumumab reaction  meperidine     Injectable 12.5 milliGRAM(s) IV Push once PRN Daratumumab reaction      CAPILLARY BLOOD GLUCOSE        I&O's Summary    31 Mar 2022 07:01  -  01 Apr 2022 07:00  --------------------------------------------------------  IN: 240 mL / OUT: 420 mL / NET: -180 mL    01 Apr 2022 07:01  -  02 Apr 2022 05:52  --------------------------------------------------------  IN: 610 mL / OUT: 1870 mL / NET: -1260 mL        PHYSICAL EXAM:  Vital Signs Last 24 Hrs  T(C): 36.8 (02 Apr 2022 04:41), Max: 37.2 (01 Apr 2022 10:00)  T(F): 98.2 (02 Apr 2022 04:41), Max: 99 (01 Apr 2022 10:00)  HR: 80 (02 Apr 2022 04:41) (64 - 84)  BP: 110/70 (02 Apr 2022 04:41) (95/57 - 122/72)  BP(mean): --  RR: 18 (02 Apr 2022 04:41) (18 - 18)  SpO2: 92% (02 Apr 2022 04:41) (90% - 95%)    General: thin, middle-aged woman in NAD; awake, sitting comfortably in bed  HEENT: nc/at, clear conjunctiva, moist mucous membranes  Neck: supple, no JVD  Heart: RRR, +systolic murmur   Chest/lungs: mild bibasilar rales; no wheezing or rhonchi  ABD: soft, non-tender, non-distended; no guarding or rebound; bowel sounds present  Extremities: no edema, erythema, or tenderness to palpation  Skin: clear, dry  Neuro: A&Ox4, FROM of all 4 extremities; no focal deficits; grossly intact    LABS:                        9.5    5.70  )-----------( 274      ( 01 Apr 2022 05:48 )             28.3     04-01    137  |  101  |  7   ----------------------------<  91  3.4<L>   |  28  |  0.72    Ca    8.4      01 Apr 2022 05:51  Phos  3.7     04-01  Mg     1.9     04-01    TPro  4.5<L>  /  Alb  3.0<L>  /  TBili  0.9  /  DBili  x   /  AST  11  /  ALT  34  /  AlkPhos  57  04-01                RADIOLOGY & ADDITIONAL TESTS:  Results Reviewed:   Imaging Personally Reviewed:  Electrocardiogram Personally Reviewed:    COORDINATION OF CARE:  Care Discussed with Consultants/Other Providers [Y/N]:  Prior or Outpatient Records Reviewed [Y/N]:   ***************************************************************  Sydni Bueno, PGY2  Internal Medicine   pager: NS: [phone] [facility]: [id number]  ***************************************************************    PROGRESS NOTE:     Patient is a 57y old  Female who presents with a chief complaint of syncope (01 Apr 2022 10:05)      SUBJECTIVE / OVERNIGHT EVENTS:  No acute events overnight. On tele, NSR 60s to 70s overnight. She had some neuropathic pain in the feet and received gabapentin which improved her pain. Patient received daratumumab, cyclophosphamide, bortezomib and 20 mg decadron yesterday, her first chemotherapy session for amyloidosis and tolerated it well. No nausea no rash although she had some swelling at the infusion site and will ask the nurse to have her infusion today at a different site. She will receive her second dose of daratumumab today along with benadryl, decadron 20 mg x1 and tylenol 650 mg.  2/2 steroids but will hold off on ISS.     MEDICATIONS  (STANDING):  aspirin  chewable 81 milliGRAM(s) Oral daily  atorvastatin 40 milliGRAM(s) Oral at bedtime  enoxaparin Injectable 40 milliGRAM(s) SubCutaneous every 24 hours  famotidine    Tablet 20 milliGRAM(s) Oral two times a day  fludroCORTISONE 0.2 milliGRAM(s) Oral daily  fluticasone propionate 50 MICROgram(s)/spray Nasal Spray 1 Spray(s) Both Nostrils two times a day  gabapentin 100 milliGRAM(s) Oral every 8 hours  influenza   Vaccine 0.5 milliLiter(s) IntraMuscular once  lactobacillus acidophilus 2 Tablet(s) Oral daily  levothyroxine 137 MICROGram(s) Oral daily  loratadine 10 milliGRAM(s) Oral daily  midodrine. 20 milliGRAM(s) Oral three times a day  psyllium Powder 1 Packet(s) Oral two times a day  simethicone 80 milliGRAM(s) Chew every 6 hours  valACYclovir 500 milliGRAM(s) Oral two times a day    MEDICATIONS  (PRN):  acetaminophen     Tablet .. 650 milliGRAM(s) Oral every 6 hours PRN Mild Pain (1 - 3), Moderate Pain (4 - 6)  diphenhydrAMINE Injectable 50 milliGRAM(s) IV Push once PRN Daratumumab reaction  FIRST- Mouthwash  BLM 10 milliLiter(s) Swish and Spit four times a day PRN 15 minutes before meals  hydrocortisone sodium succinate Injectable 100 milliGRAM(s) IV Push once PRN Daratumumab reaction  meperidine     Injectable 12.5 milliGRAM(s) IV Push once PRN Daratumumab reaction      CAPILLARY BLOOD GLUCOSE        I&O's Summary    31 Mar 2022 07:01  -  01 Apr 2022 07:00  --------------------------------------------------------  IN: 240 mL / OUT: 420 mL / NET: -180 mL    01 Apr 2022 07:01  -  02 Apr 2022 05:52  --------------------------------------------------------  IN: 610 mL / OUT: 1870 mL / NET: -1260 mL        PHYSICAL EXAM:  Vital Signs Last 24 Hrs  T(C): 36.8 (02 Apr 2022 04:41), Max: 37.2 (01 Apr 2022 10:00)  T(F): 98.2 (02 Apr 2022 04:41), Max: 99 (01 Apr 2022 10:00)  HR: 80 (02 Apr 2022 04:41) (64 - 84)  BP: 110/70 (02 Apr 2022 04:41) (95/57 - 122/72)  BP(mean): --  RR: 18 (02 Apr 2022 04:41) (18 - 18)  SpO2: 92% (02 Apr 2022 04:41) (90% - 95%)    General: thin, middle-aged woman in NAD; awake, sitting comfortably in bed  HEENT: nc/at, clear conjunctiva, moist mucous membranes  Neck: supple, no JVD  Heart: RRR, +systolic murmur   Chest/lungs: mild bibasilar rales; no wheezing or rhonchi  ABD: soft, non-tender, non-distended; no guarding or rebound; bowel sounds present  Extremities: no edema, erythema, or tenderness to palpation  Skin: clear, dry  Neuro: A&Ox4, FROM of all 4 extremities; no focal deficits; grossly intact    LABS:                        9.5    5.70  )-----------( 274      ( 01 Apr 2022 05:48 )             28.3     04-01    137  |  101  |  7   ----------------------------<  91  3.4<L>   |  28  |  0.72    Ca    8.4      01 Apr 2022 05:51  Phos  3.7     04-01  Mg     1.9     04-01    TPro  4.5<L>  /  Alb  3.0<L>  /  TBili  0.9  /  DBili  x   /  AST  11  /  ALT  34  /  AlkPhos  57  04-01                RADIOLOGY & ADDITIONAL TESTS:  Results Reviewed:   Imaging Personally Reviewed:  Electrocardiogram Personally Reviewed:    COORDINATION OF CARE:  Care Discussed with Consultants/Other Providers [Y/N]:  Prior or Outpatient Records Reviewed [Y/N]:

## 2022-04-02 NOTE — PROGRESS NOTE ADULT - PROBLEM SELECTOR PLAN 2
Pt presented to OSH for multiple recurrent syncopal episodes.   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 No regional wall motion abnormalities. Moderate concentric LVH.  - ECG 1st degree AV block, now paced  - Cardiac MRI w/ amyloid deposits   - s/p Micra PPM implant

## 2022-04-02 NOTE — PROGRESS NOTE ADULT - ASSESSMENT
57 y.o. F with PMH of POTS, orthostatic hypotension, recently diagnosed with smoldering myeloma (March 2022) and amyloidosis (Feb 2022), mouth burning syndrome, recently diagnosed c.diff colitis (March 2022), hyperthyroidism s/p thyroidectomy, admitted to [facility] for multiple recurrent syncopal episodes thought to be associated with autonomic dysfunction 2/2 amyloidosis, transferred to [facility] for further amyloidosis management.

## 2022-04-02 NOTE — PROGRESS NOTE ADULT - ASSESSMENT
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  Patient admitted with syncope and noted to have symptomatic bradycardia on tele.  Dual chamber PPM by CHANELL.  PO vanco for C. diff.    Myeloma/amyloid therapies per hematology.

## 2022-04-02 NOTE — ADVANCED PRACTICE NURSE CONSULT - RECOMMEDATIONS
Strict I & O's   Monitor Labs  Encourage fluids 
will continue to monitor for any adverse effects , I/O, any abnormal labs

## 2022-04-02 NOTE — ADVANCED PRACTICE NURSE CONSULT - ASSESSMENT
received pt in bed awake alert and oriented x4 not in any distress able to express her needs  Cycle #1 day 2/2 .Height and weight verified. Lab results as per Md shiela aware of same. Vital signs stable prior to the start of chemotherapy, see sunrise.  Pt educated on the importance of saving urine, verbalize understanding of same. Pt education done re chemo regimen, drug effects and potential side effects, written material provided, pt states understanding. Pt reports the she has tolerated previous chemotherapy without side effects. Pt with piv line #20 on left a/c , site free from signs and symptoms of infection, positive blood return obtained. Pre-medicated with tylenol 650mg pox1, benadryl 50mg pox1, monelukast 10mg pox1, decadron 20mg ivss x1 pt started on Daratumumab 8mg/re=971vc iv intiated at 1318 at 50cc/hr    received pt in bed awake alert and oriented x4 not in any distress able to express her needs  Cycle #1 day 2/2 .Height and weight verified. Lab results as per Md shiela aware of same. Vital signs stable prior to the start of chemotherapy, see sunrise.  Pt educated on the importance of saving urine, verbalize understanding of same. Pt education done re chemo regimen, drug effects and potential side effects, written material provided, pt states understanding. Pt reports the she has tolerated previous chemotherapy without side effects. Pt with piv line #20 on left a/c , site free from signs and symptoms of infection, positive blood return obtained. Pre-medicated with tylenol 650mg pox1, benadryl 50mg pox1, monelukast 10mg pox1, decadron 20mg ivss x1 pt started on Daratumumab 8mg/ee=549tb iv intiated at 1318 at 50cc/hr pt tolerated well x1hr rate increased by 50cc/hr every 1hr to a max rate of 200cc/hr v/s checked every 1 hr and stable    received pt in bed awake alert and oriented x4 not in any distress able to express her needs  Cycle #1 day 2/2 .Height and weight verified. Lab results as per Md shiela aware of same. Vital signs stable prior to the start of chemotherapy, see sunrise.  Pt educated on the importance of saving urine, verbalize understanding of same. Pt education done re chemo regimen, drug effects and potential side effects, written material provided, pt states understanding. Pt reports the she has tolerated previous chemotherapy without side effects. Pt with piv line #20 on left a/c , site free from signs and symptoms of infection, positive blood return obtained. Pre-medicated with tylenol 650mg pox1, benadryl 50mg pox1, monelukast 10mg pox1, decadron 20mg ivss x1 pt started on Daratumumab 8mg/zx=966of iv intiated at 1318 at 50cc/hr pt tolerated well x1hr rate increased by 50cc/hr every 1hr to a max rate of 200cc/hr v/s checked every 1 hr and stable completed infusion with no adverse effects at 1730 post v/s stable pt resting in bed family at the bedside report given to the area nurse

## 2022-04-02 NOTE — PROGRESS NOTE ADULT - PROBLEM SELECTOR PLAN 3
C/w midodrine 20mg with more lenient parameters (SBP >150 and HR <40) given pt had multiple RRT in OSH when midodrine was held  - c/w midodrine 20mg q8  - C/w with fludrocortisone   - BP monitoring

## 2022-04-02 NOTE — PROGRESS NOTE ADULT - PROBLEM SELECTOR PLAN 1
Counts stable for therapy. No fluid overload / allergic reaction with the daratumumab. She will proceed with treatment as planned today with plan to have future treatments at the [facility] if able to remain clinically stable. Antiemetics as needed: s/p CyBorD yesterday. Monitor CBC daily.

## 2022-04-02 NOTE — PROGRESS NOTE ADULT - SUBJECTIVE AND OBJECTIVE BOX
HPI:  Patient seen and examined at bedside on 4 Jomar.  Started Cy-Bor-D and daratumumab.    Review Of Systems:           Respiratory: No shortness of breath, cough, or wheezing  Cardiovascular: No chest pain or palpitations  10 point review of systems is otherwise negative except as mentioned above        Medications:  acetaminophen     Tablet .. 650 milliGRAM(s) Oral every 6 hours PRN  aspirin  chewable 81 milliGRAM(s) Oral daily  atorvastatin 40 milliGRAM(s) Oral at bedtime  diphenhydrAMINE Injectable 50 milliGRAM(s) IV Push once PRN  enoxaparin Injectable 40 milliGRAM(s) SubCutaneous every 24 hours  famotidine    Tablet 20 milliGRAM(s) Oral two times a day  FIRST- Mouthwash  BLM 10 milliLiter(s) Swish and Spit four times a day PRN  fludroCORTISONE 0.2 milliGRAM(s) Oral daily  fluticasone propionate 50 MICROgram(s)/spray Nasal Spray 1 Spray(s) Both Nostrils two times a day  gabapentin 100 milliGRAM(s) Oral every 8 hours  hydrocortisone sodium succinate Injectable 100 milliGRAM(s) IV Push once PRN  influenza   Vaccine 0.5 milliLiter(s) IntraMuscular once  lactobacillus acidophilus 2 Tablet(s) Oral daily  levothyroxine 137 MICROGram(s) Oral daily  loratadine 10 milliGRAM(s) Oral daily  meperidine     Injectable 12.5 milliGRAM(s) IV Push once PRN  midodrine. 20 milliGRAM(s) Oral three times a day  psyllium Powder 1 Packet(s) Oral two times a day  simethicone 80 milliGRAM(s) Chew every 6 hours  valACYclovir 500 milliGRAM(s) Oral two times a day    PAST MEDICAL & SURGICAL HISTORY:  Hypothyroidism    POTS (postural orthostatic tachycardia syndrome)    Clostridium difficile colitis    Amyloidosis    Smoldering myeloma    No significant past surgical history      Vitals:  T(C): 37.1 (22 @ 15:14), Max: 37.1 (22 @ 10:00)  HR: 80 (22 @ 16:11) (67 - 83)  BP: 111/69 (22 @ 16:11) (95/57 - 132/82)  BP(mean): --  RR: 18 (22 @ 16:11) (18 - 18)  SpO2: 94% (22 @ 16:11) (92% - 98%)  Wt(kg): --  Daily     Daily Weight in k.8 (2022 08:00)  I&O's Summary    2022 07:01  -  2022 07:00  --------------------------------------------------------  IN: 610 mL / OUT: 1870 mL / NET: -1260 mL    2022 07:01  -  2022 17:33  --------------------------------------------------------  IN: 50 mL / OUT: 200 mL / NET: -150 mL        Physical Exam:  Appearance: No acute distress; well appearing  Eyes: PERRL, EOMI, pink conjunctiva  HENT: Normal oral mucosa  Cardiovascular: RRR, S1, S2, no murmurs, rubs, or gallops; no edema; no JVD  Respiratory: Clear to auscultation bilaterally  Gastrointestinal: soft, non-tender, non-distended with normal bowel sounds  Musculoskeletal: No clubbing; no joint deformity   Neurologic: Non-focal  Lymphatic: No lymphadenopathy  Psychiatry: AAOx3, mood & affect appropriate  Skin: No rashes, ecchymoses, or cyanosis                          11.6   7.56  )-----------( 349      ( 2022 07:41 )             36.2     04-02    138  |  101  |  10  ----------------------------<  200<H>  5.1   |  24  |  0.52    Ca    9.3      2022 07:41  Phos  3.7     04-02  Mg     2.3     04-02    TPro  5.9<L>  /  Alb  3.8  /  TBili  0.6  /  DBili  x   /  AST  14  /  ALT  33  /  AlkPhos  69  04-02        ECG: sinus 75 bpm, low voltage    Echo:  < from: TTE with Doppler (w/Cont) (22 @ 07:58) >  ------------------------------------------------------------------------  Dimensions:    Normal Values:  LA:     3.6    2.0 - 4.0 cm  Ao:     3.1    2.0 - 3.8 cm  SEPTUM: 1.2    0.6 - 1.2 cm  PWT:    1.2    0.6 - 1.1 cm  LVIDd:  3.0    3.0 - 5.6 cm  LVIDs:  2.5    1.8 - 4.0 cm  Derived variables:  LVMI: 69 g/m2  RWT: 0.80  EF (Visual Estimate): 60 %  Doppler Peak Velocity (m/sec): AoV=1.2 TV=2.2  ------------------------------------------------------------------------  Observations:  Mitral Valve: Normal mitral valve.  Aortic Valve/Aorta: Mildly calcified trileaflet aortic  valve with normal opening. Minimal aortic regurgitation.  Normal aortic root size.  Left Atrium: Moderately dilated left atrium.  Left Ventricle: Normal left ventricular internal dimensions  and wall thickness.  Normal left ventricular systolic function. No segmental  wall motion abnormalities.  Left atrial filling pressure is elevated.  Right Heart: Normal right atrium. Normal right ventricular  size and function.  Normal tricuspid valve. Mild tricuspid regurgitation.  Normal pulmonic valve.  Pericardium/Pleura: Normal pericardium with no pericardial  effusion.  Bilateral pleural effusions.  Hemodynamic: Estimated right atrial pressure is severely  elevated.  No evidence of pulmonary hypertension.  ------------------------------------------------------------------------  Conclusions:  Normal left ventricular systolic function. No segmental  wall motion abnormalities.  Average global longitudinal strain = -14.6%; pattern is  homogeneous (i.e. not suggestive of amyloid).  ------------------------------------------------------------------------  Confirmed on  3/30/2022 - 10:49:49 by Michael Mcdonald MD, FASE  ------------------------------------------------------------------------    < end of copied text >    Interpretation of Telemetry: NSR

## 2022-04-03 LAB
ALBUMIN SERPL ELPH-MCNC: 3.8 G/DL — SIGNIFICANT CHANGE UP (ref 3.3–5)
ALP SERPL-CCNC: 67 U/L — SIGNIFICANT CHANGE UP (ref 40–120)
ALT FLD-CCNC: 41 U/L — SIGNIFICANT CHANGE UP (ref 10–45)
ANION GAP SERPL CALC-SCNC: 10 MMOL/L — SIGNIFICANT CHANGE UP (ref 5–17)
AST SERPL-CCNC: 20 U/L — SIGNIFICANT CHANGE UP (ref 10–40)
BASOPHILS # BLD AUTO: 0.01 K/UL — SIGNIFICANT CHANGE UP (ref 0–0.2)
BASOPHILS NFR BLD AUTO: 0.1 % — SIGNIFICANT CHANGE UP (ref 0–2)
BILIRUB SERPL-MCNC: 0.5 MG/DL — SIGNIFICANT CHANGE UP (ref 0.2–1.2)
BUN SERPL-MCNC: 11 MG/DL — SIGNIFICANT CHANGE UP (ref 7–23)
CALCIUM SERPL-MCNC: 8.7 MG/DL — SIGNIFICANT CHANGE UP (ref 8.4–10.5)
CHLORIDE SERPL-SCNC: 101 MMOL/L — SIGNIFICANT CHANGE UP (ref 96–108)
CO2 SERPL-SCNC: 25 MMOL/L — SIGNIFICANT CHANGE UP (ref 22–31)
CREAT SERPL-MCNC: 0.51 MG/DL — SIGNIFICANT CHANGE UP (ref 0.5–1.3)
CULTURE RESULTS: SIGNIFICANT CHANGE UP
EGFR: 108 ML/MIN/1.73M2 — SIGNIFICANT CHANGE UP
EOSINOPHIL # BLD AUTO: 0 K/UL — SIGNIFICANT CHANGE UP (ref 0–0.5)
EOSINOPHIL NFR BLD AUTO: 0 % — SIGNIFICANT CHANGE UP (ref 0–6)
GLUCOSE SERPL-MCNC: 115 MG/DL — HIGH (ref 70–99)
HCT VFR BLD CALC: 30.9 % — LOW (ref 34.5–45)
HGB BLD-MCNC: 10.2 G/DL — LOW (ref 11.5–15.5)
IMM GRANULOCYTES NFR BLD AUTO: 0.6 % — SIGNIFICANT CHANGE UP (ref 0–1.5)
LYMPHOCYTES # BLD AUTO: 0.93 K/UL — LOW (ref 1–3.3)
LYMPHOCYTES # BLD AUTO: 9.2 % — LOW (ref 13–44)
MAGNESIUM SERPL-MCNC: 2.2 MG/DL — SIGNIFICANT CHANGE UP (ref 1.6–2.6)
MCHC RBC-ENTMCNC: 29.7 PG — SIGNIFICANT CHANGE UP (ref 27–34)
MCHC RBC-ENTMCNC: 33 GM/DL — SIGNIFICANT CHANGE UP (ref 32–36)
MCV RBC AUTO: 89.8 FL — SIGNIFICANT CHANGE UP (ref 80–100)
MONOCYTES # BLD AUTO: 0.49 K/UL — SIGNIFICANT CHANGE UP (ref 0–0.9)
MONOCYTES NFR BLD AUTO: 4.9 % — SIGNIFICANT CHANGE UP (ref 2–14)
NEUTROPHILS # BLD AUTO: 8.6 K/UL — HIGH (ref 1.8–7.4)
NEUTROPHILS NFR BLD AUTO: 85.2 % — HIGH (ref 43–77)
NRBC # BLD: 0 /100 WBCS — SIGNIFICANT CHANGE UP (ref 0–0)
PHOSPHATE SERPL-MCNC: 3 MG/DL — SIGNIFICANT CHANGE UP (ref 2.5–4.5)
PLATELET # BLD AUTO: 351 K/UL — SIGNIFICANT CHANGE UP (ref 150–400)
POTASSIUM SERPL-MCNC: 4.2 MMOL/L — SIGNIFICANT CHANGE UP (ref 3.5–5.3)
POTASSIUM SERPL-SCNC: 4.2 MMOL/L — SIGNIFICANT CHANGE UP (ref 3.5–5.3)
PROT SERPL-MCNC: 5.5 G/DL — LOW (ref 6–8.3)
RBC # BLD: 3.44 M/UL — LOW (ref 3.8–5.2)
RBC # FLD: 13.9 % — SIGNIFICANT CHANGE UP (ref 10.3–14.5)
SODIUM SERPL-SCNC: 136 MMOL/L — SIGNIFICANT CHANGE UP (ref 135–145)
SPECIMEN SOURCE: SIGNIFICANT CHANGE UP
WBC # BLD: 10.09 K/UL — SIGNIFICANT CHANGE UP (ref 3.8–10.5)
WBC # FLD AUTO: 10.09 K/UL — SIGNIFICANT CHANGE UP (ref 3.8–10.5)

## 2022-04-03 PROCEDURE — 99233 SBSQ HOSP IP/OBS HIGH 50: CPT

## 2022-04-03 PROCEDURE — 99232 SBSQ HOSP IP/OBS MODERATE 35: CPT | Mod: GC

## 2022-04-03 PROCEDURE — 99232 SBSQ HOSP IP/OBS MODERATE 35: CPT

## 2022-04-03 RX ADMIN — ENOXAPARIN SODIUM 40 MILLIGRAM(S): 100 INJECTION SUBCUTANEOUS at 13:00

## 2022-04-03 RX ADMIN — ATORVASTATIN CALCIUM 40 MILLIGRAM(S): 80 TABLET, FILM COATED ORAL at 22:10

## 2022-04-03 RX ADMIN — LORATADINE 10 MILLIGRAM(S): 10 TABLET ORAL at 13:03

## 2022-04-03 RX ADMIN — SIMETHICONE 80 MILLIGRAM(S): 80 TABLET, CHEWABLE ORAL at 05:47

## 2022-04-03 RX ADMIN — SIMETHICONE 80 MILLIGRAM(S): 80 TABLET, CHEWABLE ORAL at 13:00

## 2022-04-03 RX ADMIN — Medication 137 MICROGRAM(S): at 05:48

## 2022-04-03 RX ADMIN — FLUDROCORTISONE ACETATE 0.2 MILLIGRAM(S): 0.1 TABLET ORAL at 05:47

## 2022-04-03 RX ADMIN — FAMOTIDINE 20 MILLIGRAM(S): 10 INJECTION INTRAVENOUS at 05:47

## 2022-04-03 RX ADMIN — GABAPENTIN 100 MILLIGRAM(S): 400 CAPSULE ORAL at 13:06

## 2022-04-03 RX ADMIN — VALACYCLOVIR 500 MILLIGRAM(S): 500 TABLET, FILM COATED ORAL at 18:40

## 2022-04-03 RX ADMIN — MIDODRINE HYDROCHLORIDE 20 MILLIGRAM(S): 2.5 TABLET ORAL at 05:47

## 2022-04-03 RX ADMIN — Medication 81 MILLIGRAM(S): at 13:03

## 2022-04-03 RX ADMIN — VALACYCLOVIR 500 MILLIGRAM(S): 500 TABLET, FILM COATED ORAL at 05:47

## 2022-04-03 RX ADMIN — SIMETHICONE 80 MILLIGRAM(S): 80 TABLET, CHEWABLE ORAL at 18:40

## 2022-04-03 RX ADMIN — MIDODRINE HYDROCHLORIDE 20 MILLIGRAM(S): 2.5 TABLET ORAL at 13:04

## 2022-04-03 RX ADMIN — Medication 20 MILLIGRAM(S): at 13:41

## 2022-04-03 RX ADMIN — SIMETHICONE 80 MILLIGRAM(S): 80 TABLET, CHEWABLE ORAL at 23:37

## 2022-04-03 RX ADMIN — GABAPENTIN 100 MILLIGRAM(S): 400 CAPSULE ORAL at 22:10

## 2022-04-03 RX ADMIN — MIDODRINE HYDROCHLORIDE 20 MILLIGRAM(S): 2.5 TABLET ORAL at 18:40

## 2022-04-03 RX ADMIN — FAMOTIDINE 20 MILLIGRAM(S): 10 INJECTION INTRAVENOUS at 18:39

## 2022-04-03 RX ADMIN — Medication 2 TABLET(S): at 13:05

## 2022-04-03 RX ADMIN — GABAPENTIN 100 MILLIGRAM(S): 400 CAPSULE ORAL at 05:47

## 2022-04-03 NOTE — PROGRESS NOTE ADULT - PROBLEM SELECTOR PLAN 8
DVT PPx: SCDs, DVT prophylaxis to resume tomorrow   Diet: Regular  Full Code  Dispo: TBD DVT PPx: Lovenox  Diet: Regular  Full Code  Dispo: TBD

## 2022-04-03 NOTE — PROGRESS NOTE ADULT - PROBLEM SELECTOR PLAN 5
Last saw Dr. 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       Plan:  -heme/onc start Daratumumab + CyBorD yesterday with second dose of Daratumumab today  - Will f/u with Dr. Goldberg after discharge (patient's Hematologist) Last saw Dr. 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       Plan:  -heme/onc start Daratumumab + CyBorD with second dose of Daratumumab yesterday  - Will f/u with Dr. Goldberg after discharge (patient's Hematologist)

## 2022-04-03 NOTE — PROVIDER CONTACT NOTE (OTHER) - RECOMMENDATIONS
Dr. Danica Pelaez
ED
ortho BP and notify provider, continue to monitor pt
Charlene Strauss MD made aware
Notify provider and make aware. Recommend to not give midodrine or fludrocortisone.
Notify provider and make aware. Tell provider the provider to RN note that says "do not give SQ heparin, lovenox, or dabigatran without first checking w/ electrophysiology service."

## 2022-04-03 NOTE — PROVIDER CONTACT NOTE (OTHER) - ACTION/TREATMENT ORDERED:
Charlene Strauss MD made aware. Repeat /67, HR 79.
Notified provider and is aware. Per provider, have the day team discuss if the patient should be given lovenox. Will continue to monitor.
Notified provider. Per provider, give midodrine since in the patient's chart her systolic is less than 150. Fludrocortisone should also be given and if BP is high, day team can adjust dose.
per provider do ortho bp and recheck around 0000. after 0000 gave pt 10mg midodrine. BP did not significantly increase and gave another 20mg of midodrine to help bp ortho BP.

## 2022-04-03 NOTE — PROVIDER CONTACT NOTE (OTHER) - SITUATION
Pt is s/p PPM and lovenox has not been given after the procedure.
pt ortho +
Pt hypotensive; manual BP 70/48, electronic BP 86/55 HR 76
Patient's BP was 142/84. Patient has a history of orthostatic hypotension and multiple rapids were called in the past.

## 2022-04-03 NOTE — PROGRESS NOTE ADULT - PROBLEM SELECTOR PLAN 1
Tolerated C1 well: no signs or symptoms of fluid overload or worsening neuropathy. Continue present dose of gabapentin. CBC stable and CMP WNL. Will plan for additional treatment outpatient with Dr Goldberg.

## 2022-04-03 NOTE — PROGRESS NOTE ADULT - SUBJECTIVE AND OBJECTIVE BOX
Hematology Follow-up    INTERVAL HPI/OVERNIGHT EVENTS: Did not sleep well last night. Feels neuropathy is back to baseline. Was able to walk in hallway; no lightheadedness/ dizziness. No SOB. No diarrhea or nausea.     VITAL SIGNS:  T(F): 98.1 (04-03-22 @ 11:00)  HR: 69 (04-03-22 @ 11:00)  BP: 102/64 (04-03-22 @ 11:00)  RR: 18 (04-03-22 @ 11:00)  SpO2: 92% (04-03-22 @ 11:00)  Wt(kg): --    PHYSICAL EXAM:    Constitutional: NAD, lying in bed   Neck: supple, no masses, no JVD  Respiratory: CTA b/l, good air entry b/l  Cardiovascular: RRR, normal S1S2, PPM L anterior chest wall   Gastrointestinal: soft, NTND, no masses palpable, BS normal  Extremities: 1+ pedal edema   Neurological: Grossly intact  Skin: No rash     MEDICATIONS  (STANDING):  aspirin  chewable 81 milliGRAM(s) Oral daily  atorvastatin 40 milliGRAM(s) Oral at bedtime  enoxaparin Injectable 40 milliGRAM(s) SubCutaneous every 24 hours  famotidine    Tablet 20 milliGRAM(s) Oral two times a day  fludroCORTISONE 0.2 milliGRAM(s) Oral daily  fluticasone propionate 50 MICROgram(s)/spray Nasal Spray 1 Spray(s) Both Nostrils two times a day  gabapentin 100 milliGRAM(s) Oral every 8 hours  influenza   Vaccine 0.5 milliLiter(s) IntraMuscular once  lactobacillus acidophilus 2 Tablet(s) Oral daily  levothyroxine 137 MICROGram(s) Oral daily  loratadine 10 milliGRAM(s) Oral daily  midodrine. 20 milliGRAM(s) Oral three times a day  predniSONE   Tablet 20 milliGRAM(s) Oral once  psyllium Powder 1 Packet(s) Oral two times a day  simethicone 80 milliGRAM(s) Chew every 6 hours  valACYclovir 500 milliGRAM(s) Oral two times a day    MEDICATIONS  (PRN):  acetaminophen     Tablet .. 650 milliGRAM(s) Oral every 6 hours PRN Mild Pain (1 - 3), Moderate Pain (4 - 6)  diphenhydrAMINE Injectable 50 milliGRAM(s) IV Push once PRN Daratumumab reaction  FIRST- Mouthwash  BLM 10 milliLiter(s) Swish and Spit four times a day PRN 15 minutes before meals  hydrocortisone sodium succinate Injectable 100 milliGRAM(s) IV Push once PRN Daratumumab reaction  meperidine     Injectable 12.5 milliGRAM(s) IV Push once PRN Daratumumab reaction      penicillins (Unknown)      LABS:                        10.2   10.09 )-----------( 351      ( 03 Apr 2022 06:01 )             30.9     04-03    136  |  101  |  11  ----------------------------<  115<H>  4.2   |  25  |  0.51    Ca    8.7      03 Apr 2022 06:01  Phos  3.0     04-03  Mg     2.2     04-03    TPro  5.5<L>  /  Alb  3.8  /  TBili  0.5  /  DBili  x   /  AST  20  /  ALT  41  /  AlkPhos  67  04-03           RADIOLOGY & ADDITIONAL TESTS:  Studies reviewed.   Hematology Follow-up    INTERVAL HPI/OVERNIGHT EVENTS: Did not sleep well last night. Feels neuropathy is back to baseline. Was able to walk in hallway; no lightheadedness/ dizziness. No SOB. No diarrhea or nausea. Did not like mouth rinse given to prevent mucositis.     VITAL SIGNS:  T(F): 98.1 (04-03-22 @ 11:00)  HR: 69 (04-03-22 @ 11:00)  BP: 102/64 (04-03-22 @ 11:00)  RR: 18 (04-03-22 @ 11:00)  SpO2: 92% (04-03-22 @ 11:00)  Wt(kg): --    PHYSICAL EXAM:    Constitutional: NAD, lying in bed   Neck: supple, no masses, no JVD  Respiratory: CTA b/l, good air entry b/l  Cardiovascular: RRR, normal S1S2, PPM L anterior chest wall   Gastrointestinal: soft, NTND, no masses palpable, BS normal  Extremities: 1+ pedal edema   Neurological: Grossly intact  Skin: No rash     MEDICATIONS  (STANDING):  aspirin  chewable 81 milliGRAM(s) Oral daily  atorvastatin 40 milliGRAM(s) Oral at bedtime  enoxaparin Injectable 40 milliGRAM(s) SubCutaneous every 24 hours  famotidine    Tablet 20 milliGRAM(s) Oral two times a day  fludroCORTISONE 0.2 milliGRAM(s) Oral daily  fluticasone propionate 50 MICROgram(s)/spray Nasal Spray 1 Spray(s) Both Nostrils two times a day  gabapentin 100 milliGRAM(s) Oral every 8 hours  influenza   Vaccine 0.5 milliLiter(s) IntraMuscular once  lactobacillus acidophilus 2 Tablet(s) Oral daily  levothyroxine 137 MICROGram(s) Oral daily  loratadine 10 milliGRAM(s) Oral daily  midodrine. 20 milliGRAM(s) Oral three times a day  predniSONE   Tablet 20 milliGRAM(s) Oral once  psyllium Powder 1 Packet(s) Oral two times a day  simethicone 80 milliGRAM(s) Chew every 6 hours  valACYclovir 500 milliGRAM(s) Oral two times a day    MEDICATIONS  (PRN):  acetaminophen     Tablet .. 650 milliGRAM(s) Oral every 6 hours PRN Mild Pain (1 - 3), Moderate Pain (4 - 6)  diphenhydrAMINE Injectable 50 milliGRAM(s) IV Push once PRN Daratumumab reaction  FIRST- Mouthwash  BLM 10 milliLiter(s) Swish and Spit four times a day PRN 15 minutes before meals  hydrocortisone sodium succinate Injectable 100 milliGRAM(s) IV Push once PRN Daratumumab reaction  meperidine     Injectable 12.5 milliGRAM(s) IV Push once PRN Daratumumab reaction      penicillins (Unknown)      LABS:                        10.2   10.09 )-----------( 351      ( 03 Apr 2022 06:01 )             30.9     04-03    136  |  101  |  11  ----------------------------<  115<H>  4.2   |  25  |  0.51    Ca    8.7      03 Apr 2022 06:01  Phos  3.0     04-03  Mg     2.2     04-03    TPro  5.5<L>  /  Alb  3.8  /  TBili  0.5  /  DBili  x   /  AST  20  /  ALT  41  /  AlkPhos  67  04-03           RADIOLOGY & ADDITIONAL TESTS:  Studies reviewed.

## 2022-04-03 NOTE — PROGRESS NOTE ADULT - ASSESSMENT
57 y.o. F with PMH of POTS, orthostatic hypotension, recently diagnosed with smoldering myeloma (March 2022) and amyloidosis (Feb 2022), mouth burning syndrome, recently diagnosed c.diff colitis (March 2022), hyperthyroidism s/p thyroidectomy, admitted to [facility] for multiple recurrent syncopal episodes thought to be associated with autonomic dysfunction 2/2 amyloidosis, transferred to [facility] for further amyloidosis management.

## 2022-04-03 NOTE — PROGRESS NOTE ADULT - ASSESSMENT
58 y/o F with newly diagnosed lambda light chain amyloidosis s/p C1 of CyBorD with daratumumab treatment. Done inpatient due to bradycardia/ falls and need for hospitalization.

## 2022-04-03 NOTE — PROGRESS NOTE ADULT - SUBJECTIVE AND OBJECTIVE BOX
HPI:  Patient seen and examined at bedside on 4 Jomar.  No cardiac events overnight.    Review Of Systems:           Respiratory: No shortness of breath, cough, or wheezing  Cardiovascular: No chest pain or palpitations  10 point review of systems is otherwise negative except as mentioned above        Medications:  acetaminophen     Tablet .. 650 milliGRAM(s) Oral every 6 hours PRN  aspirin  chewable 81 milliGRAM(s) Oral daily  atorvastatin 40 milliGRAM(s) Oral at bedtime  diphenhydrAMINE Injectable 50 milliGRAM(s) IV Push once PRN  enoxaparin Injectable 40 milliGRAM(s) SubCutaneous every 24 hours  famotidine    Tablet 20 milliGRAM(s) Oral two times a day  FIRST- Mouthwash  BLM 10 milliLiter(s) Swish and Spit four times a day PRN  fludroCORTISONE 0.2 milliGRAM(s) Oral daily  fluticasone propionate 50 MICROgram(s)/spray Nasal Spray 1 Spray(s) Both Nostrils two times a day  gabapentin 100 milliGRAM(s) Oral every 8 hours  hydrocortisone sodium succinate Injectable 100 milliGRAM(s) IV Push once PRN  influenza   Vaccine 0.5 milliLiter(s) IntraMuscular once  lactobacillus acidophilus 2 Tablet(s) Oral daily  levothyroxine 137 MICROGram(s) Oral daily  loratadine 10 milliGRAM(s) Oral daily  meperidine     Injectable 12.5 milliGRAM(s) IV Push once PRN  midodrine. 20 milliGRAM(s) Oral three times a day  psyllium Powder 1 Packet(s) Oral two times a day  simethicone 80 milliGRAM(s) Chew every 6 hours  valACYclovir 500 milliGRAM(s) Oral two times a day    PAST MEDICAL & SURGICAL HISTORY:  Hypothyroidism    POTS (postural orthostatic tachycardia syndrome)    Clostridium difficile colitis    Amyloidosis    Smoldering myeloma    No significant past surgical history      Vitals:  T(C): 36.7 (22 @ 11:00), Max: 37.1 (22 @ 15:14)  HR: 69 (22 @ 11:00) (69 - 83)  BP: 102/64 (22 @ 11:00) (102/64 - 142/84)  BP(mean): --  RR: 18 (22 @ 11:00) (18 - 18)  SpO2: 92% (22 @ 11:00) (92% - 95%)  Wt(kg): --  Daily     Daily Weight in k.3 (2022 07:53)  I&O's Summary    2022 07:01  -  2022 07:00  --------------------------------------------------------  IN: 1600 mL / OUT: 1200 mL / NET: 400 mL    2022 07:01  -  2022 15:01  --------------------------------------------------------  IN: 0 mL / OUT: 300 mL / NET: -300 mL        Physical Exam:  Appearance: No acute distress; well appearing  Eyes: PERRL, EOMI, pink conjunctiva  HENT: Normal oral mucosa  Cardiovascular: RRR, S1, S2, no murmurs, rubs, or gallops; no edema; no JVD  Respiratory: Clear to auscultation bilaterally  Gastrointestinal: soft, non-tender, non-distended with normal bowel sounds  Musculoskeletal: No clubbing; no joint deformity   Neurologic: Non-focal  Lymphatic: No lymphadenopathy  Psychiatry: AAOx3, mood & affect appropriate  Skin: No rashes, ecchymoses, or cyanosis                          10.2   10.09 )-----------( 351      ( 2022 06:01 )             30.9         136  |  101  |  11  ----------------------------<  115<H>  4.2   |  25  |  0.51    Ca    8.7      2022 06:01  Phos  3.0     04-03  Mg     2.2     -03    TPro  5.5<L>  /  Alb  3.8  /  TBili  0.5  /  DBili  x   /  AST  20  /  ALT  41  /  AlkPhos  67  04-03      ECG: sinus 75 bpm, low voltage    Echo:  < from: TTE with Doppler (w/Cont) (22 @ 07:58) >  ------------------------------------------------------------------------  Dimensions:    Normal Values:  LA:     3.6    2.0 - 4.0 cm  Ao:     3.1    2.0 - 3.8 cm  SEPTUM: 1.2    0.6 - 1.2 cm  PWT:    1.2    0.6 - 1.1 cm  LVIDd:  3.0    3.0 - 5.6 cm  LVIDs:  2.5    1.8 - 4.0 cm  Derived variables:  LVMI: 69 g/m2  RWT: 0.80  EF (Visual Estimate): 60 %  Doppler Peak Velocity (m/sec): AoV=1.2 TV=2.2  ------------------------------------------------------------------------  Observations:  Mitral Valve: Normal mitral valve.  Aortic Valve/Aorta: Mildly calcified trileaflet aortic  valve with normal opening. Minimal aortic regurgitation.  Normal aortic root size.  Left Atrium: Moderately dilated left atrium.  Left Ventricle: Normal left ventricular internal dimensions  and wall thickness.  Normal left ventricular systolic function. No segmental  wall motion abnormalities.  Left atrial filling pressure is elevated.  Right Heart: Normal right atrium. Normal right ventricular  size and function.  Normal tricuspid valve. Mild tricuspid regurgitation.  Normal pulmonic valve.  Pericardium/Pleura: Normal pericardium with no pericardial  effusion.  Bilateral pleural effusions.  Hemodynamic: Estimated right atrial pressure is severely  elevated.  No evidence of pulmonary hypertension.  ------------------------------------------------------------------------  Conclusions:  Normal left ventricular systolic function. No segmental  wall motion abnormalities.  Average global longitudinal strain = -14.6%; pattern is  homogeneous (i.e. not suggestive of amyloid).  ------------------------------------------------------------------------  Confirmed on  3/30/2022 - 10:49:49 by Michael Mcdonald MD, FASE  ------------------------------------------------------------------------    < end of copied text >    Interpretation of Telemetry: JOSÉ ANTONIO

## 2022-04-03 NOTE — PROVIDER CONTACT NOTE (OTHER) - ASSESSMENT
Pt A&Ox4, VS as per flowsheet. Pt c/o feeling lightheaded and denies any CP or SOB. Pt given AM midodrine and florinef as per order
pt BP have been low when pt gets up and uses commode
A&Ox4, tolerating on RA, SR on tele and denies any discomfort, SOB, or chest pain.
A&Ox4, SR on tele, RA, and denies any chest pain, SOB, or discomfort.

## 2022-04-03 NOTE — PROGRESS NOTE ADULT - SUBJECTIVE AND OBJECTIVE BOX
PROGRESS NOTE:     Patient is a 57y old  Female who presents with a chief complaint of syncope (01 Apr 2022 10:05)      SUBJECTIVE / OVERNIGHT EVENTS:  No acute events overnight. On tele, NSR 60s to 70s overnight. She had some neuropathic pain in the feet and received gabapentin which improved her pain. Patient received daratumumab, cyclophosphamide, bortezomib and 20 mg decadron yesterday, her first chemotherapy session for amyloidosis and tolerated it well. No nausea no rash although she had some swelling at the infusion site and will ask the nurse to have her infusion today at a different site. She will receive her second dose of daratumumab today along with benadryl, decadron 20 mg x1 and tylenol 650 mg.  2/2 steroids but will hold off on ISS.     MEDICATIONS  (STANDING):  aspirin  chewable 81 milliGRAM(s) Oral daily  atorvastatin 40 milliGRAM(s) Oral at bedtime  enoxaparin Injectable 40 milliGRAM(s) SubCutaneous every 24 hours  famotidine    Tablet 20 milliGRAM(s) Oral two times a day  fludroCORTISONE 0.2 milliGRAM(s) Oral daily  fluticasone propionate 50 MICROgram(s)/spray Nasal Spray 1 Spray(s) Both Nostrils two times a day  gabapentin 100 milliGRAM(s) Oral every 8 hours  influenza   Vaccine 0.5 milliLiter(s) IntraMuscular once  lactobacillus acidophilus 2 Tablet(s) Oral daily  levothyroxine 137 MICROGram(s) Oral daily  loratadine 10 milliGRAM(s) Oral daily  midodrine. 20 milliGRAM(s) Oral three times a day  psyllium Powder 1 Packet(s) Oral two times a day  simethicone 80 milliGRAM(s) Chew every 6 hours  valACYclovir 500 milliGRAM(s) Oral two times a day    MEDICATIONS  (PRN):  acetaminophen     Tablet .. 650 milliGRAM(s) Oral every 6 hours PRN Mild Pain (1 - 3), Moderate Pain (4 - 6)  diphenhydrAMINE Injectable 50 milliGRAM(s) IV Push once PRN Daratumumab reaction  FIRST- Mouthwash  BLM 10 milliLiter(s) Swish and Spit four times a day PRN 15 minutes before meals  hydrocortisone sodium succinate Injectable 100 milliGRAM(s) IV Push once PRN Daratumumab reaction  meperidine     Injectable 12.5 milliGRAM(s) IV Push once PRN Daratumumab reaction      CAPILLARY BLOOD GLUCOSE        I&O's Summary    31 Mar 2022 07:01  -  01 Apr 2022 07:00  --------------------------------------------------------  IN: 240 mL / OUT: 420 mL / NET: -180 mL    01 Apr 2022 07:01  -  02 Apr 2022 05:52  --------------------------------------------------------  IN: 610 mL / OUT: 1870 mL / NET: -1260 mL        PHYSICAL EXAM:  Vital Signs Last 24 Hrs  T(C): 36.8 (02 Apr 2022 04:41), Max: 37.2 (01 Apr 2022 10:00)  T(F): 98.2 (02 Apr 2022 04:41), Max: 99 (01 Apr 2022 10:00)  HR: 80 (02 Apr 2022 04:41) (64 - 84)  BP: 110/70 (02 Apr 2022 04:41) (95/57 - 122/72)  BP(mean): --  RR: 18 (02 Apr 2022 04:41) (18 - 18)  SpO2: 92% (02 Apr 2022 04:41) (90% - 95%)    General: thin, middle-aged woman in NAD; awake, sitting comfortably in bed  HEENT: nc/at, clear conjunctiva, moist mucous membranes  Neck: supple, no JVD  Heart: RRR, +systolic murmur   Chest/lungs: mild bibasilar rales; no wheezing or rhonchi  ABD: soft, non-tender, non-distended; no guarding or rebound; bowel sounds present  Extremities: no edema, erythema, or tenderness to palpation  Skin: clear, dry  Neuro: A&Ox4, FROM of all 4 extremities; no focal deficits; grossly intact    LABS:                        9.5    5.70  )-----------( 274      ( 01 Apr 2022 05:48 )             28.3     04-01    137  |  101  |  7   ----------------------------<  91  3.4<L>   |  28  |  0.72    Ca    8.4      01 Apr 2022 05:51  Phos  3.7     04-01  Mg     1.9     04-01    TPro  4.5<L>  /  Alb  3.0<L>  /  TBili  0.9  /  DBili  x   /  AST  11  /  ALT  34  /  AlkPhos  57  04-01                RADIOLOGY & ADDITIONAL TESTS:  Results Reviewed:   Imaging Personally Reviewed:  Electrocardiogram Personally Reviewed:    COORDINATION OF CARE:  Care Discussed with Consultants/Other Providers [Y/N]:  Prior or Outpatient Records Reviewed [Y/N]:   PROGRESS NOTE:     Patient is a 57y old  Female who presents with a chief complaint of syncope (01 Apr 2022 10:05)      SUBJECTIVE / OVERNIGHT EVENTS:  No acute events overnight. On tele, NSR 60s to 70s overnight.  Patient had second chemotherapy session for amyloidosis yesterday and tolerated it well. No F,C,N,V, no rash, no CP, SOB.     MEDICATIONS  (STANDING):  aspirin  chewable 81 milliGRAM(s) Oral daily  atorvastatin 40 milliGRAM(s) Oral at bedtime  enoxaparin Injectable 40 milliGRAM(s) SubCutaneous every 24 hours  famotidine    Tablet 20 milliGRAM(s) Oral two times a day  fludroCORTISONE 0.2 milliGRAM(s) Oral daily  fluticasone propionate 50 MICROgram(s)/spray Nasal Spray 1 Spray(s) Both Nostrils two times a day  gabapentin 100 milliGRAM(s) Oral every 8 hours  influenza   Vaccine 0.5 milliLiter(s) IntraMuscular once  lactobacillus acidophilus 2 Tablet(s) Oral daily  levothyroxine 137 MICROGram(s) Oral daily  loratadine 10 milliGRAM(s) Oral daily  midodrine. 20 milliGRAM(s) Oral three times a day  psyllium Powder 1 Packet(s) Oral two times a day  simethicone 80 milliGRAM(s) Chew every 6 hours  valACYclovir 500 milliGRAM(s) Oral two times a day    MEDICATIONS  (PRN):  acetaminophen     Tablet .. 650 milliGRAM(s) Oral every 6 hours PRN Mild Pain (1 - 3), Moderate Pain (4 - 6)  diphenhydrAMINE Injectable 50 milliGRAM(s) IV Push once PRN Daratumumab reaction  FIRST- Mouthwash  BLM 10 milliLiter(s) Swish and Spit four times a day PRN 15 minutes before meals  hydrocortisone sodium succinate Injectable 100 milliGRAM(s) IV Push once PRN Daratumumab reaction  meperidine     Injectable 12.5 milliGRAM(s) IV Push once PRN Daratumumab reaction      CAPILLARY BLOOD GLUCOSE        I&O's Summary    31 Mar 2022 07:01  -  01 Apr 2022 07:00  --------------------------------------------------------  IN: 240 mL / OUT: 420 mL / NET: -180 mL    01 Apr 2022 07:01  -  02 Apr 2022 05:52  --------------------------------------------------------  IN: 610 mL / OUT: 1870 mL / NET: -1260 mL        PHYSICAL EXAM:  Vital Signs Last 24 Hrs  T(C): 36.6 (03 Apr 2022 05:32), Max: 37.1 (02 Apr 2022 15:14)  T(F): 97.8 (03 Apr 2022 05:32), Max: 98.7 (02 Apr 2022 15:14)  HR: 79 (03 Apr 2022 05:32) (78 - 83)  BP: 142/84 (03 Apr 2022 05:32) (111/69 - 142/84)  BP(mean): --  RR: 18 (03 Apr 2022 05:32) (18 - 18)  SpO2: 95% (03 Apr 2022 05:32) (93% - 98%)      General: thin, middle-aged woman in NAD; awake, sitting comfortably in bed  HEENT: nc/at, clear conjunctiva, moist mucous membranes  Neck: supple, no JVD  Heart: RRR, +systolic murmur   Chest/lungs: mild bibasilar rales; no wheezing or rhonchi  ABD: soft, non-tender, non-distended; no guarding or rebound; bowel sounds present  Extremities: no edema, erythema, or tenderness to palpation  Skin: clear, dry  Neuro: A&Ox4, FROM of all 4 extremities; no focal deficits; grossly intact      LABS:                        10.2   10.09 )-----------( 351      ( 03 Apr 2022 06:01 )             30.9     04-03    136  |  101  |  11  ----------------------------<  115<H>  4.2   |  25  |  0.51    Ca    8.7      03 Apr 2022 06:01  Phos  3.0     04-03  Mg     2.2     04-03    TPro  5.5<L>  /  Alb  3.8  /  TBili  0.5  /  DBili  x   /  AST  20  /  ALT  41  /  AlkPhos  67  04-03

## 2022-04-03 NOTE — PROVIDER CONTACT NOTE (OTHER) - BACKGROUND
pt admitted with localized swelling, mass or lump
Pt admitted for localized swelling, mass, or lump
58 yo F with PMH of POTS, orthostatic hypotension, recently diagnosed with smoldering myeloma with localized swelling, mass, or lump.
58 yo F with PMH of POTS, orthostatic hypotension, recently diagnosed with smoldering myeloma with localized swelling, mass, or lump.

## 2022-04-03 NOTE — PROGRESS NOTE ADULT - PROBLEM SELECTOR PLAN 1
Pt underwent fat pad bx in Feb 2022 and diagnosed with amyloidosis. 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 No typing of amyloidosis was done given as sample was not enough for mass spec.     # Autonomic dysfunction   - Suspect autonomic dysfunction and AV block may be associated with amyloidosis   - Tx orthostatic hypotension and POTS (see above/below)     # Rule out cardiac involvement  - TTE 3/16/2022 with concentric LVH. LVH is classically seen in light chain amyloidosis and plasma cell neoplasm   - Cardiac MRI 3/28 w evidence of cardiac amyloidosis   - S/p micra PPM   - Day 1 daratumumab and CyBorD (4/1) and day 2 daratumumab today (4/2)  - will receive 20mg prednisone PO post Danyell infusion on 4/3 and 4/4  - If pt tolerates chemo, will d/c next week and continue as outpatient Pt underwent fat pad bx in Feb 2022 and diagnosed with amyloidosis. 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 No typing of amyloidosis was done given as sample was not enough for mass spec.     # Autonomic dysfunction   - Suspect autonomic dysfunction and AV block may be associated with amyloidosis   - Tx orthostatic hypotension and POTS (see above/below)     # Rule out cardiac involvement  - TTE 3/16/2022 with concentric LVH. LVH is classically seen in light chain amyloidosis and plasma cell neoplasm   - Cardiac MRI 3/28 w evidence of cardiac amyloidosis   - S/p micra PPM   - Day 1 daratumumab and CyBorD (4/1) and day 2 daratumumab (4/2)  - will receive 20mg prednisone PO post Danyell infusion on 4/3 and 4/4  - If pt tolerates chemo, will d/c next week and continue as outpatient

## 2022-04-04 ENCOUNTER — TRANSCRIPTION ENCOUNTER (OUTPATIENT)
Age: 57
End: 2022-04-04

## 2022-04-04 VITALS
DIASTOLIC BLOOD PRESSURE: 68 MMHG | OXYGEN SATURATION: 93 % | HEART RATE: 70 BPM | TEMPERATURE: 98 F | SYSTOLIC BLOOD PRESSURE: 111 MMHG | RESPIRATION RATE: 18 BRPM

## 2022-04-04 LAB
ALBUMIN SERPL ELPH-MCNC: 3.6 G/DL — SIGNIFICANT CHANGE UP (ref 3.3–5)
ALP SERPL-CCNC: 79 U/L — SIGNIFICANT CHANGE UP (ref 40–120)
ALT FLD-CCNC: 61 U/L — HIGH (ref 10–45)
ANION GAP SERPL CALC-SCNC: 10 MMOL/L — SIGNIFICANT CHANGE UP (ref 5–17)
AST SERPL-CCNC: 32 U/L — SIGNIFICANT CHANGE UP (ref 10–40)
BILIRUB SERPL-MCNC: 0.6 MG/DL — SIGNIFICANT CHANGE UP (ref 0.2–1.2)
BUN SERPL-MCNC: 9 MG/DL — SIGNIFICANT CHANGE UP (ref 7–23)
CALCIUM SERPL-MCNC: 8.5 MG/DL — SIGNIFICANT CHANGE UP (ref 8.4–10.5)
CHLORIDE SERPL-SCNC: 101 MMOL/L — SIGNIFICANT CHANGE UP (ref 96–108)
CO2 SERPL-SCNC: 25 MMOL/L — SIGNIFICANT CHANGE UP (ref 22–31)
CREAT SERPL-MCNC: 0.51 MG/DL — SIGNIFICANT CHANGE UP (ref 0.5–1.3)
EGFR: 108 ML/MIN/1.73M2 — SIGNIFICANT CHANGE UP
GLUCOSE SERPL-MCNC: 101 MG/DL — HIGH (ref 70–99)
HCT VFR BLD CALC: 30.7 % — LOW (ref 34.5–45)
HGB BLD-MCNC: 9.8 G/DL — LOW (ref 11.5–15.5)
MAGNESIUM SERPL-MCNC: 2.1 MG/DL — SIGNIFICANT CHANGE UP (ref 1.6–2.6)
MCHC RBC-ENTMCNC: 29.3 PG — SIGNIFICANT CHANGE UP (ref 27–34)
MCHC RBC-ENTMCNC: 31.9 GM/DL — LOW (ref 32–36)
MCV RBC AUTO: 91.9 FL — SIGNIFICANT CHANGE UP (ref 80–100)
NRBC # BLD: 0 /100 WBCS — SIGNIFICANT CHANGE UP (ref 0–0)
PHOSPHATE SERPL-MCNC: 2.8 MG/DL — SIGNIFICANT CHANGE UP (ref 2.5–4.5)
PLATELET # BLD AUTO: 280 K/UL — SIGNIFICANT CHANGE UP (ref 150–400)
POTASSIUM SERPL-MCNC: 4 MMOL/L — SIGNIFICANT CHANGE UP (ref 3.5–5.3)
POTASSIUM SERPL-SCNC: 4 MMOL/L — SIGNIFICANT CHANGE UP (ref 3.5–5.3)
PROT SERPL-MCNC: 5.3 G/DL — LOW (ref 6–8.3)
RBC # BLD: 3.34 M/UL — LOW (ref 3.8–5.2)
RBC # FLD: 13.9 % — SIGNIFICANT CHANGE UP (ref 10.3–14.5)
SODIUM SERPL-SCNC: 136 MMOL/L — SIGNIFICANT CHANGE UP (ref 135–145)
WBC # BLD: 9.53 K/UL — SIGNIFICANT CHANGE UP (ref 3.8–10.5)
WBC # FLD AUTO: 9.53 K/UL — SIGNIFICANT CHANGE UP (ref 3.8–10.5)

## 2022-04-04 PROCEDURE — 86880 COOMBS TEST DIRECT: CPT

## 2022-04-04 PROCEDURE — 71045 X-RAY EXAM CHEST 1 VIEW: CPT

## 2022-04-04 PROCEDURE — 86803 HEPATITIS C AB TEST: CPT

## 2022-04-04 PROCEDURE — 85610 PROTHROMBIN TIME: CPT

## 2022-04-04 PROCEDURE — U0003: CPT

## 2022-04-04 PROCEDURE — U0005: CPT

## 2022-04-04 PROCEDURE — 99233 SBSQ HOSP IP/OBS HIGH 50: CPT

## 2022-04-04 PROCEDURE — 82728 ASSAY OF FERRITIN: CPT

## 2022-04-04 PROCEDURE — 83735 ASSAY OF MAGNESIUM: CPT

## 2022-04-04 PROCEDURE — 93356 MYOCRD STRAIN IMG SPCKL TRCK: CPT

## 2022-04-04 PROCEDURE — 86850 RBC ANTIBODY SCREEN: CPT

## 2022-04-04 PROCEDURE — 83540 ASSAY OF IRON: CPT

## 2022-04-04 PROCEDURE — 36415 COLL VENOUS BLD VENIPUNCTURE: CPT

## 2022-04-04 PROCEDURE — 86870 RBC ANTIBODY IDENTIFICATION: CPT

## 2022-04-04 PROCEDURE — 85730 THROMBOPLASTIN TIME PARTIAL: CPT

## 2022-04-04 PROCEDURE — 86900 BLOOD TYPING SEROLOGIC ABO: CPT

## 2022-04-04 PROCEDURE — 80048 BASIC METABOLIC PNL TOTAL CA: CPT

## 2022-04-04 PROCEDURE — 85027 COMPLETE CBC AUTOMATED: CPT

## 2022-04-04 PROCEDURE — C1898: CPT

## 2022-04-04 PROCEDURE — 99239 HOSP IP/OBS DSCHRG MGMT >30: CPT | Mod: GC

## 2022-04-04 PROCEDURE — 84466 ASSAY OF TRANSFERRIN: CPT

## 2022-04-04 PROCEDURE — 75561 CARDIAC MRI FOR MORPH W/DYE: CPT

## 2022-04-04 PROCEDURE — 82962 GLUCOSE BLOOD TEST: CPT

## 2022-04-04 PROCEDURE — A9585: CPT

## 2022-04-04 PROCEDURE — 86905 BLOOD TYPING RBC ANTIGENS: CPT

## 2022-04-04 PROCEDURE — 86704 HEP B CORE ANTIBODY TOTAL: CPT

## 2022-04-04 PROCEDURE — 85025 COMPLETE CBC W/AUTO DIFF WBC: CPT

## 2022-04-04 PROCEDURE — 86860 RBC ANTIBODY ELUTION: CPT

## 2022-04-04 PROCEDURE — C1887: CPT

## 2022-04-04 PROCEDURE — 80053 COMPREHEN METABOLIC PANEL: CPT

## 2022-04-04 PROCEDURE — 93306 TTE W/DOPPLER COMPLETE: CPT

## 2022-04-04 PROCEDURE — 93005 ELECTROCARDIOGRAM TRACING: CPT

## 2022-04-04 PROCEDURE — 80074 ACUTE HEPATITIS PANEL: CPT

## 2022-04-04 PROCEDURE — 33208 INSRT HEART PM ATRIAL & VENT: CPT

## 2022-04-04 PROCEDURE — 84100 ASSAY OF PHOSPHORUS: CPT

## 2022-04-04 PROCEDURE — C1769: CPT

## 2022-04-04 PROCEDURE — C1892: CPT

## 2022-04-04 PROCEDURE — 87507 IADNA-DNA/RNA PROBE TQ 12-25: CPT

## 2022-04-04 PROCEDURE — 94640 AIRWAY INHALATION TREATMENT: CPT

## 2022-04-04 PROCEDURE — 83550 IRON BINDING TEST: CPT

## 2022-04-04 PROCEDURE — C1785: CPT

## 2022-04-04 PROCEDURE — 71046 X-RAY EXAM CHEST 2 VIEWS: CPT

## 2022-04-04 PROCEDURE — 86901 BLOOD TYPING SEROLOGIC RH(D): CPT

## 2022-04-04 RX ORDER — LORATADINE 10 MG/1
1 TABLET ORAL
Qty: 0 | Refills: 0 | DISCHARGE

## 2022-04-04 RX ORDER — MIDODRINE HYDROCHLORIDE 2.5 MG/1
2 TABLET ORAL
Qty: 0 | Refills: 0 | DISCHARGE

## 2022-04-04 RX ORDER — ENOXAPARIN SODIUM 100 MG/ML
40 INJECTION SUBCUTANEOUS
Qty: 0 | Refills: 0 | DISCHARGE

## 2022-04-04 RX ORDER — FAMOTIDINE 10 MG/ML
1 INJECTION INTRAVENOUS
Qty: 60 | Refills: 0
Start: 2022-04-04 | End: 2022-05-03

## 2022-04-04 RX ORDER — LACTOBACILLUS ACIDOPHILUS 100MM CELL
2 CAPSULE ORAL
Qty: 0 | Refills: 0 | DISCHARGE

## 2022-04-04 RX ORDER — FLUTICASONE PROPIONATE 50 MCG
1 SPRAY, SUSPENSION NASAL
Qty: 1 | Refills: 0
Start: 2022-04-04 | End: 2022-05-03

## 2022-04-04 RX ORDER — FLUDROCORTISONE ACETATE 0.1 MG/1
2 TABLET ORAL
Qty: 0 | Refills: 0 | DISCHARGE

## 2022-04-04 RX ORDER — MIDODRINE HYDROCHLORIDE 2.5 MG/1
2 TABLET ORAL
Qty: 180 | Refills: 0
Start: 2022-04-04 | End: 2022-05-03

## 2022-04-04 RX ORDER — LACTOBACILLUS ACIDOPHILUS 100MM CELL
1 CAPSULE ORAL
Qty: 60 | Refills: 0
Start: 2022-04-04 | End: 2022-05-03

## 2022-04-04 RX ORDER — VANCOMYCIN HCL 1 G
250 VIAL (EA) INTRAVENOUS
Qty: 0 | Refills: 0 | DISCHARGE

## 2022-04-04 RX ORDER — SIMETHICONE 80 MG/1
1 TABLET, CHEWABLE ORAL
Qty: 0 | Refills: 0 | DISCHARGE

## 2022-04-04 RX ORDER — ACETAMINOPHEN 500 MG
2 TABLET ORAL
Qty: 0 | Refills: 0 | DISCHARGE

## 2022-04-04 RX ORDER — ATORVASTATIN CALCIUM 80 MG/1
1 TABLET, FILM COATED ORAL
Qty: 0 | Refills: 0 | DISCHARGE

## 2022-04-04 RX ORDER — ATORVASTATIN CALCIUM 80 MG/1
1 TABLET, FILM COATED ORAL
Qty: 30 | Refills: 0
Start: 2022-04-04 | End: 2022-05-03

## 2022-04-04 RX ORDER — LEVOTHYROXINE SODIUM 125 MCG
1 TABLET ORAL
Qty: 0 | Refills: 0 | DISCHARGE

## 2022-04-04 RX ORDER — ASPIRIN/CALCIUM CARB/MAGNESIUM 324 MG
1 TABLET ORAL
Qty: 0 | Refills: 0 | DISCHARGE

## 2022-04-04 RX ORDER — SIMETHICONE 80 MG/1
1 TABLET, CHEWABLE ORAL
Qty: 120 | Refills: 0
Start: 2022-04-04 | End: 2022-05-03

## 2022-04-04 RX ORDER — FLUTICASONE PROPIONATE 220 MCG
1 AEROSOL WITH ADAPTER (GRAM) INHALATION
Qty: 0 | Refills: 0 | DISCHARGE

## 2022-04-04 RX ORDER — LEVOTHYROXINE SODIUM 125 MCG
1 TABLET ORAL
Qty: 30 | Refills: 0
Start: 2022-04-04 | End: 2022-05-03

## 2022-04-04 RX ORDER — FLUDROCORTISONE ACETATE 0.1 MG/1
2 TABLET ORAL
Qty: 60 | Refills: 0
Start: 2022-04-04 | End: 2022-05-03

## 2022-04-04 RX ORDER — MAGNESIUM HYDROXIDE 400 MG/1
30 TABLET, CHEWABLE ORAL
Qty: 0 | Refills: 0 | DISCHARGE

## 2022-04-04 RX ORDER — ASPIRIN/CALCIUM CARB/MAGNESIUM 324 MG
1 TABLET ORAL
Qty: 30 | Refills: 0
Start: 2022-04-04 | End: 2022-05-03

## 2022-04-04 RX ORDER — LORATADINE 10 MG/1
1 TABLET ORAL
Qty: 30 | Refills: 0
Start: 2022-04-04 | End: 2022-05-03

## 2022-04-04 RX ORDER — VALACYCLOVIR 500 MG/1
1 TABLET, FILM COATED ORAL
Qty: 28 | Refills: 0
Start: 2022-04-04 | End: 2022-04-17

## 2022-04-04 RX ORDER — FAMOTIDINE 10 MG/ML
1 INJECTION INTRAVENOUS
Qty: 0 | Refills: 0 | DISCHARGE

## 2022-04-04 RX ORDER — ACETAMINOPHEN 500 MG
2 TABLET ORAL
Qty: 240 | Refills: 0
Start: 2022-04-04 | End: 2022-05-03

## 2022-04-04 RX ADMIN — GABAPENTIN 100 MILLIGRAM(S): 400 CAPSULE ORAL at 05:16

## 2022-04-04 RX ADMIN — Medication 137 MICROGRAM(S): at 05:15

## 2022-04-04 RX ADMIN — MIDODRINE HYDROCHLORIDE 20 MILLIGRAM(S): 2.5 TABLET ORAL at 13:27

## 2022-04-04 RX ADMIN — SIMETHICONE 80 MILLIGRAM(S): 80 TABLET, CHEWABLE ORAL at 05:15

## 2022-04-04 RX ADMIN — Medication 81 MILLIGRAM(S): at 13:27

## 2022-04-04 RX ADMIN — LORATADINE 10 MILLIGRAM(S): 10 TABLET ORAL at 13:27

## 2022-04-04 RX ADMIN — SIMETHICONE 80 MILLIGRAM(S): 80 TABLET, CHEWABLE ORAL at 13:52

## 2022-04-04 RX ADMIN — GABAPENTIN 100 MILLIGRAM(S): 400 CAPSULE ORAL at 13:27

## 2022-04-04 RX ADMIN — VALACYCLOVIR 500 MILLIGRAM(S): 500 TABLET, FILM COATED ORAL at 05:15

## 2022-04-04 RX ADMIN — FLUDROCORTISONE ACETATE 0.2 MILLIGRAM(S): 0.1 TABLET ORAL at 05:16

## 2022-04-04 RX ADMIN — MIDODRINE HYDROCHLORIDE 20 MILLIGRAM(S): 2.5 TABLET ORAL at 05:15

## 2022-04-04 RX ADMIN — Medication 2 TABLET(S): at 13:26

## 2022-04-04 RX ADMIN — FAMOTIDINE 20 MILLIGRAM(S): 10 INJECTION INTRAVENOUS at 05:16

## 2022-04-04 RX ADMIN — Medication 20 MILLIGRAM(S): at 13:27

## 2022-04-04 RX ADMIN — ENOXAPARIN SODIUM 40 MILLIGRAM(S): 100 INJECTION SUBCUTANEOUS at 13:27

## 2022-04-04 NOTE — PROGRESS NOTE ADULT - PROBLEM SELECTOR PROBLEM 6
Clostridium difficile colitis

## 2022-04-04 NOTE — DISCHARGE NOTE NURSING/CASE MANAGEMENT/SOCIAL WORK - NSDCPEFALRISK_GEN_ALL_CORE
For information on Fall & Injury Prevention, visit: [email or web address] OR  [email or web address] OR  https://www.cdc.gov/steadi/patient.html

## 2022-04-04 NOTE — PROGRESS NOTE ADULT - PROBLEM SELECTOR PROBLEM 4
POTS (postural orthostatic tachycardia syndrome)

## 2022-04-04 NOTE — PROGRESS NOTE ADULT - PROBLEM SELECTOR PLAN 1
Pt underwent fat pad bx in Feb 2022 and diagnosed with amyloidosis. 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 No typing of amyloidosis was done given as sample was not enough for mass spec.     # Autonomic dysfunction   - Suspect autonomic dysfunction and AV block may be associated with amyloidosis   - Tx orthostatic hypotension and POTS (see above/below)     # Rule out cardiac involvement  - TTE 3/16/2022 with concentric LVH. LVH is classically seen in light chain amyloidosis and plasma cell neoplasm   - Cardiac MRI 3/28 w evidence of cardiac amyloidosis   - S/p micra PPM   - Day 1 daratumumab and CyBorD (4/1) and day 2 daratumumab (4/2)  - will receive 20mg prednisone PO post Danyell infusion on 4/3 and 4/4  - If pt tolerates chemo, will d/c next week and continue as outpatient Pt underwent fat pad bx in Feb 2022 and diagnosed with amyloidosis. 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 No typing of amyloidosis was done given as sample was not enough for mass spec.     # Autonomic dysfunction   - Suspect autonomic dysfunction and AV block may be associated with amyloidosis   - Tx orthostatic hypotension and POTS (see above/below)     # Rule out cardiac involvement  - TTE 3/16/2022 with concentric LVH. LVH is classically seen in light chain amyloidosis and plasma cell neoplasm   - Cardiac MRI 3/28 w evidence of cardiac amyloidosis   - S/p micra PPM   - Day 1 daratumumab and CyBorD (4/1) and day 2 daratumumab (4/2)  - will receive 20mg prednisone PO post Danyell infusion on 4/3 and 4/4  - as pt tolerated chemo, will d/c patient when outpatient chemotherapy is set up

## 2022-04-04 NOTE — PROGRESS NOTE ADULT - ATTENDING SUPERVISION STATEMENT
Fellow
Resident
Fellow
Resident

## 2022-04-04 NOTE — DISCHARGE NOTE NURSING/CASE MANAGEMENT/SOCIAL WORK - PATIENT PORTAL LINK FT
You can access the FollowMyHealth Patient Portal offered by [facility] by registering at the following website: [email or web address]. By joining [facility]’s FollowMyHealth portal, you will also be able to view your health information using other applications (apps) compatible with our system.

## 2022-04-04 NOTE — PROGRESS NOTE ADULT - ATTENDING COMMENTS
57 year old F with amyloidosis, presented with syncope, symptomatic bradycardia    s/p PPM for bradycardia  s/p cycle 1 of Cy Bor Daratumumab chemo   to continue additional treatment outpatient per hem onc  discharge planning >40mins

## 2022-04-04 NOTE — PROGRESS NOTE ADULT - PROBLEM SELECTOR PROBLEM 5
Smoldering myeloma

## 2022-04-04 NOTE — PROGRESS NOTE ADULT - ASSESSMENT
57 y.o. F with PMH of POTS, orthostatic hypotension, recently diagnosed with smoldering myeloma (March 2022) and amyloidosis (Feb 2022), mouth burning syndrome, recently diagnosed c.diff colitis (March 2022), hyperthyroidism s/p thyroidectomy, admitted to [facility] for multiple recurrent syncopal episodes thought to be associated with autonomic dysfunction 2/2 amyloidosis, transferred to [facility] for further amyloidosis management.

## 2022-04-04 NOTE — PROGRESS NOTE ADULT - PROVIDER SPECIALTY LIST ADULT
Cardiology
DAYANA
Electrophysiology
Heme/Onc
Cardiology
Critical Care
DAYANA
Electrophysiology
Electrophysiology
Heme/Onc
Cardiology
Cardiology
Electrophysiology
Electrophysiology
Heme/Onc
Heme/Onc
Internal Medicine
Heme/Onc
Internal Medicine

## 2022-04-04 NOTE — PROGRESS NOTE ADULT - SUBJECTIVE AND OBJECTIVE BOX
HPI:  Patient seen and examined at bedside on 4 Jomar.  Feeling better with less neuropathy.    Review Of Systems:           Respiratory: No shortness of breath, cough, or wheezing  Cardiovascular: No chest pain or palpitations  10 point review of systems is otherwise negative except as mentioned above        Medications:  acetaminophen     Tablet .. 650 milliGRAM(s) Oral every 6 hours PRN  aspirin  chewable 81 milliGRAM(s) Oral daily  atorvastatin 40 milliGRAM(s) Oral at bedtime  diphenhydrAMINE Injectable 50 milliGRAM(s) IV Push once PRN  enoxaparin Injectable 40 milliGRAM(s) SubCutaneous every 24 hours  famotidine    Tablet 20 milliGRAM(s) Oral two times a day  FIRST- Mouthwash  BLM 10 milliLiter(s) Swish and Spit four times a day PRN  fludroCORTISONE 0.2 milliGRAM(s) Oral daily  fluticasone propionate 50 MICROgram(s)/spray Nasal Spray 1 Spray(s) Both Nostrils two times a day  gabapentin 100 milliGRAM(s) Oral every 8 hours  hydrocortisone sodium succinate Injectable 100 milliGRAM(s) IV Push once PRN  influenza   Vaccine 0.5 milliLiter(s) IntraMuscular once  lactobacillus acidophilus 2 Tablet(s) Oral daily  levothyroxine 137 MICROGram(s) Oral daily  loratadine 10 milliGRAM(s) Oral daily  meperidine     Injectable 12.5 milliGRAM(s) IV Push once PRN  midodrine. 20 milliGRAM(s) Oral three times a day  simethicone 80 milliGRAM(s) Chew every 6 hours  valACYclovir 500 milliGRAM(s) Oral two times a day    PAST MEDICAL & SURGICAL HISTORY:  Hypothyroidism    POTS (postural orthostatic tachycardia syndrome)    Clostridium difficile colitis    Amyloidosis    Smoldering myeloma    No significant past surgical history      Vitals:  T(C): 36.9 (04-04-22 @ 12:00), Max: 37.3 (04-04-22 @ 04:33)  HR: 70 (04-04-22 @ 12:00) (69 - 76)  BP: 111/68 (04-04-22 @ 12:00) (111/68 - 117/71)  BP(mean): --  RR: 18 (04-04-22 @ 12:00) (18 - 18)  SpO2: 93% (04-04-22 @ 12:00) (92% - 94%)  Wt(kg): --  Daily     Daily   I&O's Summary    03 Apr 2022 07:01  -  04 Apr 2022 07:00  --------------------------------------------------------  IN: 1600 mL / OUT: 1600 mL / NET: 0 mL        Physical Exam:  Appearance: No acute distress; well appearing  Eyes: PERRL, EOMI, pink conjunctiva  HENT: Normal oral mucosa  Cardiovascular: RRR, S1, S2, no murmurs, rubs, or gallops; no edema; no JVD  Respiratory: Clear to auscultation bilaterally  Gastrointestinal: soft, non-tender, non-distended with normal bowel sounds  Musculoskeletal: No clubbing; no joint deformity   Neurologic: Non-focal  Lymphatic: No lymphadenopathy  Psychiatry: AAOx3, mood & affect appropriate  Skin: No rashes, ecchymoses, or cyanosis                          9.8    9.53  )-----------( 280      ( 04 Apr 2022 07:16 )             30.7     04-04    136  |  101  |  9   ----------------------------<  101<H>  4.0   |  25  |  0.51    Ca    8.5      04 Apr 2022 07:15  Phos  2.8     04-04  Mg     2.1     04-04    TPro  5.3<L>  /  Alb  3.6  /  TBili  0.6  /  DBili  x   /  AST  32  /  ALT  61<H>  /  AlkPhos  79  04-04        ECG: sinus 75 bpm, low voltage    Echo:  < from: TTE with Doppler (w/Cont) (03.30.22 @ 07:58) >  ------------------------------------------------------------------------  Dimensions:    Normal Values:  LA:     3.6    2.0 - 4.0 cm  Ao:     3.1    2.0 - 3.8 cm  SEPTUM: 1.2    0.6 - 1.2 cm  PWT:    1.2    0.6 - 1.1 cm  LVIDd:  3.0    3.0 - 5.6 cm  LVIDs:  2.5    1.8 - 4.0 cm  Derived variables:  LVMI: 69 g/m2  RWT: 0.80  EF (Visual Estimate): 60 %  Doppler Peak Velocity (m/sec): AoV=1.2 TV=2.2  ------------------------------------------------------------------------  Observations:  Mitral Valve: Normal mitral valve.  Aortic Valve/Aorta: Mildly calcified trileaflet aortic  valve with normal opening. Minimal aortic regurgitation.  Normal aortic root size.  Left Atrium: Moderately dilated left atrium.  Left Ventricle: Normal left ventricular internal dimensions  and wall thickness.  Normal left ventricular systolic function. No segmental  wall motion abnormalities.  Left atrial filling pressure is elevated.  Right Heart: Normal right atrium. Normal right ventricular  size and function.  Normal tricuspid valve. Mild tricuspid regurgitation.  Normal pulmonic valve.  Pericardium/Pleura: Normal pericardium with no pericardial  effusion.  Bilateral pleural effusions.  Hemodynamic: Estimated right atrial pressure is severely  elevated.  No evidence of pulmonary hypertension.  ------------------------------------------------------------------------  Conclusions:  Normal left ventricular systolic function. No segmental  wall motion abnormalities.  Average global longitudinal strain = -14.6%; pattern is  homogeneous (i.e. not suggestive of amyloid).  ------------------------------------------------------------------------  Confirmed on  3/30/2022 - 10:49:49 by Michael Mcdonald MD, FASE  ------------------------------------------------------------------------    < end of copied text >    Interpretation of Telemetry: JOSÉ ANTONIO

## 2022-04-04 NOTE — PROGRESS NOTE ADULT - PROBLEM SELECTOR PLAN 5
Last saw Dr. 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       Plan:  -heme/onc start Daratumumab + CyBorD with second dose of Daratumumab yesterday  - Will f/u with Dr. Goldberg after discharge (patient's Hematologist)

## 2022-04-04 NOTE — PROGRESS NOTE ADULT - PROBLEM SELECTOR PROBLEM 3
Orthostatic hypotension

## 2022-04-04 NOTE — PROGRESS NOTE ADULT - SUBJECTIVE AND OBJECTIVE BOX
PROGRESS NOTE:     Patient is a 57y old  Female who presents with a chief complaint of syncope (01 Apr 2022 10:05)      SUBJECTIVE / OVERNIGHT EVENTS:  No acute events overnight.  Patient had two chemotherapy session for amyloidosis this weekend and tolerated it well. No F,C,N,V, no rash, no CP, SOB.     MEDICATIONS  (STANDING):  aspirin  chewable 81 milliGRAM(s) Oral daily  atorvastatin 40 milliGRAM(s) Oral at bedtime  enoxaparin Injectable 40 milliGRAM(s) SubCutaneous every 24 hours  famotidine    Tablet 20 milliGRAM(s) Oral two times a day  fludroCORTISONE 0.2 milliGRAM(s) Oral daily  fluticasone propionate 50 MICROgram(s)/spray Nasal Spray 1 Spray(s) Both Nostrils two times a day  gabapentin 100 milliGRAM(s) Oral every 8 hours  influenza   Vaccine 0.5 milliLiter(s) IntraMuscular once  lactobacillus acidophilus 2 Tablet(s) Oral daily  levothyroxine 137 MICROGram(s) Oral daily  loratadine 10 milliGRAM(s) Oral daily  midodrine. 20 milliGRAM(s) Oral three times a day  psyllium Powder 1 Packet(s) Oral two times a day  simethicone 80 milliGRAM(s) Chew every 6 hours  valACYclovir 500 milliGRAM(s) Oral two times a day    MEDICATIONS  (PRN):  acetaminophen     Tablet .. 650 milliGRAM(s) Oral every 6 hours PRN Mild Pain (1 - 3), Moderate Pain (4 - 6)  diphenhydrAMINE Injectable 50 milliGRAM(s) IV Push once PRN Daratumumab reaction  FIRST- Mouthwash  BLM 10 milliLiter(s) Swish and Spit four times a day PRN 15 minutes before meals  hydrocortisone sodium succinate Injectable 100 milliGRAM(s) IV Push once PRN Daratumumab reaction  meperidine     Injectable 12.5 milliGRAM(s) IV Push once PRN Daratumumab reaction      CAPILLARY BLOOD GLUCOSE        I&O's Summary    31 Mar 2022 07:01  -  01 Apr 2022 07:00  --------------------------------------------------------  IN: 240 mL / OUT: 420 mL / NET: -180 mL    01 Apr 2022 07:01  -  02 Apr 2022 05:52  --------------------------------------------------------  IN: 610 mL / OUT: 1870 mL / NET: -1260 mL        PHYSICAL EXAM:  Vital Signs Last 24 Hrs  T(C): 36.6 (03 Apr 2022 05:32), Max: 37.1 (02 Apr 2022 15:14)  T(F): 97.8 (03 Apr 2022 05:32), Max: 98.7 (02 Apr 2022 15:14)  HR: 79 (03 Apr 2022 05:32) (78 - 83)  BP: 142/84 (03 Apr 2022 05:32) (111/69 - 142/84)  BP(mean): --  RR: 18 (03 Apr 2022 05:32) (18 - 18)  SpO2: 95% (03 Apr 2022 05:32) (93% - 98%)      General: thin, middle-aged woman in NAD; awake, sitting comfortably in bed  HEENT: nc/at, clear conjunctiva, moist mucous membranes  Neck: supple, no JVD  Heart: RRR, +systolic murmur   Chest/lungs: mild bibasilar rales; no wheezing or rhonchi  ABD: soft, non-tender, non-distended; no guarding or rebound; bowel sounds present  Extremities: no edema, erythema, or tenderness to palpation  Skin: clear, dry  Neuro: A&Ox4, FROM of all 4 extremities; no focal deficits; grossly intact      LABS:                        10.2   10.09 )-----------( 351      ( 03 Apr 2022 06:01 )             30.9     04-03    136  |  101  |  11  ----------------------------<  115<H>  4.2   |  25  |  0.51    Ca    8.7      03 Apr 2022 06:01  Phos  3.0     04-03  Mg     2.2     04-03    TPro  5.5<L>  /  Alb  3.8  /  TBili  0.5  /  DBili  x   /  AST  20  /  ALT  41  /  AlkPhos  67  04-03             PROGRESS NOTE:     Patient is a 57y old  Female who presents with a chief complaint of syncope (01 Apr 2022 10:05)      SUBJECTIVE / OVERNIGHT EVENTS:  No acute events overnight.  Patient had two chemotherapy session for amyloidosis this weekend and tolerated it well. No F,C,N,V, no rash, no CP, SOB.     MEDICATIONS  (STANDING):  aspirin  chewable 81 milliGRAM(s) Oral daily  atorvastatin 40 milliGRAM(s) Oral at bedtime  enoxaparin Injectable 40 milliGRAM(s) SubCutaneous every 24 hours  famotidine    Tablet 20 milliGRAM(s) Oral two times a day  fludroCORTISONE 0.2 milliGRAM(s) Oral daily  fluticasone propionate 50 MICROgram(s)/spray Nasal Spray 1 Spray(s) Both Nostrils two times a day  gabapentin 100 milliGRAM(s) Oral every 8 hours  influenza   Vaccine 0.5 milliLiter(s) IntraMuscular once  lactobacillus acidophilus 2 Tablet(s) Oral daily  levothyroxine 137 MICROGram(s) Oral daily  loratadine 10 milliGRAM(s) Oral daily  midodrine. 20 milliGRAM(s) Oral three times a day  psyllium Powder 1 Packet(s) Oral two times a day  simethicone 80 milliGRAM(s) Chew every 6 hours  valACYclovir 500 milliGRAM(s) Oral two times a day    MEDICATIONS  (PRN):  acetaminophen     Tablet .. 650 milliGRAM(s) Oral every 6 hours PRN Mild Pain (1 - 3), Moderate Pain (4 - 6)  diphenhydrAMINE Injectable 50 milliGRAM(s) IV Push once PRN Daratumumab reaction  FIRST- Mouthwash  BLM 10 milliLiter(s) Swish and Spit four times a day PRN 15 minutes before meals  hydrocortisone sodium succinate Injectable 100 milliGRAM(s) IV Push once PRN Daratumumab reaction  meperidine     Injectable 12.5 milliGRAM(s) IV Push once PRN Daratumumab reaction      CAPILLARY BLOOD GLUCOSE        I&O's Summary    31 Mar 2022 07:01  -  01 Apr 2022 07:00  --------------------------------------------------------  IN: 240 mL / OUT: 420 mL / NET: -180 mL    01 Apr 2022 07:01  -  02 Apr 2022 05:52  --------------------------------------------------------  IN: 610 mL / OUT: 1870 mL / NET: -1260 mL        PHYSICAL EXAM:  LABS:                        9.8    9.53  )-----------( 280      ( 04 Apr 2022 07:16 )             30.7     04-04    136  |  101  |  9   ----------------------------<  101<H>  4.0   |  25  |  0.51    Ca    8.5      04 Apr 2022 07:15  Phos  2.8     04-04  Mg     2.1     04-04    TPro  5.3<L>  /  Alb  3.6  /  TBili  0.6  /  DBili  x   /  AST  32  /  ALT  61<H>  /  AlkPhos  79  04-04              GI PCR Panel, Stool (collected 03 Apr 2022 18:36)  Source: .Stool Feces  Final Report (03 Apr 2022 22:43):    GI PCR Results: NOT detected    *******Please Note:*******    GI panel PCR evaluates for:    Campylobacter, Plesiomonas shigelloides, Salmonella,    Vibrio, Yersinia enterocolitica, Enteroaggregative    Escherichia coli (EAEC), Enteropathogenic E.coli (EPEC),    Enterotoxigenic E. coli (ETEC) lt/st, Shiga-like    toxin-producing E. coli (STEC) stx1/stx2,    Shigella/ Enteroinvasive E. coli (EIEC), Cryptosporidium,    Cyclospora cayetanensis, Entamoeba histolytica,    Giardia lamblia, Adenovirus F 40/41, Astrovirus,    Norovirus GI/GII, Rotavirus A, Sapovirus      General: thin, middle-aged woman in NAD; awake, sitting comfortably in bed  HEENT: nc/at, clear conjunctiva, moist mucous membranes  Neck: supple, no JVD  Heart: RRR, +systolic murmur   Chest/lungs: mild bibasilar rales; no wheezing or rhonchi  ABD: soft, non-tender, non-distended; no guarding or rebound; bowel sounds present  Extremities: no edema, erythema, or tenderness to palpation  Skin: clear, dry  Neuro: A&Ox4, FROM of all 4 extremities; no focal deficits; grossly intact      LABS:             LABS:                        9.8    9.53  )-----------( 280      ( 04 Apr 2022 07:16 )             30.7     04-04    136  |  101  |  9   ----------------------------<  101<H>  4.0   |  25  |  0.51    Ca    8.5      04 Apr 2022 07:15  Phos  2.8     04-04  Mg     2.1     04-04    TPro  5.3<L>  /  Alb  3.6  /  TBili  0.6  /  DBili  x   /  AST  32  /  ALT  61<H>  /  AlkPhos  79  04-04              GI PCR Panel, Stool (collected 03 Apr 2022 18:36)  Source: .Stool Feces  Final Report (03 Apr 2022 22:43):    GI PCR Results: NOT detected    *******Please Note:*******    GI panel PCR evaluates for:    Campylobacter, Plesiomonas shigelloides, Salmonella,    Vibrio, Yersinia enterocolitica, Enteroaggregative    Escherichia coli (EAEC), Enteropathogenic E.coli (EPEC),    Enterotoxigenic E. coli (ETEC) lt/st, Shiga-like    toxin-producing E. coli (STEC) stx1/stx2,    Shigella/ Enteroinvasive E. coli (EIEC), Cryptosporidium,    Cyclospora cayetanensis, Entamoeba histolytica,    Giardia lamblia, Adenovirus F 40/41, Astrovirus,    Norovirus GI/GII, Rotavirus A, Sapovirus

## 2022-04-04 NOTE — PROGRESS NOTE ADULT - PROBLEM SELECTOR PROBLEM 1
Amyloidosis

## 2022-04-06 ENCOUNTER — APPOINTMENT (OUTPATIENT)
Dept: HEMATOLOGY ONCOLOGY | Facility: CLINIC | Age: 57
End: 2022-04-06
Payer: COMMERCIAL

## 2022-04-06 PROCEDURE — 99215 OFFICE O/P EST HI 40 MIN: CPT | Mod: 95

## 2022-04-06 RX ORDER — OMEPRAZOLE 20 MG/1
20 CAPSULE, DELAYED RELEASE ORAL
Qty: 30 | Refills: 0 | Status: DISCONTINUED | COMMUNITY
Start: 2021-04-30 | End: 2022-04-06

## 2022-04-06 RX ORDER — AMITRIPTYLINE HYDROCHLORIDE 10 MG/1
10 TABLET, FILM COATED ORAL
Qty: 200 | Refills: 0 | Status: DISCONTINUED | COMMUNITY
Start: 2022-02-28 | End: 2022-04-06

## 2022-04-06 RX ORDER — ASPIRIN ENTERIC COATED TABLETS 81 MG 81 MG/1
81 TABLET, DELAYED RELEASE ORAL
Qty: 30 | Refills: 3 | Status: ACTIVE | COMMUNITY
Start: 2022-04-06

## 2022-04-06 RX ORDER — FEXOFENADINE HYDROCHLORIDE 180 MG/1
180 TABLET, FILM COATED ORAL DAILY
Refills: 0 | Status: ACTIVE | COMMUNITY

## 2022-04-06 RX ORDER — VANCOMYCIN HYDROCHLORIDE 250 MG/1
250 CAPSULE ORAL
Qty: 40 | Refills: 0 | Status: DISCONTINUED | COMMUNITY
Start: 2022-03-09 | End: 2022-04-06

## 2022-04-06 NOTE — ASSESSMENT
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 for cardiac complications likely related to amyloid involvement now s/p PPM. Cycle 1 of CyBorD was initiated on 4/1/22 while inpatient. \par \par -Serum SPEP without a monoclonal fraction, Urine SPEP without monoclonal protein, immunofixation normal. \par -However Lambda serum FLCs 8.73 and kappa 0.92, for a ratio of 0.11 (or 9.5). \par -Monoclonal gammopathy workup has been unremarkable at this time outside of this finding. However, given the elevated ratio this was concerning for a monoclonal process. Remainder of workup had been unremarkable, however fat pad biopsy done and was POSITIVE for congo red, with positive polarization. \par -As per discussion with pathology, unable to send this for mass spectrometry to confirm amyloid type. However, now s/p BMBx which is showing ~35% plasmacytosis (monoclonal) c/w plasma cell neoplasm. 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 Is NOT meeting MM criteria. \par -Recently saw cardiology and had echocardiogram done which was largely unremarkable outside of a thickened interventricular septum. Recommend continued cardiology follow up. Renal ultrasound recently showed right parapelvic cyst versus renal pelvic fullness without adis hydronephrosis.\par -Urine showing proteinuria (although not nephrotic range)\par -Systemic light chain amyloidosis is classically associated with a thickened interventricular septum and proteinuria. Also associated with neurologic sequelae such as that she has. \par -Would forgo further pathologic workup with EGD/colonoscopy at this point. \par -Pt was admitted for C1 D1 of danyell-CyBorD.  This was given as Daratumumab (16mg/kG split into 2 doses on Day 1 and Day 2 given recent PPM placement to decrease risk of reaction) + CyBorD (given on Day 1). Additionally, given 20mg prednisone PO post Danyell infusion on 4/3 and 4/4. \par -Plan  discussed with patient, to include daratumumab plus CyBorD which will be given weekly on D 1,8,15,22 of 28 day cycle with a plan for autologous bone marrow transplant. Discussed possible side effects including fatigue and neuropathy, as well as toxicity such as pancytopenia and increased susceptibility to infection. \par -Patient understands and agrees with plan. All information explained to the best of my ability.

## 2022-04-06 NOTE — HISTORY OF PRESENT ILLNESS
[Home] : at home, [unfilled] , at the time of the visit. [Medical Office: ([facility])___] : at the medical office located in  [Verbal consent obtained from patient] : the patient, [unfilled] [de-identified] : 58 y/o F with hx of thyroidectomy in 2011 (benign pathology) on thyroid replacement since then, and with COVID pneumonia back in March of 2020 ultimately signed herself out AMA, chronic issues thereafter, ended up going back to work in July 2020. In January-February 2021 had Pfizer vaccines. Started having issues starting in March 2021, she was having GI issues, endoscopy showed chronic gastritis. Additionally diagnosed with IBS at that point. She has been having issues with Postural Orthostatic Tachycardia Syndrome - her blood pressure goes down when she stands up from a seated position. She has fainted "too many times to count." She ends up with bruises here and there but nothing severe where she would need to go to the hospital. She saw two neurologists who did extensive evaluations without any success in finding diagnostic lesions. She reports she is seeing specialist for dysautonomia, Dr. Colón at [place] (neurology). She said overall her POTS has improved but she has a had a couple of episodes of fainting in the last couple of weeks. She started fludrocortisone in September 2021 and has titrated up the dose. She is now on 1 mg total (probably 0.5 mg twice daily). The only way she feels completely normal is when she is laying down. BP gets very low on standing up, it has registered as low as 50s over 40s. She reports when she is standing or sitting for too long, she will get a chest tightness, but not quite a pain. It gets to the point where she feels like she is having trouble breathing and then she has to sit down. She has muscle aches in her legs which improves with warm bath, and then some burning pain in her arms which reminds her of a sunburn. She also now is having issues with her mouth - very difficult to eat as it feels like she has burnt her mouth. She has lost 47 pounds since March 2021. She is not really able to get much down due to these feelings, although she does have an appetite. She denies any night-sweats, she does always feel cold though. Her bowel movements "swing" from constipation to diarrhea. She was on Linzess recently but was taken off it. She reports that she is urinating normally, in fact a lot because she drinks a fair amount of water daily. She has no swelling in her legs at this time. She had in the past at work when she was standing a lot and was on amlodipine. She was sent to me for evaluation of elevated free light chains. 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 She was found with no monoclonal protein on SPEP and hemoglobin normal, normal renal function, and no hypercalcemia. Fat pad biopsy showed positive congo red staining in rare blood vessels and positive for polarization. 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 [de-identified] : Patient was recently hospitalized at [facility] for recurrent syncopal episodes, had multiple RRT's and was admitted to CCU for Levophed support briefly. Her hospital stay was further c/b C. Diff infection for which she has completed PO antibiotics. She was transferred to [facility] where cMRI on 3/28 showed there was likely cardiac involvement of amyloidosis contributing to patient's recurrent syncope, first-degree heart block, sinus pauses, and POTS. She was upgraded to CCU after syncopal episode requiring atropine x 2 during RRT and initiation of dobutamine drip. EPS was consulted and a dual chamber PPM was placed on 3/30/22. On 4/1/22 C1 of Danyell + CyBorD was initiated and patient was subsequently discharged on 4/4/22. Treatment was given as follows: Daratumumab (16mg/kG split into 2 doses on Day 1 and Day 2 given recent PPM placement to decrease risk of reaction) + CyBorD (given on Day 1).  Additionally, given 20mg prednisone PO post Danyell infusion on 4/3 and 4/4. \par \par Currently she has been feeling a little better, she does still have weakness although since she has been home she has been moving around better. She had been bedbound while hospitalized due to her BP and feels that is contributing. She is continuing Midodrine per cardiology and will be making an appointment to see Dr. Nieves. Her BP today was 125/81. She has had no further episodes of dizziness or fainting. Her PPM site has no pain or s/s of infection. She has been eating ok but still has the burning sensation in her mouth although it is only at night. Food has a "weird taste" but she will still make sure she eats. She has no SOB or CP. She feels like her neuropathy in her feet has improved since she was in the hospital but there are no changes in her fingers. She no longer has any diarrhea. She states that she had no adverse reactions to the initial therapy that she received inpatient. Feeling overwhelmed given recent hospital admission and diagnosis.

## 2022-04-06 NOTE — PHYSICAL EXAM
[Normal] : affect appropriate [de-identified] : Visit via telemedicine. Patient appears in no apparent distress during visit

## 2022-04-06 NOTE — REVIEW OF SYSTEMS
[Fatigue] : fatigue [Recent Change In Weight] : ~T recent weight change [Skin Wound] : skin wound [Muscle Weakness] : muscle weakness [Negative] : Heme/Lymph [Fever] : no fever [Chills] : no chills [Night Sweats] : no night sweats [Dysphagia] : no dysphagia [Nosebleeds] : no nosebleeds [Odynophagia] : no odynophagia [Chest Pain] : no chest pain [Lower Ext Edema] : no lower extremity edema [Dysuria] : no dysuria [Skin Rash] : no skin rash [Hot Flashes] : no hot flashes [FreeTextEntry2] : weight loss  [FreeTextEntry4] : mouth burning - improved per HPI [de-identified] : s/p PPM

## 2022-04-07 ENCOUNTER — NON-APPOINTMENT (OUTPATIENT)
Age: 57
End: 2022-04-07

## 2022-04-08 ENCOUNTER — NON-APPOINTMENT (OUTPATIENT)
Age: 57
End: 2022-04-08

## 2022-04-08 ENCOUNTER — RESULT REVIEW (OUTPATIENT)
Age: 57
End: 2022-04-08

## 2022-04-08 ENCOUNTER — OUTPATIENT (OUTPATIENT)
Dept: OUTPATIENT SERVICES | Facility: HOSPITAL | Age: 57
LOS: 1 days | End: 2022-04-08
Payer: COMMERCIAL

## 2022-04-08 ENCOUNTER — APPOINTMENT (OUTPATIENT)
Dept: INFUSION THERAPY | Facility: HOSPITAL | Age: 57
End: 2022-04-08

## 2022-04-08 DIAGNOSIS — R11.2 NAUSEA WITH VOMITING, UNSPECIFIED: ICD-10-CM

## 2022-04-08 DIAGNOSIS — Z51.11 ENCOUNTER FOR ANTINEOPLASTIC CHEMOTHERAPY: ICD-10-CM

## 2022-04-08 DIAGNOSIS — D47.2 MONOCLONAL GAMMOPATHY: ICD-10-CM

## 2022-04-08 LAB
BASOPHILS # BLD AUTO: 0.04 K/UL — SIGNIFICANT CHANGE UP (ref 0–0.2)
BASOPHILS NFR BLD AUTO: 0.8 % — SIGNIFICANT CHANGE UP (ref 0–2)
DAT C3-SP REAG RBC QL: NEGATIVE — SIGNIFICANT CHANGE UP
EOSINOPHIL # BLD AUTO: 0.17 K/UL — SIGNIFICANT CHANGE UP (ref 0–0.5)
EOSINOPHIL NFR BLD AUTO: 3.2 % — SIGNIFICANT CHANGE UP (ref 0–6)
HCT VFR BLD CALC: 32.6 % — LOW (ref 34.5–45)
HGB BLD-MCNC: 11.1 G/DL — LOW (ref 11.5–15.5)
IMM GRANULOCYTES NFR BLD AUTO: 0.6 % — SIGNIFICANT CHANGE UP (ref 0–1.5)
LYMPHOCYTES # BLD AUTO: 1.59 K/UL — SIGNIFICANT CHANGE UP (ref 1–3.3)
LYMPHOCYTES # BLD AUTO: 30.2 % — SIGNIFICANT CHANGE UP (ref 13–44)
MCHC RBC-ENTMCNC: 30.2 PG — SIGNIFICANT CHANGE UP (ref 27–34)
MCHC RBC-ENTMCNC: 34 G/DL — SIGNIFICANT CHANGE UP (ref 32–36)
MCV RBC AUTO: 88.6 FL — SIGNIFICANT CHANGE UP (ref 80–100)
MONOCYTES # BLD AUTO: 0.27 K/UL — SIGNIFICANT CHANGE UP (ref 0–0.9)
MONOCYTES NFR BLD AUTO: 5.1 % — SIGNIFICANT CHANGE UP (ref 2–14)
NEUTROPHILS # BLD AUTO: 3.17 K/UL — SIGNIFICANT CHANGE UP (ref 1.8–7.4)
NEUTROPHILS NFR BLD AUTO: 60.1 % — SIGNIFICANT CHANGE UP (ref 43–77)
NRBC # BLD: 0 /100 WBCS — SIGNIFICANT CHANGE UP (ref 0–0)
PLATELET # BLD AUTO: 295 K/UL — SIGNIFICANT CHANGE UP (ref 150–400)
RBC # BLD: 3.68 M/UL — LOW (ref 3.8–5.2)
RBC # FLD: 13.3 % — SIGNIFICANT CHANGE UP (ref 10.3–14.5)
WBC # BLD: 5.27 K/UL — SIGNIFICANT CHANGE UP (ref 3.8–10.5)
WBC # FLD AUTO: 5.27 K/UL — SIGNIFICANT CHANGE UP (ref 3.8–10.5)

## 2022-04-08 PROCEDURE — 86880 COOMBS TEST DIRECT: CPT

## 2022-04-08 PROCEDURE — 86901 BLOOD TYPING SEROLOGIC RH(D): CPT

## 2022-04-08 PROCEDURE — 86900 BLOOD TYPING SEROLOGIC ABO: CPT

## 2022-04-08 PROCEDURE — 86922 COMPATIBILITY TEST ANTIGLOB: CPT

## 2022-04-08 PROCEDURE — 86850 RBC ANTIBODY SCREEN: CPT

## 2022-04-11 ENCOUNTER — APPOINTMENT (OUTPATIENT)
Dept: ELECTROPHYSIOLOGY | Facility: CLINIC | Age: 57
End: 2022-04-11
Payer: COMMERCIAL

## 2022-04-11 ENCOUNTER — NON-APPOINTMENT (OUTPATIENT)
Age: 57
End: 2022-04-11

## 2022-04-11 VITALS
DIASTOLIC BLOOD PRESSURE: 73 MMHG | SYSTOLIC BLOOD PRESSURE: 111 MMHG | BODY MASS INDEX: 19.49 KG/M2 | HEART RATE: 68 BPM | HEIGHT: 63 IN | WEIGHT: 110 LBS | OXYGEN SATURATION: 98 %

## 2022-04-11 PROCEDURE — 93000 ELECTROCARDIOGRAM COMPLETE: CPT | Mod: 59

## 2022-04-11 PROCEDURE — 93280 PM DEVICE PROGR EVAL DUAL: CPT

## 2022-04-11 PROCEDURE — 99024 POSTOP FOLLOW-UP VISIT: CPT

## 2022-04-15 ENCOUNTER — RESULT REVIEW (OUTPATIENT)
Age: 57
End: 2022-04-15

## 2022-04-15 ENCOUNTER — APPOINTMENT (OUTPATIENT)
Dept: INFUSION THERAPY | Facility: HOSPITAL | Age: 57
End: 2022-04-15

## 2022-04-15 LAB
BASOPHILS # BLD AUTO: 0.03 K/UL — SIGNIFICANT CHANGE UP (ref 0–0.2)
BASOPHILS NFR BLD AUTO: 0.6 % — SIGNIFICANT CHANGE UP (ref 0–2)
EOSINOPHIL # BLD AUTO: 0.05 K/UL — SIGNIFICANT CHANGE UP (ref 0–0.5)
EOSINOPHIL NFR BLD AUTO: 1 % — SIGNIFICANT CHANGE UP (ref 0–6)
HCT VFR BLD CALC: 31.1 % — LOW (ref 34.5–45)
HGB BLD-MCNC: 10.5 G/DL — LOW (ref 11.5–15.5)
IMM GRANULOCYTES NFR BLD AUTO: 0.2 % — SIGNIFICANT CHANGE UP (ref 0–1.5)
LYMPHOCYTES # BLD AUTO: 1.12 K/UL — SIGNIFICANT CHANGE UP (ref 1–3.3)
LYMPHOCYTES # BLD AUTO: 21.6 % — SIGNIFICANT CHANGE UP (ref 13–44)
MCHC RBC-ENTMCNC: 30.6 PG — SIGNIFICANT CHANGE UP (ref 27–34)
MCHC RBC-ENTMCNC: 33.8 G/DL — SIGNIFICANT CHANGE UP (ref 32–36)
MCV RBC AUTO: 90.7 FL — SIGNIFICANT CHANGE UP (ref 80–100)
MONOCYTES # BLD AUTO: 0.31 K/UL — SIGNIFICANT CHANGE UP (ref 0–0.9)
MONOCYTES NFR BLD AUTO: 6 % — SIGNIFICANT CHANGE UP (ref 2–14)
NEUTROPHILS # BLD AUTO: 3.67 K/UL — SIGNIFICANT CHANGE UP (ref 1.8–7.4)
NEUTROPHILS NFR BLD AUTO: 70.6 % — SIGNIFICANT CHANGE UP (ref 43–77)
NRBC # BLD: 0 /100 WBCS — SIGNIFICANT CHANGE UP (ref 0–0)
PLATELET # BLD AUTO: 215 K/UL — SIGNIFICANT CHANGE UP (ref 150–400)
RBC # BLD: 3.43 M/UL — LOW (ref 3.8–5.2)
RBC # FLD: 14.2 % — SIGNIFICANT CHANGE UP (ref 10.3–14.5)
WBC # BLD: 5.19 K/UL — SIGNIFICANT CHANGE UP (ref 3.8–10.5)
WBC # FLD AUTO: 5.19 K/UL — SIGNIFICANT CHANGE UP (ref 3.8–10.5)

## 2022-04-18 ENCOUNTER — RESULT REVIEW (OUTPATIENT)
Age: 57
End: 2022-04-18

## 2022-04-18 ENCOUNTER — LABORATORY RESULT (OUTPATIENT)
Age: 57
End: 2022-04-18

## 2022-04-18 ENCOUNTER — APPOINTMENT (OUTPATIENT)
Dept: HEMATOLOGY ONCOLOGY | Facility: CLINIC | Age: 57
End: 2022-04-18
Payer: COMMERCIAL

## 2022-04-18 VITALS
DIASTOLIC BLOOD PRESSURE: 65 MMHG | TEMPERATURE: 98.1 F | OXYGEN SATURATION: 97 % | RESPIRATION RATE: 16 BRPM | HEIGHT: 62.99 IN | HEART RATE: 70 BPM | WEIGHT: 112.41 LBS | BODY MASS INDEX: 19.92 KG/M2 | SYSTOLIC BLOOD PRESSURE: 99 MMHG

## 2022-04-18 LAB
BASOPHILS # BLD AUTO: 0.02 K/UL — SIGNIFICANT CHANGE UP (ref 0–0.2)
BASOPHILS NFR BLD AUTO: 0.6 % — SIGNIFICANT CHANGE UP (ref 0–2)
EOSINOPHIL # BLD AUTO: 0 K/UL — SIGNIFICANT CHANGE UP (ref 0–0.5)
EOSINOPHIL NFR BLD AUTO: 0 % — SIGNIFICANT CHANGE UP (ref 0–6)
HCT VFR BLD CALC: 34.7 % — SIGNIFICANT CHANGE UP (ref 34.5–45)
HGB BLD-MCNC: 11.3 G/DL — LOW (ref 11.5–15.5)
IMM GRANULOCYTES NFR BLD AUTO: 0.3 % — SIGNIFICANT CHANGE UP (ref 0–1.5)
LYMPHOCYTES # BLD AUTO: 0.64 K/UL — LOW (ref 1–3.3)
LYMPHOCYTES # BLD AUTO: 19.4 % — SIGNIFICANT CHANGE UP (ref 13–44)
MCHC RBC-ENTMCNC: 30.3 PG — SIGNIFICANT CHANGE UP (ref 27–34)
MCHC RBC-ENTMCNC: 32.6 G/DL — SIGNIFICANT CHANGE UP (ref 32–36)
MCV RBC AUTO: 93 FL — SIGNIFICANT CHANGE UP (ref 80–100)
MONOCYTES # BLD AUTO: 0.14 K/UL — SIGNIFICANT CHANGE UP (ref 0–0.9)
MONOCYTES NFR BLD AUTO: 4.2 % — SIGNIFICANT CHANGE UP (ref 2–14)
NEUTROPHILS # BLD AUTO: 2.49 K/UL — SIGNIFICANT CHANGE UP (ref 1.8–7.4)
NEUTROPHILS NFR BLD AUTO: 75.5 % — SIGNIFICANT CHANGE UP (ref 43–77)
NRBC # BLD: 0 /100 WBCS — SIGNIFICANT CHANGE UP (ref 0–0)
PLATELET # BLD AUTO: 218 K/UL — SIGNIFICANT CHANGE UP (ref 150–400)
RBC # BLD: 3.73 M/UL — LOW (ref 3.8–5.2)
RBC # FLD: 15 % — HIGH (ref 10.3–14.5)
WBC # BLD: 3.3 K/UL — LOW (ref 3.8–10.5)
WBC # FLD AUTO: 3.3 K/UL — LOW (ref 3.8–10.5)

## 2022-04-18 PROCEDURE — 99214 OFFICE O/P EST MOD 30 MIN: CPT

## 2022-04-19 ENCOUNTER — OUTPATIENT (OUTPATIENT)
Dept: OUTPATIENT SERVICES | Facility: HOSPITAL | Age: 57
LOS: 1 days | Discharge: HOME | End: 2022-04-19

## 2022-04-19 DIAGNOSIS — D47.2 MONOCLONAL GAMMOPATHY: ICD-10-CM

## 2022-04-19 NOTE — ADDENDUM
[FreeTextEntry1] : Results of UA reviewed. At this time will start Cipro 500mg PO q 12 x 7 days (previous testing shows urine has been susceptible to this), follow up Cx and hold Cyclophosphamide. Patient is no longer having any blood in the urine but it is cloudy. Will have patient follow up with Urology as well. She remains afebrile and has no flank pain. If there is any concern for worsening infection she will call the office immediately or go to the ER.

## 2022-04-19 NOTE — PHYSICAL EXAM
[Ambulatory and capable of all self care but unable to carry out any work activities] : Status 2- Ambulatory and capable of all self care but unable to carry out any work activities. Up and about more than 50% of waking hours [Thin] : thin [Normal] : grossly intact [de-identified] : supple [de-identified] : (+)S1S2 RRR [de-identified] : +1-2 B/L LE edema [de-identified] : warm/dry

## 2022-04-19 NOTE — REVIEW OF SYSTEMS
[Fatigue] : fatigue [Skin Wound] : skin wound [Muscle Weakness] : muscle weakness [Negative] : Heme/Lymph [Lower Ext Edema] : lower extremity edema [Diarrhea] : diarrhea [Fever] : no fever [Chills] : no chills [Night Sweats] : no night sweats [Recent Change In Weight] : ~T no recent weight change [Dysphagia] : no dysphagia [Nosebleeds] : no nosebleeds [Odynophagia] : no odynophagia [Chest Pain] : no chest pain [Palpitations] : no palpitations [Abdominal Pain] : no abdominal pain [Vomiting] : no vomiting [Constipation] : no constipation [Dysuria] : no dysuria [Vaginal Discharge] : no vaginal discharge [Dysmenorrhea/Abn Vaginal Bleeding] : no dysmenorrhea/abnormal vaginal bleeding [Skin Rash] : no skin rash [Hot Flashes] : no hot flashes [FreeTextEntry2] : fatigue has improved [FreeTextEntry4] : mouth burning/pain per HPI [FreeTextEntry8] : (+) red urine yesterday [de-identified] : s/p PPM

## 2022-04-19 NOTE — ASSESSMENT
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 Cycle 1 of CyBorD was initiated on 4/1/22 while inpatient and today is now C1 D18.\par \par -Serum SPEP without a monoclonal fraction, Urine SPEP without monoclonal protein, immunofixation normal. \par -However Lambda serum FLCs 8.73 and kappa 0.92, for a ratio of 0.11 (or 9.5). \par -Monoclonal gammopathy workup has been unremarkable at this time outside of this finding. However, given the elevated ratio this was concerning for a monoclonal process. Remainder of workup had been unremarkable, however fat pad biopsy done and was POSITIVE for congo red, with positive polarization. \par -As per discussion with pathology, unable to send this for mass spectrometry to confirm amyloid type. However, now s/p BMBx which is showing ~35% plasmacytosis (monoclonal) c/w plasma cell neoplasm. 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 Is NOT meeting MM criteria. \par -Recently saw cardiology and had echocardiogram done which was largely unremarkable outside of a thickened interventricular septum. Recommend continued cardiology follow up. Renal ultrasound recently showed right parapelvic cyst versus renal pelvic fullness without adis hydronephrosis.\par -Urine showing proteinuria (although not nephrotic range)\par -Systemic light chain amyloidosis is classically associated with a thickened interventricular septum and proteinuria. Also associated with neurologic sequelae such as that she has. \par -Would forgo further pathologic workup with EGD/colonoscopy at this point. \par -Pt was admitted for C1 D1 of danyell-CyBorD.  This was given as Daratumumab (16mg/kG split into 2 doses on Day 1 and Day 2 given recent PPM placement to decrease risk of reaction) + CyBorD (given on Day 1). Additionally, given 20mg prednisone PO post Danyell infusion on 4/3 and 4/4. \par -Will continue daratumumab plus CyBorD which will be given weekly on D 1,8,15,22 of 28 day cycle, Daratumumab will be weekly x 8 followed by every 2 weeks x 8 and then monthly and plan for autologous bone marrow transplant. Discussed possible side effects including fatigue and neuropathy, as well as toxicity such as pancytopenia and increased susceptibility to infection. \par -Will send UA with Ucx for possible UTI.\par -[facility] consult sent for PT/OT for deconditioning.\par -Patient aware if ANC <1000 she will need to stop Florastor. \par -Patient understands and agrees with plan. All information explained to the best of my ability.\par \par Follow up next month with Dr. Goldberg

## 2022-04-19 NOTE — HISTORY OF PRESENT ILLNESS
[Home] : at home, [unfilled] , at the time of the visit. [Medical Office: ([facility])___] : at the medical office located in  [Verbal consent obtained from patient] : the patient, [unfilled] [Therapy: ___] : Therapy: [unfilled] [Cycle: ___] : Cycle: [unfilled] [Day: ___] : Day: [unfilled] [de-identified] : 58 y/o F with hx of thyroidectomy in 2011 (benign pathology) on thyroid replacement since then, and with COVID pneumonia back in March of 2020 ultimately signed herself out AMA, chronic issues thereafter, ended up going back to work in July 2020. In January-February 2021 had Pfizer vaccines. Started having issues starting in March 2021, she was having GI issues, endoscopy showed chronic gastritis. Additionally diagnosed with IBS at that point. She has been having issues with Postural Orthostatic Tachycardia Syndrome - her blood pressure goes down when she stands up from a seated position. She has fainted "too many times to count." She ends up with bruises here and there but nothing severe where she would need to go to the hospital. She saw two neurologists who did extensive evaluations without any success in finding diagnostic lesions. She reports she is seeing specialist for dysautonomia, Dr. Colón at [place] (neurology). She said overall her POTS has improved but she has a had a couple of episodes of fainting in the last couple of weeks. She started fludrocortisone in September 2021 and has titrated up the dose. She is now on 1 mg total (probably 0.5 mg twice daily). The only way she feels completely normal is when she is laying down. BP gets very low on standing up, it has registered as low as 50s over 40s. She reports when she is standing or sitting for too long, she will get a chest tightness, but not quite a pain. It gets to the point where she feels like she is having trouble breathing and then she has to sit down. She has muscle aches in her legs which improves with warm bath, and then some burning pain in her arms which reminds her of a sunburn. She also now is having issues with her mouth - very difficult to eat as it feels like she has burnt her mouth. She has lost 47 pounds since March 2021. She is not really able to get much down due to these feelings, although she does have an appetite. She denies any night-sweats, she does always feel cold though. Her bowel movements "swing" from constipation to diarrhea. She was on Linzess recently but was taken off it. She reports that she is urinating normally, in fact a lot because she drinks a fair amount of water daily. She has no swelling in her legs at this time. She had in the past at work when she was standing a lot and was on amlodipine. She was sent to me for evaluation of elevated free light chains. 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 She was found with no monoclonal protein on SPEP and hemoglobin normal, normal renal function, and no hypercalcemia. Fat pad biopsy showed positive congo red staining in rare blood vessels and positive for polarization. 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 [FreeTextEntry1] : Kleber + CyBorD started on 4/8/2022 [de-identified] : Patient presents today for follow up. She has been tolerating therapy at this time and her fatigue has been better. She does continue to have episodes of diarrhea since previous C. Diff infection in March and is now taking Florastor which she states she thinks is helping. She has also continued to have B/L LE edema that is better upon waking in the morning and will increase throughout the day when ambulating. This is no worse than when she was in the hospital and is following up with her cardiologist in the next 2 weeks. Saw EP the other day to have PPM interrogated and new steri-strips were applied, she has no pain or swelling at the site. She noticed yesterday that her urine was red in color and after drinking water it became more orange. She denies any fever, shaking chills, frequency, dysuria, or back pain. She does state that she has trouble ambulating at home and feels deconditioned since her hospital stay. The other day she had bent down to get something out of the cabinet in her kitchen and was unable to stand back up due to limited ROM in her legs. Mouth pain had been better and a metallic taste had remained. Over the past few days she has started to get the pain again although it is not as bed as it initially was, she has no issues swallowing food or liquids. She denies any pain, dizziness, palpitations, SOB, abdominal pain, n/v/c. \par \par

## 2022-04-20 ENCOUNTER — APPOINTMENT (OUTPATIENT)
Dept: UROLOGY | Facility: CLINIC | Age: 57
End: 2022-04-20
Payer: COMMERCIAL

## 2022-04-20 VITALS — TEMPERATURE: 97.7 F | SYSTOLIC BLOOD PRESSURE: 90 MMHG | DIASTOLIC BLOOD PRESSURE: 60 MMHG

## 2022-04-20 LAB
ALBUMIN SERPL ELPH-MCNC: 3.9 G/DL
ALP BLD-CCNC: 103 U/L
ALT SERPL-CCNC: 46 U/L
ANION GAP SERPL CALC-SCNC: 10 MMOL/L
APPEARANCE: ABNORMAL
AST SERPL-CCNC: 17 U/L
BILIRUB SERPL-MCNC: 0.8 MG/DL
BILIRUBIN URINE: NEGATIVE
BLOOD URINE: ABNORMAL
BUN SERPL-MCNC: 7 MG/DL
CALCIUM SERPL-MCNC: 9 MG/DL
CHLORIDE SERPL-SCNC: 99 MMOL/L
CO2 SERPL-SCNC: 28 MMOL/L
COLOR: NORMAL
CREAT SERPL-MCNC: 0.52 MG/DL
EGFR: 108 ML/MIN/1.73M2
GLUCOSE QUALITATIVE U: NEGATIVE
GLUCOSE SERPL-MCNC: 91 MG/DL
KETONES URINE: NEGATIVE
LEUKOCYTE ESTERASE URINE: ABNORMAL
NITRITE URINE: POSITIVE
PH URINE: 7
POTASSIUM SERPL-SCNC: 4.4 MMOL/L
PROT SERPL-MCNC: 5.5 G/DL
PROTEIN URINE: NEGATIVE
SODIUM SERPL-SCNC: 138 MMOL/L
SPECIFIC GRAVITY URINE: 1.01
UROBILINOGEN URINE: NORMAL

## 2022-04-20 PROCEDURE — 99213 OFFICE O/P EST LOW 20 MIN: CPT

## 2022-04-20 NOTE — PHYSICAL EXAM
[Normal Appearance] : normal appearance [Well Groomed] : well groomed [Abdomen Soft] : soft [Abdomen Tenderness] : non-tender [Costovertebral Angle Tenderness] : no ~M costovertebral angle tenderness [FreeTextEntry1] : Bladder not palpable [] : no respiratory distress [Exaggerated Use Of Accessory Muscles For Inspiration] : no accessory muscle use [Respiration, Rhythm And Depth] : normal respiratory rhythm and effort

## 2022-04-20 NOTE — ASSESSMENT
[FreeTextEntry1] : She is on Cipro at this time.  She was again asymptomatic.  Urine culture preliminary shows Klebsiella.  Since she is receiving chemotherapy, she should repeat urine culture subsequently to make sure that it becomes negative.  She has not noticed any significant side effects as far as the urinary tract is concerned with cyclophosphamide.  On office ultrasound residual urine volume is minimal.  She can undergo repeat renal ultrasound and then contact me for the results.  Try to remain hydrated.

## 2022-04-20 NOTE — HISTORY OF PRESENT ILLNESS
[FreeTextEntry1] : She is a 57-year-old woman who is seen today in follow-up.  She was last seen in November 2021.  Since then she was diagnosed with amyloid and she is receiving chemotherapy including cyclophosphamide.  She has not noticed any significant urinary side effects.  She noticed that her urine was cloudy but again had no symptoms.  Preliminary urine culture showed Klebsiella and urinalysis showed white blood cells.  She is on Cipro at this time.  There is no flank pain.  In November 2021, ultrasound showed right parapelvic renal cyst.  Residual urine volume today in the office was minimal.\par \par Previous notes: Patient states that in August 2021, routine laboratory values showed urinary tract infection.  At that time she was asymptomatic.  Urine culture had shown Enterococcus.  Then in October 2021 she had dysuria and cloudy urine.  She was treated with Cipro.  Then urinalysis was repeated in November 2021 which showed significant amount of white blood cells but urine culture was contaminated.  Repeat urinalysis in November 2021 was normal.  She tries to remain hydrated and therefore nocturia 3-4 times.  It generally does not bother her significantly.  She does not have history of kidney stones.

## 2022-04-21 LAB
ALBUMIN MFR SERPL ELPH: 61.9 %
ALBUMIN SERPL-MCNC: 3.4 G/DL
ALBUMIN/GLOB SERPL: 1.6 RATIO
ALPHA1 GLOB MFR SERPL ELPH: 6.2 %
ALPHA1 GLOB SERPL ELPH-MCNC: 0.3 G/DL
ALPHA2 GLOB MFR SERPL ELPH: 12.7 %
ALPHA2 GLOB SERPL ELPH-MCNC: 0.7 G/DL
B-GLOBULIN MFR SERPL ELPH: 11.8 %
B-GLOBULIN SERPL ELPH-MCNC: 0.6 G/DL
DEPRECATED KAPPA LC FREE/LAMBDA SER: 0.24 RATIO
GAMMA GLOB FLD ELPH-MCNC: 0.4 G/DL
GAMMA GLOB MFR SERPL ELPH: 7.4 %
IGA SER QL IEP: 33 MG/DL
IGG SER QL IEP: 439 MG/DL
IGM SER QL IEP: 14 MG/DL
INTERPRETATION SERPL IEP-IMP: NORMAL
KAPPA LC CSF-MCNC: 2.16 MG/DL
KAPPA LC SERPL-MCNC: 0.51 MG/DL
M PROTEIN MFR SERPL ELPH: 1.6 %
M PROTEIN SPEC IFE-MCNC: NORMAL
MONOCLON BAND OBS SERPL: 0.1 G/DL
PROT SERPL-MCNC: 5.5 G/DL
PROT SERPL-MCNC: 5.5 G/DL

## 2022-04-22 ENCOUNTER — RESULT REVIEW (OUTPATIENT)
Age: 57
End: 2022-04-22

## 2022-04-22 ENCOUNTER — APPOINTMENT (OUTPATIENT)
Dept: HEMATOLOGY ONCOLOGY | Facility: CLINIC | Age: 57
End: 2022-04-22

## 2022-04-22 ENCOUNTER — APPOINTMENT (OUTPATIENT)
Dept: INFUSION THERAPY | Facility: HOSPITAL | Age: 57
End: 2022-04-22

## 2022-04-22 DIAGNOSIS — R11.2 NAUSEA WITH VOMITING, UNSPECIFIED: ICD-10-CM

## 2022-04-22 DIAGNOSIS — Z51.11 ENCOUNTER FOR ANTINEOPLASTIC CHEMOTHERAPY: ICD-10-CM

## 2022-04-22 LAB
BASOPHILS # BLD AUTO: 0.03 K/UL — SIGNIFICANT CHANGE UP (ref 0–0.2)
BASOPHILS NFR BLD AUTO: 0.8 % — SIGNIFICANT CHANGE UP (ref 0–2)
EOSINOPHIL # BLD AUTO: 0.04 K/UL — SIGNIFICANT CHANGE UP (ref 0–0.5)
EOSINOPHIL NFR BLD AUTO: 1.1 % — SIGNIFICANT CHANGE UP (ref 0–6)
HCT VFR BLD CALC: 30.9 % — LOW (ref 34.5–45)
HGB BLD-MCNC: 10.5 G/DL — LOW (ref 11.5–15.5)
IMM GRANULOCYTES NFR BLD AUTO: 2.1 % — HIGH (ref 0–1.5)
LYMPHOCYTES # BLD AUTO: 0.91 K/UL — LOW (ref 1–3.3)
LYMPHOCYTES # BLD AUTO: 24.3 % — SIGNIFICANT CHANGE UP (ref 13–44)
MCHC RBC-ENTMCNC: 30.5 PG — SIGNIFICANT CHANGE UP (ref 27–34)
MCHC RBC-ENTMCNC: 34 G/DL — SIGNIFICANT CHANGE UP (ref 32–36)
MCV RBC AUTO: 89.8 FL — SIGNIFICANT CHANGE UP (ref 80–100)
MONOCYTES # BLD AUTO: 0.31 K/UL — SIGNIFICANT CHANGE UP (ref 0–0.9)
MONOCYTES NFR BLD AUTO: 8.3 % — SIGNIFICANT CHANGE UP (ref 2–14)
NEUTROPHILS # BLD AUTO: 2.37 K/UL — SIGNIFICANT CHANGE UP (ref 1.8–7.4)
NEUTROPHILS NFR BLD AUTO: 63.4 % — SIGNIFICANT CHANGE UP (ref 43–77)
NRBC # BLD: 0 /100 WBCS — SIGNIFICANT CHANGE UP (ref 0–0)
PLATELET # BLD AUTO: 200 K/UL — SIGNIFICANT CHANGE UP (ref 150–400)
RBC # BLD: 3.44 M/UL — LOW (ref 3.8–5.2)
RBC # FLD: 15 % — HIGH (ref 10.3–14.5)
WBC # BLD: 3.74 K/UL — LOW (ref 3.8–10.5)
WBC # FLD AUTO: 3.74 K/UL — LOW (ref 3.8–10.5)

## 2022-04-25 ENCOUNTER — NON-APPOINTMENT (OUTPATIENT)
Age: 57
End: 2022-04-25

## 2022-04-29 ENCOUNTER — APPOINTMENT (OUTPATIENT)
Dept: HEMATOLOGY ONCOLOGY | Facility: CLINIC | Age: 57
End: 2022-04-29

## 2022-04-29 ENCOUNTER — RESULT REVIEW (OUTPATIENT)
Age: 57
End: 2022-04-29

## 2022-04-29 ENCOUNTER — NON-APPOINTMENT (OUTPATIENT)
Age: 57
End: 2022-04-29

## 2022-04-29 ENCOUNTER — APPOINTMENT (OUTPATIENT)
Dept: INFUSION THERAPY | Facility: HOSPITAL | Age: 57
End: 2022-04-29

## 2022-04-29 LAB
BASOPHILS # BLD AUTO: 0.04 K/UL — SIGNIFICANT CHANGE UP (ref 0–0.2)
BASOPHILS NFR BLD AUTO: 0.9 % — SIGNIFICANT CHANGE UP (ref 0–2)
EOSINOPHIL # BLD AUTO: 0.05 K/UL — SIGNIFICANT CHANGE UP (ref 0–0.5)
EOSINOPHIL NFR BLD AUTO: 1.1 % — SIGNIFICANT CHANGE UP (ref 0–6)
HCT VFR BLD CALC: 30.7 % — LOW (ref 34.5–45)
HGB BLD-MCNC: 10.4 G/DL — LOW (ref 11.5–15.5)
IMM GRANULOCYTES NFR BLD AUTO: 0.6 % — SIGNIFICANT CHANGE UP (ref 0–1.5)
LYMPHOCYTES # BLD AUTO: 1.4 K/UL — SIGNIFICANT CHANGE UP (ref 1–3.3)
LYMPHOCYTES # BLD AUTO: 29.9 % — SIGNIFICANT CHANGE UP (ref 13–44)
MCHC RBC-ENTMCNC: 30.7 PG — SIGNIFICANT CHANGE UP (ref 27–34)
MCHC RBC-ENTMCNC: 33.9 G/DL — SIGNIFICANT CHANGE UP (ref 32–36)
MCV RBC AUTO: 90.6 FL — SIGNIFICANT CHANGE UP (ref 80–100)
MONOCYTES # BLD AUTO: 0.37 K/UL — SIGNIFICANT CHANGE UP (ref 0–0.9)
MONOCYTES NFR BLD AUTO: 7.9 % — SIGNIFICANT CHANGE UP (ref 2–14)
NEUTROPHILS # BLD AUTO: 2.79 K/UL — SIGNIFICANT CHANGE UP (ref 1.8–7.4)
NEUTROPHILS NFR BLD AUTO: 59.6 % — SIGNIFICANT CHANGE UP (ref 43–77)
NRBC # BLD: 0 /100 WBCS — SIGNIFICANT CHANGE UP (ref 0–0)
PLATELET # BLD AUTO: 187 K/UL — SIGNIFICANT CHANGE UP (ref 150–400)
RBC # BLD: 3.39 M/UL — LOW (ref 3.8–5.2)
RBC # FLD: 15.9 % — HIGH (ref 10.3–14.5)
WBC # BLD: 4.68 K/UL — SIGNIFICANT CHANGE UP (ref 3.8–10.5)
WBC # FLD AUTO: 4.68 K/UL — SIGNIFICANT CHANGE UP (ref 3.8–10.5)

## 2022-04-30 ENCOUNTER — APPOINTMENT (OUTPATIENT)
Dept: ULTRASOUND IMAGING | Facility: CLINIC | Age: 57
End: 2022-04-30
Payer: COMMERCIAL

## 2022-04-30 PROCEDURE — 76775 US EXAM ABDO BACK WALL LIM: CPT

## 2022-05-01 ENCOUNTER — NON-APPOINTMENT (OUTPATIENT)
Age: 57
End: 2022-05-01

## 2022-05-02 ENCOUNTER — APPOINTMENT (OUTPATIENT)
Dept: INTERNAL MEDICINE | Facility: CLINIC | Age: 57
End: 2022-05-02
Payer: COMMERCIAL

## 2022-05-02 VITALS
TEMPERATURE: 97.8 F | SYSTOLIC BLOOD PRESSURE: 126 MMHG | HEIGHT: 62 IN | WEIGHT: 126 LBS | BODY MASS INDEX: 23.19 KG/M2 | HEART RATE: 69 BPM | DIASTOLIC BLOOD PRESSURE: 81 MMHG | OXYGEN SATURATION: 99 %

## 2022-05-02 DIAGNOSIS — J45.20 MILD INTERMITTENT ASTHMA, UNCOMPLICATED: ICD-10-CM

## 2022-05-02 PROCEDURE — 99213 OFFICE O/P EST LOW 20 MIN: CPT

## 2022-05-02 NOTE — HEALTH RISK ASSESSMENT
[Intercurrent hospitalizations] : was admitted to the hospital  [Never] : Never [No] : In the past 12 months have you used drugs other than those required for medical reasons? No [Any fall with injury in past year] : Patient reported fall with injury in the past year [0] : 2) Feeling down, depressed, or hopeless: Not at all (0) [With Patient/Caregiver] : , with patient/caregiver [Designated Healthcare Proxy] : Designated healthcare proxy [Relationship: ___] : Relationship: [unfilled] [de-identified] : hemotologist [Audit-CScore] : 0 [de-identified] : not really [de-identified] : not really [IYO2Cwdgr] : 0 [AdvancecareDate] : 05/22

## 2022-05-02 NOTE — HISTORY OF PRESENT ILLNESS
[de-identified] : The pt is currently being Rx for Primary Amylodosis\par Has cardiac involvement and received a PPM\par Notices pedal edema /Will discuss this with Cardiologist\par Feels her C/O postural hypotension are better

## 2022-05-02 NOTE — PHYSICAL EXAM
[Well Nourished] : well nourished [Normal Oropharynx] : the oropharynx was normal [No Lymphadenopathy] : no lymphadenopathy [Supple] : supple [Clear to Auscultation] : lungs were clear to auscultation bilaterally [Normal Rate] : normal rate  [Regular Rhythm] : with a regular rhythm [Normal S1, S2] : normal S1 and S2 [de-identified] : Has 2+ pitting edema both ankles

## 2022-05-03 ENCOUNTER — NON-APPOINTMENT (OUTPATIENT)
Age: 57
End: 2022-05-03

## 2022-05-03 ENCOUNTER — APPOINTMENT (OUTPATIENT)
Dept: CARDIOLOGY | Facility: CLINIC | Age: 57
End: 2022-05-03
Payer: COMMERCIAL

## 2022-05-03 ENCOUNTER — APPOINTMENT (OUTPATIENT)
Dept: ULTRASOUND IMAGING | Facility: CLINIC | Age: 57
End: 2022-05-03

## 2022-05-03 VITALS
BODY MASS INDEX: 23.19 KG/M2 | OXYGEN SATURATION: 99 % | SYSTOLIC BLOOD PRESSURE: 120 MMHG | HEIGHT: 62 IN | DIASTOLIC BLOOD PRESSURE: 70 MMHG | HEART RATE: 70 BPM | WEIGHT: 126 LBS

## 2022-05-03 PROCEDURE — 93000 ELECTROCARDIOGRAM COMPLETE: CPT

## 2022-05-03 PROCEDURE — 99215 OFFICE O/P EST HI 40 MIN: CPT

## 2022-05-06 ENCOUNTER — RESULT REVIEW (OUTPATIENT)
Age: 57
End: 2022-05-06

## 2022-05-06 ENCOUNTER — APPOINTMENT (OUTPATIENT)
Dept: HEMATOLOGY ONCOLOGY | Facility: CLINIC | Age: 57
End: 2022-05-06

## 2022-05-06 ENCOUNTER — APPOINTMENT (OUTPATIENT)
Dept: INFUSION THERAPY | Facility: HOSPITAL | Age: 57
End: 2022-05-06

## 2022-05-06 LAB
BASOPHILS # BLD AUTO: 0.04 K/UL — SIGNIFICANT CHANGE UP (ref 0–0.2)
BASOPHILS NFR BLD AUTO: 0.5 % — SIGNIFICANT CHANGE UP (ref 0–2)
EOSINOPHIL # BLD AUTO: 0.02 K/UL — SIGNIFICANT CHANGE UP (ref 0–0.5)
EOSINOPHIL NFR BLD AUTO: 0.2 % — SIGNIFICANT CHANGE UP (ref 0–6)
HCT VFR BLD CALC: 31.7 % — LOW (ref 34.5–45)
HGB BLD-MCNC: 10.4 G/DL — LOW (ref 11.5–15.5)
IMM GRANULOCYTES NFR BLD AUTO: 0.2 % — SIGNIFICANT CHANGE UP (ref 0–1.5)
LYMPHOCYTES # BLD AUTO: 1.13 K/UL — SIGNIFICANT CHANGE UP (ref 1–3.3)
LYMPHOCYTES # BLD AUTO: 13.2 % — SIGNIFICANT CHANGE UP (ref 13–44)
MCHC RBC-ENTMCNC: 30.5 PG — SIGNIFICANT CHANGE UP (ref 27–34)
MCHC RBC-ENTMCNC: 32.8 G/DL — SIGNIFICANT CHANGE UP (ref 32–36)
MCV RBC AUTO: 93 FL — SIGNIFICANT CHANGE UP (ref 80–100)
MONOCYTES # BLD AUTO: 0.5 K/UL — SIGNIFICANT CHANGE UP (ref 0–0.9)
MONOCYTES NFR BLD AUTO: 5.9 % — SIGNIFICANT CHANGE UP (ref 2–14)
NEUTROPHILS # BLD AUTO: 6.83 K/UL — SIGNIFICANT CHANGE UP (ref 1.8–7.4)
NEUTROPHILS NFR BLD AUTO: 80 % — HIGH (ref 43–77)
NRBC # BLD: 0 /100 WBCS — SIGNIFICANT CHANGE UP (ref 0–0)
PLATELET # BLD AUTO: 188 K/UL — SIGNIFICANT CHANGE UP (ref 150–400)
RBC # BLD: 3.41 M/UL — LOW (ref 3.8–5.2)
RBC # FLD: 16.5 % — HIGH (ref 10.3–14.5)
WBC # BLD: 8.54 K/UL — SIGNIFICANT CHANGE UP (ref 3.8–10.5)
WBC # FLD AUTO: 8.54 K/UL — SIGNIFICANT CHANGE UP (ref 3.8–10.5)

## 2022-05-11 ENCOUNTER — TRANSCRIPTION ENCOUNTER (OUTPATIENT)
Age: 57
End: 2022-05-11

## 2022-05-12 ENCOUNTER — RESULT REVIEW (OUTPATIENT)
Age: 57
End: 2022-05-12

## 2022-05-12 ENCOUNTER — APPOINTMENT (OUTPATIENT)
Dept: HEMATOLOGY ONCOLOGY | Facility: CLINIC | Age: 57
End: 2022-05-12
Payer: COMMERCIAL

## 2022-05-12 VITALS
BODY MASS INDEX: 24.4 KG/M2 | SYSTOLIC BLOOD PRESSURE: 116 MMHG | OXYGEN SATURATION: 95 % | WEIGHT: 133.38 LBS | RESPIRATION RATE: 16 BRPM | DIASTOLIC BLOOD PRESSURE: 73 MMHG | HEART RATE: 68 BPM | TEMPERATURE: 98 F

## 2022-05-12 LAB
BASOPHILS # BLD AUTO: 0.03 K/UL — SIGNIFICANT CHANGE UP (ref 0–0.2)
BASOPHILS NFR BLD AUTO: 0.5 % — SIGNIFICANT CHANGE UP (ref 0–2)
EOSINOPHIL # BLD AUTO: 0.03 K/UL — SIGNIFICANT CHANGE UP (ref 0–0.5)
EOSINOPHIL NFR BLD AUTO: 0.5 % — SIGNIFICANT CHANGE UP (ref 0–6)
HCT VFR BLD CALC: 34.7 % — SIGNIFICANT CHANGE UP (ref 34.5–45)
HGB BLD-MCNC: 11.1 G/DL — LOW (ref 11.5–15.5)
IMM GRANULOCYTES NFR BLD AUTO: 0.4 % — SIGNIFICANT CHANGE UP (ref 0–1.5)
LYMPHOCYTES # BLD AUTO: 0.77 K/UL — LOW (ref 1–3.3)
LYMPHOCYTES # BLD AUTO: 13.9 % — SIGNIFICANT CHANGE UP (ref 13–44)
MCHC RBC-ENTMCNC: 30.7 PG — SIGNIFICANT CHANGE UP (ref 27–34)
MCHC RBC-ENTMCNC: 32 G/DL — SIGNIFICANT CHANGE UP (ref 32–36)
MCV RBC AUTO: 95.9 FL — SIGNIFICANT CHANGE UP (ref 80–100)
MONOCYTES # BLD AUTO: 0.3 K/UL — SIGNIFICANT CHANGE UP (ref 0–0.9)
MONOCYTES NFR BLD AUTO: 5.4 % — SIGNIFICANT CHANGE UP (ref 2–14)
NEUTROPHILS # BLD AUTO: 4.4 K/UL — SIGNIFICANT CHANGE UP (ref 1.8–7.4)
NEUTROPHILS NFR BLD AUTO: 79.3 % — HIGH (ref 43–77)
NRBC # BLD: 0 /100 WBCS — SIGNIFICANT CHANGE UP (ref 0–0)
PLATELET # BLD AUTO: 166 K/UL — SIGNIFICANT CHANGE UP (ref 150–400)
RBC # BLD: 3.62 M/UL — LOW (ref 3.8–5.2)
RBC # FLD: 16.7 % — HIGH (ref 10.3–14.5)
WBC # BLD: 5.55 K/UL — SIGNIFICANT CHANGE UP (ref 3.8–10.5)
WBC # FLD AUTO: 5.55 K/UL — SIGNIFICANT CHANGE UP (ref 3.8–10.5)

## 2022-05-12 PROCEDURE — 99215 OFFICE O/P EST HI 40 MIN: CPT

## 2022-05-13 ENCOUNTER — APPOINTMENT (OUTPATIENT)
Dept: INFUSION THERAPY | Facility: HOSPITAL | Age: 57
End: 2022-05-13

## 2022-05-13 ENCOUNTER — APPOINTMENT (OUTPATIENT)
Dept: HEMATOLOGY ONCOLOGY | Facility: CLINIC | Age: 57
End: 2022-05-13

## 2022-05-14 NOTE — PHYSICAL EXAM

## 2022-05-14 NOTE — CARDIOLOGY SUMMARY
[de-identified] : 3/30/2022, septum 1.2 cm, PWT 1.2 cm, moderately dilated LA, normal LV systolic function, average GLS -14.6%, pattern is\par homogeneous (i.e. not suggestive of amyloid), LVEF 60%\par  [de-identified] : 3/28/2022, Small pericardial and bilateral pleural effusions. Subtle inferior lateral basal myocardial wall with gadolinium enhancement.  Biatrial enlargement with associated late gadolinium enhancement. These findings may represent amyloidosis.

## 2022-05-14 NOTE — DISCUSSION/SUMMARY
[FreeTextEntry1] : 57 year-old woman with light chain amyloidosis, orthostatic hypotension requiring fludrocortisone and midodrine, now started on Cy-Bor-D plus daratumumab, with lower extremity edmea, likely secondary to fludrocortisone, presents today or follow-up after recent hospitalization (March 2022).\par Myleoma therapies per hematology.\par \par Reduce fludocortisone to 0.1 mg daily. Reassess in one week and consider stopping altogether. \par \par Encouraged ambulations as tolerated.\par Starts PT on 5/16.\par \par Elevate the legs.\par \par We discussed compression stockings, but she has had a bad reaction to them in the past that required her to cut them off.

## 2022-05-14 NOTE — HISTORY OF PRESENT ILLNESS
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 \par \par Lower extremity edema up to her waist. \par Patient continues to take fludocortisone 0.2 mg daily and midodrine 20 mg TID

## 2022-05-16 ENCOUNTER — OUTPATIENT (OUTPATIENT)
Dept: OUTPATIENT SERVICES | Facility: HOSPITAL | Age: 57
LOS: 1 days | Discharge: HOME | End: 2022-05-16

## 2022-05-16 DIAGNOSIS — D47.2 MONOCLONAL GAMMOPATHY: ICD-10-CM

## 2022-05-16 DIAGNOSIS — E85.9 AMYLOIDOSIS, UNSPECIFIED: ICD-10-CM

## 2022-05-18 NOTE — HISTORY OF PRESENT ILLNESS
[Therapy: ___] : Therapy: [unfilled] [Cycle: ___] : Cycle: [unfilled] [Day: ___] : Day: [unfilled] [de-identified] : 58 y/o F with hx of thyroidectomy in 2011 (benign pathology) on thyroid replacement since then, and with COVID pneumonia back in March of 2020 ultimately signed herself out AMA, chronic issues thereafter, ended up going back to work in July 2020. In January-February 2021 had Pfizer vaccines. Started having issues starting in March 2021, she was having GI issues, endoscopy showed chronic gastritis. Additionally diagnosed with IBS at that point. She has been having issues with Postural Orthostatic Tachycardia Syndrome - her blood pressure goes down when she stands up from a seated position. She has fainted "too many times to count." She ends up with bruises here and there but nothing severe where she would need to go to the hospital. She saw two neurologists who did extensive evaluations without any success in finding diagnostic lesions. She reports she is seeing specialist for dysautonomia, Dr. Colón at [place] (neurology). She said overall her POTS has improved but she has a had a couple of episodes of fainting in the last couple of weeks. She started fludrocortisone in September 2021 and has titrated up the dose. She is now on 1 mg total (probably 0.5 mg twice daily). The only way she feels completely normal is when she is laying down. BP gets very low on standing up, it has registered as low as 50s over 40s. She reports when she is standing or sitting for too long, she will get a chest tightness, but not quite a pain. It gets to the point where she feels like she is having trouble breathing and then she has to sit down. She has muscle aches in her legs which improves with warm bath, and then some burning pain in her arms which reminds her of a sunburn. She also now is having issues with her mouth - very difficult to eat as it feels like she has burnt her mouth. She has lost 47 pounds since March 2021. She is not really able to get much down due to these feelings, although she does have an appetite. She denies any night-sweats, she does always feel cold though. Her bowel movements "swing" from constipation to diarrhea. She was on Linzess recently but was taken off it. She reports that she is urinating normally, in fact a lot because she drinks a fair amount of water daily. She has no swelling in her legs at this time. She had in the past at work when she was standing a lot and was on amlodipine. She was sent to me for evaluation of elevated free light chains. 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 She was found with no monoclonal protein on SPEP and hemoglobin normal, normal renal function, and no hypercalcemia. Fat pad biopsy showed positive congo red staining in rare blood vessels and positive for polarization. This is concerning for primary light chain amyloidosis.\par \par In March 2022 patient was admitted to [facility] for multiple recurrent syncopal episodes. While admitted course was complicated by multiple unresponsive episodes prompting RRTs, during which pt found to be hypotensive and bradycardic. She initially required levophed for BP support but ultimately BP remained stable while on Midodrine 20mg q8 and Fludrocortisone 0.2mg q24. Pt noted to intermittently have sinus pauses (2-3.8 seconds) thought to be 2/2 vagal episodes, and asymptomatic bradycardia overnight on tele. ECG noted NSR but 1st degree AV block. TTE 3/16/22 showed normal LV size and function with EF 55%-60%. No regional wall motion abnormalities. Moderate concentric LVH. \par \par Pt also started on oral vanco for C.diff and completed ceftriaxone for UTI. \par \par Ultimately transferred to [facility] for consideration of inpatient chemotherapy. \par PPM placement recommended, as cardiac MRI showed evidence of amyloid involvement. Pt had another RRT for hypotension and symptomatic bradycardia to 30s, was on pressor support until she received a dual chamber PPM. Pt also continued her C.diff treatment and chemotherapy was placed on hold until the diarrhea cleared. She was then started on Daratumumab  + CyBorD inpatient, and tolerated it well ; she completed 2 treatments of chemotherapy inpatient.\par \par \par Patient has continued with this regimen now as outpatient. She did continue to have episodes of diarrhea since previous C. Diff infection in March. On follow up 4/18 she noticed that her urine was red in color and after drinking water it became more orange. Treated with antibiotics for UTI and evaluated by urology. Cytoxan was held for a time period, and ultimately this cleared with antibiotics.  [FreeTextEntry1] : Kleber + CyBorD started on 4/8/2022, held cytoxan from 4/18-5/12 for hematuria [de-identified] : On follow up 5/12/22 patient reported feeling improved but still fatigued. She is STILL experiencing diarrhea which started before due to C. Diff. She takes Imodium every 4 hours but with incomplete relief. Urine appears cloudy. She had conjunctivitis recently which has since resolved. She has swelling in her legs which tracks up to her buttocks at times. She is currently not on any diuretics for this as there is concern for her BP. She is on midodrine still and florinef.  She denied having any recent fevers, chills, nausea, vomiting. Denied blood in the stool. No particular smell in the stool. She reports gas cramps. Compression socking do not help her legs. She experiences sob with exertion. No chest pain, palpitations. Appetite is improving but she still has some mouth pain. Neuropathy is stable. She has less  in her left hand than the right since starting Velcade, but not interfering with use. She will go to physical therapy soon.\par \par

## 2022-05-18 NOTE — REVIEW OF SYSTEMS
[Fatigue] : fatigue [Lower Ext Edema] : lower extremity edema [Diarrhea] : diarrhea [Skin Wound] : skin wound [Muscle Weakness] : muscle weakness [Negative] : Heme/Lymph [Fever] : no fever [Chills] : no chills [Night Sweats] : no night sweats [Recent Change In Weight] : ~T no recent weight change [Dysphagia] : no dysphagia [Nosebleeds] : no nosebleeds [Odynophagia] : no odynophagia [Chest Pain] : no chest pain [Palpitations] : no palpitations [Abdominal Pain] : no abdominal pain [Vomiting] : no vomiting [Constipation] : no constipation [Dysuria] : no dysuria [Vaginal Discharge] : no vaginal discharge [Dysmenorrhea/Abn Vaginal Bleeding] : no dysmenorrhea/abnormal vaginal bleeding [Skin Rash] : no skin rash [Hot Flashes] : no hot flashes [FreeTextEntry2] : fatigue has improved [FreeTextEntry4] : mouth burning/pain per HPI [FreeTextEntry8] : (+) red urine yesterday [de-identified] : s/p PPM

## 2022-05-18 NOTE — PHYSICAL EXAM
[Ambulatory and capable of all self care but unable to carry out any work activities] : Status 2- Ambulatory and capable of all self care but unable to carry out any work activities. Up and about more than 50% of waking hours [Normal] : affect appropriate [Ulcers] : no ulcers [Mucositis] : no mucositis [Thrush] : no thrush [Vesicles] : no vesicles [de-identified] : wheelchair bound and thin [de-identified] : tongue scalloping + [de-identified] : supple [de-identified] : (+)S1S2 RRR [de-identified] : +1-2 B/L LE edema to the level of thigh [de-identified] : warm/dry

## 2022-05-18 NOTE — ASSESSMENT
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 Cycle 1 of CyBorD was initiated on 4/1/22 while inpatient and today is now C2 D15.\par \par -Serum SPEP without a monoclonal fraction, Urine SPEP without monoclonal protein, immunofixation normal. \par -However on diagnosis Lambda serum FLCs 8.73 and kappa 0.92, for a ratio of 0.11 (or 9.5). \par -Fat pad biopsy done and was POSITIVE for congo red, with positive polarization. \par -As per discussion with pathology, unable to send this for mass spectrometry to confirm amyloid type. However, now s/p BMBx which is showing ~35% plasmacytosis (monoclonal) c/w plasma cell neoplasm. 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 Is NOT meeting MM criteria. \par -Recent hospitalization noted - patient with hypotension likely due to heart block 2/2 amyloidosis, which confirmed involving heart with MRI. \par -Urine showing proteinuria (although not nephrotic range).\par -Systemic light chain amyloidosis is classically associated with a thickened interventricular septum and proteinuria. Also associated with neurologic sequelae such as that she has. \par -Pt currently being treated for systemic light chain amyloidosis with poncho-CyBorD.  Day 1 dose inpatient split, as patient was getting PPM and wanted lower risk of reaction / delays. \par -Will continue daratumumab plus CyBorD which will be given weekly on D 1,8,15,22 of 28 day cycle, Daratumumab will be weekly x 8 followed by every 2 weeks x 8 and then monthly and plan for autologous bone marrow transplant. Discussed possible side effects including fatigue and neuropathy, as well as toxicity such as pancytopenia and increased susceptibility to infection. \par -Held cyclophosphamide due to hematuria, although more likely 2/2 UTI. Will restart now.\par -[facility] consult previously sent for PT/OT for deconditioning.\par -Patient understands and agrees with plan. All information explained to the best of my ability.\par -Following up closely with cardiology. \par -RTC in 1 month.

## 2022-05-20 ENCOUNTER — RESULT REVIEW (OUTPATIENT)
Age: 57
End: 2022-05-20

## 2022-05-20 ENCOUNTER — APPOINTMENT (OUTPATIENT)
Dept: HEMATOLOGY ONCOLOGY | Facility: CLINIC | Age: 57
End: 2022-05-20

## 2022-05-20 ENCOUNTER — NON-APPOINTMENT (OUTPATIENT)
Age: 57
End: 2022-05-20

## 2022-05-20 ENCOUNTER — APPOINTMENT (OUTPATIENT)
Dept: INFUSION THERAPY | Facility: HOSPITAL | Age: 57
End: 2022-05-20

## 2022-05-20 DIAGNOSIS — Z51.11 ENCOUNTER FOR ANTINEOPLASTIC CHEMOTHERAPY: ICD-10-CM

## 2022-05-20 DIAGNOSIS — R11.2 NAUSEA WITH VOMITING, UNSPECIFIED: ICD-10-CM

## 2022-05-20 LAB
BASOPHILS # BLD AUTO: 0.03 K/UL — SIGNIFICANT CHANGE UP (ref 0–0.2)
BASOPHILS NFR BLD AUTO: 0.6 % — SIGNIFICANT CHANGE UP (ref 0–2)
EOSINOPHIL # BLD AUTO: 0.06 K/UL — SIGNIFICANT CHANGE UP (ref 0–0.5)
EOSINOPHIL NFR BLD AUTO: 1.1 % — SIGNIFICANT CHANGE UP (ref 0–6)
HCT VFR BLD CALC: 31.9 % — LOW (ref 34.5–45)
HGB BLD-MCNC: 10.5 G/DL — LOW (ref 11.5–15.5)
IMM GRANULOCYTES NFR BLD AUTO: 0.4 % — SIGNIFICANT CHANGE UP (ref 0–1.5)
LYMPHOCYTES # BLD AUTO: 0.79 K/UL — LOW (ref 1–3.3)
LYMPHOCYTES # BLD AUTO: 15.1 % — SIGNIFICANT CHANGE UP (ref 13–44)
MCHC RBC-ENTMCNC: 30.6 PG — SIGNIFICANT CHANGE UP (ref 27–34)
MCHC RBC-ENTMCNC: 32.9 G/DL — SIGNIFICANT CHANGE UP (ref 32–36)
MCV RBC AUTO: 93 FL — SIGNIFICANT CHANGE UP (ref 80–100)
MONOCYTES # BLD AUTO: 0.37 K/UL — SIGNIFICANT CHANGE UP (ref 0–0.9)
MONOCYTES NFR BLD AUTO: 7.1 % — SIGNIFICANT CHANGE UP (ref 2–14)
NEUTROPHILS # BLD AUTO: 3.95 K/UL — SIGNIFICANT CHANGE UP (ref 1.8–7.4)
NEUTROPHILS NFR BLD AUTO: 75.7 % — SIGNIFICANT CHANGE UP (ref 43–77)
NRBC # BLD: 0 /100 WBCS — SIGNIFICANT CHANGE UP (ref 0–0)
PLATELET # BLD AUTO: 207 K/UL — SIGNIFICANT CHANGE UP (ref 150–400)
RBC # BLD: 3.43 M/UL — LOW (ref 3.8–5.2)
RBC # FLD: 16.1 % — HIGH (ref 10.3–14.5)
WBC # BLD: 5.22 K/UL — SIGNIFICANT CHANGE UP (ref 3.8–10.5)
WBC # FLD AUTO: 5.22 K/UL — SIGNIFICANT CHANGE UP (ref 3.8–10.5)

## 2022-05-27 ENCOUNTER — RESULT REVIEW (OUTPATIENT)
Age: 57
End: 2022-05-27

## 2022-05-27 ENCOUNTER — APPOINTMENT (OUTPATIENT)
Dept: HEMATOLOGY ONCOLOGY | Facility: CLINIC | Age: 57
End: 2022-05-27

## 2022-05-27 ENCOUNTER — LABORATORY RESULT (OUTPATIENT)
Age: 57
End: 2022-05-27

## 2022-05-27 ENCOUNTER — APPOINTMENT (OUTPATIENT)
Dept: INFUSION THERAPY | Facility: HOSPITAL | Age: 57
End: 2022-05-27

## 2022-05-27 LAB
ALBUMIN MFR SERPL ELPH: 62 %
ALBUMIN SERPL ELPH-MCNC: 3.8 G/DL
ALBUMIN SERPL-MCNC: 3.2 G/DL
ALBUMIN/GLOB SERPL: 1.6 RATIO
ALP BLD-CCNC: 108 U/L
ALPHA1 GLOB MFR SERPL ELPH: 6.5 %
ALPHA1 GLOB SERPL ELPH-MCNC: 0.3 G/DL
ALPHA2 GLOB MFR SERPL ELPH: 12.8 %
ALPHA2 GLOB SERPL ELPH-MCNC: 0.7 G/DL
ALT SERPL-CCNC: 48 U/L
ANION GAP SERPL CALC-SCNC: 10 MMOL/L
AST SERPL-CCNC: 25 U/L
B-GLOBULIN MFR SERPL ELPH: 12.1 %
B-GLOBULIN SERPL ELPH-MCNC: 0.6 G/DL
BASOPHILS # BLD AUTO: 0.04 K/UL — SIGNIFICANT CHANGE UP (ref 0–0.2)
BASOPHILS NFR BLD AUTO: 0.7 % — SIGNIFICANT CHANGE UP (ref 0–2)
BILIRUB SERPL-MCNC: 0.9 MG/DL
BUN SERPL-MCNC: 6 MG/DL
CALCIUM SERPL-MCNC: 8.6 MG/DL
CHLORIDE SERPL-SCNC: 100 MMOL/L
CO2 SERPL-SCNC: 25 MMOL/L
CREAT SERPL-MCNC: 0.58 MG/DL
DEPRECATED KAPPA LC FREE/LAMBDA SER: 0.23 RATIO
EGFR: 105 ML/MIN/1.73M2
EOSINOPHIL # BLD AUTO: 0.04 K/UL — SIGNIFICANT CHANGE UP (ref 0–0.5)
EOSINOPHIL NFR BLD AUTO: 0.7 % — SIGNIFICANT CHANGE UP (ref 0–6)
GAMMA GLOB FLD ELPH-MCNC: 0.3 G/DL
GAMMA GLOB MFR SERPL ELPH: 6.6 %
GLUCOSE SERPL-MCNC: 85 MG/DL
HCT VFR BLD CALC: 34.5 % — SIGNIFICANT CHANGE UP (ref 34.5–45)
HGB BLD-MCNC: 11.1 G/DL — LOW (ref 11.5–15.5)
IGA SER QL IEP: 20 MG/DL
IGG SER QL IEP: 413 MG/DL
IGM SER QL IEP: <10 MG/DL
IMM GRANULOCYTES NFR BLD AUTO: 0.2 % — SIGNIFICANT CHANGE UP (ref 0–1.5)
INTERPRETATION SERPL IEP-IMP: NORMAL
KAPPA LC CSF-MCNC: 2.29 MG/DL
KAPPA LC SERPL-MCNC: 0.52 MG/DL
LYMPHOCYTES # BLD AUTO: 0.82 K/UL — LOW (ref 1–3.3)
LYMPHOCYTES # BLD AUTO: 14.9 % — SIGNIFICANT CHANGE UP (ref 13–44)
M PROTEIN MFR SERPL ELPH: 2.2 %
M PROTEIN SPEC IFE-MCNC: NORMAL
MCHC RBC-ENTMCNC: 31 PG — SIGNIFICANT CHANGE UP (ref 27–34)
MCHC RBC-ENTMCNC: 32.2 G/DL — SIGNIFICANT CHANGE UP (ref 32–36)
MCV RBC AUTO: 96.4 FL — SIGNIFICANT CHANGE UP (ref 80–100)
MONOCLON BAND OBS SERPL: 0.1 G/DL
MONOCYTES # BLD AUTO: 0.39 K/UL — SIGNIFICANT CHANGE UP (ref 0–0.9)
MONOCYTES NFR BLD AUTO: 7.1 % — SIGNIFICANT CHANGE UP (ref 2–14)
NEUTROPHILS # BLD AUTO: 4.2 K/UL — SIGNIFICANT CHANGE UP (ref 1.8–7.4)
NEUTROPHILS NFR BLD AUTO: 76.4 % — SIGNIFICANT CHANGE UP (ref 43–77)
NRBC # BLD: 0 /100 WBCS — SIGNIFICANT CHANGE UP (ref 0–0)
PLATELET # BLD AUTO: 237 K/UL — SIGNIFICANT CHANGE UP (ref 150–400)
POTASSIUM SERPL-SCNC: 4.8 MMOL/L
PROT SERPL-MCNC: 5.2 G/DL
RBC # BLD: 3.58 M/UL — LOW (ref 3.8–5.2)
RBC # FLD: 16.4 % — HIGH (ref 10.3–14.5)
SODIUM SERPL-SCNC: 136 MMOL/L
WBC # BLD: 5.5 K/UL — SIGNIFICANT CHANGE UP (ref 3.8–10.5)
WBC # FLD AUTO: 5.5 K/UL — SIGNIFICANT CHANGE UP (ref 3.8–10.5)

## 2022-06-03 ENCOUNTER — RESULT REVIEW (OUTPATIENT)
Age: 57
End: 2022-06-03

## 2022-06-03 ENCOUNTER — APPOINTMENT (OUTPATIENT)
Dept: HEMATOLOGY ONCOLOGY | Facility: CLINIC | Age: 57
End: 2022-06-03

## 2022-06-03 ENCOUNTER — APPOINTMENT (OUTPATIENT)
Dept: INFUSION THERAPY | Facility: HOSPITAL | Age: 57
End: 2022-06-03

## 2022-06-03 LAB
BASOPHILS # BLD AUTO: 0.03 K/UL — SIGNIFICANT CHANGE UP (ref 0–0.2)
BASOPHILS NFR BLD AUTO: 0.6 % — SIGNIFICANT CHANGE UP (ref 0–2)
EOSINOPHIL # BLD AUTO: 0.05 K/UL — SIGNIFICANT CHANGE UP (ref 0–0.5)
EOSINOPHIL NFR BLD AUTO: 1 % — SIGNIFICANT CHANGE UP (ref 0–6)
HCT VFR BLD CALC: 29 % — LOW (ref 34.5–45)
HGB BLD-MCNC: 9.6 G/DL — LOW (ref 11.5–15.5)
IMM GRANULOCYTES NFR BLD AUTO: 0.6 % — SIGNIFICANT CHANGE UP (ref 0–1.5)
LYMPHOCYTES # BLD AUTO: 0.62 K/UL — LOW (ref 1–3.3)
LYMPHOCYTES # BLD AUTO: 12.5 % — LOW (ref 13–44)
MCHC RBC-ENTMCNC: 31.3 PG — SIGNIFICANT CHANGE UP (ref 27–34)
MCHC RBC-ENTMCNC: 33.1 G/DL — SIGNIFICANT CHANGE UP (ref 32–36)
MCV RBC AUTO: 94.5 FL — SIGNIFICANT CHANGE UP (ref 80–100)
MONOCYTES # BLD AUTO: 0.38 K/UL — SIGNIFICANT CHANGE UP (ref 0–0.9)
MONOCYTES NFR BLD AUTO: 7.7 % — SIGNIFICANT CHANGE UP (ref 2–14)
NEUTROPHILS # BLD AUTO: 3.84 K/UL — SIGNIFICANT CHANGE UP (ref 1.8–7.4)
NEUTROPHILS NFR BLD AUTO: 77.6 % — HIGH (ref 43–77)
NRBC # BLD: 0 /100 WBCS — SIGNIFICANT CHANGE UP (ref 0–0)
PLATELET # BLD AUTO: 181 K/UL — SIGNIFICANT CHANGE UP (ref 150–400)
RBC # BLD: 3.07 M/UL — LOW (ref 3.8–5.2)
RBC # FLD: 16.2 % — HIGH (ref 10.3–14.5)
WBC # BLD: 4.95 K/UL — SIGNIFICANT CHANGE UP (ref 3.8–10.5)
WBC # FLD AUTO: 4.95 K/UL — SIGNIFICANT CHANGE UP (ref 3.8–10.5)

## 2022-06-06 ENCOUNTER — NON-APPOINTMENT (OUTPATIENT)
Age: 57
End: 2022-06-06

## 2022-06-06 ENCOUNTER — APPOINTMENT (OUTPATIENT)
Dept: INTERNAL MEDICINE | Facility: CLINIC | Age: 57
End: 2022-06-06
Payer: COMMERCIAL

## 2022-06-06 ENCOUNTER — APPOINTMENT (OUTPATIENT)
Dept: CARDIOLOGY | Facility: CLINIC | Age: 57
End: 2022-06-06
Payer: COMMERCIAL

## 2022-06-06 VITALS
OXYGEN SATURATION: 98 % | BODY MASS INDEX: 22.08 KG/M2 | DIASTOLIC BLOOD PRESSURE: 71 MMHG | WEIGHT: 120 LBS | HEART RATE: 69 BPM | SYSTOLIC BLOOD PRESSURE: 117 MMHG | HEIGHT: 62 IN

## 2022-06-06 VITALS
HEART RATE: 69 BPM | WEIGHT: 124 LBS | SYSTOLIC BLOOD PRESSURE: 110 MMHG | RESPIRATION RATE: 12 BRPM | DIASTOLIC BLOOD PRESSURE: 70 MMHG | OXYGEN SATURATION: 96 % | HEIGHT: 62 IN | BODY MASS INDEX: 22.82 KG/M2

## 2022-06-06 DIAGNOSIS — E89.0 POSTPROCEDURAL HYPOTHYROIDISM: ICD-10-CM

## 2022-06-06 DIAGNOSIS — Z92.29 PERSONAL HISTORY OF OTHER DRUG THERAPY: ICD-10-CM

## 2022-06-06 DIAGNOSIS — Z92.89 PERSONAL HISTORY OF OTHER MEDICAL TREATMENT: ICD-10-CM

## 2022-06-06 DIAGNOSIS — U09.9 POST COVID-19 CONDITION, UNSPECIFIED: ICD-10-CM

## 2022-06-06 DIAGNOSIS — R39.9 UNSPECIFIED SYMPTOMS AND SIGNS INVOLVING THE GENITOURINARY SYSTEM: ICD-10-CM

## 2022-06-06 DIAGNOSIS — Z12.83 ENCOUNTER FOR SCREENING FOR MALIGNANT NEOPLASM OF SKIN: ICD-10-CM

## 2022-06-06 DIAGNOSIS — R42 DIZZINESS AND GIDDINESS: ICD-10-CM

## 2022-06-06 PROCEDURE — 99214 OFFICE O/P EST MOD 30 MIN: CPT | Mod: 25

## 2022-06-06 PROCEDURE — 93000 ELECTROCARDIOGRAM COMPLETE: CPT

## 2022-06-06 PROCEDURE — 99215 OFFICE O/P EST HI 40 MIN: CPT

## 2022-06-06 RX ORDER — ASPIRIN 81 MG/1
81 TABLET, CHEWABLE ORAL
Qty: 30 | Refills: 0 | Status: DISCONTINUED | COMMUNITY
Start: 2022-04-04 | End: 2022-06-06

## 2022-06-06 RX ORDER — DIPHENHYDRAMINE HYDROCHLORIDE AND LIDOCAINE HYDROCHLORIDE AND ALUMINUM HYDROXIDE AND MAGNESIUM HYDRO
KIT
Qty: 3 | Refills: 3 | Status: DISCONTINUED | COMMUNITY
Start: 2022-02-24 | End: 2022-06-06

## 2022-06-06 RX ORDER — FLUDROCORTISONE ACETATE 0.1 MG/1
0.1 TABLET ORAL DAILY
Qty: 60 | Refills: 1 | Status: DISCONTINUED | COMMUNITY
Start: 2022-04-06 | End: 2022-06-06

## 2022-06-06 RX ORDER — FAMOTIDINE 40 MG/1
40 TABLET, FILM COATED ORAL
Qty: 60 | Refills: 0 | Status: DISCONTINUED | COMMUNITY
Start: 2021-09-08 | End: 2022-06-06

## 2022-06-06 RX ORDER — LIDOCAINE HYDROCHLORIDE 20 MG/ML
2 SOLUTION ORAL; TOPICAL
Qty: 2 | Refills: 0 | Status: DISCONTINUED | COMMUNITY
Start: 2022-02-25 | End: 2022-06-06

## 2022-06-06 RX ORDER — FLUTICASONE PROPIONATE 50 MCG
50 SPRAY, SUSPENSION NASAL DAILY
Refills: 0 | Status: DISCONTINUED | COMMUNITY
End: 2022-06-06

## 2022-06-06 NOTE — REVIEW OF SYSTEMS
[Fever] : no fever [Chills] : no chills [Palpitations] : no palpitations [Abdominal Pain] : no abdominal pain [Nausea] : nausea [Diarrhea] : diarrhea [Vomiting] : no vomiting

## 2022-06-06 NOTE — PHYSICAL EXAM
[No Acute Distress] : no acute distress [Well Nourished] : well nourished [Well Developed] : well developed [Well-Appearing] : well-appearing [No Respiratory Distress] : no respiratory distress  [No Accessory Muscle Use] : no accessory muscle use [Clear to Auscultation] : lungs were clear to auscultation bilaterally [Normal Rate] : normal rate  [Regular Rhythm] : with a regular rhythm [Normal S1, S2] : normal S1 and S2 [No Murmur] : no murmur heard [Soft] : abdomen soft [Non Tender] : non-tender [Non-distended] : non-distended [Normal Bowel Sounds] : normal bowel sounds [de-identified] : + Symmetric lower extremity edema

## 2022-06-06 NOTE — ASSESSMENT
[FreeTextEntry1] : 1.  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  Diarrhea\par Differential is broad including infectious versus chemotherapy related versus colitis\par Send infectious work-up with stool pathogens by PCR, cryptosporidium antigen, Giardia, C. difficile\par Send fecal calprotectin\par Send fecal elastase to rule out pancreatic involvement from amyloid\par Check TFTs\par \par 3.  Prediabetes\par Hemoglobin A1c

## 2022-06-06 NOTE — PHYSICAL EXAM
[Well Developed] : well developed [Well Nourished] : well nourished [No Acute Distress] : no acute distress [Normal Conjunctiva] : normal conjunctiva [Normal Venous Pressure] : normal venous pressure [No Carotid Bruit] : no carotid bruit [Normal S1, S2] : normal S1, S2 [No Murmur] : no murmur [No Rub] : no rub [No Gallop] : no gallop [___+] : [unfilled]U+ pitting edema to the sacrum [Clear Lung Fields] : clear lung fields [Good Air Entry] : good air entry [No Respiratory Distress] : no respiratory distress  [Soft] : abdomen soft [Non Tender] : non-tender [No Masses/organomegaly] : no masses/organomegaly [Normal Bowel Sounds] : normal bowel sounds [Normal Gait] : normal gait [No Cyanosis] : no cyanosis [No Clubbing] : no clubbing [No Varicosities] : no varicosities [No Rash] : no rash [No Skin Lesions] : no skin lesions [Moves all extremities] : moves all extremities [No Focal Deficits] : no focal deficits [Normal Speech] : normal speech [Alert and Oriented] : alert and oriented [Normal memory] : normal memory

## 2022-06-06 NOTE — HISTORY OF PRESENT ILLNESS
[FreeTextEntry8] : Tonia presents today as a new patient.  She has a history of amyloidosis/smoldering myeloma with neuropathy, heart block status post permanent pacemaker, and orthostatic hypotension.  She also has a history of prediabetes and hypothyroidism.  She is here to establish care and with concerns of diarrhea.  Diarrhea started before she started chemotherapy, though has worsened since then.  She has more than 10 bowel movements a day.  Bowel movements are large volume and watery.  There is no blood or mucus.  She has some mild nausea.  She has no vomiting.  She tries to stay well-hydrated.  She has cardiology, heme-onc, and urology specialist that she follows with regularly. [Spouse] : spouse

## 2022-06-08 ENCOUNTER — RX RENEWAL (OUTPATIENT)
Age: 57
End: 2022-06-08

## 2022-06-10 ENCOUNTER — APPOINTMENT (OUTPATIENT)
Dept: INFUSION THERAPY | Facility: HOSPITAL | Age: 57
End: 2022-06-10

## 2022-06-10 ENCOUNTER — APPOINTMENT (OUTPATIENT)
Dept: HEMATOLOGY ONCOLOGY | Facility: CLINIC | Age: 57
End: 2022-06-10

## 2022-06-10 ENCOUNTER — RESULT REVIEW (OUTPATIENT)
Age: 57
End: 2022-06-10

## 2022-06-10 LAB
BASOPHILS # BLD AUTO: 0.05 K/UL — SIGNIFICANT CHANGE UP (ref 0–0.2)
BASOPHILS NFR BLD AUTO: 0.8 % — SIGNIFICANT CHANGE UP (ref 0–2)
EOSINOPHIL # BLD AUTO: 0.04 K/UL — SIGNIFICANT CHANGE UP (ref 0–0.5)
EOSINOPHIL NFR BLD AUTO: 0.6 % — SIGNIFICANT CHANGE UP (ref 0–6)
HCT VFR BLD CALC: 29.6 % — LOW (ref 34.5–45)
HGB BLD-MCNC: 10 G/DL — LOW (ref 11.5–15.5)
IMM GRANULOCYTES NFR BLD AUTO: 0.5 % — SIGNIFICANT CHANGE UP (ref 0–1.5)
LYMPHOCYTES # BLD AUTO: 0.81 K/UL — LOW (ref 1–3.3)
LYMPHOCYTES # BLD AUTO: 12.6 % — LOW (ref 13–44)
MCHC RBC-ENTMCNC: 31.7 PG — SIGNIFICANT CHANGE UP (ref 27–34)
MCHC RBC-ENTMCNC: 33.8 G/DL — SIGNIFICANT CHANGE UP (ref 32–36)
MCV RBC AUTO: 94 FL — SIGNIFICANT CHANGE UP (ref 80–100)
MONOCYTES # BLD AUTO: 0.52 K/UL — SIGNIFICANT CHANGE UP (ref 0–0.9)
MONOCYTES NFR BLD AUTO: 8.1 % — SIGNIFICANT CHANGE UP (ref 2–14)
NEUTROPHILS # BLD AUTO: 4.98 K/UL — SIGNIFICANT CHANGE UP (ref 1.8–7.4)
NEUTROPHILS NFR BLD AUTO: 77.4 % — HIGH (ref 43–77)
NRBC # BLD: 0 /100 WBCS — SIGNIFICANT CHANGE UP (ref 0–0)
PLATELET # BLD AUTO: 256 K/UL — SIGNIFICANT CHANGE UP (ref 150–400)
RBC # BLD: 3.15 M/UL — LOW (ref 3.8–5.2)
RBC # FLD: 16.3 % — HIGH (ref 10.3–14.5)
WBC # BLD: 6.43 K/UL — SIGNIFICANT CHANGE UP (ref 3.8–10.5)
WBC # FLD AUTO: 6.43 K/UL — SIGNIFICANT CHANGE UP (ref 3.8–10.5)

## 2022-06-15 ENCOUNTER — APPOINTMENT (OUTPATIENT)
Dept: HEMATOLOGY ONCOLOGY | Facility: CLINIC | Age: 57
End: 2022-06-15
Payer: COMMERCIAL

## 2022-06-15 VITALS
DIASTOLIC BLOOD PRESSURE: 81 MMHG | RESPIRATION RATE: 14 BRPM | WEIGHT: 119.05 LBS | SYSTOLIC BLOOD PRESSURE: 137 MMHG | BODY MASS INDEX: 21.77 KG/M2 | HEART RATE: 69 BPM | OXYGEN SATURATION: 99 % | TEMPERATURE: 98.1 F

## 2022-06-15 PROCEDURE — 99214 OFFICE O/P EST MOD 30 MIN: CPT

## 2022-06-15 RX ORDER — SIMETHICONE 80 MG/1
80 TABLET, CHEWABLE ORAL
Refills: 0 | Status: DISCONTINUED | COMMUNITY
Start: 2022-04-06 | End: 2022-06-15

## 2022-06-15 RX ORDER — FAMOTIDINE 20 MG/1
20 TABLET, FILM COATED ORAL
Qty: 60 | Refills: 2 | Status: DISCONTINUED | COMMUNITY
End: 2022-06-15

## 2022-06-15 RX ORDER — SACCHAROMYCES BOULARDII 50 MG
250 CAPSULE ORAL
Refills: 0 | Status: DISCONTINUED | COMMUNITY
Start: 2022-04-18 | End: 2022-06-15

## 2022-06-15 NOTE — DISCUSSION/SUMMARY
[FreeTextEntry1] : 57 year-old woman with light chain amyloidosis, orthostatic hypotension requiring fludrocortisone and midodrine, now started on Cy-Bor-D plus daratumumab, with lower extremity edmea, likely secondary to fludrocortisone, presents today or follow-up after recent hospitalization (March 2022).\par Myleoma therapies per hematology.\par Now off of fludocortisone, but some degree of lower extremity edema persists.\par \par Encouraged ambulations as tolerated.\par Starts PT on 5/16.\par \par Elevate the legs.\par \par We discussed compression stockings, but she has had a bad reaction to them in the past that required her to cut them off.

## 2022-06-15 NOTE — REASON FOR VISIT
[Other: ____] : [unfilled] [FreeTextEntry1] : June 2022 - Patient returns today for follow-up.\par She has weaned herself off of the fludrocortisone. She now only gets orthostatic hypotension in association with frequent diarrhea. She has non-bloody, watery diarrhea up to 10 times per day. Some days, it is well controlled with Imodium, but she cannot remember the last well formed bowel movement. \par She continues to take midodrine 20 mg TID.\par She continues Cy-Bor-D plus daratumumab with Dr. Goldberg.

## 2022-06-15 NOTE — CARDIOLOGY SUMMARY
[de-identified] : 3/30/2022, septum 1.2 cm, PWT 1.2 cm, moderately dilated LA, normal LV systolic function, average GLS -14.6%, pattern is\par homogeneous (i.e. not suggestive of amyloid), LVEF 60%\par  [de-identified] : 3/28/2022, Small pericardial and bilateral pleural effusions. Subtle inferior lateral basal myocardial wall with gadolinium enhancement.  Biatrial enlargement with associated late gadolinium enhancement. These findings may represent amyloidosis.

## 2022-06-16 LAB
ALBUMIN SERPL ELPH-MCNC: 4.2 G/DL
ALP BLD-CCNC: 147 U/L
ALT SERPL-CCNC: 44 U/L
ANA SER IF-ACNC: NEGATIVE
ANION GAP SERPL CALC-SCNC: 13 MMOL/L
AST SERPL-CCNC: 22 U/L
BASOPHILS # BLD AUTO: 0.04 K/UL
BASOPHILS NFR BLD AUTO: 0.7 %
BILIRUB SERPL-MCNC: 0.4 MG/DL
BUN SERPL-MCNC: 14 MG/DL
C DIFF TOXIN B QL PCR REFLEX: NORMAL
CALCIUM SERPL-MCNC: 8.6 MG/DL
CALPROTECTIN FECAL: 21 UG/G
CHLORIDE SERPL-SCNC: 100 MMOL/L
CO2 SERPL-SCNC: 23 MMOL/L
CREAT SERPL-MCNC: 0.56 MG/DL
CRYPTOSP AG SPEC QL: NORMAL
DEPRECATED O AND P PREP STL: NORMAL
EGFR: 106 ML/MIN/1.73M2
ENA SS-A AB SER IA-ACNC: <0.2 AL
ENA SS-B AB SER IA-ACNC: <0.2 AL
EOSINOPHIL # BLD AUTO: 0.01 K/UL
EOSINOPHIL NFR BLD AUTO: 0.2 %
ESTIMATED AVERAGE GLUCOSE: 137 MG/DL
G LAMBLIA AG STL QL: NORMAL
GDH ANTIGEN: NOT DETECTED
GI PCR PANEL, STOOL: NORMAL
GLUCOSE SERPL-MCNC: 96 MG/DL
HBA1C MFR BLD HPLC: 6.4 %
HCT VFR BLD CALC: 32.8 %
HGB BLD-MCNC: 10.1 G/DL
IMM GRANULOCYTES NFR BLD AUTO: 0.4 %
LYMPHOCYTES # BLD AUTO: 0.58 K/UL
LYMPHOCYTES NFR BLD AUTO: 10.5 %
MAN DIFF?: NORMAL
MCHC RBC-ENTMCNC: 30.4 PG
MCHC RBC-ENTMCNC: 30.8 GM/DL
MCV RBC AUTO: 98.8 FL
MONOCYTES # BLD AUTO: 0.54 K/UL
MONOCYTES NFR BLD AUTO: 9.7 %
NEUTROPHILS # BLD AUTO: 4.35 K/UL
NEUTROPHILS NFR BLD AUTO: 78.5 %
PLATELET # BLD AUTO: 235 K/UL
POTASSIUM SERPL-SCNC: 4.3 MMOL/L
PROT SERPL-MCNC: 5.8 G/DL
RBC # BLD: 3.32 M/UL
RBC # FLD: 17.5 %
SODIUM SERPL-SCNC: 137 MMOL/L
T4 FREE SERPL-MCNC: 1.2 NG/DL
TOXIN A AND B: NOT DETECTED
TSH SERPL-ACNC: 11.8 UIU/ML
WBC # FLD AUTO: 5.54 K/UL

## 2022-06-16 NOTE — HISTORY OF PRESENT ILLNESS
[Therapy: ___] : Therapy: [unfilled] [Cycle: ___] : Cycle: [unfilled] [Day: ___] : Day: [unfilled] [de-identified] : 58 y/o F with hx of thyroidectomy in 2011 (benign pathology) on thyroid replacement since then, and with COVID pneumonia back in March of 2020 ultimately signed herself out AMA, chronic issues thereafter, ended up going back to work in July 2020. In January-February 2021 had Pfizer vaccines. Started having issues starting in March 2021, she was having GI issues, endoscopy showed chronic gastritis. Additionally diagnosed with IBS at that point. She has been having issues with Postural Orthostatic Tachycardia Syndrome - her blood pressure goes down when she stands up from a seated position. She has fainted "too many times to count." She ends up with bruises here and there but nothing severe where she would need to go to the hospital. She saw two neurologists who did extensive evaluations without any success in finding diagnostic lesions. She reports she is seeing specialist for dysautonomia, Dr. Colón at [facility] (neurology). She said overall her POTS has improved but she has a had a couple of episodes of fainting in the last couple of weeks. She started fludrocortisone in September 2021 and has titrated up the dose. She is now on 1 mg total (probably 0.5 mg twice daily). The only way she feels completely normal is when she is laying down. BP gets very low on standing up, it has registered as low as 50s over 40s. She reports when she is standing or sitting for too long, she will get a chest tightness, but not quite a pain. It gets to the point where she feels like she is having trouble breathing and then she has to sit down. She has muscle aches in her legs which improves with warm bath, and then some burning pain in her arms which reminds her of a sunburn. She also now is having issues with her mouth - very difficult to eat as it feels like she has burnt her mouth. She has lost 47 pounds since March 2021. She is not really able to get much down due to these feelings, although she does have an appetite. She denies any night-sweats, she does always feel cold though. Her bowel movements "swing" from constipation to diarrhea. She was on Linzess recently but was taken off it. She reports that she is urinating normally, in fact a lot because she drinks a fair amount of water daily. She has no swelling in her legs at this time. She had in the past at work when she was standing a lot and was on amlodipine. She was sent to me for evaluation of elevated free light chains. 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 She was found with no monoclonal protein on SPEP and hemoglobin normal, normal renal function, and no hypercalcemia. Fat pad biopsy showed positive congo red staining in rare blood vessels and positive for polarization. This is concerning for primary light chain amyloidosis.\par \par In March 2022 patient was admitted to [facility] for multiple recurrent syncopal episodes. While admitted course was complicated by multiple unresponsive episodes prompting RRTs, during which pt found to be hypotensive and bradycardic. She initially required levophed for BP support but ultimately BP remained stable while on Midodrine 20mg q8 and Fludrocortisone 0.2mg q24. Pt noted to intermittently have sinus pauses (2-3.8 seconds) thought to be 2/2 vagal episodes, and asymptomatic bradycardia overnight on tele. ECG noted NSR but 1st degree AV block. TTE 3/16/22 showed normal LV size and function with EF 55%-60%. No regional wall motion abnormalities. Moderate concentric LVH. \par \par Pt also started on oral vanco for C.diff and completed ceftriaxone for UTI. \par \par Ultimately transferred to [facility] for consideration of inpatient chemotherapy. \par PPM placement recommended, as cardiac MRI showed evidence of amyloid involvement. Pt had another RRT for hypotension and symptomatic bradycardia to 30s, was on pressor support until she received a dual chamber PPM. Pt also continued her C.diff treatment and chemotherapy was placed on hold until the diarrhea cleared. She was then started on Daratumumab  + CyBorD inpatient, and tolerated it well ; she completed 2 treatments of chemotherapy inpatient.\par \par \par Patient has continued with this regimen now as outpatient. She did continue to have episodes of diarrhea since previous C. Diff infection in March. On follow up 4/18 she noticed that her urine was red in color and after drinking water it became more orange. Treated with antibiotics for UTI and evaluated by urology. Cytoxan was held for a time period, and ultimately this cleared with antibiotics.  [FreeTextEntry1] : Kleber + CyBorD started on 4/8/2022, held cytoxan from 4/18-5/12 for hematuria [de-identified] : On follow up today patient reports her swelling has improved and this has been since she stopped taking Fludrocortisone. At this time swelling is limited to lower extremities mostly. There is still a degree of swelling however she is able to do more tasks at home. She had previously cancelled PT eval due to the extreme swelling and will now be rescheduling. Overall her BP had been stable on Midodrine but she had ran out of pills and yesterday she captured her BP reading at 40 systolic. She states she did experience much in terms of symptoms but she stayed in bed until our office refilled and her BP increased. Today it is 137/81 in the office. The fatigue does continue and she is still experiencing diarrhea which started before due to C. Diff. She continues to takes Imodium, today she had already taken prior to appointment because she had diarrhea almost every hour starting last night (per patient this frequency of diarrhea does not occur everyday but happens randomly at least 1-2 days a week). She also took Zofran this morning because she did have some nausea but this seems to be the only medication that helps slow diarrhea down. She states she stopped Lomotil because it was not working and also stopped Florastor, PPI, and Carafate. she saw her new PCP who tested her stool and there were no infectious or inflammatory findings on this. She is scheduled to see GI in the next few weeks for further evaluation. Initially when she started treatment she had 2-3 "bad days" after treatment lasting until Monday however now she only starts feeling better on Thursday and is retreated on Fridays. She does eat and pushes herself to do so her complaint is that her taste is altered and nothing is really enjoyable.  She denied having any recent fevers, chills, nausea, vomiting. Denied any further shortness of breath with exertion. No chest pain, palpitations. Neuropathy is stable. She has less  in her left hand than the right since starting Velcade, but not interfering with use. Her PCP had tested her TFTs and she is further awaiting if any changes are planned to be made to Synthroid or if the testing will be repeated.\par \par

## 2022-06-16 NOTE — ASSESSMENT
[FreeTextEntry1] : 58 y/o F previously healthy but with recently developing chronic postural orthostatic tachycardia syndrome (POTS) and autonomic postural hypotension, found to have systemic lambda light chain amyloidosis recently discharged from [facility] for cardiac complications likely related to amyloid involvement now s/p PPM. Cycle 1 of CyBorD was initiated on 4/1/22 while inpatient and today is now C3 D20.\par \par -Serum SPEP without a monoclonal fraction, Urine SPEP without monoclonal protein, immunofixation normal. \par -However on diagnosis Lambda serum FLCs 8.73 and kappa 0.92, for a ratio of 0.11 (or 9.5). \par -Fat pad biopsy done and was POSITIVE for congo red, with positive polarization. \par -As per discussion with pathology, unable to send this for mass spectrometry to confirm amyloid type. However, now s/p BMBx which is showing ~35% plasmacytosis (monoclonal) c/w plasma cell neoplasm. 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 Is NOT meeting MM criteria. \par -Recent hospitalization noted - patient with hypotension likely due to heart block 2/2 amyloidosis, which confirmed involving heart with MRI. \par -Urine showing proteinuria (although not nephrotic range).\par -Systemic light chain amyloidosis is classically associated with a thickened interventricular septum and proteinuria. Also associated with neurologic sequelae such as that she has. \par -Pt currently being treated for systemic light chain amyloidosis with poncho-CyBorD.  Day 1 dose inpatient split, as patient was getting PPM and wanted lower risk of reaction / delays. \par -Will continue daratumumab plus CyBorD which will be given weekly on D 1,8,15,22 of 28 day cycle, Daratumumab will be weekly x 8 followed by every 2 weeks x 8 and then monthly and plan for autologous bone marrow transplant. Discussed possible side effects including fatigue and neuropathy, as well as toxicity such as pancytopenia and increased susceptibility to infection. \par -Held cyclophosphamide due to hematuria, although more likely 2/2 UTI, this has been restarted.\par -Continue follow up with Cardiology and PCP.\par -Will continue to monitor diarrhea, GI eval scheduled.\par -[facility] consult previously sent for PT/OT for deconditioning, this had been cancelled by patient for extreme LE swelling. Patient has improved and will be rescheduling at this time.\par -Patient understands and agrees with plan. All information explained to the best of my ability.\par -Following up closely with cardiology. \par -RTC in 1 month.

## 2022-06-16 NOTE — PHYSICAL EXAM
[Ambulatory and capable of all self care but unable to carry out any work activities] : Status 2- Ambulatory and capable of all self care but unable to carry out any work activities. 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

## 2022-06-16 NOTE — REVIEW OF SYSTEMS
[Fatigue] : fatigue [Lower Ext Edema] : lower extremity edema [Diarrhea] : diarrhea [Difficulty Walking] : difficulty walking [Anxiety] : anxiety [Muscle Weakness] : muscle weakness [Negative] : Heme/Lymph [Fever] : no fever [Chills] : no chills [Night Sweats] : no night sweats [Recent Change In Weight] : ~T no recent weight change [Dysphagia] : no dysphagia [Nosebleeds] : no nosebleeds [Odynophagia] : no odynophagia [Chest Pain] : no chest pain [Palpitations] : no palpitations [Abdominal Pain] : no abdominal pain [Vomiting] : no vomiting [Constipation] : no constipation [Dysuria] : no dysuria [Confused] : no confusion [Fainting] : no fainting [Dizziness] : no dizziness [Insomnia] : no insomnia [Hot Flashes] : no hot flashes [FreeTextEntry2] : fatigue has improved

## 2022-06-17 ENCOUNTER — APPOINTMENT (OUTPATIENT)
Dept: INFUSION THERAPY | Facility: HOSPITAL | Age: 57
End: 2022-06-17

## 2022-06-17 ENCOUNTER — RESULT REVIEW (OUTPATIENT)
Age: 57
End: 2022-06-17

## 2022-06-17 ENCOUNTER — APPOINTMENT (OUTPATIENT)
Dept: HEMATOLOGY ONCOLOGY | Facility: CLINIC | Age: 57
End: 2022-06-17

## 2022-06-17 LAB
ALBUMIN SERPL ELPH-MCNC: 4.2 G/DL
ALP BLD-CCNC: 102 U/L
ALT SERPL-CCNC: 78 U/L
ANION GAP SERPL CALC-SCNC: 13 MMOL/L
AST SERPL-CCNC: 33 U/L
BASOPHILS # BLD AUTO: 0.06 K/UL — SIGNIFICANT CHANGE UP (ref 0–0.2)
BASOPHILS NFR BLD AUTO: 1 % — SIGNIFICANT CHANGE UP (ref 0–2)
BILIRUB SERPL-MCNC: 0.8 MG/DL
BUN SERPL-MCNC: 11 MG/DL
CALCIUM SERPL-MCNC: 8.9 MG/DL
CHLORIDE SERPL-SCNC: 101 MMOL/L
CO2 SERPL-SCNC: 24 MMOL/L
CREAT SERPL-MCNC: 0.47 MG/DL
EGFR: 110 ML/MIN/1.73M2
EOSINOPHIL # BLD AUTO: 0.05 K/UL — SIGNIFICANT CHANGE UP (ref 0–0.5)
EOSINOPHIL NFR BLD AUTO: 0.8 % — SIGNIFICANT CHANGE UP (ref 0–6)
GLUCOSE SERPL-MCNC: 96 MG/DL
HCT VFR BLD CALC: 29.8 % — LOW (ref 34.5–45)
HGB BLD-MCNC: 10 G/DL — LOW (ref 11.5–15.5)
IMM GRANULOCYTES NFR BLD AUTO: 0.3 % — SIGNIFICANT CHANGE UP (ref 0–1.5)
LYMPHOCYTES # BLD AUTO: 0.73 K/UL — LOW (ref 1–3.3)
LYMPHOCYTES # BLD AUTO: 12.1 % — LOW (ref 13–44)
MCHC RBC-ENTMCNC: 32.2 PG — SIGNIFICANT CHANGE UP (ref 27–34)
MCHC RBC-ENTMCNC: 33.6 G/DL — SIGNIFICANT CHANGE UP (ref 32–36)
MCV RBC AUTO: 95.8 FL — SIGNIFICANT CHANGE UP (ref 80–100)
MONOCYTES # BLD AUTO: 0.49 K/UL — SIGNIFICANT CHANGE UP (ref 0–0.9)
MONOCYTES NFR BLD AUTO: 8.1 % — SIGNIFICANT CHANGE UP (ref 2–14)
NEUTROPHILS # BLD AUTO: 4.7 K/UL — SIGNIFICANT CHANGE UP (ref 1.8–7.4)
NEUTROPHILS NFR BLD AUTO: 77.7 % — HIGH (ref 43–77)
NRBC # BLD: 0 /100 WBCS — SIGNIFICANT CHANGE UP (ref 0–0)
PLATELET # BLD AUTO: 220 K/UL — SIGNIFICANT CHANGE UP (ref 150–400)
POTASSIUM SERPL-SCNC: 4.8 MMOL/L
PROT SERPL-MCNC: 5.6 G/DL
RBC # BLD: 3.11 M/UL — LOW (ref 3.8–5.2)
RBC # FLD: 16.7 % — HIGH (ref 10.3–14.5)
SODIUM SERPL-SCNC: 137 MMOL/L
WBC # BLD: 6.05 K/UL — SIGNIFICANT CHANGE UP (ref 3.8–10.5)
WBC # FLD AUTO: 6.05 K/UL — SIGNIFICANT CHANGE UP (ref 3.8–10.5)

## 2022-06-20 LAB
ALBUMIN MFR SERPL ELPH: 64.4 %
ALBUMIN SERPL-MCNC: 3.6 G/DL
ALBUMIN/GLOB SERPL: 1.8 RATIO
ALPHA1 GLOB MFR SERPL ELPH: 5.9 %
ALPHA1 GLOB SERPL ELPH-MCNC: 0.3 G/DL
ALPHA2 GLOB MFR SERPL ELPH: 12.3 %
ALPHA2 GLOB SERPL ELPH-MCNC: 0.7 G/DL
B-GLOBULIN MFR SERPL ELPH: 11.2 %
B-GLOBULIN SERPL ELPH-MCNC: 0.6 G/DL
DEPRECATED KAPPA LC FREE/LAMBDA SER: 0.35 RATIO
GAMMA GLOB FLD ELPH-MCNC: 0.3 G/DL
GAMMA GLOB MFR SERPL ELPH: 6.2 %
IGA SER QL IEP: 15 MG/DL
IGG SER QL IEP: 395 MG/DL
IGM SER QL IEP: <10 MG/DL
INTERPRETATION SERPL IEP-IMP: NORMAL
KAPPA LC CSF-MCNC: 1.32 MG/DL
KAPPA LC SERPL-MCNC: 0.46 MG/DL
M PROTEIN MFR SERPL ELPH: 2.5 %
M PROTEIN SPEC IFE-MCNC: NORMAL
MONOCLON BAND OBS SERPL: 0.1 G/DL
PROT SERPL-MCNC: 5.6 G/DL
PROT SERPL-MCNC: 5.6 G/DL

## 2022-06-23 ENCOUNTER — RESULT REVIEW (OUTPATIENT)
Age: 57
End: 2022-06-23

## 2022-06-23 ENCOUNTER — APPOINTMENT (OUTPATIENT)
Dept: HEMATOLOGY ONCOLOGY | Facility: CLINIC | Age: 57
End: 2022-06-23

## 2022-06-23 VITALS
DIASTOLIC BLOOD PRESSURE: 59 MMHG | HEART RATE: 72 BPM | HEIGHT: 63.54 IN | BODY MASS INDEX: 20.56 KG/M2 | OXYGEN SATURATION: 99 % | TEMPERATURE: 96.9 F | WEIGHT: 117.48 LBS | RESPIRATION RATE: 16 BRPM | SYSTOLIC BLOOD PRESSURE: 96 MMHG

## 2022-06-23 LAB
BASOPHILS # BLD AUTO: 0.04 K/UL — SIGNIFICANT CHANGE UP (ref 0–0.2)
BASOPHILS NFR BLD AUTO: 0.8 % — SIGNIFICANT CHANGE UP (ref 0–2)
EOSINOPHIL # BLD AUTO: 0.1 K/UL — SIGNIFICANT CHANGE UP (ref 0–0.5)
EOSINOPHIL NFR BLD AUTO: 1.9 % — SIGNIFICANT CHANGE UP (ref 0–6)
HCT VFR BLD CALC: 28.8 % — LOW (ref 34.5–45)
HGB BLD-MCNC: 9.7 G/DL — LOW (ref 11.5–15.5)
IMM GRANULOCYTES NFR BLD AUTO: 1 % — SIGNIFICANT CHANGE UP (ref 0–1.5)
LYMPHOCYTES # BLD AUTO: 0.71 K/UL — LOW (ref 1–3.3)
LYMPHOCYTES # BLD AUTO: 13.6 % — SIGNIFICANT CHANGE UP (ref 13–44)
MCHC RBC-ENTMCNC: 32.3 PG — SIGNIFICANT CHANGE UP (ref 27–34)
MCHC RBC-ENTMCNC: 33.7 G/DL — SIGNIFICANT CHANGE UP (ref 32–36)
MCV RBC AUTO: 96 FL — SIGNIFICANT CHANGE UP (ref 80–100)
MONOCYTES # BLD AUTO: 0.33 K/UL — SIGNIFICANT CHANGE UP (ref 0–0.9)
MONOCYTES NFR BLD AUTO: 6.3 % — SIGNIFICANT CHANGE UP (ref 2–14)
NEUTROPHILS # BLD AUTO: 4 K/UL — SIGNIFICANT CHANGE UP (ref 1.8–7.4)
NEUTROPHILS NFR BLD AUTO: 76.4 % — SIGNIFICANT CHANGE UP (ref 43–77)
NRBC # BLD: 0 /100 WBCS — SIGNIFICANT CHANGE UP (ref 0–0)
PLATELET # BLD AUTO: 140 K/UL — LOW (ref 150–400)
RBC # BLD: 3 M/UL — LOW (ref 3.8–5.2)
RBC # FLD: 17.1 % — HIGH (ref 10.3–14.5)
WBC # BLD: 5.23 K/UL — SIGNIFICANT CHANGE UP (ref 3.8–10.5)
WBC # FLD AUTO: 5.23 K/UL — SIGNIFICANT CHANGE UP (ref 3.8–10.5)

## 2022-06-23 PROCEDURE — 99215 OFFICE O/P EST HI 40 MIN: CPT | Mod: 1L

## 2022-06-24 ENCOUNTER — APPOINTMENT (OUTPATIENT)
Dept: HEMATOLOGY ONCOLOGY | Facility: CLINIC | Age: 57
End: 2022-06-24

## 2022-06-24 ENCOUNTER — APPOINTMENT (OUTPATIENT)
Dept: INFUSION THERAPY | Facility: HOSPITAL | Age: 57
End: 2022-06-24

## 2022-06-29 ENCOUNTER — NON-APPOINTMENT (OUTPATIENT)
Age: 57
End: 2022-06-29

## 2022-06-29 ENCOUNTER — APPOINTMENT (OUTPATIENT)
Dept: HEMATOLOGY ONCOLOGY | Facility: CLINIC | Age: 57
End: 2022-06-29

## 2022-06-29 PROCEDURE — 99442: CPT

## 2022-06-30 ENCOUNTER — TRANSCRIPTION ENCOUNTER (OUTPATIENT)
Age: 57
End: 2022-06-30

## 2022-07-01 ENCOUNTER — APPOINTMENT (OUTPATIENT)
Dept: HEMATOLOGY ONCOLOGY | Facility: CLINIC | Age: 57
End: 2022-07-01

## 2022-07-01 ENCOUNTER — APPOINTMENT (OUTPATIENT)
Dept: INFUSION THERAPY | Facility: HOSPITAL | Age: 57
End: 2022-07-01

## 2022-07-07 ENCOUNTER — OUTPATIENT (OUTPATIENT)
Dept: OUTPATIENT SERVICES | Facility: HOSPITAL | Age: 57
LOS: 1 days | Discharge: HOME | End: 2022-07-07

## 2022-07-07 ENCOUNTER — APPOINTMENT (OUTPATIENT)
Dept: GASTROENTEROLOGY | Facility: CLINIC | Age: 57
End: 2022-07-07

## 2022-07-07 VITALS
SYSTOLIC BLOOD PRESSURE: 108 MMHG | BODY MASS INDEX: 19.91 KG/M2 | HEART RATE: 67 BPM | HEIGHT: 63 IN | DIASTOLIC BLOOD PRESSURE: 65 MMHG | OXYGEN SATURATION: 96 % | WEIGHT: 112.4 LBS | TEMPERATURE: 96.9 F

## 2022-07-07 DIAGNOSIS — Z87.898 PERSONAL HISTORY OF OTHER SPECIFIED CONDITIONS: ICD-10-CM

## 2022-07-07 DIAGNOSIS — Z86.59 PERSONAL HISTORY OF OTHER MENTAL AND BEHAVIORAL DISORDERS: ICD-10-CM

## 2022-07-07 DIAGNOSIS — Z86.2 PERSONAL HISTORY OF DISEASES OF THE BLOOD AND BLOOD-FORMING ORGANS AND CERTAIN DISORDERS INVOLVING THE IMMUNE MECHANISM: ICD-10-CM

## 2022-07-07 DIAGNOSIS — E55.9 VITAMIN D DEFICIENCY, UNSPECIFIED: ICD-10-CM

## 2022-07-07 DIAGNOSIS — E85.9 AMYLOIDOSIS, UNSPECIFIED: ICD-10-CM

## 2022-07-07 DIAGNOSIS — Z87.2 PERSONAL HISTORY OF DISEASES OF THE SKIN AND SUBCUTANEOUS TISSUE: ICD-10-CM

## 2022-07-07 DIAGNOSIS — R76.8 OTHER SPECIFIED ABNORMAL IMMUNOLOGICAL FINDINGS IN SERUM: ICD-10-CM

## 2022-07-07 DIAGNOSIS — D47.2 MONOCLONAL GAMMOPATHY: ICD-10-CM

## 2022-07-07 PROCEDURE — 99204 OFFICE O/P NEW MOD 45 MIN: CPT

## 2022-07-07 NOTE — REVIEW OF SYSTEMS
[Feeling Poorly] : feeling poorly [Feeling Tired] : feeling tired [As Noted in HPI] : as noted in HPI [Diarrhea] : diarrhea [Heartburn] : heartburn [Negative] : Neurological [FreeTextEntry5] : Patient has a history of heart block with a permanent pacemaker.

## 2022-07-07 NOTE — ASSESSMENT
[FreeTextEntry1] : Mrs. Hassan is a 57-year-old female with significant medical issues.  From the  gastrointestinal perspective the patient has been having ongoing intermittent diarrhea since November.  In March she was diagnosed with C. difficile and treated with vancomycin.  This was followed by a course of chemotherapy for amyloidosis which has been suspended due to the persistence of the diarrhea.  Differential diagnosis remains vast.  The diarrhea seems more consistent with a small bowel etiology.  This is evidenced by the fact that she has high-volume bowel movements causing volume depletion and weight loss.  She is not really having significant colonic spasm or tenesmus.  Stool testing for infections has been negative.  I do not feel it was related to her chemotherapy since the diarrhea antedated the initiation of this treatment.  I started the patient on cholestyramine as a therapeutic trial.  I hope that this will at least treat the symptoms temporarily until the etiology can be better understood.  The goal is to decrease bowel frequency and volume to allow her to decrease her midodrine.  Her weight loss is also of concern.  Although considered less likely there is the remote possibility that this is a manifestation of amyloid involvement of the gastrointestinal tract.  Although the cholestyramine will be more effective for a colonic etiology I am hoping that it does afford her some relief.  If this  is ineffective I may opt to give the patient another course of vancomycin and perform a sigmoidoscopy to rule out the remote possibility of microscopic colitis.  The patient may need formal imaging of the  small intestine in the form of an enterography if the symptoms persist.  The patient will get back to me with a progress report in 1 week's time.

## 2022-07-07 NOTE — PHYSICAL EXAM
[General Appearance - Alert] : alert [Respiration, Rhythm And Depth] : normal respiratory rhythm and effort [Auscultation Breath Sounds / Voice Sounds] : lungs were clear to auscultation bilaterally [Apical Impulse] : the apical impulse was normal [Heart Rate And Rhythm] : heart rate was normal and rhythm regular [Heart Sounds] : normal S1 and S2 [Bowel Sounds] : normal bowel sounds [Abdomen Soft] : soft [Abdomen Tenderness] : non-tender [] : no hepato-splenomegaly [FreeTextEntry1] : There is no tenderness or distention.

## 2022-07-07 NOTE — HISTORY OF PRESENT ILLNESS
[FreeTextEntry1] : I saw patient Tonia Hassan the office today.  Patient is a 57-year-old female with a complicated medical history.  From the gastrointestinal perspective the patient has been followed over the years by another physician for treatment of irritable bowel syndrome as well as GERD with a possible short segment Richter's.  The patient has had several endoscopies and colonoscopies in the past and appears to be up-to-date in this regard.  The patient has a history of hypothyroidism but denies a history of diabetes or coronary artery disease.  Over the past 6 to 8 months the patient has had several significant diagnoses.  The patient was diagnosed with light chain amyloidosis and then initiated chemotherapy treatment with apparently a gratifying response.  Prior to this in November the patient saw a gastroenterologist for diarrhea.  It was suspected that she had a flareup of her irritable bowel at that time.  In March the patient was hospitalized for blood pressure issues and refractory diarrhea and was found to have hypotension.  Patient developed heart block which necessitated the placement of a pacemaker.  The patient continues to have blood pressure issues and is on midodrine 3 times a day.  The patient complains of profuse diarrhea which is refractory to medication.  Even if she takes 2-3 Imodium' or even a Lomotil she will still have breakthrough symptoms.  The symptoms have greatly curtailed her ability to eat since putting food in her mouth exacerbates the diarrheal urgency.  The patient is having nocturnal bowel movements of high volume.  When the patient was hospitalized in March she was diagnosed with C. difficile and took vancomycin.  There was a temporary respite in the diarrhea but it recurred soon afterwards.  Follow-up stool testing for PCR C. difficile ova and parasites and calprotectin have been negative.  Patient did not start another course of vancomycin.  Under the suspicion that the chemotherapy may have provoked some of the diarrhea it has been stopped for 3 weeks with no amelioration of the symptoms.  The patient has never been diagnosed with celiac disease and has never abuse caffeine tobacco or ethanol.

## 2022-07-08 ENCOUNTER — APPOINTMENT (OUTPATIENT)
Dept: HEMATOLOGY ONCOLOGY | Facility: CLINIC | Age: 57
End: 2022-07-08

## 2022-07-08 ENCOUNTER — APPOINTMENT (OUTPATIENT)
Dept: INFUSION THERAPY | Facility: HOSPITAL | Age: 57
End: 2022-07-08

## 2022-07-12 ENCOUNTER — APPOINTMENT (OUTPATIENT)
Dept: ELECTROPHYSIOLOGY | Facility: CLINIC | Age: 57
End: 2022-07-12

## 2022-07-12 ENCOUNTER — NON-APPOINTMENT (OUTPATIENT)
Age: 57
End: 2022-07-12

## 2022-07-12 PROCEDURE — 93296 REM INTERROG EVL PM/IDS: CPT

## 2022-07-12 PROCEDURE — 93294 REM INTERROG EVL PM/LDLS PM: CPT

## 2022-07-15 ENCOUNTER — APPOINTMENT (OUTPATIENT)
Dept: INFUSION THERAPY | Facility: HOSPITAL | Age: 57
End: 2022-07-15

## 2022-07-15 ENCOUNTER — APPOINTMENT (OUTPATIENT)
Dept: HEMATOLOGY ONCOLOGY | Facility: CLINIC | Age: 57
End: 2022-07-15

## 2022-07-18 ENCOUNTER — APPOINTMENT (OUTPATIENT)
Dept: HEMATOLOGY ONCOLOGY | Facility: CLINIC | Age: 57
End: 2022-07-18

## 2022-07-18 ENCOUNTER — RESULT REVIEW (OUTPATIENT)
Age: 57
End: 2022-07-18

## 2022-07-18 ENCOUNTER — LABORATORY RESULT (OUTPATIENT)
Age: 57
End: 2022-07-18

## 2022-07-18 VITALS
HEART RATE: 70 BPM | RESPIRATION RATE: 16 BRPM | WEIGHT: 113.1 LBS | SYSTOLIC BLOOD PRESSURE: 100 MMHG | TEMPERATURE: 97.9 F | HEIGHT: 63.03 IN | OXYGEN SATURATION: 97 % | DIASTOLIC BLOOD PRESSURE: 64 MMHG | BODY MASS INDEX: 20.04 KG/M2

## 2022-07-18 DIAGNOSIS — R42 DIZZINESS AND GIDDINESS: ICD-10-CM

## 2022-07-18 LAB
BASOPHILS # BLD AUTO: 0.06 K/UL — SIGNIFICANT CHANGE UP (ref 0–0.2)
BASOPHILS NFR BLD AUTO: 1.2 % — SIGNIFICANT CHANGE UP (ref 0–2)
EOSINOPHIL # BLD AUTO: 0.05 K/UL — SIGNIFICANT CHANGE UP (ref 0–0.5)
EOSINOPHIL NFR BLD AUTO: 1 % — SIGNIFICANT CHANGE UP (ref 0–6)
HCT VFR BLD CALC: 31.1 % — LOW (ref 34.5–45)
HGB BLD-MCNC: 10.1 G/DL — LOW (ref 11.5–15.5)
IMM GRANULOCYTES NFR BLD AUTO: 0.4 % — SIGNIFICANT CHANGE UP (ref 0–1.5)
LYMPHOCYTES # BLD AUTO: 0.93 K/UL — LOW (ref 1–3.3)
LYMPHOCYTES # BLD AUTO: 19.3 % — SIGNIFICANT CHANGE UP (ref 13–44)
MCHC RBC-ENTMCNC: 32.2 PG — SIGNIFICANT CHANGE UP (ref 27–34)
MCHC RBC-ENTMCNC: 32.5 G/DL — SIGNIFICANT CHANGE UP (ref 32–36)
MCV RBC AUTO: 99 FL — SIGNIFICANT CHANGE UP (ref 80–100)
MONOCYTES # BLD AUTO: 0.33 K/UL — SIGNIFICANT CHANGE UP (ref 0–0.9)
MONOCYTES NFR BLD AUTO: 6.9 % — SIGNIFICANT CHANGE UP (ref 2–14)
NEUTROPHILS # BLD AUTO: 3.42 K/UL — SIGNIFICANT CHANGE UP (ref 1.8–7.4)
NEUTROPHILS NFR BLD AUTO: 71.2 % — SIGNIFICANT CHANGE UP (ref 43–77)
NRBC # BLD: 0 /100 WBCS — SIGNIFICANT CHANGE UP (ref 0–0)
PLATELET # BLD AUTO: 204 K/UL — SIGNIFICANT CHANGE UP (ref 150–400)
RBC # BLD: 3.14 M/UL — LOW (ref 3.8–5.2)
RBC # FLD: 16 % — HIGH (ref 10.3–14.5)
WBC # BLD: 4.81 K/UL — SIGNIFICANT CHANGE UP (ref 3.8–10.5)
WBC # FLD AUTO: 4.81 K/UL — SIGNIFICANT CHANGE UP (ref 3.8–10.5)

## 2022-07-18 PROCEDURE — 99214 OFFICE O/P EST MOD 30 MIN: CPT

## 2022-07-20 ENCOUNTER — TRANSCRIPTION ENCOUNTER (OUTPATIENT)
Age: 57
End: 2022-07-20

## 2022-07-20 NOTE — PHYSICAL EXAM
[Ambulatory and capable of all self care but unable to carry out any work activities] : Status 2- Ambulatory and capable of all self care but unable to carry out any work activities. 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

## 2022-07-20 NOTE — ASSESSMENT
[FreeTextEntry1] : 58 y/o F previously healthy but with recently developing chronic postural orthostatic tachycardia syndrome (POTS) and autonomic postural hypotension, found to have systemic lambda light chain amyloidosis recently discharged from [facility] for cardiac complications likely related to amyloid involvement now s/p PPM. Cycle 1 of CyBorD was initiated on 4/1/22 while inpatient and therapy has been held (last dose given was 6/17, the end of C3) for persistent diarrhea.\par \par -Serum SPEP without a monoclonal fraction, Urine SPEP without monoclonal protein, immunofixation normal. \par -However on diagnosis Lambda serum FLCs 8.73 and kappa 0.92, for a ratio of 0.11 (or 9.5). \par -Fat pad biopsy done and was POSITIVE for congo red, with positive polarization. \par -As per discussion with pathology, unable to send this for mass spectrometry to confirm amyloid type. However, now s/p BMBx which is showing ~35% plasmacytosis (monoclonal) c/w plasma cell neoplasm. 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 Is NOT meeting MM criteria. \par -Recent hospitalization noted - patient with hypotension likely due to heart block 2/2 amyloidosis, which confirmed involving heart with MRI. \par -Urine showing proteinuria (although not nephrotic range).\par -Systemic light chain amyloidosis is classically associated with a thickened interventricular septum and proteinuria. Also associated with neurologic sequelae such as that she has. \par -Pt currently being treated for systemic light chain amyloidosis with poncho-CyBorD.  Day 1 dose inpatient split, as patient was getting PPM and wanted lower risk of reaction / delays. \par -Daratumumab plus CyBorD which will be given weekly on D 1,8,15,22 of 28 day cycle, Daratumumab will be weekly x 8 followed by every 2 weeks x 8 and then monthly and plan for autologous bone marrow transplant. Discussed possible side effects including fatigue and neuropathy, as well as toxicity such as pancytopenia and increased susceptibility to infection. Treatment was held after 6/17 for persistent diarrhea. Will restart therapy this Friday which will be the start of C4.\par -Held cyclophosphamide due to hematuria, although more likely 2/2 UTI, this has been restarted.\par -LFTs slowly up trending, diarrhea may be a contributing factor however will will repeat in 2 weeks if not improving will complete abdominal US.  \par -Continue follow up with Cardiology and PCP.\par -Will continue to monitor diarrhea, GI following. Patient will restart Questran today and message was sent to Dr. Holloway to notify of findings at visit today and if any other recommendations at this time.  \par -[facility] consult previously sent for PT/OT for deconditioning, this had been cancelled by patient for extreme LE swelling. Patient has improved and will be rescheduling after resolution of diarrhea.\par -Patient understands and agrees with plan. All information explained to the best of my ability.\par -Following up closely with cardiology. \par -RTC in 2 weeks.

## 2022-07-20 NOTE — HISTORY OF PRESENT ILLNESS
[Therapy: ___] : Therapy: [unfilled] [de-identified] : 56 y/o F with hx of thyroidectomy in 2011 (benign pathology) on thyroid replacement since then, and with COVID pneumonia back in March of 2020 ultimately signed herself out AMA, chronic issues thereafter, ended up going back to work in July 2020. In January-February 2021 had Pfizer vaccines. Started having issues starting in March 2021, she was having GI issues, endoscopy showed chronic gastritis. Additionally diagnosed with IBS at that point. She has been having issues with Postural Orthostatic Tachycardia Syndrome - her blood pressure goes down when she stands up from a seated position. She has fainted "too many times to count." She ends up with bruises here and there but nothing severe where she would need to go to the hospital. She saw two neurologists who did extensive evaluations without any success in finding diagnostic lesions. She reports she is seeing specialist for dysautonomia, Dr. Colón at [facility] (neurology). She said overall her POTS has improved but she has a had a couple of episodes of fainting in the last couple of weeks. She started fludrocortisone in September 2021 and has titrated up the dose. She is now on 1 mg total (probably 0.5 mg twice daily). The only way she feels completely normal is when she is laying down. BP gets very low on standing up, it has registered as low as 50s over 40s. She reports when she is standing or sitting for too long, she will get a chest tightness, but not quite a pain. It gets to the point where she feels like she is having trouble breathing and then she has to sit down. She has muscle aches in her legs which improves with warm bath, and then some burning pain in her arms which reminds her of a sunburn. She also now is having issues with her mouth - very difficult to eat as it feels like she has burnt her mouth. She has lost 47 pounds since March 2021. She is not really able to get much down due to these feelings, although she does have an appetite. She denies any night-sweats, she does always feel cold though. Her bowel movements "swing" from constipation to diarrhea. She was on Linzess recently but was taken off it. She reports that she is urinating normally, in fact a lot because she drinks a fair amount of water daily. She has no swelling in her legs at this time. She had in the past at work when she was standing a lot and was on amlodipine. She was sent to me for evaluation of elevated free light chains. 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 She was found with no monoclonal protein on SPEP and hemoglobin normal, normal renal function, and no hypercalcemia. Fat pad biopsy showed positive congo red staining in rare blood vessels and positive for polarization. 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 for multiple recurrent syncopal episodes. While admitted course was complicated by multiple unresponsive episodes prompting RRTs, during which pt found to be hypotensive and bradycardic. She initially required levophed for BP support but ultimately BP remained stable while on Midodrine 20mg q8 and Fludrocortisone 0.2mg q24. Pt noted to intermittently have sinus pauses (2-3.8 seconds) thought to be 2/2 vagal episodes, and asymptomatic bradycardia overnight on tele. ECG noted NSR but 1st degree AV block. TTE 3/16/22 showed normal LV size and function with EF 55%-60%. No regional wall motion abnormalities. Moderate concentric LVH. \par \par Pt also started on oral vanco for C.diff and completed ceftriaxone for UTI. \par \par Ultimately transferred to [facility] for consideration of inpatient chemotherapy. \par PPM placement recommended, as cardiac MRI showed evidence of amyloid involvement. Pt had another RRT for hypotension and symptomatic bradycardia to 30s, was on pressor support until she received a dual chamber PPM. Pt also continued her C.diff treatment and chemotherapy was placed on hold until the diarrhea cleared. She was then started on Daratumumab  + CyBorD inpatient, and tolerated it well ; she completed 2 treatments of chemotherapy inpatient.\par \par \par Patient has continued with this regimen now as outpatient. She did continue to have episodes of diarrhea since previous C. Diff infection in March. On follow up 4/18 she noticed that her urine was red in color and after drinking water it became more orange. Treated with antibiotics for UTI and evaluated by urology. Cytoxan was held for a time period, and ultimately this cleared with antibiotics.  [FreeTextEntry1] : Kleber + CyBorD started on 4/8/2022, held cytoxan from 4/18-5/12 for hematuria, held again 6/24 for worsening diarrhea [de-identified] : Patient seen today for follow up. Most recently saw GI on 7/7 and was started on cholestyramine with possibility of giving another course of Vancomycin for history of C. Diff with possible sigmoidoscopy. Per patient she had taken Questran for about 4 days and had an increase in diarrhea where she was unable to stay out of the bathroom for any extended period of time that day.  At that time she had stopped taking any further packets and the next 3 days she had no diarrhea and was able to eat. The fourth day her diarrhea returned which was over the weekend. She does state that her episodes are "a little more formed than watery".  She has had no diarrhea today which she attributes to not having eaten yet as well as a dose of Imodium and Zofran. She has had better tolerance of walking up the stairs with less dizziness or SOB and she was able to take the garbage out yesterday. Swelling is still limited to lower extremities mostly. Overall her BP has been stable on Midodrine. She does eat and and pushes herself to do so although her taste is altered and nothing is really enjoyable. She had previously cancelled PT eval due to the extreme swelling and now has held off due to diarrhea. She denied having any recent fevers, chills, nausea, vomiting. Denied any further shortness of breath with exertion. No chest pain, palpitations. Neuropathy is stable. She has less  in her left hand than the right since starting Velcade, but not interfering with use. Met with BMT team to establish care. \par \par

## 2022-07-20 NOTE — REVIEW OF SYSTEMS
[Fatigue] : fatigue [Lower Ext Edema] : lower extremity edema [Diarrhea] : diarrhea [Difficulty Walking] : difficulty walking [Anxiety] : anxiety [Muscle Weakness] : muscle weakness [Negative] : Heme/Lymph [Fever] : no fever [Chills] : no chills [Night Sweats] : no night sweats [Recent Change In Weight] : ~T no recent weight change [Dysphagia] : no dysphagia [Nosebleeds] : no nosebleeds [Odynophagia] : no odynophagia [Chest Pain] : no chest pain [Palpitations] : no palpitations [Abdominal Pain] : no abdominal pain [Vomiting] : no vomiting [Constipation] : no constipation [Dysuria] : no dysuria [Confused] : no confusion [Dizziness] : no dizziness [Fainting] : no fainting [Insomnia] : no insomnia [Hot Flashes] : no hot flashes [FreeTextEntry2] : fatigue has improved

## 2022-07-21 ENCOUNTER — NON-APPOINTMENT (OUTPATIENT)
Age: 57
End: 2022-07-21

## 2022-07-22 ENCOUNTER — RESULT REVIEW (OUTPATIENT)
Age: 57
End: 2022-07-22

## 2022-07-22 ENCOUNTER — APPOINTMENT (OUTPATIENT)
Dept: HEMATOLOGY ONCOLOGY | Facility: CLINIC | Age: 57
End: 2022-07-22

## 2022-07-22 ENCOUNTER — APPOINTMENT (OUTPATIENT)
Dept: INFUSION THERAPY | Facility: HOSPITAL | Age: 57
End: 2022-07-22

## 2022-07-22 DIAGNOSIS — Z51.11 ENCOUNTER FOR ANTINEOPLASTIC CHEMOTHERAPY: ICD-10-CM

## 2022-07-22 DIAGNOSIS — R11.2 NAUSEA WITH VOMITING, UNSPECIFIED: ICD-10-CM

## 2022-07-22 LAB
ALBUMIN MFR SERPL ELPH: 65.3 %
ALBUMIN SERPL ELPH-MCNC: 4 G/DL
ALBUMIN SERPL-MCNC: 3.7 G/DL
ALBUMIN/GLOB SERPL: 1.8 RATIO
ALP BLD-CCNC: 123 U/L
ALPHA1 GLOB MFR SERPL ELPH: 5 %
ALPHA1 GLOB SERPL ELPH-MCNC: 0.3 G/DL
ALPHA2 GLOB MFR SERPL ELPH: 11.6 %
ALPHA2 GLOB SERPL ELPH-MCNC: 0.7 G/DL
ALT SERPL-CCNC: 179 U/L
ANION GAP SERPL CALC-SCNC: 10 MMOL/L
AST SERPL-CCNC: 57 U/L
B-GLOBULIN MFR SERPL ELPH: 11.5 %
B-GLOBULIN SERPL ELPH-MCNC: 0.7 G/DL
BASOPHILS # BLD AUTO: 0.05 K/UL — SIGNIFICANT CHANGE UP (ref 0–0.2)
BASOPHILS NFR BLD AUTO: 1.1 % — SIGNIFICANT CHANGE UP (ref 0–2)
BILIRUB SERPL-MCNC: 0.8 MG/DL
BUN SERPL-MCNC: 10 MG/DL
CALCIUM SERPL-MCNC: 9.1 MG/DL
CHLORIDE SERPL-SCNC: 105 MMOL/L
CO2 SERPL-SCNC: 26 MMOL/L
CREAT SERPL-MCNC: 0.53 MG/DL
DEPRECATED KAPPA LC FREE/LAMBDA SER: 0.35 RATIO
EGFR: 107 ML/MIN/1.73M2
EOSINOPHIL # BLD AUTO: 0.1 K/UL — SIGNIFICANT CHANGE UP (ref 0–0.5)
EOSINOPHIL NFR BLD AUTO: 2.1 % — SIGNIFICANT CHANGE UP (ref 0–6)
GAMMA GLOB FLD ELPH-MCNC: 0.4 G/DL
GAMMA GLOB MFR SERPL ELPH: 6.6 %
GLUCOSE SERPL-MCNC: 94 MG/DL
HCT VFR BLD CALC: 29.5 % — LOW (ref 34.5–45)
HGB BLD-MCNC: 10 G/DL — LOW (ref 11.5–15.5)
IGA SER QL IEP: 19 MG/DL
IGG SER QL IEP: 414 MG/DL
IGM SER QL IEP: 11 MG/DL
IMM GRANULOCYTES NFR BLD AUTO: 1.3 % — SIGNIFICANT CHANGE UP (ref 0–1.5)
INTERPRETATION SERPL IEP-IMP: NORMAL
KAPPA LC CSF-MCNC: 1.56 MG/DL
KAPPA LC SERPL-MCNC: 0.55 MG/DL
LYMPHOCYTES # BLD AUTO: 1.01 K/UL — SIGNIFICANT CHANGE UP (ref 1–3.3)
LYMPHOCYTES # BLD AUTO: 21.7 % — SIGNIFICANT CHANGE UP (ref 13–44)
M PROTEIN MFR SERPL ELPH: 2.6 %
M PROTEIN SPEC IFE-MCNC: NORMAL
MCHC RBC-ENTMCNC: 32.7 PG — SIGNIFICANT CHANGE UP (ref 27–34)
MCHC RBC-ENTMCNC: 33.9 G/DL — SIGNIFICANT CHANGE UP (ref 32–36)
MCV RBC AUTO: 96.4 FL — SIGNIFICANT CHANGE UP (ref 80–100)
MONOCLON BAND OBS SERPL: 0.1 G/DL
MONOCYTES # BLD AUTO: 0.31 K/UL — SIGNIFICANT CHANGE UP (ref 0–0.9)
MONOCYTES NFR BLD AUTO: 6.7 % — SIGNIFICANT CHANGE UP (ref 2–14)
NEUTROPHILS # BLD AUTO: 3.13 K/UL — SIGNIFICANT CHANGE UP (ref 1.8–7.4)
NEUTROPHILS NFR BLD AUTO: 67.1 % — SIGNIFICANT CHANGE UP (ref 43–77)
NRBC # BLD: 0 /100 WBCS — SIGNIFICANT CHANGE UP (ref 0–0)
PLATELET # BLD AUTO: 212 K/UL — SIGNIFICANT CHANGE UP (ref 150–400)
POTASSIUM SERPL-SCNC: 4.9 MMOL/L
PROT SERPL-MCNC: 5.7 G/DL
RBC # BLD: 3.06 M/UL — LOW (ref 3.8–5.2)
RBC # FLD: 15.6 % — HIGH (ref 10.3–14.5)
SODIUM SERPL-SCNC: 141 MMOL/L
TSH SERPL-ACNC: 16.3 UIU/ML
WBC # BLD: 4.66 K/UL — SIGNIFICANT CHANGE UP (ref 3.8–10.5)
WBC # FLD AUTO: 4.66 K/UL — SIGNIFICANT CHANGE UP (ref 3.8–10.5)

## 2022-07-25 ENCOUNTER — TRANSCRIPTION ENCOUNTER (OUTPATIENT)
Age: 57
End: 2022-07-25

## 2022-07-26 ENCOUNTER — TRANSCRIPTION ENCOUNTER (OUTPATIENT)
Age: 57
End: 2022-07-26

## 2022-07-27 ENCOUNTER — APPOINTMENT (OUTPATIENT)
Dept: ELECTROPHYSIOLOGY | Facility: CLINIC | Age: 57
End: 2022-07-27

## 2022-07-27 ENCOUNTER — APPOINTMENT (OUTPATIENT)
Dept: CARDIOLOGY | Facility: CLINIC | Age: 57
End: 2022-07-27

## 2022-07-27 ENCOUNTER — NON-APPOINTMENT (OUTPATIENT)
Age: 57
End: 2022-07-27

## 2022-07-27 VITALS
HEART RATE: 69 BPM | DIASTOLIC BLOOD PRESSURE: 81 MMHG | SYSTOLIC BLOOD PRESSURE: 137 MMHG | BODY MASS INDEX: 20.02 KG/M2 | HEIGHT: 63 IN | OXYGEN SATURATION: 99 % | WEIGHT: 113 LBS

## 2022-07-27 PROCEDURE — 93306 TTE W/DOPPLER COMPLETE: CPT

## 2022-07-27 PROCEDURE — 93000 ELECTROCARDIOGRAM COMPLETE: CPT | Mod: 59

## 2022-07-27 PROCEDURE — 93280 PM DEVICE PROGR EVAL DUAL: CPT

## 2022-07-27 PROCEDURE — 93356 MYOCRD STRAIN IMG SPCKL TRCK: CPT

## 2022-07-28 ENCOUNTER — APPOINTMENT (OUTPATIENT)
Dept: HEMATOLOGY ONCOLOGY | Facility: CLINIC | Age: 57
End: 2022-07-28

## 2022-07-29 ENCOUNTER — APPOINTMENT (OUTPATIENT)
Dept: HEMATOLOGY ONCOLOGY | Facility: CLINIC | Age: 57
End: 2022-07-29

## 2022-07-29 ENCOUNTER — RESULT REVIEW (OUTPATIENT)
Age: 57
End: 2022-07-29

## 2022-07-29 ENCOUNTER — APPOINTMENT (OUTPATIENT)
Dept: INFUSION THERAPY | Facility: HOSPITAL | Age: 57
End: 2022-07-29

## 2022-07-29 LAB
BASOPHILS # BLD AUTO: 0.05 K/UL — SIGNIFICANT CHANGE UP (ref 0–0.2)
BASOPHILS NFR BLD AUTO: 0.9 % — SIGNIFICANT CHANGE UP (ref 0–2)
EOSINOPHIL # BLD AUTO: 0.06 K/UL — SIGNIFICANT CHANGE UP (ref 0–0.5)
EOSINOPHIL NFR BLD AUTO: 1.1 % — SIGNIFICANT CHANGE UP (ref 0–6)
HCT VFR BLD CALC: 30.6 % — LOW (ref 34.5–45)
HGB BLD-MCNC: 10.3 G/DL — LOW (ref 11.5–15.5)
IMM GRANULOCYTES NFR BLD AUTO: 0.9 % — SIGNIFICANT CHANGE UP (ref 0–1.5)
LYMPHOCYTES # BLD AUTO: 1.03 K/UL — SIGNIFICANT CHANGE UP (ref 1–3.3)
LYMPHOCYTES # BLD AUTO: 18.8 % — SIGNIFICANT CHANGE UP (ref 13–44)
MCHC RBC-ENTMCNC: 32.6 PG — SIGNIFICANT CHANGE UP (ref 27–34)
MCHC RBC-ENTMCNC: 33.7 G/DL — SIGNIFICANT CHANGE UP (ref 32–36)
MCV RBC AUTO: 96.8 FL — SIGNIFICANT CHANGE UP (ref 80–100)
MONOCYTES # BLD AUTO: 0.51 K/UL — SIGNIFICANT CHANGE UP (ref 0–0.9)
MONOCYTES NFR BLD AUTO: 9.3 % — SIGNIFICANT CHANGE UP (ref 2–14)
NEUTROPHILS # BLD AUTO: 3.78 K/UL — SIGNIFICANT CHANGE UP (ref 1.8–7.4)
NEUTROPHILS NFR BLD AUTO: 69 % — SIGNIFICANT CHANGE UP (ref 43–77)
NRBC # BLD: 0 /100 WBCS — SIGNIFICANT CHANGE UP (ref 0–0)
PLATELET # BLD AUTO: 215 K/UL — SIGNIFICANT CHANGE UP (ref 150–400)
RBC # BLD: 3.16 M/UL — LOW (ref 3.8–5.2)
RBC # FLD: 15 % — HIGH (ref 10.3–14.5)
WBC # BLD: 5.48 K/UL — SIGNIFICANT CHANGE UP (ref 3.8–10.5)
WBC # FLD AUTO: 5.48 K/UL — SIGNIFICANT CHANGE UP (ref 3.8–10.5)

## 2022-08-01 ENCOUNTER — OUTPATIENT (OUTPATIENT)
Dept: OUTPATIENT SERVICES | Facility: HOSPITAL | Age: 57
LOS: 1 days | Discharge: HOME | End: 2022-08-01

## 2022-08-01 ENCOUNTER — APPOINTMENT (OUTPATIENT)
Dept: HEMATOLOGY ONCOLOGY | Facility: CLINIC | Age: 57
End: 2022-08-01

## 2022-08-01 DIAGNOSIS — D47.2 MONOCLONAL GAMMOPATHY: ICD-10-CM

## 2022-08-01 PROCEDURE — 99213 OFFICE O/P EST LOW 20 MIN: CPT | Mod: 95

## 2022-08-02 ENCOUNTER — LABORATORY RESULT (OUTPATIENT)
Age: 57
End: 2022-08-02

## 2022-08-04 ENCOUNTER — NON-APPOINTMENT (OUTPATIENT)
Age: 57
End: 2022-08-04

## 2022-08-05 ENCOUNTER — APPOINTMENT (OUTPATIENT)
Dept: HEMATOLOGY ONCOLOGY | Facility: CLINIC | Age: 57
End: 2022-08-05

## 2022-08-05 ENCOUNTER — TRANSCRIPTION ENCOUNTER (OUTPATIENT)
Age: 57
End: 2022-08-05

## 2022-08-05 ENCOUNTER — RESULT REVIEW (OUTPATIENT)
Age: 57
End: 2022-08-05

## 2022-08-05 ENCOUNTER — APPOINTMENT (OUTPATIENT)
Dept: INFUSION THERAPY | Facility: HOSPITAL | Age: 57
End: 2022-08-05

## 2022-08-05 DIAGNOSIS — R11.2 NAUSEA WITH VOMITING, UNSPECIFIED: ICD-10-CM

## 2022-08-05 DIAGNOSIS — Z51.11 ENCOUNTER FOR ANTINEOPLASTIC CHEMOTHERAPY: ICD-10-CM

## 2022-08-05 LAB
BASOPHILS # BLD AUTO: 0.04 K/UL — SIGNIFICANT CHANGE UP (ref 0–0.2)
BASOPHILS NFR BLD AUTO: 1 % — SIGNIFICANT CHANGE UP (ref 0–2)
EOSINOPHIL # BLD AUTO: 0.03 K/UL — SIGNIFICANT CHANGE UP (ref 0–0.5)
EOSINOPHIL NFR BLD AUTO: 0.8 % — SIGNIFICANT CHANGE UP (ref 0–6)
HCT VFR BLD CALC: 28.5 % — LOW (ref 34.5–45)
HGB BLD-MCNC: 9.3 G/DL — LOW (ref 11.5–15.5)
IMM GRANULOCYTES NFR BLD AUTO: 0.3 % — SIGNIFICANT CHANGE UP (ref 0–1.5)
LYMPHOCYTES # BLD AUTO: 0.66 K/UL — LOW (ref 1–3.3)
LYMPHOCYTES # BLD AUTO: 16.9 % — SIGNIFICANT CHANGE UP (ref 13–44)
MCHC RBC-ENTMCNC: 31.7 PG — SIGNIFICANT CHANGE UP (ref 27–34)
MCHC RBC-ENTMCNC: 32.6 G/DL — SIGNIFICANT CHANGE UP (ref 32–36)
MCV RBC AUTO: 97.3 FL — SIGNIFICANT CHANGE UP (ref 80–100)
MONOCYTES # BLD AUTO: 0.3 K/UL — SIGNIFICANT CHANGE UP (ref 0–0.9)
MONOCYTES NFR BLD AUTO: 7.7 % — SIGNIFICANT CHANGE UP (ref 2–14)
NEUTROPHILS # BLD AUTO: 2.86 K/UL — SIGNIFICANT CHANGE UP (ref 1.8–7.4)
NEUTROPHILS NFR BLD AUTO: 73.3 % — SIGNIFICANT CHANGE UP (ref 43–77)
NRBC # BLD: 0 /100 WBCS — SIGNIFICANT CHANGE UP (ref 0–0)
PLATELET # BLD AUTO: 184 K/UL — SIGNIFICANT CHANGE UP (ref 150–400)
RBC # BLD: 2.93 M/UL — LOW (ref 3.8–5.2)
RBC # FLD: 15 % — HIGH (ref 10.3–14.5)
WBC # BLD: 3.9 K/UL — SIGNIFICANT CHANGE UP (ref 3.8–10.5)
WBC # FLD AUTO: 3.9 K/UL — SIGNIFICANT CHANGE UP (ref 3.8–10.5)

## 2022-08-10 ENCOUNTER — APPOINTMENT (OUTPATIENT)
Dept: GASTROENTEROLOGY | Facility: CLINIC | Age: 57
End: 2022-08-10

## 2022-08-12 ENCOUNTER — APPOINTMENT (OUTPATIENT)
Dept: INFUSION THERAPY | Facility: HOSPITAL | Age: 57
End: 2022-08-12

## 2022-08-12 ENCOUNTER — APPOINTMENT (OUTPATIENT)
Dept: HEMATOLOGY ONCOLOGY | Facility: CLINIC | Age: 57
End: 2022-08-12

## 2022-08-12 ENCOUNTER — RESULT REVIEW (OUTPATIENT)
Age: 57
End: 2022-08-12

## 2022-08-12 LAB
ALBUMIN SERPL ELPH-MCNC: 4.3 G/DL
ALP BLD-CCNC: 108 U/L
ALT SERPL-CCNC: 121 U/L
ANION GAP SERPL CALC-SCNC: 12 MMOL/L
AST SERPL-CCNC: 46 U/L
BASOPHILS # BLD AUTO: 0.06 K/UL — SIGNIFICANT CHANGE UP (ref 0–0.2)
BASOPHILS NFR BLD AUTO: 1.6 % — SIGNIFICANT CHANGE UP (ref 0–2)
BILIRUB SERPL-MCNC: 0.9 MG/DL
BUN SERPL-MCNC: 16 MG/DL
CALCIUM SERPL-MCNC: 9.2 MG/DL
CHLORIDE SERPL-SCNC: 106 MMOL/L
CO2 SERPL-SCNC: 22 MMOL/L
CREAT SERPL-MCNC: 0.56 MG/DL
EGFR: 106 ML/MIN/1.73M2
EOSINOPHIL # BLD AUTO: 0.04 K/UL — SIGNIFICANT CHANGE UP (ref 0–0.5)
EOSINOPHIL NFR BLD AUTO: 1.1 % — SIGNIFICANT CHANGE UP (ref 0–6)
GLUCOSE SERPL-MCNC: 93 MG/DL
HCT VFR BLD CALC: 31.3 % — LOW (ref 34.5–45)
HGB BLD-MCNC: 10 G/DL — LOW (ref 11.5–15.5)
IMM GRANULOCYTES NFR BLD AUTO: 0.3 % — SIGNIFICANT CHANGE UP (ref 0–1.5)
LYMPHOCYTES # BLD AUTO: 0.73 K/UL — LOW (ref 1–3.3)
LYMPHOCYTES # BLD AUTO: 19.4 % — SIGNIFICANT CHANGE UP (ref 13–44)
MCHC RBC-ENTMCNC: 31.9 G/DL — LOW (ref 32–36)
MCHC RBC-ENTMCNC: 32.4 PG — SIGNIFICANT CHANGE UP (ref 27–34)
MCV RBC AUTO: 101.3 FL — HIGH (ref 80–100)
MONOCYTES # BLD AUTO: 0.41 K/UL — SIGNIFICANT CHANGE UP (ref 0–0.9)
MONOCYTES NFR BLD AUTO: 10.9 % — SIGNIFICANT CHANGE UP (ref 2–14)
NEUTROPHILS # BLD AUTO: 2.52 K/UL — SIGNIFICANT CHANGE UP (ref 1.8–7.4)
NEUTROPHILS NFR BLD AUTO: 66.7 % — SIGNIFICANT CHANGE UP (ref 43–77)
NRBC # BLD: 0 /100 WBCS — SIGNIFICANT CHANGE UP (ref 0–0)
PLATELET # BLD AUTO: 234 K/UL — SIGNIFICANT CHANGE UP (ref 150–400)
POTASSIUM SERPL-SCNC: 4.7 MMOL/L
PROT SERPL-MCNC: 5.8 G/DL
RBC # BLD: 3.09 M/UL — LOW (ref 3.8–5.2)
RBC # FLD: 14.8 % — HIGH (ref 10.3–14.5)
SODIUM SERPL-SCNC: 141 MMOL/L
WBC # BLD: 3.77 K/UL — LOW (ref 3.8–10.5)
WBC # FLD AUTO: 3.77 K/UL — LOW (ref 3.8–10.5)

## 2022-08-18 DIAGNOSIS — E85.9 AMYLOIDOSIS, UNSPECIFIED: ICD-10-CM

## 2022-08-19 ENCOUNTER — APPOINTMENT (OUTPATIENT)
Dept: INFUSION THERAPY | Facility: HOSPITAL | Age: 57
End: 2022-08-19

## 2022-08-19 ENCOUNTER — RESULT REVIEW (OUTPATIENT)
Age: 57
End: 2022-08-19

## 2022-08-19 LAB
BASOPHILS # BLD AUTO: 0.05 K/UL — SIGNIFICANT CHANGE UP (ref 0–0.2)
BASOPHILS NFR BLD AUTO: 1.6 % — SIGNIFICANT CHANGE UP (ref 0–2)
EOSINOPHIL # BLD AUTO: 0.03 K/UL — SIGNIFICANT CHANGE UP (ref 0–0.5)
EOSINOPHIL NFR BLD AUTO: 0.9 % — SIGNIFICANT CHANGE UP (ref 0–6)
HCT VFR BLD CALC: 27.9 % — LOW (ref 34.5–45)
HGB BLD-MCNC: 9.4 G/DL — LOW (ref 11.5–15.5)
IMM GRANULOCYTES NFR BLD AUTO: 0.3 % — SIGNIFICANT CHANGE UP (ref 0–1.5)
LYMPHOCYTES # BLD AUTO: 0.68 K/UL — LOW (ref 1–3.3)
LYMPHOCYTES # BLD AUTO: 21.3 % — SIGNIFICANT CHANGE UP (ref 13–44)
MCHC RBC-ENTMCNC: 33 PG — SIGNIFICANT CHANGE UP (ref 27–34)
MCHC RBC-ENTMCNC: 33.7 G/DL — SIGNIFICANT CHANGE UP (ref 32–36)
MCV RBC AUTO: 97.9 FL — SIGNIFICANT CHANGE UP (ref 80–100)
MONOCYTES # BLD AUTO: 0.3 K/UL — SIGNIFICANT CHANGE UP (ref 0–0.9)
MONOCYTES NFR BLD AUTO: 9.4 % — SIGNIFICANT CHANGE UP (ref 2–14)
NEUTROPHILS # BLD AUTO: 2.13 K/UL — SIGNIFICANT CHANGE UP (ref 1.8–7.4)
NEUTROPHILS NFR BLD AUTO: 66.5 % — SIGNIFICANT CHANGE UP (ref 43–77)
NRBC # BLD: 0 /100 WBCS — SIGNIFICANT CHANGE UP (ref 0–0)
PLATELET # BLD AUTO: 148 K/UL — LOW (ref 150–400)
RBC # BLD: 2.85 M/UL — LOW (ref 3.8–5.2)
RBC # FLD: 14.4 % — SIGNIFICANT CHANGE UP (ref 10.3–14.5)
WBC # BLD: 3.2 K/UL — LOW (ref 3.8–10.5)
WBC # FLD AUTO: 3.2 K/UL — LOW (ref 3.8–10.5)

## 2022-08-19 NOTE — ASSESSMENT
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 Cycle 1 of CyBorD was initiated on 4/1/22 while inpatient and therapy had been held (last dose given was 6/17) and restarted on 7/22/22.\par \par -Serum SPEP without a monoclonal fraction, Urine SPEP without monoclonal protein, immunofixation normal. \par -However on diagnosis Lambda serum FLCs 8.73 and kappa 0.92, for a ratio of 0.11 (or 9.5). \par -Fat pad biopsy done and was POSITIVE for congo red, with positive polarization. \par -As per discussion with pathology, unable to send this for mass spectrometry to confirm amyloid type. However, now s/p BMBx which is showing ~35% plasmacytosis (monoclonal) c/w plasma cell neoplasm. 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 Is NOT meeting MM criteria. \par -Recent hospitalization noted - patient with hypotension likely due to heart block 2/2 amyloidosis, which confirmed involving heart with MRI. \par -Urine showing proteinuria (although not nephrotic range).\par -Systemic light chain amyloidosis is classically associated with a thickened interventricular septum and proteinuria. Also associated with neurologic sequelae such as that she has. \par -Pt currently being treated for systemic light chain amyloidosis with poncho-CyBorD.  Day 1 dose inpatient split, as patient was getting PPM and wanted lower risk of reaction / delays. \par -Daratumumab plus CyBorD which will be given weekly on D 1,8,15,22 of 28 day cycle, Daratumumab will be weekly x 8 followed by every 2 weeks x 8 and then monthly and plan for autologous bone marrow transplant. Discussed possible side effects including fatigue and neuropathy, as well as toxicity such as pancytopenia and increased susceptibility to infection. Treatment was held after 6/17 for persistent diarrhea and restarted therapy 7/22/22. Today is C4 D11.\par -Held cyclophosphamide due to hematuria, although more likely 2/2 UTI, this has been restarted.\par -LFTs slowly up trending, diarrhea may be a contributing factor however will will repeat with home draw tomorrow and if not improving will complete abdominal US.  \par -Continue follow up with Cardiology and PCP.\par -Will continue to monitor diarrhea, GI following. Patient self discontinued Questran this weekend and will be calling Dr. Holloway to schedule follow up and explore further recommendations at this time.  \par -[facility] consult previously sent for PT/OT for deconditioning, this had been cancelled by patient for extreme LE swelling. Patient will be rescheduling after resolution of diarrhea.\par -Patient understands and agrees with plan. All information explained to the best of my ability.\par -Following up closely with cardiology. \par -RTC 8/30

## 2022-08-19 NOTE — REASON FOR VISIT
[Follow-Up Visit] : a follow-up visit for [Spouse] : spouse [Home] : at home, [unfilled] , at the time of the visit. [Medical Office: ([facility])___] : at the medical office located in  [Self] : self [This encounter was initiated by telehealth (audio with video) and converted to telephone (audio only) due to technical difficulties.] : This encounter was initiated by telehealth (audio with video) and converted to telephone (audio only) due to technical difficulties. [FreeTextEntry2] : primary light chain amyloidosis.

## 2022-08-19 NOTE — REVIEW OF SYSTEMS
Labs ordered for CPE on 7/26/21 w/Dr. Augustin.  Spoke to Pt to advise to go to the [facility] lab a few days prior to appt & she verbalized her understanding.     [Fatigue] : fatigue [Lower Ext Edema] : lower extremity edema [Diarrhea] : diarrhea [Difficulty Walking] : difficulty walking [Anxiety] : anxiety [Negative] : Heme/Lymph [Fever] : no fever [Chills] : no chills [Night Sweats] : no night sweats [Recent Change In Weight] : ~T no recent weight change [Vision Problems] : no vision problems [Dysphagia] : no dysphagia [Nosebleeds] : no nosebleeds [Odynophagia] : no odynophagia [Chest Pain] : no chest pain [Palpitations] : no palpitations [Abdominal Pain] : no abdominal pain [Vomiting] : no vomiting [Constipation] : no constipation [Dysuria] : no dysuria [Confused] : no confusion [Dizziness] : no dizziness [Fainting] : no fainting [Insomnia] : no insomnia [Hot Flashes] : no hot flashes [Muscle Weakness] : no muscle weakness

## 2022-08-19 NOTE — PHYSICAL EXAM
[Ambulatory and capable of all self care but unable to carry out any work activities] : Status 2- Ambulatory and capable of all self care but unable to carry out any work activities. Up and about more than 50% of waking hours [de-identified] : telehealth services used and appears in no apparent distress

## 2022-08-19 NOTE — HISTORY OF PRESENT ILLNESS
[Therapy: ___] : Therapy: [unfilled] [Cycle: ___] : Cycle: [unfilled] [Day: ___] : Day: [unfilled] [de-identified] : 58 y/o F with hx of thyroidectomy in 2011 (benign pathology) on thyroid replacement since then, and with COVID pneumonia back in March of 2020 ultimately signed herself out AMA, chronic issues thereafter, ended up going back to work in July 2020. In January-February 2021 had Pfizer vaccines. Started having issues starting in March 2021, she was having GI issues, endoscopy showed chronic gastritis. Additionally diagnosed with IBS at that point. She has been having issues with Postural Orthostatic Tachycardia Syndrome - her blood pressure goes down when she stands up from a seated position. She has fainted "too many times to count." She ends up with bruises here and there but nothing severe where she would need to go to the hospital. She saw two neurologists who did extensive evaluations without any success in finding diagnostic lesions. She reports she is seeing specialist for dysautonomia, Dr. Colón at [facility] (neurology). She said overall her POTS has improved but she has a had a couple of episodes of fainting in the last couple of weeks. She started fludrocortisone in September 2021 and has titrated up the dose. She is now on 1 mg total (probably 0.5 mg twice daily). The only way she feels completely normal is when she is laying down. BP gets very low on standing up, it has registered as low as 50s over 40s. She reports when she is standing or sitting for too long, she will get a chest tightness, but not quite a pain. It gets to the point where she feels like she is having trouble breathing and then she has to sit down. She has muscle aches in her legs which improves with warm bath, and then some burning pain in her arms which reminds her of a sunburn. She also now is having issues with her mouth - very difficult to eat as it feels like she has burnt her mouth. She has lost 47 pounds since March 2021. She is not really able to get much down due to these feelings, although she does have an appetite. She denies any night-sweats, she does always feel cold though. Her bowel movements "swing" from constipation to diarrhea. She was on Linzess recently but was taken off it. She reports that she is urinating normally, in fact a lot because she drinks a fair amount of water daily. She has no swelling in her legs at this time. She had in the past at work when she was standing a lot and was on amlodipine. She was sent to me for evaluation of elevated free light chains. 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 She was found with no monoclonal protein on SPEP and hemoglobin normal, normal renal function, and no hypercalcemia. Fat pad biopsy showed positive congo red staining in rare blood vessels and positive for polarization. 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 While admitted course was complicated by multiple unresponsive episodes prompting RRTs, during which pt found to be hypotensive and bradycardic. She initially required levophed for BP support but ultimately BP remained stable while on Midodrine 20mg q8 and Fludrocortisone 0.2mg q24. Pt noted to intermittently have sinus pauses (2-3.8 seconds) thought to be 2/2 vagal episodes, and asymptomatic bradycardia overnight on tele. ECG noted NSR but 1st degree AV block. TTE 3/16/22 showed normal LV size and function with EF 55%-60%. No regional wall motion abnormalities. Moderate concentric LVH. \par \par Pt also started on oral vanco for C.diff and completed ceftriaxone for UTI. \par \par Ultimately transferred to [facility] for consideration of inpatient chemotherapy. \par PPM placement recommended, as cardiac MRI showed evidence of amyloid involvement. Pt had another RRT for hypotension and symptomatic bradycardia to 30s, was on pressor support until she received a dual chamber PPM. Pt also continued her C.diff treatment and chemotherapy was placed on hold until the diarrhea cleared. She was then started on Daratumumab  + CyBorD inpatient, and tolerated it well ; she completed 2 treatments of chemotherapy inpatient.\par \par \par Patient has continued with this regimen now as outpatient. She did continue to have episodes of diarrhea since previous C. Diff infection in March. On follow up 4/18 she noticed that her urine was red in color and after drinking water it became more orange. Treated with antibiotics for UTI and evaluated by urology. Cytoxan was held for a time period, and ultimately this cleared with antibiotics.  [FreeTextEntry1] : Kleber + CyBorD started on 4/8/2022, held cytoxan from 4/18-5/12 for hematuria, held again 6/24 for worsening diarrhea [de-identified] : Patient seen today for follow up. Her treatment restarted 2 weeks ago (Weekly Velcade/Danyell q 2 weeks) and she feels like it has made her more fatigued and feels more intense than when previously receiving injections. Restarted cholestyramine and then stopped again this weekend because she was not feeling well while taking it event jan her diarrhea improved. As of yesterday her diarrhea had occurred less than 5 times and as of today she has had no episodes. She will be calling GI now that her new insurance has kicked in to explore further options. She still has not started PT due to diarrhea. She has been ambulating more at home, going outdoors and walking up the stairs with less dizziness or SOB. Swelling is still limited to lower extremities mostly. Overall her BP has been stable on Midodrine. She does eat and and pushes herself to do so although her taste is altered and nothing is really enjoyable. She denied having any recent fevers, chills, abdominal pain, nausea, vomiting. Denied any further shortness of breath with exertion. No chest pain, palpitations. Neuropathy is stable. She has less  in her left hand than the right since starting Velcade, but not interfering with use. \par \par

## 2022-08-22 ENCOUNTER — TRANSCRIPTION ENCOUNTER (OUTPATIENT)
Age: 57
End: 2022-08-22

## 2022-08-23 ENCOUNTER — TRANSCRIPTION ENCOUNTER (OUTPATIENT)
Age: 57
End: 2022-08-23

## 2022-08-26 ENCOUNTER — LABORATORY RESULT (OUTPATIENT)
Age: 57
End: 2022-08-26

## 2022-08-26 ENCOUNTER — APPOINTMENT (OUTPATIENT)
Dept: INFUSION THERAPY | Facility: HOSPITAL | Age: 57
End: 2022-08-26

## 2022-08-26 ENCOUNTER — RESULT REVIEW (OUTPATIENT)
Age: 57
End: 2022-08-26

## 2022-08-26 LAB
BASOPHILS # BLD AUTO: 0.05 K/UL — SIGNIFICANT CHANGE UP (ref 0–0.2)
BASOPHILS NFR BLD AUTO: 1.2 % — SIGNIFICANT CHANGE UP (ref 0–2)
EOSINOPHIL # BLD AUTO: 0.04 K/UL — SIGNIFICANT CHANGE UP (ref 0–0.5)
EOSINOPHIL NFR BLD AUTO: 0.9 % — SIGNIFICANT CHANGE UP (ref 0–6)
HCT VFR BLD CALC: 33.1 % — LOW (ref 34.5–45)
HGB BLD-MCNC: 10.5 G/DL — LOW (ref 11.5–15.5)
IMM GRANULOCYTES NFR BLD AUTO: 0.2 % — SIGNIFICANT CHANGE UP (ref 0–1.5)
LYMPHOCYTES # BLD AUTO: 0.93 K/UL — LOW (ref 1–3.3)
LYMPHOCYTES # BLD AUTO: 22 % — SIGNIFICANT CHANGE UP (ref 13–44)
MCHC RBC-ENTMCNC: 31.7 G/DL — LOW (ref 32–36)
MCHC RBC-ENTMCNC: 32.9 PG — SIGNIFICANT CHANGE UP (ref 27–34)
MCV RBC AUTO: 103.8 FL — HIGH (ref 80–100)
MONOCYTES # BLD AUTO: 0.52 K/UL — SIGNIFICANT CHANGE UP (ref 0–0.9)
MONOCYTES NFR BLD AUTO: 12.3 % — SIGNIFICANT CHANGE UP (ref 2–14)
NEUTROPHILS # BLD AUTO: 2.67 K/UL — SIGNIFICANT CHANGE UP (ref 1.8–7.4)
NEUTROPHILS NFR BLD AUTO: 63.4 % — SIGNIFICANT CHANGE UP (ref 43–77)
NRBC # BLD: 0 /100 WBCS — SIGNIFICANT CHANGE UP (ref 0–0)
PLATELET # BLD AUTO: 170 K/UL — SIGNIFICANT CHANGE UP (ref 150–400)
RBC # BLD: 3.19 M/UL — LOW (ref 3.8–5.2)
RBC # FLD: 14.6 % — HIGH (ref 10.3–14.5)
WBC # BLD: 4.22 K/UL — SIGNIFICANT CHANGE UP (ref 3.8–10.5)
WBC # FLD AUTO: 4.22 K/UL — SIGNIFICANT CHANGE UP (ref 3.8–10.5)

## 2022-08-30 ENCOUNTER — APPOINTMENT (OUTPATIENT)
Dept: HEMATOLOGY ONCOLOGY | Facility: CLINIC | Age: 57
End: 2022-08-30

## 2022-08-30 ENCOUNTER — RESULT REVIEW (OUTPATIENT)
Age: 57
End: 2022-08-30

## 2022-08-30 ENCOUNTER — APPOINTMENT (OUTPATIENT)
Dept: CARDIOLOGY | Facility: CLINIC | Age: 57
End: 2022-08-30

## 2022-08-30 VITALS
BODY MASS INDEX: 19.18 KG/M2 | TEMPERATURE: 98 F | OXYGEN SATURATION: 98 % | RESPIRATION RATE: 16 BRPM | WEIGHT: 108.25 LBS | SYSTOLIC BLOOD PRESSURE: 111 MMHG | DIASTOLIC BLOOD PRESSURE: 69 MMHG | HEART RATE: 73 BPM

## 2022-08-30 DIAGNOSIS — R79.89 OTHER SPECIFIED ABNORMAL FINDINGS OF BLOOD CHEMISTRY: ICD-10-CM

## 2022-08-30 LAB
BASOPHILS # BLD AUTO: 0.02 K/UL — SIGNIFICANT CHANGE UP (ref 0–0.2)
BASOPHILS NFR BLD AUTO: 0.4 % — SIGNIFICANT CHANGE UP (ref 0–2)
EOSINOPHIL # BLD AUTO: 0.03 K/UL — SIGNIFICANT CHANGE UP (ref 0–0.5)
EOSINOPHIL NFR BLD AUTO: 0.6 % — SIGNIFICANT CHANGE UP (ref 0–6)
HCT VFR BLD CALC: 31.4 % — LOW (ref 34.5–45)
HGB BLD-MCNC: 10.3 G/DL — LOW (ref 11.5–15.5)
IMM GRANULOCYTES NFR BLD AUTO: 0.8 % — SIGNIFICANT CHANGE UP (ref 0–1.5)
LYMPHOCYTES # BLD AUTO: 0.78 K/UL — LOW (ref 1–3.3)
LYMPHOCYTES # BLD AUTO: 16.1 % — SIGNIFICANT CHANGE UP (ref 13–44)
MCHC RBC-ENTMCNC: 32.8 G/DL — SIGNIFICANT CHANGE UP (ref 32–36)
MCHC RBC-ENTMCNC: 32.8 PG — SIGNIFICANT CHANGE UP (ref 27–34)
MCV RBC AUTO: 100 FL — SIGNIFICANT CHANGE UP (ref 80–100)
MONOCYTES # BLD AUTO: 0.37 K/UL — SIGNIFICANT CHANGE UP (ref 0–0.9)
MONOCYTES NFR BLD AUTO: 7.6 % — SIGNIFICANT CHANGE UP (ref 2–14)
NEUTROPHILS # BLD AUTO: 3.6 K/UL — SIGNIFICANT CHANGE UP (ref 1.8–7.4)
NEUTROPHILS NFR BLD AUTO: 74.5 % — SIGNIFICANT CHANGE UP (ref 43–77)
NRBC # BLD: 0 /100 WBCS — SIGNIFICANT CHANGE UP (ref 0–0)
PLATELET # BLD AUTO: 115 K/UL — LOW (ref 150–400)
RBC # BLD: 3.14 M/UL — LOW (ref 3.8–5.2)
RBC # FLD: 14.5 % — SIGNIFICANT CHANGE UP (ref 10.3–14.5)
WBC # BLD: 4.84 K/UL — SIGNIFICANT CHANGE UP (ref 3.8–10.5)
WBC # FLD AUTO: 4.84 K/UL — SIGNIFICANT CHANGE UP (ref 3.8–10.5)

## 2022-08-30 PROCEDURE — 99214 OFFICE O/P EST MOD 30 MIN: CPT

## 2022-08-30 PROCEDURE — 99215 OFFICE O/P EST HI 40 MIN: CPT | Mod: 25,95

## 2022-08-30 RX ORDER — CHOLESTYRAMINE 4 G/9G
4 POWDER, FOR SUSPENSION ORAL TWICE DAILY
Qty: 60 | Refills: 1 | Status: DISCONTINUED | COMMUNITY
Start: 2022-07-07 | End: 2022-08-30

## 2022-08-30 RX ORDER — ATORVASTATIN CALCIUM 40 MG/1
40 TABLET, FILM COATED ORAL
Qty: 90 | Refills: 0 | Status: DISCONTINUED | COMMUNITY
Start: 2022-04-06 | End: 2022-08-30

## 2022-08-30 RX ORDER — MAGNESIUM HYDROXIDE/ALUMINUM HYDROXICE/SIMETHICONE 120; 1200; 1200 MG/30ML; MG/30ML; MG/30ML
200-200-20 SUSPENSION ORAL
Refills: 0 | Status: DISCONTINUED | COMMUNITY
Start: 2022-04-06 | End: 2022-08-30

## 2022-08-30 RX ORDER — CHOLESTYRAMINE 4 G/9G
4 POWDER, FOR SUSPENSION ORAL TWICE DAILY
Qty: 1 | Refills: 3 | Status: DISCONTINUED | COMMUNITY
Start: 2022-07-19 | End: 2022-08-30

## 2022-08-30 NOTE — PHYSICAL EXAM
[Ambulatory and capable of all self care but unable to carry out any work activities] : Status 2- Ambulatory and capable of all self care but unable to carry out any work activities. Up and about more than 50% of waking hours [Thin] : thin [Normal] : affect appropriate [de-identified] : in wheelchair.

## 2022-08-30 NOTE — HISTORY OF PRESENT ILLNESS
[de-identified] : 58 y/o F with hx of thyroidectomy in 2011 (benign pathology) on thyroid replacement since then, and with COVID pneumonia back in March of 2020 ultimately signed herself out AMA, chronic issues thereafter, ended up going back to work in July 2020. In January-February 2021 had Pfizer vaccines. Started having issues starting in March 2021, she was having GI issues, endoscopy showed chronic gastritis. Additionally diagnosed with IBS at that point. She has been having issues with Postural Orthostatic Tachycardia Syndrome - her blood pressure goes down when she stands up from a seated position. She has fainted "too many times to count." She ends up with bruises here and there but nothing severe where she would need to go to the hospital. She saw two neurologists who did extensive evaluations without any success in finding diagnostic lesions. She reports she is seeing specialist for dysautonomia, Dr. Colón at [facility] (neurology). She said overall her POTS has improved but she has a had a couple of episodes of fainting in the last couple of weeks. She started fludrocortisone in September 2021 and has titrated up the dose. She is now on 1 mg total (probably 0.5 mg twice daily). The only way she feels completely normal is when she is laying down. BP gets very low on standing up, it has registered as low as 50s over 40s. She reports when she is standing or sitting for too long, she will get a chest tightness, but not quite a pain. It gets to the point where she feels like she is having trouble breathing and then she has to sit down. She has muscle aches in her legs which improves with warm bath, and then some burning pain in her arms which reminds her of a sunburn. She also now is having issues with her mouth - very difficult to eat as it feels like she has burnt her mouth. She has lost 47 pounds since March 2021. She is not really able to get much down due to these feelings, although she does have an appetite. She denies any night-sweats, she does always feel cold though. Her bowel movements "swing" from constipation to diarrhea. She was on Linzess recently but was taken off it. She reports that she is urinating normally, in fact a lot because she drinks a fair amount of water daily. She has no swelling in her legs at this time. She had in the past at work when she was standing a lot and was on amlodipine. She was sent to me for evaluation of elevated free light chains. 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 She was found with no monoclonal protein on SPEP and hemoglobin normal, normal renal function, and no hypercalcemia. Fat pad biopsy showed positive congo red staining in rare blood vessels and positive for polarization. 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 While admitted course was complicated by multiple unresponsive episodes prompting RRTs, during which pt found to be hypotensive and bradycardic. She initially required levophed for BP support but ultimately BP remained stable while on Midodrine 20mg q8 and Fludrocortisone 0.2mg q24. Pt noted to intermittently have sinus pauses (2-3.8 seconds) thought to be 2/2 vagal episodes, and asymptomatic bradycardia overnight on tele. ECG noted NSR but 1st degree AV block. TTE 3/16/22 showed normal LV size and function with EF 55%-60%. No regional wall motion abnormalities. Moderate concentric LVH. \par \par Pt also started on oral vanco for C.diff and completed ceftriaxone for UTI. \par \par Ultimately transferred to [facility] for consideration of inpatient chemotherapy. \par PPM placement recommended, as cardiac MRI showed evidence of amyloid involvement. Pt had another RRT for hypotension and symptomatic bradycardia to 30s, was on pressor support until she received a dual chamber PPM. Pt also continued her C.diff treatment and chemotherapy was placed on hold until the diarrhea cleared. She was then started on Daratumumab  + CyBorD inpatient, and tolerated it well ; she completed 2 treatments of chemotherapy inpatient.\par \par \par Patient has continued with this regimen now as outpatient. She did continue to have episodes of diarrhea since previous C. Diff infection in March. On follow up 4/18 she noticed that her urine was red in color and after drinking water it became more orange. Treated with antibiotics for UTI and evaluated by urology. Cytoxan was held for a time period, and ultimately this cleared with antibiotics.  [Therapy: ___] : Therapy: [unfilled] [Cycle: ___] : Cycle: [unfilled] [Day: ___] : Day: [unfilled] [FreeTextEntry1] : Kleber + CyBorD started on 4/8/2022, held cytoxan from 4/18-5/12 for hematuria, held again 6/24 for worsening diarrhea [de-identified] : Patient is still limited in her functional status due to diarrhea, but she is able to perform most of her ADLs independently. She is having diarrhea still multiple times daily (probably on average 5 times a day) and at times it is watery although not every time. She denies any abdominal pain or cramps associated with this. She reported a robust appetite but she cannot eat a lot because she has a bad taste in her mouth. Denied any dyspnea or chest pain, and her neuropathy is improving to her judgement. She is still taking midodrine 20 mg TID. AM Cortisol level recently checked and normal. Denied fevers or night chills. She is planned for follow up on 9/1 with GI - multiple medical interventions without any success to this point. \par \par Of note, patient reported that she is NOT interested in hematopoietic transplant at this time but she is interested in collecting her stem cells for a later date. \par \par

## 2022-08-30 NOTE — REVIEW OF SYSTEMS
[Fever] : no fever [Chills] : no chills [Night Sweats] : no night sweats [Fatigue] : fatigue [Recent Change In Weight] : ~T no recent weight change [Vision Problems] : no vision problems [Dysphagia] : no dysphagia [Nosebleeds] : no nosebleeds [Odynophagia] : no odynophagia [Chest Pain] : no chest pain [Palpitations] : no palpitations [Lower Ext Edema] : lower extremity edema [Abdominal Pain] : no abdominal pain [Vomiting] : no vomiting [Constipation] : no constipation [Diarrhea] : diarrhea [Dysuria] : no dysuria [Confused] : no confusion [Dizziness] : no dizziness [Fainting] : no fainting [Difficulty Walking] : difficulty walking [Insomnia] : no insomnia [Anxiety] : anxiety [Hot Flashes] : no hot flashes [Muscle Weakness] : no muscle weakness [Negative] : Heme/Lymph

## 2022-08-30 NOTE — ASSESSMENT
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 Cycle 1 of CyBorD was initiated on 4/1/22 while inpatient and therapy had been held (last dose given was 6/17) and restarted on 7/22/22.\par \par -Serum SPEP without a monoclonal fraction, Urine SPEP without monoclonal protein, immunofixation normal. \par -However on diagnosis Lambda serum FLCs 8.73 and kappa 0.92, for a ratio of 0.11 (or 9.5). \par -Fat pad biopsy done and was POSITIVE for congo red, with positive polarization. \par -As per discussion with pathology, unable to send this for mass spectrometry to confirm amyloid type. However, now s/p BMBx which is showing ~35% plasmacytosis (monoclonal) c/w plasma cell neoplasm. 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 It does NOT meeting MM criteria. 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 \par -Urine showing proteinuria (although not nephrotic range).\par -Systemic light chain amyloidosis is classically associated with a thickened interventricular septum and proteinuria. Also associated with neurologic sequelae such as that she has. \par -Pt currently being treated for systemic light chain amyloidosis with poncho-CyBorD.  Day 1 dose inpatient split, as patient was getting PPM and wanted lower risk of reaction / delays. \par -Daratumumab plus CyBorD being given weekly on D 1,8,15,22 of 28 day cycle, Daratumumab will be weekly x 8 followed by every 2 weeks x 8 and then monthly and plan for autologous bone marrow transplant evaluation. Discussed possible side effects including fatigue and neuropathy, as well as toxicity such as pancytopenia and increased susceptibility to infection. Treatment was held after 6/17 for persistent diarrhea and restarted therapy 7/22/22. Today is C5 D12.\par -Had held cyclophosphamide due to hematuria, although more likely 2/2 UTI, this has been restarted.\par -LFTs slowly up trending which has continued, unclear etiology - d/c'ed atorvastatin. Will check abdominal US and discuss with GI. May need biopsy if continues. \par -Continue follow up with Cardiology and PCP.\par -Will continue to monitor diarrhea, as this has not resolved at all to this point. GI following. Patient self discontinued Questran as this was not working. Will follow up with GI on 9/1. Will reach out to GI regarding the utility of possible endoscopy evaluation. \par -Patient does not wish to pursue autologous BMT at this point but does want to pursue collection of stem cells. \par -Patient understands and agrees with plan. All information explained to the best of my ability.\par -Following up closely with cardiology. \par -RTC 1 month.

## 2022-08-31 ENCOUNTER — NON-APPOINTMENT (OUTPATIENT)
Age: 57
End: 2022-08-31

## 2022-09-01 ENCOUNTER — APPOINTMENT (OUTPATIENT)
Dept: GASTROENTEROLOGY | Facility: CLINIC | Age: 57
End: 2022-09-01

## 2022-09-01 VITALS
BODY MASS INDEX: 18.68 KG/M2 | DIASTOLIC BLOOD PRESSURE: 62 MMHG | SYSTOLIC BLOOD PRESSURE: 90 MMHG | HEIGHT: 63 IN | OXYGEN SATURATION: 98 % | TEMPERATURE: 97.5 F | HEART RATE: 73 BPM | WEIGHT: 105.4 LBS

## 2022-09-01 DIAGNOSIS — Z80.3 FAMILY HISTORY OF MALIGNANT NEOPLASM OF BREAST: ICD-10-CM

## 2022-09-01 DIAGNOSIS — Z80.1 FAMILY HISTORY OF MALIGNANT NEOPLASM OF TRACHEA, BRONCHUS AND LUNG: ICD-10-CM

## 2022-09-01 DIAGNOSIS — R63.4 ABNORMAL WEIGHT LOSS: ICD-10-CM

## 2022-09-01 DIAGNOSIS — Z01.812 ENCOUNTER FOR PREPROCEDURAL LABORATORY EXAMINATION: ICD-10-CM

## 2022-09-01 DIAGNOSIS — F32.A ANXIETY DISORDER, UNSPECIFIED: ICD-10-CM

## 2022-09-01 DIAGNOSIS — F41.9 ANXIETY DISORDER, UNSPECIFIED: ICD-10-CM

## 2022-09-01 DIAGNOSIS — Z82.49 FAMILY HISTORY OF ISCHEMIC HEART DISEASE AND OTHER DISEASES OF THE CIRCULATORY SYSTEM: ICD-10-CM

## 2022-09-01 DIAGNOSIS — Z20.822 ENCOUNTER FOR PREPROCEDURAL LABORATORY EXAMINATION: ICD-10-CM

## 2022-09-01 DIAGNOSIS — G90.9 DISORDER OF THE AUTONOMIC NERVOUS SYSTEM, UNSPECIFIED: ICD-10-CM

## 2022-09-01 PROCEDURE — 99214 OFFICE O/P EST MOD 30 MIN: CPT

## 2022-09-01 NOTE — ASSESSMENT
[FreeTextEntry1] : The patient is a 57-year-old female who suffers from amyloidosis currently on treatment.  The gastrointestinal perspective the patient has chronic unrelenting diarrhea with frequent bowel movements with no significant abdominal pain.  Bowel movements are high-volume and the differential diagnosis does include amyloid involvement of the small intestine.  The patient will undergo an upper endoscopy and colonoscopy as soon as her dysphagia clearance is obtained.  We will obtain biopsies of the proximal small intestine as well as the colon and terminal ileum.  If the exams are normal the patient will require an enterography.  The patient has been sent for stool testing in the past but this may need to be repeated as well.  Once the procedures are performed I will distribute a copy of the results.

## 2022-09-01 NOTE — REVIEW OF SYSTEMS
[Feeling Poorly] : feeling poorly [Feeling Tired] : feeling tired [Recent Weight Loss (___ Lbs)] : recent [unfilled] ~Ulb weight loss [As Noted in HPI] : as noted in HPI [Diarrhea] : diarrhea [Negative] : Genitourinary [FreeTextEntry5] : PPM

## 2022-09-01 NOTE — HISTORY OF PRESENT ILLNESS
[FreeTextEntry1] : I saw patient Tonia Hassan in the office today.  The patient is a 57-year-old female who suffers from amyloidosis and is currently on treatment.  The patient is plagued by chronic high-volume diarrhea which causes dehydration.  It was initially felt that it may have been due to her treatment however in spite of suspending this temporarily the diarrhea has persisted.  Multiple stool tests negative for infection.  The patient is afraid to eat because it promotes bowel movements and has lost significant weight.  Testing for celiac disease has been negative.  The patient's history also includes complete heart block and has a permanent pacemaker.  The patient also suffers from postural hypotension.  There has been no diagnosis of coronary artery disease and the patient has had a recent interrogation of her pacemaker for history of heart block as well as an echocardiogram.  The patient will be scheduled for an endoscopy and colonoscopy.

## 2022-09-01 NOTE — PHYSICAL EXAM
[General Appearance - Alert] : alert [General Appearance - In No Acute Distress] : in no acute distress [Respiration, Rhythm And Depth] : normal respiratory rhythm and effort [Auscultation Breath Sounds / Voice Sounds] : lungs were clear to auscultation bilaterally [Apical Impulse] : the apical impulse was normal [Heart Rate And Rhythm] : heart rate was normal and rhythm regular [Bowel Sounds] : normal bowel sounds [Abdomen Soft] : soft [Abdomen Tenderness] : non-tender [] : no hepato-splenomegaly

## 2022-09-02 ENCOUNTER — APPOINTMENT (OUTPATIENT)
Dept: INFUSION THERAPY | Facility: HOSPITAL | Age: 57
End: 2022-09-02

## 2022-09-06 ENCOUNTER — OUTPATIENT (OUTPATIENT)
Dept: OUTPATIENT SERVICES | Facility: HOSPITAL | Age: 57
LOS: 1 days | End: 2022-09-06

## 2022-09-06 ENCOUNTER — TRANSCRIPTION ENCOUNTER (OUTPATIENT)
Age: 57
End: 2022-09-06

## 2022-09-06 ENCOUNTER — APPOINTMENT (OUTPATIENT)
Dept: DISASTER EMERGENCY | Facility: HOSPITAL | Age: 57
End: 2022-09-06

## 2022-09-06 VITALS
HEART RATE: 74 BPM | SYSTOLIC BLOOD PRESSURE: 108 MMHG | TEMPERATURE: 99 F | RESPIRATION RATE: 19 BRPM | DIASTOLIC BLOOD PRESSURE: 67 MMHG | OXYGEN SATURATION: 98 %

## 2022-09-06 VITALS
HEART RATE: 76 BPM | HEIGHT: 63 IN | SYSTOLIC BLOOD PRESSURE: 97 MMHG | OXYGEN SATURATION: 99 % | TEMPERATURE: 99 F | DIASTOLIC BLOOD PRESSURE: 56 MMHG | RESPIRATION RATE: 18 BRPM | WEIGHT: 104.94 LBS

## 2022-09-06 DIAGNOSIS — U07.1 COVID-19: ICD-10-CM

## 2022-09-06 RX ORDER — SODIUM CHLORIDE 9 MG/ML
250 INJECTION INTRAMUSCULAR; INTRAVENOUS; SUBCUTANEOUS
Refills: 0 | Status: DISCONTINUED | OUTPATIENT
Start: 2022-09-06 | End: 2022-09-21

## 2022-09-06 RX ORDER — BEBTELOVIMAB 87.5 MG/ML
175 INJECTION, SOLUTION INTRAVENOUS ONCE
Refills: 0 | Status: COMPLETED | OUTPATIENT
Start: 2022-09-06 | End: 2022-09-06

## 2022-09-06 RX ADMIN — BEBTELOVIMAB 175 MILLIGRAM(S): 87.5 INJECTION, SOLUTION INTRAVENOUS at 16:03

## 2022-09-06 RX ADMIN — SODIUM CHLORIDE 250 MILLILITER(S): 9 INJECTION INTRAMUSCULAR; INTRAVENOUS; SUBCUTANEOUS at 16:04

## 2022-09-06 NOTE — MONOCLONAL ANTIBODY INFUSION - EXAM
CC: Monoclonal Antibody Infusion/COVID 19 Positive  57yFemale    exam/findings:  T(C): --  HR: --  BP: --  RR: --  SpO2: --      PE:   Appearance: NAD	  HEENT:   Normal oral mucosa,   Lymphatic: No lymphadenopathy  Cardiovascular: Normal S1 S2, No JVD, No murmurs, No edema  Respiratory: Lungs clear to auscultation	  Gastrointestinal:  Soft, Non-tender, + BS	  Skin: warm and dry  Neurologic: Non-focal  Extremities: Normal range of motion,

## 2022-09-06 NOTE — MONOCLONAL ANTIBODY INFUSION - HOME MEDICATIONS
Allegra 180 mg oral tablet , 1 tab(s) orally once a day  cyclophosphamide 50 mg oral capsule , Take 9 capsules weekly on Friday  Lomotil 2.5 mg-0.025 mg oral tablet , 2 tab(s) orally 4 times a day, As Needed  levothyroxine 137 mcg (0.137 mg) oral tablet , 1 tab(s) orally once a day  Acidophilus oral capsule , 1 cap(s) orally 2 times a day   Flonase 50 mcg/inh nasal spray , 1 spray(s) nasal , As Needed  simethicone 80 mg oral tablet, chewable , 1 tab(s) orally every 6 hours  midodrine 10 mg oral tablet , 2 tab(s) orally 3 times a day  famotidine 20 mg oral tablet , 1 tab(s) orally 2 times a day  Valtrex 500 mg oral tablet , 1 tab(s) orally 2 times a day  aspirin 81 mg oral tablet, chewable , 1 tab(s) orally once a day  atorvastatin 40 mg oral tablet , 1 tab(s) orally once a day (at bedtime)  loratadine 10 mg oral tablet , 1 tab(s) orally once a day  acetaminophen 325 mg oral tablet , 2 tab(s) orally every 6 hours, As needed, Mild Pain (1 - 3), Moderate Pain (4 - 6)  fludrocortisone 0.1 mg oral tablet , 2 tab(s) orally once a day  gabapentin 100 mg oral tablet , 1 tab(s) orally every 8 hours

## 2022-09-06 NOTE — MONOCLONAL ANTIBODY INFUSION - ASSESSMENT AND PLAN
---- y/o M/F with PMHx of ----------- and recently diagnosed with Covid-19 infection who was referred for monoclonal antibody infusion by  ---------- after testing positive for COVID 19 on -------.  Patient states he has been experiencing HA, cough, sore throat, malaise, and nasal congestion since -------. He denies any CP, SOB, chills, numbness/tingling in b/l limbs, loss of sensation or motor function, N/V/D. Pt is vaccinated and boosted with         PLAN:  - Injection procedure explained to patient   - Consent for monoclonal antibody injection obtained   - Risk & benefits discussed/all questions answered  - Inject Bebtelovimab 175 mg over 1 minute.  - Observe patient for one hour post administration    I have reviewed the Bebtelovimab Emergency Use Authorization (EUA) and I have provided the patient or patient's caregiver with the following information:    1. FDA has authorized emergency use Bebtelovimab, which is not an FDA-approved biological product.  2. The patient or patient's caregiver has the option to accept or refuse administration of Bebtelovimab.  3. The significant known and potential risks and benefits of Bebtelovimab and the extent to which such risks and benefits are unknown.  4. Information on available alternative treatments and risks and benefits of those alternatives.    The patient's COVID monoclonal antibody injection administration went well without any complications. The patient tolerated the treatment without any reactions. Vitals were stable throughout the injection & post-injection administration. The pt denies any CP, fevers, chills, SOB, numbness/tingling in b/l limbs, loss of sensation or motor function, N/V/D while receiving the injection. Patient denies any symptoms an hour after post injection. Vitals were taken post injection and were stable. Pt is medically stable to be discharged home. Discharge instructions were provided to the patient with a fact sheet included. Patient was instructed to self-isolate and use infection control measures (e.g wear mask, isolate, social distance, avoid sharing personal items, clean and disinfect "high touch" surfaces, and frequent handwashing according to the CDC guidelines. The patient was informed on what symptoms to be aware of for the next couple of days, and if there are any issues to call the 24/7 clinical call center. Patient was instructed to follow up with PCP as needed.   This is a 57 yrs old female with PMHx of Amyloidosis on chemo, had pace maker inserted in 2022 for bradycardia, smoldering myeloma and recently diagnosed with Covid-19 infection who was referred for monoclonal antibody infusion by Dr. Goldberg after testing positive for COVID 19 on 9/5/22.  Patient states he has been experiencing  cough, sore throat, malaise, and nasal congestion since 9/5/22. He denies any CP, SOB, chills, numbness/tingling in b/l limbs, loss of sensation or motor function, N/V/D. Pt is vaccinated  with pfizer x2  PLAN:  - Injection procedure explained to patient   - Consent for monoclonal antibody injection obtained   - Risk & benefits discussed/all questions answered  - Inject Bebtelovimab 175 mg over 1 minute.  - Observe patient for one hour post administration    I have reviewed the Bebtelovimab Emergency Use Authorization (EUA) and I have provided the patient or patient's caregiver with the following information:    1. FDA has authorized emergency use Bebtelovimab, which is not an FDA-approved biological product.  2. The patient or patient's caregiver has the option to accept or refuse administration of Bebtelovimab.  3. The significant known and potential risks and benefits of Bebtelovimab and the extent to which such risks and benefits are unknown.  4. Information on available alternative treatments and risks and benefits of those alternatives.    The patient's COVID monoclonal antibody injection administration went well without any complications. The patient tolerated the treatment without any reactions. Vitals were stable throughout the injection & post-injection administration. The pt denies any CP, fevers, chills, SOB, numbness/tingling in b/l limbs, loss of sensation or motor function, N/V/D while receiving the injection. Patient denies any symptoms an hour after post injection. Vitals were taken post injection and were stable. Pt is medically stable to be discharged home. Discharge instructions were provided to the patient with a fact sheet included. Patient was instructed to self-isolate and use infection control measures (e.g wear mask, isolate, social distance, avoid sharing personal items, clean and disinfect "high touch" surfaces, and frequent handwashing according to the CDC guidelines. The patient was informed on what symptoms to be aware of for the next couple of days, and if there are any issues to call the 24/7 clinical call center. Patient was instructed to follow up with PCP as needed.

## 2022-09-07 LAB
ALBUMIN MFR SERPL ELPH: 67.4 %
ALBUMIN SERPL ELPH-MCNC: 4.4 G/DL
ALBUMIN SERPL-MCNC: 4 G/DL
ALBUMIN/GLOB SERPL: 2.1 RATIO
ALP BLD-CCNC: 132 U/L
ALPHA1 GLOB MFR SERPL ELPH: 4.2 %
ALPHA1 GLOB SERPL ELPH-MCNC: 0.2 G/DL
ALPHA2 GLOB MFR SERPL ELPH: 10.4 %
ALPHA2 GLOB SERPL ELPH-MCNC: 0.6 G/DL
ALT SERPL-CCNC: 132 U/L
ANION GAP SERPL CALC-SCNC: 10 MMOL/L
AST SERPL-CCNC: 28 U/L
B-GLOBULIN MFR SERPL ELPH: 11.3 %
B-GLOBULIN SERPL ELPH-MCNC: 0.7 G/DL
BILIRUB SERPL-MCNC: 1.1 MG/DL
BUN SERPL-MCNC: 17 MG/DL
CALCIUM SERPL-MCNC: 9.2 MG/DL
CHLORIDE SERPL-SCNC: 107 MMOL/L
CO2 SERPL-SCNC: 27 MMOL/L
CREAT SERPL-MCNC: 0.48 MG/DL
DEPRECATED KAPPA LC FREE/LAMBDA SER: 0.35 RATIO
EGFR: 110 ML/MIN/1.73M2
GAMMA GLOB FLD ELPH-MCNC: 0.4 G/DL
GAMMA GLOB MFR SERPL ELPH: 6.7 %
GLUCOSE SERPL-MCNC: 102 MG/DL
IGA SER QL IEP: 22 MG/DL
IGG SER QL IEP: 481 MG/DL
IGM SER QL IEP: <10 MG/DL
INTERPRETATION SERPL IEP-IMP: NORMAL
KAPPA LC CSF-MCNC: 1.19 MG/DL
KAPPA LC SERPL-MCNC: 0.42 MG/DL
M PROTEIN MFR SERPL ELPH: 2.5 %
M PROTEIN SPEC IFE-MCNC: NORMAL
MONOCLON BAND OBS SERPL: 0.1 G/DL
POTASSIUM SERPL-SCNC: 4.3 MMOL/L
PROT SERPL-MCNC: 5.9 G/DL
SODIUM SERPL-SCNC: 143 MMOL/L

## 2022-09-09 ENCOUNTER — APPOINTMENT (OUTPATIENT)
Dept: INFUSION THERAPY | Facility: HOSPITAL | Age: 57
End: 2022-09-09

## 2022-09-10 NOTE — ED ADULT TRIAGE NOTE - HEIGHT IN CM
Pt was at her cross country meet and felt weak and started to black out. Pt had a syncopal episode. Pt had a similar episode 1 week ago but did not go the ED.    160.02

## 2022-09-13 ENCOUNTER — APPOINTMENT (OUTPATIENT)
Dept: GASTROENTEROLOGY | Facility: CLINIC | Age: 57
End: 2022-09-13

## 2022-09-13 PROCEDURE — 43239 EGD BIOPSY SINGLE/MULTIPLE: CPT | Mod: 59

## 2022-09-13 PROCEDURE — 45380 COLONOSCOPY AND BIOPSY: CPT

## 2022-09-15 ENCOUNTER — NON-APPOINTMENT (OUTPATIENT)
Age: 57
End: 2022-09-15

## 2022-09-15 NOTE — CARDIOLOGY SUMMARY
[de-identified] : 7/27/2022, paced at 70 bpm [de-identified] : 3/30/2022, septum 1.2 cm, PWT 1.2 cm, moderately dilated LA, normal LV systolic function, average GLS -14.6%, pattern is\par homogeneous (i.e. not suggestive of amyloid), LVEF 60%\par \par 7/27/2022, septum 1.2 cm, PWT 1.3 cm, mildly dilated LA, normal LV systolic function, average GLS - 15%, LVEF 60-65% [de-identified] : 3/28/2022, Small pericardial and bilateral pleural effusions. Subtle inferior lateral basal myocardial wall with gadolinium enhancement.  Biatrial enlargement with associated late gadolinium enhancement. These findings may represent amyloidosis. [de-identified] : 3/30/2022 Medtronic Margarita XT DR MRI dual chamber PPM implanted by Hasmukh Villarreal MD, PhD

## 2022-09-15 NOTE — REASON FOR VISIT
[Other: ____] : [unfilled] [FreeTextEntry1] : August 2022 - TeleHealth Video Encounter\par Initiated by: Patient election for TeleHealth visit\par Patient was consented for TeleHealth visit\par Patient Location: Home\par Physician Location: Office ([address] [place] [address], [address], [place], [place], [address])\par Duration of Encounter: 40 minutes, at least 50% of which was spent in direct counseling and coordination of care.\par  \par Continues to have diarrhea. Will follow-up with GI (Dr. Holloway). She continues to have orthostatic symptoms related to her diarrhea. She notes it the most with frequent diarrhea. \par She is off of fludocortisone. She takes midodrine 20 mg TID. \par The swelling in her legs is only a problem when she sits for too long. When she walks, her legs are better.

## 2022-09-15 NOTE — DISCUSSION/SUMMARY
[EKG obtained to assist in diagnosis and management of assessed problem(s)] : EKG obtained to assist in diagnosis and management of assessed problem(s) [FreeTextEntry1] : 57 year-old woman with light chain amyloidosis, orthostatic hypotension requiring fludrocortisone and midodrine, now started on Cy-Bor-D plus daratumumab, with lower extremity edema, likely secondary to fludrocortisone, presents today or follow-up after recent hospitalization (March 2022).\par Myleoma therapies per hematology.\par Now off of fludocortisone, but some degree of lower extremity edema persists.\par \par We discussed compression stockings, but she has had a bad reaction to them in the past that required her to cut them off. \par \par Follow-up with GI regarding diarrhea.\par Follow-up with GI and hematology regarding elevated LFTs.\par \par Encouraged ambulations as tolerated.\par \par Elevate the legs.\par \par Patient has chronic heart failure with preserved ejection fraction and is hypotensive due to autonomic dysregulation. She is without acute coronary syndrome, decompensated heart failure, dangerous arrhythmia, or critical valvular stenosis.\par No further cardiovascular testing is necessary prior to EGD/colonoscopy.\par \par

## 2022-09-15 NOTE — HISTORY OF PRESENT ILLNESS
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 \par \par Lower extremity edema up to her waist. \par Patient continues to take fludocortisone 0.2 mg daily and midodrine 20 mg TID\par \par June 2022 - Patient returns today for follow-up.\par She has weaned herself off of the fludrocortisone. She now only gets orthostatic hypotension in association with frequent diarrhea. She has non-bloody, watery diarrhea up to 10 times per day. Some days, it is well controlled with Imodium, but she cannot remember the last well formed bowel movement. \par She continues to take midodrine 20 mg TID.\par She continues Cy-Bor-D plus daratumumab with Dr. Goldberg.

## 2022-09-16 ENCOUNTER — RESULT REVIEW (OUTPATIENT)
Age: 57
End: 2022-09-16

## 2022-09-16 ENCOUNTER — APPOINTMENT (OUTPATIENT)
Dept: INFUSION THERAPY | Facility: HOSPITAL | Age: 57
End: 2022-09-16

## 2022-09-16 ENCOUNTER — TRANSCRIPTION ENCOUNTER (OUTPATIENT)
Age: 57
End: 2022-09-16

## 2022-09-16 LAB
BASOPHILS # BLD AUTO: 0.04 K/UL — SIGNIFICANT CHANGE UP (ref 0–0.2)
BASOPHILS NFR BLD AUTO: 0.9 % — SIGNIFICANT CHANGE UP (ref 0–2)
EOSINOPHIL # BLD AUTO: 0.09 K/UL — SIGNIFICANT CHANGE UP (ref 0–0.5)
EOSINOPHIL NFR BLD AUTO: 2.1 % — SIGNIFICANT CHANGE UP (ref 0–6)
HCT VFR BLD CALC: 31.8 % — LOW (ref 34.5–45)
HGB BLD-MCNC: 10.5 G/DL — LOW (ref 11.5–15.5)
IMM GRANULOCYTES NFR BLD AUTO: 0.5 % — SIGNIFICANT CHANGE UP (ref 0–0.9)
LYMPHOCYTES # BLD AUTO: 0.59 K/UL — LOW (ref 1–3.3)
LYMPHOCYTES # BLD AUTO: 13.7 % — SIGNIFICANT CHANGE UP (ref 13–44)
MCHC RBC-ENTMCNC: 32.7 PG — SIGNIFICANT CHANGE UP (ref 27–34)
MCHC RBC-ENTMCNC: 33 G/DL — SIGNIFICANT CHANGE UP (ref 32–36)
MCV RBC AUTO: 99.1 FL — SIGNIFICANT CHANGE UP (ref 80–100)
MONOCYTES # BLD AUTO: 0.3 K/UL — SIGNIFICANT CHANGE UP (ref 0–0.9)
MONOCYTES NFR BLD AUTO: 6.9 % — SIGNIFICANT CHANGE UP (ref 2–14)
NEUTROPHILS # BLD AUTO: 3.28 K/UL — SIGNIFICANT CHANGE UP (ref 1.8–7.4)
NEUTROPHILS NFR BLD AUTO: 75.9 % — SIGNIFICANT CHANGE UP (ref 43–77)
NRBC # BLD: 0 /100 WBCS — SIGNIFICANT CHANGE UP (ref 0–0)
PLATELET # BLD AUTO: 315 K/UL — SIGNIFICANT CHANGE UP (ref 150–400)
RBC # BLD: 3.21 M/UL — LOW (ref 3.8–5.2)
RBC # FLD: 13.6 % — SIGNIFICANT CHANGE UP (ref 10.3–14.5)
WBC # BLD: 4.32 K/UL — SIGNIFICANT CHANGE UP (ref 3.8–10.5)
WBC # FLD AUTO: 4.32 K/UL — SIGNIFICANT CHANGE UP (ref 3.8–10.5)

## 2022-09-19 NOTE — ASSESSMENT
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 for cardiac complications likely related to amyloid involvement now s/p PPM. Cycle 1 of CyBorD was initiated on 4/1/22 while inpatient and she is now C3D20 on 6/15/22.\par \par -Serum SPEP without a monoclonal fraction, Urine SPEP without monoclonal protein, immunofixation normal. \par -However on diagnosis Lambda serum FLCs 8.73 and kappa 0.92, for a ratio of 0.11 (or 9.5). \par -Fat pad biopsy done and was POSITIVE for congo red, with positive polarization. \par -As per discussion with pathology, unable to send this for mass spectrometry to confirm amyloid type. However, now s/p BMBx which is showing ~35% plasmacytosis (monoclonal) c/w plasma cell neoplasm. 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 Is NOT meeting MM criteria. \par -Recent hospitalization noted - patient with hypotension likely due to heart block 2/2 amyloidosis, which confirmed involving heart with MRI. \par -Urine showing proteinuria (although not nephrotic range).\par -Systemic light chain amyloidosis is classically associated with a thickened interventricular septum and proteinuria. Also associated with neurologic sequelae such as that she has. \par -Pt currently being treated for systemic light chain amyloidosis with poncho-CyBorD. Day 1 dose inpatient split, as patient was getting PPM and wanted lower risk of reaction / delays. \par -Will continue daratumumab plus CyBorD which will be given weekly on D 1,8,15,22 of 28 day cycle, Daratumumab will be weekly x 8 followed by every 2 weeks x 8 and then monthly and plan for autologous peripheral blood stem cell transplant. \par \par I had a very lengthy discussion with the patient regarding the diagnosis of AL amyloidosis/plasma cell neoplasm and that this is not a curable disease with standard chemotherapy treatment.  I also discussed that high-dose chemotherapy followed by autologous peripheral stem cell transplant is considered one of the treatment options as part of standard of care for patients less than 70 years of age with newly diagnosed AL amyloidosis/plasma cell neoplasm with good overall performance status and normal vital organ function. I discussed that deep hematologic response rate > 60% and estimated 5 yr survival > 80% has been achieved following high-dose chemotherapy followed by autologous peripheral stem cell transplantation(ERNIE Simpson. Blood Advances, 2020).  I did discuss that early autologous peripheral stem cell transplantation may minimize total exposure to chemotherapy and may lead to an improvement in quality of life.  \par \par In addition, comprehensive discussion regarding acquisition of hematopoietic stem cells for autologous transplantation by apheresis following mobilization with leukocyte growth factor took place. This was followed by discussion regarding hospitalization for high-dose chemotherapy with Melphalan followed by autologous stem cell transplantation.  Potential side effects and toxicities of leukocyte growth factor for mobilization of stem cells as well as subsequent high-dose chemotherapy with Melphalan preparative regimen in the setting of transplant were discussed with review of specific consent forms taking place during consultation visit.  The patient states she has significant symptoms of diarrhea with weight loss and generalized weakness from diarrhea. She required to use bathroom about every 15 minutes during consult visit. She has GI consult in a few weeks. She feels she is unable to proceed with early autologous peripheral stem cell transplantation for her disease at this time as she could not tolerate it.  She also has significant cardiac involvement with complications secondary to AL amyloidosis. I will contact her Cardiologist to discuss her cardiac involvement to determine if she is a candidate for autoPBSCT.  She will be scheduled for standard pre-transplant testing if she is found to be transplant eligible. \par \par I have had an extensive discussion with the patient regarding the risks, benefits and alternatives to high-dose chemotherapy and autologous peripheral blood stem cell transplantation.   We discussed stem cell mobilization with leukocyte growth factor.  My goal would be to collect 5 to 10 x 10^6 CD34 cells per kilo.  This would be enough for two transplants if at some point a second transplant be deemed necessary.   A preparative regimen including Melphalan 200 mg/m2 was discussed.  Risks including but not limited to infection, bleeding, end organ damage, secondary malignancy, venoocclusive disease and mortality were discussed. Arrangements will be made for the patient to attend our transplant orientation meeting.   Venoocclusive disease and infection prophylaxis and the use of Kepivance to help prevent mucositis were discussed.  Literature and consents have been provided for review.   \par \par The introduction of ASCT represented a major step forward in the treatment of AL amyloidosis.\par In transplant eligible patients, the hematologic response rate to ASCT exceeds 70%, with 35% to 37% of patients obtaining CR.  The [facility] investigators updated their experience with ASCT.  With a median follow-up of 8 years, the overall median survival was 7.6 years. Remarkably, about 55% of patients in CR are projected to be alive at 14 years, with no deaths observed in patients with longer follow-up.(\par MAGO’Darwin A, J Clin Oncol, 2015); ( Isabelle V, Blood, 2015)\par Bortezomib can be used in subjects who fail to achieve CR after ASCT, increasing the CR rate to almost 60%.\par (Landau H,Leukemia, 2013). \par \par

## 2022-09-19 NOTE — REVIEW OF SYSTEMS
[Fatigue] : fatigue [Lower Ext Edema] : lower extremity edema [Diarrhea] : diarrhea [Dizziness] : dizziness [Fainting] : fainting [Fever] : no fever [Chills] : no chills [Night Sweats] : no night sweats [Vision Problems] : no vision problems [Mucosal Pain] : no mucosal pain [Chest Pain] : no chest pain [Shortness Of Breath] : no shortness of breath [Cough] : no cough [Skin Rash] : no skin rash [Easy Bleeding] : no tendency for easy bleeding [Easy Bruising] : no tendency for easy bruising [Swollen Glands] : no swollen glands [FreeTextEntry4] : no sore throat [FreeTextEntry7] : Good appetite, no nausea [FreeTextEntry9] : no bone pain

## 2022-09-19 NOTE — CONSULT LETTER
[Dear  ___] : Dear  [unfilled], [Consult Letter:] : I had the pleasure of evaluating your patient, [unfilled]. [Please see my note below.] : Please see my note below. [Consult Closing:] : Thank you very much for allowing me to participate in the care of this patient.  If you have any questions, please do not hesitate to contact me. [Sincerely,] : Sincerely, [FreeTextEntry2] : Bradley Goldberg, M.D.\[facility] [address]\[facility] [place], [place]   [address] [FreeTextEntry3] : \par Darya Echeverria M.D.\par \par  of Medicine\par [employer]\par [facility] \par [facility]\par [address] [place] [address] \[place] [address] \par ph: [phone]\par fax: [phone]

## 2022-09-19 NOTE — PHYSICAL EXAM
[Thin] : thin [Normal] : affect appropriate [de-identified] : uses wheelchair in the office [de-identified] : anicteric [de-identified] : RRR, normal S1S2 [de-identified] : +1-2 B/L LE edema [de-identified] : no rash [de-identified] : A & O x 3

## 2022-09-19 NOTE — REASON FOR VISIT
[Other: _____] : [unfilled] [Initial Consultation] : an initial consultation for [FreeTextEntry2] : primary light chain amyloidosis on Danyell + CyBorD to discuss high-dose chemotherapy followed by autologous peripheral blood stem cell transplant.

## 2022-09-19 NOTE — HISTORY OF PRESENT ILLNESS
[de-identified] : \par 56 y/o F with hx of thyroidectomy in 2011 (benign pathology) on thyroid replacement since then, and with COVID pneumonia back in March of 2020 ultimately signed herself out AMA, chronic issues thereafter, ended up going back to work in July 2020. In January-February 2021 had Pfizer vaccines. Started having issues starting in March 2021, she was having GI issues, endoscopy showed chronic gastritis. Additionally diagnosed with IBS at that point. She has been having issues with Postural Orthostatic Tachycardia Syndrome - her blood pressure goes down when she stands up from a seated position. She has fainted "too many times to count." She ends up with bruises here and there but nothing severe where she would need to go to the hospital. She saw two neurologists who did extensive evaluations without any success in finding diagnostic lesions. She reports she is seeing specialist for dysautonomia, Dr. Colón at [facility] (neurology). She said overall her POTS has improved but she has a had a couple of episodes of fainting in the last couple of weeks. She started fludrocortisone in September 2021 and has titrated up the dose. She is now on 1 mg total (probably 0.5 mg twice daily). The only way she feels completely normal is when she is laying down. BP gets very low on standing up, it has registered as low as 50s over 40s. She reports when she is standing or sitting for too long, she will get a chest tightness, but not quite a pain. It gets to the point where she feels like she is having trouble breathing and then she has to sit down. She has muscle aches in her legs which improves with warm bath, and then some burning pain in her arms which reminds her of a sunburn. She also now is having issues with her mouth - very difficult to eat as it feels like she has burnt her mouth. She has lost 47 pounds since March 2021. She is not really able to get much down due to these feelings, although she does have an appetite. She denies any night-sweats, she does always feel cold though. Her bowel movements "swing" from constipation to diarrhea. She was on Linzess recently but was taken off it. She reports that she is urinating normally, in fact a lot because she drinks a fair amount of water daily. She has no swelling in her legs at this time. She had in the past at work when she was standing a lot and was on amlodipine. She was sent to me for evaluation of elevated free light chains. 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 She was found with no monoclonal protein on SPEP and hemoglobin normal, normal renal function, and no hypercalcemia. Fat pad biopsy showed positive congo red staining in rare blood vessels and positive for polarization. This is concerning for primary light chain amyloidosis.\par \par In March 2022 patient was admitted to [facility] for multiple recurrent syncopal episodes. While admitted course was complicated by multiple unresponsive episodes prompting RRTs, during which pt found to be hypotensive and bradycardic. She initially required levophed for BP support but ultimately BP remained stable while on Midodrine 20mg q8 and Fludrocortisone 0.2mg q24. Pt noted to intermittently have sinus pauses (2-3.8 seconds) thought to be 2/2 vagal episodes, and asymptomatic bradycardia overnight on tele. ECG noted NSR but 1st degree AV block. TTE 3/16/22 showed normal LV size and function with EF 55%-60%. No regional wall motion abnormalities. Moderate concentric LVH. \par \par Pt also started on oral vanco for C.diff and completed ceftriaxone for UTI. \par \par Ultimately transferred to [facility] for consideration of inpatient chemotherapy. \par PPM placement recommended, as cardiac MRI showed evidence of amyloid involvement. Pt had another RRT for hypotension and symptomatic bradycardia to 30s, was on pressor support until she received a dual chamber PPM. Pt also continued her C.diff treatment and chemotherapy was placed on hold until the diarrhea cleared. She was then started on Daratumumab + CyBorD inpatient, and tolerated it well ; she completed 2 treatments of chemotherapy inpatient.\par \par \par Patient has continued with this regimen now as outpatient. She did continue to have episodes of diarrhea since previous C. Diff infection in March. On follow up 4/18 she noticed that her urine was red in color and after drinking water it became more orange. Treated with antibiotics for UTI and evaluated by urology. Cytoxan was held for a time period, and ultimately this cleared with antibiotics. \par \par \par \par Current Treatment Status: Therapy: Danyell + CyBorD, Cycle: 3, Day: 20 on 6/15/22.  Kleber + CyBorD started on 4/8/2022, held Cytoxan from 4/18-5/12 for hematuria. \par \par \par On follow up 5/12/22 patient reported feeling improved but still fatigued. She is STILL experiencing diarrhea which started before due to C. Diff. She takes Imodium every 4 hours but with incomplete relief. Urine appears cloudy. She had conjunctivitis recently which has since resolved. She has swelling in her legs which tracks up to her buttocks at times. She is currently not on any diuretics for this as there is concern for her BP. She is on midodrine still and florinef. She denied having any recent fevers, chills, nausea, vomiting. Denied blood in the stool. No particular smell in the stool. She reports gas cramps. Compression socking do not help her legs. She experiences sob with exertion. No chest pain, palpitations. Appetite is improving but she still has some mouth pain. Neuropathy is stable. She has less  in her left hand than the right since starting Velcade, but not interfering with use. She will go to physical therapy soon.\par \par \par On follow up on 6/15/22  patient reports her swelling has improved and this has been since she stopped taking Fludrocortisone. At this time swelling is limited to lower extremities mostly. There is still a degree of swelling however she is able to do more tasks at home. She had previously cancelled PT eval due to the extreme swelling and will now be rescheduling. Overall her BP had been stable on Midodrine but she had ran out of pills and yesterday she captured her BP reading at 40 systolic. She states she did experience much in terms of symptoms but she stayed in bed until our office refilled and her BP increased. Today it is 137/81 in the office. The fatigue does continue and she is still experiencing diarrhea which started before due to C. Diff. She continues to takes Imodium, today she had already taken prior to appointment because she had diarrhea almost every hour starting last night (per patient this frequency of diarrhea does not occur everyday but happens randomly at least 1-2 days a week). She also took Zofran this morning because she did have some nausea but this seems to be the only medication that helps slow diarrhea down. She states she stopped Lomotil because it was not working and also stopped Florastor, PPI, and Carafate. she saw her new PCP who tested her stool and there were no infectious or inflammatory findings on this. She is scheduled to see GI in the next few weeks for further evaluation. Initially when she started treatment she had 2-3 "bad days" after treatment lasting until Monday however now she only starts feeling better on Thursday and is retreated on Fridays. She does eat and pushes herself to do so her complaint is that her taste is altered and nothing is really enjoyable. She denied having any recent fevers, chills, nausea, vomiting. Denied any further shortness of breath with exertion. No chest pain, palpitations. Neuropathy is stable. She has less  in her left hand than the right since starting Velcade, but not interfering with use. Her PCP had tested her TFTs and she is further awaiting if any changes are planned to be made to Synthroid or if the testing will be repeated.\par \par  Today, the patient describes generalized weakness, weight loss, and profuse diarrhea which is refractory to medication.She has GI appointment on 7/7/22.

## 2022-09-23 ENCOUNTER — RESULT REVIEW (OUTPATIENT)
Age: 57
End: 2022-09-23

## 2022-09-23 ENCOUNTER — APPOINTMENT (OUTPATIENT)
Dept: INFUSION THERAPY | Facility: HOSPITAL | Age: 57
End: 2022-09-23

## 2022-09-23 LAB
BASOPHILS # BLD AUTO: 0.04 K/UL — SIGNIFICANT CHANGE UP (ref 0–0.2)
BASOPHILS NFR BLD AUTO: 0.9 % — SIGNIFICANT CHANGE UP (ref 0–2)
EOSINOPHIL # BLD AUTO: 0.05 K/UL — SIGNIFICANT CHANGE UP (ref 0–0.5)
EOSINOPHIL NFR BLD AUTO: 1.1 % — SIGNIFICANT CHANGE UP (ref 0–6)
HCT VFR BLD CALC: 32.5 % — LOW (ref 34.5–45)
HGB BLD-MCNC: 10.4 G/DL — LOW (ref 11.5–15.5)
IMM GRANULOCYTES NFR BLD AUTO: 0.2 % — SIGNIFICANT CHANGE UP (ref 0–0.9)
LYMPHOCYTES # BLD AUTO: 0.93 K/UL — LOW (ref 1–3.3)
LYMPHOCYTES # BLD AUTO: 20.1 % — SIGNIFICANT CHANGE UP (ref 13–44)
MCHC RBC-ENTMCNC: 32 G/DL — SIGNIFICANT CHANGE UP (ref 32–36)
MCHC RBC-ENTMCNC: 32.2 PG — SIGNIFICANT CHANGE UP (ref 27–34)
MCV RBC AUTO: 100.6 FL — HIGH (ref 80–100)
MONOCYTES # BLD AUTO: 0.49 K/UL — SIGNIFICANT CHANGE UP (ref 0–0.9)
MONOCYTES NFR BLD AUTO: 10.6 % — SIGNIFICANT CHANGE UP (ref 2–14)
NEUTROPHILS # BLD AUTO: 3.11 K/UL — SIGNIFICANT CHANGE UP (ref 1.8–7.4)
NEUTROPHILS NFR BLD AUTO: 67.1 % — SIGNIFICANT CHANGE UP (ref 43–77)
NRBC # BLD: 0 /100 WBCS — SIGNIFICANT CHANGE UP (ref 0–0)
PLATELET # BLD AUTO: 253 K/UL — SIGNIFICANT CHANGE UP (ref 150–400)
RBC # BLD: 3.23 M/UL — LOW (ref 3.8–5.2)
RBC # FLD: 13.6 % — SIGNIFICANT CHANGE UP (ref 10.3–14.5)
WBC # BLD: 4.63 K/UL — SIGNIFICANT CHANGE UP (ref 3.8–10.5)
WBC # FLD AUTO: 4.63 K/UL — SIGNIFICANT CHANGE UP (ref 3.8–10.5)

## 2022-09-29 ENCOUNTER — TRANSCRIPTION ENCOUNTER (OUTPATIENT)
Age: 57
End: 2022-09-29

## 2022-09-30 ENCOUNTER — TRANSCRIPTION ENCOUNTER (OUTPATIENT)
Age: 57
End: 2022-09-30

## 2022-09-30 ENCOUNTER — RESULT REVIEW (OUTPATIENT)
Age: 57
End: 2022-09-30

## 2022-09-30 ENCOUNTER — APPOINTMENT (OUTPATIENT)
Dept: INFUSION THERAPY | Facility: HOSPITAL | Age: 57
End: 2022-09-30

## 2022-09-30 ENCOUNTER — OUTPATIENT (OUTPATIENT)
Dept: OUTPATIENT SERVICES | Facility: HOSPITAL | Age: 57
LOS: 1 days | Discharge: HOME | End: 2022-09-30

## 2022-09-30 DIAGNOSIS — D47.2 MONOCLONAL GAMMOPATHY: ICD-10-CM

## 2022-09-30 LAB
BASOPHILS # BLD AUTO: 0.04 K/UL — SIGNIFICANT CHANGE UP (ref 0–0.2)
BASOPHILS NFR BLD AUTO: 0.9 % — SIGNIFICANT CHANGE UP (ref 0–2)
EOSINOPHIL # BLD AUTO: 0.05 K/UL — SIGNIFICANT CHANGE UP (ref 0–0.5)
EOSINOPHIL NFR BLD AUTO: 1.1 % — SIGNIFICANT CHANGE UP (ref 0–6)
HCT VFR BLD CALC: 29.9 % — LOW (ref 34.5–45)
HGB BLD-MCNC: 10.2 G/DL — LOW (ref 11.5–15.5)
IMM GRANULOCYTES NFR BLD AUTO: 0.2 % — SIGNIFICANT CHANGE UP (ref 0–0.9)
LYMPHOCYTES # BLD AUTO: 0.86 K/UL — LOW (ref 1–3.3)
LYMPHOCYTES # BLD AUTO: 18.7 % — SIGNIFICANT CHANGE UP (ref 13–44)
MCHC RBC-ENTMCNC: 32.7 PG — SIGNIFICANT CHANGE UP (ref 27–34)
MCHC RBC-ENTMCNC: 34.1 G/DL — SIGNIFICANT CHANGE UP (ref 32–36)
MCV RBC AUTO: 95.8 FL — SIGNIFICANT CHANGE UP (ref 80–100)
MONOCYTES # BLD AUTO: 0.32 K/UL — SIGNIFICANT CHANGE UP (ref 0–0.9)
MONOCYTES NFR BLD AUTO: 7 % — SIGNIFICANT CHANGE UP (ref 2–14)
NEUTROPHILS # BLD AUTO: 3.31 K/UL — SIGNIFICANT CHANGE UP (ref 1.8–7.4)
NEUTROPHILS NFR BLD AUTO: 72.1 % — SIGNIFICANT CHANGE UP (ref 43–77)
NRBC # BLD: 0 /100 WBCS — SIGNIFICANT CHANGE UP (ref 0–0)
PLATELET # BLD AUTO: 175 K/UL — SIGNIFICANT CHANGE UP (ref 150–400)
RBC # BLD: 3.12 M/UL — LOW (ref 3.8–5.2)
RBC # FLD: 13.9 % — SIGNIFICANT CHANGE UP (ref 10.3–14.5)
WBC # BLD: 4.59 K/UL — SIGNIFICANT CHANGE UP (ref 3.8–10.5)
WBC # FLD AUTO: 4.59 K/UL — SIGNIFICANT CHANGE UP (ref 3.8–10.5)

## 2022-10-01 ENCOUNTER — APPOINTMENT (OUTPATIENT)
Dept: ULTRASOUND IMAGING | Facility: CLINIC | Age: 57
End: 2022-10-01

## 2022-10-01 DIAGNOSIS — R11.2 NAUSEA WITH VOMITING, UNSPECIFIED: ICD-10-CM

## 2022-10-01 DIAGNOSIS — Z51.11 ENCOUNTER FOR ANTINEOPLASTIC CHEMOTHERAPY: ICD-10-CM

## 2022-10-01 PROCEDURE — 76700 US EXAM ABDOM COMPLETE: CPT

## 2022-10-03 ENCOUNTER — RESULT REVIEW (OUTPATIENT)
Age: 57
End: 2022-10-03

## 2022-10-03 ENCOUNTER — APPOINTMENT (OUTPATIENT)
Dept: HEMATOLOGY ONCOLOGY | Facility: CLINIC | Age: 57
End: 2022-10-03

## 2022-10-03 VITALS
BODY MASS INDEX: 17.7 KG/M2 | WEIGHT: 99.87 LBS | HEART RATE: 68 BPM | OXYGEN SATURATION: 96 % | SYSTOLIC BLOOD PRESSURE: 98 MMHG | RESPIRATION RATE: 16 BRPM | HEIGHT: 63 IN | TEMPERATURE: 97.5 F | DIASTOLIC BLOOD PRESSURE: 62 MMHG

## 2022-10-03 LAB
BASOPHILS # BLD AUTO: 0.04 K/UL — SIGNIFICANT CHANGE UP (ref 0–0.2)
BASOPHILS NFR BLD AUTO: 0.6 % — SIGNIFICANT CHANGE UP (ref 0–2)
EOSINOPHIL # BLD AUTO: 0.03 K/UL — SIGNIFICANT CHANGE UP (ref 0–0.5)
EOSINOPHIL NFR BLD AUTO: 0.4 % — SIGNIFICANT CHANGE UP (ref 0–6)
HCT VFR BLD CALC: 33.9 % — LOW (ref 34.5–45)
HGB BLD-MCNC: 11 G/DL — LOW (ref 11.5–15.5)
IMM GRANULOCYTES NFR BLD AUTO: 0.6 % — SIGNIFICANT CHANGE UP (ref 0–0.9)
LYMPHOCYTES # BLD AUTO: 0.84 K/UL — LOW (ref 1–3.3)
LYMPHOCYTES # BLD AUTO: 11.7 % — LOW (ref 13–44)
MCHC RBC-ENTMCNC: 32.4 G/DL — SIGNIFICANT CHANGE UP (ref 32–36)
MCHC RBC-ENTMCNC: 32.8 PG — SIGNIFICANT CHANGE UP (ref 27–34)
MCV RBC AUTO: 101.2 FL — HIGH (ref 80–100)
MONOCYTES # BLD AUTO: 0.48 K/UL — SIGNIFICANT CHANGE UP (ref 0–0.9)
MONOCYTES NFR BLD AUTO: 6.7 % — SIGNIFICANT CHANGE UP (ref 2–14)
NEUTROPHILS # BLD AUTO: 5.74 K/UL — SIGNIFICANT CHANGE UP (ref 1.8–7.4)
NEUTROPHILS NFR BLD AUTO: 80 % — HIGH (ref 43–77)
NRBC # BLD: 0 /100 WBCS — SIGNIFICANT CHANGE UP (ref 0–0)
PLATELET # BLD AUTO: 193 K/UL — SIGNIFICANT CHANGE UP (ref 150–400)
RBC # BLD: 3.35 M/UL — LOW (ref 3.8–5.2)
RBC # FLD: 14.2 % — SIGNIFICANT CHANGE UP (ref 10.3–14.5)
WBC # BLD: 7.17 K/UL — SIGNIFICANT CHANGE UP (ref 3.8–10.5)
WBC # FLD AUTO: 7.17 K/UL — SIGNIFICANT CHANGE UP (ref 3.8–10.5)

## 2022-10-03 PROCEDURE — 99214 OFFICE O/P EST MOD 30 MIN: CPT

## 2022-10-05 ENCOUNTER — APPOINTMENT (OUTPATIENT)
Dept: ENDOCRINOLOGY | Facility: CLINIC | Age: 57
End: 2022-10-05

## 2022-10-05 PROCEDURE — 99202 OFFICE O/P NEW SF 15 MIN: CPT | Mod: 95

## 2022-10-06 NOTE — REVIEW OF SYSTEMS
[Fatigue] : fatigue [Lower Ext Edema] : lower extremity edema [Diarrhea] : diarrhea [Difficulty Walking] : difficulty walking [Anxiety] : anxiety [Negative] : Heme/Lymph [Fever] : no fever [Chills] : no chills [Night Sweats] : no night sweats [Recent Change In Weight] : ~T no recent weight change [Vision Problems] : no vision problems [Dysphagia] : no dysphagia [Nosebleeds] : no nosebleeds [Odynophagia] : no odynophagia [Chest Pain] : no chest pain [Palpitations] : no palpitations [Abdominal Pain] : no abdominal pain [Vomiting] : no vomiting [Constipation] : no constipation [Dysuria] : no dysuria [Confused] : no confusion [Dizziness] : no dizziness [Fainting] : no fainting [Insomnia] : no insomnia [Hot Flashes] : no hot flashes [Muscle Weakness] : no muscle weakness

## 2022-10-06 NOTE — PHYSICAL EXAM
[Ambulatory and capable of all self care but unable to carry out any work activities] : Status 2- Ambulatory and capable of all self care but unable to carry out any work activities. Up and about more than 50% of waking hours [Thin] : thin [Normal] : affect appropriate [de-identified] : in wheelchair.

## 2022-10-06 NOTE — ASSESSMENT
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 Cycle 1 of CyBorD was initiated on 4/1/22 while inpatient and therapy had been held (last dose given was 6/17) and restarted on 7/22/22.\par \par -Serum SPEP without a monoclonal fraction, Urine SPEP without monoclonal protein, immunofixation normal. \par -However on diagnosis Lambda serum FLCs 8.73 and kappa 0.92, for a ratio of 0.11 (or 9.5). \par -Fat pad biopsy done and was POSITIVE for congo red, with positive polarization. \par -As per discussion with pathology, unable to send this for mass spectrometry to confirm amyloid type. However, now s/p BMBx which is showing ~35% plasmacytosis (monoclonal) c/w plasma cell neoplasm. 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 It does NOT meeting MM criteria. 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 \par -Urine showing proteinuria (although not nephrotic range).\par -Systemic light chain amyloidosis is classically associated with a thickened interventricular septum and proteinuria. Also associated with neurologic sequelae such as that she has. \par -Pt currently being treated for systemic light chain amyloidosis with poncho-CyBorD.  Day 1 dose inpatient split, as patient was getting PPM and wanted lower risk of reaction / delays. \par -Daratumumab plus CyBorD being given weekly on D 1,8,15,22 of 28 day cycle, Daratumumab will be weekly x 8 followed by every 2 weeks x 8 and then monthly and plan for autologous bone marrow transplant evaluation. Discussed possible side effects including fatigue and neuropathy, as well as toxicity such as pancytopenia and increased susceptibility to infection. Treatment was held after 6/17 for persistent diarrhea and restarted therapy 7/22/22. Today is C6 D11. After cycle 6 will plan for stem cell collection and deescalation. \par -Had held cyclophosphamide due to hematuria, although more likely 2/2 UTI, this has been restarted.\par -LFTs slowly up trending which has continued, unclear etiology - d/c'ed atorvastatin. Will check abdominal US and discuss with GI. May need biopsy if continues. This is improving. \par -Continue follow up with Cardiology and PCP.\par -Will continue to monitor diarrhea, as this has not resolved at all to this point. GI following. Patient self discontinued Questran as this was not working. Now s/p EGD colonoscopy on 9/13/22 with Dr. Holloway which showed evidence of significant amyloid involvement. At this point will continue with supportive care as per GI.  \par -Patient does not wish to pursue autologous BMT at this point but does want to pursue collection of stem cells. \par -Patient understands and agrees with plan. All information explained to the best of my ability.\par -Following up closely with cardiology. \par -RTC 1 month.

## 2022-10-06 NOTE — HISTORY OF PRESENT ILLNESS
[Therapy: ___] : Therapy: [unfilled] [Cycle: ___] : Cycle: [unfilled] [Day: ___] : Day: [unfilled] [de-identified] : 58 y/o F with hx of thyroidectomy in 2011 (benign pathology) on thyroid replacement since then, and with COVID pneumonia back in March of 2020 ultimately signed herself out AMA, chronic issues thereafter, ended up going back to work in July 2020. In January-February 2021 had Pfizer vaccines. Started having issues starting in March 2021, she was having GI issues, endoscopy showed chronic gastritis. Additionally diagnosed with IBS at that point. She has been having issues with Postural Orthostatic Tachycardia Syndrome - her blood pressure goes down when she stands up from a seated position. She has fainted "too many times to count." She ends up with bruises here and there but nothing severe where she would need to go to the hospital. She saw two neurologists who did extensive evaluations without any success in finding diagnostic lesions. She reports she is seeing specialist for dysautonomia, Dr. Colón at [place] (neurology). She said overall her POTS has improved but she has a had a couple of episodes of fainting in the last couple of weeks. She started fludrocortisone in September 2021 and has titrated up the dose. She is now on 1 mg total (probably 0.5 mg twice daily). The only way she feels completely normal is when she is laying down. BP gets very low on standing up, it has registered as low as 50s over 40s. She reports when she is standing or sitting for too long, she will get a chest tightness, but not quite a pain. It gets to the point where she feels like she is having trouble breathing and then she has to sit down. She has muscle aches in her legs which improves with warm bath, and then some burning pain in her arms which reminds her of a sunburn. She also now is having issues with her mouth - very difficult to eat as it feels like she has burnt her mouth. She has lost 47 pounds since March 2021. She is not really able to get much down due to these feelings, although she does have an appetite. She denies any night-sweats, she does always feel cold though. Her bowel movements "swing" from constipation to diarrhea. She was on Linzess recently but was taken off it. She reports that she is urinating normally, in fact a lot because she drinks a fair amount of water daily. She has no swelling in her legs at this time. She had in the past at work when she was standing a lot and was on amlodipine. She was sent to me for evaluation of elevated free light chains. 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 She was found with no monoclonal protein on SPEP and hemoglobin normal, normal renal function, and no hypercalcemia. Fat pad biopsy showed positive congo red staining in rare blood vessels and positive for polarization. 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 for multiple recurrent syncopal episodes. While admitted course was complicated by multiple unresponsive episodes prompting RRTs, during which pt found to be hypotensive and bradycardic. She initially required levophed for BP support but ultimately BP remained stable while on Midodrine 20mg q8 and Fludrocortisone 0.2mg q24. Pt noted to intermittently have sinus pauses (2-3.8 seconds) thought to be 2/2 vagal episodes, and asymptomatic bradycardia overnight on tele. ECG noted NSR but 1st degree AV block. TTE 3/16/22 showed normal LV size and function with EF 55%-60%. No regional wall motion abnormalities. Moderate concentric LVH. \par \par Pt also started on oral vanco for C.diff and completed ceftriaxone for UTI. \par \par Ultimately transferred to [facility] for consideration of inpatient chemotherapy. \par PPM placement recommended, as cardiac MRI showed evidence of amyloid involvement. Pt had another RRT for hypotension and symptomatic bradycardia to 30s, was on pressor support until she received a dual chamber PPM. Pt also continued her C.diff treatment and chemotherapy was placed on hold until the diarrhea cleared. She was then started on Daratumumab  + CyBorD inpatient, and tolerated it well ; she completed 2 treatments of chemotherapy inpatient.\par \par Patient has continued with this regimen now as outpatient. She did continue to have episodes of diarrhea since previous C. Diff infection in March. On follow up 4/18 she noticed that her urine was red in color and after drinking water it became more orange. Treated with antibiotics for UTI and evaluated by urology. Cytoxan was held for a time period, and ultimately this cleared with antibiotics. \par \par **Patient is still limited in her functional status due to diarrhea, but she is able to perform most of her ADLs independently. She is having diarrhea still multiple times daily (probably on average 5 times a day) and at times it is watery although not every time. She denies any abdominal pain or cramps associated with this. She reported a robust appetite but she cannot eat a lot because she has a bad taste in her mouth. Denied any dyspnea or chest pain, and her neuropathy is improving to her judgement. She is still taking midodrine 20 mg TID. AM Cortisol level recently checked and normal. Denied fevers or night chills. She is planned for follow up on 9/1 with GI - multiple medical interventions without any success to this point. \par \par Of note, patient reported that she is NOT interested in hematopoietic transplant at this time but she is interested in collecting her stem cells for a later date.  [FreeTextEntry1] : Kleber + CyBorD started on 4/8/2022, held cytoxan from 4/18-5/12 for hematuria, held again 6/24 for worsening diarrhea [de-identified] : Patient presented for follow up on 10/03/2022. Patient felt fatigued and had diarrhea, uses Imodium but causes her constipation. She is getting Velcade and Cytoxan this week. She reported that everything tastes bad for her. No fevers, chills, night sweats. \par \par

## 2022-10-07 ENCOUNTER — RESULT REVIEW (OUTPATIENT)
Age: 57
End: 2022-10-07

## 2022-10-07 ENCOUNTER — TRANSCRIPTION ENCOUNTER (OUTPATIENT)
Age: 57
End: 2022-10-07

## 2022-10-07 ENCOUNTER — APPOINTMENT (OUTPATIENT)
Dept: INFUSION THERAPY | Facility: HOSPITAL | Age: 57
End: 2022-10-07

## 2022-10-07 ENCOUNTER — NON-APPOINTMENT (OUTPATIENT)
Age: 57
End: 2022-10-07

## 2022-10-07 LAB
ALBUMIN SERPL ELPH-MCNC: 4 G/DL
ALP BLD-CCNC: 76 U/L
ALT SERPL-CCNC: 30 U/L
ANION GAP SERPL CALC-SCNC: 8 MMOL/L
AST SERPL-CCNC: 15 U/L
BASOPHILS # BLD AUTO: 0.04 K/UL — SIGNIFICANT CHANGE UP (ref 0–0.2)
BASOPHILS NFR BLD AUTO: 1.1 % — SIGNIFICANT CHANGE UP (ref 0–2)
BILIRUB SERPL-MCNC: 0.7 MG/DL
BUN SERPL-MCNC: 16 MG/DL
CALCIUM SERPL-MCNC: 9.4 MG/DL
CHLORIDE SERPL-SCNC: 103 MMOL/L
CO2 SERPL-SCNC: 32 MMOL/L
CREAT SERPL-MCNC: 0.58 MG/DL
EGFR: 105 ML/MIN/1.73M2
EOSINOPHIL # BLD AUTO: 0.03 K/UL — SIGNIFICANT CHANGE UP (ref 0–0.5)
EOSINOPHIL NFR BLD AUTO: 0.8 % — SIGNIFICANT CHANGE UP (ref 0–6)
GLUCOSE SERPL-MCNC: 104 MG/DL
HCT VFR BLD CALC: 30.5 % — LOW (ref 34.5–45)
HGB BLD-MCNC: 10 G/DL — LOW (ref 11.5–15.5)
IMM GRANULOCYTES NFR BLD AUTO: 0.5 % — SIGNIFICANT CHANGE UP (ref 0–0.9)
LYMPHOCYTES # BLD AUTO: 0.77 K/UL — LOW (ref 1–3.3)
LYMPHOCYTES # BLD AUTO: 20.8 % — SIGNIFICANT CHANGE UP (ref 13–44)
MCHC RBC-ENTMCNC: 32.8 G/DL — SIGNIFICANT CHANGE UP (ref 32–36)
MCHC RBC-ENTMCNC: 32.8 PG — SIGNIFICANT CHANGE UP (ref 27–34)
MCV RBC AUTO: 100 FL — SIGNIFICANT CHANGE UP (ref 80–100)
MONOCYTES # BLD AUTO: 0.33 K/UL — SIGNIFICANT CHANGE UP (ref 0–0.9)
MONOCYTES NFR BLD AUTO: 8.9 % — SIGNIFICANT CHANGE UP (ref 2–14)
NEUTROPHILS # BLD AUTO: 2.52 K/UL — SIGNIFICANT CHANGE UP (ref 1.8–7.4)
NEUTROPHILS NFR BLD AUTO: 67.9 % — SIGNIFICANT CHANGE UP (ref 43–77)
NRBC # BLD: 0 /100 WBCS — SIGNIFICANT CHANGE UP (ref 0–0)
PLATELET # BLD AUTO: 176 K/UL — SIGNIFICANT CHANGE UP (ref 150–400)
POTASSIUM SERPL-SCNC: 5.2 MMOL/L
PROT SERPL-MCNC: 5.7 G/DL
RBC # BLD: 3.05 M/UL — LOW (ref 3.8–5.2)
RBC # FLD: 14.4 % — SIGNIFICANT CHANGE UP (ref 10.3–14.5)
SODIUM SERPL-SCNC: 143 MMOL/L
WBC # BLD: 3.71 K/UL — LOW (ref 3.8–10.5)
WBC # FLD AUTO: 3.71 K/UL — LOW (ref 3.8–10.5)

## 2022-10-09 LAB
ESTIMATED AVERAGE GLUCOSE: 146 MG/DL
HBA1C MFR BLD HPLC: 6.7 %
T4 FREE SERPL-MCNC: 2 NG/DL
TSH SERPL-ACNC: 1.52 UIU/ML

## 2022-10-09 NOTE — ASSESSMENT
[FreeTextEntry1] : History of prediabetes\par Last hemoglobin A1c in June 2022 is 6.4%\par Of note, patient is being treated monthly with dexamethasone steroid therapy for amyloidosis which may impact sugars.\par Currently patient is diet controlled\par Will repeat A1c this week\par \par History of hypothyroidism\par Reviewed previous TFTs from July 2022, noted TSH was elevated at 16 at which time levothyroxine was increased to 150 mcg daily\par Advised patient to repeat TFTs when she goes for blood work this week, will follow-up results\par \par Answered all questions today; patient verbalized understanding of the above\par RTC in 3 months\par  [Levothyroxine] : The patient was instructed to take Levothyroxine on an empty stomach, separate from vitamins, and wait at least 30 minutes before eating

## 2022-10-09 NOTE — HISTORY OF PRESENT ILLNESS
[Home] : at home, [unfilled] , at the time of the visit. [Medical Office: ([facility])___] : at the medical office located in  [Verbal consent obtained from patient] : the patient, [unfilled] [FreeTextEntry1] : This is a 56 yo female who presents for follow up of pre-diabetes and hypothyroidism.\par \par She was diagnosed with prediabetes 5 years ago, was previously on Metformin, however stopped medication due to Gi upset. Since stopping Metformin, she has been diet controlled.\par Patient also reportedly underwent total thyroidectomy and partial parathyroidectomy in Feb 2011, final pathology was benign as per patient.  She is currently taking levothyroxine 150mcg daily for hypothyroidism which was increased by her PCP in July 2022.\par After COVID infection in March 2020, patient noted to have long-haul she symptoms and notes she is seeing neurology. states she has postural hypotension. She she also notes she has been diagnosed with amyloidosis which has affected her systemically.  She notes she did chemotherapy in April and is now on maintenance once monthly with darzolex and dexamethasone therapy.

## 2022-10-10 ENCOUNTER — TRANSCRIPTION ENCOUNTER (OUTPATIENT)
Age: 57
End: 2022-10-10

## 2022-10-14 ENCOUNTER — RESULT REVIEW (OUTPATIENT)
Age: 57
End: 2022-10-14

## 2022-10-14 ENCOUNTER — APPOINTMENT (OUTPATIENT)
Dept: INFUSION THERAPY | Facility: HOSPITAL | Age: 57
End: 2022-10-14

## 2022-10-14 LAB
BASOPHILS # BLD AUTO: 0.03 K/UL — SIGNIFICANT CHANGE UP (ref 0–0.2)
BASOPHILS NFR BLD AUTO: 0.6 % — SIGNIFICANT CHANGE UP (ref 0–2)
EOSINOPHIL # BLD AUTO: 0.05 K/UL — SIGNIFICANT CHANGE UP (ref 0–0.5)
EOSINOPHIL NFR BLD AUTO: 1 % — SIGNIFICANT CHANGE UP (ref 0–6)
HCT VFR BLD CALC: 31.1 % — LOW (ref 34.5–45)
HGB BLD-MCNC: 10.4 G/DL — LOW (ref 11.5–15.5)
IMM GRANULOCYTES NFR BLD AUTO: 0.2 % — SIGNIFICANT CHANGE UP (ref 0–0.9)
LYMPHOCYTES # BLD AUTO: 0.7 K/UL — LOW (ref 1–3.3)
LYMPHOCYTES # BLD AUTO: 14.3 % — SIGNIFICANT CHANGE UP (ref 13–44)
MCHC RBC-ENTMCNC: 33 PG — SIGNIFICANT CHANGE UP (ref 27–34)
MCHC RBC-ENTMCNC: 33.4 G/DL — SIGNIFICANT CHANGE UP (ref 32–36)
MCV RBC AUTO: 98.7 FL — SIGNIFICANT CHANGE UP (ref 80–100)
MONOCYTES # BLD AUTO: 0.33 K/UL — SIGNIFICANT CHANGE UP (ref 0–0.9)
MONOCYTES NFR BLD AUTO: 6.7 % — SIGNIFICANT CHANGE UP (ref 2–14)
NEUTROPHILS # BLD AUTO: 3.78 K/UL — SIGNIFICANT CHANGE UP (ref 1.8–7.4)
NEUTROPHILS NFR BLD AUTO: 77.2 % — HIGH (ref 43–77)
NRBC # BLD: 0 /100 WBCS — SIGNIFICANT CHANGE UP (ref 0–0)
PLATELET # BLD AUTO: 227 K/UL — SIGNIFICANT CHANGE UP (ref 150–400)
RBC # BLD: 3.15 M/UL — LOW (ref 3.8–5.2)
RBC # FLD: 14.6 % — HIGH (ref 10.3–14.5)
WBC # BLD: 4.9 K/UL — SIGNIFICANT CHANGE UP (ref 3.8–10.5)
WBC # FLD AUTO: 4.9 K/UL — SIGNIFICANT CHANGE UP (ref 3.8–10.5)

## 2022-10-21 ENCOUNTER — APPOINTMENT (OUTPATIENT)
Dept: INFUSION THERAPY | Facility: HOSPITAL | Age: 57
End: 2022-10-21

## 2022-10-26 LAB
ALBUMIN MFR SERPL ELPH: 65.3 %
ALBUMIN SERPL-MCNC: 3.5 G/DL
ALBUMIN/GLOB SERPL: 1.8 RATIO
ALPHA1 GLOB MFR SERPL ELPH: 4.7 %
ALPHA1 GLOB SERPL ELPH-MCNC: 0.3 G/DL
ALPHA2 GLOB MFR SERPL ELPH: 11.8 %
ALPHA2 GLOB SERPL ELPH-MCNC: 0.6 G/DL
B-GLOBULIN MFR SERPL ELPH: 11 %
B-GLOBULIN SERPL ELPH-MCNC: 0.6 G/DL
DEPRECATED KAPPA LC FREE/LAMBDA SER: 0.44 RATIO
GAMMA GLOB FLD ELPH-MCNC: 0.4 G/DL
GAMMA GLOB MFR SERPL ELPH: 7.2 %
IGA SER QL IEP: 17 MG/DL
IGG SER QL IEP: 477 MG/DL
IGM SER QL IEP: <10 MG/DL
INTERPRETATION SERPL IEP-IMP: NORMAL
KAPPA LC CSF-MCNC: 1.19 MG/DL
KAPPA LC SERPL-MCNC: 0.52 MG/DL
M PROTEIN MFR SERPL ELPH: 2.9 %
M PROTEIN SPEC IFE-MCNC: NORMAL
MONOCLON BAND OBS SERPL: 0.2 G/DL
PROT SERPL-MCNC: 5.4 G/DL
PROT SERPL-MCNC: 5.4 G/DL

## 2022-10-27 ENCOUNTER — NON-APPOINTMENT (OUTPATIENT)
Age: 57
End: 2022-10-27

## 2022-10-27 ENCOUNTER — APPOINTMENT (OUTPATIENT)
Dept: ELECTROPHYSIOLOGY | Facility: CLINIC | Age: 57
End: 2022-10-27

## 2022-10-27 VITALS
BODY MASS INDEX: 18.07 KG/M2 | HEIGHT: 63 IN | SYSTOLIC BLOOD PRESSURE: 124 MMHG | DIASTOLIC BLOOD PRESSURE: 75 MMHG | WEIGHT: 102 LBS | HEART RATE: 72 BPM | OXYGEN SATURATION: 100 %

## 2022-10-27 PROCEDURE — 93280 PM DEVICE PROGR EVAL DUAL: CPT

## 2022-10-27 PROCEDURE — 93000 ELECTROCARDIOGRAM COMPLETE: CPT | Mod: 59

## 2022-10-28 ENCOUNTER — APPOINTMENT (OUTPATIENT)
Dept: INFUSION THERAPY | Facility: HOSPITAL | Age: 57
End: 2022-10-28

## 2022-10-28 ENCOUNTER — RESULT REVIEW (OUTPATIENT)
Age: 57
End: 2022-10-28

## 2022-10-28 LAB
BASOPHILS # BLD AUTO: 0.07 K/UL — SIGNIFICANT CHANGE UP (ref 0–0.2)
BASOPHILS NFR BLD AUTO: 1.6 % — SIGNIFICANT CHANGE UP (ref 0–2)
EOSINOPHIL # BLD AUTO: 0.09 K/UL — SIGNIFICANT CHANGE UP (ref 0–0.5)
EOSINOPHIL NFR BLD AUTO: 2.1 % — SIGNIFICANT CHANGE UP (ref 0–6)
HCT VFR BLD CALC: 29.8 % — LOW (ref 34.5–45)
HGB BLD-MCNC: 10.1 G/DL — LOW (ref 11.5–15.5)
IMM GRANULOCYTES NFR BLD AUTO: 0.7 % — SIGNIFICANT CHANGE UP (ref 0–0.9)
LYMPHOCYTES # BLD AUTO: 0.88 K/UL — LOW (ref 1–3.3)
LYMPHOCYTES # BLD AUTO: 20.7 % — SIGNIFICANT CHANGE UP (ref 13–44)
MCHC RBC-ENTMCNC: 33.8 PG — SIGNIFICANT CHANGE UP (ref 27–34)
MCHC RBC-ENTMCNC: 33.9 G/DL — SIGNIFICANT CHANGE UP (ref 32–36)
MCV RBC AUTO: 99.7 FL — SIGNIFICANT CHANGE UP (ref 80–100)
MONOCYTES # BLD AUTO: 0.41 K/UL — SIGNIFICANT CHANGE UP (ref 0–0.9)
MONOCYTES NFR BLD AUTO: 9.6 % — SIGNIFICANT CHANGE UP (ref 2–14)
NEUTROPHILS # BLD AUTO: 2.78 K/UL — SIGNIFICANT CHANGE UP (ref 1.8–7.4)
NEUTROPHILS NFR BLD AUTO: 65.3 % — SIGNIFICANT CHANGE UP (ref 43–77)
NRBC # BLD: 0 /100 WBCS — SIGNIFICANT CHANGE UP (ref 0–0)
PLATELET # BLD AUTO: 230 K/UL — SIGNIFICANT CHANGE UP (ref 150–400)
RBC # BLD: 2.99 M/UL — LOW (ref 3.8–5.2)
RBC # FLD: 15 % — HIGH (ref 10.3–14.5)
WBC # BLD: 4.26 K/UL — SIGNIFICANT CHANGE UP (ref 3.8–10.5)
WBC # FLD AUTO: 4.26 K/UL — SIGNIFICANT CHANGE UP (ref 3.8–10.5)

## 2022-11-01 ENCOUNTER — APPOINTMENT (OUTPATIENT)
Dept: CARDIOLOGY | Facility: CLINIC | Age: 57
End: 2022-11-01

## 2022-11-01 VITALS
SYSTOLIC BLOOD PRESSURE: 122 MMHG | HEART RATE: 75 BPM | OXYGEN SATURATION: 96 % | BODY MASS INDEX: 18.07 KG/M2 | WEIGHT: 102 LBS | DIASTOLIC BLOOD PRESSURE: 76 MMHG | HEIGHT: 63 IN

## 2022-11-01 PROCEDURE — 93356 MYOCRD STRAIN IMG SPCKL TRCK: CPT

## 2022-11-01 PROCEDURE — 99215 OFFICE O/P EST HI 40 MIN: CPT

## 2022-11-01 PROCEDURE — 93306 TTE W/DOPPLER COMPLETE: CPT

## 2022-11-05 NOTE — CARDIOLOGY SUMMARY
[de-identified] : 7/27/2022, paced at 70 bpm [de-identified] : 3/30/2022, septum 1.2 cm, PWT 1.2 cm, moderately dilated LA, normal LV systolic function, average GLS -14.6%, pattern is\par homogeneous (i.e. not suggestive of amyloid), LVEF 60%\par \par 7/27/2022, septum 1.2 cm, PWT 1.3 cm, mildly dilated LA, normal LV systolic function, average GLS - 15%, LVEF 60-65%\par \par 11/1/2022, septum 1.4 cm, PWT 1.3 cm, dilated LA, low normal LV systolic function, average GLS - 12%, LVEF 50-55% [de-identified] : 3/28/2022, Small pericardial and bilateral pleural effusions. Subtle inferior lateral basal myocardial wall with gadolinium enhancement.  Biatrial enlargement with associated late gadolinium enhancement. These findings may represent amyloidosis. [de-identified] : 3/30/2022 Medtronic Margarita XT DR MRI dual chamber PPM implanted by Hasmukh Villarreal MD, PhD

## 2022-11-05 NOTE — REASON FOR VISIT
[Other: ____] : [unfilled] [FreeTextEntry1] : November 2022 - Patient returns today for follow-up of her light chain amyloidosis. \par She has completed Cytoxan. She continues to receive Velcade, poncho, and dexamethasone. \par Her lower extremity edema is markedly improved. She believes this is a result of a combination of being off the fludrocortisone, more walking, and her current diuretics. \par She continues to have diarrhea, and her biopsies of the GI tract suggested involvement of her amyloidosis. Her GI symptoms are somewhat improved by reducing fiber and stopping her Cytoxan.

## 2022-11-05 NOTE — HISTORY OF PRESENT ILLNESS
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 \par \par Lower extremity edema up to her waist. \par Patient continues to take fludocortisone 0.2 mg daily and midodrine 20 mg TID\par \par June 2022 - Patient returns today for follow-up.\par She has weaned herself off of the fludrocortisone. She now only gets orthostatic hypotension in association with frequent diarrhea. She has non-bloody, watery diarrhea up to 10 times per day. Some days, it is well controlled with Imodium, but she cannot remember the last well formed bowel movement. \par She continues to take midodrine 20 mg TID.\par She continues Cy-Bor-D plus daratumumab with Dr. Goldberg. \par \par August 2022 - TeleHealth Video Encounter\par Initiated by: Patient election for TeleHealth visit\par Patient was consented for TeleHealth visit\par Patient Location: Home\par Physician Location: Office ([address] [place] [address], [address], [place], [place], [address])\par Duration of Encounter: 40 minutes, at least 50% of which was spent in direct counseling and coordination of care.\par  \par Continues to have diarrhea. Will follow-up with GI (Dr. Holloway). She continues to have orthostatic symptoms related to her diarrhea. She notes it the most with frequent diarrhea. \par She is off of fludocortisone. She takes midodrine 20 mg TID. \par The swelling in her legs is only a problem when she sits for too long. When she walks, her legs are better.

## 2022-11-05 NOTE — DISCUSSION/SUMMARY
[FreeTextEntry1] : 57 year-old woman with light chain amyloidosis, orthostatic hypotension requiring fludrocortisone and midodrine, now started on Cy-Bor-D plus daratumumab, with lower extremity edema, likely secondary to fludrocortisone, presents today or follow-up after recent hospitalization (March 2022).\par Myleoma therapies per hematology. She has completed Cytoxan and remains on Velcade, dexamethasone, and poncho. \par Now off of fludocortisone, and her lower etremity edema is much better. \par \par Encouraged trying Green tea for EGCG\par \par Follow-up with GI regarding diarrhea.\par Follow-up with GI and hematology regarding elevated LFTs.\par \par Encouraged ambulation as tolerated.\par Elevate the legs when at rest.

## 2022-11-08 ENCOUNTER — TRANSCRIPTION ENCOUNTER (OUTPATIENT)
Age: 57
End: 2022-11-08

## 2022-11-09 ENCOUNTER — TRANSCRIPTION ENCOUNTER (OUTPATIENT)
Age: 57
End: 2022-11-09

## 2022-11-10 ENCOUNTER — TRANSCRIPTION ENCOUNTER (OUTPATIENT)
Age: 57
End: 2022-11-10

## 2022-11-11 ENCOUNTER — NON-APPOINTMENT (OUTPATIENT)
Age: 57
End: 2022-11-11

## 2022-11-14 ENCOUNTER — RESULT REVIEW (OUTPATIENT)
Age: 57
End: 2022-11-14

## 2022-11-14 ENCOUNTER — APPOINTMENT (OUTPATIENT)
Dept: INFUSION THERAPY | Facility: HOSPITAL | Age: 57
End: 2022-11-14

## 2022-11-14 LAB
BASOPHILS # BLD AUTO: 0.06 K/UL — SIGNIFICANT CHANGE UP (ref 0–0.2)
BASOPHILS NFR BLD AUTO: 1.4 % — SIGNIFICANT CHANGE UP (ref 0–2)
EOSINOPHIL # BLD AUTO: 0.1 K/UL — SIGNIFICANT CHANGE UP (ref 0–0.5)
EOSINOPHIL NFR BLD AUTO: 2.4 % — SIGNIFICANT CHANGE UP (ref 0–6)
HCT VFR BLD CALC: 30.7 % — LOW (ref 34.5–45)
HGB BLD-MCNC: 10.2 G/DL — LOW (ref 11.5–15.5)
IMM GRANULOCYTES NFR BLD AUTO: 0.9 % — SIGNIFICANT CHANGE UP (ref 0–0.9)
LYMPHOCYTES # BLD AUTO: 0.96 K/UL — LOW (ref 1–3.3)
LYMPHOCYTES # BLD AUTO: 22.7 % — SIGNIFICANT CHANGE UP (ref 13–44)
MCHC RBC-ENTMCNC: 33.2 G/DL — SIGNIFICANT CHANGE UP (ref 32–36)
MCHC RBC-ENTMCNC: 33.4 PG — SIGNIFICANT CHANGE UP (ref 27–34)
MCV RBC AUTO: 100.7 FL — HIGH (ref 80–100)
MONOCYTES # BLD AUTO: 0.43 K/UL — SIGNIFICANT CHANGE UP (ref 0–0.9)
MONOCYTES NFR BLD AUTO: 10.2 % — SIGNIFICANT CHANGE UP (ref 2–14)
NEUTROPHILS # BLD AUTO: 2.64 K/UL — SIGNIFICANT CHANGE UP (ref 1.8–7.4)
NEUTROPHILS NFR BLD AUTO: 62.4 % — SIGNIFICANT CHANGE UP (ref 43–77)
NRBC # BLD: 0 /100 WBCS — SIGNIFICANT CHANGE UP (ref 0–0)
PLATELET # BLD AUTO: 193 K/UL — SIGNIFICANT CHANGE UP (ref 150–400)
RBC # BLD: 3.05 M/UL — LOW (ref 3.8–5.2)
RBC # FLD: 14.6 % — HIGH (ref 10.3–14.5)
WBC # BLD: 4.23 K/UL — SIGNIFICANT CHANGE UP (ref 3.8–10.5)
WBC # FLD AUTO: 4.23 K/UL — SIGNIFICANT CHANGE UP (ref 3.8–10.5)

## 2022-11-25 ENCOUNTER — APPOINTMENT (OUTPATIENT)
Dept: INFUSION THERAPY | Facility: HOSPITAL | Age: 57
End: 2022-11-25

## 2022-11-28 ENCOUNTER — RESULT REVIEW (OUTPATIENT)
Age: 57
End: 2022-11-28

## 2022-11-28 ENCOUNTER — TRANSCRIPTION ENCOUNTER (OUTPATIENT)
Age: 57
End: 2022-11-28

## 2022-11-28 ENCOUNTER — APPOINTMENT (OUTPATIENT)
Dept: INFUSION THERAPY | Facility: HOSPITAL | Age: 57
End: 2022-11-28

## 2022-11-28 ENCOUNTER — APPOINTMENT (OUTPATIENT)
Dept: HEMATOLOGY ONCOLOGY | Facility: CLINIC | Age: 57
End: 2022-11-28

## 2022-11-28 VITALS
TEMPERATURE: 98.2 F | HEART RATE: 92 BPM | BODY MASS INDEX: 20.46 KG/M2 | OXYGEN SATURATION: 95 % | DIASTOLIC BLOOD PRESSURE: 71 MMHG | RESPIRATION RATE: 16 BRPM | WEIGHT: 115.52 LBS | SYSTOLIC BLOOD PRESSURE: 115 MMHG

## 2022-11-28 LAB
BASOPHILS # BLD AUTO: 0.03 K/UL — SIGNIFICANT CHANGE UP (ref 0–0.2)
BASOPHILS NFR BLD AUTO: 0.6 % — SIGNIFICANT CHANGE UP (ref 0–2)
EOSINOPHIL # BLD AUTO: 0.15 K/UL — SIGNIFICANT CHANGE UP (ref 0–0.5)
EOSINOPHIL NFR BLD AUTO: 3.2 % — SIGNIFICANT CHANGE UP (ref 0–6)
HCT VFR BLD CALC: 30.1 % — LOW (ref 34.5–45)
HGB BLD-MCNC: 9.6 G/DL — LOW (ref 11.5–15.5)
IMM GRANULOCYTES NFR BLD AUTO: 0.2 % — SIGNIFICANT CHANGE UP (ref 0–0.9)
LYMPHOCYTES # BLD AUTO: 0.58 K/UL — LOW (ref 1–3.3)
LYMPHOCYTES # BLD AUTO: 12.2 % — LOW (ref 13–44)
MCHC RBC-ENTMCNC: 31.9 G/DL — LOW (ref 32–36)
MCHC RBC-ENTMCNC: 32.7 PG — SIGNIFICANT CHANGE UP (ref 27–34)
MCV RBC AUTO: 102.4 FL — HIGH (ref 80–100)
MONOCYTES # BLD AUTO: 0.37 K/UL — SIGNIFICANT CHANGE UP (ref 0–0.9)
MONOCYTES NFR BLD AUTO: 7.8 % — SIGNIFICANT CHANGE UP (ref 2–14)
NEUTROPHILS # BLD AUTO: 3.61 K/UL — SIGNIFICANT CHANGE UP (ref 1.8–7.4)
NEUTROPHILS NFR BLD AUTO: 76 % — SIGNIFICANT CHANGE UP (ref 43–77)
NRBC # BLD: 0 /100 WBCS — SIGNIFICANT CHANGE UP (ref 0–0)
PLATELET # BLD AUTO: 205 K/UL — SIGNIFICANT CHANGE UP (ref 150–400)
RBC # BLD: 2.94 M/UL — LOW (ref 3.8–5.2)
RBC # FLD: 13.5 % — SIGNIFICANT CHANGE UP (ref 10.3–14.5)
WBC # BLD: 4.75 K/UL — SIGNIFICANT CHANGE UP (ref 3.8–10.5)
WBC # FLD AUTO: 4.75 K/UL — SIGNIFICANT CHANGE UP (ref 3.8–10.5)

## 2022-11-28 PROCEDURE — 99214 OFFICE O/P EST MOD 30 MIN: CPT

## 2022-11-28 RX ORDER — CYCLOPHOSPHAMIDE 50 MG/1
50 CAPSULE ORAL
Qty: 36 | Refills: 5 | Status: DISCONTINUED | COMMUNITY
Start: 2022-04-06 | End: 2022-11-28

## 2022-11-28 RX ORDER — DOXYCYCLINE HYCLATE 100 MG/1
100 TABLET ORAL
Qty: 20 | Refills: 0 | Status: DISCONTINUED | COMMUNITY
Start: 2022-10-12 | End: 2022-11-28

## 2022-11-29 NOTE — HISTORY OF PRESENT ILLNESS
[Therapy: ___] : Therapy: [unfilled] [Cycle: ___] : Cycle: [unfilled] [Day: ___] : Day: [unfilled] [de-identified] : 58 y/o F with hx of thyroidectomy in 2011 (benign pathology) on thyroid replacement since then, and with COVID pneumonia back in March of 2020 ultimately signed herself out AMA, chronic issues thereafter, ended up going back to work in July 2020. In January-February 2021 had Pfizer vaccines. Started having issues starting in March 2021, she was having GI issues, endoscopy showed chronic gastritis. Additionally diagnosed with IBS at that point. She has been having issues with Postural Orthostatic Tachycardia Syndrome - her blood pressure goes down when she stands up from a seated position. She has fainted "too many times to count." She ends up with bruises here and there but nothing severe where she would need to go to the hospital. She saw two neurologists who did extensive evaluations without any success in finding diagnostic lesions. She reports she is seeing specialist for dysautonomia, Dr. Colón at [place] (neurology). She said overall her POTS has improved but she has a had a couple of episodes of fainting in the last couple of weeks. She started fludrocortisone in September 2021 and has titrated up the dose. She is now on 1 mg total (probably 0.5 mg twice daily). The only way she feels completely normal is when she is laying down. BP gets very low on standing up, it has registered as low as 50s over 40s. She reports when she is standing or sitting for too long, she will get a chest tightness, but not quite a pain. It gets to the point where she feels like she is having trouble breathing and then she has to sit down. She has muscle aches in her legs which improves with warm bath, and then some burning pain in her arms which reminds her of a sunburn. She also now is having issues with her mouth - very difficult to eat as it feels like she has burnt her mouth. She has lost 47 pounds since March 2021. She is not really able to get much down due to these feelings, although she does have an appetite. She denies any night-sweats, she does always feel cold though. Her bowel movements "swing" from constipation to diarrhea. She was on Linzess recently but was taken off it. She reports that she is urinating normally, in fact a lot because she drinks a fair amount of water daily. She has no swelling in her legs at this time. She had in the past at work when she was standing a lot and was on amlodipine. She was sent to me for evaluation of elevated free light chains. 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 She was found with no monoclonal protein on SPEP and hemoglobin normal, normal renal function, and no hypercalcemia. Fat pad biopsy showed positive congo red staining in rare blood vessels and positive for polarization. 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 While admitted course was complicated by multiple unresponsive episodes prompting RRTs, during which pt found to be hypotensive and bradycardic. She initially required levophed for BP support but ultimately BP remained stable while on Midodrine 20mg q8 and Fludrocortisone 0.2mg q24. Pt noted to intermittently have sinus pauses (2-3.8 seconds) thought to be 2/2 vagal episodes, and asymptomatic bradycardia overnight on tele. ECG noted NSR but 1st degree AV block. TTE 3/16/22 showed normal LV size and function with EF 55%-60%. No regional wall motion abnormalities. Moderate concentric LVH. \par \par Pt also started on oral vanco for C.diff and completed ceftriaxone for UTI. \par \par Ultimately transferred to [facility] for consideration of inpatient chemotherapy. \par PPM placement recommended, as cardiac MRI showed evidence of amyloid involvement. Pt had another RRT for hypotension and symptomatic bradycardia to 30s, was on pressor support until she received a dual chamber PPM. Pt also continued her C.diff treatment and chemotherapy was placed on hold until the diarrhea cleared. She was then started on Daratumumab  + CyBorD inpatient, and tolerated it well ; she completed 2 treatments of chemotherapy inpatient.\par \par Patient has continued with this regimen now as outpatient. She did continue to have episodes of diarrhea since previous C. Diff infection in March. On follow up 4/18 she noticed that her urine was red in color and after drinking water it became more orange. Treated with antibiotics for UTI and evaluated by urology. Cytoxan was held for a time period, and ultimately this cleared with antibiotics. \par \par **Patient is still limited in her functional status due to diarrhea, but she is able to perform most of her ADLs independently. She is having diarrhea still multiple times daily (probably on average 5 times a day) and at times it is watery although not every time. She denies any abdominal pain or cramps associated with this. She reported a robust appetite but she cannot eat a lot because she has a bad taste in her mouth. Denied any dyspnea or chest pain, and her neuropathy is improving to her judgement. She is still taking midodrine 20 mg TID. AM Cortisol level recently checked and normal. Denied fevers or night chills. She is planned for follow up on 9/1 with GI - multiple medical interventions without any success to this point. \par \par Of note, patient reported that she is NOT interested in hematopoietic transplant at this time but she is interested in collecting her stem cells for a later date.  [FreeTextEntry1] : Kleber + CyBorD started on 4/8/2022, held cytoxan from 4/18-5/12 for hematuria, held again 6/24 for worsening diarrhea [de-identified] : Patient presented for follow up on 11/28/2022. Patient felt fatigued and diarrhea is stable, continues to use Imodium. She had treatment this morning. Everything she eats still tastes bad for her. She is able to ambulate without a wheelchair today. No fevers, chills, night sweats. Strength is improved.

## 2022-11-29 NOTE — ASSESSMENT
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 Cycle 1 of CyBorD was initiated on 4/1/22 Completed 7 full cycles on 10/28/22. \par \par -Serum SPEP without a monoclonal fraction, Urine SPEP without monoclonal protein, immunofixation normal. \par -However on diagnosis Lambda serum FLCs 8.73 and kappa 0.92, for a ratio of 0.11 (or 9.5). \par -Fat pad biopsy done and was POSITIVE for congo red, with positive polarization. \par -As per discussion with pathology, unable to send this for mass spectrometry to confirm amyloid type. However, now s/p BMBx which is showing ~35% plasmacytosis (monoclonal) c/w plasma cell neoplasm. 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 It does NOT meeting MM criteria. 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 \par -Urine showing proteinuria on diagnosis (although not nephrotic range).\par -Systemic light chain amyloidosis is classically associated with a thickened interventricular septum and proteinuria. Also associated with neurologic sequelae such as that she has. \par -Pt is s/p treatment for systemic light chain amyloidosis with poncho-CyBorD, completed 7 cycles in October 2022 with an excellent biochemical response. \par -Will continue at this point with maintenance with QOW Velcade and monthly daratumumab. \par -Still with symptoms of diarrhea and poor taste - likely residual from amyloid deposition. Following up with GI. \par -To plan for collection of stem cells, however undecided on transplant right now. \par -Continue follow up with Cardiology and PCP.\par -Will continue to monitor diarrhea, as this has not resolved at all to this point. GI following. Patient self discontinued Questran as this was not working. Now s/p EGD colonoscopy on 9/13/22 with Dr. Holloway which showed evidence of significant amyloid involvement. At this point will continue with supportive care as per GI.  \par -Patient understands and agrees with plan. All information explained to the best of my ability.\par -Following up closely with cardiology. \par -RTC 1 month with Grecia.

## 2022-11-30 LAB
ALBUMIN MFR SERPL ELPH: 63.9 %
ALBUMIN SERPL ELPH-MCNC: 3.9 G/DL
ALBUMIN SERPL-MCNC: 3.6 G/DL
ALBUMIN/GLOB SERPL: 1.8 RATIO
ALP BLD-CCNC: 77 U/L
ALPHA1 GLOB MFR SERPL ELPH: 5.2 %
ALPHA1 GLOB SERPL ELPH-MCNC: 0.3 G/DL
ALPHA2 GLOB MFR SERPL ELPH: 11.9 %
ALPHA2 GLOB SERPL ELPH-MCNC: 0.7 G/DL
ALT SERPL-CCNC: 24 U/L
ANION GAP SERPL CALC-SCNC: 9 MMOL/L
AST SERPL-CCNC: 19 U/L
B-GLOBULIN MFR SERPL ELPH: 12.5 %
B-GLOBULIN SERPL ELPH-MCNC: 0.7 G/DL
BILIRUB SERPL-MCNC: 0.6 MG/DL
BUN SERPL-MCNC: 11 MG/DL
CALCIUM SERPL-MCNC: 9.3 MG/DL
CHLORIDE SERPL-SCNC: 104 MMOL/L
CHOLEST SERPL-MCNC: 176 MG/DL
CO2 SERPL-SCNC: 27 MMOL/L
CREAT SERPL-MCNC: 0.51 MG/DL
DEPRECATED KAPPA LC FREE/LAMBDA SER: 0.48 RATIO
EGFR: 108 ML/MIN/1.73M2
ESTIMATED AVERAGE GLUCOSE: 126 MG/DL
GAMMA GLOB FLD ELPH-MCNC: 0.4 G/DL
GAMMA GLOB MFR SERPL ELPH: 6.5 %
GLUCOSE SERPL-MCNC: 112 MG/DL
HBA1C MFR BLD HPLC: 6 %
HDLC SERPL-MCNC: 66 MG/DL
IGA SER QL IEP: 17 MG/DL
IGG SER QL IEP: 421 MG/DL
IGM SER QL IEP: <10 MG/DL
INTERPRETATION SERPL IEP-IMP: NORMAL
KAPPA LC CSF-MCNC: 1.11 MG/DL
KAPPA LC SERPL-MCNC: 0.53 MG/DL
LDLC SERPL CALC-MCNC: 82 MG/DL
M PROTEIN MFR SERPL ELPH: 2.4 %
M PROTEIN SPEC IFE-MCNC: NORMAL
MONOCLON BAND OBS SERPL: 0.1 G/DL
NONHDLC SERPL-MCNC: 110 MG/DL
POTASSIUM SERPL-SCNC: 4.5 MMOL/L
PROT SERPL-MCNC: 5.6 G/DL
SODIUM SERPL-SCNC: 140 MMOL/L
TRIGL SERPL-MCNC: 140 MG/DL

## 2022-12-07 ENCOUNTER — OUTPATIENT (OUTPATIENT)
Dept: OUTPATIENT SERVICES | Facility: HOSPITAL | Age: 57
LOS: 1 days | Discharge: HOME | End: 2022-12-07

## 2022-12-07 DIAGNOSIS — D47.2 MONOCLONAL GAMMOPATHY: ICD-10-CM

## 2022-12-12 ENCOUNTER — RESULT REVIEW (OUTPATIENT)
Age: 57
End: 2022-12-12

## 2022-12-12 ENCOUNTER — APPOINTMENT (OUTPATIENT)
Dept: INFUSION THERAPY | Facility: HOSPITAL | Age: 57
End: 2022-12-12

## 2022-12-12 LAB
BASOPHILS # BLD AUTO: 0.07 K/UL — SIGNIFICANT CHANGE UP (ref 0–0.2)
BASOPHILS NFR BLD AUTO: 0.9 % — SIGNIFICANT CHANGE UP (ref 0–2)
EOSINOPHIL # BLD AUTO: 0.23 K/UL — SIGNIFICANT CHANGE UP (ref 0–0.5)
EOSINOPHIL NFR BLD AUTO: 3.1 % — SIGNIFICANT CHANGE UP (ref 0–6)
HCT VFR BLD CALC: 30 % — LOW (ref 34.5–45)
HGB BLD-MCNC: 9.9 G/DL — LOW (ref 11.5–15.5)
IMM GRANULOCYTES NFR BLD AUTO: 0.3 % — SIGNIFICANT CHANGE UP (ref 0–0.9)
LYMPHOCYTES # BLD AUTO: 0.88 K/UL — LOW (ref 1–3.3)
LYMPHOCYTES # BLD AUTO: 11.9 % — LOW (ref 13–44)
MCHC RBC-ENTMCNC: 33 G/DL — SIGNIFICANT CHANGE UP (ref 32–36)
MCHC RBC-ENTMCNC: 33.1 PG — SIGNIFICANT CHANGE UP (ref 27–34)
MCV RBC AUTO: 100.3 FL — HIGH (ref 80–100)
MONOCYTES # BLD AUTO: 0.49 K/UL — SIGNIFICANT CHANGE UP (ref 0–0.9)
MONOCYTES NFR BLD AUTO: 6.6 % — SIGNIFICANT CHANGE UP (ref 2–14)
NEUTROPHILS # BLD AUTO: 5.7 K/UL — SIGNIFICANT CHANGE UP (ref 1.8–7.4)
NEUTROPHILS NFR BLD AUTO: 77.2 % — HIGH (ref 43–77)
NRBC # BLD: 0 /100 WBCS — SIGNIFICANT CHANGE UP (ref 0–0)
PLATELET # BLD AUTO: 249 K/UL — SIGNIFICANT CHANGE UP (ref 150–400)
RBC # BLD: 2.99 M/UL — LOW (ref 3.8–5.2)
RBC # FLD: 12.7 % — SIGNIFICANT CHANGE UP (ref 10.3–14.5)
WBC # BLD: 7.39 K/UL — SIGNIFICANT CHANGE UP (ref 3.8–10.5)
WBC # FLD AUTO: 7.39 K/UL — SIGNIFICANT CHANGE UP (ref 3.8–10.5)

## 2022-12-13 DIAGNOSIS — E85.9 AMYLOIDOSIS, UNSPECIFIED: ICD-10-CM

## 2022-12-13 DIAGNOSIS — Z51.11 ENCOUNTER FOR ANTINEOPLASTIC CHEMOTHERAPY: ICD-10-CM

## 2022-12-13 DIAGNOSIS — R11.2 NAUSEA WITH VOMITING, UNSPECIFIED: ICD-10-CM

## 2022-12-27 ENCOUNTER — APPOINTMENT (OUTPATIENT)
Dept: HEMATOLOGY ONCOLOGY | Facility: CLINIC | Age: 57
End: 2022-12-27
Payer: MEDICAID

## 2022-12-27 ENCOUNTER — RESULT REVIEW (OUTPATIENT)
Age: 57
End: 2022-12-27

## 2022-12-27 ENCOUNTER — APPOINTMENT (OUTPATIENT)
Dept: INFUSION THERAPY | Facility: HOSPITAL | Age: 57
End: 2022-12-27

## 2022-12-27 LAB
BASOPHILS # BLD AUTO: 0.04 K/UL — SIGNIFICANT CHANGE UP (ref 0–0.2)
BASOPHILS NFR BLD AUTO: 1.1 % — SIGNIFICANT CHANGE UP (ref 0–2)
EOSINOPHIL # BLD AUTO: 0.11 K/UL — SIGNIFICANT CHANGE UP (ref 0–0.5)
EOSINOPHIL NFR BLD AUTO: 3 % — SIGNIFICANT CHANGE UP (ref 0–6)
HCT VFR BLD CALC: 31.9 % — LOW (ref 34.5–45)
HGB BLD-MCNC: 10.3 G/DL — LOW (ref 11.5–15.5)
IMM GRANULOCYTES NFR BLD AUTO: 0 % — SIGNIFICANT CHANGE UP (ref 0–0.9)
LYMPHOCYTES # BLD AUTO: 0.75 K/UL — LOW (ref 1–3.3)
LYMPHOCYTES # BLD AUTO: 20.5 % — SIGNIFICANT CHANGE UP (ref 13–44)
MCHC RBC-ENTMCNC: 32.3 G/DL — SIGNIFICANT CHANGE UP (ref 32–36)
MCHC RBC-ENTMCNC: 32.6 PG — SIGNIFICANT CHANGE UP (ref 27–34)
MCV RBC AUTO: 100.9 FL — HIGH (ref 80–100)
MONOCYTES # BLD AUTO: 0.4 K/UL — SIGNIFICANT CHANGE UP (ref 0–0.9)
MONOCYTES NFR BLD AUTO: 10.9 % — SIGNIFICANT CHANGE UP (ref 2–14)
NEUTROPHILS # BLD AUTO: 2.36 K/UL — SIGNIFICANT CHANGE UP (ref 1.8–7.4)
NEUTROPHILS NFR BLD AUTO: 64.5 % — SIGNIFICANT CHANGE UP (ref 43–77)
NRBC # BLD: 0 /100 WBCS — SIGNIFICANT CHANGE UP (ref 0–0)
PLATELET # BLD AUTO: 212 K/UL — SIGNIFICANT CHANGE UP (ref 150–400)
RBC # BLD: 3.16 M/UL — LOW (ref 3.8–5.2)
RBC # FLD: 12 % — SIGNIFICANT CHANGE UP (ref 10.3–14.5)
WBC # BLD: 3.66 K/UL — LOW (ref 3.8–10.5)
WBC # FLD AUTO: 3.66 K/UL — LOW (ref 3.8–10.5)

## 2022-12-27 PROCEDURE — 99214 OFFICE O/P EST MOD 30 MIN: CPT

## 2022-12-28 LAB
APPEARANCE UR: ABNORMAL
BILIRUB UR-MCNC: NEGATIVE — SIGNIFICANT CHANGE UP
COLOR SPEC: SIGNIFICANT CHANGE UP
DIFF PNL FLD: ABNORMAL
GLUCOSE UR QL: NEGATIVE — SIGNIFICANT CHANGE UP
KETONES UR-MCNC: NEGATIVE — SIGNIFICANT CHANGE UP
LEUKOCYTE ESTERASE UR-ACNC: ABNORMAL
NITRITE UR-MCNC: POSITIVE
PH UR: 6 — SIGNIFICANT CHANGE UP (ref 5–8)
PROT UR-MCNC: SIGNIFICANT CHANGE UP
SP GR SPEC: 1.02 — SIGNIFICANT CHANGE UP (ref 1.01–1.02)
UROBILINOGEN FLD QL: SIGNIFICANT CHANGE UP

## 2022-12-30 LAB
-  AMIKACIN: SIGNIFICANT CHANGE UP
-  AMOXICILLIN/CLAVULANIC ACID: SIGNIFICANT CHANGE UP
-  AMPICILLIN/SULBACTAM: SIGNIFICANT CHANGE UP
-  AMPICILLIN: SIGNIFICANT CHANGE UP
-  AZTREONAM: SIGNIFICANT CHANGE UP
-  CEFAZOLIN: SIGNIFICANT CHANGE UP
-  CEFEPIME: SIGNIFICANT CHANGE UP
-  CEFOXITIN: SIGNIFICANT CHANGE UP
-  CEFTRIAXONE: SIGNIFICANT CHANGE UP
-  CIPROFLOXACIN: SIGNIFICANT CHANGE UP
-  ERTAPENEM: SIGNIFICANT CHANGE UP
-  GENTAMICIN: SIGNIFICANT CHANGE UP
-  IMIPENEM: SIGNIFICANT CHANGE UP
-  LEVOFLOXACIN: SIGNIFICANT CHANGE UP
-  MEROPENEM: SIGNIFICANT CHANGE UP
-  NITROFURANTOIN: SIGNIFICANT CHANGE UP
-  PIPERACILLIN/TAZOBACTAM: SIGNIFICANT CHANGE UP
-  TOBRAMYCIN: SIGNIFICANT CHANGE UP
-  TRIMETHOPRIM/SULFAMETHOXAZOLE: SIGNIFICANT CHANGE UP
CULTURE RESULTS: SIGNIFICANT CHANGE UP
METHOD TYPE: SIGNIFICANT CHANGE UP
ORGANISM # SPEC MICROSCOPIC CNT: SIGNIFICANT CHANGE UP
ORGANISM # SPEC MICROSCOPIC CNT: SIGNIFICANT CHANGE UP
SPECIMEN SOURCE: SIGNIFICANT CHANGE UP

## 2023-01-04 LAB
ALBUMIN MFR SERPL ELPH: 64 %
ALBUMIN SERPL ELPH-MCNC: 4.2 G/DL
ALBUMIN SERPL-MCNC: 3.6 G/DL
ALBUMIN/GLOB SERPL: 1.7 RATIO
ALP BLD-CCNC: 75 U/L
ALPHA1 GLOB MFR SERPL ELPH: 5.3 %
ALPHA1 GLOB SERPL ELPH-MCNC: 0.3 G/DL
ALPHA2 GLOB MFR SERPL ELPH: 12.5 %
ALPHA2 GLOB SERPL ELPH-MCNC: 0.7 G/DL
ALT SERPL-CCNC: 21 U/L
ANION GAP SERPL CALC-SCNC: 8 MMOL/L
AST SERPL-CCNC: 18 U/L
B-GLOBULIN MFR SERPL ELPH: 12.1 %
B-GLOBULIN SERPL ELPH-MCNC: 0.7 G/DL
BILIRUB SERPL-MCNC: 0.7 MG/DL
BUN SERPL-MCNC: 12 MG/DL
CALCIUM SERPL-MCNC: 9.7 MG/DL
CHLORIDE SERPL-SCNC: 106 MMOL/L
CO2 SERPL-SCNC: 29 MMOL/L
CREAT SERPL-MCNC: 0.52 MG/DL
DEPRECATED KAPPA LC FREE/LAMBDA SER: 0.49 RATIO
EGFR: 108 ML/MIN/1.73M2
GAMMA GLOB FLD ELPH-MCNC: 0.3 G/DL
GAMMA GLOB MFR SERPL ELPH: 6.1 %
GLUCOSE SERPL-MCNC: 103 MG/DL
IGA SER QL IEP: 25 MG/DL
IGG SER QL IEP: 413 MG/DL
IGM SER QL IEP: <10 MG/DL
INTERPRETATION SERPL IEP-IMP: NORMAL
KAPPA LC CSF-MCNC: 1.22 MG/DL
KAPPA LC SERPL-MCNC: 0.6 MG/DL
M PROTEIN MFR SERPL ELPH: 1.6 %
M PROTEIN SPEC IFE-MCNC: NORMAL
MONOCLON BAND OBS SERPL: 0.1 G/DL
POTASSIUM SERPL-SCNC: 4.7 MMOL/L
PROT SERPL-MCNC: 5.7 G/DL
SODIUM SERPL-SCNC: 143 MMOL/L

## 2023-01-06 ENCOUNTER — APPOINTMENT (OUTPATIENT)
Dept: UROLOGY | Facility: CLINIC | Age: 58
End: 2023-01-06
Payer: MEDICAID

## 2023-01-06 ENCOUNTER — APPOINTMENT (OUTPATIENT)
Dept: GASTROENTEROLOGY | Facility: CLINIC | Age: 58
End: 2023-01-06
Payer: MEDICAID

## 2023-01-06 VITALS
HEART RATE: 84 BPM | HEIGHT: 63 IN | WEIGHT: 106 LBS | TEMPERATURE: 97.7 F | BODY MASS INDEX: 18.78 KG/M2 | DIASTOLIC BLOOD PRESSURE: 70 MMHG | OXYGEN SATURATION: 98 % | SYSTOLIC BLOOD PRESSURE: 110 MMHG

## 2023-01-06 VITALS
RESPIRATION RATE: 16 BRPM | OXYGEN SATURATION: 98 % | HEIGHT: 63 IN | BODY MASS INDEX: 20.38 KG/M2 | DIASTOLIC BLOOD PRESSURE: 70 MMHG | WEIGHT: 115 LBS | SYSTOLIC BLOOD PRESSURE: 111 MMHG | HEART RATE: 63 BPM | TEMPERATURE: 97.1 F

## 2023-01-06 DIAGNOSIS — R00.2 PALPITATIONS: ICD-10-CM

## 2023-01-06 DIAGNOSIS — E11.65 TYPE 2 DIABETES MELLITUS WITH HYPERGLYCEMIA: ICD-10-CM

## 2023-01-06 DIAGNOSIS — Z86.39 PERSONAL HISTORY OF OTHER ENDOCRINE, NUTRITIONAL AND METABOLIC DISEASE: ICD-10-CM

## 2023-01-06 DIAGNOSIS — A04.72 ENTEROCOLITIS DUE TO CLOSTRIDIUM DIFFICILE, NOT SPECIFIED AS RECURRENT: ICD-10-CM

## 2023-01-06 DIAGNOSIS — Z82.49 FAMILY HISTORY OF ISCHEMIC HEART DISEASE AND OTHER DISEASES OF THE CIRCULATORY SYSTEM: ICD-10-CM

## 2023-01-06 DIAGNOSIS — Z80.0 FAMILY HISTORY OF MALIGNANT NEOPLASM OF DIGESTIVE ORGANS: ICD-10-CM

## 2023-01-06 DIAGNOSIS — Z86.79 PERSONAL HISTORY OF OTHER DISEASES OF THE CIRCULATORY SYSTEM: ICD-10-CM

## 2023-01-06 DIAGNOSIS — Z78.9 OTHER SPECIFIED HEALTH STATUS: ICD-10-CM

## 2023-01-06 DIAGNOSIS — K21.9 GASTRO-ESOPHAGEAL REFLUX DISEASE W/OUT ESOPHAGITIS: ICD-10-CM

## 2023-01-06 DIAGNOSIS — Z87.09 PERSONAL HISTORY OF OTHER DISEASES OF THE RESPIRATORY SYSTEM: ICD-10-CM

## 2023-01-06 DIAGNOSIS — Z86.69 PERSONAL HISTORY OF OTHER DISEASES OF THE NERVOUS SYSTEM AND SENSE ORGANS: ICD-10-CM

## 2023-01-06 DIAGNOSIS — K58.9 IRRITABLE BOWEL SYNDROME W/OUT DIARRHEA: ICD-10-CM

## 2023-01-06 DIAGNOSIS — Z87.39 PERSONAL HISTORY OF OTHER DISEASES OF THE MUSCULOSKELETAL SYSTEM AND CONNECTIVE TISSUE: ICD-10-CM

## 2023-01-06 PROCEDURE — 99213 OFFICE O/P EST LOW 20 MIN: CPT

## 2023-01-06 PROCEDURE — 99214 OFFICE O/P EST MOD 30 MIN: CPT

## 2023-01-06 RX ORDER — CIPROFLOXACIN HYDROCHLORIDE 500 MG/1
500 TABLET, FILM COATED ORAL
Qty: 6 | Refills: 0 | Status: DISCONTINUED | COMMUNITY
Start: 2022-12-28 | End: 2023-01-06

## 2023-01-06 NOTE — PHYSICAL EXAM
[Alert] : alert [No Acute Distress] : no acute distress [Normal] : heart rate was normal and rhythm regular, normal S1 and S2, no murmurs [Bowel Sounds] : normal bowel sounds [Abdomen Tenderness] : non-tender [Abdomen Soft] : soft

## 2023-01-06 NOTE — ASSESSMENT
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 Cycle 1 of CyBorD was initiated on 4/1/22 Completed 7 full cycles on 10/28/22. \par \par -Serum SPEP without a monoclonal fraction, Urine SPEP without monoclonal protein, immunofixation normal. \par -However on diagnosis Lambda serum FLCs 8.73 and kappa 0.92, for a ratio of 0.11 (or 9.5). \par -Fat pad biopsy done and was POSITIVE for congo red, with positive polarization. \par -As per discussion with pathology, unable to send this for mass spectrometry to confirm amyloid type. However, now s/p BMBx which is showing ~35% plasmacytosis (monoclonal) c/w plasma cell neoplasm. 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 It does NOT meeting MM criteria. 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 \par -Urine showing proteinuria on diagnosis (although not nephrotic range).\par -Systemic light chain amyloidosis is classically associated with a thickened interventricular septum and proteinuria. Also associated with neurologic sequelae such as that she has. \par -Pt is s/p treatment for systemic light chain amyloidosis with poncho-CyBorD, completed 7 cycles in October 2022 with an excellent biochemical response. \par -Will continue at this point with maintenance with QOW Velcade and monthly daratumumab. \par -Still with symptoms of diarrhea and poor taste - likely residual from amyloid deposition. Taking Lomotil. Following up with GI. \par -To plan for collection of stem cells, however undecided on transplant right now. Will reach out to Dr. Echeverria to inform her.\par -Continue follow up with Cardiology and PCP.\par -Will continue to monitor diarrhea, as this has not resolved at all to this point. GI following. Patient self discontinued Questran as this was not working. Now s/p EGD colonoscopy on 9/13/22 with Dr. Holloway which showed evidence of significant amyloid involvement. At this point will continue with supportive care as per GI.  \par -Will check UA and C&S and patient will follow up with Urology ASAP.\par -Patient understands and agrees with plan. All information explained to the best of my ability.\par -Following up closely with cardiology. \par -RTC 1 month.

## 2023-01-06 NOTE — HISTORY OF PRESENT ILLNESS
[FreeTextEntry1] : I saw patient Tonia Hassan in follow-up today.  The patient is a 57-year-old female has several significant medical issues.  These include amyloidosis for which the patient is seeing an oncologist on a regular basis the patient undergoes infusions twice a week.  Patient also has a history of type 2 diabetes, as well as hypotension and type both thyroidism.  The patient underwent a recent endoscopy and colonoscopy for chronic dyspeptic symptoms and persistent diarrhea.  The patient has been on multiple medications to control her diarrhea and currently takes Lomotil along with an occasional Imodium.  Only recently she notices that some of her antidiarrheal medication requirements have decreased.  The recent endoscopic evaluations did reveal amyloidosis and all the biopsies obtained both of the stomach and the colon.  Tonia does not abuse tobacco caffeine or ethanol.  The patient has multiple liquid bowel movements during the course of the day.  Some days she may only have 2-3 bowel movements with some form.  There is no blood in the stool on the toilet tissue.

## 2023-01-06 NOTE — PHYSICAL EXAM
[Ambulatory and capable of all self care but unable to carry out any work activities] : Status 2- Ambulatory and capable of all self care but unable to carry out any work activities. Up and about more than 50% of waking hours [Thin] : thin [Normal] : affect appropriate [de-identified] : supple [de-identified] : (+)S1S2 RRR [de-identified] : (+) varicosities [de-identified] : no pain [de-identified] : warm/dry (+) small scab on right outer thigh

## 2023-01-06 NOTE — ASSESSMENT
[FreeTextEntry1] : Tonia is a 57-year-old female with multiple significant comorbid condition.  From the GI perspective the patient suffers from chronic diarrhea which is suspected to be on the basis of infiltration of the intestinal mucosa and submucosa with amyloid deposits.  This is causing her to have diarrhea and malabsorption.  The patient has tried enzyme supplements, Xifaxan for treatment of bacterial overgrowth and multiple antispasmodics.  The current drug regimen of antidiarrheals keeps his symptoms under reasonable control.  I have renewed her medications.  There is literature that suggest that a trial of octreotide may be helpful.  The patient is hesitant to use this medication due to its possible cardiac side effects.  Tincture of opium for symptomatic control was also mentioned.  There is concern that this would further lower her blood pressure.  The patient will get back to me with a progress report in several weeks to see if there is any change in her clinical status

## 2023-01-06 NOTE — REVIEW OF SYSTEMS
[Fatigue] : fatigue [Diarrhea] : diarrhea [Difficulty Walking] : difficulty walking [Anxiety] : anxiety [Negative] : Heme/Lymph [Dysuria] : dysuria [Joint Stiffness] : joint stiffness [Fever] : no fever [Chills] : no chills [Night Sweats] : no night sweats [Recent Change In Weight] : ~T no recent weight change [Vision Problems] : no vision problems [Dysphagia] : no dysphagia [Nosebleeds] : no nosebleeds [Odynophagia] : no odynophagia [Chest Pain] : no chest pain [Palpitations] : no palpitations [Lower Ext Edema] : no lower extremity edema [Abdominal Pain] : no abdominal pain [Vomiting] : no vomiting [Constipation] : no constipation [Incontinence] : no incontinence [Joint Pain] : no joint pain [Confused] : no confusion [Dizziness] : no dizziness [Fainting] : no fainting [Insomnia] : no insomnia [Hot Flashes] : no hot flashes [Muscle Weakness] : no muscle weakness [FreeTextEntry8] : (+) cloudy urine [FreeTextEntry9] : in hands [de-identified] : (+) neuropathy in feet

## 2023-01-06 NOTE — REVIEW OF SYSTEMS
[Fever] : no fever [Chills] : no chills [Feeling Poorly] : not feeling poorly [Feeling Tired] : not feeling tired [Recent Weight Loss (___ Lbs)] : recent [unfilled] ~Ulb weight loss [As Noted in HPI] : as noted in HPI [Diarrhea] : diarrhea [Bloating (gassiness)] : bloating [Negative] : Respiratory [FreeTextEntry5] : She suffers from orthostatic hypotension and also has a permanent pacemaker.

## 2023-01-06 NOTE — HISTORY OF PRESENT ILLNESS
[Therapy: ___] : Therapy: [unfilled] [Cycle: ___] : Cycle: [unfilled] [Day: ___] : Day: [unfilled] [de-identified] : 56 y/o F with hx of thyroidectomy in 2011 (benign pathology) on thyroid replacement since then, and with COVID pneumonia back in March of 2020 ultimately signed herself out AMA, chronic issues thereafter, ended up going back to work in July 2020. In January-February 2021 had Pfizer vaccines. Started having issues starting in March 2021, she was having GI issues, endoscopy showed chronic gastritis. Additionally diagnosed with IBS at that point. She has been having issues with Postural Orthostatic Tachycardia Syndrome - her blood pressure goes down when she stands up from a seated position. She has fainted "too many times to count." She ends up with bruises here and there but nothing severe where she would need to go to the hospital. She saw two neurologists who did extensive evaluations without any success in finding diagnostic lesions. She reports she is seeing specialist for dysautonomia, Dr. Colón at [place] (neurology). She said overall her POTS has improved but she has a had a couple of episodes of fainting in the last couple of weeks. She started fludrocortisone in September 2021 and has titrated up the dose. She is now on 1 mg total (probably 0.5 mg twice daily). The only way she feels completely normal is when she is laying down. BP gets very low on standing up, it has registered as low as 50s over 40s. She reports when she is standing or sitting for too long, she will get a chest tightness, but not quite a pain. It gets to the point where she feels like she is having trouble breathing and then she has to sit down. She has muscle aches in her legs which improves with warm bath, and then some burning pain in her arms which reminds her of a sunburn. She also now is having issues with her mouth - very difficult to eat as it feels like she has burnt her mouth. She has lost 47 pounds since March 2021. She is not really able to get much down due to these feelings, although she does have an appetite. She denies any night-sweats, she does always feel cold though. Her bowel movements "swing" from constipation to diarrhea. She was on Linzess recently but was taken off it. She reports that she is urinating normally, in fact a lot because she drinks a fair amount of water daily. She has no swelling in her legs at this time. She had in the past at work when she was standing a lot and was on amlodipine. She was sent to me for evaluation of elevated free light chains. 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 She was found with no monoclonal protein on SPEP and hemoglobin normal, normal renal function, and no hypercalcemia. Fat pad biopsy showed positive congo red staining in rare blood vessels and positive for polarization. 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 While admitted course was complicated by multiple unresponsive episodes prompting RRTs, during which pt found to be hypotensive and bradycardic. She initially required levophed for BP support but ultimately BP remained stable while on Midodrine 20mg q8 and Fludrocortisone 0.2mg q24. Pt noted to intermittently have sinus pauses (2-3.8 seconds) thought to be 2/2 vagal episodes, and asymptomatic bradycardia overnight on tele. ECG noted NSR but 1st degree AV block. TTE 3/16/22 showed normal LV size and function with EF 55%-60%. No regional wall motion abnormalities. Moderate concentric LVH. \par \par Pt also started on oral vanco for C.diff and completed ceftriaxone for UTI. \par \par Ultimately transferred to [facility] for consideration of inpatient chemotherapy. \par PPM placement recommended, as cardiac MRI showed evidence of amyloid involvement. Pt had another RRT for hypotension and symptomatic bradycardia to 30s, was on pressor support until she received a dual chamber PPM. Pt also continued her C.diff treatment and chemotherapy was placed on hold until the diarrhea cleared. She was then started on Daratumumab  + CyBorD inpatient, and tolerated it well ; she completed 2 treatments of chemotherapy inpatient.\par \par Patient has continued with this regimen now as outpatient. She did continue to have episodes of diarrhea since previous C. Diff infection in March. On follow up 4/18 she noticed that her urine was red in color and after drinking water it became more orange. Treated with antibiotics for UTI and evaluated by urology. Cytoxan was held for a time period, and ultimately this cleared with antibiotics. \par \par **Patient is still limited in her functional status due to diarrhea, but she is able to perform most of her ADLs independently. She is having diarrhea still multiple times daily (probably on average 5 times a day) and at times it is watery although not every time. She denies any abdominal pain or cramps associated with this. She reported a robust appetite but she cannot eat a lot because she has a bad taste in her mouth. Denied any dyspnea or chest pain, and her neuropathy is improving to her judgement. She is still taking midodrine 20 mg TID. AM Cortisol level recently checked and normal. Denied fevers or night chills. She is planned for follow up on 9/1 with GI - multiple medical interventions without any success to this point. \par \par Of note, patient reported that she is NOT interested in hematopoietic transplant at this time but she is interested in collecting her stem cells for a later date.  [FreeTextEntry1] : Kleber + CyBorD started on 4/8/2022, held cytoxan from 4/18-5/12 for hematuria, held again 6/24 for worsening diarrhea [de-identified] : Patient presented for follow up. Patient has been in her usual state of health. Has been going to acupuncture an she feels that his has decreased her neuropathy.  Diarrhea is stable, she is now using Lomotil that was previously prescribed and states it is helping decrease the frequency. Everything she eats still tastes bad and she still has pain in the mouth, no difficulty chewing or swallowing and she is not using mouth rinses. She is able to ambulate without a wheelchair. She is considering resuming PT now that the diarrhea is better. She has been having cloudy urine the past two weeks it becomes more clear later in the day, this has been an ongoing problem and has some burning with urination otherwise no other symptoms. No fevers, chills, night sweats. Inquiring about stem cell collection.

## 2023-01-07 NOTE — ASSESSMENT
[FreeTextEntry1] : We discussed the pathophysiology, workup and management of urinary tract infections. The difference between acute, chronic, and recurrent UTIs and also asymptomatic bacteriuria were reviewed. Generally, recurrent UTIs are considered to be 3 infections in 12 months or 2 infections in 6 months. Management of UTIs in pregnancy, patients with anatomic abnormalities and those with indwelling urinary catheters maybe different. Some of the more common symptoms of UTIs are dysuria, urinary frequency and urgency, lower abdominal or pelvic pressure and pain and hematuria. Diagnosis of UTI should occur with a properly collected urine culture when symptomatic. Some of the more common urinary pathogens are Escherichia coli, Enterococcus, Klebsiella, Proteus, etc. Cystoscopy and renal or upper tract imaging are not generally needed but it could be considered. In addition to treatment of symptomatic episodes, patient may consider self initiated treatments when symptomatic. Post-coital prophylactic antibiotic can be considered if UTIs recur after sexual activity. Patients who are symptomatic can consider long-term antibiotic prophylaxis. Risk of developing bacterial resistance has been discussed. Treatment of asymptomatic bacteriuria should be avoided. Non-antibiotic therapy with hydration, cranberry pills, probiotics, and over the counter Azo was reviewed.  We will repeat urinalysis and urine culture and will discuss the next step based on her symptoms and results.

## 2023-01-07 NOTE — PHYSICAL EXAM
[General Appearance - Well Developed] : well developed [General Appearance - Well Nourished] : well nourished [Normal Appearance] : normal appearance [Well Groomed] : well groomed [General Appearance - In No Acute Distress] : no acute distress [Abdomen Soft] : soft [Abdomen Tenderness] : non-tender [Costovertebral Angle Tenderness] : no ~M costovertebral angle tenderness [FreeTextEntry1] : Bladder not distended [] : no respiratory distress [Respiration, Rhythm And Depth] : normal respiratory rhythm and effort [Exaggerated Use Of Accessory Muscles For Inspiration] : no accessory muscle use

## 2023-01-07 NOTE — HISTORY OF PRESENT ILLNESS
[FreeTextEntry1] : She is a 57-year-old woman who is seen today in follow-up for urinary tract infections.  Patient stated she was recently again diagnosed with UTI and given antibiotics.  Urine culture in December 2022 showed Klebsiella and she was given Cipro.  She developed a rash with Cipro but finished taking it.  Ultrasound in October 2022 showed normal right kidney and left renal 1 cm cyst.  Ultrasound in April 2022 showed mild right hydronephrosis.  Ultrasound in November 2021 showed right parapelvic renal cyst versus fullness.  Residual urine volume today was minimal.  Urine is now cloudy again.\par \par She has been diagnosed with amyloid and she received chemotherapy.  She has not noticed any significant urinary side effects. There is no flank pain. \par \par Previous notes: Patient states that in August 2021, routine laboratory values showed urinary tract infection.  At that time she was asymptomatic.  Urine culture had shown Enterococcus.  Then in October 2021 she had dysuria and cloudy urine.  She was treated with Cipro.  Then urinalysis was repeated in November 2021 which showed significant amount of white blood cells but urine culture was contaminated.  Repeat urinalysis in November 2021 was normal.  She tries to remain hydrated and therefore nocturia 3-4 times.  It generally does not bother her significantly.  She does not have history of kidney stones.

## 2023-01-08 LAB
APPEARANCE: ABNORMAL
BACTERIA: ABNORMAL
BILIRUBIN URINE: NEGATIVE
BLOOD URINE: ABNORMAL
CALCIUM OXALATE CRYSTALS: ABNORMAL
COLOR: YELLOW
GLUCOSE QUALITATIVE U: NEGATIVE
KETONES URINE: NEGATIVE
LEUKOCYTE ESTERASE URINE: ABNORMAL
MICROSCOPIC-UA: NORMAL
NITRITE URINE: NEGATIVE
PH URINE: 6
PROTEIN URINE: ABNORMAL
RED BLOOD CELLS URINE: 5 /HPF
SPECIFIC GRAVITY URINE: >=1.03
SQUAMOUS EPITHELIAL CELLS: 0 /HPF
UROBILINOGEN URINE: NORMAL
WHITE BLOOD CELLS URINE: >720 /HPF

## 2023-01-09 ENCOUNTER — RESULT REVIEW (OUTPATIENT)
Age: 58
End: 2023-01-09

## 2023-01-09 ENCOUNTER — APPOINTMENT (OUTPATIENT)
Dept: INFUSION THERAPY | Facility: HOSPITAL | Age: 58
End: 2023-01-09

## 2023-01-09 LAB
BASOPHILS # BLD AUTO: 0.06 K/UL — SIGNIFICANT CHANGE UP (ref 0–0.2)
BASOPHILS NFR BLD AUTO: 1.1 % — SIGNIFICANT CHANGE UP (ref 0–2)
EOSINOPHIL # BLD AUTO: 0.11 K/UL — SIGNIFICANT CHANGE UP (ref 0–0.5)
EOSINOPHIL NFR BLD AUTO: 2 % — SIGNIFICANT CHANGE UP (ref 0–6)
HCT VFR BLD CALC: 32.6 % — LOW (ref 34.5–45)
HGB BLD-MCNC: 10.8 G/DL — LOW (ref 11.5–15.5)
IMM GRANULOCYTES NFR BLD AUTO: 0.2 % — SIGNIFICANT CHANGE UP (ref 0–0.9)
LYMPHOCYTES # BLD AUTO: 1.17 K/UL — SIGNIFICANT CHANGE UP (ref 1–3.3)
LYMPHOCYTES # BLD AUTO: 21.2 % — SIGNIFICANT CHANGE UP (ref 13–44)
MCHC RBC-ENTMCNC: 32.5 PG — SIGNIFICANT CHANGE UP (ref 27–34)
MCHC RBC-ENTMCNC: 33.1 G/DL — SIGNIFICANT CHANGE UP (ref 32–36)
MCV RBC AUTO: 98.2 FL — SIGNIFICANT CHANGE UP (ref 80–100)
MONOCYTES # BLD AUTO: 0.43 K/UL — SIGNIFICANT CHANGE UP (ref 0–0.9)
MONOCYTES NFR BLD AUTO: 7.8 % — SIGNIFICANT CHANGE UP (ref 2–14)
NEUTROPHILS # BLD AUTO: 3.75 K/UL — SIGNIFICANT CHANGE UP (ref 1.8–7.4)
NEUTROPHILS NFR BLD AUTO: 67.7 % — SIGNIFICANT CHANGE UP (ref 43–77)
NRBC # BLD: 0 /100 WBCS — SIGNIFICANT CHANGE UP (ref 0–0)
PLATELET # BLD AUTO: 190 K/UL — SIGNIFICANT CHANGE UP (ref 150–400)
RBC # BLD: 3.32 M/UL — LOW (ref 3.8–5.2)
RBC # FLD: 11.7 % — SIGNIFICANT CHANGE UP (ref 10.3–14.5)
WBC # BLD: 5.53 K/UL — SIGNIFICANT CHANGE UP (ref 3.8–10.5)
WBC # FLD AUTO: 5.53 K/UL — SIGNIFICANT CHANGE UP (ref 3.8–10.5)

## 2023-01-10 LAB — BACTERIA UR CULT: ABNORMAL

## 2023-01-12 ENCOUNTER — NON-APPOINTMENT (OUTPATIENT)
Age: 58
End: 2023-01-12

## 2023-01-12 ENCOUNTER — APPOINTMENT (OUTPATIENT)
Dept: HEMATOLOGY ONCOLOGY | Facility: CLINIC | Age: 58
End: 2023-01-12
Payer: MEDICAID

## 2023-01-12 VITALS
BODY MASS INDEX: 18.75 KG/M2 | SYSTOLIC BLOOD PRESSURE: 107 MMHG | OXYGEN SATURATION: 99 % | WEIGHT: 105.82 LBS | HEART RATE: 77 BPM | DIASTOLIC BLOOD PRESSURE: 68 MMHG | RESPIRATION RATE: 16 BRPM | TEMPERATURE: 97.1 F

## 2023-01-12 DIAGNOSIS — Z01.818 ENCOUNTER FOR OTHER PREPROCEDURAL EXAMINATION: ICD-10-CM

## 2023-01-12 PROCEDURE — 99215 OFFICE O/P EST HI 40 MIN: CPT

## 2023-01-12 NOTE — HISTORY OF PRESENT ILLNESS
[de-identified] : \par 58 y/o F with hx of thyroidectomy in 2011 (benign pathology) on thyroid replacement since then, and with COVID pneumonia back in March of 2020 ultimately signed herself out AMA, chronic issues thereafter, ended up going back to work in July 2020. In January-February 2021 had Pfizer vaccines. Started having issues starting in March 2021, she was having GI issues, endoscopy showed chronic gastritis. Additionally diagnosed with IBS at that point. She has been having issues with Postural Orthostatic Tachycardia Syndrome - her blood pressure goes down when she stands up from a seated position. She has fainted "too many times to count." She ends up with bruises here and there but nothing severe where she would need to go to the hospital. She saw two neurologists who did extensive evaluations without any success in finding diagnostic lesions. She reports she is seeing specialist for dysautonomia, Dr. Colón at [place] (neurology). She said overall her POTS has improved but she has a had a couple of episodes of fainting in the last couple of weeks. She started fludrocortisone in September 2021 and has titrated up the dose. She is now on 1 mg total (probably 0.5 mg twice daily). The only way she feels completely normal is when she is laying down. BP gets very low on standing up, it has registered as low as 50s over 40s. She reports when she is standing or sitting for too long, she will get a chest tightness, but not quite a pain. It gets to the point where she feels like she is having trouble breathing and then she has to sit down. She has muscle aches in her legs which improves with warm bath, and then some burning pain in her arms which reminds her of a sunburn. She also now is having issues with her mouth - very difficult to eat as it feels like she has burnt her mouth. She has lost 47 pounds since March 2021. She is not really able to get much down due to these feelings, although she does have an appetite. She denies any night-sweats, she does always feel cold though. Her bowel movements "swing" from constipation to diarrhea. She was on Linzess recently but was taken off it. She reports that she is urinating normally, in fact a lot because she drinks a fair amount of water daily. She has no swelling in her legs at this time. She had in the past at work when she was standing a lot and was on amlodipine. She was sent to me for evaluation of elevated free light chains. 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 She was found with no monoclonal protein on SPEP and hemoglobin normal, normal renal function, and no hypercalcemia. Fat pad biopsy showed positive congo red staining in rare blood vessels and positive for polarization. 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 While admitted course was complicated by multiple unresponsive episodes prompting RRTs, during which pt found to be hypotensive and bradycardic. She initially required levophed for BP support but ultimately BP remained stable while on Midodrine 20mg q8 and Fludrocortisone 0.2mg q24. Pt noted to intermittently have sinus pauses (2-3.8 seconds) thought to be 2/2 vagal episodes, and asymptomatic bradycardia overnight on tele. ECG noted NSR but 1st degree AV block. TTE 3/16/22 showed normal LV size and function with EF 55%-60%. No regional wall motion abnormalities. Moderate concentric LVH. \par \par Pt also started on oral vanco for C.diff and completed ceftriaxone for UTI. \par \par Ultimately transferred to [facility] for consideration of inpatient chemotherapy. \par PPM placement recommended, as cardiac MRI showed evidence of amyloid involvement. Pt had another RRT for hypotension and symptomatic bradycardia to 30s, was on pressor support until she received a dual chamber PPM. Pt also continued her C.diff treatment and chemotherapy was placed on hold until the diarrhea cleared. She was then started on Daratumumab + CyBorD inpatient, and tolerated it well ; she completed 2 treatments of chemotherapy inpatient.\par \par \par Patient has continued with this regimen now as outpatient. She did continue to have episodes of diarrhea since previous C. Diff infection in March. On follow up 4/18 she noticed that her urine was red in color and after drinking water it became more orange. Treated with antibiotics for UTI and evaluated by urology. Cytoxan was held for a time period, and ultimately this cleared with antibiotics. \par \par \par \par Current Treatment Status: Therapy: Danyell + CyBorD, Cycle: 3, Day: 20 on 6/15/22.  Kleber + CyBorD started on 4/8/2022, held Cytoxan from 4/18-5/12 for hematuria. \par \par \par On follow up 5/12/22 patient reported feeling improved but still fatigued. She is STILL experiencing diarrhea which started before due to C. Diff. She takes Imodium every 4 hours but with incomplete relief. Urine appears cloudy. She had conjunctivitis recently which has since resolved. She has swelling in her legs which tracks up to her buttocks at times. She is currently not on any diuretics for this as there is concern for her BP. She is on midodrine still and florinef. She denied having any recent fevers, chills, nausea, vomiting. Denied blood in the stool. No particular smell in the stool. She reports gas cramps. Compression socking do not help her legs. She experiences sob with exertion. No chest pain, palpitations. Appetite is improving but she still has some mouth pain. Neuropathy is stable. She has less  in her left hand than the right since starting Velcade, but not interfering with use. She will go to physical therapy soon.\par \par \par On follow up on 6/15/22  patient reports her swelling has improved and this has been since she stopped taking Fludrocortisone. At this time swelling is limited to lower extremities mostly. There is still a degree of swelling however she is able to do more tasks at home. She had previously cancelled PT eval due to the extreme swelling and will now be rescheduling. Overall her BP had been stable on Midodrine but she had ran out of pills and yesterday she captured her BP reading at 40 systolic. She states she did experience much in terms of symptoms but she stayed in bed until our office refilled and her BP increased. Today it is 137/81 in the office. The fatigue does continue and she is still experiencing diarrhea which started before due to C. Diff. She continues to takes Imodium, today she had already taken prior to appointment because she had diarrhea almost every hour starting last night (per patient this frequency of diarrhea does not occur everyday but happens randomly at least 1-2 days a week). She also took Zofran this morning because she did have some nausea but this seems to be the only medication that helps slow diarrhea down. She states she stopped Lomotil because it was not working and also stopped Florastor, PPI, and Carafate. she saw her new PCP who tested her stool and there were no infectious or inflammatory findings on this. She is scheduled to see GI in the next few weeks for further evaluation. Initially when she started treatment she had 2-3 "bad days" after treatment lasting until Monday however now she only starts feeling better on Thursday and is retreated on Fridays. She does eat and pushes herself to do so her complaint is that her taste is altered and nothing is really enjoyable. She denied having any recent fevers, chills, nausea, vomiting. Denied any further shortness of breath with exertion. No chest pain, palpitations. Neuropathy is stable. She has less  in her left hand than the right since starting Velcade, but not interfering with use. Her PCP had tested her TFTs and she is further awaiting if any changes are planned to be made to Synthroid or if the testing will be repeated.\par \par  Today, the patient describes generalized weakness, weight loss, and profuse diarrhea which is refractory to medication.She has GI appointment on 7/7/22. [de-identified] : Since initial [facility] consult visit on 6/23/22, patient is s/p treatment for systemic light chain amyloidosis with poncho-CyBorD, completed 7 cycles in October 2022 with an excellent biochemical response. \par -Will continue at this point with maintenance with QOW Velcade and monthly Daratumumab. \par -Still with symptoms of diarrhea and poor taste - likely residual from amyloid deposition. Taking Lomotil. Following up with GI. \par \par She still has diarrhea ranging from 1- 10 episodes per day, but better on Lomotil. She has Imodium when leaving her house. She has chronic UTI and is followed by Urology with plans to start long-term course of Nitrofurantoin. She denies fever, chills, cough. \par

## 2023-01-12 NOTE — PHYSICAL EXAM
[Thin] : thin [Normal] : affect appropriate [de-identified] : in NAD [de-identified] : anicteric [de-identified] : RRR, normal S1S2 [de-identified] : trace B/L pedal edema [de-identified] : no rash [de-identified] : A & O x 3

## 2023-01-12 NOTE — REASON FOR VISIT
[Other: _____] : [unfilled] [Follow-Up Visit] : a follow-up visit for [FreeTextEntry2] : primary light chain amyloidosis on Danyell + CyBorD to re-discuss high-dose chemotherapy followed by autologous peripheral blood stem cell transplant.

## 2023-01-12 NOTE — REVIEW OF SYSTEMS
[Fatigue] : fatigue [Diarrhea] : diarrhea [Dizziness] : dizziness [Fever] : no fever [Chills] : no chills [Night Sweats] : no night sweats [Vision Problems] : no vision problems [Mucosal Pain] : no mucosal pain [Chest Pain] : no chest pain [Shortness Of Breath] : no shortness of breath [Cough] : no cough [Abdominal Pain] : no abdominal pain [Vomiting] : no vomiting [Joint Pain] : joint pain [Joint Stiffness] : joint stiffness [Skin Rash] : no skin rash [Fainting] : no fainting [Easy Bleeding] : no tendency for easy bleeding [Easy Bruising] : no tendency for easy bruising [Swollen Glands] : no swollen glands [FreeTextEntry4] : no sore throat [FreeTextEntry5] : intermittent mild pedal edema [FreeTextEntry7] : Good appetite, no nausea [FreeTextEntry8] : slight dysuria [FreeTextEntry9] : no bone pain; musculoskeletal stiffness(5/10) [de-identified] : last syncopal episode early October

## 2023-01-12 NOTE — CONSULT LETTER
[Dear  ___] : Dear  [unfilled], [Please see my note below.] : Please see my note below. [Consult Closing:] : Thank you very much for allowing me to participate in the care of this patient.  If you have any questions, please do not hesitate to contact me. [Sincerely,] : Sincerely, [Courtesy Letter:] : I had the pleasure of seeing your patient, [unfilled], in my office today. [FreeTextEntry2] : Bradley Goldberg, M.D.\[facility] [address]\[facility] [place], [place]   [address] [FreeTextEntry3] : \par Darya Echeverria M.D.\par \par  of Medicine\par [facility]\par [facility] \par [facility]\par [address] [place] [address] \[place] [address] \par ph: [phone]\par fax: [phone]

## 2023-01-12 NOTE — ASSESSMENT
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 Cycle 1 of CyBorD was initiated on 4/1/22 while inpatient and she is now C3D20 on 6/15/22.\par \par -Serum SPEP without a monoclonal fraction, Urine SPEP without monoclonal protein, immunofixation normal. \par -However on diagnosis Lambda serum FLCs 8.73 and kappa 0.92, for a ratio of 0.11 (or 9.5). \par -Fat pad biopsy done and was POSITIVE for congo red, with positive polarization. \par -As per discussion with pathology, unable to send this for mass spectrometry to confirm amyloid type. However, now s/p BMBx which is showing ~35% plasmacytosis (monoclonal) c/w plasma cell neoplasm. 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 Is NOT meeting MM criteria. \par -Recent hospitalization noted - patient with hypotension likely due to heart block 2/2 amyloidosis, which confirmed involving heart with MRI. \par -Urine showing proteinuria (although not nephrotic range).\par -Systemic light chain amyloidosis is classically associated with a thickened interventricular septum and proteinuria. Also associated with neurologic sequelae such as that she has. \par -Pt currently being treated for systemic light chain amyloidosis with poncho-CyBorD. Day 1 dose inpatient split, as patient was getting PPM and wanted lower risk of reaction / delays. \par -Will continue daratumumab plus CyBorD which will be given weekly on D 1,8,15,22 of 28 day cycle, Daratumumab will be weekly x 8 followed by every 2 weeks x 8 and then monthly and plan for autologous peripheral blood stem cell transplant. \par \par I had a very lengthy discussion with the patient regarding the diagnosis of AL amyloidosis/plasma cell neoplasm and that this is not a curable disease with standard chemotherapy treatment.  I also discussed that high-dose chemotherapy followed by autologous peripheral stem cell transplant is considered one of the treatment options as part of standard of care for patients less than 70 years of age with newly diagnosed AL amyloidosis/plasma cell neoplasm with good overall performance status and normal vital organ function. I discussed that deep hematologic response rate > 60% and estimated 5 yr survival > 80% has been achieved following high-dose chemotherapy followed by autologous peripheral stem cell transplantation(ERNIE Simpson. Blood Advances, 2020).  I did discuss that early autologous peripheral stem cell transplantation may minimize total exposure to chemotherapy and may lead to an improvement in quality of life.  \par \par In addition, comprehensive discussion regarding acquisition of hematopoietic stem cells for autologous transplantation by apheresis following mobilization with leukocyte growth factor took place. This was followed by discussion regarding hospitalization for high-dose chemotherapy with Melphalan followed by autologous stem cell transplantation.  Potential side effects and toxicities of leukocyte growth factor for mobilization of stem cells as well as subsequent high-dose chemotherapy with Melphalan preparative regimen in the setting of transplant were discussed with review of specific consent forms taking place during consultation visit.  The patient states she has significant symptoms of diarrhea with weight loss and generalized weakness from diarrhea. She required to use bathroom about every 15 minutes during consult visit. She has GI consult in a few weeks. She feels she is unable to proceed with early autologous peripheral stem cell transplantation for her disease at this time as she could not tolerate it.  She also has significant cardiac involvement with complications secondary to AL amyloidosis. I will contact her Cardiologist to discuss her cardiac involvement to determine if she is a candidate for autoPBSCT.  She will be scheduled for standard pre-transplant testing if she is found to be transplant eligible. \par \par I have had an extensive discussion with the patient regarding the risks, benefits and alternatives to high-dose chemotherapy and autologous peripheral blood stem cell transplantation.   We discussed stem cell mobilization with leukocyte growth factor.  My goal would be to collect 5 to 10 x 10^6 CD34 cells per kilo.  This would be enough for two transplants if at some point a second transplant be deemed necessary.   A preparative regimen including Melphalan 200 mg/m2 was discussed.  Risks including but not limited to infection, bleeding, end organ damage, secondary malignancy, venoocclusive disease and mortality were discussed. Arrangements will be made for the patient to attend our transplant orientation meeting.   Venoocclusive disease and infection prophylaxis and the use of Kepivance to help prevent mucositis were discussed.  Literature and consents have been provided for review.   \par \par The introduction of ASCT represented a major step forward in the treatment of AL amyloidosis.\par In transplant eligible patients, the hematologic response rate to ASCT exceeds 70%, with 35% to 37% of patients obtaining CR.  The [facility] investigators updated their experience with ASCT.  With a median follow-up of 8 years, the overall median survival was 7.6 years. Remarkably, about 55% of patients in CR are projected to be alive at 14 years, with no deaths observed in patients with longer follow-up.(\par MAGO’Darwin A, J Clin Oncol, 2015); ( Isabelle DAN, Blood, 2015)\par Bortezomib can be used in subjects who fail to achieve CR after ASCT, increasing the CR rate to almost 60%.\par (Landau H,Leukemia, 2013). \par \par 01/12/23:\par Pt is s/p treatment for systemic light chain amyloidosis with poncho-CyBorD, completed 7 cycles in October 2022 with an excellent biochemical response. \par -Will continue at this point with maintenance with QOW Velcade and monthly daratumumab. \par -Still with symptoms of diarrhea and poor taste - likely residual from amyloid deposition. Taking Lomotil. Following up with GI. \par Her next chemotherapy is on 1/24/23 and she has office visit with hematologist. \par Plan- \par Today, I rediscussed acquisition of hematopoietic stem cells for autologous transplantation by apheresis following mobilization with leukocyte growth factor. The patient will review consent form again at home and will sign it during followup office visit after all questions are addressed. \par Will schedule pre-transplant testing; I will speak to her Cardiologist and Gastroenterologist. \par I reviewed lab studies from 12/27/22 with patient.\par \par

## 2023-01-13 ENCOUNTER — NON-APPOINTMENT (OUTPATIENT)
Age: 58
End: 2023-01-13

## 2023-01-24 ENCOUNTER — RESULT REVIEW (OUTPATIENT)
Age: 58
End: 2023-01-24

## 2023-01-24 ENCOUNTER — APPOINTMENT (OUTPATIENT)
Dept: INFUSION THERAPY | Facility: HOSPITAL | Age: 58
End: 2023-01-24

## 2023-01-24 ENCOUNTER — APPOINTMENT (OUTPATIENT)
Dept: HEMATOLOGY ONCOLOGY | Facility: CLINIC | Age: 58
End: 2023-01-24
Payer: MEDICAID

## 2023-01-24 VITALS
SYSTOLIC BLOOD PRESSURE: 115 MMHG | TEMPERATURE: 97.5 F | OXYGEN SATURATION: 99 % | HEART RATE: 80 BPM | WEIGHT: 106 LBS | RESPIRATION RATE: 16 BRPM | DIASTOLIC BLOOD PRESSURE: 70 MMHG | BODY MASS INDEX: 18.78 KG/M2

## 2023-01-24 LAB
BASOPHILS # BLD AUTO: 0.05 K/UL — SIGNIFICANT CHANGE UP (ref 0–0.2)
BASOPHILS NFR BLD AUTO: 1.1 % — SIGNIFICANT CHANGE UP (ref 0–2)
EOSINOPHIL # BLD AUTO: 0.12 K/UL — SIGNIFICANT CHANGE UP (ref 0–0.5)
EOSINOPHIL NFR BLD AUTO: 2.7 % — SIGNIFICANT CHANGE UP (ref 0–6)
HCT VFR BLD CALC: 33.1 % — LOW (ref 34.5–45)
HGB BLD-MCNC: 10.7 G/DL — LOW (ref 11.5–15.5)
IMM GRANULOCYTES NFR BLD AUTO: 0.2 % — SIGNIFICANT CHANGE UP (ref 0–0.9)
LYMPHOCYTES # BLD AUTO: 0.91 K/UL — LOW (ref 1–3.3)
LYMPHOCYTES # BLD AUTO: 20.4 % — SIGNIFICANT CHANGE UP (ref 13–44)
MCHC RBC-ENTMCNC: 32.1 PG — SIGNIFICANT CHANGE UP (ref 27–34)
MCHC RBC-ENTMCNC: 32.3 G/DL — SIGNIFICANT CHANGE UP (ref 32–36)
MCV RBC AUTO: 99.4 FL — SIGNIFICANT CHANGE UP (ref 80–100)
MONOCYTES # BLD AUTO: 0.4 K/UL — SIGNIFICANT CHANGE UP (ref 0–0.9)
MONOCYTES NFR BLD AUTO: 8.9 % — SIGNIFICANT CHANGE UP (ref 2–14)
NEUTROPHILS # BLD AUTO: 2.98 K/UL — SIGNIFICANT CHANGE UP (ref 1.8–7.4)
NEUTROPHILS NFR BLD AUTO: 66.7 % — SIGNIFICANT CHANGE UP (ref 43–77)
NRBC # BLD: 0 /100 WBCS — SIGNIFICANT CHANGE UP (ref 0–0)
PLATELET # BLD AUTO: 220 K/UL — SIGNIFICANT CHANGE UP (ref 150–400)
RBC # BLD: 3.33 M/UL — LOW (ref 3.8–5.2)
RBC # FLD: 11.4 % — SIGNIFICANT CHANGE UP (ref 10.3–14.5)
WBC # BLD: 4.47 K/UL — SIGNIFICANT CHANGE UP (ref 3.8–10.5)
WBC # FLD AUTO: 4.47 K/UL — SIGNIFICANT CHANGE UP (ref 3.8–10.5)

## 2023-01-24 PROCEDURE — 99214 OFFICE O/P EST MOD 30 MIN: CPT

## 2023-01-24 NOTE — REVIEW OF SYSTEMS
No No 18-Jan-2023 No [Fatigue] : fatigue [Diarrhea] : diarrhea [Dysuria] : dysuria [Joint Stiffness] : joint stiffness [Difficulty Walking] : difficulty walking [Anxiety] : anxiety [Negative] : Heme/Lymph [Fever] : no fever [Chills] : no chills [Night Sweats] : no night sweats [Recent Change In Weight] : ~T no recent weight change [Vision Problems] : no vision problems [Dysphagia] : no dysphagia [Nosebleeds] : no nosebleeds [Odynophagia] : no odynophagia [Chest Pain] : no chest pain [Palpitations] : no palpitations [Lower Ext Edema] : no lower extremity edema [Abdominal Pain] : no abdominal pain [Vomiting] : no vomiting [Constipation] : no constipation [Incontinence] : no incontinence [Joint Pain] : no joint pain [Confused] : no confusion [Dizziness] : no dizziness [Fainting] : no fainting [Insomnia] : no insomnia [Hot Flashes] : no hot flashes [Muscle Weakness] : no muscle weakness [FreeTextEntry8] : (+) cloudy urine [FreeTextEntry9] : in hands [de-identified] : (+) neuropathy in feet

## 2023-01-24 NOTE — HISTORY OF PRESENT ILLNESS
[Therapy: ___] : Therapy: [unfilled] [Cycle: ___] : Cycle: [unfilled] [Day: ___] : Day: [unfilled] [de-identified] : 56 y/o F with hx of thyroidectomy in 2011 (benign pathology) on thyroid replacement since then, and with COVID pneumonia back in March of 2020 ultimately signed herself out AMA, chronic issues thereafter, ended up going back to work in July 2020. In January-February 2021 had Pfizer vaccines. Started having issues starting in March 2021, she was having GI issues, endoscopy showed chronic gastritis. Additionally diagnosed with IBS at that point. She has been having issues with Postural Orthostatic Tachycardia Syndrome - her blood pressure goes down when she stands up from a seated position. She has fainted "too many times to count." She ends up with bruises here and there but nothing severe where she would need to go to the hospital. She saw two neurologists who did extensive evaluations without any success in finding diagnostic lesions. She reports she is seeing specialist for dysautonomia, Dr. Colón at [facility] (neurology). She said overall her POTS has improved but she has a had a couple of episodes of fainting in the last couple of weeks. She started fludrocortisone in September 2021 and has titrated up the dose. She is now on 1 mg total (probably 0.5 mg twice daily). The only way she feels completely normal is when she is laying down. BP gets very low on standing up, it has registered as low as 50s over 40s. She reports when she is standing or sitting for too long, she will get a chest tightness, but not quite a pain. It gets to the point where she feels like she is having trouble breathing and then she has to sit down. She has muscle aches in her legs which improves with warm bath, and then some burning pain in her arms which reminds her of a sunburn. She also now is having issues with her mouth - very difficult to eat as it feels like she has burnt her mouth. She has lost 47 pounds since March 2021. She is not really able to get much down due to these feelings, although she does have an appetite. She denies any night-sweats, she does always feel cold though. Her bowel movements "swing" from constipation to diarrhea. She was on Linzess recently but was taken off it. She reports that she is urinating normally, in fact a lot because she drinks a fair amount of water daily. She has no swelling in her legs at this time. She had in the past at work when she was standing a lot and was on amlodipine. She was sent to me for evaluation of elevated free light chains. 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 She was found with no monoclonal protein on SPEP and hemoglobin normal, normal renal function, and no hypercalcemia. Fat pad biopsy showed positive congo red staining in rare blood vessels and positive for polarization. This is concerning for primary light chain amyloidosis.\par \par In March 2022 patient was admitted to [facility] for multiple recurrent syncopal episodes. While admitted course was complicated by multiple unresponsive episodes prompting RRTs, during which pt found to be hypotensive and bradycardic. She initially required levophed for BP support but ultimately BP remained stable while on Midodrine 20mg q8 and Fludrocortisone 0.2mg q24. Pt noted to intermittently have sinus pauses (2-3.8 seconds) thought to be 2/2 vagal episodes, and asymptomatic bradycardia overnight on tele. ECG noted NSR but 1st degree AV block. TTE 3/16/22 showed normal LV size and function with EF 55%-60%. No regional wall motion abnormalities. Moderate concentric LVH. \par \par Pt also started on oral vanco for C.diff and completed ceftriaxone for UTI. \par \par Ultimately transferred to [facility] for consideration of inpatient chemotherapy. \par PPM placement recommended, as cardiac MRI showed evidence of amyloid involvement. Pt had another RRT for hypotension and symptomatic bradycardia to 30s, was on pressor support until she received a dual chamber PPM. Pt also continued her C.diff treatment and chemotherapy was placed on hold until the diarrhea cleared. She was then started on Daratumumab  + CyBorD inpatient, and tolerated it well ; she completed 2 treatments of chemotherapy inpatient.\par \par Patient has continued with this regimen now as outpatient. She did continue to have episodes of diarrhea since previous C. Diff infection in March. On follow up 4/18 she noticed that her urine was red in color and after drinking water it became more orange. Treated with antibiotics for UTI and evaluated by urology. Cytoxan was held for a time period, and ultimately this cleared with antibiotics. \par \par **Patient is still limited in her functional status due to diarrhea, but she is able to perform most of her ADLs independently. She is having diarrhea still multiple times daily (probably on average 5 times a day) and at times it is watery although not every time. She denies any abdominal pain or cramps associated with this. She reported a robust appetite but she cannot eat a lot because she has a bad taste in her mouth. Denied any dyspnea or chest pain, and her neuropathy is improving to her judgement. She is still taking midodrine 20 mg TID. AM Cortisol level recently checked and normal. Denied fevers or night chills. She is planned for follow up on 9/1 with GI - multiple medical interventions without any success to this point. \par \par Of note, patient reported that she is NOT interested in hematopoietic transplant at this time but she is interested in collecting her stem cells for a later date.  [FreeTextEntry1] : Kleber + CyBorD started on 4/8/2022, held cytoxan from 4/18-5/12 for hematuria, held again 6/24 for worsening diarrhea [de-identified] : Patient seen for follow up on 01/24/2023. Diarrhea maybe slightly improved, some days only once or twice. Taste is also improving. \par On acupuncture, helping her neuropathy.

## 2023-01-24 NOTE — PHYSICAL EXAM
[Ambulatory and capable of all self care but unable to carry out any work activities] : Status 2- Ambulatory and capable of all self care but unable to carry out any work activities. Up and about more than 50% of waking hours [Thin] : thin [Normal] : affect appropriate [de-identified] : supple [de-identified] : (+)S1S2 RRR [de-identified] : (+) varicosities [de-identified] : no pain [de-identified] : warm/dry (+) small scab on right outer thigh

## 2023-01-24 NOTE — ASSESSMENT
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 Cycle 1 of CyBorD was initiated on 4/1/22 Completed 7 full cycles on 10/28/22. \par \par -Serum SPEP without a monoclonal fraction, Urine SPEP without monoclonal protein, immunofixation normal. \par -However on diagnosis Lambda serum FLCs 8.73 and kappa 0.92, for a ratio of 0.11 (or 9.5). \par -Fat pad biopsy done and was POSITIVE for congo red, with positive polarization. \par -As per discussion with pathology, unable to send this for mass spectrometry to confirm amyloid type. However, now s/p BMBx which is showing ~35% plasmacytosis (monoclonal) c/w plasma cell neoplasm. 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 It does NOT meeting MM criteria. 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 \par -Urine showing proteinuria on diagnosis (although not nephrotic range).\par -Systemic light chain amyloidosis is classically associated with a thickened interventricular septum and proteinuria. Also associated with neurologic sequelae such as that she has. \par -Pt is s/p treatment for systemic light chain amyloidosis with poncho-CyBorD, completed 7 cycles in October 2022 with an excellent biochemical response. \par -Will continue at this point with maintenance with QOW Velcade and monthly daratumumab. \par -Still with symptoms of diarrhea and poor taste - likely residual from amyloid deposition. Taking Lomotil. Following up with GI. This has minimally improved with time, but consistently and managed well with supportive medications. \par -To plan for collection of stem cells, however undecided on transplant right now. On further discussion patient is even undecided on collection of stem cells, as requiring growth factor gets her anxious. Reassured her that growth factor shouldn't affect her plasmacytosis. On continued discussion,patient will be planned on bone marrow biopsy prior to collection. \par -Continue follow up with Cardiology and PCP.\par -Will continue to monitor diarrhea, as this has not resolved at all to this point. GI following. Patient self discontinued Questran as this was not working. Now s/p EGD colonoscopy on 9/13/22 with Dr. Holloway which showed evidence of significant amyloid involvement. At this point will continue with supportive care as per GI which has been more effective with time. \par -Patient understands and agrees with plan. All information explained to the best of my ability.\par -RTC 1 month.

## 2023-01-26 ENCOUNTER — APPOINTMENT (OUTPATIENT)
Dept: ELECTROPHYSIOLOGY | Facility: CLINIC | Age: 58
End: 2023-01-26
Payer: MEDICAID

## 2023-01-26 ENCOUNTER — NON-APPOINTMENT (OUTPATIENT)
Age: 58
End: 2023-01-26

## 2023-01-26 PROCEDURE — 93296 REM INTERROG EVL PM/IDS: CPT

## 2023-01-26 PROCEDURE — 93294 REM INTERROG EVL PM/LDLS PM: CPT

## 2023-01-27 ENCOUNTER — APPOINTMENT (OUTPATIENT)
Dept: ELECTROPHYSIOLOGY | Facility: CLINIC | Age: 58
End: 2023-01-27

## 2023-01-27 LAB
ALBUMIN MFR SERPL ELPH: 66.1 %
ALBUMIN SERPL ELPH-MCNC: 4.1 G/DL
ALBUMIN SERPL-MCNC: 3.8 G/DL
ALBUMIN/GLOB SERPL: 1.9 RATIO
ALP BLD-CCNC: 69 U/L
ALPHA1 GLOB MFR SERPL ELPH: 4.8 %
ALPHA1 GLOB SERPL ELPH-MCNC: 0.3 G/DL
ALPHA2 GLOB MFR SERPL ELPH: 11.5 %
ALPHA2 GLOB SERPL ELPH-MCNC: 0.7 G/DL
ALT SERPL-CCNC: 24 U/L
ANION GAP SERPL CALC-SCNC: 10 MMOL/L
AST SERPL-CCNC: 19 U/L
B-GLOBULIN MFR SERPL ELPH: 11.4 %
B-GLOBULIN SERPL ELPH-MCNC: 0.7 G/DL
BILIRUB SERPL-MCNC: 0.5 MG/DL
BUN SERPL-MCNC: 11 MG/DL
CALCIUM SERPL-MCNC: 9.5 MG/DL
CHLORIDE SERPL-SCNC: 105 MMOL/L
CO2 SERPL-SCNC: 25 MMOL/L
CREAT SERPL-MCNC: 0.5 MG/DL
DEPRECATED KAPPA LC FREE/LAMBDA SER: 0.56 RATIO
EGFR: 109 ML/MIN/1.73M2
GAMMA GLOB FLD ELPH-MCNC: 0.4 G/DL
GAMMA GLOB MFR SERPL ELPH: 6.2 %
GLUCOSE SERPL-MCNC: 95 MG/DL
IGA SER QL IEP: 22 MG/DL
IGG SER QL IEP: 421 MG/DL
IGM SER QL IEP: <10 MG/DL
INTERPRETATION SERPL IEP-IMP: NORMAL
KAPPA LC CSF-MCNC: 1.09 MG/DL
KAPPA LC SERPL-MCNC: 0.61 MG/DL
M PROTEIN MFR SERPL ELPH: 1.8 %
M PROTEIN SPEC IFE-MCNC: NORMAL
MONOCLON BAND OBS SERPL: 0.1 G/DL
POTASSIUM SERPL-SCNC: 4.5 MMOL/L
PROT SERPL-MCNC: 5.8 G/DL
SODIUM SERPL-SCNC: 141 MMOL/L

## 2023-01-30 ENCOUNTER — OUTPATIENT (OUTPATIENT)
Dept: OUTPATIENT SERVICES | Facility: HOSPITAL | Age: 58
LOS: 1 days | Discharge: HOME | End: 2023-01-30

## 2023-01-30 DIAGNOSIS — D47.2 MONOCLONAL GAMMOPATHY: ICD-10-CM

## 2023-02-06 ENCOUNTER — RESULT REVIEW (OUTPATIENT)
Age: 58
End: 2023-02-06

## 2023-02-06 ENCOUNTER — APPOINTMENT (OUTPATIENT)
Dept: INFUSION THERAPY | Facility: HOSPITAL | Age: 58
End: 2023-02-06

## 2023-02-06 DIAGNOSIS — R11.2 NAUSEA WITH VOMITING, UNSPECIFIED: ICD-10-CM

## 2023-02-06 DIAGNOSIS — Z51.11 ENCOUNTER FOR ANTINEOPLASTIC CHEMOTHERAPY: ICD-10-CM

## 2023-02-06 LAB
BASOPHILS # BLD AUTO: 0.04 K/UL — SIGNIFICANT CHANGE UP (ref 0–0.2)
BASOPHILS NFR BLD AUTO: 0.8 % — SIGNIFICANT CHANGE UP (ref 0–2)
EOSINOPHIL # BLD AUTO: 0.08 K/UL — SIGNIFICANT CHANGE UP (ref 0–0.5)
EOSINOPHIL NFR BLD AUTO: 1.5 % — SIGNIFICANT CHANGE UP (ref 0–6)
HCT VFR BLD CALC: 32.5 % — LOW (ref 34.5–45)
HGB BLD-MCNC: 10.9 G/DL — LOW (ref 11.5–15.5)
IMM GRANULOCYTES NFR BLD AUTO: 0.6 % — SIGNIFICANT CHANGE UP (ref 0–0.9)
LYMPHOCYTES # BLD AUTO: 0.85 K/UL — LOW (ref 1–3.3)
LYMPHOCYTES # BLD AUTO: 16 % — SIGNIFICANT CHANGE UP (ref 13–44)
MCHC RBC-ENTMCNC: 32 PG — SIGNIFICANT CHANGE UP (ref 27–34)
MCHC RBC-ENTMCNC: 33.5 G/DL — SIGNIFICANT CHANGE UP (ref 32–36)
MCV RBC AUTO: 95.3 FL — SIGNIFICANT CHANGE UP (ref 80–100)
MONOCYTES # BLD AUTO: 0.44 K/UL — SIGNIFICANT CHANGE UP (ref 0–0.9)
MONOCYTES NFR BLD AUTO: 8.3 % — SIGNIFICANT CHANGE UP (ref 2–14)
NEUTROPHILS # BLD AUTO: 3.87 K/UL — SIGNIFICANT CHANGE UP (ref 1.8–7.4)
NEUTROPHILS NFR BLD AUTO: 72.8 % — SIGNIFICANT CHANGE UP (ref 43–77)
NRBC # BLD: 0 /100 WBCS — SIGNIFICANT CHANGE UP (ref 0–0)
PLATELET # BLD AUTO: 155 K/UL — SIGNIFICANT CHANGE UP (ref 150–400)
RBC # BLD: 3.41 M/UL — LOW (ref 3.8–5.2)
RBC # FLD: 11.7 % — SIGNIFICANT CHANGE UP (ref 10.3–14.5)
WBC # BLD: 5.31 K/UL — SIGNIFICANT CHANGE UP (ref 3.8–10.5)
WBC # FLD AUTO: 5.31 K/UL — SIGNIFICANT CHANGE UP (ref 3.8–10.5)

## 2023-02-07 ENCOUNTER — APPOINTMENT (OUTPATIENT)
Dept: CARDIOLOGY | Facility: CLINIC | Age: 58
End: 2023-02-07
Payer: MEDICAID

## 2023-02-07 PROCEDURE — 99215 OFFICE O/P EST HI 40 MIN: CPT | Mod: 95

## 2023-02-07 NOTE — REASON FOR VISIT
[Other: ____] : [unfilled] [FreeTextEntry1] : February 2023 - \par TeleHealth Video Encounter\par Initiated by: Patient election for TeleHealth visit\par Patient was consented for TeleHealth visit\par Patient Location: Home\par Physician Location: Office ([address] [place] [address], [address], [place], [place], [address])\par Duration of Encounter: 40 minutes, at least 50% of which was spent in direct counseling and coordination of care.\par  \par She is currently on maintenance chemo with Velcade every other week and daratumumab monthly. \par She is currently maintained on midodrine TID (15 mg, 15 mg, and 10 mg).\par She continues to have chronic diarrhea and chronic UTI. These are being managed. The diarrhea sometimes drops her blood pressure, but she is sensitive to it and adjust fluid intake accordingly. \par She gets occasional pinching in her chest that lasts 10-15 seconds and then resolves. It is not exertional.\par The lower extremity edema is much better.

## 2023-02-07 NOTE — HISTORY OF PRESENT ILLNESS
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 \par \par Lower extremity edema up to her waist. \par Patient continues to take fludocortisone 0.2 mg daily and midodrine 20 mg TID\par \par June 2022 - Patient returns today for follow-up.\par She has weaned herself off of the fludrocortisone. She now only gets orthostatic hypotension in association with frequent diarrhea. She has non-bloody, watery diarrhea up to 10 times per day. Some days, it is well controlled with Imodium, but she cannot remember the last well formed bowel movement. \par She continues to take midodrine 20 mg TID.\par She continues Cy-Bor-D plus daratumumab with Dr. Goldberg. \par \par August 2022 - TeleHealth Video Encounter\par Initiated by: Patient election for TeleHealth visit\par Patient was consented for TeleHealth visit\par Patient Location: Home\par Physician Location: Office ([address] [place] [address], [address], [place], [place], [address])\par Duration of Encounter: 40 minutes, at least 50% of which was spent in direct counseling and coordination of care.\par  \par Continues to have diarrhea. Will follow-up with GI (Dr. Holloway). She continues to have orthostatic symptoms related to her diarrhea. She notes it the most with frequent diarrhea. \par She is off of fludocortisone. She takes midodrine 20 mg TID. \par The swelling in her legs is only a problem when she sits for too long. When she walks, her legs are better.\par \par November 2022 - Patient returns today for follow-up of her light chain amyloidosis. \par She has completed Cytoxan. She continues to receive Velcade, poncho, and dexamethasone. \par Her lower extremity edema is markedly improved. She believes this is a result of a combination of being off the fludrocortisone, more walking, and her current diuretics. \par She continues to have diarrhea, and her biopsies of the GI tract suggested involvement of her amyloidosis. Her GI symptoms are somewhat improved by reducing fiber and stopping her Cytoxan.

## 2023-02-07 NOTE — DISCUSSION/SUMMARY
[FreeTextEntry1] : 57 year-old woman with light chain amyloidosis, orthostatic hypotension requiring fludrocortisone and midodrine, now started on Cy-Bor-D plus daratumumab, with lower extremity edema, likely secondary to fludrocortisone, presents today or follow-up after recent hospitalization (March 2022).\par Myleoma therapies per hematology. She has completed Cytoxan and remains on Velcade, dexamethasone, and poncho. \par Now off of fludocortisone, and her lower extremity edema is much better. \par She is going to reduce her midodrine to 15, 10, and 10 mg and see how she feels. \par \par Encouraged trying Green tea for EGCG\par \par Follow-up with GI regarding diarrhea.\par Follow-up with GI and hematology regarding elevated LFTs.\par \par Encouraged ambulation as tolerated.\par Elevate the legs when at rest.

## 2023-02-07 NOTE — CARDIOLOGY SUMMARY
[de-identified] : 7/27/2022, paced at 70 bpm [de-identified] : 3/30/2022, septum 1.2 cm, PWT 1.2 cm, moderately dilated LA, normal LV systolic function, average GLS -14.6%, pattern is\par homogeneous (i.e. not suggestive of amyloid), LVEF 60%\par \par 7/27/2022, septum 1.2 cm, PWT 1.3 cm, mildly dilated LA, normal LV systolic function, average GLS - 15%, LVEF 60-65%\par \par 11/1/2022, septum 1.4 cm, PWT 1.3 cm, dilated LA, low normal LV systolic function, average GLS - 12%, LVEF 50-55% [de-identified] : 3/28/2022, Small pericardial and bilateral pleural effusions. Subtle inferior lateral basal myocardial wall with gadolinium enhancement.  Biatrial enlargement with associated late gadolinium enhancement. These findings may represent amyloidosis. [de-identified] : 3/30/2022 Medtronic Margarita XT DR MRI dual chamber PPM implanted by Hasmukh Villarreal MD, PhD

## 2023-02-08 ENCOUNTER — LABORATORY RESULT (OUTPATIENT)
Age: 58
End: 2023-02-08

## 2023-02-08 ENCOUNTER — APPOINTMENT (OUTPATIENT)
Dept: HEMATOLOGY ONCOLOGY | Facility: CLINIC | Age: 58
End: 2023-02-08
Payer: MEDICAID

## 2023-02-08 VITALS
SYSTOLIC BLOOD PRESSURE: 116 MMHG | HEART RATE: 75 BPM | BODY MASS INDEX: 18.55 KG/M2 | RESPIRATION RATE: 16 BRPM | DIASTOLIC BLOOD PRESSURE: 77 MMHG | WEIGHT: 104.72 LBS | OXYGEN SATURATION: 99 % | TEMPERATURE: 98.1 F

## 2023-02-08 PROCEDURE — 38221 DX BONE MARROW BIOPSIES: CPT | Mod: RT

## 2023-02-08 NOTE — PROCEDURE
[Bone Marrow Biopsy] : bone marrow biopsy [Bone Marrow Aspiration] : bone marrow aspiration  [Patient] : the patient [Verbal Consent Obtained] : verbal consent was obtained prior to the procedure [Patient identification verified] : patient identification verified [Procedure verified and consent obtained] : procedure verified and consent obtained [Laterality verified and correct site marked] : laterality verified and correct site marked [Right] : site: right [Correct positioning] : correct positioning [Correct implant and/ or special equipment obtained] : correct impact and/ or special equipment obtained [Prone] : prone [Superior iliac spine was identified] : the superior iliac spine was identified. [The right posterior iliac crest was prepped with betadine and draped, using sterile technique.] : The right posterior iliac crest was prepped with betadine and draped, using sterile technique. [Lidocaine was injected and into the periosteum overlying the site.] : Lidocaine was injected and into the periosteum overlying the site. [Aspirate] : aspirate [Cytogenetics] : cytogenetics [FISH] : FISH [Biopsy] : biopsy [Flow Cytometry] : flow cytometry [] : The patient was instructed to remove the bandage the following AM. The patient may bathe. Acetaminophen may be taken for discomfort, as per package directions.If there are any other problems, the patient was instructed to call the office. The patient verbalized understanding, and is aware of the office contact numbers. [FreeTextEntry1] : 58 yo F with PMHx of Amyloid s/p treatment now on maintenance evaluating for POD [FreeTextEntry2] : WBC: 5.31\par Hgb: 10.9\par PLT: 155K\par \par Bone marrow biopsy and aspiration. Patient tolerated procedure well.

## 2023-02-08 NOTE — REASON FOR VISIT
[Bone Marrow Biopsy] : bone marrow biopsy [Bone Marrow Aspiration] : bone marrow aspiration [Spouse] : spouse [FreeTextEntry2] : 58 yo F with PMHx of Amyloid s/p treatment now on maintenance evaluating for POD

## 2023-02-11 NOTE — END OF VISIT
[FreeTextEntry3] : Johnson Fonseca acting as a scribe for Dr. Goldberg 11/28/2022. 
SCM under last name Reinier/immune

## 2023-02-16 ENCOUNTER — NON-APPOINTMENT (OUTPATIENT)
Age: 58
End: 2023-02-16

## 2023-02-16 DIAGNOSIS — Z01.818 ENCOUNTER FOR OTHER PREPROCEDURAL EXAMINATION: ICD-10-CM

## 2023-02-21 ENCOUNTER — APPOINTMENT (OUTPATIENT)
Dept: HEMATOLOGY ONCOLOGY | Facility: CLINIC | Age: 58
End: 2023-02-21
Payer: MEDICAID

## 2023-02-21 ENCOUNTER — APPOINTMENT (OUTPATIENT)
Dept: INFUSION THERAPY | Facility: HOSPITAL | Age: 58
End: 2023-02-21

## 2023-02-21 ENCOUNTER — TRANSCRIPTION ENCOUNTER (OUTPATIENT)
Age: 58
End: 2023-02-21

## 2023-02-21 ENCOUNTER — RESULT REVIEW (OUTPATIENT)
Age: 58
End: 2023-02-21

## 2023-02-21 LAB
APPEARANCE: ABNORMAL
BACTERIA: ABNORMAL
BASOPHILS # BLD AUTO: 0.02 K/UL — SIGNIFICANT CHANGE UP (ref 0–0.2)
BASOPHILS NFR BLD AUTO: 0.5 % — SIGNIFICANT CHANGE UP (ref 0–2)
BILIRUBIN URINE: NEGATIVE
BLOOD URINE: ABNORMAL
CALCIUM OXALATE CRYSTALS: ABNORMAL
COLOR: NORMAL
EOSINOPHIL # BLD AUTO: 0.13 K/UL — SIGNIFICANT CHANGE UP (ref 0–0.5)
EOSINOPHIL NFR BLD AUTO: 3.3 % — SIGNIFICANT CHANGE UP (ref 0–6)
GLUCOSE QUALITATIVE U: NEGATIVE
HCT VFR BLD CALC: 30.8 % — LOW (ref 34.5–45)
HGB BLD-MCNC: 10.2 G/DL — LOW (ref 11.5–15.5)
HYALINE CASTS: 0 /LPF
IMM GRANULOCYTES NFR BLD AUTO: 0.3 % — SIGNIFICANT CHANGE UP (ref 0–0.9)
KETONES URINE: NEGATIVE
LEUKOCYTE ESTERASE URINE: ABNORMAL
LYMPHOCYTES # BLD AUTO: 0.69 K/UL — LOW (ref 1–3.3)
LYMPHOCYTES # BLD AUTO: 17.6 % — SIGNIFICANT CHANGE UP (ref 13–44)
MCHC RBC-ENTMCNC: 31.8 PG — SIGNIFICANT CHANGE UP (ref 27–34)
MCHC RBC-ENTMCNC: 33.1 G/DL — SIGNIFICANT CHANGE UP (ref 32–36)
MCV RBC AUTO: 96 FL — SIGNIFICANT CHANGE UP (ref 80–100)
MICROSCOPIC-UA: NORMAL
MONOCYTES # BLD AUTO: 0.42 K/UL — SIGNIFICANT CHANGE UP (ref 0–0.9)
MONOCYTES NFR BLD AUTO: 10.7 % — SIGNIFICANT CHANGE UP (ref 2–14)
NEUTROPHILS # BLD AUTO: 2.64 K/UL — SIGNIFICANT CHANGE UP (ref 1.8–7.4)
NEUTROPHILS NFR BLD AUTO: 67.6 % — SIGNIFICANT CHANGE UP (ref 43–77)
NITRITE URINE: NEGATIVE
NRBC # BLD: 0 /100 WBCS — SIGNIFICANT CHANGE UP (ref 0–0)
PH URINE: 6
PLATELET # BLD AUTO: 164 K/UL — SIGNIFICANT CHANGE UP (ref 150–400)
PROTEIN URINE: NEGATIVE
RBC # BLD: 3.21 M/UL — LOW (ref 3.8–5.2)
RBC # FLD: 11.7 % — SIGNIFICANT CHANGE UP (ref 10.3–14.5)
RED BLOOD CELLS URINE: 5 /HPF
SPECIFIC GRAVITY URINE: 1.02
SQUAMOUS EPITHELIAL CELLS: 2 /HPF
UROBILINOGEN URINE: NORMAL
WBC # BLD: 3.91 K/UL — SIGNIFICANT CHANGE UP (ref 3.8–10.5)
WBC # FLD AUTO: 3.91 K/UL — SIGNIFICANT CHANGE UP (ref 3.8–10.5)
WHITE BLOOD CELLS URINE: >720 /HPF

## 2023-02-21 PROCEDURE — 99214 OFFICE O/P EST MOD 30 MIN: CPT

## 2023-02-22 LAB
ALBUMIN SERPL ELPH-MCNC: 4.2 G/DL
ALP BLD-CCNC: 75 U/L
ALT SERPL-CCNC: 19 U/L
ANION GAP SERPL CALC-SCNC: 17 MMOL/L
AST SERPL-CCNC: 19 U/L
BACTERIA UR CULT: ABNORMAL
BILIRUB SERPL-MCNC: 0.4 MG/DL
BUN SERPL-MCNC: 13 MG/DL
CALCIUM SERPL-MCNC: 9.1 MG/DL
CHLORIDE SERPL-SCNC: 103 MMOL/L
CO2 SERPL-SCNC: 23 MMOL/L
CREAT SERPL-MCNC: 0.53 MG/DL
EGFR: 107 ML/MIN/1.73M2
GLUCOSE SERPL-MCNC: 103 MG/DL
POTASSIUM SERPL-SCNC: 4.7 MMOL/L
PROT SERPL-MCNC: 5.6 G/DL
SODIUM SERPL-SCNC: 142 MMOL/L

## 2023-02-22 RX ORDER — NITROFURANTOIN MACROCRYSTALS 100 MG/1
100 CAPSULE ORAL
Qty: 90 | Refills: 1 | Status: DISCONTINUED | COMMUNITY
Start: 2023-01-10 | End: 2023-02-22

## 2023-02-23 ENCOUNTER — TRANSCRIPTION ENCOUNTER (OUTPATIENT)
Age: 58
End: 2023-02-23

## 2023-02-23 LAB — NT-PROBNP SERPL-MCNC: 2220 PG/ML

## 2023-02-24 ENCOUNTER — TRANSCRIPTION ENCOUNTER (OUTPATIENT)
Age: 58
End: 2023-02-24

## 2023-02-24 LAB
ALBUMIN MFR SERPL ELPH: 64.4 %
ALBUMIN SERPL ELPH-MCNC: 3.9 G/DL
ALBUMIN SERPL-MCNC: 3.7 G/DL
ALBUMIN/GLOB SERPL: 1.8 RATIO
ALP BLD-CCNC: 73 U/L
ALPHA1 GLOB MFR SERPL ELPH: 5.6 %
ALPHA1 GLOB SERPL ELPH-MCNC: 0.3 G/DL
ALPHA2 GLOB MFR SERPL ELPH: 12.2 %
ALPHA2 GLOB SERPL ELPH-MCNC: 0.7 G/DL
ALT SERPL-CCNC: 22 U/L
ANION GAP SERPL CALC-SCNC: 10 MMOL/L
AST SERPL-CCNC: 17 U/L
B-GLOBULIN MFR SERPL ELPH: 11.4 %
B-GLOBULIN SERPL ELPH-MCNC: 0.6 G/DL
BILIRUB SERPL-MCNC: 0.4 MG/DL
BUN SERPL-MCNC: 12 MG/DL
CALCIUM SERPL-MCNC: 9 MG/DL
CHLORIDE SERPL-SCNC: 103 MMOL/L
CO2 SERPL-SCNC: 29 MMOL/L
CREAT SERPL-MCNC: 0.52 MG/DL
DEPRECATED KAPPA LC FREE/LAMBDA SER: 0.52 RATIO
EGFR: 108 ML/MIN/1.73M2
GAMMA GLOB FLD ELPH-MCNC: 0.4 G/DL
GAMMA GLOB MFR SERPL ELPH: 6.4 %
GLUCOSE SERPL-MCNC: 103 MG/DL
IGA SER QL IEP: 19 MG/DL
IGG SER QL IEP: 412 MG/DL
IGM SER QL IEP: <10 MG/DL
INTERPRETATION SERPL IEP-IMP: NORMAL
KAPPA LC CSF-MCNC: 0.99 MG/DL
KAPPA LC SERPL-MCNC: 0.51 MG/DL
M PROTEIN MFR SERPL ELPH: NORMAL
M PROTEIN SPEC IFE-MCNC: NORMAL
MONOCLON BAND OBS SERPL: NORMAL
POTASSIUM SERPL-SCNC: 4.7 MMOL/L
PROT SERPL-MCNC: 5.7 G/DL
SODIUM SERPL-SCNC: 142 MMOL/L

## 2023-02-27 NOTE — HISTORY OF PRESENT ILLNESS
[Therapy: ___] : Therapy: [unfilled] [Cycle: ___] : Cycle: [unfilled] [Day: ___] : Day: [unfilled] [de-identified] : 58 y/o F with hx of thyroidectomy in 2011 (benign pathology) on thyroid replacement since then, and with COVID pneumonia back in March of 2020 ultimately signed herself out AMA, chronic issues thereafter, ended up going back to work in July 2020. In January-February 2021 had Pfizer vaccines. Started having issues starting in March 2021, she was having GI issues, endoscopy showed chronic gastritis. Additionally diagnosed with IBS at that point. She has been having issues with Postural Orthostatic Tachycardia Syndrome - her blood pressure goes down when she stands up from a seated position. She has fainted "too many times to count." She ends up with bruises here and there but nothing severe where she would need to go to the hospital. She saw two neurologists who did extensive evaluations without any success in finding diagnostic lesions. She reports she is seeing specialist for dysautonomia, Dr. Colón at [facility] (neurology). She said overall her POTS has improved but she has a had a couple of episodes of fainting in the last couple of weeks. She started fludrocortisone in September 2021 and has titrated up the dose. She is now on 1 mg total (probably 0.5 mg twice daily). The only way she feels completely normal is when she is laying down. BP gets very low on standing up, it has registered as low as 50s over 40s. She reports when she is standing or sitting for too long, she will get a chest tightness, but not quite a pain. It gets to the point where she feels like she is having trouble breathing and then she has to sit down. She has muscle aches in her legs which improves with warm bath, and then some burning pain in her arms which reminds her of a sunburn. She also now is having issues with her mouth - very difficult to eat as it feels like she has burnt her mouth. She has lost 47 pounds since March 2021. She is not really able to get much down due to these feelings, although she does have an appetite. She denies any night-sweats, she does always feel cold though. Her bowel movements "swing" from constipation to diarrhea. She was on Linzess recently but was taken off it. She reports that she is urinating normally, in fact a lot because she drinks a fair amount of water daily. She has no swelling in her legs at this time. She had in the past at work when she was standing a lot and was on amlodipine. She was sent to me for evaluation of elevated free light chains. 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 She was found with no monoclonal protein on SPEP and hemoglobin normal, normal renal function, and no hypercalcemia. Fat pad biopsy showed positive congo red staining in rare blood vessels and positive for polarization. 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 While admitted course was complicated by multiple unresponsive episodes prompting RRTs, during which pt found to be hypotensive and bradycardic. She initially required levophed for BP support but ultimately BP remained stable while on Midodrine 20mg q8 and Fludrocortisone 0.2mg q24. Pt noted to intermittently have sinus pauses (2-3.8 seconds) thought to be 2/2 vagal episodes, and asymptomatic bradycardia overnight on tele. ECG noted NSR but 1st degree AV block. TTE 3/16/22 showed normal LV size and function with EF 55%-60%. No regional wall motion abnormalities. Moderate concentric LVH. \par \par Pt also started on oral vanco for C.diff and completed ceftriaxone for UTI. \par \par Ultimately transferred to [facility] for consideration of inpatient chemotherapy. \par PPM placement recommended, as cardiac MRI showed evidence of amyloid involvement. Pt had another RRT for hypotension and symptomatic bradycardia to 30s, was on pressor support until she received a dual chamber PPM. Pt also continued her C.diff treatment and chemotherapy was placed on hold until the diarrhea cleared. She was then started on Daratumumab  + CyBorD inpatient, and tolerated it well ; she completed 2 treatments of chemotherapy inpatient.\par \par Patient has continued with this regimen now as outpatient. She did continue to have episodes of diarrhea since previous C. Diff infection in March. On follow up 4/18 she noticed that her urine was red in color and after drinking water it became more orange. Treated with antibiotics for UTI and evaluated by urology. Cytoxan was held for a time period, and ultimately this cleared with antibiotics. \par \par **Patient is still limited in her functional status due to diarrhea, but she is able to perform most of her ADLs independently. She is having diarrhea still multiple times daily (probably on average 5 times a day) and at times it is watery although not every time. She denies any abdominal pain or cramps associated with this. She reported a robust appetite but she cannot eat a lot because she has a bad taste in her mouth. Denied any dyspnea or chest pain, and her neuropathy is improving to her judgement. She is still taking midodrine 20 mg TID. AM Cortisol level recently checked and normal. Denied fevers or night chills. She is planned for follow up on 9/1 with GI - multiple medical interventions without any success to this point. \par \par Of note, patient reported that she is NOT interested in hematopoietic transplant at this time but she is interested in collecting her stem cells for a later date.  [FreeTextEntry1] : Kleber + CyBorD started on 4/8/2022, held cytoxan from 4/18-5/12 for hematuria, held again 6/24 for worsening diarrhea [de-identified] : Patient seen for follow up. Overall she is feeling well. She does have continued UTI symptoms (cloudy urine and at times burning) and has spoke with Urology who has her taking Nitrofuran every other day also recommending she receives a second opinion due to persistent bacteria. Denies any fevers or flank pain. Diarrhea maybe slightly improved, some days only once or twice. Taste is the same, she had experienced burning in her mouth this morning after eating pineapple. After climbing steps (greater than a flight) she may become mildly short of breath. Still considering transplant and stem cell collection. Neuropathy stable. No CP or palpitations.\par

## 2023-02-27 NOTE — REVIEW OF SYSTEMS
[Fatigue] : fatigue [Diarrhea] : diarrhea [Dysuria] : dysuria [Joint Stiffness] : joint stiffness [Difficulty Walking] : difficulty walking [Anxiety] : anxiety [Negative] : Heme/Lymph [Fever] : no fever [Chills] : no chills [Night Sweats] : no night sweats [Recent Change In Weight] : ~T no recent weight change [Vision Problems] : no vision problems [Dysphagia] : no dysphagia [Nosebleeds] : no nosebleeds [Odynophagia] : no odynophagia [Chest Pain] : no chest pain [Palpitations] : no palpitations [Lower Ext Edema] : no lower extremity edema [Abdominal Pain] : no abdominal pain [Vomiting] : no vomiting [Constipation] : no constipation [Incontinence] : no incontinence [Joint Pain] : no joint pain [Confused] : no confusion [Dizziness] : no dizziness [Fainting] : no fainting [Insomnia] : no insomnia [Hot Flashes] : no hot flashes [Muscle Weakness] : no muscle weakness [FreeTextEntry7] : improved [FreeTextEntry8] : (+) cloudy urine [FreeTextEntry9] : in hands [de-identified] : (+) neuropathy in feet

## 2023-02-27 NOTE — PHYSICAL EXAM
[Ambulatory and capable of all self care but unable to carry out any work activities] : Status 2- Ambulatory and capable of all self care but unable to carry out any work activities. Up and about more than 50% of waking hours [Thin] : thin [Normal] : affect appropriate [Ulcers] : no ulcers [Mucositis] : no mucositis [Thrush] : no thrush [Vesicles] : no vesicles [de-identified] : (+) scalloping  [de-identified] : supple [de-identified] : (+)S1S2 RRR [de-identified] : (+) varicosities [de-identified] : no pain [de-identified] : warm/dry

## 2023-02-28 ENCOUNTER — TRANSCRIPTION ENCOUNTER (OUTPATIENT)
Age: 58
End: 2023-02-28

## 2023-03-06 ENCOUNTER — APPOINTMENT (OUTPATIENT)
Dept: INFUSION THERAPY | Facility: HOSPITAL | Age: 58
End: 2023-03-06

## 2023-03-06 ENCOUNTER — RESULT REVIEW (OUTPATIENT)
Age: 58
End: 2023-03-06

## 2023-03-06 ENCOUNTER — APPOINTMENT (OUTPATIENT)
Dept: PULMONOLOGY | Facility: CLINIC | Age: 58
End: 2023-03-06

## 2023-03-06 LAB
BASOPHILS # BLD AUTO: 0.05 K/UL — SIGNIFICANT CHANGE UP (ref 0–0.2)
BASOPHILS NFR BLD AUTO: 0.7 % — SIGNIFICANT CHANGE UP (ref 0–2)
EOSINOPHIL # BLD AUTO: 0.08 K/UL — SIGNIFICANT CHANGE UP (ref 0–0.5)
EOSINOPHIL NFR BLD AUTO: 1.1 % — SIGNIFICANT CHANGE UP (ref 0–6)
HCT VFR BLD CALC: 34 % — LOW (ref 34.5–45)
HGB BLD-MCNC: 11.3 G/DL — LOW (ref 11.5–15.5)
IMM GRANULOCYTES NFR BLD AUTO: 0.4 % — SIGNIFICANT CHANGE UP (ref 0–0.9)
LYMPHOCYTES # BLD AUTO: 1.2 K/UL — SIGNIFICANT CHANGE UP (ref 1–3.3)
LYMPHOCYTES # BLD AUTO: 16.2 % — SIGNIFICANT CHANGE UP (ref 13–44)
MCHC RBC-ENTMCNC: 31 PG — SIGNIFICANT CHANGE UP (ref 27–34)
MCHC RBC-ENTMCNC: 33.2 G/DL — SIGNIFICANT CHANGE UP (ref 32–36)
MCV RBC AUTO: 93.2 FL — SIGNIFICANT CHANGE UP (ref 80–100)
MONOCYTES # BLD AUTO: 0.43 K/UL — SIGNIFICANT CHANGE UP (ref 0–0.9)
MONOCYTES NFR BLD AUTO: 5.8 % — SIGNIFICANT CHANGE UP (ref 2–14)
NEUTROPHILS # BLD AUTO: 5.61 K/UL — SIGNIFICANT CHANGE UP (ref 1.8–7.4)
NEUTROPHILS NFR BLD AUTO: 75.8 % — SIGNIFICANT CHANGE UP (ref 43–77)
NRBC # BLD: 0 /100 WBCS — SIGNIFICANT CHANGE UP (ref 0–0)
PLATELET # BLD AUTO: 209 K/UL — SIGNIFICANT CHANGE UP (ref 150–400)
RBC # BLD: 3.65 M/UL — LOW (ref 3.8–5.2)
RBC # FLD: 11.9 % — SIGNIFICANT CHANGE UP (ref 10.3–14.5)
WBC # BLD: 7.4 K/UL — SIGNIFICANT CHANGE UP (ref 3.8–10.5)
WBC # FLD AUTO: 7.4 K/UL — SIGNIFICANT CHANGE UP (ref 3.8–10.5)

## 2023-03-07 ENCOUNTER — APPOINTMENT (OUTPATIENT)
Dept: PULMONOLOGY | Facility: CLINIC | Age: 58
End: 2023-03-07

## 2023-03-13 ENCOUNTER — APPOINTMENT (OUTPATIENT)
Dept: UROLOGY | Facility: CLINIC | Age: 58
End: 2023-03-13
Payer: MEDICAID

## 2023-03-13 VITALS
RESPIRATION RATE: 17 BRPM | HEIGHT: 63 IN | WEIGHT: 104 LBS | SYSTOLIC BLOOD PRESSURE: 115 MMHG | HEART RATE: 77 BPM | DIASTOLIC BLOOD PRESSURE: 69 MMHG | TEMPERATURE: 97.2 F | BODY MASS INDEX: 18.43 KG/M2

## 2023-03-13 DIAGNOSIS — R33.9 RETENTION OF URINE, UNSPECIFIED: ICD-10-CM

## 2023-03-13 DIAGNOSIS — N95.8 OTHER SPECIFIED MENOPAUSAL AND PERIMENOPAUSAL DISORDERS: ICD-10-CM

## 2023-03-13 PROCEDURE — 51798 US URINE CAPACITY MEASURE: CPT

## 2023-03-13 PROCEDURE — 99215 OFFICE O/P EST HI 40 MIN: CPT | Mod: 25

## 2023-03-13 NOTE — PHYSICAL EXAM
[General Appearance - Well Developed] : well developed [Normal Appearance] : normal appearance [Skin Color & Pigmentation] : normal skin color and pigmentation [Edema] : no peripheral edema [] : no respiratory distress [Mood] : the mood was normal [Not Anxious] : not anxious [Abdomen Tenderness] : non-tender [Urinary Bladder Findings] : the bladder was normal on palpation [Affect] : the affect was normal [Normal Station and Gait] : the gait and station were normal for the patient's age

## 2023-03-13 NOTE — END OF VISIT
[] : Fellow [Time Spent: ___ minutes] : I have spent [unfilled] minutes of time on the encounter. 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\par Counseled patient on how this phenomenon can affect the bladder and vaginal lining.\par Prescribed Vaginal Estradiol cream to be used 3x / week - Mon / Wed / Friday\par Instructed patient on appropriate use - with an entire applicator full into the vagina and then to distribute the cream throughout the vagina with a focus on the roof of the vagina near the urethral meatus\par Counseled patient on the findings of recent studies and guidelines suggesting the safety of vaginal estrogen cream\par \par I counseled the patient on recurrent urinary tract infections and the treatment options.\par \par We discussed conservative measures that can contribute to recurrent UTIs, including avoidance of constipation and increasing water daily water intake by 1.5 L in accordance to studies to show that this can decrease frequency and duration of UTIs.  A total of 2.5 L or 84 ounces / day was recommended. \par \par We discussed prophylactic strategies including cranberry supplements, with the active ingredient Proanthocyanidins (PACS) distinguishing over-the-counter vs prescription-grade products. The product based on studies we recommend is Ellura to be taken once a day with food. Patient also advised that if a UTI is suspected, they may increase their dose to twice a day (breakfast and dinner) for a short period.\par \par We discussed the role of menopause and how hormonal balance can change the zacarias and pH of the vagina. We discussed the role of vaginal hormone-replacement therapy as a "localized" as opposed to systemic source of estrogen. Patient was counseled that trace amounts of estrogen can enter the bloodstream, but the majority would act locally. \par \par We discussed the role of oral probiotics in patients who have received multiple courses of antibiotics. That the microbiome may be altered and that probiotics administered orally can theoretically benefit. We also discussed the utility of vaginally delivered probiotics and provided the patient information for bioPHresh vaginal probiotics at www.[employer] to be placed as a vaginal suppository every 3 days (10 per order for a total of 30 days)\par \par Haydee\par increase water intake\par vaginal estrogen cream\par ellura\par biophresh\par \par rto for repeat pvr \par \par \par \par The total time spent with the patient includes face to face time as well as time for documentation, ordering medications/labs/procedures, and care coordination, but I acknowledge it does not include time spent on any procedures performed (eg PVR, UDS, Cystoscopy, catheter changes, etc).  Time includes reviewing the chart prior to visit, documentation, and correspondence.\par  [FreeTextEntry2] : The patient voided with instructions to empty their bladder. Afterwards, we performed a bladder ultrasound scan to obtain a post-void residual.  The estimated residual volume on the bladder scan was:  >200 cc \par

## 2023-03-13 NOTE — ASSESSMENT
[FreeTextEntry1] : 57 year old female patient with history of Amyloidosis presenting with recurrent/ persistent UTI.\par \par Assessment/ Plan:\par \par We had an extensive discussion with the patient about her condition and treatment options. We discussed the management of recurrent UTI. We discussed with the patient the importance of hydration and increase water intake to 2-3 liters a day will help in preventing infections. We discussed the role of vaginal estrogen in also preventing recurrent infections. We discussed that studies have shown that high PAC in cranberry can prevent UTI and advised about ellura. We also discussed vaginal probiotics with [employer] and patient was provided samples.\par \par We discussed with the patient the option of draining her bladder in office today to have exact PVR since the US in office today showed same numbers 212 after voiding twice, but patient refused.\par \par Will start Tamsulosin 0.4 mg daily for now to help empty her bladder. Risks and benefits and side effects were discussed. Patient was educated to take the med at night before sleeping since it might cause lightheadedness.\par \par we discussed follow up in 3-6 months and if still high residue we might consider UDS and cysto.  \par \par Will send urine for culture today

## 2023-03-13 NOTE — HISTORY OF PRESENT ILLNESS
[FreeTextEntry1] : 57 y old female patient with history of Amyloidosis since 2 years on maintenance chemotherapy, presenting with recurrent UTI since March 2022. Referred by Dr Vital.\par \par  she has been on several courses of antibiotics with some improvements. \par she reports cloudy urine and burning at the end of the stream.\par she reports she has to push and strain to urinate.\par No hematuria or weak stream.\par Currently off abx - was on suppressive nitrofurantoin for 1 month\par \par DTF: every 2 hrs \par nocturia: 3 times \par urgency:--\par UUI: had few episodes in the past\par CHRIST: +\par No pads \par \par she reports that she has chronic diarrhea \par drinks 4-5 bottles of 16 oz water bottles \par \par Feb 2023: klebsiella pneumniae ( took Macrobid ever other day but stopped 2 weeks ago with persistent symptoms)\par Jan 2023: Klebsiella variicola\par \par PMH: Amyloidosis, low blood pressure \par PSH: thyroidectomy.\par Social: nonsmoker, used to work as medical assistant \par \par \par PVR: 210 ( patient was asked to void and after her first void her PVR was 212, she voided another time but her PVR still showed 210)

## 2023-03-16 LAB
APPEARANCE: ABNORMAL
BACTERIA UR CULT: ABNORMAL
BACTERIA: ABNORMAL
BILIRUBIN URINE: NEGATIVE
BLOOD URINE: ABNORMAL
COLOR: ABNORMAL
GLUCOSE QUALITATIVE U: NEGATIVE
HYALINE CASTS: 2 /LPF
KETONES URINE: NEGATIVE
LEUKOCYTE ESTERASE URINE: ABNORMAL
MICROSCOPIC-UA: NORMAL
NITRITE URINE: NEGATIVE
PH URINE: 6
PROTEIN URINE: ABNORMAL
RED BLOOD CELLS URINE: 5 /HPF
SPECIFIC GRAVITY URINE: 1.01
SQUAMOUS EPITHELIAL CELLS: 0 /HPF
UROBILINOGEN URINE: NORMAL
WHITE BLOOD CELLS URINE: >720 /HPF

## 2023-03-20 ENCOUNTER — APPOINTMENT (OUTPATIENT)
Dept: INFUSION THERAPY | Facility: HOSPITAL | Age: 58
End: 2023-03-20

## 2023-03-20 ENCOUNTER — RESULT REVIEW (OUTPATIENT)
Age: 58
End: 2023-03-20

## 2023-03-20 LAB
BASOPHILS # BLD AUTO: 0.03 K/UL — SIGNIFICANT CHANGE UP (ref 0–0.2)
BASOPHILS NFR BLD AUTO: 0.6 % — SIGNIFICANT CHANGE UP (ref 0–2)
EOSINOPHIL # BLD AUTO: 0.14 K/UL — SIGNIFICANT CHANGE UP (ref 0–0.5)
EOSINOPHIL NFR BLD AUTO: 2.7 % — SIGNIFICANT CHANGE UP (ref 0–6)
HCT VFR BLD CALC: 32.2 % — LOW (ref 34.5–45)
HGB BLD-MCNC: 10.7 G/DL — LOW (ref 11.5–15.5)
IMM GRANULOCYTES NFR BLD AUTO: 0.2 % — SIGNIFICANT CHANGE UP (ref 0–0.9)
LYMPHOCYTES # BLD AUTO: 1.03 K/UL — SIGNIFICANT CHANGE UP (ref 1–3.3)
LYMPHOCYTES # BLD AUTO: 19.7 % — SIGNIFICANT CHANGE UP (ref 13–44)
MCHC RBC-ENTMCNC: 31.4 PG — SIGNIFICANT CHANGE UP (ref 27–34)
MCHC RBC-ENTMCNC: 33.2 G/DL — SIGNIFICANT CHANGE UP (ref 32–36)
MCV RBC AUTO: 94.4 FL — SIGNIFICANT CHANGE UP (ref 80–100)
MONOCYTES # BLD AUTO: 0.47 K/UL — SIGNIFICANT CHANGE UP (ref 0–0.9)
MONOCYTES NFR BLD AUTO: 9 % — SIGNIFICANT CHANGE UP (ref 2–14)
NEUTROPHILS # BLD AUTO: 3.54 K/UL — SIGNIFICANT CHANGE UP (ref 1.8–7.4)
NEUTROPHILS NFR BLD AUTO: 67.8 % — SIGNIFICANT CHANGE UP (ref 43–77)
NRBC # BLD: 0 /100 WBCS — SIGNIFICANT CHANGE UP (ref 0–0)
PLATELET # BLD AUTO: 204 K/UL — SIGNIFICANT CHANGE UP (ref 150–400)
RBC # BLD: 3.41 M/UL — LOW (ref 3.8–5.2)
RBC # FLD: 12.7 % — SIGNIFICANT CHANGE UP (ref 10.3–14.5)
WBC # BLD: 5.22 K/UL — SIGNIFICANT CHANGE UP (ref 3.8–10.5)
WBC # FLD AUTO: 5.22 K/UL — SIGNIFICANT CHANGE UP (ref 3.8–10.5)

## 2023-03-28 ENCOUNTER — APPOINTMENT (OUTPATIENT)
Dept: HEMATOLOGY ONCOLOGY | Facility: CLINIC | Age: 58
End: 2023-03-28
Payer: MEDICAID

## 2023-03-28 ENCOUNTER — RESULT REVIEW (OUTPATIENT)
Age: 58
End: 2023-03-28

## 2023-03-28 VITALS
BODY MASS INDEX: 18.63 KG/M2 | DIASTOLIC BLOOD PRESSURE: 69 MMHG | SYSTOLIC BLOOD PRESSURE: 115 MMHG | HEIGHT: 63.07 IN | TEMPERATURE: 96.8 F | HEART RATE: 78 BPM | OXYGEN SATURATION: 99 % | WEIGHT: 105.16 LBS | RESPIRATION RATE: 76 BRPM

## 2023-03-28 LAB
BASOPHILS # BLD AUTO: 0.04 K/UL — SIGNIFICANT CHANGE UP (ref 0–0.2)
BASOPHILS NFR BLD AUTO: 1.1 % — SIGNIFICANT CHANGE UP (ref 0–2)
EOSINOPHIL # BLD AUTO: 0.08 K/UL — SIGNIFICANT CHANGE UP (ref 0–0.5)
EOSINOPHIL NFR BLD AUTO: 2.2 % — SIGNIFICANT CHANGE UP (ref 0–6)
HCT VFR BLD CALC: 34.4 % — LOW (ref 34.5–45)
HGB BLD-MCNC: 11 G/DL — LOW (ref 11.5–15.5)
IMM GRANULOCYTES NFR BLD AUTO: 0.3 % — SIGNIFICANT CHANGE UP (ref 0–0.9)
LYMPHOCYTES # BLD AUTO: 1.16 K/UL — SIGNIFICANT CHANGE UP (ref 1–3.3)
LYMPHOCYTES # BLD AUTO: 31.4 % — SIGNIFICANT CHANGE UP (ref 13–44)
MCHC RBC-ENTMCNC: 30.8 PG — SIGNIFICANT CHANGE UP (ref 27–34)
MCHC RBC-ENTMCNC: 32 G/DL — SIGNIFICANT CHANGE UP (ref 32–36)
MCV RBC AUTO: 96.4 FL — SIGNIFICANT CHANGE UP (ref 80–100)
MONOCYTES # BLD AUTO: 0.33 K/UL — SIGNIFICANT CHANGE UP (ref 0–0.9)
MONOCYTES NFR BLD AUTO: 8.9 % — SIGNIFICANT CHANGE UP (ref 2–14)
NEUTROPHILS # BLD AUTO: 2.08 K/UL — SIGNIFICANT CHANGE UP (ref 1.8–7.4)
NEUTROPHILS NFR BLD AUTO: 56.1 % — SIGNIFICANT CHANGE UP (ref 43–77)
NRBC # BLD: 0 /100 WBCS — SIGNIFICANT CHANGE UP (ref 0–0)
PLATELET # BLD AUTO: 212 K/UL — SIGNIFICANT CHANGE UP (ref 150–400)
RBC # BLD: 3.57 M/UL — LOW (ref 3.8–5.2)
RBC # FLD: 12.8 % — SIGNIFICANT CHANGE UP (ref 10.3–14.5)
WBC # BLD: 3.7 K/UL — LOW (ref 3.8–10.5)
WBC # FLD AUTO: 3.7 K/UL — LOW (ref 3.8–10.5)

## 2023-03-28 PROCEDURE — 99214 OFFICE O/P EST MOD 30 MIN: CPT

## 2023-03-29 ENCOUNTER — OUTPATIENT (OUTPATIENT)
Dept: OUTPATIENT SERVICES | Facility: HOSPITAL | Age: 58
LOS: 1 days | Discharge: HOME | End: 2023-03-29

## 2023-03-29 DIAGNOSIS — D47.2 MONOCLONAL GAMMOPATHY: ICD-10-CM

## 2023-03-29 NOTE — ASSESSMENT
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 Cycle 1 of CyBorD was initiated on 4/1/22 Completed 7 full cycles on 10/28/22. \par \par -Serum SPEP without a monoclonal fraction, Urine SPEP without monoclonal protein, immunofixation normal. \par -However on diagnosis Lambda serum FLCs 8.73 and kappa 0.92, for a ratio of 0.11 (or 9.5). \par -Fat pad biopsy done and was POSITIVE for congo red, with positive polarization. \par -As per discussion with pathology, unable to send this for mass spectrometry to confirm amyloid type. However, now s/p BMBx which is showing ~35% plasmacytosis (monoclonal) c/w plasma cell neoplasm. 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 It did NOT meet MM criteria. 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 \par -Urine showing proteinuria on diagnosis (although not nephrotic range).\par -Systemic light chain amyloidosis is classically associated with a thickened interventricular septum and proteinuria. Also associated with neurologic sequelae such as that she has. \par -Pt is s/p treatment for systemic light chain amyloidosis with poncho-CyBorD, completed 7 cycles in October 2022 with an excellent biochemical response. She has had a complete response, and has had improvement in her symptoms of POTS, along with treatment with midodrine. \par -Will continue at this point with maintenance with QOW Velcade and monthly daratumumab. \par -Still with symptoms of diarrhea and poor taste - likely residual from amyloid deposition. Taking Lomotil supportively and following up with GI. This has improved with time, and is managed well with supportive medications. \par -Had been planning for collection of stem cells, however undecided on transplant still. On further discussion patient is even undecided on collection of stem cells, as requiring growth factor gets her anxious. Reassured her that growth factor shouldn't affect her plasmacytosis however she wanted to hold off. \par -Repeat BM bx on 2/8/23 showed minimal involvement by patients known plasma cell neoplasm/myeloma (less than 5% of the cellularity). Still with evidence of FISH positive 11;14 translocation at this point in 2.5% of cells. Venetoclax may be suitable agent if serum FLC uptrending. \par -Continue follow up with Cardiology and PCP. Patient asked about green tea extract, will discuss with cardiologist. \par -Patient understands and agrees with plan. All information explained to the best of my ability.\par -RTC 1 month.

## 2023-03-29 NOTE — PHYSICAL EXAM
[Thin] : thin [Normal] : affect appropriate [Restricted in physically strenuous activity but ambulatory and able to carry out work of a light or sedentary nature] : Status 1- Restricted in physically strenuous activity but ambulatory and able to carry out work of a light or sedentary nature, e.g., light house work, office work [Ulcers] : no ulcers [Mucositis] : no mucositis [Thrush] : no thrush [Vesicles] : no vesicles [de-identified] : (+) scalloping  [de-identified] : supple [de-identified] : (+)S1S2 RRR [de-identified] : (+) varicosities [de-identified] : no pain [de-identified] : warm/dry

## 2023-03-29 NOTE — HISTORY OF PRESENT ILLNESS
[Disease:__________________________] : Disease: [unfilled] [Therapy: ___] : Therapy: [unfilled] [de-identified] : 57 y/o F with hx of thyroidectomy in 2011 (benign pathology) on thyroid replacement since then, and with COVID pneumonia back in March of 2020 ultimately signed herself out AMA, chronic issues thereafter, ended up going back to work in July 2020. In January-February 2021 had Pfizer vaccines. Started having issues starting in March 2021, she was having GI issues, endoscopy showed chronic gastritis. Additionally diagnosed with IBS at that point. She had been diagnosed with Postural Orthostatic Tachycardia Syndrome - her blood pressure went down when she stands up from a seated position (very severe hypotension). She had fainted "too many times to count." She ended up with bruises here and there but nothing severe where she would need to go to the hospital. She saw two neurologists who did extensive evaluations without any success in finding diagnostic lesions. She reported seeing specialist for dysautonomia, Dr. Colón at [place] (neurology) and started fludrocortisone in September 2021. Also at that point c/o burning skin, muscle aches, burning mouth and altered taste. She had lost 47 pounds from March 2021 to when I first saw her. She was ultimatley sent to me for evaluation of elevated free light chains. 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 She was found with no monoclonal protein on SPEP and hemoglobin was normal, with normal renal function, and no hypercalcemia. Subsequent fat pad biopsy showed positive congo red staining in rare blood vessels and positive for polarization. This was diagnostic for primary light chain amyloidosis.\par \par In March 2022 patient was admitted to [facility] for multiple recurrent syncopal episodes. While admitted course was complicated by multiple unresponsive episodes prompting RRTs, during which pt found to be hypotensive and bradycardic. She initially required levophed for BP support but ultimately BP remained stable while on Midodrine 20mg q8 and Fludrocortisone 0.2mg q24. Pt noted to intermittently have sinus pauses (2-3.8 seconds) thought to be 2/2 vagal episodes, and asymptomatic bradycardia overnight on telemetry. ECG noted NSR but 1st degree AV block. TTE 3/16/22 showed normal LV size and function with EF 55%-60%. No regional wall motion abnormalities. Moderate concentric LVH. Treated for C. diff and UTI during hospitalization as well.  Ultimately at that point she was transferred to [facility] for consideration of inpatient chemotherapy. \par \par A PPM placement was recommended, as cardiac MRI showed evidence of definitive amyloid involvement. Pt had another RRT for hypotension and symptomatic bradycardia to 30s, was on pressor support until she received a dual chamber PPM. Pt also continued her C.diff treatment and chemotherapy was placed on hold until the diarrhea cleared. She was then started on Daratumumab  + CyBorD inpatient, and tolerated it very well ; she completed 2 treatments of chemotherapy inpatient. She subseqently continued with this regimen as outpatient. Has been complicated by diarrhea which on evaluation by GI was deemed likely amyloid related. This was biopsy proven on colonoscopic / endoscopic evaluation in September 2022. Treated with supportive care since then. \par \par She has completed 7 full cycles of Danyell + CyBorD in October 2022. Based on serum FLC ratio she is in a complete response. \par Of note, patient reported that she is NOT interested in hematopoietic transplant at this time but she is interested in collecting her stem cells for a later date. \par At this point she is on maintenance Velcade and Darzalex Faspro.  [de-identified] : Final Diagnosis (2/8/22) [de-identified] : Not enough tissue for mass spectrometry testing. \par \par Final Diagnosis (3/1/22)\par 1, 2. Bone marrow biopsy and bone marrow aspirate\par      - Plasma cell myeloma, 25-35% of cellularity by immunohistochemistry\par      - Trilineage hematopoiesis with maturation, mild eosinophilia, serous\par \par  atrophy, and iron stores present\par \par See note and description.\par \par Diagnostic note:\par As per chart review, the patient was referred for elevated free light\par  chains. Her light chain ratio was found to be very low at 0.11 and was\par  found to have hypogammaglobulinemia with no monoclonal protein on SPEP,\par   hemoglobin normal, normal renal function, and no hypercalcemia. Fat\par  pad biopsy showed positive congo red staining in rare blood vessels\par  and positive for polarization. Bone marrow to evaluate for plasma cell\par  myeloma. The bone marrow shows a significant plasma cell infiltrate by\par  immunohistochemistry (25-35% of cellularity, also positive with cyclin\par  D1, with monotypic plasma cells by flow cytometry. Congo red stain is\par  negative.\par \par Normal female karyotype seen on bone marrow. \par FISH Result: ABNORMAL FISH MULTIPLE MYELOMA PANEL -\par Atypical CCND1/IGH fusion pattern detected (3.5%) [FreeTextEntry1] : Kleber + CyBorD started on 4/8/2022, held cytoxan from 4/18-5/12 for hematuria, held again 6/24 for worsening diarrhea\par Now s/p 7 cycles of Danyell+CyBorD (ending 10/28/22) [de-identified] : Patient seen for follow up on 03/28/2023. Patient reporting that she has had worsening of her carpal tunnel symptoms worse on the L than R. Worsening for a couple of months now. She has had this in the past, but it is very painful now. She has a rash on the front of bilateral shins, which she thinks has been a couple of months. Her diarrhea has been better, she would report that it is about 40-50% better. She had another UTI which was treated with Bactrim and it improved - was given cranberry supplement and that caused very bad diarrhea and hypotension. Cardiologist wants her to take green tea extract. Neuropathy in her feet is the same, it was improving with acupuncture, but that had inflamed her GI symptoms so she stopped it. She has a "dot" on her foot, not painful or itchy but it is new.

## 2023-03-29 NOTE — REVIEW OF SYSTEMS
[Fatigue] : fatigue [Diarrhea] : diarrhea [Dysuria] : dysuria [Joint Stiffness] : joint stiffness [Difficulty Walking] : difficulty walking [Anxiety] : anxiety [Negative] : Heme/Lymph [Fever] : no fever [Chills] : no chills [Night Sweats] : no night sweats [Recent Change In Weight] : ~T no recent weight change [Vision Problems] : no vision problems [Dysphagia] : no dysphagia [Nosebleeds] : no nosebleeds [Odynophagia] : no odynophagia [Chest Pain] : no chest pain [Palpitations] : no palpitations [Lower Ext Edema] : no lower extremity edema [Abdominal Pain] : no abdominal pain [Vomiting] : no vomiting [Constipation] : no constipation [Incontinence] : no incontinence [Joint Pain] : no joint pain [Confused] : no confusion [Dizziness] : no dizziness [Fainting] : no fainting [Insomnia] : no insomnia [Hot Flashes] : no hot flashes [Muscle Weakness] : no muscle weakness [FreeTextEntry7] : improved [FreeTextEntry8] : (+) cloudy urine [FreeTextEntry9] : in hands [de-identified] : (+) neuropathy in feet

## 2023-03-29 NOTE — RESULTS/DATA
[FreeTextEntry1] : US ABDOMEN COMPLETE\par PROCEDURE DATE:  10/01/2022\par \par INTERPRETATION:  CLINICAL INFORMATION: Elevated liver function tests. History of amyloidosis.\par COMPARISON: Renal ultrasound 4/30/2022\par TECHNIQUE: Sonography of the abdomen.\par \par FINDINGS:\par Liver: Within normal limits.\par Bile ducts: Normal caliber. Common bile duct measures 6 mm.\par Gallbladder: Within normal limits.\par Pancreas: Visualized portions are within normal limits.\par Spleen: 8.4 cm. Within normal limits.\par Right kidney: 10.3 cm. No hydronephrosis.\par Left kidney: 9.1 cm. 1.1 cm upper pole cyst. No hydronephrosis.\par Ascites: None.\par Aorta and IVC: Visualized portions are within normal limits.\par \par IMPRESSION:\par Normal abdominal ultrasound.\par \par \par Final Diagnosis 2/8/23\par 1, 2. Bone Marrow Biopsy, Clot and Bone Marrow Aspirate, Right PIC\par      - Minimal involvement by patients known plasma cell neoplasm/myeloma\par  (less than 5% of the cellularity).\par       - Normocellular marrow for age, with maturing trilineage\par  hematopoiesis, adequate megakaryocytes and iron stores are present.\par \par See note and description.\par \par Diagnostic note: As per chart review, the patient is s/p treatment for\par  systemic light chain amyloidosis with poncho-CyBorD, completed 7 cycles\par  in October 2022 with an excellent biochemical response. The bone marrow\par  biopsy performed in March 2022 revealed involvement by plasma cell\par  neoplasm/myeloma (lambda restricted by flow cytometry). Fat pad biopsy\par  ( performed in Feb 2022) was positive for congo red staining in rare blood\par  vessels and polarization.\par The current CBC shows normocytic anemia, M spike of 0.1 g/dL, gamma\par  migrating paraprotein on serum protein electrophoresis and kappa band\par  identified by IF studies. The  bone marrow shows normocellular marrow\par  for age, with minimal involvement by + plasma cells (less than 5%\par  of the cellularity),  positive for lambda by in situ hybridization.\par \par

## 2023-04-03 ENCOUNTER — RESULT REVIEW (OUTPATIENT)
Age: 58
End: 2023-04-03

## 2023-04-03 ENCOUNTER — APPOINTMENT (OUTPATIENT)
Dept: HEMATOLOGY ONCOLOGY | Facility: CLINIC | Age: 58
End: 2023-04-03

## 2023-04-03 ENCOUNTER — APPOINTMENT (OUTPATIENT)
Dept: INFUSION THERAPY | Facility: HOSPITAL | Age: 58
End: 2023-04-03

## 2023-04-03 ENCOUNTER — RX RENEWAL (OUTPATIENT)
Age: 58
End: 2023-04-03

## 2023-04-03 DIAGNOSIS — Z51.11 ENCOUNTER FOR ANTINEOPLASTIC CHEMOTHERAPY: ICD-10-CM

## 2023-04-03 DIAGNOSIS — R11.2 NAUSEA WITH VOMITING, UNSPECIFIED: ICD-10-CM

## 2023-04-03 LAB
BASOPHILS # BLD AUTO: 0.05 K/UL — SIGNIFICANT CHANGE UP (ref 0–0.2)
BASOPHILS NFR BLD AUTO: 1 % — SIGNIFICANT CHANGE UP (ref 0–2)
EOSINOPHIL # BLD AUTO: 0.11 K/UL — SIGNIFICANT CHANGE UP (ref 0–0.5)
EOSINOPHIL NFR BLD AUTO: 2.1 % — SIGNIFICANT CHANGE UP (ref 0–6)
HCT VFR BLD CALC: 32.3 % — LOW (ref 34.5–45)
HGB BLD-MCNC: 10.8 G/DL — LOW (ref 11.5–15.5)
IMM GRANULOCYTES NFR BLD AUTO: 0.6 % — SIGNIFICANT CHANGE UP (ref 0–0.9)
LYMPHOCYTES # BLD AUTO: 1.42 K/UL — SIGNIFICANT CHANGE UP (ref 1–3.3)
LYMPHOCYTES # BLD AUTO: 27.3 % — SIGNIFICANT CHANGE UP (ref 13–44)
MCHC RBC-ENTMCNC: 31.5 PG — SIGNIFICANT CHANGE UP (ref 27–34)
MCHC RBC-ENTMCNC: 33.4 G/DL — SIGNIFICANT CHANGE UP (ref 32–36)
MCV RBC AUTO: 94.2 FL — SIGNIFICANT CHANGE UP (ref 80–100)
MONOCYTES # BLD AUTO: 0.42 K/UL — SIGNIFICANT CHANGE UP (ref 0–0.9)
MONOCYTES NFR BLD AUTO: 8.1 % — SIGNIFICANT CHANGE UP (ref 2–14)
NEUTROPHILS # BLD AUTO: 3.18 K/UL — SIGNIFICANT CHANGE UP (ref 1.8–7.4)
NEUTROPHILS NFR BLD AUTO: 60.9 % — SIGNIFICANT CHANGE UP (ref 43–77)
NRBC # BLD: 0 /100 WBCS — SIGNIFICANT CHANGE UP (ref 0–0)
PLATELET # BLD AUTO: 188 K/UL — SIGNIFICANT CHANGE UP (ref 150–400)
RBC # BLD: 3.43 M/UL — LOW (ref 3.8–5.2)
RBC # FLD: 13 % — SIGNIFICANT CHANGE UP (ref 10.3–14.5)
WBC # BLD: 5.21 K/UL — SIGNIFICANT CHANGE UP (ref 3.8–10.5)
WBC # FLD AUTO: 5.21 K/UL — SIGNIFICANT CHANGE UP (ref 3.8–10.5)

## 2023-04-10 ENCOUNTER — TRANSCRIPTION ENCOUNTER (OUTPATIENT)
Age: 58
End: 2023-04-10

## 2023-04-12 LAB
ALBUMIN MFR SERPL ELPH: 65.1 %
ALBUMIN SERPL ELPH-MCNC: 4 G/DL
ALBUMIN SERPL-MCNC: 3.8 G/DL
ALBUMIN/GLOB SERPL: 1.8 RATIO
ALP BLD-CCNC: 69 U/L
ALPHA1 GLOB MFR SERPL ELPH: 5.1 %
ALPHA1 GLOB SERPL ELPH-MCNC: 0.3 G/DL
ALPHA2 GLOB MFR SERPL ELPH: 11.8 %
ALPHA2 GLOB SERPL ELPH-MCNC: 0.7 G/DL
ALT SERPL-CCNC: 23 U/L
ANION GAP SERPL CALC-SCNC: 11 MMOL/L
AST SERPL-CCNC: 20 U/L
B-GLOBULIN MFR SERPL ELPH: 11.5 %
B-GLOBULIN SERPL ELPH-MCNC: 0.7 G/DL
BILIRUB SERPL-MCNC: 0.6 MG/DL
BUN SERPL-MCNC: 17 MG/DL
CALCIUM SERPL-MCNC: 9.7 MG/DL
CHLORIDE SERPL-SCNC: 104 MMOL/L
CO2 SERPL-SCNC: 24 MMOL/L
CREAT SERPL-MCNC: 0.49 MG/DL
DEPRECATED KAPPA LC FREE/LAMBDA SER: 0.48 RATIO
EGFR: 108 ML/MIN/1.73M2
GAMMA GLOB FLD ELPH-MCNC: 0.4 G/DL
GAMMA GLOB MFR SERPL ELPH: 6.5 %
GLUCOSE SERPL-MCNC: 92 MG/DL
IGA SER QL IEP: 23 MG/DL
IGG SER QL IEP: 442 MG/DL
IGM SER QL IEP: <10 MG/DL
INTERPRETATION SERPL IEP-IMP: NORMAL
KAPPA LC CSF-MCNC: 1.13 MG/DL
KAPPA LC SERPL-MCNC: 0.54 MG/DL
M PROTEIN MFR SERPL ELPH: NORMAL
M PROTEIN SPEC IFE-MCNC: NORMAL
MONOCLON BAND OBS SERPL: NORMAL
POTASSIUM SERPL-SCNC: 5 MMOL/L
PROT SERPL-MCNC: 5.9 G/DL
SODIUM SERPL-SCNC: 140 MMOL/L

## 2023-04-18 ENCOUNTER — APPOINTMENT (OUTPATIENT)
Dept: HEMATOLOGY ONCOLOGY | Facility: CLINIC | Age: 58
End: 2023-04-18

## 2023-04-18 ENCOUNTER — APPOINTMENT (OUTPATIENT)
Dept: INFUSION THERAPY | Facility: HOSPITAL | Age: 58
End: 2023-04-18

## 2023-04-18 ENCOUNTER — RESULT REVIEW (OUTPATIENT)
Age: 58
End: 2023-04-18

## 2023-04-18 ENCOUNTER — LABORATORY RESULT (OUTPATIENT)
Age: 58
End: 2023-04-18

## 2023-04-18 LAB
BASOPHILS # BLD AUTO: 0.03 K/UL — SIGNIFICANT CHANGE UP (ref 0–0.2)
BASOPHILS NFR BLD AUTO: 0.8 % — SIGNIFICANT CHANGE UP (ref 0–2)
EOSINOPHIL # BLD AUTO: 0.12 K/UL — SIGNIFICANT CHANGE UP (ref 0–0.5)
EOSINOPHIL NFR BLD AUTO: 3.1 % — SIGNIFICANT CHANGE UP (ref 0–6)
HCT VFR BLD CALC: 31.1 % — LOW (ref 34.5–45)
HGB BLD-MCNC: 10.4 G/DL — LOW (ref 11.5–15.5)
IMM GRANULOCYTES NFR BLD AUTO: 0.3 % — SIGNIFICANT CHANGE UP (ref 0–0.9)
LYMPHOCYTES # BLD AUTO: 1.07 K/UL — SIGNIFICANT CHANGE UP (ref 1–3.3)
LYMPHOCYTES # BLD AUTO: 27.7 % — SIGNIFICANT CHANGE UP (ref 13–44)
MCHC RBC-ENTMCNC: 32.1 PG — SIGNIFICANT CHANGE UP (ref 27–34)
MCHC RBC-ENTMCNC: 33.4 G/DL — SIGNIFICANT CHANGE UP (ref 32–36)
MCV RBC AUTO: 96 FL — SIGNIFICANT CHANGE UP (ref 80–100)
MONOCYTES # BLD AUTO: 0.41 K/UL — SIGNIFICANT CHANGE UP (ref 0–0.9)
MONOCYTES NFR BLD AUTO: 10.6 % — SIGNIFICANT CHANGE UP (ref 2–14)
NEUTROPHILS # BLD AUTO: 2.22 K/UL — SIGNIFICANT CHANGE UP (ref 1.8–7.4)
NEUTROPHILS NFR BLD AUTO: 57.5 % — SIGNIFICANT CHANGE UP (ref 43–77)
NRBC # BLD: 0 /100 WBCS — SIGNIFICANT CHANGE UP (ref 0–0)
PLATELET # BLD AUTO: 180 K/UL — SIGNIFICANT CHANGE UP (ref 150–400)
RBC # BLD: 3.24 M/UL — LOW (ref 3.8–5.2)
RBC # FLD: 13.1 % — SIGNIFICANT CHANGE UP (ref 10.3–14.5)
WBC # BLD: 3.86 K/UL — SIGNIFICANT CHANGE UP (ref 3.8–10.5)
WBC # FLD AUTO: 3.86 K/UL — SIGNIFICANT CHANGE UP (ref 3.8–10.5)

## 2023-04-19 LAB
ALBUMIN SERPL ELPH-MCNC: 4.1 G/DL
ALP BLD-CCNC: 72 U/L
ALT SERPL-CCNC: 27 U/L
ANION GAP SERPL CALC-SCNC: 9 MMOL/L
AST SERPL-CCNC: 16 U/L
BILIRUB SERPL-MCNC: 0.8 MG/DL
BUN SERPL-MCNC: 13 MG/DL
CALCIUM SERPL-MCNC: 9.2 MG/DL
CHLORIDE SERPL-SCNC: 106 MMOL/L
CO2 SERPL-SCNC: 28 MMOL/L
CREAT SERPL-MCNC: 0.5 MG/DL
EGFR: 108 ML/MIN/1.73M2
GLUCOSE SERPL-MCNC: 81 MG/DL
POTASSIUM SERPL-SCNC: 4.3 MMOL/L
PROT SERPL-MCNC: 5.4 G/DL
SODIUM SERPL-SCNC: 143 MMOL/L

## 2023-04-24 LAB
ALBUMIN MFR SERPL ELPH: 66 %
ALBUMIN SERPL-MCNC: 3.7 G/DL
ALBUMIN/GLOB SERPL: 1.9 RATIO
ALPHA1 GLOB MFR SERPL ELPH: 4.8 %
ALPHA1 GLOB SERPL ELPH-MCNC: 0.3 G/DL
ALPHA2 GLOB MFR SERPL ELPH: 11.2 %
ALPHA2 GLOB SERPL ELPH-MCNC: 0.6 G/DL
B-GLOBULIN MFR SERPL ELPH: 11.5 %
B-GLOBULIN SERPL ELPH-MCNC: 0.6 G/DL
DEPRECATED KAPPA LC FREE/LAMBDA SER: 0.46 RATIO
GAMMA GLOB FLD ELPH-MCNC: 0.4 G/DL
GAMMA GLOB MFR SERPL ELPH: 6.5 %
IGA 24H UR QL IFE: NORMAL
IGA SER QL IEP: 27 MG/DL
IGG SER QL IEP: 450 MG/DL
IGM SER QL IEP: 12 MG/DL
INTERPRETATION SERPL IEP-IMP: NORMAL
KAPPA LC 24H UR QL: NORMAL
KAPPA LC CSF-MCNC: 1.26 MG/DL
KAPPA LC SERPL-MCNC: 0.58 MG/DL
M PROTEIN MFR SERPL ELPH: NORMAL
M PROTEIN SPEC IFE-MCNC: NORMAL
MONOCLON BAND OBS SERPL: NORMAL
PROT SERPL-MCNC: 5.6 G/DL
PROT SERPL-MCNC: 5.6 G/DL

## 2023-04-27 ENCOUNTER — TRANSCRIPTION ENCOUNTER (OUTPATIENT)
Age: 58
End: 2023-04-27

## 2023-04-28 ENCOUNTER — RX RENEWAL (OUTPATIENT)
Age: 58
End: 2023-04-28

## 2023-04-28 ENCOUNTER — APPOINTMENT (OUTPATIENT)
Dept: ELECTROPHYSIOLOGY | Facility: CLINIC | Age: 58
End: 2023-04-28
Payer: MEDICAID

## 2023-04-28 ENCOUNTER — TRANSCRIPTION ENCOUNTER (OUTPATIENT)
Age: 58
End: 2023-04-28

## 2023-04-28 ENCOUNTER — NON-APPOINTMENT (OUTPATIENT)
Age: 58
End: 2023-04-28

## 2023-04-28 VITALS
HEART RATE: 74 BPM | BODY MASS INDEX: 18.2 KG/M2 | SYSTOLIC BLOOD PRESSURE: 116 MMHG | DIASTOLIC BLOOD PRESSURE: 73 MMHG | OXYGEN SATURATION: 100 % | WEIGHT: 103 LBS

## 2023-04-28 DIAGNOSIS — I49.5 SICK SINUS SYNDROME: ICD-10-CM

## 2023-04-28 PROCEDURE — 93280 PM DEVICE PROGR EVAL DUAL: CPT

## 2023-04-28 PROCEDURE — 93000 ELECTROCARDIOGRAM COMPLETE: CPT | Mod: 59

## 2023-05-01 ENCOUNTER — RESULT REVIEW (OUTPATIENT)
Age: 58
End: 2023-05-01

## 2023-05-01 ENCOUNTER — APPOINTMENT (OUTPATIENT)
Dept: INFUSION THERAPY | Facility: HOSPITAL | Age: 58
End: 2023-05-01

## 2023-05-01 PROBLEM — I49.5 SINUS NODE DYSFUNCTION: Status: ACTIVE | Noted: 2023-05-01

## 2023-05-01 LAB
BASOPHILS # BLD AUTO: 0.04 K/UL — SIGNIFICANT CHANGE UP (ref 0–0.2)
BASOPHILS NFR BLD AUTO: 0.7 % — SIGNIFICANT CHANGE UP (ref 0–2)
EOSINOPHIL # BLD AUTO: 0.08 K/UL — SIGNIFICANT CHANGE UP (ref 0–0.5)
EOSINOPHIL NFR BLD AUTO: 1.5 % — SIGNIFICANT CHANGE UP (ref 0–6)
HCT VFR BLD CALC: 32.6 % — LOW (ref 34.5–45)
HGB BLD-MCNC: 10.9 G/DL — LOW (ref 11.5–15.5)
IMM GRANULOCYTES NFR BLD AUTO: 0.6 % — SIGNIFICANT CHANGE UP (ref 0–0.9)
LYMPHOCYTES # BLD AUTO: 1.41 K/UL — SIGNIFICANT CHANGE UP (ref 1–3.3)
LYMPHOCYTES # BLD AUTO: 26 % — SIGNIFICANT CHANGE UP (ref 13–44)
MCHC RBC-ENTMCNC: 31.7 PG — SIGNIFICANT CHANGE UP (ref 27–34)
MCHC RBC-ENTMCNC: 33.4 G/DL — SIGNIFICANT CHANGE UP (ref 32–36)
MCV RBC AUTO: 94.8 FL — SIGNIFICANT CHANGE UP (ref 80–100)
MONOCYTES # BLD AUTO: 0.4 K/UL — SIGNIFICANT CHANGE UP (ref 0–0.9)
MONOCYTES NFR BLD AUTO: 7.4 % — SIGNIFICANT CHANGE UP (ref 2–14)
NEUTROPHILS # BLD AUTO: 3.47 K/UL — SIGNIFICANT CHANGE UP (ref 1.8–7.4)
NEUTROPHILS NFR BLD AUTO: 63.8 % — SIGNIFICANT CHANGE UP (ref 43–77)
NRBC # BLD: 0 /100 WBCS — SIGNIFICANT CHANGE UP (ref 0–0)
PLATELET # BLD AUTO: 188 K/UL — SIGNIFICANT CHANGE UP (ref 150–400)
RBC # BLD: 3.44 M/UL — LOW (ref 3.8–5.2)
RBC # FLD: 12.8 % — SIGNIFICANT CHANGE UP (ref 10.3–14.5)
WBC # BLD: 5.43 K/UL — SIGNIFICANT CHANGE UP (ref 3.8–10.5)
WBC # FLD AUTO: 5.43 K/UL — SIGNIFICANT CHANGE UP (ref 3.8–10.5)

## 2023-05-02 LAB
ESTIMATED AVERAGE GLUCOSE: 123 MG/DL
HBA1C MFR BLD HPLC: 5.9 %
T4 FREE SERPL-MCNC: 1.9 NG/DL
TSH SERPL-ACNC: 0.04 UIU/ML

## 2023-05-04 ENCOUNTER — TRANSCRIPTION ENCOUNTER (OUTPATIENT)
Age: 58
End: 2023-05-04

## 2023-05-10 ENCOUNTER — RX RENEWAL (OUTPATIENT)
Age: 58
End: 2023-05-10

## 2023-05-12 ENCOUNTER — TRANSCRIPTION ENCOUNTER (OUTPATIENT)
Age: 58
End: 2023-05-12

## 2023-05-16 ENCOUNTER — APPOINTMENT (OUTPATIENT)
Dept: HEMATOLOGY ONCOLOGY | Facility: CLINIC | Age: 58
End: 2023-05-16
Payer: MEDICAID

## 2023-05-16 ENCOUNTER — OUTPATIENT (OUTPATIENT)
Dept: OUTPATIENT SERVICES | Facility: HOSPITAL | Age: 58
LOS: 1 days | Discharge: HOME | End: 2023-05-16

## 2023-05-16 ENCOUNTER — RESULT REVIEW (OUTPATIENT)
Age: 58
End: 2023-05-16

## 2023-05-16 ENCOUNTER — APPOINTMENT (OUTPATIENT)
Dept: INFUSION THERAPY | Facility: HOSPITAL | Age: 58
End: 2023-05-16

## 2023-05-16 VITALS
HEART RATE: 79 BPM | DIASTOLIC BLOOD PRESSURE: 62 MMHG | OXYGEN SATURATION: 99 % | WEIGHT: 108.25 LBS | SYSTOLIC BLOOD PRESSURE: 97 MMHG | RESPIRATION RATE: 17 BRPM | BODY MASS INDEX: 19.13 KG/M2 | TEMPERATURE: 96.9 F

## 2023-05-16 DIAGNOSIS — Z51.11 ENCOUNTER FOR ANTINEOPLASTIC CHEMOTHERAPY: ICD-10-CM

## 2023-05-16 DIAGNOSIS — K13.79 OTHER LESIONS OF ORAL MUCOSA: ICD-10-CM

## 2023-05-16 DIAGNOSIS — R11.2 NAUSEA WITH VOMITING, UNSPECIFIED: ICD-10-CM

## 2023-05-16 DIAGNOSIS — D47.2 MONOCLONAL GAMMOPATHY: ICD-10-CM

## 2023-05-16 DIAGNOSIS — R19.7 DIARRHEA, UNSPECIFIED: ICD-10-CM

## 2023-05-16 DIAGNOSIS — I44.2 ATRIOVENTRICULAR BLOCK, COMPLETE: ICD-10-CM

## 2023-05-16 LAB
BASOPHILS # BLD AUTO: 0.03 K/UL — SIGNIFICANT CHANGE UP (ref 0–0.2)
BASOPHILS NFR BLD AUTO: 0.6 % — SIGNIFICANT CHANGE UP (ref 0–2)
EOSINOPHIL # BLD AUTO: 0.11 K/UL — SIGNIFICANT CHANGE UP (ref 0–0.5)
EOSINOPHIL NFR BLD AUTO: 2.1 % — SIGNIFICANT CHANGE UP (ref 0–6)
HCT VFR BLD CALC: 32 % — LOW (ref 34.5–45)
HGB BLD-MCNC: 10.6 G/DL — LOW (ref 11.5–15.5)
IMM GRANULOCYTES NFR BLD AUTO: 0.2 % — SIGNIFICANT CHANGE UP (ref 0–0.9)
LYMPHOCYTES # BLD AUTO: 1.02 K/UL — SIGNIFICANT CHANGE UP (ref 1–3.3)
LYMPHOCYTES # BLD AUTO: 19.6 % — SIGNIFICANT CHANGE UP (ref 13–44)
MCHC RBC-ENTMCNC: 31.9 PG — SIGNIFICANT CHANGE UP (ref 27–34)
MCHC RBC-ENTMCNC: 33.1 G/DL — SIGNIFICANT CHANGE UP (ref 32–36)
MCV RBC AUTO: 96.4 FL — SIGNIFICANT CHANGE UP (ref 80–100)
MONOCYTES # BLD AUTO: 0.4 K/UL — SIGNIFICANT CHANGE UP (ref 0–0.9)
MONOCYTES NFR BLD AUTO: 7.7 % — SIGNIFICANT CHANGE UP (ref 2–14)
NEUTROPHILS # BLD AUTO: 3.63 K/UL — SIGNIFICANT CHANGE UP (ref 1.8–7.4)
NEUTROPHILS NFR BLD AUTO: 69.8 % — SIGNIFICANT CHANGE UP (ref 43–77)
NRBC # BLD: 0 /100 WBCS — SIGNIFICANT CHANGE UP (ref 0–0)
PLATELET # BLD AUTO: 200 K/UL — SIGNIFICANT CHANGE UP (ref 150–400)
RBC # BLD: 3.32 M/UL — LOW (ref 3.8–5.2)
RBC # FLD: 12.7 % — SIGNIFICANT CHANGE UP (ref 10.3–14.5)
WBC # BLD: 5.2 K/UL — SIGNIFICANT CHANGE UP (ref 3.8–10.5)
WBC # FLD AUTO: 5.2 K/UL — SIGNIFICANT CHANGE UP (ref 3.8–10.5)

## 2023-05-16 PROCEDURE — 99215 OFFICE O/P EST HI 40 MIN: CPT

## 2023-05-17 NOTE — REVIEW OF SYSTEMS
[Diarrhea: Grade 0] : Diarrhea: Grade 0 [Negative] : Allergic/Immunologic [Abdominal Pain] : no abdominal pain [Vomiting] : no vomiting [FreeTextEntry7] : diarrhea

## 2023-05-17 NOTE — ASSESSMENT
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 Cycle 1 of CyBorD was initiated on 4/1/22 Completed 7 full cycles of poncho-CyBorD on 10/28/22. She is now on maintenance with daratumumab monthly with Velcade [id number]. She has had a complete response from the standpoint of her amyloidosis. Repeat BM bx on 2/8/23 showed minimal involvement by patients known plasma cell neoplasm/myeloma (less than 5% of the cellularity). Still with evidence of FISH positive 11;14 translocation at this point in 2.5% of cells. Venetoclax may be suitable agent if serum FLC uptrending. \par \par On diagnosis Lambda serum FLCs 8.73 and kappa 0.92, for a ratio of 0.11 (or 9.5). Subsequent fat pad biopsy was done and was POSITIVE for congo red, with positive polarization. We were unable to send for mass spectrometry to confirm amyloid type. However, BMBx which showed ~35% plasmacytosis (monoclonal) c/w plasma cell neoplasm. This further confirmed the presence of systemic light chain amyloidosis. It did NOT meet MM criteria. \par \par Patient has multiple end organ difficulties related to her amyloidosis. 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 Her urine showing proteinuria on diagnosis (although not nephrotic range). She had peripheral neuropathy. She has chronic diarrhea with pathology from colonoscopy confirming amyloid deposition. Has poor taste sensation. \par \par \par Plan:\par -Had been planning for collection of stem cells, as she wishes to defer any autologous transplantl. On further discussion patient is even undecided on collection of stem cells, as requiring growth factor gets her anxious. Again has extensive discussion and reassured her that growth factor shouldn't affect her plasmacytosis however she still wanted to hold off. \par -Continue follow up with Cardiology and PCP. Patient asked about green tea extract again, but given possible interaction with Velcade did recommend against for now. \par -Continue with supportive care for diarrhea, imodium PRN. \par -Treating with maintenance daratumumab monthly and velcade every other week, dose reduced for neuropathy. We will continue with this regimen for now and continue to monitor serum FLC closely. \par -CBC reviewed, stable today. Mild anemia likely related to therapy. \par -Discussed investigational agent [id number] as patient has been doing research about this, it is not available commercially yet. Unclear utility for her, as on the trials eligibility requires measurable disease which she doesn't have right now (she is in [place]). Will investigate further though. \par -Patient understands and agrees with plan. All information explained to the best of my ability. \par -RTC in 1 month. \par

## 2023-05-17 NOTE — HISTORY OF PRESENT ILLNESS
[de-identified] : 59 y/o F with hx of thyroidectomy in 2011 (benign pathology) on thyroid replacement since then, and with COVID pneumonia back in March of 2020 ultimately signed herself out AMA, chronic issues thereafter, ended up going back to work in July 2020. In January-February 2021 had Pfizer vaccines. Started having issues starting in March 2021, she was having GI issues, endoscopy showed chronic gastritis. Additionally diagnosed with IBS at that point. She had been diagnosed with Postural Orthostatic Tachycardia Syndrome - her blood pressure went down when she stands up from a seated position (very severe hypotension). She had fainted "too many times to count." She ended up with bruises here and there but nothing severe where she would need to go to the hospital. She saw two neurologists who did extensive evaluations without any success in finding diagnostic lesions. She reported seeing specialist for dysautonomia, Dr. Colón at [place] (neurology) and started fludrocortisone in September 2021. Also at that point c/o burning skin, muscle aches, burning mouth and altered taste. She had lost 47 pounds from March 2021 to when I first saw her. She was ultimatley sent to me for evaluation of elevated free light chains. 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 She was found with no monoclonal protein on SPEP and hemoglobin was normal, with normal renal function, and no hypercalcemia. Subsequent fat pad biopsy showed positive congo red staining in rare blood vessels and positive for polarization. This was diagnostic for primary light chain amyloidosis.\par \par In March 2022 patient was admitted to [facility] for multiple recurrent syncopal episodes. While admitted course was complicated by multiple unresponsive episodes prompting RRTs, during which pt found to be hypotensive and bradycardic. She initially required levophed for BP support but ultimately BP remained stable while on Midodrine 20mg q8 and Fludrocortisone 0.2mg q24. Pt noted to intermittently have sinus pauses (2-3.8 seconds) thought to be 2/2 vagal episodes, and asymptomatic bradycardia overnight on telemetry. ECG noted NSR but 1st degree AV block. TTE 3/16/22 showed normal LV size and function with EF 55%-60%. No regional wall motion abnormalities. Moderate concentric LVH. Treated for C. diff and UTI during hospitalization as well. Ultimately at that point she was transferred to [facility] for consideration of inpatient chemotherapy. \par \par A PPM placement was recommended, as cardiac MRI showed evidence of definitive amyloid involvement. Pt had another RRT for hypotension and symptomatic bradycardia to 30s, was on pressor support until she received a dual chamber PPM. Pt also continued her C.diff treatment and chemotherapy was placed on hold until the diarrhea cleared. She was then started on Daratumumab + CyBorD inpatient, and tolerated it very well ; she completed 2 treatments of chemotherapy inpatient. She subseqently continued with this regimen as outpatient. Has been complicated by diarrhea which on evaluation by GI was deemed likely amyloid related. This was biopsy proven on colonoscopic / endoscopic evaluation in September 2022. Treated with supportive care since then. \par \par She has completed 7 full cycles of Danyell + CyBorD in October 2022. Based on serum FLC ratio she is in a complete response. \par Of note, patient reported that she is NOT interested in hematopoietic transplant at this time but she is interested in collecting her stem cells for a later date. \par At this point she is on maintenance Velcade and Darzalex Faspro. \par \par Disease: AL amyloidosis \par Pathology: Final Diagnosis (2/8/22). \par Tumor/ Prognostic Markers: Not enough tissue for mass spectrometry testing. \par \par Final Diagnosis (3/1/22)\par 1, 2. Bone marrow biopsy and bone marrow aspirate\par  - Plasma cell myeloma, 25-35% of cellularity by immunohistochemistry\par  - Trilineage hematopoiesis with maturation, mild eosinophilia, serous\par \par  atrophy, and iron stores present\par \par See note and description.\par \par Diagnostic note:\par As per chart review, the patient was referred for elevated free light\par  chains. Her light chain ratio was found to be very low at 0.11 and was\par  found to have hypogammaglobulinemia with no monoclonal protein on SPEP,\par  hemoglobin normal, normal renal function, and no hypercalcemia. Fat\par  pad biopsy showed positive congo red staining in rare blood vessels\par  and positive for polarization. Bone marrow to evaluate for plasma cell\par  myeloma. The bone marrow shows a significant plasma cell infiltrate by\par  immunohistochemistry (25-35% of cellularity, also positive with cyclin\par  D1, with monotypic plasma cells by flow cytometry. Congo red stain is\par  negative.\par \par Normal female karyotype seen on bone marrow. \par FISH Result: ABNORMAL FISH MULTIPLE MYELOMA PANEL -\par Atypical CCND1/IGH fusion pattern detected (3.5%). \par \par \par Current Treatment Status: Therapy: Danyell + Velcade Maintenance . Kleber + CyBorD started on 4/8/2022, held cytoxan from 4/18-5/12 for hematuria, held again 6/24 for worsening diarrhea\par Now s/p 7 cycles of Danyell+CyBorD (ending 10/28/22). \par \par  [de-identified] : Patient seen for follow up on 05/16/2023. She received treatment today, no side effects. She inquires about her most recent BMBx results, green tea extract, etc. Feels otherwise well. Remains uninterested in stem cell collection and  bone marrow transplant. No new medical diagnoses since her last visit. \par She still complained of diarrhea, which is again severe  but well managed. Still has poor taste sensation and neuropathy. Has been dealing with some more dyspnea as well recently, plans to follow up with cardiology shortly.

## 2023-05-19 LAB
ALBUMIN SERPL ELPH-MCNC: 4.1 G/DL
ALP BLD-CCNC: 71 U/L
ALT SERPL-CCNC: 22 U/L
ANION GAP SERPL CALC-SCNC: 9 MMOL/L
AST SERPL-CCNC: 17 U/L
BILIRUB SERPL-MCNC: 0.4 MG/DL
BUN SERPL-MCNC: 21 MG/DL
CALCIUM SERPL-MCNC: 9.4 MG/DL
CHLORIDE SERPL-SCNC: 106 MMOL/L
CO2 SERPL-SCNC: 27 MMOL/L
CREAT SERPL-MCNC: 0.47 MG/DL
EGFR: 110 ML/MIN/1.73M2
GLUCOSE SERPL-MCNC: 102 MG/DL
POTASSIUM SERPL-SCNC: 5 MMOL/L
PROT SERPL-MCNC: 5.8 G/DL
SODIUM SERPL-SCNC: 143 MMOL/L

## 2023-05-23 ENCOUNTER — NON-APPOINTMENT (OUTPATIENT)
Age: 58
End: 2023-05-23

## 2023-05-23 DIAGNOSIS — N39.0 URINARY TRACT INFECTION, SITE NOT SPECIFIED: ICD-10-CM

## 2023-05-23 LAB
ALBUMIN MFR SERPL ELPH: 65.8 %
ALBUMIN SERPL-MCNC: 3.8 G/DL
ALBUMIN/GLOB SERPL: 1.9 RATIO
ALPHA1 GLOB MFR SERPL ELPH: 4.8 %
ALPHA1 GLOB SERPL ELPH-MCNC: 0.3 G/DL
ALPHA2 GLOB MFR SERPL ELPH: 12 %
ALPHA2 GLOB SERPL ELPH-MCNC: 0.7 G/DL
B-GLOBULIN MFR SERPL ELPH: 11.4 %
B-GLOBULIN SERPL ELPH-MCNC: 0.7 G/DL
DEPRECATED KAPPA LC FREE/LAMBDA SER: 0.47 RATIO
GAMMA GLOB FLD ELPH-MCNC: 0.3 G/DL
GAMMA GLOB MFR SERPL ELPH: 6 %
IGA SER QL IEP: 22 MG/DL
IGG SER QL IEP: 381 MG/DL
IGM SER QL IEP: 12 MG/DL
INTERPRETATION SERPL IEP-IMP: NORMAL
KAPPA LC CSF-MCNC: 1.09 MG/DL
KAPPA LC SERPL-MCNC: 0.51 MG/DL
M PROTEIN MFR SERPL ELPH: NORMAL
M PROTEIN SPEC IFE-MCNC: NORMAL
MONOCLON BAND OBS SERPL: NORMAL
PROT SERPL-MCNC: 5.8 G/DL
PROT SERPL-MCNC: 5.8 G/DL

## 2023-05-24 ENCOUNTER — APPOINTMENT (OUTPATIENT)
Dept: CARDIOLOGY | Facility: CLINIC | Age: 58
End: 2023-05-24
Payer: MEDICAID

## 2023-05-24 ENCOUNTER — NON-APPOINTMENT (OUTPATIENT)
Age: 58
End: 2023-05-24

## 2023-05-24 VITALS
HEIGHT: 63 IN | BODY MASS INDEX: 18.43 KG/M2 | OXYGEN SATURATION: 100 % | SYSTOLIC BLOOD PRESSURE: 118 MMHG | WEIGHT: 104 LBS | DIASTOLIC BLOOD PRESSURE: 74 MMHG | RESPIRATION RATE: 17 BRPM | HEART RATE: 74 BPM

## 2023-05-24 DIAGNOSIS — R42 DIZZINESS AND GIDDINESS: ICD-10-CM

## 2023-05-24 PROCEDURE — 93000 ELECTROCARDIOGRAM COMPLETE: CPT

## 2023-05-24 PROCEDURE — 93306 TTE W/DOPPLER COMPLETE: CPT

## 2023-05-24 PROCEDURE — 93356 MYOCRD STRAIN IMG SPCKL TRCK: CPT

## 2023-05-24 PROCEDURE — 99215 OFFICE O/P EST HI 40 MIN: CPT | Mod: 25

## 2023-05-30 ENCOUNTER — RESULT REVIEW (OUTPATIENT)
Age: 58
End: 2023-05-30

## 2023-05-30 ENCOUNTER — APPOINTMENT (OUTPATIENT)
Dept: INFUSION THERAPY | Facility: HOSPITAL | Age: 58
End: 2023-05-30

## 2023-05-30 LAB
BASOPHILS # BLD AUTO: 0.03 K/UL — SIGNIFICANT CHANGE UP (ref 0–0.2)
BASOPHILS NFR BLD AUTO: 0.5 % — SIGNIFICANT CHANGE UP (ref 0–2)
EOSINOPHIL # BLD AUTO: 0.06 K/UL — SIGNIFICANT CHANGE UP (ref 0–0.5)
EOSINOPHIL NFR BLD AUTO: 1 % — SIGNIFICANT CHANGE UP (ref 0–6)
HCT VFR BLD CALC: 34.8 % — SIGNIFICANT CHANGE UP (ref 34.5–45)
HGB BLD-MCNC: 11.5 G/DL — SIGNIFICANT CHANGE UP (ref 11.5–15.5)
IMM GRANULOCYTES NFR BLD AUTO: 0.5 % — SIGNIFICANT CHANGE UP (ref 0–0.9)
LYMPHOCYTES # BLD AUTO: 1.12 K/UL — SIGNIFICANT CHANGE UP (ref 1–3.3)
LYMPHOCYTES # BLD AUTO: 18.9 % — SIGNIFICANT CHANGE UP (ref 13–44)
MCHC RBC-ENTMCNC: 31.8 PG — SIGNIFICANT CHANGE UP (ref 27–34)
MCHC RBC-ENTMCNC: 33 G/DL — SIGNIFICANT CHANGE UP (ref 32–36)
MCV RBC AUTO: 96.1 FL — SIGNIFICANT CHANGE UP (ref 80–100)
MONOCYTES # BLD AUTO: 0.39 K/UL — SIGNIFICANT CHANGE UP (ref 0–0.9)
MONOCYTES NFR BLD AUTO: 6.6 % — SIGNIFICANT CHANGE UP (ref 2–14)
NEUTROPHILS # BLD AUTO: 4.29 K/UL — SIGNIFICANT CHANGE UP (ref 1.8–7.4)
NEUTROPHILS NFR BLD AUTO: 72.5 % — SIGNIFICANT CHANGE UP (ref 43–77)
NRBC # BLD: 0 /100 WBCS — SIGNIFICANT CHANGE UP (ref 0–0)
PLATELET # BLD AUTO: 184 K/UL — SIGNIFICANT CHANGE UP (ref 150–400)
RBC # BLD: 3.62 M/UL — LOW (ref 3.8–5.2)
RBC # FLD: 12.3 % — SIGNIFICANT CHANGE UP (ref 10.3–14.5)
WBC # BLD: 5.92 K/UL — SIGNIFICANT CHANGE UP (ref 3.8–10.5)
WBC # FLD AUTO: 5.92 K/UL — SIGNIFICANT CHANGE UP (ref 3.8–10.5)

## 2023-05-31 PROBLEM — R42 POSTURAL DIZZINESS: Status: ACTIVE | Noted: 2021-06-21

## 2023-05-31 NOTE — HISTORY OF PRESENT ILLNESS
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 \par \par Lower extremity edema up to her waist. \par Patient continues to take fludocortisone 0.2 mg daily and midodrine 20 mg TID\par \par June 2022 - Patient returns today for follow-up.\par She has weaned herself off of the fludrocortisone. She now only gets orthostatic hypotension in association with frequent diarrhea. She has non-bloody, watery diarrhea up to 10 times per day. Some days, it is well controlled with Imodium, but she cannot remember the last well formed bowel movement. \par She continues to take midodrine 20 mg TID.\par She continues Cy-Bor-D plus daratumumab with Dr. Goldberg. \par \par August 2022 - TeleHealth Video Encounter\par Initiated by: Patient election for TeleHealth visit\par Patient was consented for TeleHealth visit\par Patient Location: Home\par Physician Location: Office ([address] [place] [address], [address], [place], [place], [address])\par Duration of Encounter: 40 minutes, at least 50% of which was spent in direct counseling and coordination of care.\par  \par Continues to have diarrhea. Will follow-up with GI (Dr. Holloway). She continues to have orthostatic symptoms related to her diarrhea. She notes it the most with frequent diarrhea. \par She is off of fludocortisone. She takes midodrine 20 mg TID. \par The swelling in her legs is only a problem when she sits for too long. When she walks, her legs are better.\par \par November 2022 - Patient returns today for follow-up of her light chain amyloidosis. \par She has completed Cytoxan. She continues to receive Velcade, poncho, and dexamethasone. \par Her lower extremity edema is markedly improved. She believes this is a result of a combination of being off the fludrocortisone, more walking, and her current diuretics. \par She continues to have diarrhea, and her biopsies of the GI tract suggested involvement of her amyloidosis. Her GI symptoms are somewhat improved by reducing fiber and stopping her Cytoxan. \par \par February 2023 - \par TeleHealth Video Encounter\par Initiated by: Patient election for TeleHealth visit\par Patient was consented for TeleHealth visit\par Patient Location: Home\par Physician Location: Office ([address] [place] [address], [address], [place], [place], [address])\par Duration of Encounter: 40 minutes, at least 50% of which was spent in direct counseling and coordination of care.\par  \par She is currently on maintenance chemo with Velcade every other week and daratumumab monthly. \par She is currently maintained on midodrine TID (15 mg, 15 mg, and 10 mg).\par She continues to have chronic diarrhea and chronic UTI. These are being managed. The diarrhea sometimes drops her blood pressure, but she is sensitive to it and adjust fluid intake accordingly. \par She gets occasional pinching in her chest that lasts 10-15 seconds and then resolves. It is not exertional.\par The lower extremity edema is much better.

## 2023-05-31 NOTE — DISCUSSION/SUMMARY
[EKG obtained to assist in diagnosis and management of assessed problem(s)] : EKG obtained to assist in diagnosis and management of assessed problem(s) [FreeTextEntry1] : 58 year-old woman with light chain amyloidosis, orthostatic hypotension requiring fludrocortisone and midodrine, now started on Cy-Bor-D plus daratumumab, with lower extremity edema, likely secondary to fludrocortisone, presents today or follow-up after recent hospitalization (March 2022).\par Myleoma therapies per hematology. She has completed Cytoxan and remains on Velcade, dexamethasone, and poncho. \par Now off of fludocortisone, and her lower extremity edema is much better. \par Continue working to reduce her midodrine to see how she feels. \par \par Encouraged trying Green tea for EGCG, but she was advised this would interfere with her Velcade.\par \par Follow-up with GI regarding diarrhea.\par Follow-up with GI and hematology regarding elevated LFTs.\par \par Encouraged ambulation as tolerated.\par Elevate the legs when at rest.

## 2023-05-31 NOTE — REASON FOR VISIT
[Other: ____] : [unfilled] [FreeTextEntry1] : May 2023 - Patient returns today for follow-up in her usual state of health. Yesterday, she walked 1800 steps in 30 minutes (using her walker), and she did not feel winded.\par Midodrine is currently 10 mg, 15 mg, 10 mg.\par She noted very low blood pressure associated with her diarrhea.

## 2023-05-31 NOTE — CARDIOLOGY SUMMARY
[de-identified] : 7/27/2022, paced at 70 bpm [de-identified] : 3/30/2022, septum 1.2 cm, PWT 1.2 cm, moderately dilated LA, normal LV systolic function, average GLS -14.6%, pattern is\par homogeneous (i.e. not suggestive of amyloid), LVEF 60%\par \par 7/27/2022, septum 1.2 cm, PWT 1.3 cm, mildly dilated LA, normal LV systolic function, average GLS - 15%, LVEF 60-65%\par \par 11/1/2022, septum 1.4 cm, PWT 1.3 cm, dilated LA, low normal LV systolic function, average GLS - 12%, LVEF 50-55% [de-identified] : 3/28/2022, Small pericardial and bilateral pleural effusions. Subtle inferior lateral basal myocardial wall with gadolinium enhancement.  Biatrial enlargement with associated late gadolinium enhancement. These findings may represent amyloidosis. [de-identified] : 3/30/2022 Medtronic Margarita XT DR MRI dual chamber PPM implanted by Hasmukh Villarreal MD, PhD

## 2023-06-06 NOTE — PROGRESS NOTE ADULT - PROBLEM/PLAN-6
DISPLAY PLAN FREE TEXT
yes
DISPLAY PLAN FREE TEXT

## 2023-06-13 ENCOUNTER — LABORATORY RESULT (OUTPATIENT)
Age: 58
End: 2023-06-13

## 2023-06-13 ENCOUNTER — APPOINTMENT (OUTPATIENT)
Dept: INFUSION THERAPY | Facility: HOSPITAL | Age: 58
End: 2023-06-13

## 2023-06-13 ENCOUNTER — RESULT REVIEW (OUTPATIENT)
Age: 58
End: 2023-06-13

## 2023-06-13 ENCOUNTER — APPOINTMENT (OUTPATIENT)
Dept: HEMATOLOGY ONCOLOGY | Facility: CLINIC | Age: 58
End: 2023-06-13
Payer: MEDICAID

## 2023-06-13 ENCOUNTER — APPOINTMENT (OUTPATIENT)
Dept: HEMATOLOGY ONCOLOGY | Facility: CLINIC | Age: 58
End: 2023-06-13

## 2023-06-13 LAB
BASOPHILS # BLD AUTO: 0.04 K/UL — SIGNIFICANT CHANGE UP (ref 0–0.2)
BASOPHILS NFR BLD AUTO: 0.9 % — SIGNIFICANT CHANGE UP (ref 0–2)
EOSINOPHIL # BLD AUTO: 0.1 K/UL — SIGNIFICANT CHANGE UP (ref 0–0.5)
EOSINOPHIL NFR BLD AUTO: 2.1 % — SIGNIFICANT CHANGE UP (ref 0–6)
HCT VFR BLD CALC: 32.2 % — LOW (ref 34.5–45)
HGB BLD-MCNC: 10.5 G/DL — LOW (ref 11.5–15.5)
IMM GRANULOCYTES NFR BLD AUTO: 0.2 % — SIGNIFICANT CHANGE UP (ref 0–0.9)
LYMPHOCYTES # BLD AUTO: 1.23 K/UL — SIGNIFICANT CHANGE UP (ref 1–3.3)
LYMPHOCYTES # BLD AUTO: 26.2 % — SIGNIFICANT CHANGE UP (ref 13–44)
MCHC RBC-ENTMCNC: 31.8 PG — SIGNIFICANT CHANGE UP (ref 27–34)
MCHC RBC-ENTMCNC: 32.6 G/DL — SIGNIFICANT CHANGE UP (ref 32–36)
MCV RBC AUTO: 97.6 FL — SIGNIFICANT CHANGE UP (ref 80–100)
MONOCYTES # BLD AUTO: 0.33 K/UL — SIGNIFICANT CHANGE UP (ref 0–0.9)
MONOCYTES NFR BLD AUTO: 7 % — SIGNIFICANT CHANGE UP (ref 2–14)
NEUTROPHILS # BLD AUTO: 2.98 K/UL — SIGNIFICANT CHANGE UP (ref 1.8–7.4)
NEUTROPHILS NFR BLD AUTO: 63.6 % — SIGNIFICANT CHANGE UP (ref 43–77)
NRBC # BLD: 0 /100 WBCS — SIGNIFICANT CHANGE UP (ref 0–0)
PLATELET # BLD AUTO: 185 K/UL — SIGNIFICANT CHANGE UP (ref 150–400)
RBC # BLD: 3.3 M/UL — LOW (ref 3.8–5.2)
RBC # FLD: 12.3 % — SIGNIFICANT CHANGE UP (ref 10.3–14.5)
WBC # BLD: 4.69 K/UL — SIGNIFICANT CHANGE UP (ref 3.8–10.5)
WBC # FLD AUTO: 4.69 K/UL — SIGNIFICANT CHANGE UP (ref 3.8–10.5)

## 2023-06-13 PROCEDURE — 99214 OFFICE O/P EST MOD 30 MIN: CPT

## 2023-06-13 NOTE — HISTORY OF PRESENT ILLNESS
[de-identified] : 59 y/o F with hx of thyroidectomy in 2011 (benign pathology) on thyroid replacement since then, and with COVID pneumonia back in March of 2020 ultimately signed herself out AMA, chronic issues thereafter, ended up going back to work in July 2020. In January-February 2021 had Pfizer vaccines. Started having issues starting in March 2021, she was having GI issues, endoscopy showed chronic gastritis. Additionally diagnosed with IBS at that point. She had been diagnosed with Postural Orthostatic Tachycardia Syndrome - her blood pressure went down when she stands up from a seated position (very severe hypotension). She had fainted "too many times to count." She ended up with bruises here and there but nothing severe where she would need to go to the hospital. She saw two neurologists who did extensive evaluations without any success in finding diagnostic lesions. She reported seeing specialist for dysautonomia, Dr. Colón at [place] (neurology) and started fludrocortisone in September 2021. Also at that point c/o burning skin, muscle aches, burning mouth and altered taste. She had lost 47 pounds from March 2021 to when I first saw her. She was ultimatley sent to me for evaluation of elevated free light chains. 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 She was found with no monoclonal protein on SPEP and hemoglobin was normal, with normal renal function, and no hypercalcemia. Subsequent fat pad biopsy showed positive congo red staining in rare blood vessels and positive for polarization. This was diagnostic for primary light chain amyloidosis.\par \par In March 2022 patient was admitted to [facility] for multiple recurrent syncopal episodes. While admitted course was complicated by multiple unresponsive episodes prompting RRTs, during which pt found to be hypotensive and bradycardic. She initially required levophed for BP support but ultimately BP remained stable while on Midodrine 20mg q8 and Fludrocortisone 0.2mg q24. Pt noted to intermittently have sinus pauses (2-3.8 seconds) thought to be 2/2 vagal episodes, and asymptomatic bradycardia overnight on telemetry. ECG noted NSR but 1st degree AV block. TTE 3/16/22 showed normal LV size and function with EF 55%-60%. No regional wall motion abnormalities. Moderate concentric LVH. Treated for C. diff and UTI during hospitalization as well. Ultimately at that point she was transferred to [facility] for consideration of inpatient chemotherapy. \par \par A PPM placement was recommended, as cardiac MRI showed evidence of definitive amyloid involvement. Pt had another RRT for hypotension and symptomatic bradycardia to 30s, was on pressor support until she received a dual chamber PPM. Pt also continued her C.diff treatment and chemotherapy was placed on hold until the diarrhea cleared. She was then started on Daratumumab + CyBorD inpatient, and tolerated it very well ; she completed 2 treatments of chemotherapy inpatient. She subseqently continued with this regimen as outpatient. Has been complicated by diarrhea which on evaluation by GI was deemed likely amyloid related. This was biopsy proven on colonoscopic / endoscopic evaluation in September 2022. Treated with supportive care since then. \par \par She has completed 7 full cycles of Danyell + CyBorD in October 2022. Based on serum FLC ratio she is in a complete response. \par Of note, patient reported that she is NOT interested in hematopoietic transplant at this time but she is interested in collecting her stem cells for a later date. \par At this point she is on maintenance Velcade and Darzalex Faspro. \par \par Disease: AL amyloidosis \par Pathology: Final Diagnosis (2/8/22). \par Tumor/ Prognostic Markers: Not enough tissue for mass spectrometry testing. \par \par Final Diagnosis (3/1/22)\par 1, 2. Bone marrow biopsy and bone marrow aspirate\par  - Plasma cell myeloma, 25-35% of cellularity by immunohistochemistry\par  - Trilineage hematopoiesis with maturation, mild eosinophilia, serous\par \par  atrophy, and iron stores present\par \par See note and description.\par \par Diagnostic note:\par As per chart review, the patient was referred for elevated free light\par  chains. Her light chain ratio was found to be very low at 0.11 and was\par  found to have hypogammaglobulinemia with no monoclonal protein on SPEP,\par  hemoglobin normal, normal renal function, and no hypercalcemia. Fat\par  pad biopsy showed positive congo red staining in rare blood vessels\par  and positive for polarization. Bone marrow to evaluate for plasma cell\par  myeloma. The bone marrow shows a significant plasma cell infiltrate by\par  immunohistochemistry (25-35% of cellularity, also positive with cyclin\par  D1, with monotypic plasma cells by flow cytometry. Congo red stain is\par  negative.\par \par Normal female karyotype seen on bone marrow. \par FISH Result: ABNORMAL FISH MULTIPLE MYELOMA PANEL -\par Atypical CCND1/IGH fusion pattern detected (3.5%). \par \par \par Current Treatment Status: Therapy: Danyell + Velcade Maintenance . Kleber + CyBorD started on 4/8/2022, held cytoxan from 4/18-5/12 for hematuria, held again 6/24 for worsening diarrhea\par Now s/p 7 cycles of Adnyell+CyBorD (ending 10/28/22). \par \par  [de-identified] : Patient seen for follow up. She is receiving treatment today and has been in her usual state of health. Patient has been having success with less diarrhea and had multiple days in a row without even needing to take Imodium. This lasted until this weekend when she was out and ate chips and salsa as well as mayonnaise based foods. She then developed diarrhea that night and yesterday continues to have multiple incidents. Today she has not had any diarrhea but she also has not ate yet. Still has poor taste sensation and neuropathy. Had noticed continued shortness of breath that was increasing in frequency. Per patient she had seen her cardiologist who told her that her echo was "a little better." Her breathing has come back to baseline when she notices she gets short of breath walking long distances. Would like to pursue harvesting her cells but is concerned about financial responsibility and would like to ensure she is able to afford the storage.

## 2023-06-13 NOTE — ASSESSMENT
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 Cycle 1 of CyBorD was initiated on 4/1/22 Completed 7 full cycles of poncho-CyBorD on 10/28/22. She is now on maintenance with daratumumab monthly with Velcade [id number]. She has had a complete response from the standpoint of her amyloidosis. Repeat BM bx on 2/8/23 showed minimal involvement by patients known plasma cell neoplasm/myeloma (less than 5% of the cellularity). Still with evidence of FISH positive 11;14 translocation at this point in 2.5% of cells. Venetoclax may be suitable agent if serum FLC uptrending. \par \par On diagnosis Lambda serum FLCs 8.73 and kappa 0.92, for a ratio of 0.11 (or 9.5). Subsequent fat pad biopsy was done and was POSITIVE for congo red, with positive polarization. We were unable to send for mass spectrometry to confirm amyloid type. However, BMBx which showed ~35% plasmacytosis (monoclonal) c/w plasma cell neoplasm. This further confirmed the presence of systemic light chain amyloidosis. It did NOT meet MM criteria. \par \par Patient has multiple end organ difficulties related to her amyloidosis. 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 Her urine showing proteinuria on diagnosis (although not nephrotic range). She had peripheral neuropathy. She has chronic diarrhea with pathology from colonoscopy confirming amyloid deposition. Has poor taste sensation. \par \par \par Plan:\par -Had been planning for collection of stem cells, as she wishes to defer any autologous transplant. On further discussion patient is even undecided on collection of stem cells, as requiring growth factor gets her anxious. Again has extensive discussion and reassured her that growth factor shouldn't affect her plasmacytosis however she still wanted to hold off. At this time the patient would like to proceed with collection however she requires more information of the financial aspect including storage costs. Will message BMT team.\par -Continue follow up with Cardiology and PCP. Patient asked about green tea extract again, but given possible interaction with Velcade did recommend against for now. \par -Continue with supportive care for diarrhea, imodium PRN. \par -Treating with maintenance daratumumab monthly and velcade every other week, dose reduced for neuropathy. We will continue with this regimen for now and continue to monitor serum FLC closely. \par -CBC reviewed, stable today. Mild anemia likely related to therapy. \par -Discussed investigational agent [id number] as patient has been doing research about this, it is not available commercially yet. Unclear utility for her, as on the trials eligibility requires measurable disease which she doesn't have right now (she is in [place]). Will investigate further though. \par -Patient understands and agrees with plan. All information explained to the best of my ability. \par -RTC in 1 month. \par

## 2023-06-13 NOTE — PHYSICAL EXAM
[Fully active, able to carry on all pre-disease performance without restriction] : Status 0 - Fully active, able to carry on all pre-disease performance without restriction [Normal] : affect appropriate [de-identified] : supple [de-identified] : (+)S1S2 RRR [de-identified] : no edema [de-identified] : no pain

## 2023-06-13 NOTE — REVIEW OF SYSTEMS
[Diarrhea: Grade 0] : Diarrhea: Grade 0 [Negative] : Allergic/Immunologic [Vision Problems] : no vision problems [Dysphagia] : no dysphagia [Nosebleeds] : no nosebleeds [Odynophagia] : no odynophagia [Chest Pain] : no chest pain [Palpitations] : no palpitations [Lower Ext Edema] : no lower extremity edema [Abdominal Pain] : no abdominal pain [Vomiting] : no vomiting [Dysuria] : no dysuria [Insomnia] : no insomnia [Anxiety] : no anxiety [Hot Flashes] : no hot flashes [FreeTextEntry7] : diarrhea per HPI

## 2023-06-14 DIAGNOSIS — E85.9 AMYLOIDOSIS, UNSPECIFIED: ICD-10-CM

## 2023-06-15 ENCOUNTER — APPOINTMENT (OUTPATIENT)
Dept: UROLOGY | Facility: CLINIC | Age: 58
End: 2023-06-15

## 2023-06-21 ENCOUNTER — NON-APPOINTMENT (OUTPATIENT)
Age: 58
End: 2023-06-21

## 2023-06-27 ENCOUNTER — APPOINTMENT (OUTPATIENT)
Dept: HEMATOLOGY ONCOLOGY | Facility: CLINIC | Age: 58
End: 2023-06-27

## 2023-06-27 ENCOUNTER — APPOINTMENT (OUTPATIENT)
Dept: INFUSION THERAPY | Facility: HOSPITAL | Age: 58
End: 2023-06-27

## 2023-06-27 ENCOUNTER — RESULT REVIEW (OUTPATIENT)
Age: 58
End: 2023-06-27

## 2023-06-27 LAB
BASOPHILS # BLD AUTO: 0.04 K/UL — SIGNIFICANT CHANGE UP (ref 0–0.2)
BASOPHILS NFR BLD AUTO: 0.8 % — SIGNIFICANT CHANGE UP (ref 0–2)
EOSINOPHIL # BLD AUTO: 0.09 K/UL — SIGNIFICANT CHANGE UP (ref 0–0.5)
EOSINOPHIL NFR BLD AUTO: 1.8 % — SIGNIFICANT CHANGE UP (ref 0–6)
HCT VFR BLD CALC: 33.4 % — LOW (ref 34.5–45)
HGB BLD-MCNC: 11.1 G/DL — LOW (ref 11.5–15.5)
IMM GRANULOCYTES NFR BLD AUTO: 0.6 % — SIGNIFICANT CHANGE UP (ref 0–0.9)
LYMPHOCYTES # BLD AUTO: 1.11 K/UL — SIGNIFICANT CHANGE UP (ref 1–3.3)
LYMPHOCYTES # BLD AUTO: 22.3 % — SIGNIFICANT CHANGE UP (ref 13–44)
MCHC RBC-ENTMCNC: 32.2 PG — SIGNIFICANT CHANGE UP (ref 27–34)
MCHC RBC-ENTMCNC: 33.2 G/DL — SIGNIFICANT CHANGE UP (ref 32–36)
MCV RBC AUTO: 96.8 FL — SIGNIFICANT CHANGE UP (ref 80–100)
MONOCYTES # BLD AUTO: 0.38 K/UL — SIGNIFICANT CHANGE UP (ref 0–0.9)
MONOCYTES NFR BLD AUTO: 7.6 % — SIGNIFICANT CHANGE UP (ref 2–14)
NEUTROPHILS # BLD AUTO: 3.32 K/UL — SIGNIFICANT CHANGE UP (ref 1.8–7.4)
NEUTROPHILS NFR BLD AUTO: 66.9 % — SIGNIFICANT CHANGE UP (ref 43–77)
NRBC # BLD: 0 /100 WBCS — SIGNIFICANT CHANGE UP (ref 0–0)
PLATELET # BLD AUTO: 189 K/UL — SIGNIFICANT CHANGE UP (ref 150–400)
RBC # BLD: 3.45 M/UL — LOW (ref 3.8–5.2)
RBC # FLD: 12.3 % — SIGNIFICANT CHANGE UP (ref 10.3–14.5)
WBC # BLD: 4.97 K/UL — SIGNIFICANT CHANGE UP (ref 3.8–10.5)
WBC # FLD AUTO: 4.97 K/UL — SIGNIFICANT CHANGE UP (ref 3.8–10.5)

## 2023-07-06 ENCOUNTER — RX RENEWAL (OUTPATIENT)
Age: 58
End: 2023-07-06

## 2023-07-10 ENCOUNTER — APPOINTMENT (OUTPATIENT)
Dept: HEMATOLOGY ONCOLOGY | Facility: CLINIC | Age: 58
End: 2023-07-10
Payer: MEDICAID

## 2023-07-10 ENCOUNTER — RESULT REVIEW (OUTPATIENT)
Age: 58
End: 2023-07-10

## 2023-07-10 ENCOUNTER — APPOINTMENT (OUTPATIENT)
Dept: HEMATOLOGY ONCOLOGY | Facility: CLINIC | Age: 58
End: 2023-07-10

## 2023-07-10 ENCOUNTER — APPOINTMENT (OUTPATIENT)
Dept: INFUSION THERAPY | Facility: HOSPITAL | Age: 58
End: 2023-07-10

## 2023-07-10 VITALS
SYSTOLIC BLOOD PRESSURE: 117 MMHG | BODY MASS INDEX: 19.25 KG/M2 | DIASTOLIC BLOOD PRESSURE: 72 MMHG | RESPIRATION RATE: 16 BRPM | HEART RATE: 78 BPM | WEIGHT: 108.69 LBS | OXYGEN SATURATION: 98 % | TEMPERATURE: 96.6 F

## 2023-07-10 LAB
BASOPHILS # BLD AUTO: 0.03 K/UL — SIGNIFICANT CHANGE UP (ref 0–0.2)
BASOPHILS NFR BLD AUTO: 0.6 % — SIGNIFICANT CHANGE UP (ref 0–2)
EOSINOPHIL # BLD AUTO: 0.09 K/UL — SIGNIFICANT CHANGE UP (ref 0–0.5)
EOSINOPHIL NFR BLD AUTO: 1.7 % — SIGNIFICANT CHANGE UP (ref 0–6)
HCT VFR BLD CALC: 31.6 % — LOW (ref 34.5–45)
HGB BLD-MCNC: 10.8 G/DL — LOW (ref 11.5–15.5)
IMM GRANULOCYTES NFR BLD AUTO: 0.2 % — SIGNIFICANT CHANGE UP (ref 0–0.9)
LYMPHOCYTES # BLD AUTO: 0.96 K/UL — LOW (ref 1–3.3)
LYMPHOCYTES # BLD AUTO: 18.2 % — SIGNIFICANT CHANGE UP (ref 13–44)
MCHC RBC-ENTMCNC: 32.8 PG — SIGNIFICANT CHANGE UP (ref 27–34)
MCHC RBC-ENTMCNC: 34.2 G/DL — SIGNIFICANT CHANGE UP (ref 32–36)
MCV RBC AUTO: 96 FL — SIGNIFICANT CHANGE UP (ref 80–100)
MONOCYTES # BLD AUTO: 0.47 K/UL — SIGNIFICANT CHANGE UP (ref 0–0.9)
MONOCYTES NFR BLD AUTO: 8.9 % — SIGNIFICANT CHANGE UP (ref 2–14)
NEUTROPHILS # BLD AUTO: 3.71 K/UL — SIGNIFICANT CHANGE UP (ref 1.8–7.4)
NEUTROPHILS NFR BLD AUTO: 70.4 % — SIGNIFICANT CHANGE UP (ref 43–77)
NRBC # BLD: 0 /100 WBCS — SIGNIFICANT CHANGE UP (ref 0–0)
PLATELET # BLD AUTO: 202 K/UL — SIGNIFICANT CHANGE UP (ref 150–400)
RBC # BLD: 3.29 M/UL — LOW (ref 3.8–5.2)
RBC # FLD: 12.1 % — SIGNIFICANT CHANGE UP (ref 10.3–14.5)
WBC # BLD: 5.27 K/UL — SIGNIFICANT CHANGE UP (ref 3.8–10.5)
WBC # FLD AUTO: 5.27 K/UL — SIGNIFICANT CHANGE UP (ref 3.8–10.5)

## 2023-07-10 PROCEDURE — 99214 OFFICE O/P EST MOD 30 MIN: CPT

## 2023-07-10 NOTE — HISTORY OF PRESENT ILLNESS
[de-identified] : 57 y/o F with hx of thyroidectomy in 2011 (benign pathology) on thyroid replacement since then, and with COVID pneumonia back in March of 2020 ultimately signed herself out AMA, chronic issues thereafter, ended up going back to work in July 2020. In January-February 2021 had Pfizer vaccines. Started having issues starting in March 2021, she was having GI issues, endoscopy showed chronic gastritis. Additionally diagnosed with IBS at that point. She had been diagnosed with Postural Orthostatic Tachycardia Syndrome - her blood pressure went down when she stands up from a seated position (very severe hypotension). She had fainted "too many times to count." She ended up with bruises here and there but nothing severe where she would need to go to the hospital. She saw two neurologists who did extensive evaluations without any success in finding diagnostic lesions. She reported seeing specialist for dysautonomia, Dr. Colón at [place] (neurology) and started fludrocortisone in September 2021. Also at that point c/o burning skin, muscle aches, burning mouth and altered taste. She had lost 47 pounds from March 2021 to when I first saw her. She was ultimatley sent to me for evaluation of elevated free light chains. 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 She was found with no monoclonal protein on SPEP and hemoglobin was normal, with normal renal function, and no hypercalcemia. Subsequent fat pad biopsy showed positive congo red staining in rare blood vessels and positive for polarization. This was diagnostic for primary light chain amyloidosis.\par \par In March 2022 patient was admitted to [facility] for multiple recurrent syncopal episodes. While admitted course was complicated by multiple unresponsive episodes prompting RRTs, during which pt found to be hypotensive and bradycardic. She initially required levophed for BP support but ultimately BP remained stable while on Midodrine 20mg q8 and Fludrocortisone 0.2mg q24. Pt noted to intermittently have sinus pauses (2-3.8 seconds) thought to be 2/2 vagal episodes, and asymptomatic bradycardia overnight on telemetry. ECG noted NSR but 1st degree AV block. TTE 3/16/22 showed normal LV size and function with EF 55%-60%. No regional wall motion abnormalities. Moderate concentric LVH. Treated for C. diff and UTI during hospitalization as well. Ultimately at that point she was transferred to [facility] for consideration of inpatient chemotherapy. \par \par A PPM placement was recommended, as cardiac MRI showed evidence of definitive amyloid involvement. Pt had another RRT for hypotension and symptomatic bradycardia to 30s, was on pressor support until she received a dual chamber PPM. Pt also continued her C.diff treatment and chemotherapy was placed on hold until the diarrhea cleared. She was then started on Daratumumab + CyBorD inpatient, and tolerated it very well ; she completed 2 treatments of chemotherapy inpatient. She subseqently continued with this regimen as outpatient. Has been complicated by diarrhea which on evaluation by GI was deemed likely amyloid related. This was biopsy proven on colonoscopic / endoscopic evaluation in September 2022. Treated with supportive care since then. \par \par She has completed 7 full cycles of Danyell + CyBorD in October 2022. Based on serum FLC ratio she was in a complete response. \par Of note, patient reported that she is NOT interested in hematopoietic transplant at this time but she is interested in collecting her stem cells for a later date. \par At this point she is on maintenance Velcade and Darzalex Faspro. \par \par Disease: AL amyloidosis \par Pathology: Final Diagnosis (2/8/22). \par Tumor/ Prognostic Markers: Not enough tissue for mass spectrometry testing. \par \par Final Diagnosis (3/1/22)\par 1, 2. Bone marrow biopsy and bone marrow aspirate\par  - Plasma cell myeloma, 25-35% of cellularity by immunohistochemistry\par  - Trilineage hematopoiesis with maturation, mild eosinophilia, serous\par \par  atrophy, and iron stores present\par \par See note and description.\par \par Diagnostic note:\par As per chart review, the patient was referred for elevated free light\par  chains. Her light chain ratio was found to be very low at 0.11 and was\par  found to have hypogammaglobulinemia with no monoclonal protein on SPEP,\par  hemoglobin normal, normal renal function, and no hypercalcemia. Fat\par  pad biopsy showed positive congo red staining in rare blood vessels\par  and positive for polarization. Bone marrow to evaluate for plasma cell\par  myeloma. The bone marrow shows a significant plasma cell infiltrate by\par  immunohistochemistry (25-35% of cellularity, also positive with cyclin\par  D1, with monotypic plasma cells by flow cytometry. Congo red stain is\par  negative.\par \par Normal female karyotype seen on bone marrow. \par FISH Result: ABNORMAL FISH MULTIPLE MYELOMA PANEL -\par Atypical CCND1/IGH fusion pattern detected (3.5%). \par \par \par Current Treatment Status: Therapy: Danyell + Velcade Maintenance . Kleber + CyBorD started on 4/8/2022, held cytoxan from 4/18-5/12 for hematuria, held again 6/24 for worsening diarrhea\par Now s/p 7 cycles of Danyell+CyBorD (ending 10/28/22). \par \par  [de-identified] : Patient seen for follow up on 07/10/2023. Overall she is doing well. Her taste sensation has changed but still not great. Her blood pressure has been reasonably controlled. She has been on lower dose of midodrine - she also had repeat echocardiogram which looked improved. She otherwise has had a little bit of weight loss. She still experiences diarrhea, she still takes lomotil 4 times daily and imodium as well. She takes Zofran here and there as well. She sometimes only goes once or twice a day, but others she's there all day. When its once or twice per day it was formed.

## 2023-07-10 NOTE — ASSESSMENT
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 Cycle 1 of CyBorD was initiated on 4/1/22 Completed 7 full cycles of poncho-CyBorD on 10/28/22. She is now on maintenance with daratumumab monthly with Velcade [id number]. She has had a complete response from the standpoint of her amyloidosis. Repeat BM bx on 2/8/23 showed minimal involvement by patients known plasma cell neoplasm/myeloma (less than 5% of the cellularity). Still with evidence of FISH positive 11;14 translocation at this point in 2.5% of cells. Venetoclax may be suitable agent if serum FLC uptrending. \par \par On diagnosis Lambda serum FLCs 8.73 and kappa 0.92, for a ratio of 0.11 (or 9.5). Subsequent fat pad biopsy was done and was POSITIVE for congo red, with positive polarization. We were unable to send for mass spectrometry to confirm amyloid type. However, BMBx which showed ~35% plasmacytosis (monoclonal) c/w plasma cell neoplasm. This further confirmed the presence of systemic light chain amyloidosis. It did NOT meet MM criteria. \par \par Patient has multiple end organ difficulties related to her amyloidosis. 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 Her urine showing proteinuria on diagnosis (although not nephrotic range). She had peripheral neuropathy. She has chronic diarrhea with pathology from colonoscopy confirming amyloid deposition. Has poor taste sensation. Some of this has improved in intensity but still an ongoing issue. \par \par \par Plan:\par -Had been planning for collection of stem cells but according to patient she discussed with the BMT team and unable to do this. Will follow up with them myself as well. \par -Continue follow up with Cardiology and PCP. Repeat echocardiogram showing some improvement. \par -Continue with supportive care for diarrhea, imodium PRN. \par -Treating with maintenance daratumumab monthly and velcade every other week, dose reduced for neuropathy. We will continue with this regimen for now and continue to monitor serum FLC closely. This has been stable. \par -CBC reviewed, stable today. \par -Again discussed investigational agent [id number] as patient has been doing research about this, it is not available commercially yet. Unclear utility for her, as on the trials eligibility requires measurable disease which she doesn't have right now (she is in [place]). Will continue to investigate further though. \par -Patient understands and agrees with plan. All information explained to the best of my ability. \par -RTC in 1 month. \par

## 2023-07-10 NOTE — PHYSICAL EXAM
[Fully active, able to carry on all pre-disease performance without restriction] : Status 0 - Fully active, able to carry on all pre-disease performance without restriction [Normal] : affect appropriate [de-identified] : supple [de-identified] : (+)S1S2 RRR [de-identified] : no edema [de-identified] : no pain

## 2023-07-14 ENCOUNTER — OUTPATIENT (OUTPATIENT)
Dept: OUTPATIENT SERVICES | Facility: HOSPITAL | Age: 58
LOS: 1 days | Discharge: HOME | End: 2023-07-14

## 2023-07-14 DIAGNOSIS — D47.2 MONOCLONAL GAMMOPATHY: ICD-10-CM

## 2023-07-14 DIAGNOSIS — E85.9 AMYLOIDOSIS, UNSPECIFIED: ICD-10-CM

## 2023-07-24 ENCOUNTER — RESULT REVIEW (OUTPATIENT)
Age: 58
End: 2023-07-24

## 2023-07-24 ENCOUNTER — APPOINTMENT (OUTPATIENT)
Dept: INFUSION THERAPY | Facility: HOSPITAL | Age: 58
End: 2023-07-24

## 2023-07-24 ENCOUNTER — APPOINTMENT (OUTPATIENT)
Dept: HEMATOLOGY ONCOLOGY | Facility: CLINIC | Age: 58
End: 2023-07-24

## 2023-07-24 DIAGNOSIS — Z51.11 ENCOUNTER FOR ANTINEOPLASTIC CHEMOTHERAPY: ICD-10-CM

## 2023-07-24 DIAGNOSIS — R11.2 NAUSEA WITH VOMITING, UNSPECIFIED: ICD-10-CM

## 2023-07-24 LAB
BASOPHILS # BLD AUTO: 0.05 K/UL — SIGNIFICANT CHANGE UP (ref 0–0.2)
BASOPHILS NFR BLD AUTO: 0.8 % — SIGNIFICANT CHANGE UP (ref 0–2)
EOSINOPHIL # BLD AUTO: 0.11 K/UL — SIGNIFICANT CHANGE UP (ref 0–0.5)
EOSINOPHIL NFR BLD AUTO: 1.7 % — SIGNIFICANT CHANGE UP (ref 0–6)
HCT VFR BLD CALC: 33.5 % — LOW (ref 34.5–45)
HGB BLD-MCNC: 10.9 G/DL — LOW (ref 11.5–15.5)
IMM GRANULOCYTES NFR BLD AUTO: 0.3 % — SIGNIFICANT CHANGE UP (ref 0–0.9)
LYMPHOCYTES # BLD AUTO: 1.1 K/UL — SIGNIFICANT CHANGE UP (ref 1–3.3)
LYMPHOCYTES # BLD AUTO: 17.5 % — SIGNIFICANT CHANGE UP (ref 13–44)
MCHC RBC-ENTMCNC: 31.8 PG — SIGNIFICANT CHANGE UP (ref 27–34)
MCHC RBC-ENTMCNC: 32.5 G/DL — SIGNIFICANT CHANGE UP (ref 32–36)
MCV RBC AUTO: 97.7 FL — SIGNIFICANT CHANGE UP (ref 80–100)
MONOCYTES # BLD AUTO: 0.48 K/UL — SIGNIFICANT CHANGE UP (ref 0–0.9)
MONOCYTES NFR BLD AUTO: 7.6 % — SIGNIFICANT CHANGE UP (ref 2–14)
NEUTROPHILS # BLD AUTO: 4.54 K/UL — SIGNIFICANT CHANGE UP (ref 1.8–7.4)
NEUTROPHILS NFR BLD AUTO: 72.1 % — SIGNIFICANT CHANGE UP (ref 43–77)
NRBC # BLD: 0 /100 WBCS — SIGNIFICANT CHANGE UP (ref 0–0)
PLATELET # BLD AUTO: 192 K/UL — SIGNIFICANT CHANGE UP (ref 150–400)
RBC # BLD: 3.43 M/UL — LOW (ref 3.8–5.2)
RBC # FLD: 12.1 % — SIGNIFICANT CHANGE UP (ref 10.3–14.5)
WBC # BLD: 6.3 K/UL — SIGNIFICANT CHANGE UP (ref 3.8–10.5)
WBC # FLD AUTO: 6.3 K/UL — SIGNIFICANT CHANGE UP (ref 3.8–10.5)

## 2023-07-28 ENCOUNTER — NON-APPOINTMENT (OUTPATIENT)
Age: 58
End: 2023-07-28

## 2023-07-28 ENCOUNTER — TRANSCRIPTION ENCOUNTER (OUTPATIENT)
Age: 58
End: 2023-07-28

## 2023-07-28 ENCOUNTER — APPOINTMENT (OUTPATIENT)
Dept: ELECTROPHYSIOLOGY | Facility: CLINIC | Age: 58
End: 2023-07-28
Payer: MEDICAID

## 2023-07-28 LAB
ALBUMIN MFR SERPL ELPH: 65.5 %
ALBUMIN SERPL ELPH-MCNC: 4.3 G/DL
ALBUMIN SERPL-MCNC: 3.8 G/DL
ALBUMIN/GLOB SERPL: 1.9 RATIO
ALP BLD-CCNC: 79 U/L
ALPHA1 GLOB MFR SERPL ELPH: 5.1 %
ALPHA1 GLOB SERPL ELPH-MCNC: 0.3 G/DL
ALPHA2 GLOB MFR SERPL ELPH: 11.9 %
ALPHA2 GLOB SERPL ELPH-MCNC: 0.7 G/DL
ALT SERPL-CCNC: 32 U/L
ANION GAP SERPL CALC-SCNC: 7 MMOL/L
AST SERPL-CCNC: 26 U/L
B-GLOBULIN MFR SERPL ELPH: 11.5 %
B-GLOBULIN SERPL ELPH-MCNC: 0.7 G/DL
BILIRUB SERPL-MCNC: 0.5 MG/DL
BUN SERPL-MCNC: 22 MG/DL
CALCIUM SERPL-MCNC: 9.5 MG/DL
CHLORIDE SERPL-SCNC: 104 MMOL/L
CO2 SERPL-SCNC: 29 MMOL/L
CREAT SERPL-MCNC: 0.64 MG/DL
DEPRECATED KAPPA LC FREE/LAMBDA SER: 0.56 RATIO
EGFR: 102 ML/MIN/1.73M2
GAMMA GLOB FLD ELPH-MCNC: 0.3 G/DL
GAMMA GLOB MFR SERPL ELPH: 6 %
GLUCOSE SERPL-MCNC: 104 MG/DL
IGA SER QL IEP: 27 MG/DL
IGG SER QL IEP: 376 MG/DL
IGM SER QL IEP: 11 MG/DL
INTERPRETATION SERPL IEP-IMP: NORMAL
KAPPA LC CSF-MCNC: 0.99 MG/DL
KAPPA LC SERPL-MCNC: 0.55 MG/DL
M PROTEIN MFR SERPL ELPH: NORMAL
M PROTEIN SPEC IFE-MCNC: NORMAL
MONOCLON BAND OBS SERPL: NORMAL
POTASSIUM SERPL-SCNC: 5.4 MMOL/L
PROT SERPL-MCNC: 5.8 G/DL
SODIUM SERPL-SCNC: 139 MMOL/L

## 2023-07-28 PROCEDURE — 93296 REM INTERROG EVL PM/IDS: CPT

## 2023-07-28 PROCEDURE — 93294 REM INTERROG EVL PM/LDLS PM: CPT

## 2023-07-31 ENCOUNTER — RX RENEWAL (OUTPATIENT)
Age: 58
End: 2023-07-31

## 2023-08-08 ENCOUNTER — RESULT REVIEW (OUTPATIENT)
Age: 58
End: 2023-08-08

## 2023-08-08 ENCOUNTER — APPOINTMENT (OUTPATIENT)
Dept: HEMATOLOGY ONCOLOGY | Facility: CLINIC | Age: 58
End: 2023-08-08
Payer: MEDICAID

## 2023-08-08 ENCOUNTER — RX RENEWAL (OUTPATIENT)
Age: 58
End: 2023-08-08

## 2023-08-08 ENCOUNTER — APPOINTMENT (OUTPATIENT)
Dept: INFUSION THERAPY | Facility: HOSPITAL | Age: 58
End: 2023-08-08

## 2023-08-08 LAB
BASOPHILS # BLD AUTO: 0.03 K/UL — SIGNIFICANT CHANGE UP (ref 0–0.2)
BASOPHILS NFR BLD AUTO: 0.6 % — SIGNIFICANT CHANGE UP (ref 0–2)
EOSINOPHIL # BLD AUTO: 0.11 K/UL — SIGNIFICANT CHANGE UP (ref 0–0.5)
EOSINOPHIL NFR BLD AUTO: 2.3 % — SIGNIFICANT CHANGE UP (ref 0–6)
HCT VFR BLD CALC: 33.9 % — LOW (ref 34.5–45)
HGB BLD-MCNC: 11 G/DL — LOW (ref 11.5–15.5)
IMM GRANULOCYTES NFR BLD AUTO: 0.2 % — SIGNIFICANT CHANGE UP (ref 0–0.9)
LYMPHOCYTES # BLD AUTO: 1.04 K/UL — SIGNIFICANT CHANGE UP (ref 1–3.3)
LYMPHOCYTES # BLD AUTO: 21.5 % — SIGNIFICANT CHANGE UP (ref 13–44)
MCHC RBC-ENTMCNC: 32 PG — SIGNIFICANT CHANGE UP (ref 27–34)
MCHC RBC-ENTMCNC: 32.4 G/DL — SIGNIFICANT CHANGE UP (ref 32–36)
MCV RBC AUTO: 98.5 FL — SIGNIFICANT CHANGE UP (ref 80–100)
MONOCYTES # BLD AUTO: 0.39 K/UL — SIGNIFICANT CHANGE UP (ref 0–0.9)
MONOCYTES NFR BLD AUTO: 8.1 % — SIGNIFICANT CHANGE UP (ref 2–14)
NEUTROPHILS # BLD AUTO: 3.25 K/UL — SIGNIFICANT CHANGE UP (ref 1.8–7.4)
NEUTROPHILS NFR BLD AUTO: 67.3 % — SIGNIFICANT CHANGE UP (ref 43–77)
NRBC # BLD: 0 /100 WBCS — SIGNIFICANT CHANGE UP (ref 0–0)
PLATELET # BLD AUTO: 189 K/UL — SIGNIFICANT CHANGE UP (ref 150–400)
RBC # BLD: 3.44 M/UL — LOW (ref 3.8–5.2)
RBC # FLD: 12.4 % — SIGNIFICANT CHANGE UP (ref 10.3–14.5)
WBC # BLD: 4.83 K/UL — SIGNIFICANT CHANGE UP (ref 3.8–10.5)
WBC # FLD AUTO: 4.83 K/UL — SIGNIFICANT CHANGE UP (ref 3.8–10.5)

## 2023-08-08 PROCEDURE — 99214 OFFICE O/P EST MOD 30 MIN: CPT

## 2023-08-08 NOTE — REASON FOR VISIT
[Follow-Up Visit] : a follow-up visit for [Spouse] : spouse [FreeTextEntry2] : Light Chain Disease, Lambda

## 2023-08-08 NOTE — ASSESSMENT
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 for cardiac complications likely related to amyloid involvement now s/p PPM. Cycle 1 of CyBorD was initiated on 22 Completed 7 full cycles of poncho-CyBorD on 10/28/22. She is now on maintenance with daratumumab monthly with Velcade [id number]. She has had a complete response from the standpoint of her amyloidosis. Repeat BM bx on 23 showed minimal involvement by patients known plasma cell neoplasm/myeloma (less than 5% of the cellularity). Still with evidence of FISH positive 11;14 translocation at this point in 2.5% of cells. Venetoclax may be suitable agent if serum FLC uptrending.   On diagnosis Lambda serum FLCs 8.73 and kappa 0.92, for a ratio of 0.11 (or 9.5). Subsequent fat pad biopsy was done and was POSITIVE for congo red, with positive polarization. We were unable to send for mass spectrometry to confirm amyloid type. However, BMBx which showed ~35% plasmacytosis (monoclonal) c/w plasma cell neoplasm. This further confirmed the presence of systemic light chain amyloidosis. It did NOT meet MM criteria.   Patient has multiple end organ difficulties related to her amyloidosis. 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 Her urine showing proteinuria on diagnosis (although not nephrotic range). She had peripheral neuropathy. She has chronic diarrhea with pathology from colonoscopy confirming amyloid deposition. Has poor taste sensation. Some of this has improved in intensity but still an ongoing issue.    Plan: -Had been planning for collection of stem cells but according to patient she discussed with the BMT team and unable to collect and save.  -Continue follow up with Cardiology with goal of titrating off Midodrine as BP permits. Repeat echocardiogram showing some improvement.  -Patient to continue follow up with Urology for frequent UTIs and PCP.  -Continue with supportive care for diarrhea.  -Treating with maintenance daratumumab monthly and velcade every other week, dose reduced for neuropathy. We will continue with this regimen for now and continue to monitor serum FLC closely. This has been stable.  -CBC reviewed, stable today.  -Again discussed investigational agent [id number] as patient has been doing research about this, it is not available commercially yet. Unclear utility for her, as on the trial's eligibility requires measurable disease which she doesn't have right now (she is in [place]). Will continue to investigate further though.  -Previously had been found to have small lung nodules characterized as post inflammatory changes. Per patient her pulmonologist at [facility] had seen scans and explained that follow up scans were not necessary. She does voice concern as her mother had  of lung CA however reassured patient of findings and recommended follow up with her pulmonologist to discuss further.  -Patient understands and agrees with plan. All information explained to the best of my ability.  -RTC in 1 month.

## 2023-08-08 NOTE — PHYSICAL EXAM
[Fully active, able to carry on all pre-disease performance without restriction] : Status 0 - Fully active, able to carry on all pre-disease performance without restriction [Normal] : affect appropriate [de-identified] : supple [de-identified] : (+)S1S2 RRR [de-identified] : no edema [de-identified] : no pain

## 2023-08-08 NOTE — REVIEW OF SYSTEMS
[Fever] : no fever [Chills] : no chills [Night Sweats] : no night sweats [Fatigue] : no fatigue [Recent Change In Weight] : ~T recent weight change [Vision Problems] : no vision problems [Dysphagia] : no dysphagia [Nosebleeds] : no nosebleeds [Odynophagia] : no odynophagia [Chest Pain] : no chest pain [Palpitations] : no palpitations [Lower Ext Edema] : no lower extremity edema [Abdominal Pain] : no abdominal pain [Vomiting] : no vomiting [Diarrhea: Grade 0] : Diarrhea: Grade 0 [Dysuria] : no dysuria [Insomnia] : no insomnia [Anxiety] : anxiety [Hot Flashes] : no hot flashes [Negative] : Allergic/Immunologic [FreeTextEntry2] : weight gain [FreeTextEntry7] : diarrhea per HPI

## 2023-08-08 NOTE — HISTORY OF PRESENT ILLNESS
[de-identified] : 59 y/o F with hx of thyroidectomy in 2011 (benign pathology) on thyroid replacement since then, and with COVID pneumonia back in March of 2020 ultimately signed herself out AMA, chronic issues thereafter, ended up going back to work in July 2020. In January-February 2021 had Pfizer vaccines. Started having issues starting in March 2021, she was having GI issues, endoscopy showed chronic gastritis. Additionally diagnosed with IBS at that point. She had been diagnosed with Postural Orthostatic Tachycardia Syndrome - her blood pressure went down when she stands up from a seated position (very severe hypotension). She had fainted "too many times to count." She ended up with bruises here and there but nothing severe where she would need to go to the hospital. She saw two neurologists who did extensive evaluations without any success in finding diagnostic lesions. She reported seeing specialist for dysautonomia, Dr. Colón at [facility] (neurology) and started fludrocortisone in September 2021. Also at that point c/o burning skin, muscle aches, burning mouth and altered taste. She had lost 47 pounds from March 2021 to when I first saw her. She was ultimatley sent to me for evaluation of elevated free light chains. 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 She was found with no monoclonal protein on SPEP and hemoglobin was normal, with normal renal function, and no hypercalcemia. Subsequent fat pad biopsy showed positive congo red staining in rare blood vessels and positive for polarization. This was diagnostic for primary light chain amyloidosis.\par  \par  In March 2022 patient was admitted to [facility] for multiple recurrent syncopal episodes. While admitted course was complicated by multiple unresponsive episodes prompting RRTs, during which pt found to be hypotensive and bradycardic. She initially required levophed for BP support but ultimately BP remained stable while on Midodrine 20mg q8 and Fludrocortisone 0.2mg q24. Pt noted to intermittently have sinus pauses (2-3.8 seconds) thought to be 2/2 vagal episodes, and asymptomatic bradycardia overnight on telemetry. ECG noted NSR but 1st degree AV block. TTE 3/16/22 showed normal LV size and function with EF 55%-60%. No regional wall motion abnormalities. Moderate concentric LVH. Treated for C. diff and UTI during hospitalization as well. Ultimately at that point she was transferred to [facility] for consideration of inpatient chemotherapy. \par  \par  A PPM placement was recommended, as cardiac MRI showed evidence of definitive amyloid involvement. Pt had another RRT for hypotension and symptomatic bradycardia to 30s, was on pressor support until she received a dual chamber PPM. Pt also continued her C.diff treatment and chemotherapy was placed on hold until the diarrhea cleared. She was then started on Daratumumab + CyBorD inpatient, and tolerated it very well ; she completed 2 treatments of chemotherapy inpatient. She subseqently continued with this regimen as outpatient. Has been complicated by diarrhea which on evaluation by GI was deemed likely amyloid related. This was biopsy proven on colonoscopic / endoscopic evaluation in September 2022. Treated with supportive care since then. \par  \par  She has completed 7 full cycles of Danyell + CyBorD in October 2022. Based on serum FLC ratio she was in a complete response. \par  Of note, patient reported that she is NOT interested in hematopoietic transplant at this time but she is interested in collecting her stem cells for a later date. \par  At this point she is on maintenance Velcade and Darzalex Faspro. \par  \par  Disease: AL amyloidosis \par  Pathology: Final Diagnosis (2/8/22). \par  Tumor/ Prognostic Markers: Not enough tissue for mass spectrometry testing. \par  \par  Final Diagnosis (3/1/22)\par  1, 2. Bone marrow biopsy and bone marrow aspirate\par   - Plasma cell myeloma, 25-35% of cellularity by immunohistochemistry\par   - Trilineage hematopoiesis with maturation, mild eosinophilia, serous\par  \par   atrophy, and iron stores present\par  \par  See note and description.\par  \par  Diagnostic note:\par  As per chart review, the patient was referred for elevated free light\par   chains. Her light chain ratio was found to be very low at 0.11 and was\par   found to have hypogammaglobulinemia with no monoclonal protein on SPEP,\par   hemoglobin normal, normal renal function, and no hypercalcemia. Fat\par   pad biopsy showed positive congo red staining in rare blood vessels\par   and positive for polarization. Bone marrow to evaluate for plasma cell\par   myeloma. The bone marrow shows a significant plasma cell infiltrate by\par   immunohistochemistry (25-35% of cellularity, also positive with cyclin\par   D1, with monotypic plasma cells by flow cytometry. Congo red stain is\par   negative.\par  \par  Normal female karyotype seen on bone marrow. \par  FISH Result: ABNORMAL FISH MULTIPLE MYELOMA PANEL -\par  Atypical CCND1/IGH fusion pattern detected (3.5%). \par  \par  \par  Current Treatment Status: Therapy: Danyell + Velcade Maintenance . Kleber + CyBorD started on 4/8/2022, held cytoxan from 4/18-5/12 for hematuria, held again 6/24 for worsening diarrhea\par  Now s/p 7 cycles of Danyell+CyBorD (ending 10/28/22). \par  \par   [de-identified] : Patient seen for follow up. Overall, she is doing well, and she has been having significantly less diarrhea. She has been taking Pepto recently as she finds this is works better than Imodium some days.  The frequency of taking antidiarrheals has decreased. Her blood pressure has been reasonably controlled. She continues to take Midodrine 10mg TID and has been working on strategies to decrease doing and frequency. At this time, she is considering taking half the dose of her evening pill but is concerned about waking up in the middle of the night and feeling like she is going to pass out which happened once in the past. Repeat echocardiogram looked improved. Weight is up and she is 110lbs today. NO swelling, CP, palpitations. Her activity level has been better, and she is walking more. Questioning old CT scans of her chest and nodules seen which appeared to be post inflammatory.

## 2023-08-09 LAB
ALBUMIN SERPL ELPH-MCNC: 4.2 G/DL
ALP BLD-CCNC: 83 U/L
ALT SERPL-CCNC: 33 U/L
ANION GAP SERPL CALC-SCNC: 10 MMOL/L
AST SERPL-CCNC: 23 U/L
BILIRUB SERPL-MCNC: 0.5 MG/DL
BUN SERPL-MCNC: 22 MG/DL
CALCIUM SERPL-MCNC: 9.3 MG/DL
CHLORIDE SERPL-SCNC: 105 MMOL/L
CO2 SERPL-SCNC: 28 MMOL/L
CREAT SERPL-MCNC: 0.61 MG/DL
EGFR: 103 ML/MIN/1.73M2
GLUCOSE SERPL-MCNC: 83 MG/DL
POTASSIUM SERPL-SCNC: 5.6 MMOL/L
PROT SERPL-MCNC: 5.9 G/DL
SODIUM SERPL-SCNC: 143 MMOL/L

## 2023-08-11 LAB
ALBUMIN MFR SERPL ELPH: 65.5 %
ALBUMIN SERPL-MCNC: 3.9 G/DL
ALBUMIN/GLOB SERPL: 2 RATIO
ALPHA1 GLOB MFR SERPL ELPH: 5 %
ALPHA1 GLOB SERPL ELPH-MCNC: 0.3 G/DL
ALPHA2 GLOB MFR SERPL ELPH: 11.7 %
ALPHA2 GLOB SERPL ELPH-MCNC: 0.7 G/DL
B-GLOBULIN MFR SERPL ELPH: 11.7 %
B-GLOBULIN SERPL ELPH-MCNC: 0.7 G/DL
DEPRECATED KAPPA LC FREE/LAMBDA SER: 0.53 RATIO
GAMMA GLOB FLD ELPH-MCNC: 0.4 G/DL
GAMMA GLOB MFR SERPL ELPH: 6.1 %
IGA SER QL IEP: 27 MG/DL
IGG SER QL IEP: 384 MG/DL
IGM SER QL IEP: 11 MG/DL
INTERPRETATION SERPL IEP-IMP: NORMAL
KAPPA LC CSF-MCNC: 0.97 MG/DL
KAPPA LC SERPL-MCNC: 0.51 MG/DL
M PROTEIN MFR SERPL ELPH: NORMAL
M PROTEIN SPEC IFE-MCNC: NORMAL
MONOCLON BAND OBS SERPL: NORMAL
PROT SERPL-MCNC: 5.9 G/DL
PROT SERPL-MCNC: 5.9 G/DL

## 2023-08-16 NOTE — REASON FOR VISIT
Airway    Performed by: Bindu Ann MD  Authorized by: Bindu Ann MD    Final Airway Type:  Endotracheal airway  Final Endotracheal Airway*:  ETT  ETT Size (mm)*:  8.5  Technique Used for Successful ETT Placement:  Video laryngoscopy  Intubation Procedure*:  ETCO2, Preoxygenation, Atraumatic, Dentition Unchanged and Pharynx Clear  Insertion Site:  Oral  Blade Type*:  Glidescope  Blade Size*:  3  Measured from*:  Lips  Secured at (cm)*:  23  Glottic View*:  1 - full view of glottis  Attempts*:  1   Patient Identified, Procedure confirmed, Emergency equipment available and Safety protocols followed  Location:  OR  Urgency:  Elective  Difficult Airway: No    Indications for Airway Management:  Anesthesia  Start Time: 8/16/2023 6:42 AM   Anticipated challenging airway promped the use of the Glidescope, which provided excellent views and an easy intubation. OGT down and out. [FreeTextEntry2] : evaluation of left occipital lymph node

## 2023-08-21 ENCOUNTER — NON-APPOINTMENT (OUTPATIENT)
Age: 58
End: 2023-08-21

## 2023-08-21 ENCOUNTER — APPOINTMENT (OUTPATIENT)
Dept: CARDIOLOGY | Facility: CLINIC | Age: 58
End: 2023-08-21
Payer: MEDICAID

## 2023-08-21 VITALS
HEART RATE: 87 BPM | DIASTOLIC BLOOD PRESSURE: 78 MMHG | OXYGEN SATURATION: 98 % | HEIGHT: 63 IN | WEIGHT: 107 LBS | SYSTOLIC BLOOD PRESSURE: 120 MMHG | BODY MASS INDEX: 18.96 KG/M2

## 2023-08-21 PROCEDURE — 93000 ELECTROCARDIOGRAM COMPLETE: CPT

## 2023-08-21 PROCEDURE — 99215 OFFICE O/P EST HI 40 MIN: CPT | Mod: 25

## 2023-08-21 NOTE — HISTORY OF PRESENT ILLNESS
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 She has been having diarrhea, likely due to her chemotherapy regimen, and she is now taking Imodium and Pedialyte.   Lower extremity edema up to her waist.  Patient continues to take fludocortisone 0.2 mg daily and midodrine 20 mg TID  June 2022 - Patient returns today for follow-up. She has weaned herself off of the fludrocortisone. She now only gets orthostatic hypotension in association with frequent diarrhea. She has non-bloody, watery diarrhea up to 10 times per day. Some days, it is well controlled with Imodium, but she cannot remember the last well formed bowel movement.  She continues to take midodrine 20 mg TID. She continues Cy-Bor-D plus daratumumab with Dr. Goldberg.   August 2022 - TeleHealth Video Encounter Initiated by: Patient election for TeleHealth visit Patient was consented for TeleHealth visit Patient Location: Home Physician Location: Office ([address] [place] [address], [address], [place], [place], [address]) Duration of Encounter: 40 minutes, at least 50% of which was spent in direct counseling and coordination of care.   Continues to have diarrhea. Will follow-up with GI (Dr. Holloway). She continues to have orthostatic symptoms related to her diarrhea. She notes it the most with frequent diarrhea.  She is off of fludocortisone. She takes midodrine 20 mg TID.  The swelling in her legs is only a problem when she sits for too long. When she walks, her legs are better.  November 2022 - Patient returns today for follow-up of her light chain amyloidosis.  She has completed Cytoxan. She continues to receive Velcade, poncho, and dexamethasone.  Her lower extremity edema is markedly improved. She believes this is a result of a combination of being off the fludrocortisone, more walking, and her current diuretics.  She continues to have diarrhea, and her biopsies of the GI tract suggested involvement of her amyloidosis. Her GI symptoms are somewhat improved by reducing fiber and stopping her Cytoxan.   February 2023 -  TeleHealth Video Encounter Initiated by: Patient election for TeleHealth visit Patient was consented for TeleHealth visit Patient Location: Home Physician Location: Office ([address] [place] [address], [address], [place], [place], [address]) Duration of Encounter: 40 minutes, at least 50% of which was spent in direct counseling and coordination of care.   She is currently on maintenance chemo with Velcade every other week and daratumumab monthly.  She is currently maintained on midodrine TID (15 mg, 15 mg, and 10 mg). She continues to have chronic diarrhea and chronic UTI. These are being managed. The diarrhea sometimes drops her blood pressure, but she is sensitive to it and adjust fluid intake accordingly.  She gets occasional pinching in her chest that lasts 10-15 seconds and then resolves. It is not exertional. The lower extremity edema is much better.  May 2023 - Patient returns today for follow-up in her usual state of health. Yesterday, she walked 1800 steps in 30 minutes (using her walker), and she did not feel winded. Midodrine is currently 10 mg, 15 mg, 10 mg. She noted very low blood pressure associated with her diarrhea.

## 2023-08-21 NOTE — CARDIOLOGY SUMMARY
[de-identified] : 7/27/2022, paced at 70 bpm [de-identified] : 3/30/2022, septum 1.2 cm, PWT 1.2 cm, moderately dilated LA, normal LV systolic function, average GLS -14.6%, pattern is\par  homogeneous (i.e. not suggestive of amyloid), LVEF 60%\par  \par  7/27/2022, septum 1.2 cm, PWT 1.3 cm, mildly dilated LA, normal LV systolic function, average GLS - 15%, LVEF 60-65%\par  \par  11/1/2022, septum 1.4 cm, PWT 1.3 cm, dilated LA, low normal LV systolic function, average GLS - 12%, LVEF 50-55% [de-identified] : 3/28/2022, Small pericardial and bilateral pleural effusions. Subtle inferior lateral basal myocardial wall with gadolinium enhancement.  Biatrial enlargement with associated late gadolinium enhancement. These findings may represent amyloidosis. [de-identified] : 3/30/2022 Medtronic Margarita XT DR MRI dual chamber PPM implanted by Hasmukh Villarreal MD, PhD

## 2023-08-21 NOTE — DISCUSSION/SUMMARY
[EKG obtained to assist in diagnosis and management of assessed problem(s)] : EKG obtained to assist in diagnosis and management of assessed problem(s) [FreeTextEntry1] : 58 year-old woman with light chain amyloidosis, orthostatic hypotension requiring fludrocortisone and midodrine, now started on Cy-Bor-D plus daratumumab, with lower extremity edema, likely secondary to fludrocortisone, presents today or follow-up after recent hospitalization (March 2022). Myleoma therapies per hematology. She has completed Cytoxan and remains on Velcade, dexamethasone, and poncho.  Now off of fludocortisone, and her lower extremity edema is much better.  Continue working to reduce her midodrine to see how she feels.   Encouraged trying Green tea for EGCG, but she was advised this would interfere with her Velcade.  Follow-up with GI regarding diarrhea. Follow-up with GI and hematology regarding elevated LFTs.  Encouraged ambulation as tolerated. Elevate the legs when at rest.

## 2023-08-21 NOTE — REASON FOR VISIT
[Other: ____] : [unfilled] [FreeTextEntry1] : August 2023 - Patient returns today for follow-up in her usual state of health.  She continues to note very low blood pressure associated with her diarrhea.  Midodrine is currently 10 mg, 10 mg, 10 mg.

## 2023-08-22 ENCOUNTER — RESULT REVIEW (OUTPATIENT)
Age: 58
End: 2023-08-22

## 2023-08-22 ENCOUNTER — APPOINTMENT (OUTPATIENT)
Dept: INFUSION THERAPY | Facility: HOSPITAL | Age: 58
End: 2023-08-22

## 2023-08-22 ENCOUNTER — APPOINTMENT (OUTPATIENT)
Dept: HEMATOLOGY ONCOLOGY | Facility: CLINIC | Age: 58
End: 2023-08-22

## 2023-08-22 LAB
ALBUMIN SERPL ELPH-MCNC: 4.1 G/DL — SIGNIFICANT CHANGE UP (ref 3.3–5)
ALP SERPL-CCNC: 79 U/L — SIGNIFICANT CHANGE UP (ref 40–120)
ALT FLD-CCNC: 30 U/L — SIGNIFICANT CHANGE UP (ref 10–45)
ANION GAP SERPL CALC-SCNC: 9 MMOL/L — SIGNIFICANT CHANGE UP (ref 5–17)
AST SERPL-CCNC: 27 U/L — SIGNIFICANT CHANGE UP (ref 10–40)
BILIRUB SERPL-MCNC: 0.7 MG/DL — SIGNIFICANT CHANGE UP (ref 0.2–1.2)
BUN SERPL-MCNC: 22 MG/DL — SIGNIFICANT CHANGE UP (ref 7–23)
CALCIUM SERPL-MCNC: 9.3 MG/DL — SIGNIFICANT CHANGE UP (ref 8.4–10.5)
CHLORIDE SERPL-SCNC: 102 MMOL/L — SIGNIFICANT CHANGE UP (ref 96–108)
CO2 SERPL-SCNC: 28 MMOL/L — SIGNIFICANT CHANGE UP (ref 22–31)
CREAT SERPL-MCNC: 0.74 MG/DL — SIGNIFICANT CHANGE UP (ref 0.5–1.3)
EGFR: 93 ML/MIN/1.73M2 — SIGNIFICANT CHANGE UP
GLUCOSE SERPL-MCNC: 104 MG/DL — HIGH (ref 70–99)
POTASSIUM SERPL-MCNC: 4.7 MMOL/L — SIGNIFICANT CHANGE UP (ref 3.5–5.3)
POTASSIUM SERPL-SCNC: 4.7 MMOL/L — SIGNIFICANT CHANGE UP (ref 3.5–5.3)
PROT SERPL-MCNC: 6.1 G/DL — SIGNIFICANT CHANGE UP (ref 6–8.3)
SODIUM SERPL-SCNC: 139 MMOL/L — SIGNIFICANT CHANGE UP (ref 135–145)

## 2023-09-05 ENCOUNTER — APPOINTMENT (OUTPATIENT)
Dept: INFUSION THERAPY | Facility: HOSPITAL | Age: 58
End: 2023-09-05

## 2023-09-05 ENCOUNTER — RESULT REVIEW (OUTPATIENT)
Age: 58
End: 2023-09-05

## 2023-09-05 LAB
BASOPHILS # BLD AUTO: 0.04 K/UL — SIGNIFICANT CHANGE UP (ref 0–0.2)
BASOPHILS NFR BLD AUTO: 1 % — SIGNIFICANT CHANGE UP (ref 0–2)
EOSINOPHIL # BLD AUTO: 0.09 K/UL — SIGNIFICANT CHANGE UP (ref 0–0.5)
EOSINOPHIL NFR BLD AUTO: 2.2 % — SIGNIFICANT CHANGE UP (ref 0–6)
HCT VFR BLD CALC: 32.7 % — LOW (ref 34.5–45)
HGB BLD-MCNC: 10.5 G/DL — LOW (ref 11.5–15.5)
IMM GRANULOCYTES NFR BLD AUTO: 0 % — SIGNIFICANT CHANGE UP (ref 0–0.9)
LYMPHOCYTES # BLD AUTO: 1.08 K/UL — SIGNIFICANT CHANGE UP (ref 1–3.3)
LYMPHOCYTES # BLD AUTO: 26.3 % — SIGNIFICANT CHANGE UP (ref 13–44)
MCHC RBC-ENTMCNC: 31.8 PG — SIGNIFICANT CHANGE UP (ref 27–34)
MCHC RBC-ENTMCNC: 32.1 G/DL — SIGNIFICANT CHANGE UP (ref 32–36)
MCV RBC AUTO: 99.1 FL — SIGNIFICANT CHANGE UP (ref 80–100)
MONOCYTES # BLD AUTO: 0.41 K/UL — SIGNIFICANT CHANGE UP (ref 0–0.9)
MONOCYTES NFR BLD AUTO: 10 % — SIGNIFICANT CHANGE UP (ref 2–14)
NEUTROPHILS # BLD AUTO: 2.48 K/UL — SIGNIFICANT CHANGE UP (ref 1.8–7.4)
NEUTROPHILS NFR BLD AUTO: 60.5 % — SIGNIFICANT CHANGE UP (ref 43–77)
NRBC # BLD: 0 /100 WBCS — SIGNIFICANT CHANGE UP (ref 0–0)
PLATELET # BLD AUTO: 218 K/UL — SIGNIFICANT CHANGE UP (ref 150–400)
RBC # BLD: 3.3 M/UL — LOW (ref 3.8–5.2)
RBC # FLD: 12 % — SIGNIFICANT CHANGE UP (ref 10.3–14.5)
WBC # BLD: 4.1 K/UL — SIGNIFICANT CHANGE UP (ref 3.8–10.5)
WBC # FLD AUTO: 4.1 K/UL — SIGNIFICANT CHANGE UP (ref 3.8–10.5)

## 2023-09-06 LAB
ALBUMIN SERPL ELPH-MCNC: 4.1 G/DL
ALP BLD-CCNC: 86 U/L
ALT SERPL-CCNC: 25 U/L
ANION GAP SERPL CALC-SCNC: 9 MMOL/L
AST SERPL-CCNC: 17 U/L
BILIRUB SERPL-MCNC: 0.7 MG/DL
BUN SERPL-MCNC: 14 MG/DL
CALCIUM SERPL-MCNC: 9.5 MG/DL
CHLORIDE SERPL-SCNC: 105 MMOL/L
CO2 SERPL-SCNC: 28 MMOL/L
CREAT SERPL-MCNC: 0.55 MG/DL
EGFR: 106 ML/MIN/1.73M2
GLUCOSE SERPL-MCNC: 91 MG/DL
POTASSIUM SERPL-SCNC: 5.1 MMOL/L
PROT SERPL-MCNC: 5.7 G/DL
SODIUM SERPL-SCNC: 142 MMOL/L

## 2023-09-08 LAB
ALBUMIN MFR SERPL ELPH: 64.2 %
ALBUMIN SERPL-MCNC: 3.7 G/DL
ALBUMIN/GLOB SERPL: 1.8 RATIO
ALPHA1 GLOB MFR SERPL ELPH: 5.4 %
ALPHA1 GLOB SERPL ELPH-MCNC: 0.3 G/DL
ALPHA2 GLOB MFR SERPL ELPH: 12.5 %
ALPHA2 GLOB SERPL ELPH-MCNC: 0.7 G/DL
B-GLOBULIN MFR SERPL ELPH: 12 %
B-GLOBULIN SERPL ELPH-MCNC: 0.7 G/DL
DEPRECATED KAPPA LC FREE/LAMBDA SER: 0.61 RATIO
GAMMA GLOB FLD ELPH-MCNC: 0.3 G/DL
GAMMA GLOB MFR SERPL ELPH: 5.9 %
IGA SER QL IEP: 31 MG/DL
IGG SER QL IEP: 383 MG/DL
IGM SER QL IEP: 15 MG/DL
INTERPRETATION SERPL IEP-IMP: NORMAL
KAPPA LC CSF-MCNC: 0.9 MG/DL
KAPPA LC SERPL-MCNC: 0.55 MG/DL
M PROTEIN MFR SERPL ELPH: 1.7 %
M PROTEIN SPEC IFE-MCNC: NORMAL
MONOCLON BAND OBS SERPL: 0.1 G/DL
PROT SERPL-MCNC: 5.7 G/DL
PROT SERPL-MCNC: 5.7 G/DL

## 2023-09-13 ENCOUNTER — OUTPATIENT (OUTPATIENT)
Dept: OUTPATIENT SERVICES | Facility: HOSPITAL | Age: 58
LOS: 1 days | Discharge: HOME | End: 2023-09-13

## 2023-09-13 DIAGNOSIS — D47.2 MONOCLONAL GAMMOPATHY: ICD-10-CM

## 2023-09-19 ENCOUNTER — RESULT REVIEW (OUTPATIENT)
Age: 58
End: 2023-09-19

## 2023-09-19 ENCOUNTER — APPOINTMENT (OUTPATIENT)
Dept: INFUSION THERAPY | Facility: HOSPITAL | Age: 58
End: 2023-09-19

## 2023-09-19 LAB
BASOPHILS # BLD AUTO: 0.04 K/UL — SIGNIFICANT CHANGE UP (ref 0–0.2)
BASOPHILS NFR BLD AUTO: 0.7 % — SIGNIFICANT CHANGE UP (ref 0–2)
EOSINOPHIL # BLD AUTO: 0.19 K/UL — SIGNIFICANT CHANGE UP (ref 0–0.5)
EOSINOPHIL NFR BLD AUTO: 3.3 % — SIGNIFICANT CHANGE UP (ref 0–6)
HCT VFR BLD CALC: 31.7 % — LOW (ref 34.5–45)
HGB BLD-MCNC: 10.5 G/DL — LOW (ref 11.5–15.5)
IMM GRANULOCYTES NFR BLD AUTO: 1.8 % — HIGH (ref 0–0.9)
LYMPHOCYTES # BLD AUTO: 1.29 K/UL — SIGNIFICANT CHANGE UP (ref 1–3.3)
LYMPHOCYTES # BLD AUTO: 22.6 % — SIGNIFICANT CHANGE UP (ref 13–44)
MCHC RBC-ENTMCNC: 32.3 PG — SIGNIFICANT CHANGE UP (ref 27–34)
MCHC RBC-ENTMCNC: 33.1 G/DL — SIGNIFICANT CHANGE UP (ref 32–36)
MCV RBC AUTO: 97.5 FL — SIGNIFICANT CHANGE UP (ref 80–100)
MONOCYTES # BLD AUTO: 0.39 K/UL — SIGNIFICANT CHANGE UP (ref 0–0.9)
MONOCYTES NFR BLD AUTO: 6.8 % — SIGNIFICANT CHANGE UP (ref 2–14)
NEUTROPHILS # BLD AUTO: 3.7 K/UL — SIGNIFICANT CHANGE UP (ref 1.8–7.4)
NEUTROPHILS NFR BLD AUTO: 64.8 % — SIGNIFICANT CHANGE UP (ref 43–77)
NRBC # BLD: 0 /100 WBCS — SIGNIFICANT CHANGE UP (ref 0–0)
PLATELET # BLD AUTO: 183 K/UL — SIGNIFICANT CHANGE UP (ref 150–400)
RBC # BLD: 3.25 M/UL — LOW (ref 3.8–5.2)
RBC # FLD: 12 % — SIGNIFICANT CHANGE UP (ref 10.3–14.5)
WBC # BLD: 5.71 K/UL — SIGNIFICANT CHANGE UP (ref 3.8–10.5)
WBC # FLD AUTO: 5.71 K/UL — SIGNIFICANT CHANGE UP (ref 3.8–10.5)

## 2023-09-20 DIAGNOSIS — Z51.11 ENCOUNTER FOR ANTINEOPLASTIC CHEMOTHERAPY: ICD-10-CM

## 2023-09-26 ENCOUNTER — APPOINTMENT (OUTPATIENT)
Dept: HEMATOLOGY ONCOLOGY | Facility: CLINIC | Age: 58
End: 2023-09-26
Payer: MEDICAID

## 2023-09-26 ENCOUNTER — LABORATORY RESULT (OUTPATIENT)
Age: 58
End: 2023-09-26

## 2023-09-26 ENCOUNTER — RESULT REVIEW (OUTPATIENT)
Age: 58
End: 2023-09-26

## 2023-09-26 VITALS
RESPIRATION RATE: 16 BRPM | WEIGHT: 111.97 LBS | SYSTOLIC BLOOD PRESSURE: 102 MMHG | BODY MASS INDEX: 19.84 KG/M2 | DIASTOLIC BLOOD PRESSURE: 68 MMHG | OXYGEN SATURATION: 100 % | HEART RATE: 89 BPM

## 2023-09-26 LAB
BASOPHILS # BLD AUTO: 0.02 K/UL — SIGNIFICANT CHANGE UP (ref 0–0.2)
BASOPHILS NFR BLD AUTO: 0.4 % — SIGNIFICANT CHANGE UP (ref 0–2)
EOSINOPHIL # BLD AUTO: 0.08 K/UL — SIGNIFICANT CHANGE UP (ref 0–0.5)
EOSINOPHIL NFR BLD AUTO: 1.8 % — SIGNIFICANT CHANGE UP (ref 0–6)
HCT VFR BLD CALC: 32.7 % — LOW (ref 34.5–45)
HGB BLD-MCNC: 10.7 G/DL — LOW (ref 11.5–15.5)
IMM GRANULOCYTES NFR BLD AUTO: 0.2 % — SIGNIFICANT CHANGE UP (ref 0–0.9)
LYMPHOCYTES # BLD AUTO: 1.17 K/UL — SIGNIFICANT CHANGE UP (ref 1–3.3)
LYMPHOCYTES # BLD AUTO: 25.8 % — SIGNIFICANT CHANGE UP (ref 13–44)
MCHC RBC-ENTMCNC: 32.2 PG — SIGNIFICANT CHANGE UP (ref 27–34)
MCHC RBC-ENTMCNC: 32.7 G/DL — SIGNIFICANT CHANGE UP (ref 32–36)
MCV RBC AUTO: 98.5 FL — SIGNIFICANT CHANGE UP (ref 80–100)
MONOCYTES # BLD AUTO: 0.4 K/UL — SIGNIFICANT CHANGE UP (ref 0–0.9)
MONOCYTES NFR BLD AUTO: 8.8 % — SIGNIFICANT CHANGE UP (ref 2–14)
NEUTROPHILS # BLD AUTO: 2.85 K/UL — SIGNIFICANT CHANGE UP (ref 1.8–7.4)
NEUTROPHILS NFR BLD AUTO: 63 % — SIGNIFICANT CHANGE UP (ref 43–77)
NRBC # BLD: 0 /100 WBCS — SIGNIFICANT CHANGE UP (ref 0–0)
PLATELET # BLD AUTO: 172 K/UL — SIGNIFICANT CHANGE UP (ref 150–400)
RBC # BLD: 3.32 M/UL — LOW (ref 3.8–5.2)
RBC # FLD: 12 % — SIGNIFICANT CHANGE UP (ref 10.3–14.5)
WBC # BLD: 4.53 K/UL — SIGNIFICANT CHANGE UP (ref 3.8–10.5)
WBC # FLD AUTO: 4.53 K/UL — SIGNIFICANT CHANGE UP (ref 3.8–10.5)

## 2023-09-26 PROCEDURE — 99214 OFFICE O/P EST MOD 30 MIN: CPT

## 2023-09-26 RX ORDER — TAMSULOSIN HYDROCHLORIDE 0.4 MG/1
0.4 CAPSULE ORAL
Qty: 90 | Refills: 0 | Status: DISCONTINUED | COMMUNITY
Start: 2023-03-13 | End: 2023-09-26

## 2023-09-26 RX ORDER — SULFAMETHOXAZOLE AND TRIMETHOPRIM 800; 160 MG/1; MG/1
800-160 TABLET ORAL TWICE DAILY
Qty: 10 | Refills: 0 | Status: DISCONTINUED | COMMUNITY
Start: 2023-03-16 | End: 2023-09-26

## 2023-09-26 RX ORDER — PHENAZOPYRIDINE 200 MG/1
200 TABLET, FILM COATED ORAL 3 TIMES DAILY
Qty: 15 | Refills: 4 | Status: DISCONTINUED | COMMUNITY
Start: 2023-02-22 | End: 2023-09-26

## 2023-10-03 ENCOUNTER — APPOINTMENT (OUTPATIENT)
Dept: INFUSION THERAPY | Facility: HOSPITAL | Age: 58
End: 2023-10-03

## 2023-10-03 ENCOUNTER — RESULT REVIEW (OUTPATIENT)
Age: 58
End: 2023-10-03

## 2023-10-03 ENCOUNTER — APPOINTMENT (OUTPATIENT)
Dept: HEMATOLOGY ONCOLOGY | Facility: CLINIC | Age: 58
End: 2023-10-03

## 2023-10-03 LAB
BASOPHILS # BLD AUTO: 0.04 K/UL — SIGNIFICANT CHANGE UP (ref 0–0.2)
BASOPHILS NFR BLD AUTO: 0.7 % — SIGNIFICANT CHANGE UP (ref 0–2)
EOSINOPHIL # BLD AUTO: 0.1 K/UL — SIGNIFICANT CHANGE UP (ref 0–0.5)
EOSINOPHIL NFR BLD AUTO: 1.8 % — SIGNIFICANT CHANGE UP (ref 0–6)
HCT VFR BLD CALC: 32.5 % — LOW (ref 34.5–45)
HGB BLD-MCNC: 11 G/DL — LOW (ref 11.5–15.5)
IMM GRANULOCYTES NFR BLD AUTO: 0.2 % — SIGNIFICANT CHANGE UP (ref 0–0.9)
LYMPHOCYTES # BLD AUTO: 1.28 K/UL — SIGNIFICANT CHANGE UP (ref 1–3.3)
LYMPHOCYTES # BLD AUTO: 22.9 % — SIGNIFICANT CHANGE UP (ref 13–44)
MCHC RBC-ENTMCNC: 32.6 PG — SIGNIFICANT CHANGE UP (ref 27–34)
MCHC RBC-ENTMCNC: 33.8 G/DL — SIGNIFICANT CHANGE UP (ref 32–36)
MCV RBC AUTO: 96.4 FL — SIGNIFICANT CHANGE UP (ref 80–100)
MONOCYTES # BLD AUTO: 0.44 K/UL — SIGNIFICANT CHANGE UP (ref 0–0.9)
MONOCYTES NFR BLD AUTO: 7.9 % — SIGNIFICANT CHANGE UP (ref 2–14)
NEUTROPHILS # BLD AUTO: 3.72 K/UL — SIGNIFICANT CHANGE UP (ref 1.8–7.4)
NEUTROPHILS NFR BLD AUTO: 66.5 % — SIGNIFICANT CHANGE UP (ref 43–77)
NRBC # BLD: 0 /100 WBCS — SIGNIFICANT CHANGE UP (ref 0–0)
PLATELET # BLD AUTO: 211 K/UL — SIGNIFICANT CHANGE UP (ref 150–400)
RBC # BLD: 3.37 M/UL — LOW (ref 3.8–5.2)
RBC # FLD: 12.2 % — SIGNIFICANT CHANGE UP (ref 10.3–14.5)
WBC # BLD: 5.59 K/UL — SIGNIFICANT CHANGE UP (ref 3.8–10.5)
WBC # FLD AUTO: 5.59 K/UL — SIGNIFICANT CHANGE UP (ref 3.8–10.5)

## 2023-10-13 LAB
T4 FREE SERPL-MCNC: 1.8 NG/DL
TSH SERPL-ACNC: 0.18 UIU/ML

## 2023-10-17 ENCOUNTER — APPOINTMENT (OUTPATIENT)
Dept: HEMATOLOGY ONCOLOGY | Facility: CLINIC | Age: 58
End: 2023-10-17
Payer: MEDICAID

## 2023-10-17 ENCOUNTER — RESULT REVIEW (OUTPATIENT)
Age: 58
End: 2023-10-17

## 2023-10-17 ENCOUNTER — APPOINTMENT (OUTPATIENT)
Dept: HEMATOLOGY ONCOLOGY | Facility: CLINIC | Age: 58
End: 2023-10-17

## 2023-10-17 ENCOUNTER — APPOINTMENT (OUTPATIENT)
Dept: INFUSION THERAPY | Facility: HOSPITAL | Age: 58
End: 2023-10-17

## 2023-10-17 LAB
BASOPHILS # BLD AUTO: 0.03 K/UL — SIGNIFICANT CHANGE UP (ref 0–0.2)
BASOPHILS # BLD AUTO: 0.03 K/UL — SIGNIFICANT CHANGE UP (ref 0–0.2)
BASOPHILS NFR BLD AUTO: 0.6 % — SIGNIFICANT CHANGE UP (ref 0–2)
BASOPHILS NFR BLD AUTO: 0.6 % — SIGNIFICANT CHANGE UP (ref 0–2)
EOSINOPHIL # BLD AUTO: 0.1 K/UL — SIGNIFICANT CHANGE UP (ref 0–0.5)
EOSINOPHIL # BLD AUTO: 0.1 K/UL — SIGNIFICANT CHANGE UP (ref 0–0.5)
EOSINOPHIL NFR BLD AUTO: 2.1 % — SIGNIFICANT CHANGE UP (ref 0–6)
EOSINOPHIL NFR BLD AUTO: 2.1 % — SIGNIFICANT CHANGE UP (ref 0–6)
HCT VFR BLD CALC: 31.4 % — LOW (ref 34.5–45)
HCT VFR BLD CALC: 31.4 % — LOW (ref 34.5–45)
HGB BLD-MCNC: 10.4 G/DL — LOW (ref 11.5–15.5)
HGB BLD-MCNC: 10.4 G/DL — LOW (ref 11.5–15.5)
IMM GRANULOCYTES NFR BLD AUTO: 0.2 % — SIGNIFICANT CHANGE UP (ref 0–0.9)
IMM GRANULOCYTES NFR BLD AUTO: 0.2 % — SIGNIFICANT CHANGE UP (ref 0–0.9)
LYMPHOCYTES # BLD AUTO: 1.15 K/UL — SIGNIFICANT CHANGE UP (ref 1–3.3)
LYMPHOCYTES # BLD AUTO: 1.15 K/UL — SIGNIFICANT CHANGE UP (ref 1–3.3)
LYMPHOCYTES # BLD AUTO: 24.4 % — SIGNIFICANT CHANGE UP (ref 13–44)
LYMPHOCYTES # BLD AUTO: 24.4 % — SIGNIFICANT CHANGE UP (ref 13–44)
MCHC RBC-ENTMCNC: 32.4 PG — SIGNIFICANT CHANGE UP (ref 27–34)
MCHC RBC-ENTMCNC: 32.4 PG — SIGNIFICANT CHANGE UP (ref 27–34)
MCHC RBC-ENTMCNC: 33.1 G/DL — SIGNIFICANT CHANGE UP (ref 32–36)
MCHC RBC-ENTMCNC: 33.1 G/DL — SIGNIFICANT CHANGE UP (ref 32–36)
MCV RBC AUTO: 97.8 FL — SIGNIFICANT CHANGE UP (ref 80–100)
MCV RBC AUTO: 97.8 FL — SIGNIFICANT CHANGE UP (ref 80–100)
MONOCYTES # BLD AUTO: 0.51 K/UL — SIGNIFICANT CHANGE UP (ref 0–0.9)
MONOCYTES # BLD AUTO: 0.51 K/UL — SIGNIFICANT CHANGE UP (ref 0–0.9)
MONOCYTES NFR BLD AUTO: 10.8 % — SIGNIFICANT CHANGE UP (ref 2–14)
MONOCYTES NFR BLD AUTO: 10.8 % — SIGNIFICANT CHANGE UP (ref 2–14)
NEUTROPHILS # BLD AUTO: 2.92 K/UL — SIGNIFICANT CHANGE UP (ref 1.8–7.4)
NEUTROPHILS # BLD AUTO: 2.92 K/UL — SIGNIFICANT CHANGE UP (ref 1.8–7.4)
NEUTROPHILS NFR BLD AUTO: 61.9 % — SIGNIFICANT CHANGE UP (ref 43–77)
NEUTROPHILS NFR BLD AUTO: 61.9 % — SIGNIFICANT CHANGE UP (ref 43–77)
NRBC # BLD: 0 /100 WBCS — SIGNIFICANT CHANGE UP (ref 0–0)
NRBC # BLD: 0 /100 WBCS — SIGNIFICANT CHANGE UP (ref 0–0)
PLATELET # BLD AUTO: 181 K/UL — SIGNIFICANT CHANGE UP (ref 150–400)
PLATELET # BLD AUTO: 181 K/UL — SIGNIFICANT CHANGE UP (ref 150–400)
RBC # BLD: 3.21 M/UL — LOW (ref 3.8–5.2)
RBC # BLD: 3.21 M/UL — LOW (ref 3.8–5.2)
RBC # FLD: 12.4 % — SIGNIFICANT CHANGE UP (ref 10.3–14.5)
RBC # FLD: 12.4 % — SIGNIFICANT CHANGE UP (ref 10.3–14.5)
WBC # BLD: 4.72 K/UL — SIGNIFICANT CHANGE UP (ref 3.8–10.5)
WBC # BLD: 4.72 K/UL — SIGNIFICANT CHANGE UP (ref 3.8–10.5)
WBC # FLD AUTO: 4.72 K/UL — SIGNIFICANT CHANGE UP (ref 3.8–10.5)
WBC # FLD AUTO: 4.72 K/UL — SIGNIFICANT CHANGE UP (ref 3.8–10.5)

## 2023-10-17 PROCEDURE — 99214 OFFICE O/P EST MOD 30 MIN: CPT

## 2023-10-18 ENCOUNTER — NON-APPOINTMENT (OUTPATIENT)
Age: 58
End: 2023-10-18

## 2023-10-18 LAB
ALBUMIN SERPL ELPH-MCNC: 4 G/DL
ALP BLD-CCNC: 83 U/L
ALT SERPL-CCNC: 23 U/L
ANION GAP SERPL CALC-SCNC: 6 MMOL/L
AST SERPL-CCNC: 18 U/L
BILIRUB SERPL-MCNC: 0.3 MG/DL
BUN SERPL-MCNC: 16 MG/DL
CALCIUM SERPL-MCNC: 9.3 MG/DL
CHLORIDE SERPL-SCNC: 105 MMOL/L
CO2 SERPL-SCNC: 30 MMOL/L
CREAT SERPL-MCNC: 0.54 MG/DL
EGFR: 106 ML/MIN/1.73M2
GLUCOSE SERPL-MCNC: 102 MG/DL
POTASSIUM SERPL-SCNC: 5.3 MMOL/L
PROT SERPL-MCNC: 5.5 G/DL
SODIUM SERPL-SCNC: 141 MMOL/L

## 2023-10-19 RX ORDER — ONDANSETRON 4 MG/1
4 TABLET ORAL
Qty: 30 | Refills: 0 | Status: ACTIVE | COMMUNITY
Start: 2021-05-19 | End: 1900-01-01

## 2023-10-20 LAB
ALBUMIN MFR SERPL ELPH: 63.5 %
ALBUMIN SERPL-MCNC: 3.5 G/DL
ALBUMIN/GLOB SERPL: 1.8 RATIO
ALPHA1 GLOB MFR SERPL ELPH: 5.4 %
ALPHA1 GLOB SERPL ELPH-MCNC: 0.3 G/DL
ALPHA2 GLOB MFR SERPL ELPH: 12.7 %
ALPHA2 GLOB SERPL ELPH-MCNC: 0.7 G/DL
B-GLOBULIN MFR SERPL ELPH: 12.2 %
B-GLOBULIN SERPL ELPH-MCNC: 0.7 G/DL
DEPRECATED KAPPA LC FREE/LAMBDA SER: 0.45 RATIO
GAMMA GLOB FLD ELPH-MCNC: 0.3 G/DL
GAMMA GLOB MFR SERPL ELPH: 6.2 %
IGA SER QL IEP: 31 MG/DL
IGG SER QL IEP: 387 MG/DL
IGM SER QL IEP: 14 MG/DL
INTERPRETATION SERPL IEP-IMP: NORMAL
KAPPA LC CSF-MCNC: 1.06 MG/DL
KAPPA LC SERPL-MCNC: 0.48 MG/DL
M PROTEIN MFR SERPL ELPH: 1.5 %
M PROTEIN SPEC IFE-MCNC: NORMAL
MONOCLON BAND OBS SERPL: 0.1 G/DL
PROT SERPL-MCNC: 5.5 G/DL
PROT SERPL-MCNC: 5.5 G/DL

## 2023-10-23 DIAGNOSIS — E85.9 AMYLOIDOSIS, UNSPECIFIED: ICD-10-CM

## 2023-10-25 ENCOUNTER — APPOINTMENT (OUTPATIENT)
Dept: ENDOCRINOLOGY | Facility: CLINIC | Age: 58
End: 2023-10-25
Payer: MEDICAID

## 2023-10-25 VITALS
OXYGEN SATURATION: 97 % | SYSTOLIC BLOOD PRESSURE: 72 MMHG | BODY MASS INDEX: 20.23 KG/M2 | WEIGHT: 114.19 LBS | HEIGHT: 63 IN | HEART RATE: 91 BPM | DIASTOLIC BLOOD PRESSURE: 41 MMHG

## 2023-10-25 PROCEDURE — 99213 OFFICE O/P EST LOW 20 MIN: CPT | Mod: 25

## 2023-10-26 LAB
ESTIMATED AVERAGE GLUCOSE: 123 MG/DL
HBA1C MFR BLD HPLC: 5.9 %
T4 FREE SERPL-MCNC: 1.9 NG/DL
TSH SERPL-ACNC: 0.02 UIU/ML

## 2023-10-30 ENCOUNTER — RESULT REVIEW (OUTPATIENT)
Age: 58
End: 2023-10-30

## 2023-10-30 ENCOUNTER — APPOINTMENT (OUTPATIENT)
Dept: INFUSION THERAPY | Facility: HOSPITAL | Age: 58
End: 2023-10-30

## 2023-10-30 ENCOUNTER — APPOINTMENT (OUTPATIENT)
Dept: HEMATOLOGY ONCOLOGY | Facility: CLINIC | Age: 58
End: 2023-10-30

## 2023-10-30 LAB
BASOPHILS # BLD AUTO: 0.04 K/UL — SIGNIFICANT CHANGE UP (ref 0–0.2)
BASOPHILS # BLD AUTO: 0.04 K/UL — SIGNIFICANT CHANGE UP (ref 0–0.2)
BASOPHILS NFR BLD AUTO: 0.9 % — SIGNIFICANT CHANGE UP (ref 0–2)
BASOPHILS NFR BLD AUTO: 0.9 % — SIGNIFICANT CHANGE UP (ref 0–2)
EOSINOPHIL # BLD AUTO: 0.1 K/UL — SIGNIFICANT CHANGE UP (ref 0–0.5)
EOSINOPHIL # BLD AUTO: 0.1 K/UL — SIGNIFICANT CHANGE UP (ref 0–0.5)
EOSINOPHIL NFR BLD AUTO: 2.3 % — SIGNIFICANT CHANGE UP (ref 0–6)
EOSINOPHIL NFR BLD AUTO: 2.3 % — SIGNIFICANT CHANGE UP (ref 0–6)
HCT VFR BLD CALC: 31.3 % — LOW (ref 34.5–45)
HCT VFR BLD CALC: 31.3 % — LOW (ref 34.5–45)
HGB BLD-MCNC: 10.6 G/DL — LOW (ref 11.5–15.5)
HGB BLD-MCNC: 10.6 G/DL — LOW (ref 11.5–15.5)
IMM GRANULOCYTES NFR BLD AUTO: 0.9 % — SIGNIFICANT CHANGE UP (ref 0–0.9)
IMM GRANULOCYTES NFR BLD AUTO: 0.9 % — SIGNIFICANT CHANGE UP (ref 0–0.9)
LYMPHOCYTES # BLD AUTO: 1.09 K/UL — SIGNIFICANT CHANGE UP (ref 1–3.3)
LYMPHOCYTES # BLD AUTO: 1.09 K/UL — SIGNIFICANT CHANGE UP (ref 1–3.3)
LYMPHOCYTES # BLD AUTO: 25.2 % — SIGNIFICANT CHANGE UP (ref 13–44)
LYMPHOCYTES # BLD AUTO: 25.2 % — SIGNIFICANT CHANGE UP (ref 13–44)
MCHC RBC-ENTMCNC: 32.6 PG — SIGNIFICANT CHANGE UP (ref 27–34)
MCHC RBC-ENTMCNC: 32.6 PG — SIGNIFICANT CHANGE UP (ref 27–34)
MCHC RBC-ENTMCNC: 33.9 G/DL — SIGNIFICANT CHANGE UP (ref 32–36)
MCHC RBC-ENTMCNC: 33.9 G/DL — SIGNIFICANT CHANGE UP (ref 32–36)
MCV RBC AUTO: 96.3 FL — SIGNIFICANT CHANGE UP (ref 80–100)
MCV RBC AUTO: 96.3 FL — SIGNIFICANT CHANGE UP (ref 80–100)
MONOCYTES # BLD AUTO: 0.38 K/UL — SIGNIFICANT CHANGE UP (ref 0–0.9)
MONOCYTES # BLD AUTO: 0.38 K/UL — SIGNIFICANT CHANGE UP (ref 0–0.9)
MONOCYTES NFR BLD AUTO: 8.8 % — SIGNIFICANT CHANGE UP (ref 2–14)
MONOCYTES NFR BLD AUTO: 8.8 % — SIGNIFICANT CHANGE UP (ref 2–14)
NEUTROPHILS # BLD AUTO: 2.68 K/UL — SIGNIFICANT CHANGE UP (ref 1.8–7.4)
NEUTROPHILS # BLD AUTO: 2.68 K/UL — SIGNIFICANT CHANGE UP (ref 1.8–7.4)
NEUTROPHILS NFR BLD AUTO: 61.9 % — SIGNIFICANT CHANGE UP (ref 43–77)
NEUTROPHILS NFR BLD AUTO: 61.9 % — SIGNIFICANT CHANGE UP (ref 43–77)
NRBC # BLD: 0 /100 WBCS — SIGNIFICANT CHANGE UP (ref 0–0)
NRBC # BLD: 0 /100 WBCS — SIGNIFICANT CHANGE UP (ref 0–0)
PLATELET # BLD AUTO: 189 K/UL — SIGNIFICANT CHANGE UP (ref 150–400)
PLATELET # BLD AUTO: 189 K/UL — SIGNIFICANT CHANGE UP (ref 150–400)
RBC # BLD: 3.25 M/UL — LOW (ref 3.8–5.2)
RBC # BLD: 3.25 M/UL — LOW (ref 3.8–5.2)
RBC # FLD: 12.1 % — SIGNIFICANT CHANGE UP (ref 10.3–14.5)
RBC # FLD: 12.1 % — SIGNIFICANT CHANGE UP (ref 10.3–14.5)
WBC # BLD: 4.33 K/UL — SIGNIFICANT CHANGE UP (ref 3.8–10.5)
WBC # BLD: 4.33 K/UL — SIGNIFICANT CHANGE UP (ref 3.8–10.5)
WBC # FLD AUTO: 4.33 K/UL — SIGNIFICANT CHANGE UP (ref 3.8–10.5)
WBC # FLD AUTO: 4.33 K/UL — SIGNIFICANT CHANGE UP (ref 3.8–10.5)

## 2023-10-31 ENCOUNTER — APPOINTMENT (OUTPATIENT)
Dept: ELECTROPHYSIOLOGY | Facility: CLINIC | Age: 58
End: 2023-10-31
Payer: MEDICAID

## 2023-10-31 ENCOUNTER — NON-APPOINTMENT (OUTPATIENT)
Age: 58
End: 2023-10-31

## 2023-10-31 VITALS — SYSTOLIC BLOOD PRESSURE: 129 MMHG | HEART RATE: 84 BPM | DIASTOLIC BLOOD PRESSURE: 78 MMHG | OXYGEN SATURATION: 100 %

## 2023-10-31 PROCEDURE — 93280 PM DEVICE PROGR EVAL DUAL: CPT

## 2023-10-31 PROCEDURE — 93000 ELECTROCARDIOGRAM COMPLETE: CPT | Mod: 59

## 2023-11-05 ENCOUNTER — OUTPATIENT (OUTPATIENT)
Dept: OUTPATIENT SERVICES | Facility: HOSPITAL | Age: 58
LOS: 1 days | Discharge: HOME | End: 2023-11-05

## 2023-11-05 DIAGNOSIS — D47.2 MONOCLONAL GAMMOPATHY: ICD-10-CM

## 2023-11-14 ENCOUNTER — APPOINTMENT (OUTPATIENT)
Dept: HEMATOLOGY ONCOLOGY | Facility: CLINIC | Age: 58
End: 2023-11-14

## 2023-11-14 ENCOUNTER — APPOINTMENT (OUTPATIENT)
Dept: INFUSION THERAPY | Facility: HOSPITAL | Age: 58
End: 2023-11-14

## 2023-11-14 ENCOUNTER — RESULT REVIEW (OUTPATIENT)
Age: 58
End: 2023-11-14

## 2023-11-14 LAB
BASOPHILS # BLD AUTO: 0.03 K/UL — SIGNIFICANT CHANGE UP (ref 0–0.2)
BASOPHILS # BLD AUTO: 0.03 K/UL — SIGNIFICANT CHANGE UP (ref 0–0.2)
BASOPHILS NFR BLD AUTO: 0.6 % — SIGNIFICANT CHANGE UP (ref 0–2)
BASOPHILS NFR BLD AUTO: 0.6 % — SIGNIFICANT CHANGE UP (ref 0–2)
EOSINOPHIL # BLD AUTO: 0.1 K/UL — SIGNIFICANT CHANGE UP (ref 0–0.5)
EOSINOPHIL # BLD AUTO: 0.1 K/UL — SIGNIFICANT CHANGE UP (ref 0–0.5)
EOSINOPHIL NFR BLD AUTO: 1.9 % — SIGNIFICANT CHANGE UP (ref 0–6)
EOSINOPHIL NFR BLD AUTO: 1.9 % — SIGNIFICANT CHANGE UP (ref 0–6)
HCT VFR BLD CALC: 32.2 % — LOW (ref 34.5–45)
HCT VFR BLD CALC: 32.2 % — LOW (ref 34.5–45)
HGB BLD-MCNC: 10.8 G/DL — LOW (ref 11.5–15.5)
HGB BLD-MCNC: 10.8 G/DL — LOW (ref 11.5–15.5)
IMM GRANULOCYTES NFR BLD AUTO: 0.4 % — SIGNIFICANT CHANGE UP (ref 0–0.9)
IMM GRANULOCYTES NFR BLD AUTO: 0.4 % — SIGNIFICANT CHANGE UP (ref 0–0.9)
LYMPHOCYTES # BLD AUTO: 1.31 K/UL — SIGNIFICANT CHANGE UP (ref 1–3.3)
LYMPHOCYTES # BLD AUTO: 1.31 K/UL — SIGNIFICANT CHANGE UP (ref 1–3.3)
LYMPHOCYTES # BLD AUTO: 24.5 % — SIGNIFICANT CHANGE UP (ref 13–44)
LYMPHOCYTES # BLD AUTO: 24.5 % — SIGNIFICANT CHANGE UP (ref 13–44)
MCHC RBC-ENTMCNC: 32.5 PG — SIGNIFICANT CHANGE UP (ref 27–34)
MCHC RBC-ENTMCNC: 32.5 PG — SIGNIFICANT CHANGE UP (ref 27–34)
MCHC RBC-ENTMCNC: 33.5 G/DL — SIGNIFICANT CHANGE UP (ref 32–36)
MCHC RBC-ENTMCNC: 33.5 G/DL — SIGNIFICANT CHANGE UP (ref 32–36)
MCV RBC AUTO: 97 FL — SIGNIFICANT CHANGE UP (ref 80–100)
MCV RBC AUTO: 97 FL — SIGNIFICANT CHANGE UP (ref 80–100)
MONOCYTES # BLD AUTO: 0.32 K/UL — SIGNIFICANT CHANGE UP (ref 0–0.9)
MONOCYTES # BLD AUTO: 0.32 K/UL — SIGNIFICANT CHANGE UP (ref 0–0.9)
MONOCYTES NFR BLD AUTO: 6 % — SIGNIFICANT CHANGE UP (ref 2–14)
MONOCYTES NFR BLD AUTO: 6 % — SIGNIFICANT CHANGE UP (ref 2–14)
NEUTROPHILS # BLD AUTO: 3.56 K/UL — SIGNIFICANT CHANGE UP (ref 1.8–7.4)
NEUTROPHILS # BLD AUTO: 3.56 K/UL — SIGNIFICANT CHANGE UP (ref 1.8–7.4)
NEUTROPHILS NFR BLD AUTO: 66.6 % — SIGNIFICANT CHANGE UP (ref 43–77)
NEUTROPHILS NFR BLD AUTO: 66.6 % — SIGNIFICANT CHANGE UP (ref 43–77)
NRBC # BLD: 0 /100 WBCS — SIGNIFICANT CHANGE UP (ref 0–0)
NRBC # BLD: 0 /100 WBCS — SIGNIFICANT CHANGE UP (ref 0–0)
PLATELET # BLD AUTO: 191 K/UL — SIGNIFICANT CHANGE UP (ref 150–400)
PLATELET # BLD AUTO: 191 K/UL — SIGNIFICANT CHANGE UP (ref 150–400)
RBC # BLD: 3.32 M/UL — LOW (ref 3.8–5.2)
RBC # BLD: 3.32 M/UL — LOW (ref 3.8–5.2)
RBC # FLD: 12.6 % — SIGNIFICANT CHANGE UP (ref 10.3–14.5)
RBC # FLD: 12.6 % — SIGNIFICANT CHANGE UP (ref 10.3–14.5)
WBC # BLD: 5.34 K/UL — SIGNIFICANT CHANGE UP (ref 3.8–10.5)
WBC # BLD: 5.34 K/UL — SIGNIFICANT CHANGE UP (ref 3.8–10.5)
WBC # FLD AUTO: 5.34 K/UL — SIGNIFICANT CHANGE UP (ref 3.8–10.5)
WBC # FLD AUTO: 5.34 K/UL — SIGNIFICANT CHANGE UP (ref 3.8–10.5)

## 2023-11-15 DIAGNOSIS — R11.2 NAUSEA WITH VOMITING, UNSPECIFIED: ICD-10-CM

## 2023-11-15 DIAGNOSIS — Z51.11 ENCOUNTER FOR ANTINEOPLASTIC CHEMOTHERAPY: ICD-10-CM

## 2023-11-27 ENCOUNTER — RX RENEWAL (OUTPATIENT)
Age: 58
End: 2023-11-27

## 2023-11-28 ENCOUNTER — RESULT REVIEW (OUTPATIENT)
Age: 58
End: 2023-11-28

## 2023-11-28 ENCOUNTER — APPOINTMENT (OUTPATIENT)
Dept: INFUSION THERAPY | Facility: HOSPITAL | Age: 58
End: 2023-11-28

## 2023-11-28 ENCOUNTER — LABORATORY RESULT (OUTPATIENT)
Age: 58
End: 2023-11-28

## 2023-11-28 ENCOUNTER — APPOINTMENT (OUTPATIENT)
Dept: HEMATOLOGY ONCOLOGY | Facility: CLINIC | Age: 58
End: 2023-11-28
Payer: MEDICARE

## 2023-11-28 ENCOUNTER — APPOINTMENT (OUTPATIENT)
Dept: HEMATOLOGY ONCOLOGY | Facility: CLINIC | Age: 58
End: 2023-11-28

## 2023-11-28 VITALS
BODY MASS INDEX: 20.31 KG/M2 | OXYGEN SATURATION: 100 % | HEIGHT: 63.03 IN | RESPIRATION RATE: 16 BRPM | DIASTOLIC BLOOD PRESSURE: 76 MMHG | TEMPERATURE: 98 F | SYSTOLIC BLOOD PRESSURE: 116 MMHG | WEIGHT: 114.64 LBS | HEART RATE: 80 BPM

## 2023-11-28 LAB
BASOPHILS # BLD AUTO: 0.04 K/UL — SIGNIFICANT CHANGE UP (ref 0–0.2)
BASOPHILS # BLD AUTO: 0.04 K/UL — SIGNIFICANT CHANGE UP (ref 0–0.2)
BASOPHILS NFR BLD AUTO: 0.9 % — SIGNIFICANT CHANGE UP (ref 0–2)
BASOPHILS NFR BLD AUTO: 0.9 % — SIGNIFICANT CHANGE UP (ref 0–2)
EOSINOPHIL # BLD AUTO: 0.09 K/UL — SIGNIFICANT CHANGE UP (ref 0–0.5)
EOSINOPHIL # BLD AUTO: 0.09 K/UL — SIGNIFICANT CHANGE UP (ref 0–0.5)
EOSINOPHIL NFR BLD AUTO: 2 % — SIGNIFICANT CHANGE UP (ref 0–6)
EOSINOPHIL NFR BLD AUTO: 2 % — SIGNIFICANT CHANGE UP (ref 0–6)
HCT VFR BLD CALC: 33.9 % — LOW (ref 34.5–45)
HCT VFR BLD CALC: 33.9 % — LOW (ref 34.5–45)
HGB BLD-MCNC: 11.1 G/DL — LOW (ref 11.5–15.5)
HGB BLD-MCNC: 11.1 G/DL — LOW (ref 11.5–15.5)
IMM GRANULOCYTES NFR BLD AUTO: 0.5 % — SIGNIFICANT CHANGE UP (ref 0–0.9)
IMM GRANULOCYTES NFR BLD AUTO: 0.5 % — SIGNIFICANT CHANGE UP (ref 0–0.9)
LYMPHOCYTES # BLD AUTO: 1.17 K/UL — SIGNIFICANT CHANGE UP (ref 1–3.3)
LYMPHOCYTES # BLD AUTO: 1.17 K/UL — SIGNIFICANT CHANGE UP (ref 1–3.3)
LYMPHOCYTES # BLD AUTO: 26.5 % — SIGNIFICANT CHANGE UP (ref 13–44)
LYMPHOCYTES # BLD AUTO: 26.5 % — SIGNIFICANT CHANGE UP (ref 13–44)
MCHC RBC-ENTMCNC: 32.4 PG — SIGNIFICANT CHANGE UP (ref 27–34)
MCHC RBC-ENTMCNC: 32.4 PG — SIGNIFICANT CHANGE UP (ref 27–34)
MCHC RBC-ENTMCNC: 32.7 G/DL — SIGNIFICANT CHANGE UP (ref 32–36)
MCHC RBC-ENTMCNC: 32.7 G/DL — SIGNIFICANT CHANGE UP (ref 32–36)
MCV RBC AUTO: 98.8 FL — SIGNIFICANT CHANGE UP (ref 80–100)
MCV RBC AUTO: 98.8 FL — SIGNIFICANT CHANGE UP (ref 80–100)
MONOCYTES # BLD AUTO: 0.29 K/UL — SIGNIFICANT CHANGE UP (ref 0–0.9)
MONOCYTES # BLD AUTO: 0.29 K/UL — SIGNIFICANT CHANGE UP (ref 0–0.9)
MONOCYTES NFR BLD AUTO: 6.6 % — SIGNIFICANT CHANGE UP (ref 2–14)
MONOCYTES NFR BLD AUTO: 6.6 % — SIGNIFICANT CHANGE UP (ref 2–14)
NEUTROPHILS # BLD AUTO: 2.8 K/UL — SIGNIFICANT CHANGE UP (ref 1.8–7.4)
NEUTROPHILS # BLD AUTO: 2.8 K/UL — SIGNIFICANT CHANGE UP (ref 1.8–7.4)
NEUTROPHILS NFR BLD AUTO: 63.5 % — SIGNIFICANT CHANGE UP (ref 43–77)
NEUTROPHILS NFR BLD AUTO: 63.5 % — SIGNIFICANT CHANGE UP (ref 43–77)
NRBC # BLD: 0 /100 WBCS — SIGNIFICANT CHANGE UP (ref 0–0)
NRBC # BLD: 0 /100 WBCS — SIGNIFICANT CHANGE UP (ref 0–0)
PLATELET # BLD AUTO: 212 K/UL — SIGNIFICANT CHANGE UP (ref 150–400)
PLATELET # BLD AUTO: 212 K/UL — SIGNIFICANT CHANGE UP (ref 150–400)
RBC # BLD: 3.43 M/UL — LOW (ref 3.8–5.2)
RBC # BLD: 3.43 M/UL — LOW (ref 3.8–5.2)
RBC # FLD: 12.6 % — SIGNIFICANT CHANGE UP (ref 10.3–14.5)
RBC # FLD: 12.6 % — SIGNIFICANT CHANGE UP (ref 10.3–14.5)
WBC # BLD: 4.41 K/UL — SIGNIFICANT CHANGE UP (ref 3.8–10.5)
WBC # BLD: 4.41 K/UL — SIGNIFICANT CHANGE UP (ref 3.8–10.5)
WBC # FLD AUTO: 4.41 K/UL — SIGNIFICANT CHANGE UP (ref 3.8–10.5)
WBC # FLD AUTO: 4.41 K/UL — SIGNIFICANT CHANGE UP (ref 3.8–10.5)

## 2023-11-28 PROCEDURE — 99214 OFFICE O/P EST MOD 30 MIN: CPT

## 2023-12-12 ENCOUNTER — APPOINTMENT (OUTPATIENT)
Dept: INFUSION THERAPY | Facility: HOSPITAL | Age: 58
End: 2023-12-12

## 2023-12-12 ENCOUNTER — RESULT REVIEW (OUTPATIENT)
Age: 58
End: 2023-12-12

## 2023-12-12 LAB
BASOPHILS # BLD AUTO: 0.04 K/UL — SIGNIFICANT CHANGE UP (ref 0–0.2)
BASOPHILS # BLD AUTO: 0.04 K/UL — SIGNIFICANT CHANGE UP (ref 0–0.2)
BASOPHILS NFR BLD AUTO: 0.8 % — SIGNIFICANT CHANGE UP (ref 0–2)
BASOPHILS NFR BLD AUTO: 0.8 % — SIGNIFICANT CHANGE UP (ref 0–2)
EOSINOPHIL # BLD AUTO: 0.09 K/UL — SIGNIFICANT CHANGE UP (ref 0–0.5)
EOSINOPHIL # BLD AUTO: 0.09 K/UL — SIGNIFICANT CHANGE UP (ref 0–0.5)
EOSINOPHIL NFR BLD AUTO: 1.8 % — SIGNIFICANT CHANGE UP (ref 0–6)
EOSINOPHIL NFR BLD AUTO: 1.8 % — SIGNIFICANT CHANGE UP (ref 0–6)
HCT VFR BLD CALC: 31.9 % — LOW (ref 34.5–45)
HCT VFR BLD CALC: 31.9 % — LOW (ref 34.5–45)
HGB BLD-MCNC: 10.6 G/DL — LOW (ref 11.5–15.5)
HGB BLD-MCNC: 10.6 G/DL — LOW (ref 11.5–15.5)
IMM GRANULOCYTES NFR BLD AUTO: 0.2 % — SIGNIFICANT CHANGE UP (ref 0–0.9)
IMM GRANULOCYTES NFR BLD AUTO: 0.2 % — SIGNIFICANT CHANGE UP (ref 0–0.9)
LYMPHOCYTES # BLD AUTO: 1.19 K/UL — SIGNIFICANT CHANGE UP (ref 1–3.3)
LYMPHOCYTES # BLD AUTO: 1.19 K/UL — SIGNIFICANT CHANGE UP (ref 1–3.3)
LYMPHOCYTES # BLD AUTO: 23.6 % — SIGNIFICANT CHANGE UP (ref 13–44)
LYMPHOCYTES # BLD AUTO: 23.6 % — SIGNIFICANT CHANGE UP (ref 13–44)
MCHC RBC-ENTMCNC: 32.3 PG — SIGNIFICANT CHANGE UP (ref 27–34)
MCHC RBC-ENTMCNC: 32.3 PG — SIGNIFICANT CHANGE UP (ref 27–34)
MCHC RBC-ENTMCNC: 33.2 G/DL — SIGNIFICANT CHANGE UP (ref 32–36)
MCHC RBC-ENTMCNC: 33.2 G/DL — SIGNIFICANT CHANGE UP (ref 32–36)
MCV RBC AUTO: 97.3 FL — SIGNIFICANT CHANGE UP (ref 80–100)
MCV RBC AUTO: 97.3 FL — SIGNIFICANT CHANGE UP (ref 80–100)
MONOCYTES # BLD AUTO: 0.36 K/UL — SIGNIFICANT CHANGE UP (ref 0–0.9)
MONOCYTES # BLD AUTO: 0.36 K/UL — SIGNIFICANT CHANGE UP (ref 0–0.9)
MONOCYTES NFR BLD AUTO: 7.1 % — SIGNIFICANT CHANGE UP (ref 2–14)
MONOCYTES NFR BLD AUTO: 7.1 % — SIGNIFICANT CHANGE UP (ref 2–14)
NEUTROPHILS # BLD AUTO: 3.36 K/UL — SIGNIFICANT CHANGE UP (ref 1.8–7.4)
NEUTROPHILS # BLD AUTO: 3.36 K/UL — SIGNIFICANT CHANGE UP (ref 1.8–7.4)
NEUTROPHILS NFR BLD AUTO: 66.5 % — SIGNIFICANT CHANGE UP (ref 43–77)
NEUTROPHILS NFR BLD AUTO: 66.5 % — SIGNIFICANT CHANGE UP (ref 43–77)
NRBC # BLD: 0 /100 WBCS — SIGNIFICANT CHANGE UP (ref 0–0)
NRBC # BLD: 0 /100 WBCS — SIGNIFICANT CHANGE UP (ref 0–0)
PLATELET # BLD AUTO: 193 K/UL — SIGNIFICANT CHANGE UP (ref 150–400)
PLATELET # BLD AUTO: 193 K/UL — SIGNIFICANT CHANGE UP (ref 150–400)
RBC # BLD: 3.28 M/UL — LOW (ref 3.8–5.2)
RBC # BLD: 3.28 M/UL — LOW (ref 3.8–5.2)
RBC # FLD: 12.3 % — SIGNIFICANT CHANGE UP (ref 10.3–14.5)
RBC # FLD: 12.3 % — SIGNIFICANT CHANGE UP (ref 10.3–14.5)
WBC # BLD: 5.05 K/UL — SIGNIFICANT CHANGE UP (ref 3.8–10.5)
WBC # BLD: 5.05 K/UL — SIGNIFICANT CHANGE UP (ref 3.8–10.5)
WBC # FLD AUTO: 5.05 K/UL — SIGNIFICANT CHANGE UP (ref 3.8–10.5)
WBC # FLD AUTO: 5.05 K/UL — SIGNIFICANT CHANGE UP (ref 3.8–10.5)

## 2023-12-14 ENCOUNTER — NON-APPOINTMENT (OUTPATIENT)
Age: 58
End: 2023-12-14

## 2023-12-14 ENCOUNTER — APPOINTMENT (OUTPATIENT)
Dept: CARDIOLOGY | Facility: CLINIC | Age: 58
End: 2023-12-14
Payer: MEDICARE

## 2023-12-14 VITALS
WEIGHT: 116 LBS | SYSTOLIC BLOOD PRESSURE: 119 MMHG | OXYGEN SATURATION: 98 % | HEART RATE: 77 BPM | DIASTOLIC BLOOD PRESSURE: 74 MMHG | BODY MASS INDEX: 20.53 KG/M2

## 2023-12-14 PROCEDURE — 93000 ELECTROCARDIOGRAM COMPLETE: CPT

## 2023-12-14 PROCEDURE — 99215 OFFICE O/P EST HI 40 MIN: CPT | Mod: 25

## 2023-12-17 LAB
ALBUMIN SERPL ELPH-MCNC: 4.3 G/DL
ALP BLD-CCNC: 90 U/L
ALT SERPL-CCNC: 25 U/L
ANION GAP SERPL CALC-SCNC: 10 MMOL/L
AST SERPL-CCNC: 21 U/L
BILIRUB SERPL-MCNC: 0.7 MG/DL
BUN SERPL-MCNC: 20 MG/DL
CALCIUM SERPL-MCNC: 9 MG/DL
CHLORIDE SERPL-SCNC: 104 MMOL/L
CO2 SERPL-SCNC: 28 MMOL/L
CREAT SERPL-MCNC: 0.58 MG/DL
EGFR: 104 ML/MIN/1.73M2
GLUCOSE SERPL-MCNC: 93 MG/DL
POTASSIUM SERPL-SCNC: 4.7 MMOL/L
PROT SERPL-MCNC: 5.6 G/DL
SODIUM SERPL-SCNC: 141 MMOL/L

## 2023-12-17 NOTE — CARDIOLOGY SUMMARY
[de-identified] : 7/27/2022, paced at 70 bpm [de-identified] : 3/30/2022, septum 1.2 cm, PWT 1.2 cm, moderately dilated LA, normal LV systolic function, average GLS -14.6%, pattern is\par  homogeneous (i.e. not suggestive of amyloid), LVEF 60%\par  \par  7/27/2022, septum 1.2 cm, PWT 1.3 cm, mildly dilated LA, normal LV systolic function, average GLS - 15%, LVEF 60-65%\par  \par  11/1/2022, septum 1.4 cm, PWT 1.3 cm, dilated LA, low normal LV systolic function, average GLS - 12%, LVEF 50-55% [de-identified] : 3/28/2022, Small pericardial and bilateral pleural effusions. Subtle inferior lateral basal myocardial wall with gadolinium enhancement.  Biatrial enlargement with associated late gadolinium enhancement. These findings may represent amyloidosis. [de-identified] : 3/30/2022 Medtronic Margarita XT DR MRI dual chamber PPM implanted by Hasmukh Villarreal MD, PhD

## 2023-12-17 NOTE — REASON FOR VISIT
[Other: ____] : [unfilled] [Other: _____] : [unfilled] [FreeTextEntry1] : December 2023 - Patient returns today for follow-up in her usual state of health. Her midodrine dosing has been reduced to 15 mg daily (taken 5 mg QAM and 10 mg QPM).  She is much more steady on her feet.  Device interrogation on 10/31/2023 showed two episodes of NSVT (one on 7/28/2023 that lasted for 10 seconds and one on 8/4/2023 that lasted for 8 beats over 2.5 seconds).

## 2023-12-17 NOTE — HISTORY OF PRESENT ILLNESS
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 She has been having diarrhea, likely due to her chemotherapy regimen, and she is now taking Imodium and Pedialyte.   Lower extremity edema up to her waist.  Patient continues to take fludocortisone 0.2 mg daily and midodrine 20 mg TID  June 2022 - Patient returns today for follow-up. She has weaned herself off of the fludrocortisone. She now only gets orthostatic hypotension in association with frequent diarrhea. She has non-bloody, watery diarrhea up to 10 times per day. Some days, it is well controlled with Imodium, but she cannot remember the last well formed bowel movement.  She continues to take midodrine 20 mg TID. She continues Cy-Bor-D plus daratumumab with Dr. Goldberg.   August 2022 - TeleHealth Video Encounter Initiated by: Patient election for TeleHealth visit Patient was consented for TeleHealth visit Patient Location: Home Physician Location: Office ([address] [place] [address], [address], [place], [place], [address]) Duration of Encounter: 40 minutes, at least 50% of which was spent in direct counseling and coordination of care.   Continues to have diarrhea. Will follow-up with GI (Dr. Holloway). She continues to have orthostatic symptoms related to her diarrhea. She notes it the most with frequent diarrhea.  She is off of fludocortisone. She takes midodrine 20 mg TID.  The swelling in her legs is only a problem when she sits for too long. When she walks, her legs are better.  November 2022 - Patient returns today for follow-up of her light chain amyloidosis.  She has completed Cytoxan. She continues to receive Velcade, poncho, and dexamethasone.  Her lower extremity edema is markedly improved. She believes this is a result of a combination of being off the fludrocortisone, more walking, and her current diuretics.  She continues to have diarrhea, and her biopsies of the GI tract suggested involvement of her amyloidosis. Her GI symptoms are somewhat improved by reducing fiber and stopping her Cytoxan.   February 2023 -  TeleHealth Video Encounter Initiated by: Patient election for TeleHealth visit Patient was consented for TeleHealth visit Patient Location: Home Physician Location: Office ([address] [place] [address], [address], [place], [place], [address]) Duration of Encounter: 40 minutes, at least 50% of which was spent in direct counseling and coordination of care.   She is currently on maintenance chemo with Velcade every other week and daratumumab monthly.  She is currently maintained on midodrine TID (15 mg, 15 mg, and 10 mg). She continues to have chronic diarrhea and chronic UTI. These are being managed. The diarrhea sometimes drops her blood pressure, but she is sensitive to it and adjust fluid intake accordingly.  She gets occasional pinching in her chest that lasts 10-15 seconds and then resolves. It is not exertional. The lower extremity edema is much better.  May 2023 - Patient returns today for follow-up in her usual state of health. Yesterday, she walked 1800 steps in 30 minutes (using her walker), and she did not feel winded. Midodrine is currently 10 mg, 15 mg, 10 mg. She noted very low blood pressure associated with her diarrhea.   August 2023 - Patient returns today for follow-up in her usual state of health.  She continues to note very low blood pressure associated with her diarrhea.  Midodrine is currently 10 mg, 10 mg, 10 mg.

## 2023-12-20 ENCOUNTER — RX RENEWAL (OUTPATIENT)
Age: 58
End: 2023-12-20

## 2023-12-20 ENCOUNTER — TRANSCRIPTION ENCOUNTER (OUTPATIENT)
Age: 58
End: 2023-12-20

## 2023-12-28 ENCOUNTER — APPOINTMENT (OUTPATIENT)
Dept: HEMATOLOGY ONCOLOGY | Facility: CLINIC | Age: 58
End: 2023-12-28

## 2023-12-29 ENCOUNTER — TRANSCRIPTION ENCOUNTER (OUTPATIENT)
Age: 58
End: 2023-12-29

## 2024-01-02 ENCOUNTER — APPOINTMENT (OUTPATIENT)
Dept: INFUSION THERAPY | Facility: HOSPITAL | Age: 59
End: 2024-01-02

## 2024-01-02 ENCOUNTER — RESULT REVIEW (OUTPATIENT)
Age: 59
End: 2024-01-02

## 2024-01-02 ENCOUNTER — OUTPATIENT (OUTPATIENT)
Dept: OUTPATIENT SERVICES | Facility: HOSPITAL | Age: 59
LOS: 1 days | Discharge: HOME | End: 2024-01-02

## 2024-01-02 DIAGNOSIS — D47.2 MONOCLONAL GAMMOPATHY: ICD-10-CM

## 2024-01-02 LAB
BASOPHILS # BLD AUTO: 0.03 K/UL — SIGNIFICANT CHANGE UP (ref 0–0.2)
BASOPHILS # BLD AUTO: 0.03 K/UL — SIGNIFICANT CHANGE UP (ref 0–0.2)
BASOPHILS NFR BLD AUTO: 0.8 % — SIGNIFICANT CHANGE UP (ref 0–2)
BASOPHILS NFR BLD AUTO: 0.8 % — SIGNIFICANT CHANGE UP (ref 0–2)
EOSINOPHIL # BLD AUTO: 0.07 K/UL — SIGNIFICANT CHANGE UP (ref 0–0.5)
EOSINOPHIL # BLD AUTO: 0.07 K/UL — SIGNIFICANT CHANGE UP (ref 0–0.5)
EOSINOPHIL NFR BLD AUTO: 1.8 % — SIGNIFICANT CHANGE UP (ref 0–6)
EOSINOPHIL NFR BLD AUTO: 1.8 % — SIGNIFICANT CHANGE UP (ref 0–6)
HCT VFR BLD CALC: 32.3 % — LOW (ref 34.5–45)
HCT VFR BLD CALC: 32.3 % — LOW (ref 34.5–45)
HGB BLD-MCNC: 10.6 G/DL — LOW (ref 11.5–15.5)
HGB BLD-MCNC: 10.6 G/DL — LOW (ref 11.5–15.5)
IMM GRANULOCYTES NFR BLD AUTO: 0.3 % — SIGNIFICANT CHANGE UP (ref 0–0.9)
IMM GRANULOCYTES NFR BLD AUTO: 0.3 % — SIGNIFICANT CHANGE UP (ref 0–0.9)
LYMPHOCYTES # BLD AUTO: 1.08 K/UL — SIGNIFICANT CHANGE UP (ref 1–3.3)
LYMPHOCYTES # BLD AUTO: 1.08 K/UL — SIGNIFICANT CHANGE UP (ref 1–3.3)
LYMPHOCYTES # BLD AUTO: 27.1 % — SIGNIFICANT CHANGE UP (ref 13–44)
LYMPHOCYTES # BLD AUTO: 27.1 % — SIGNIFICANT CHANGE UP (ref 13–44)
MCHC RBC-ENTMCNC: 32.4 PG — SIGNIFICANT CHANGE UP (ref 27–34)
MCHC RBC-ENTMCNC: 32.4 PG — SIGNIFICANT CHANGE UP (ref 27–34)
MCHC RBC-ENTMCNC: 32.8 G/DL — SIGNIFICANT CHANGE UP (ref 32–36)
MCHC RBC-ENTMCNC: 32.8 G/DL — SIGNIFICANT CHANGE UP (ref 32–36)
MCV RBC AUTO: 98.8 FL — SIGNIFICANT CHANGE UP (ref 80–100)
MCV RBC AUTO: 98.8 FL — SIGNIFICANT CHANGE UP (ref 80–100)
MONOCYTES # BLD AUTO: 0.37 K/UL — SIGNIFICANT CHANGE UP (ref 0–0.9)
MONOCYTES # BLD AUTO: 0.37 K/UL — SIGNIFICANT CHANGE UP (ref 0–0.9)
MONOCYTES NFR BLD AUTO: 9.3 % — SIGNIFICANT CHANGE UP (ref 2–14)
MONOCYTES NFR BLD AUTO: 9.3 % — SIGNIFICANT CHANGE UP (ref 2–14)
NEUTROPHILS # BLD AUTO: 2.42 K/UL — SIGNIFICANT CHANGE UP (ref 1.8–7.4)
NEUTROPHILS # BLD AUTO: 2.42 K/UL — SIGNIFICANT CHANGE UP (ref 1.8–7.4)
NEUTROPHILS NFR BLD AUTO: 60.7 % — SIGNIFICANT CHANGE UP (ref 43–77)
NEUTROPHILS NFR BLD AUTO: 60.7 % — SIGNIFICANT CHANGE UP (ref 43–77)
NRBC # BLD: 0 /100 WBCS — SIGNIFICANT CHANGE UP (ref 0–0)
NRBC # BLD: 0 /100 WBCS — SIGNIFICANT CHANGE UP (ref 0–0)
PLATELET # BLD AUTO: 184 K/UL — SIGNIFICANT CHANGE UP (ref 150–400)
PLATELET # BLD AUTO: 184 K/UL — SIGNIFICANT CHANGE UP (ref 150–400)
RBC # BLD: 3.27 M/UL — LOW (ref 3.8–5.2)
RBC # BLD: 3.27 M/UL — LOW (ref 3.8–5.2)
RBC # FLD: 12.3 % — SIGNIFICANT CHANGE UP (ref 10.3–14.5)
RBC # FLD: 12.3 % — SIGNIFICANT CHANGE UP (ref 10.3–14.5)
WBC # BLD: 3.98 K/UL — SIGNIFICANT CHANGE UP (ref 3.8–10.5)
WBC # BLD: 3.98 K/UL — SIGNIFICANT CHANGE UP (ref 3.8–10.5)
WBC # FLD AUTO: 3.98 K/UL — SIGNIFICANT CHANGE UP (ref 3.8–10.5)
WBC # FLD AUTO: 3.98 K/UL — SIGNIFICANT CHANGE UP (ref 3.8–10.5)

## 2024-01-03 ENCOUNTER — TRANSCRIPTION ENCOUNTER (OUTPATIENT)
Age: 59
End: 2024-01-03

## 2024-01-03 DIAGNOSIS — E85.9 AMYLOIDOSIS, UNSPECIFIED: ICD-10-CM

## 2024-01-03 DIAGNOSIS — R11.2 NAUSEA WITH VOMITING, UNSPECIFIED: ICD-10-CM

## 2024-01-03 DIAGNOSIS — Z51.11 ENCOUNTER FOR ANTINEOPLASTIC CHEMOTHERAPY: ICD-10-CM

## 2024-01-03 LAB
ALBUMIN SERPL ELPH-MCNC: 4.2 G/DL
ALP BLD-CCNC: 81 U/L
ALT SERPL-CCNC: 18 U/L
ANION GAP SERPL CALC-SCNC: 8 MMOL/L
AST SERPL-CCNC: 14 U/L
BILIRUB SERPL-MCNC: 0.7 MG/DL
BUN SERPL-MCNC: 22 MG/DL
CALCIUM SERPL-MCNC: 9 MG/DL
CHLORIDE SERPL-SCNC: 106 MMOL/L
CO2 SERPL-SCNC: 27 MMOL/L
CREAT SERPL-MCNC: 0.66 MG/DL
EGFR: 101 ML/MIN/1.73M2
GLUCOSE SERPL-MCNC: 103 MG/DL
POTASSIUM SERPL-SCNC: 4.9 MMOL/L
PROT SERPL-MCNC: 5.4 G/DL
SODIUM SERPL-SCNC: 142 MMOL/L

## 2024-01-10 LAB
ALBUMIN MFR SERPL ELPH: 66.3 %
ALBUMIN SERPL-MCNC: 3.6 G/DL
ALBUMIN/GLOB SERPL: 2 RATIO
ALPHA1 GLOB MFR SERPL ELPH: 4.8 %
ALPHA1 GLOB SERPL ELPH-MCNC: 0.3 G/DL
ALPHA2 GLOB MFR SERPL ELPH: 11.4 %
ALPHA2 GLOB SERPL ELPH-MCNC: 0.6 G/DL
B-GLOBULIN MFR SERPL ELPH: 11.8 %
B-GLOBULIN SERPL ELPH-MCNC: 0.6 G/DL
DEPRECATED KAPPA LC FREE/LAMBDA SER: 0.5 RATIO
GAMMA GLOB FLD ELPH-MCNC: 0.3 G/DL
GAMMA GLOB MFR SERPL ELPH: 5.7 %
IGA SER QL IEP: 24 MG/DL
IGG SER QL IEP: 369 MG/DL
IGM SER QL IEP: 14 MG/DL
INTERPRETATION SERPL IEP-IMP: NORMAL
KAPPA LC CSF-MCNC: 1.01 MG/DL
KAPPA LC SERPL-MCNC: 0.5 MG/DL
M PROTEIN MFR SERPL ELPH: 1.3 %
M PROTEIN SPEC IFE-MCNC: NORMAL
MONOCLON BAND OBS SERPL: 0.1 G/DL
PROT SERPL-MCNC: 5.4 G/DL
PROT SERPL-MCNC: 5.4 G/DL

## 2024-01-11 ENCOUNTER — TRANSCRIPTION ENCOUNTER (OUTPATIENT)
Age: 59
End: 2024-01-11

## 2024-01-16 ENCOUNTER — RESULT REVIEW (OUTPATIENT)
Age: 59
End: 2024-01-16

## 2024-01-16 ENCOUNTER — APPOINTMENT (OUTPATIENT)
Dept: HEMATOLOGY ONCOLOGY | Facility: CLINIC | Age: 59
End: 2024-01-16

## 2024-01-16 ENCOUNTER — APPOINTMENT (OUTPATIENT)
Dept: INFUSION THERAPY | Facility: HOSPITAL | Age: 59
End: 2024-01-16

## 2024-01-16 LAB
BASOPHILS # BLD AUTO: 0.04 K/UL — SIGNIFICANT CHANGE UP (ref 0–0.2)
BASOPHILS NFR BLD AUTO: 0.7 % — SIGNIFICANT CHANGE UP (ref 0–2)
EOSINOPHIL # BLD AUTO: 0.12 K/UL — SIGNIFICANT CHANGE UP (ref 0–0.5)
EOSINOPHIL NFR BLD AUTO: 2.1 % — SIGNIFICANT CHANGE UP (ref 0–6)
HCT VFR BLD CALC: 33.6 % — LOW (ref 34.5–45)
HGB BLD-MCNC: 11.1 G/DL — LOW (ref 11.5–15.5)
IMM GRANULOCYTES NFR BLD AUTO: 0.2 % — SIGNIFICANT CHANGE UP (ref 0–0.9)
LYMPHOCYTES # BLD AUTO: 0.96 K/UL — LOW (ref 1–3.3)
LYMPHOCYTES # BLD AUTO: 16.6 % — SIGNIFICANT CHANGE UP (ref 13–44)
MCHC RBC-ENTMCNC: 32 PG — SIGNIFICANT CHANGE UP (ref 27–34)
MCHC RBC-ENTMCNC: 33 G/DL — SIGNIFICANT CHANGE UP (ref 32–36)
MCV RBC AUTO: 96.8 FL — SIGNIFICANT CHANGE UP (ref 80–100)
MONOCYTES # BLD AUTO: 0.43 K/UL — SIGNIFICANT CHANGE UP (ref 0–0.9)
MONOCYTES NFR BLD AUTO: 7.4 % — SIGNIFICANT CHANGE UP (ref 2–14)
NEUTROPHILS # BLD AUTO: 4.24 K/UL — SIGNIFICANT CHANGE UP (ref 1.8–7.4)
NEUTROPHILS NFR BLD AUTO: 73 % — SIGNIFICANT CHANGE UP (ref 43–77)
NRBC # BLD: 0 /100 WBCS — SIGNIFICANT CHANGE UP (ref 0–0)
PLATELET # BLD AUTO: 188 K/UL — SIGNIFICANT CHANGE UP (ref 150–400)
RBC # BLD: 3.47 M/UL — LOW (ref 3.8–5.2)
RBC # FLD: 12.3 % — SIGNIFICANT CHANGE UP (ref 10.3–14.5)
WBC # BLD: 5.8 K/UL — SIGNIFICANT CHANGE UP (ref 3.8–10.5)
WBC # FLD AUTO: 5.8 K/UL — SIGNIFICANT CHANGE UP (ref 3.8–10.5)

## 2024-01-17 LAB
ALBUMIN SERPL ELPH-MCNC: 4.1 G/DL
ALP BLD-CCNC: 90 U/L
ALT SERPL-CCNC: 24 U/L
ANION GAP SERPL CALC-SCNC: 12 MMOL/L
AST SERPL-CCNC: 20 U/L
BILIRUB SERPL-MCNC: 0.4 MG/DL
BUN SERPL-MCNC: 23 MG/DL
CALCIUM SERPL-MCNC: 8.9 MG/DL
CHLORIDE SERPL-SCNC: 103 MMOL/L
CO2 SERPL-SCNC: 24 MMOL/L
CREAT SERPL-MCNC: 0.66 MG/DL
EGFR: 101 ML/MIN/1.73M2
GLUCOSE SERPL-MCNC: 104 MG/DL
POTASSIUM SERPL-SCNC: 5.4 MMOL/L
PROT SERPL-MCNC: 5.7 G/DL
SODIUM SERPL-SCNC: 138 MMOL/L

## 2024-01-19 ENCOUNTER — TRANSCRIPTION ENCOUNTER (OUTPATIENT)
Age: 59
End: 2024-01-19

## 2024-01-19 LAB
ALBUMIN MFR SERPL ELPH: 65.2 %
ALBUMIN SERPL-MCNC: 3.7 G/DL
ALBUMIN/GLOB SERPL: 1.8 RATIO
ALPHA1 GLOB MFR SERPL ELPH: 4.9 %
ALPHA1 GLOB SERPL ELPH-MCNC: 0.3 G/DL
ALPHA2 GLOB MFR SERPL ELPH: 11.7 %
ALPHA2 GLOB SERPL ELPH-MCNC: 0.7 G/DL
B-GLOBULIN MFR SERPL ELPH: 11.9 %
B-GLOBULIN SERPL ELPH-MCNC: 0.7 G/DL
DEPRECATED KAPPA LC FREE/LAMBDA SER: 0.61 RATIO
GAMMA GLOB FLD ELPH-MCNC: 0.4 G/DL
GAMMA GLOB MFR SERPL ELPH: 6.3 %
IGA SER QL IEP: 24 MG/DL
IGG SER QL IEP: 366 MG/DL
IGM SER QL IEP: <10 MG/DL
INTERPRETATION SERPL IEP-IMP: NORMAL
KAPPA LC CSF-MCNC: 0.8 MG/DL
KAPPA LC SERPL-MCNC: 0.49 MG/DL
M PROTEIN MFR SERPL ELPH: 1.8 %
M PROTEIN SPEC IFE-MCNC: NORMAL
MONOCLON BAND OBS SERPL: 0.1 G/DL
PROT SERPL-MCNC: 5.7 G/DL
PROT SERPL-MCNC: 5.7 G/DL

## 2024-01-21 ENCOUNTER — RX RENEWAL (OUTPATIENT)
Age: 59
End: 2024-01-21

## 2024-01-30 ENCOUNTER — RESULT REVIEW (OUTPATIENT)
Age: 59
End: 2024-01-30

## 2024-01-30 ENCOUNTER — APPOINTMENT (OUTPATIENT)
Dept: HEMATOLOGY ONCOLOGY | Facility: CLINIC | Age: 59
End: 2024-01-30

## 2024-01-30 ENCOUNTER — APPOINTMENT (OUTPATIENT)
Dept: ELECTROPHYSIOLOGY | Facility: CLINIC | Age: 59
End: 2024-01-30
Payer: MEDICARE

## 2024-01-30 ENCOUNTER — APPOINTMENT (OUTPATIENT)
Dept: INFUSION THERAPY | Facility: HOSPITAL | Age: 59
End: 2024-01-30

## 2024-01-30 ENCOUNTER — NON-APPOINTMENT (OUTPATIENT)
Age: 59
End: 2024-01-30

## 2024-01-30 LAB
BASOPHILS # BLD AUTO: 0.03 K/UL — SIGNIFICANT CHANGE UP (ref 0–0.2)
BASOPHILS NFR BLD AUTO: 0.7 % — SIGNIFICANT CHANGE UP (ref 0–2)
EOSINOPHIL # BLD AUTO: 0.13 K/UL — SIGNIFICANT CHANGE UP (ref 0–0.5)
EOSINOPHIL NFR BLD AUTO: 2.9 % — SIGNIFICANT CHANGE UP (ref 0–6)
HCT VFR BLD CALC: 31.7 % — LOW (ref 34.5–45)
HGB BLD-MCNC: 10.4 G/DL — LOW (ref 11.5–15.5)
IMM GRANULOCYTES NFR BLD AUTO: 0 % — SIGNIFICANT CHANGE UP (ref 0–0.9)
LYMPHOCYTES # BLD AUTO: 1.18 K/UL — SIGNIFICANT CHANGE UP (ref 1–3.3)
LYMPHOCYTES # BLD AUTO: 26.3 % — SIGNIFICANT CHANGE UP (ref 13–44)
MCHC RBC-ENTMCNC: 32.4 PG — SIGNIFICANT CHANGE UP (ref 27–34)
MCHC RBC-ENTMCNC: 32.8 G/DL — SIGNIFICANT CHANGE UP (ref 32–36)
MCV RBC AUTO: 98.8 FL — SIGNIFICANT CHANGE UP (ref 80–100)
MONOCYTES # BLD AUTO: 0.33 K/UL — SIGNIFICANT CHANGE UP (ref 0–0.9)
MONOCYTES NFR BLD AUTO: 7.3 % — SIGNIFICANT CHANGE UP (ref 2–14)
NEUTROPHILS # BLD AUTO: 2.82 K/UL — SIGNIFICANT CHANGE UP (ref 1.8–7.4)
NEUTROPHILS NFR BLD AUTO: 62.8 % — SIGNIFICANT CHANGE UP (ref 43–77)
NRBC # BLD: 0 /100 WBCS — SIGNIFICANT CHANGE UP (ref 0–0)
PLATELET # BLD AUTO: 213 K/UL — SIGNIFICANT CHANGE UP (ref 150–400)
RBC # BLD: 3.21 M/UL — LOW (ref 3.8–5.2)
RBC # FLD: 12.1 % — SIGNIFICANT CHANGE UP (ref 10.3–14.5)
WBC # BLD: 4.49 K/UL — SIGNIFICANT CHANGE UP (ref 3.8–10.5)
WBC # FLD AUTO: 4.49 K/UL — SIGNIFICANT CHANGE UP (ref 3.8–10.5)

## 2024-01-30 PROCEDURE — 93294 REM INTERROG EVL PM/LDLS PM: CPT

## 2024-01-30 PROCEDURE — 93296 REM INTERROG EVL PM/IDS: CPT

## 2024-01-31 LAB
ALBUMIN SERPL ELPH-MCNC: 3.8 G/DL
ALP BLD-CCNC: 84 U/L
ALT SERPL-CCNC: 16 U/L
ANION GAP SERPL CALC-SCNC: 9 MMOL/L
AST SERPL-CCNC: 16 U/L
BILIRUB SERPL-MCNC: 0.4 MG/DL
BUN SERPL-MCNC: 14 MG/DL
CALCIUM SERPL-MCNC: 8.7 MG/DL
CHLORIDE SERPL-SCNC: 107 MMOL/L
CO2 SERPL-SCNC: 27 MMOL/L
CREAT SERPL-MCNC: 0.6 MG/DL
EGFR: 103 ML/MIN/1.73M2
GLUCOSE SERPL-MCNC: 92 MG/DL
POTASSIUM SERPL-SCNC: 4.9 MMOL/L
PROT SERPL-MCNC: 5.2 G/DL
SODIUM SERPL-SCNC: 143 MMOL/L

## 2024-02-02 LAB
ALBUMIN MFR SERPL ELPH: 65.3 %
ALBUMIN SERPL-MCNC: 3.5 G/DL
ALBUMIN/GLOB SERPL: 1.9 RATIO
ALPHA1 GLOB MFR SERPL ELPH: 4.8 %
ALPHA1 GLOB SERPL ELPH-MCNC: 0.3 G/DL
ALPHA2 GLOB MFR SERPL ELPH: 12 %
ALPHA2 GLOB SERPL ELPH-MCNC: 0.6 G/DL
B-GLOBULIN MFR SERPL ELPH: 12 %
B-GLOBULIN SERPL ELPH-MCNC: 0.6 G/DL
DEPRECATED KAPPA LC FREE/LAMBDA SER: 0.61 RATIO
GAMMA GLOB FLD ELPH-MCNC: 0.3 G/DL
GAMMA GLOB MFR SERPL ELPH: 5.9 %
IGA SER QL IEP: 26 MG/DL
IGG SER QL IEP: 362 MG/DL
IGM SER QL IEP: <10 MG/DL
INTERPRETATION SERPL IEP-IMP: NORMAL
KAPPA LC CSF-MCNC: 0.88 MG/DL
KAPPA LC SERPL-MCNC: 0.54 MG/DL
M PROTEIN MFR SERPL ELPH: NORMAL
M PROTEIN SPEC IFE-MCNC: NORMAL
MONOCLON BAND OBS SERPL: NORMAL
PROT SERPL-MCNC: 5.3 G/DL
PROT SERPL-MCNC: 5.3 G/DL

## 2024-02-05 ENCOUNTER — TRANSCRIPTION ENCOUNTER (OUTPATIENT)
Age: 59
End: 2024-02-05

## 2024-02-08 ENCOUNTER — APPOINTMENT (OUTPATIENT)
Dept: HEMATOLOGY ONCOLOGY | Facility: CLINIC | Age: 59
End: 2024-02-08

## 2024-02-12 ENCOUNTER — TRANSCRIPTION ENCOUNTER (OUTPATIENT)
Age: 59
End: 2024-02-12

## 2024-02-13 ENCOUNTER — TRANSCRIPTION ENCOUNTER (OUTPATIENT)
Age: 59
End: 2024-02-13

## 2024-02-13 ENCOUNTER — RESULT REVIEW (OUTPATIENT)
Age: 59
End: 2024-02-13

## 2024-02-13 ENCOUNTER — APPOINTMENT (OUTPATIENT)
Dept: INFUSION THERAPY | Facility: HOSPITAL | Age: 59
End: 2024-02-13

## 2024-02-13 ENCOUNTER — APPOINTMENT (OUTPATIENT)
Dept: HEMATOLOGY ONCOLOGY | Facility: CLINIC | Age: 59
End: 2024-02-13

## 2024-02-13 LAB
BASOPHILS # BLD AUTO: 0.04 K/UL — SIGNIFICANT CHANGE UP (ref 0–0.2)
BASOPHILS NFR BLD AUTO: 0.7 % — SIGNIFICANT CHANGE UP (ref 0–2)
EOSINOPHIL # BLD AUTO: 0.14 K/UL — SIGNIFICANT CHANGE UP (ref 0–0.5)
EOSINOPHIL NFR BLD AUTO: 2.5 % — SIGNIFICANT CHANGE UP (ref 0–6)
HCT VFR BLD CALC: 33.4 % — LOW (ref 34.5–45)
HGB BLD-MCNC: 10.8 G/DL — LOW (ref 11.5–15.5)
IMM GRANULOCYTES NFR BLD AUTO: 0.4 % — SIGNIFICANT CHANGE UP (ref 0–0.9)
LYMPHOCYTES # BLD AUTO: 1.28 K/UL — SIGNIFICANT CHANGE UP (ref 1–3.3)
LYMPHOCYTES # BLD AUTO: 23.1 % — SIGNIFICANT CHANGE UP (ref 13–44)
MCHC RBC-ENTMCNC: 31.5 PG — SIGNIFICANT CHANGE UP (ref 27–34)
MCHC RBC-ENTMCNC: 32.3 G/DL — SIGNIFICANT CHANGE UP (ref 32–36)
MCV RBC AUTO: 97.4 FL — SIGNIFICANT CHANGE UP (ref 80–100)
MONOCYTES # BLD AUTO: 0.38 K/UL — SIGNIFICANT CHANGE UP (ref 0–0.9)
MONOCYTES NFR BLD AUTO: 6.9 % — SIGNIFICANT CHANGE UP (ref 2–14)
NEUTROPHILS # BLD AUTO: 3.68 K/UL — SIGNIFICANT CHANGE UP (ref 1.8–7.4)
NEUTROPHILS NFR BLD AUTO: 66.4 % — SIGNIFICANT CHANGE UP (ref 43–77)
NRBC # BLD: 0 /100 WBCS — SIGNIFICANT CHANGE UP (ref 0–0)
NT-PROBNP SERPL-MCNC: 1320 PG/ML
PLATELET # BLD AUTO: 182 K/UL — SIGNIFICANT CHANGE UP (ref 150–400)
RBC # BLD: 3.43 M/UL — LOW (ref 3.8–5.2)
RBC # FLD: 12.5 % — SIGNIFICANT CHANGE UP (ref 10.3–14.5)
WBC # BLD: 5.54 K/UL — SIGNIFICANT CHANGE UP (ref 3.8–10.5)
WBC # FLD AUTO: 5.54 K/UL — SIGNIFICANT CHANGE UP (ref 3.8–10.5)

## 2024-02-14 ENCOUNTER — TRANSCRIPTION ENCOUNTER (OUTPATIENT)
Age: 59
End: 2024-02-14

## 2024-02-14 LAB — TROPONIN I SERPL-MCNC: 0.02 NG/ML

## 2024-02-19 NOTE — DISCHARGE NOTE PROVIDER - YES NO FOR MLM POSITIVE OR NEGATIVE COVID RESULT
Elevate the left foot often   Wear the buddy taping to the left 5th toe and the cast shoe daily for support until you see the orthopedic doctor  Ibuprofen every 6 hours as needed for pain. Take with food  Tylenol every 4 hours as needed for pain  Limited activity until seen by the orthopedic doctor      Thank you for choosing [facility] for your healthcare needs.    We strive to provide you with excellent service and hope that we have exceeded your expectations.     If you receive a survey in the mail, we hope that you will complete it and agree that we have provided \"Very Good\" care today.  If you would like to speak to us about your visit, please contact our center at:    [facility] [place] [facility] [place]  Telephone [phone]  [facility]  Telephone [phone]  [facility]  Telephone [phone]    _____________________     ,

## 2024-02-20 ENCOUNTER — RX RENEWAL (OUTPATIENT)
Age: 59
End: 2024-02-20

## 2024-02-27 ENCOUNTER — RESULT REVIEW (OUTPATIENT)
Age: 59
End: 2024-02-27

## 2024-02-27 ENCOUNTER — APPOINTMENT (OUTPATIENT)
Dept: HEMATOLOGY ONCOLOGY | Facility: CLINIC | Age: 59
End: 2024-02-27

## 2024-02-27 ENCOUNTER — APPOINTMENT (OUTPATIENT)
Dept: HEMATOLOGY ONCOLOGY | Facility: CLINIC | Age: 59
End: 2024-02-27
Payer: MEDICARE

## 2024-02-27 ENCOUNTER — APPOINTMENT (OUTPATIENT)
Dept: INFUSION THERAPY | Facility: HOSPITAL | Age: 59
End: 2024-02-27

## 2024-02-27 LAB
BASOPHILS # BLD AUTO: 0.03 K/UL — SIGNIFICANT CHANGE UP (ref 0–0.2)
BASOPHILS NFR BLD AUTO: 0.5 % — SIGNIFICANT CHANGE UP (ref 0–2)
EOSINOPHIL # BLD AUTO: 0.14 K/UL — SIGNIFICANT CHANGE UP (ref 0–0.5)
EOSINOPHIL NFR BLD AUTO: 2.3 % — SIGNIFICANT CHANGE UP (ref 0–6)
HCT VFR BLD CALC: 31.5 % — LOW (ref 34.5–45)
HGB BLD-MCNC: 10.4 G/DL — LOW (ref 11.5–15.5)
IMM GRANULOCYTES NFR BLD AUTO: 0.2 % — SIGNIFICANT CHANGE UP (ref 0–0.9)
LYMPHOCYTES # BLD AUTO: 1.31 K/UL — SIGNIFICANT CHANGE UP (ref 1–3.3)
LYMPHOCYTES # BLD AUTO: 21.6 % — SIGNIFICANT CHANGE UP (ref 13–44)
MCHC RBC-ENTMCNC: 32.1 PG — SIGNIFICANT CHANGE UP (ref 27–34)
MCHC RBC-ENTMCNC: 33 G/DL — SIGNIFICANT CHANGE UP (ref 32–36)
MCV RBC AUTO: 97.2 FL — SIGNIFICANT CHANGE UP (ref 80–100)
MONOCYTES # BLD AUTO: 0.39 K/UL — SIGNIFICANT CHANGE UP (ref 0–0.9)
MONOCYTES NFR BLD AUTO: 6.4 % — SIGNIFICANT CHANGE UP (ref 2–14)
NEUTROPHILS # BLD AUTO: 4.18 K/UL — SIGNIFICANT CHANGE UP (ref 1.8–7.4)
NEUTROPHILS NFR BLD AUTO: 69 % — SIGNIFICANT CHANGE UP (ref 43–77)
NRBC # BLD: 0 /100 WBCS — SIGNIFICANT CHANGE UP (ref 0–0)
PLATELET # BLD AUTO: 201 K/UL — SIGNIFICANT CHANGE UP (ref 150–400)
RBC # BLD: 3.24 M/UL — LOW (ref 3.8–5.2)
RBC # FLD: 12.3 % — SIGNIFICANT CHANGE UP (ref 10.3–14.5)
WBC # BLD: 6.06 K/UL — SIGNIFICANT CHANGE UP (ref 3.8–10.5)
WBC # FLD AUTO: 6.06 K/UL — SIGNIFICANT CHANGE UP (ref 3.8–10.5)

## 2024-02-27 PROCEDURE — 99213 OFFICE O/P EST LOW 20 MIN: CPT

## 2024-02-27 RX ORDER — GABAPENTIN 400 MG/1
1 CAPSULE ORAL
Qty: 0 | Refills: 0 | DISCHARGE

## 2024-02-28 ENCOUNTER — OUTPATIENT (OUTPATIENT)
Dept: OUTPATIENT SERVICES | Facility: HOSPITAL | Age: 59
LOS: 1 days | Discharge: HOME | End: 2024-02-28

## 2024-02-28 DIAGNOSIS — D47.2 MONOCLONAL GAMMOPATHY: ICD-10-CM

## 2024-03-05 ENCOUNTER — APPOINTMENT (OUTPATIENT)
Dept: HEMATOLOGY ONCOLOGY | Facility: CLINIC | Age: 59
End: 2024-03-05
Payer: MEDICARE

## 2024-03-05 ENCOUNTER — RESULT REVIEW (OUTPATIENT)
Age: 59
End: 2024-03-05

## 2024-03-05 VITALS
DIASTOLIC BLOOD PRESSURE: 77 MMHG | OXYGEN SATURATION: 99 % | TEMPERATURE: 98.1 F | RESPIRATION RATE: 15 BRPM | BODY MASS INDEX: 20.4 KG/M2 | HEART RATE: 89 BPM | WEIGHT: 115.3 LBS | SYSTOLIC BLOOD PRESSURE: 121 MMHG

## 2024-03-05 LAB
BASOPHILS # BLD AUTO: 0.04 K/UL — SIGNIFICANT CHANGE UP (ref 0–0.2)
BASOPHILS NFR BLD AUTO: 0.8 % — SIGNIFICANT CHANGE UP (ref 0–2)
EOSINOPHIL # BLD AUTO: 0.13 K/UL — SIGNIFICANT CHANGE UP (ref 0–0.5)
EOSINOPHIL NFR BLD AUTO: 2.5 % — SIGNIFICANT CHANGE UP (ref 0–6)
HCT VFR BLD CALC: 34.7 % — SIGNIFICANT CHANGE UP (ref 34.5–45)
HGB BLD-MCNC: 11.2 G/DL — LOW (ref 11.5–15.5)
IMM GRANULOCYTES NFR BLD AUTO: 0.4 % — SIGNIFICANT CHANGE UP (ref 0–0.9)
LYMPHOCYTES # BLD AUTO: 1.21 K/UL — SIGNIFICANT CHANGE UP (ref 1–3.3)
LYMPHOCYTES # BLD AUTO: 23.1 % — SIGNIFICANT CHANGE UP (ref 13–44)
MCHC RBC-ENTMCNC: 31.8 PG — SIGNIFICANT CHANGE UP (ref 27–34)
MCHC RBC-ENTMCNC: 32.3 G/DL — SIGNIFICANT CHANGE UP (ref 32–36)
MCV RBC AUTO: 98.6 FL — SIGNIFICANT CHANGE UP (ref 80–100)
MONOCYTES # BLD AUTO: 0.42 K/UL — SIGNIFICANT CHANGE UP (ref 0–0.9)
MONOCYTES NFR BLD AUTO: 8 % — SIGNIFICANT CHANGE UP (ref 2–14)
NEUTROPHILS # BLD AUTO: 3.41 K/UL — SIGNIFICANT CHANGE UP (ref 1.8–7.4)
NEUTROPHILS NFR BLD AUTO: 65.2 % — SIGNIFICANT CHANGE UP (ref 43–77)
NRBC # BLD: 0 /100 WBCS — SIGNIFICANT CHANGE UP (ref 0–0)
PLATELET # BLD AUTO: 224 K/UL — SIGNIFICANT CHANGE UP (ref 150–400)
RBC # BLD: 3.52 M/UL — LOW (ref 3.8–5.2)
RBC # FLD: 12.4 % — SIGNIFICANT CHANGE UP (ref 10.3–14.5)
WBC # BLD: 5.23 K/UL — SIGNIFICANT CHANGE UP (ref 3.8–10.5)
WBC # FLD AUTO: 5.23 K/UL — SIGNIFICANT CHANGE UP (ref 3.8–10.5)

## 2024-03-05 PROCEDURE — 99214 OFFICE O/P EST MOD 30 MIN: CPT

## 2024-03-05 NOTE — PHYSICAL EXAM
[Fully active, able to carry on all pre-disease performance without restriction] : Status 0 - Fully active, able to carry on all pre-disease performance without restriction [Normal] : affect appropriate [de-identified] : supple [de-identified] : (+)S1S2 RRR [de-identified] : no edema [de-identified] : no pain [de-identified] : warm/dry

## 2024-03-05 NOTE — ASSESSMENT
[FreeTextEntry1] : 59 y/o F previously healthy but with development of chronic postural orthostatic tachycardia syndrome (POTS) and autonomic postural hypotension, found to have systemic lambda light chain amyloidosis presented to and subsequently discharged from [facility] for cardiac complications likely related to amyloid involvement now s/p PPM. Cycle 1 of CyBorD was initiated on 4/1/22 Completed 7 full cycles of poncho-CyBorD on 10/28/22. She is now on maintenance with daratumumab monthly with Velcade q3odtbl. She has had a complete response from the standpoint of her amyloidosis. Repeat BM bx on 2/8/23 showed minimal involvement by patients known plasma cell neoplasm/myeloma (less than 5% of the cellularity). Still with evidence of FISH positive 11;14 translocation at that point in 2.5% of cells. Venetoclax may be suitable agent if serum FLC uptrending.  On diagnosis Lambda serum FLCs 8.73 and kappa 0.92, for a ratio of 0.11 (or 9.5). Subsequent fat pad biopsy was done and was POSITIVE for congo red, with positive polarization. We were unable to send for mass spectrometry to confirm amyloid type. However, BMBx which showed ~35% plasmacytosis (monoclonal) c/w plasma cell neoplasm. This further confirmed the presence of systemic light chain amyloidosis. It did NOT meet MM criteria.  Patient has multiple end organ difficulties related to her amyloidosis. 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 Her urine showing proteinuria on diagnosis (although not nephrotic range). She had peripheral neuropathy. She has chronic diarrhea with pathology from colonoscopy confirming amyloid deposition. Has poor taste sensation. Much of this has improved in severity, but this is still an ongoing issue which needs management.    Plan: - Patient has met with the BMT team but does not plan to pursue autologous BMT at this point.  -Continue follow up with Cardiology with goal of titrating off Midodrine as BP permits. Repeat echocardiogram showing some improvement in May 2023. Had advised against green tea extract as this has the potential to interfere with the antineoplastic effect of her Velcade.  -Continue with supportive care for diarrhea. -Treating with maintenance daratumumab monthly and velcade every other week, dose reduced for neuropathy. We will continue with this regimen for now and continue to monitor serum FLC closely. This has been stable. -Discussed possible IVIG given hypogammaglobulinemia, but holding off as only has had one URI within last 2 years.  -CBC reviewed, stable today. -Patient understands and agrees with plan. All information explained to the best of my ability. -RTC in 3 months but continue monthly labs.

## 2024-03-05 NOTE — HISTORY OF PRESENT ILLNESS
[de-identified] : Patient seen for follow up on 03/05/2024. She reported she had a bad week last week, had significant diarrhea and she lost a few pounds. She has a lump in her palm which she first noticed around a month ago, at times its slightly painful but not severe very mild. More of a burning. Also developing more neuropathy on the pads of her feet (has always had in the toes). She had episode of syncope a few weeks ago, BP went down to 59/30 associated with a respiratory illness which she took an antibiotic for. She fell and hit her head and ankle. Since then she had to increase the midodrine to 10 mg twice a day (had been down to 10-5).  [de-identified] : 59 y/o F with hx of thyroidectomy in 2011 (benign pathology) on thyroid replacement since then, and with COVID pneumonia back in March of 2020 ultimately signed herself out AMA, chronic issues thereafter, ended up going back to work in July 2020. In January-February 2021 had Pfizer vaccines. Started having issues starting in March 2021, she was having GI issues, endoscopy showed chronic gastritis. Additionally diagnosed with IBS at that point. She had been diagnosed with Postural Orthostatic Tachycardia Syndrome - her blood pressure went down when she stands up from a seated position (very severe hypotension). She had fainted "too many times to count." She ended up with bruises here and there but nothing severe where she would need to go to the hospital. She saw two neurologists who did extensive evaluations without any success in finding diagnostic lesions. She reported seeing specialist for dysautonomia, Dr. Colón at [facility] (neurology) and started fludrocortisone in September 2021. Also at that point c/o burning skin, muscle aches, burning mouth and altered taste. She had lost 47 pounds from March 2021 to when I first saw her. She was ultimatley sent to me for evaluation of elevated free light chains. 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 She was found with no monoclonal protein on SPEP and hemoglobin was normal, with normal renal function, and no hypercalcemia. Subsequent fat pad biopsy showed positive congo red staining in rare blood vessels and positive for polarization. This was diagnostic for primary light chain amyloidosis.  In March 2022 patient was admitted to [facility] for multiple recurrent syncopal episodes. While admitted course was complicated by multiple unresponsive episodes prompting RRTs, during which pt found to be hypotensive and bradycardic. She initially required levophed for BP support but ultimately BP remained stable while on Midodrine 20mg q8 and Fludrocortisone 0.2mg q24. Pt noted to intermittently have sinus pauses (2-3.8 seconds) thought to be 2/2 vagal episodes, and asymptomatic bradycardia overnight on telemetry. ECG noted NSR but 1st degree AV block. TTE 3/16/22 showed normal LV size and function with EF 55%-60%. No regional wall motion abnormalities. Moderate concentric LVH. Treated for C. diff and UTI during hospitalization as well. Ultimately at that point she was transferred to [facility] for consideration of inpatient chemotherapy.   A PPM placement was recommended, as cardiac MRI showed evidence of definitive amyloid involvement. Pt had another RRT for hypotension and symptomatic bradycardia to 30s, was on pressor support until she received a dual chamber PPM. Pt also continued her C.diff treatment and chemotherapy was placed on hold until the diarrhea cleared. She was then started on Daratumumab + CyBorD inpatient, and tolerated it very well ; she completed 2 treatments of chemotherapy inpatient. She subseqently continued with this regimen as outpatient. Has been complicated by diarrhea which on evaluation by GI was deemed likely amyloid related. This was biopsy proven on colonoscopic / endoscopic evaluation in September 2022. Treated with supportive care since then.   She has completed 7 full cycles of Danyell + CyBorD in October 2022. Based on serum FLC ratio she was in a complete response.  Of note, patient reported that she is NOT interested in hematopoietic transplant at this time but she is interested in collecting her stem cells for a later date.  At this point she is on maintenance Velcade and Darzalex Faspro.    ****************************************************************************************************** Disease: AL amyloidosis  Pathology: Final Diagnosis (2/8/22).  Tumor/ Prognostic Markers: Not enough tissue for mass spectrometry testing.   Final Diagnosis (3/1/22) 1, 2. Bone marrow biopsy and bone marrow aspirate  - Plasma cell myeloma, 25-35% of cellularity by immunohistochemistry  - Trilineage hematopoiesis with maturation, mild eosinophilia, serous   atrophy, and iron stores present  See note and description.  Diagnostic note: As per chart review, the patient was referred for elevated free light  chains. Her light chain ratio was found to be very low at 0.11 and was  found to have hypogammaglobulinemia with no monoclonal protein on SPEP,  hemoglobin normal, normal renal function, and no hypercalcemia. Fat  pad biopsy showed positive congo red staining in rare blood vessels  and positive for polarization. Bone marrow to evaluate for plasma cell  myeloma. The bone marrow shows a significant plasma cell infiltrate by  immunohistochemistry (25-35% of cellularity, also positive with cyclin  D1, with monotypic plasma cells by flow cytometry. Congo red stain is  negative.  Normal female karyotype seen on bone marrow.  FISH Result: ABNORMAL FISH MULTIPLE MYELOMA PANEL - Atypical CCND1/IGH fusion pattern detected (3.5%).    Current Treatment Status: Therapy: Danyell + Velcade Maintenance . Kleber + CyBorD started on 4/8/2022, held cytoxan from 4/18-5/12 for hematuria, held again 6/24 for worsening diarrhea Now s/p 7 cycles of Danyell+CyBorD (ending 10/28/22).

## 2024-03-05 NOTE — REVIEW OF SYSTEMS
[Diarrhea: Grade 0] : Diarrhea: Grade 0 [Anxiety] : anxiety [Negative] : Allergic/Immunologic [Vision Problems] : no vision problems [Dysphagia] : no dysphagia [Nosebleeds] : no nosebleeds [Odynophagia] : no odynophagia [Chest Pain] : no chest pain [Palpitations] : no palpitations [Lower Ext Edema] : no lower extremity edema [Abdominal Pain] : no abdominal pain [Vomiting] : no vomiting [Constipation] : no constipation [Insomnia] : no insomnia [Dysuria] : no dysuria [Hot Flashes] : no hot flashes [FreeTextEntry7] : diarrhea per HPI

## 2024-03-06 LAB
ALBUMIN SERPL ELPH-MCNC: 4 G/DL
ALP BLD-CCNC: 92 U/L
ALT SERPL-CCNC: 18 U/L
ANION GAP SERPL CALC-SCNC: 10 MMOL/L
AST SERPL-CCNC: 17 U/L
BILIRUB SERPL-MCNC: 0.5 MG/DL
BUN SERPL-MCNC: 17 MG/DL
CALCIUM SERPL-MCNC: 9.1 MG/DL
CHLORIDE SERPL-SCNC: 104 MMOL/L
CO2 SERPL-SCNC: 27 MMOL/L
CREAT SERPL-MCNC: 0.73 MG/DL
EGFR: 95 ML/MIN/1.73M2
GLUCOSE SERPL-MCNC: 107 MG/DL
POTASSIUM SERPL-SCNC: 5.8 MMOL/L
PROT SERPL-MCNC: 5.8 G/DL
SODIUM SERPL-SCNC: 142 MMOL/L

## 2024-03-08 LAB
ALBUMIN MFR SERPL ELPH: 62.9 %
ALBUMIN SERPL-MCNC: 3.6 G/DL
ALBUMIN/GLOB SERPL: 1.7 RATIO
ALPHA1 GLOB MFR SERPL ELPH: 5.3 %
ALPHA1 GLOB SERPL ELPH-MCNC: 0.3 G/DL
ALPHA2 GLOB MFR SERPL ELPH: 13 %
ALPHA2 GLOB SERPL ELPH-MCNC: 0.7 G/DL
B-GLOBULIN MFR SERPL ELPH: 12.2 %
B-GLOBULIN SERPL ELPH-MCNC: 0.7 G/DL
DEPRECATED KAPPA LC FREE/LAMBDA SER: 0.41 RATIO
FOLATE SERPL-MCNC: >20 NG/ML
GAMMA GLOB FLD ELPH-MCNC: 0.4 G/DL
GAMMA GLOB MFR SERPL ELPH: 6.6 %
IGA SER QL IEP: 22 MG/DL
IGG SER QL IEP: 385 MG/DL
IGM SER QL IEP: <10 MG/DL
INTERPRETATION SERPL IEP-IMP: NORMAL
KAPPA LC CSF-MCNC: 0.92 MG/DL
KAPPA LC SERPL-MCNC: 0.38 MG/DL
M PROTEIN MFR SERPL ELPH: NORMAL
M PROTEIN SPEC IFE-MCNC: NORMAL
MONOCLON BAND OBS SERPL: NORMAL
PROT SERPL-MCNC: 5.7 G/DL
PROT SERPL-MCNC: 5.7 G/DL
VIT B12 SERPL-MCNC: 266 PG/ML

## 2024-03-12 ENCOUNTER — RESULT REVIEW (OUTPATIENT)
Age: 59
End: 2024-03-12

## 2024-03-12 ENCOUNTER — APPOINTMENT (OUTPATIENT)
Dept: INFUSION THERAPY | Facility: HOSPITAL | Age: 59
End: 2024-03-12

## 2024-03-12 ENCOUNTER — APPOINTMENT (OUTPATIENT)
Dept: HEMATOLOGY ONCOLOGY | Facility: CLINIC | Age: 59
End: 2024-03-12

## 2024-03-12 LAB
BASOPHILS # BLD AUTO: 0.03 K/UL — SIGNIFICANT CHANGE UP (ref 0–0.2)
BASOPHILS NFR BLD AUTO: 0.7 % — SIGNIFICANT CHANGE UP (ref 0–2)
EOSINOPHIL # BLD AUTO: 0.12 K/UL — SIGNIFICANT CHANGE UP (ref 0–0.5)
EOSINOPHIL NFR BLD AUTO: 2.7 % — SIGNIFICANT CHANGE UP (ref 0–6)
HCT VFR BLD CALC: 31.6 % — LOW (ref 34.5–45)
HGB BLD-MCNC: 10.3 G/DL — LOW (ref 11.5–15.5)
IMM GRANULOCYTES NFR BLD AUTO: 0.5 % — SIGNIFICANT CHANGE UP (ref 0–0.9)
LYMPHOCYTES # BLD AUTO: 1.11 K/UL — SIGNIFICANT CHANGE UP (ref 1–3.3)
LYMPHOCYTES # BLD AUTO: 25 % — SIGNIFICANT CHANGE UP (ref 13–44)
MCHC RBC-ENTMCNC: 31.9 PG — SIGNIFICANT CHANGE UP (ref 27–34)
MCHC RBC-ENTMCNC: 32.6 G/DL — SIGNIFICANT CHANGE UP (ref 32–36)
MCV RBC AUTO: 97.8 FL — SIGNIFICANT CHANGE UP (ref 80–100)
MONOCYTES # BLD AUTO: 0.34 K/UL — SIGNIFICANT CHANGE UP (ref 0–0.9)
MONOCYTES NFR BLD AUTO: 7.7 % — SIGNIFICANT CHANGE UP (ref 2–14)
NEUTROPHILS # BLD AUTO: 2.82 K/UL — SIGNIFICANT CHANGE UP (ref 1.8–7.4)
NEUTROPHILS NFR BLD AUTO: 63.4 % — SIGNIFICANT CHANGE UP (ref 43–77)
NRBC # BLD: 0 /100 WBCS — SIGNIFICANT CHANGE UP (ref 0–0)
PLATELET # BLD AUTO: 177 K/UL — SIGNIFICANT CHANGE UP (ref 150–400)
RBC # BLD: 3.23 M/UL — LOW (ref 3.8–5.2)
RBC # FLD: 12.3 % — SIGNIFICANT CHANGE UP (ref 10.3–14.5)
WBC # BLD: 4.44 K/UL — SIGNIFICANT CHANGE UP (ref 3.8–10.5)
WBC # FLD AUTO: 4.44 K/UL — SIGNIFICANT CHANGE UP (ref 3.8–10.5)

## 2024-03-13 DIAGNOSIS — R11.2 NAUSEA WITH VOMITING, UNSPECIFIED: ICD-10-CM

## 2024-03-13 DIAGNOSIS — E85.9 AMYLOIDOSIS, UNSPECIFIED: ICD-10-CM

## 2024-03-13 DIAGNOSIS — Z51.11 ENCOUNTER FOR ANTINEOPLASTIC CHEMOTHERAPY: ICD-10-CM

## 2024-03-15 LAB
ALBUMIN SERPL ELPH-MCNC: 3.8 G/DL
ALP BLD-CCNC: 85 U/L
ALT SERPL-CCNC: 17 U/L
ANION GAP SERPL CALC-SCNC: 8 MMOL/L
AST SERPL-CCNC: 16 U/L
BILIRUB SERPL-MCNC: 0.6 MG/DL
BUN SERPL-MCNC: 16 MG/DL
CALCIUM SERPL-MCNC: 9.2 MG/DL
CHLORIDE SERPL-SCNC: 103 MMOL/L
CO2 SERPL-SCNC: 29 MMOL/L
CREAT SERPL-MCNC: 0.77 MG/DL
EGFR: 89 ML/MIN/1.73M2
GLUCOSE SERPL-MCNC: 98 MG/DL
POTASSIUM SERPL-SCNC: 4.8 MMOL/L
PROT SERPL-MCNC: 5.5 G/DL
SODIUM SERPL-SCNC: 141 MMOL/L

## 2024-03-26 ENCOUNTER — RESULT REVIEW (OUTPATIENT)
Age: 59
End: 2024-03-26

## 2024-03-26 ENCOUNTER — APPOINTMENT (OUTPATIENT)
Dept: HEMATOLOGY ONCOLOGY | Facility: CLINIC | Age: 59
End: 2024-03-26

## 2024-03-26 ENCOUNTER — APPOINTMENT (OUTPATIENT)
Dept: INFUSION THERAPY | Facility: HOSPITAL | Age: 59
End: 2024-03-26

## 2024-03-26 LAB
BASOPHILS # BLD AUTO: 0.04 K/UL — SIGNIFICANT CHANGE UP (ref 0–0.2)
BASOPHILS NFR BLD AUTO: 0.7 % — SIGNIFICANT CHANGE UP (ref 0–2)
EOSINOPHIL # BLD AUTO: 0.15 K/UL — SIGNIFICANT CHANGE UP (ref 0–0.5)
EOSINOPHIL NFR BLD AUTO: 2.7 % — SIGNIFICANT CHANGE UP (ref 0–6)
HCT VFR BLD CALC: 30.5 % — LOW (ref 34.5–45)
HGB BLD-MCNC: 10.2 G/DL — LOW (ref 11.5–15.5)
IMM GRANULOCYTES NFR BLD AUTO: 0.9 % — SIGNIFICANT CHANGE UP (ref 0–0.9)
LYMPHOCYTES # BLD AUTO: 1.63 K/UL — SIGNIFICANT CHANGE UP (ref 1–3.3)
LYMPHOCYTES # BLD AUTO: 29.9 % — SIGNIFICANT CHANGE UP (ref 13–44)
MCHC RBC-ENTMCNC: 32.2 PG — SIGNIFICANT CHANGE UP (ref 27–34)
MCHC RBC-ENTMCNC: 33.4 G/DL — SIGNIFICANT CHANGE UP (ref 32–36)
MCV RBC AUTO: 96.2 FL — SIGNIFICANT CHANGE UP (ref 80–100)
MONOCYTES # BLD AUTO: 0.43 K/UL — SIGNIFICANT CHANGE UP (ref 0–0.9)
MONOCYTES NFR BLD AUTO: 7.9 % — SIGNIFICANT CHANGE UP (ref 2–14)
NEUTROPHILS # BLD AUTO: 3.16 K/UL — SIGNIFICANT CHANGE UP (ref 1.8–7.4)
NEUTROPHILS NFR BLD AUTO: 57.9 % — SIGNIFICANT CHANGE UP (ref 43–77)
NRBC # BLD: 0 /100 WBCS — SIGNIFICANT CHANGE UP (ref 0–0)
PLATELET # BLD AUTO: 167 K/UL — SIGNIFICANT CHANGE UP (ref 150–400)
RBC # BLD: 3.17 M/UL — LOW (ref 3.8–5.2)
RBC # FLD: 12.5 % — SIGNIFICANT CHANGE UP (ref 10.3–14.5)
WBC # BLD: 5.46 K/UL — SIGNIFICANT CHANGE UP (ref 3.8–10.5)
WBC # FLD AUTO: 5.46 K/UL — SIGNIFICANT CHANGE UP (ref 3.8–10.5)

## 2024-04-09 ENCOUNTER — APPOINTMENT (OUTPATIENT)
Dept: HEMATOLOGY ONCOLOGY | Facility: CLINIC | Age: 59
End: 2024-04-09

## 2024-04-09 ENCOUNTER — APPOINTMENT (OUTPATIENT)
Dept: INFUSION THERAPY | Facility: HOSPITAL | Age: 59
End: 2024-04-09

## 2024-04-09 ENCOUNTER — RESULT REVIEW (OUTPATIENT)
Age: 59
End: 2024-04-09

## 2024-04-09 LAB
BASOPHILS # BLD AUTO: 0.04 K/UL — SIGNIFICANT CHANGE UP (ref 0–0.2)
BASOPHILS NFR BLD AUTO: 0.7 % — SIGNIFICANT CHANGE UP (ref 0–2)
EOSINOPHIL # BLD AUTO: 0.15 K/UL — SIGNIFICANT CHANGE UP (ref 0–0.5)
EOSINOPHIL NFR BLD AUTO: 2.6 % — SIGNIFICANT CHANGE UP (ref 0–6)
HCT VFR BLD CALC: 31.6 % — LOW (ref 34.5–45)
HGB BLD-MCNC: 10.6 G/DL — LOW (ref 11.5–15.5)
IMM GRANULOCYTES NFR BLD AUTO: 0.4 % — SIGNIFICANT CHANGE UP (ref 0–0.9)
LYMPHOCYTES # BLD AUTO: 1.34 K/UL — SIGNIFICANT CHANGE UP (ref 1–3.3)
LYMPHOCYTES # BLD AUTO: 23.6 % — SIGNIFICANT CHANGE UP (ref 13–44)
MCHC RBC-ENTMCNC: 32.4 PG — SIGNIFICANT CHANGE UP (ref 27–34)
MCHC RBC-ENTMCNC: 33.5 G/DL — SIGNIFICANT CHANGE UP (ref 32–36)
MCV RBC AUTO: 96.6 FL — SIGNIFICANT CHANGE UP (ref 80–100)
MONOCYTES # BLD AUTO: 0.35 K/UL — SIGNIFICANT CHANGE UP (ref 0–0.9)
MONOCYTES NFR BLD AUTO: 6.2 % — SIGNIFICANT CHANGE UP (ref 2–14)
NEUTROPHILS # BLD AUTO: 3.78 K/UL — SIGNIFICANT CHANGE UP (ref 1.8–7.4)
NEUTROPHILS NFR BLD AUTO: 66.5 % — SIGNIFICANT CHANGE UP (ref 43–77)
NRBC # BLD: 0 /100 WBCS — SIGNIFICANT CHANGE UP (ref 0–0)
PLATELET # BLD AUTO: 177 K/UL — SIGNIFICANT CHANGE UP (ref 150–400)
RBC # BLD: 3.27 M/UL — LOW (ref 3.8–5.2)
RBC # FLD: 12.5 % — SIGNIFICANT CHANGE UP (ref 10.3–14.5)
WBC # BLD: 5.68 K/UL — SIGNIFICANT CHANGE UP (ref 3.8–10.5)
WBC # FLD AUTO: 5.68 K/UL — SIGNIFICANT CHANGE UP (ref 3.8–10.5)

## 2024-04-21 ENCOUNTER — RX RENEWAL (OUTPATIENT)
Age: 59
End: 2024-04-21

## 2024-04-22 ENCOUNTER — TRANSCRIPTION ENCOUNTER (OUTPATIENT)
Age: 59
End: 2024-04-22

## 2024-04-22 ENCOUNTER — RX RENEWAL (OUTPATIENT)
Age: 59
End: 2024-04-22

## 2024-04-22 RX ORDER — VALACYCLOVIR 500 MG/1
500 TABLET, FILM COATED ORAL TWICE DAILY
Qty: 60 | Refills: 4 | Status: ACTIVE | COMMUNITY
Start: 2022-04-06 | End: 1900-01-01

## 2024-04-23 ENCOUNTER — RESULT REVIEW (OUTPATIENT)
Age: 59
End: 2024-04-23

## 2024-04-23 ENCOUNTER — APPOINTMENT (OUTPATIENT)
Dept: HEMATOLOGY ONCOLOGY | Facility: CLINIC | Age: 59
End: 2024-04-23

## 2024-04-23 ENCOUNTER — RX RENEWAL (OUTPATIENT)
Age: 59
End: 2024-04-23

## 2024-04-23 ENCOUNTER — TRANSCRIPTION ENCOUNTER (OUTPATIENT)
Age: 59
End: 2024-04-23

## 2024-04-23 ENCOUNTER — APPOINTMENT (OUTPATIENT)
Dept: INFUSION THERAPY | Facility: HOSPITAL | Age: 59
End: 2024-04-23

## 2024-04-23 LAB
BASOPHILS # BLD AUTO: 0.03 K/UL — SIGNIFICANT CHANGE UP (ref 0–0.2)
BASOPHILS NFR BLD AUTO: 0.6 % — SIGNIFICANT CHANGE UP (ref 0–2)
EOSINOPHIL # BLD AUTO: 0.14 K/UL — SIGNIFICANT CHANGE UP (ref 0–0.5)
EOSINOPHIL NFR BLD AUTO: 2.9 % — SIGNIFICANT CHANGE UP (ref 0–6)
HCT VFR BLD CALC: 31.1 % — LOW (ref 34.5–45)
HGB BLD-MCNC: 10.2 G/DL — LOW (ref 11.5–15.5)
IMM GRANULOCYTES NFR BLD AUTO: 0.4 % — SIGNIFICANT CHANGE UP (ref 0–0.9)
LYMPHOCYTES # BLD AUTO: 1.21 K/UL — SIGNIFICANT CHANGE UP (ref 1–3.3)
LYMPHOCYTES # BLD AUTO: 24.7 % — SIGNIFICANT CHANGE UP (ref 13–44)
MCHC RBC-ENTMCNC: 31.9 PG — SIGNIFICANT CHANGE UP (ref 27–34)
MCHC RBC-ENTMCNC: 32.8 G/DL — SIGNIFICANT CHANGE UP (ref 32–36)
MCV RBC AUTO: 97.2 FL — SIGNIFICANT CHANGE UP (ref 80–100)
MONOCYTES # BLD AUTO: 0.36 K/UL — SIGNIFICANT CHANGE UP (ref 0–0.9)
MONOCYTES NFR BLD AUTO: 7.4 % — SIGNIFICANT CHANGE UP (ref 2–14)
NEUTROPHILS # BLD AUTO: 3.13 K/UL — SIGNIFICANT CHANGE UP (ref 1.8–7.4)
NEUTROPHILS NFR BLD AUTO: 64 % — SIGNIFICANT CHANGE UP (ref 43–77)
NRBC # BLD: 0 /100 WBCS — SIGNIFICANT CHANGE UP (ref 0–0)
PLATELET # BLD AUTO: 168 K/UL — SIGNIFICANT CHANGE UP (ref 150–400)
RBC # BLD: 3.2 M/UL — LOW (ref 3.8–5.2)
RBC # FLD: 12.9 % — SIGNIFICANT CHANGE UP (ref 10.3–14.5)
WBC # BLD: 4.89 K/UL — SIGNIFICANT CHANGE UP (ref 3.8–10.5)
WBC # FLD AUTO: 4.89 K/UL — SIGNIFICANT CHANGE UP (ref 3.8–10.5)

## 2024-04-24 ENCOUNTER — TRANSCRIPTION ENCOUNTER (OUTPATIENT)
Age: 59
End: 2024-04-24

## 2024-04-24 ENCOUNTER — APPOINTMENT (OUTPATIENT)
Dept: ENDOCRINOLOGY | Facility: CLINIC | Age: 59
End: 2024-04-24
Payer: MEDICARE

## 2024-04-24 VITALS
WEIGHT: 117 LBS | DIASTOLIC BLOOD PRESSURE: 68 MMHG | HEART RATE: 81 BPM | OXYGEN SATURATION: 98 % | BODY MASS INDEX: 20.73 KG/M2 | SYSTOLIC BLOOD PRESSURE: 108 MMHG | HEIGHT: 63.03 IN

## 2024-04-24 DIAGNOSIS — R73.03 PREDIABETES.: ICD-10-CM

## 2024-04-24 DIAGNOSIS — E03.9 HYPOTHYROIDISM, UNSPECIFIED: ICD-10-CM

## 2024-04-24 LAB
ALBUMIN SERPL ELPH-MCNC: 4 G/DL
ALP BLD-CCNC: 81 U/L
ALT SERPL-CCNC: 17 U/L
ANION GAP SERPL CALC-SCNC: 12 MMOL/L
AST SERPL-CCNC: 18 U/L
BILIRUB SERPL-MCNC: 0.5 MG/DL
BUN SERPL-MCNC: 14 MG/DL
CALCIUM SERPL-MCNC: 9.1 MG/DL
CHLORIDE SERPL-SCNC: 105 MMOL/L
CO2 SERPL-SCNC: 24 MMOL/L
CREAT SERPL-MCNC: 0.63 MG/DL
EGFR: 101 ML/MIN/1.73M2
GLUCOSE BLDC GLUCOMTR-MCNC: 109
GLUCOSE SERPL-MCNC: 96 MG/DL
HBA1C MFR BLD HPLC: 6.1
POTASSIUM SERPL-SCNC: 5.4 MMOL/L
PROT SERPL-MCNC: 5.5 G/DL
SODIUM SERPL-SCNC: 142 MMOL/L

## 2024-04-24 PROCEDURE — 99214 OFFICE O/P EST MOD 30 MIN: CPT | Mod: 25

## 2024-04-24 PROCEDURE — 36415 COLL VENOUS BLD VENIPUNCTURE: CPT

## 2024-04-24 PROCEDURE — 83036 HEMOGLOBIN GLYCOSYLATED A1C: CPT | Mod: QW

## 2024-04-24 PROCEDURE — 82962 GLUCOSE BLOOD TEST: CPT

## 2024-04-24 NOTE — PHYSICAL EXAM
[Alert] : alert [No Acute Distress] : no acute distress [Well Developed] : well developed [Normal Sclera/Conjunctiva] : normal sclera/conjunctiva [EOMI] : extra ocular movement intact [No Proptosis] : no proptosis [No Lid Lag] : no lid lag [Supple] : the neck was supple [Thyroid Not Enlarged] : the thyroid was not enlarged [No Thyroid Nodules] : no palpable thyroid nodules [No Respiratory Distress] : no respiratory distress [No Accessory Muscle Use] : no accessory muscle use [Normal Rate and Effort] : normal respiratory rate and effort [No Stigmata of Cushings Syndrome] : no stigmata of Cushings Syndrome [No Involuntary Movements] : no involuntary movements were seen [No Tremors] : no tremors [Oriented x3] : oriented to person, place, and time [Normal Insight/Judgement] : insight and judgment were intact

## 2024-04-24 NOTE — HISTORY OF PRESENT ILLNESS
[FreeTextEntry1] : This is a 59 yo female who presents for follow up of pre-diabetes and hypothyroidism.  She was diagnosed with prediabetes 5 years ago, was previously on Metformin, however stopped medication due to Gi upset. Since stopping Metformin, she has been diet controlled. Patient also reportedly underwent total thyroidectomy and partial parathyroidectomy in Feb 2011, final pathology was benign as per patient.  She is currently taking levothyroxine 125mcg daily for hypothyroidism which was increased by her PCP in July 2022. After COVID infection in March 2020, patient noted to have long-haul she symptoms and notes she is seeing neurology. states she has postural hypotension. She she also notes she has been diagnosed with amyloidosis which has affected her systemically.  She notes she did chemotherapy in April and is now on maintenance once monthly with darzolex and dexamethasone therapy.

## 2024-04-24 NOTE — ASSESSMENT
[Levothyroxine] : The patient was instructed to take Levothyroxine on an empty stomach, separate from vitamins, and wait at least 30 minutes before eating [FreeTextEntry1] : History of prediabetes POC hgbA1c today is 6.1%, at goal Noted Gi intolerance to Metformin, currently diet controlled  Of note, patient is still being treated monthly with dexamethasone steroid therapy for amyloidosis which may impact sugars. Not on medications  History of hypothyroidism Currently on levothyroxine 125mcg daily Clinically euthyroid Bloodwork was collected in office today, will f/u results accordingly.   HLD Will check fasting lipid panel today, not on statin  Answered all questions today; patient verbalized understanding of the above RTO in 6 months

## 2024-04-25 LAB
CHOLEST SERPL-MCNC: 239 MG/DL
HDLC SERPL-MCNC: 68 MG/DL
LDLC SERPL CALC-MCNC: 136 MG/DL
NONHDLC SERPL-MCNC: 170 MG/DL
T4 FREE SERPL-MCNC: 2 NG/DL
TRIGL SERPL-MCNC: 194 MG/DL
TSH SERPL-ACNC: 0.03 UIU/ML

## 2024-04-26 ENCOUNTER — OUTPATIENT (OUTPATIENT)
Dept: OUTPATIENT SERVICES | Facility: HOSPITAL | Age: 59
LOS: 1 days | Discharge: HOME | End: 2024-04-26

## 2024-04-26 DIAGNOSIS — D47.2 MONOCLONAL GAMMOPATHY: ICD-10-CM

## 2024-04-26 LAB
ALBUMIN MFR SERPL ELPH: 65.5 %
ALBUMIN SERPL-MCNC: 3.7 G/DL
ALBUMIN/GLOB SERPL: 1.9 RATIO
ALPHA1 GLOB MFR SERPL ELPH: 4.8 %
ALPHA1 GLOB SERPL ELPH-MCNC: 0.3 G/DL
ALPHA2 GLOB MFR SERPL ELPH: 11.7 %
ALPHA2 GLOB SERPL ELPH-MCNC: 0.7 G/DL
B-GLOBULIN MFR SERPL ELPH: 12.3 %
B-GLOBULIN SERPL ELPH-MCNC: 0.7 G/DL
DEPRECATED KAPPA LC FREE/LAMBDA SER: 0.38 RATIO
GAMMA GLOB FLD ELPH-MCNC: 0.3 G/DL
GAMMA GLOB MFR SERPL ELPH: 5.7 %
IGA SER QL IEP: 19 MG/DL
IGG SER QL IEP: 352 MG/DL
IGM SER QL IEP: 10 MG/DL
INTERPRETATION SERPL IEP-IMP: NORMAL
KAPPA LC CSF-MCNC: 1.04 MG/DL
KAPPA LC SERPL-MCNC: 0.4 MG/DL
M PROTEIN MFR SERPL ELPH: NORMAL
M PROTEIN SPEC IFE-MCNC: NORMAL
MONOCLON BAND OBS SERPL: NORMAL
PROT SERPL-MCNC: 5.6 G/DL
PROT SERPL-MCNC: 5.6 G/DL

## 2024-04-29 ENCOUNTER — TRANSCRIPTION ENCOUNTER (OUTPATIENT)
Age: 59
End: 2024-04-29

## 2024-04-29 ENCOUNTER — RESULT CHARGE (OUTPATIENT)
Age: 59
End: 2024-04-29

## 2024-04-29 RX ORDER — LEVOTHYROXINE SODIUM 0.11 MG/1
112 TABLET ORAL
Qty: 30 | Refills: 2 | Status: ACTIVE | COMMUNITY
Start: 2023-10-26 | End: 1900-01-01

## 2024-04-30 ENCOUNTER — NON-APPOINTMENT (OUTPATIENT)
Age: 59
End: 2024-04-30

## 2024-04-30 ENCOUNTER — TRANSCRIPTION ENCOUNTER (OUTPATIENT)
Age: 59
End: 2024-04-30

## 2024-04-30 ENCOUNTER — APPOINTMENT (OUTPATIENT)
Dept: ELECTROPHYSIOLOGY | Facility: CLINIC | Age: 59
End: 2024-04-30
Payer: MEDICARE

## 2024-04-30 VITALS
OXYGEN SATURATION: 100 % | HEART RATE: 80 BPM | WEIGHT: 119 LBS | BODY MASS INDEX: 21.09 KG/M2 | SYSTOLIC BLOOD PRESSURE: 118 MMHG | HEIGHT: 63 IN | DIASTOLIC BLOOD PRESSURE: 79 MMHG

## 2024-04-30 PROCEDURE — 93000 ELECTROCARDIOGRAM COMPLETE: CPT | Mod: 59

## 2024-04-30 PROCEDURE — 93280 PM DEVICE PROGR EVAL DUAL: CPT

## 2024-04-30 RX ORDER — MIDODRINE HYDROCHLORIDE 10 MG/1
10 TABLET ORAL
Refills: 0 | Status: ACTIVE | COMMUNITY
Start: 2024-04-30

## 2024-04-30 RX ORDER — GABAPENTIN 100 MG/1
100 CAPSULE ORAL
Qty: 150 | Refills: 2 | Status: DISCONTINUED | COMMUNITY
End: 2024-04-30

## 2024-04-30 RX ORDER — LEVOTHYROXINE SODIUM 0.14 MG/1
137 TABLET ORAL DAILY
Qty: 30 | Refills: 2 | Status: DISCONTINUED | COMMUNITY
Start: 2018-05-02 | End: 2024-04-30

## 2024-04-30 RX ORDER — GABAPENTIN 100 MG/1
100 CAPSULE ORAL 3 TIMES DAILY
Refills: 0 | Status: ACTIVE | COMMUNITY
Start: 2024-04-30

## 2024-04-30 RX ORDER — ESTRADIOL 0.1 MG/G
0.1 CREAM VAGINAL
Qty: 1 | Refills: 11 | Status: DISCONTINUED | COMMUNITY
Start: 2023-03-13 | End: 2024-04-30

## 2024-04-30 RX ORDER — AZITHROMYCIN 250 MG/1
250 TABLET, FILM COATED ORAL
Qty: 1 | Refills: 0 | Status: DISCONTINUED | COMMUNITY
Start: 2024-01-18 | End: 2024-04-30

## 2024-04-30 RX ORDER — DIPHENOXYLATE HYDROCHLORIDE AND ATROPINE SULFATE 2.5; .025 MG/1; MG/1
2.5-0.025 TABLET ORAL
Qty: 120 | Refills: 3 | Status: DISCONTINUED | COMMUNITY
Start: 2022-05-12 | End: 2024-04-30

## 2024-04-30 RX ORDER — MIDODRINE HYDROCHLORIDE 5 MG/1
5 TABLET ORAL
Qty: 90 | Refills: 3 | Status: DISCONTINUED | COMMUNITY
Start: 2022-11-01 | End: 2024-04-30

## 2024-04-30 RX ORDER — MIDODRINE HYDROCHLORIDE 10 MG/1
10 TABLET ORAL
Qty: 180 | Refills: 3 | Status: DISCONTINUED | COMMUNITY
Start: 2023-05-10 | End: 2024-04-30

## 2024-05-02 ENCOUNTER — TRANSCRIPTION ENCOUNTER (OUTPATIENT)
Age: 59
End: 2024-05-02

## 2024-05-03 ENCOUNTER — TRANSCRIPTION ENCOUNTER (OUTPATIENT)
Age: 59
End: 2024-05-03

## 2024-05-07 ENCOUNTER — APPOINTMENT (OUTPATIENT)
Dept: HEMATOLOGY ONCOLOGY | Facility: CLINIC | Age: 59
End: 2024-05-07

## 2024-05-07 ENCOUNTER — RESULT REVIEW (OUTPATIENT)
Age: 59
End: 2024-05-07

## 2024-05-07 ENCOUNTER — APPOINTMENT (OUTPATIENT)
Dept: INFUSION THERAPY | Facility: HOSPITAL | Age: 59
End: 2024-05-07

## 2024-05-07 LAB
BASOPHILS # BLD AUTO: 0.05 K/UL — SIGNIFICANT CHANGE UP (ref 0–0.2)
BASOPHILS NFR BLD AUTO: 0.9 % — SIGNIFICANT CHANGE UP (ref 0–2)
EOSINOPHIL # BLD AUTO: 0.14 K/UL — SIGNIFICANT CHANGE UP (ref 0–0.5)
EOSINOPHIL NFR BLD AUTO: 2.6 % — SIGNIFICANT CHANGE UP (ref 0–6)
HCT VFR BLD CALC: 33 % — LOW (ref 34.5–45)
HGB BLD-MCNC: 10.9 G/DL — LOW (ref 11.5–15.5)
IMM GRANULOCYTES NFR BLD AUTO: 0.6 % — SIGNIFICANT CHANGE UP (ref 0–0.9)
LYMPHOCYTES # BLD AUTO: 1.35 K/UL — SIGNIFICANT CHANGE UP (ref 1–3.3)
LYMPHOCYTES # BLD AUTO: 24.9 % — SIGNIFICANT CHANGE UP (ref 13–44)
MCHC RBC-ENTMCNC: 32.5 PG — SIGNIFICANT CHANGE UP (ref 27–34)
MCHC RBC-ENTMCNC: 33 G/DL — SIGNIFICANT CHANGE UP (ref 32–36)
MCV RBC AUTO: 98.5 FL — SIGNIFICANT CHANGE UP (ref 80–100)
MONOCYTES # BLD AUTO: 0.43 K/UL — SIGNIFICANT CHANGE UP (ref 0–0.9)
MONOCYTES NFR BLD AUTO: 7.9 % — SIGNIFICANT CHANGE UP (ref 2–14)
NEUTROPHILS # BLD AUTO: 3.43 K/UL — SIGNIFICANT CHANGE UP (ref 1.8–7.4)
NEUTROPHILS NFR BLD AUTO: 63.1 % — SIGNIFICANT CHANGE UP (ref 43–77)
NRBC # BLD: 0 /100 WBCS — SIGNIFICANT CHANGE UP (ref 0–0)
PLATELET # BLD AUTO: 172 K/UL — SIGNIFICANT CHANGE UP (ref 150–400)
RBC # BLD: 3.35 M/UL — LOW (ref 3.8–5.2)
RBC # FLD: 12.7 % — SIGNIFICANT CHANGE UP (ref 10.3–14.5)
WBC # BLD: 5.43 K/UL — SIGNIFICANT CHANGE UP (ref 3.8–10.5)
WBC # FLD AUTO: 5.43 K/UL — SIGNIFICANT CHANGE UP (ref 3.8–10.5)

## 2024-05-08 DIAGNOSIS — E85.9 AMYLOIDOSIS, UNSPECIFIED: ICD-10-CM

## 2024-05-08 DIAGNOSIS — Z51.11 ENCOUNTER FOR ANTINEOPLASTIC CHEMOTHERAPY: ICD-10-CM

## 2024-05-13 ENCOUNTER — APPOINTMENT (OUTPATIENT)
Dept: CARDIOLOGY | Facility: CLINIC | Age: 59
End: 2024-05-13
Payer: MEDICARE

## 2024-05-13 VITALS
WEIGHT: 120.5 LBS | OXYGEN SATURATION: 99 % | SYSTOLIC BLOOD PRESSURE: 124 MMHG | BODY MASS INDEX: 21.35 KG/M2 | HEART RATE: 77 BPM | HEIGHT: 63 IN | DIASTOLIC BLOOD PRESSURE: 72 MMHG

## 2024-05-13 DIAGNOSIS — I95.1 ORTHOSTATIC HYPOTENSION: ICD-10-CM

## 2024-05-13 DIAGNOSIS — E78.5 HYPERLIPIDEMIA, UNSPECIFIED: ICD-10-CM

## 2024-05-13 DIAGNOSIS — E85.9 AMYLOIDOSIS, UNSPECIFIED: ICD-10-CM

## 2024-05-13 PROCEDURE — 99215 OFFICE O/P EST HI 40 MIN: CPT

## 2024-05-13 PROCEDURE — G2211 COMPLEX E/M VISIT ADD ON: CPT

## 2024-05-13 PROCEDURE — 93306 TTE W/DOPPLER COMPLETE: CPT

## 2024-05-13 NOTE — REASON FOR VISIT
[Other: ____] : [unfilled] [Other: _____] : [unfilled] [FreeTextEntry1] : May 2024 - Patient returns today for follow-up and echocardiogram. Echo shows normal LV systolic function, normal filling pressures, and normal strain pattern. She has been walking for exercise.

## 2024-05-13 NOTE — HISTORY OF PRESENT ILLNESS
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 She has been having diarrhea, likely due to her chemotherapy regimen, and she is now taking Imodium and Pedialyte.   Lower extremity edema up to her waist.  Patient continues to take fludocortisone 0.2 mg daily and midodrine 20 mg TID  June 2022 - Patient returns today for follow-up. She has weaned herself off of the fludrocortisone. She now only gets orthostatic hypotension in association with frequent diarrhea. She has non-bloody, watery diarrhea up to 10 times per day. Some days, it is well controlled with Imodium, but she cannot remember the last well formed bowel movement.  She continues to take midodrine 20 mg TID. She continues Cy-Bor-D plus daratumumab with Dr. Goldberg.   August 2022 - TeleHealth Video Encounter Initiated by: Patient election for TeleHealth visit Patient was consented for TeleHealth visit Patient Location: Home Physician Location: Office ([address] [place] [address], [address], [place], [place], [address]) Duration of Encounter: 40 minutes, at least 50% of which was spent in direct counseling and coordination of care.   Continues to have diarrhea. Will follow-up with GI (Dr. Holloway). She continues to have orthostatic symptoms related to her diarrhea. She notes it the most with frequent diarrhea.  She is off of fludocortisone. She takes midodrine 20 mg TID.  The swelling in her legs is only a problem when she sits for too long. When she walks, her legs are better.  November 2022 - Patient returns today for follow-up of her light chain amyloidosis.  She has completed Cytoxan. She continues to receive Velcade, poncho, and dexamethasone.  Her lower extremity edema is markedly improved. She believes this is a result of a combination of being off the fludrocortisone, more walking, and her current diuretics.  She continues to have diarrhea, and her biopsies of the GI tract suggested involvement of her amyloidosis. Her GI symptoms are somewhat improved by reducing fiber and stopping her Cytoxan.   February 2023 -  TeleHealth Video Encounter Initiated by: Patient election for TeleHealth visit Patient was consented for TeleHealth visit Patient Location: Home Physician Location: Office ([address] [place] [address], [address], [place], [place], [address]) Duration of Encounter: 40 minutes, at least 50% of which was spent in direct counseling and coordination of care.   She is currently on maintenance chemo with Velcade every other week and daratumumab monthly.  She is currently maintained on midodrine TID (15 mg, 15 mg, and 10 mg). She continues to have chronic diarrhea and chronic UTI. These are being managed. The diarrhea sometimes drops her blood pressure, but she is sensitive to it and adjust fluid intake accordingly.  She gets occasional pinching in her chest that lasts 10-15 seconds and then resolves. It is not exertional. The lower extremity edema is much better.  May 2023 - Patient returns today for follow-up in her usual state of health. Yesterday, she walked 1800 steps in 30 minutes (using her walker), and she did not feel winded. Midodrine is currently 10 mg, 15 mg, 10 mg. She noted very low blood pressure associated with her diarrhea.   August 2023 - Patient returns today for follow-up in her usual state of health.  She continues to note very low blood pressure associated with her diarrhea.  Midodrine is currently 10 mg, 10 mg, 10 mg.  December 2023 - Patient returns today for follow-up in her usual state of health. Her midodrine dosing has been reduced to 15 mg daily (taken 5 mg QAM and 10 mg QPM).  She is much more steady on her feet.  Device interrogation on 10/31/2023 showed two episodes of NSVT (one on 7/28/2023 that lasted for 10 seconds and one on 8/4/2023 that lasted for 8 beats over 2.5 seconds).

## 2024-05-13 NOTE — DISCUSSION/SUMMARY
[FreeTextEntry1] : 59 year-old woman with light chain amyloidosis, orthostatic hypotension requiring fludrocortisone and midodrine, now started on Cy-Bor-D plus daratumumab, with lower extremity edema, likely secondary to fludrocortisone, presents today or follow-up after recent hospitalization (March 2022). Myleoma therapies per hematology. She has completed Cytoxan and remains on Velcade, dexamethasone, and poncho.  Now off of fludocortisone, and her lower extremity edema is much better.  Continue working to reduce her midodrine to see how she feels.   Encouraged trying Green tea for EGCG, but she was advised this would interfere with her Velcade.  Follow-up with GI regarding diarrhea. Follow-up with GI and hematology regarding elevated LFTs.  Encouraged ambulation as tolerated. Elevate the legs when at rest.

## 2024-05-13 NOTE — CARDIOLOGY SUMMARY
[de-identified] : 7/27/2022, paced at 70 bpm [de-identified] : 3/30/2022, septum 1.2 cm, PWT 1.2 cm, moderately dilated LA, normal LV systolic function, average GLS -14.6%, pattern is\par  homogeneous (i.e. not suggestive of amyloid), LVEF 60%\par  \par  7/27/2022, septum 1.2 cm, PWT 1.3 cm, mildly dilated LA, normal LV systolic function, average GLS - 15%, LVEF 60-65%\par  \par  11/1/2022, septum 1.4 cm, PWT 1.3 cm, dilated LA, low normal LV systolic function, average GLS - 12%, LVEF 50-55% [de-identified] : 3/28/2022, Small pericardial and bilateral pleural effusions. Subtle inferior lateral basal myocardial wall with gadolinium enhancement.  Biatrial enlargement with associated late gadolinium enhancement. These findings may represent amyloidosis. [de-identified] : 3/30/2022 Medtronic Margarita XT DR MRI dual chamber PPM implanted by Hasmukh Villarreal MD, PhD

## 2024-05-21 ENCOUNTER — RESULT REVIEW (OUTPATIENT)
Age: 59
End: 2024-05-21

## 2024-05-21 ENCOUNTER — APPOINTMENT (OUTPATIENT)
Dept: HEMATOLOGY ONCOLOGY | Facility: CLINIC | Age: 59
End: 2024-05-21

## 2024-05-21 ENCOUNTER — APPOINTMENT (OUTPATIENT)
Dept: INFUSION THERAPY | Facility: HOSPITAL | Age: 59
End: 2024-05-21

## 2024-05-21 LAB
BASOPHILS # BLD AUTO: 0.05 K/UL — SIGNIFICANT CHANGE UP (ref 0–0.2)
BASOPHILS NFR BLD AUTO: 0.9 % — SIGNIFICANT CHANGE UP (ref 0–2)
EOSINOPHIL # BLD AUTO: 0.15 K/UL — SIGNIFICANT CHANGE UP (ref 0–0.5)
EOSINOPHIL NFR BLD AUTO: 2.6 % — SIGNIFICANT CHANGE UP (ref 0–6)
HCT VFR BLD CALC: 32.1 % — LOW (ref 34.5–45)
HGB BLD-MCNC: 11 G/DL — LOW (ref 11.5–15.5)
IMM GRANULOCYTES NFR BLD AUTO: 0.4 % — SIGNIFICANT CHANGE UP (ref 0–0.9)
LYMPHOCYTES # BLD AUTO: 1.1 K/UL — SIGNIFICANT CHANGE UP (ref 1–3.3)
LYMPHOCYTES # BLD AUTO: 19.4 % — SIGNIFICANT CHANGE UP (ref 13–44)
MCHC RBC-ENTMCNC: 32.7 PG — SIGNIFICANT CHANGE UP (ref 27–34)
MCHC RBC-ENTMCNC: 34.3 G/DL — SIGNIFICANT CHANGE UP (ref 32–36)
MCV RBC AUTO: 95.5 FL — SIGNIFICANT CHANGE UP (ref 80–100)
MONOCYTES # BLD AUTO: 0.36 K/UL — SIGNIFICANT CHANGE UP (ref 0–0.9)
MONOCYTES NFR BLD AUTO: 6.3 % — SIGNIFICANT CHANGE UP (ref 2–14)
NEUTROPHILS # BLD AUTO: 3.99 K/UL — SIGNIFICANT CHANGE UP (ref 1.8–7.4)
NEUTROPHILS NFR BLD AUTO: 70.4 % — SIGNIFICANT CHANGE UP (ref 43–77)
NRBC # BLD: 0 /100 WBCS — SIGNIFICANT CHANGE UP (ref 0–0)
PLATELET # BLD AUTO: 183 K/UL — SIGNIFICANT CHANGE UP (ref 150–400)
RBC # BLD: 3.36 M/UL — LOW (ref 3.8–5.2)
RBC # FLD: 12.5 % — SIGNIFICANT CHANGE UP (ref 10.3–14.5)
WBC # BLD: 5.67 K/UL — SIGNIFICANT CHANGE UP (ref 3.8–10.5)
WBC # FLD AUTO: 5.67 K/UL — SIGNIFICANT CHANGE UP (ref 3.8–10.5)

## 2024-06-03 ENCOUNTER — NON-APPOINTMENT (OUTPATIENT)
Age: 59
End: 2024-06-03

## 2024-06-04 ENCOUNTER — RESULT REVIEW (OUTPATIENT)
Age: 59
End: 2024-06-04

## 2024-06-04 ENCOUNTER — APPOINTMENT (OUTPATIENT)
Dept: HEMATOLOGY ONCOLOGY | Facility: CLINIC | Age: 59
End: 2024-06-04

## 2024-06-04 ENCOUNTER — APPOINTMENT (OUTPATIENT)
Dept: HEMATOLOGY ONCOLOGY | Facility: CLINIC | Age: 59
End: 2024-06-04
Payer: MEDICARE

## 2024-06-04 ENCOUNTER — APPOINTMENT (OUTPATIENT)
Dept: INFUSION THERAPY | Facility: HOSPITAL | Age: 59
End: 2024-06-04

## 2024-06-04 VITALS
BODY MASS INDEX: 21.6 KG/M2 | HEART RATE: 76 BPM | SYSTOLIC BLOOD PRESSURE: 91 MMHG | WEIGHT: 121.89 LBS | DIASTOLIC BLOOD PRESSURE: 41 MMHG | RESPIRATION RATE: 16 BRPM | OXYGEN SATURATION: 97 % | TEMPERATURE: 97.6 F

## 2024-06-04 DIAGNOSIS — E85.81 LIGHT CHAIN (AL) AMYLOIDOSIS: ICD-10-CM

## 2024-06-04 DIAGNOSIS — R22.30 LOCALIZED SWELLING, MASS AND LUMP, UNSPECIFIED UPPER LIMB: ICD-10-CM

## 2024-06-04 LAB
BASOPHILS # BLD AUTO: 0.03 K/UL — SIGNIFICANT CHANGE UP (ref 0–0.2)
BASOPHILS NFR BLD AUTO: 0.6 % — SIGNIFICANT CHANGE UP (ref 0–2)
EOSINOPHIL # BLD AUTO: 0.16 K/UL — SIGNIFICANT CHANGE UP (ref 0–0.5)
EOSINOPHIL NFR BLD AUTO: 3 % — SIGNIFICANT CHANGE UP (ref 0–6)
HCT VFR BLD CALC: 32.3 % — LOW (ref 34.5–45)
HGB BLD-MCNC: 10.7 G/DL — LOW (ref 11.5–15.5)
IMM GRANULOCYTES NFR BLD AUTO: 0.2 % — SIGNIFICANT CHANGE UP (ref 0–0.9)
LYMPHOCYTES # BLD AUTO: 1.03 K/UL — SIGNIFICANT CHANGE UP (ref 1–3.3)
LYMPHOCYTES # BLD AUTO: 19.5 % — SIGNIFICANT CHANGE UP (ref 13–44)
MCHC RBC-ENTMCNC: 32.5 PG — SIGNIFICANT CHANGE UP (ref 27–34)
MCHC RBC-ENTMCNC: 33.1 G/DL — SIGNIFICANT CHANGE UP (ref 32–36)
MCV RBC AUTO: 98.2 FL — SIGNIFICANT CHANGE UP (ref 80–100)
MONOCYTES # BLD AUTO: 0.33 K/UL — SIGNIFICANT CHANGE UP (ref 0–0.9)
MONOCYTES NFR BLD AUTO: 6.3 % — SIGNIFICANT CHANGE UP (ref 2–14)
NEUTROPHILS # BLD AUTO: 3.71 K/UL — SIGNIFICANT CHANGE UP (ref 1.8–7.4)
NEUTROPHILS NFR BLD AUTO: 70.4 % — SIGNIFICANT CHANGE UP (ref 43–77)
NRBC # BLD: 0 /100 WBCS — SIGNIFICANT CHANGE UP (ref 0–0)
PLATELET # BLD AUTO: 189 K/UL — SIGNIFICANT CHANGE UP (ref 150–400)
RBC # BLD: 3.29 M/UL — LOW (ref 3.8–5.2)
RBC # FLD: 12.6 % — SIGNIFICANT CHANGE UP (ref 10.3–14.5)
WBC # BLD: 5.27 K/UL — SIGNIFICANT CHANGE UP (ref 3.8–10.5)
WBC # FLD AUTO: 5.27 K/UL — SIGNIFICANT CHANGE UP (ref 3.8–10.5)

## 2024-06-04 PROCEDURE — 99214 OFFICE O/P EST MOD 30 MIN: CPT

## 2024-06-04 PROCEDURE — G2211 COMPLEX E/M VISIT ADD ON: CPT

## 2024-06-05 ENCOUNTER — NON-APPOINTMENT (OUTPATIENT)
Age: 59
End: 2024-06-05

## 2024-06-05 LAB
ALBUMIN SERPL ELPH-MCNC: 4 G/DL
ALP BLD-CCNC: 72 U/L
ALT SERPL-CCNC: 16 U/L
ANION GAP SERPL CALC-SCNC: 10 MMOL/L
AST SERPL-CCNC: 14 U/L
BILIRUB SERPL-MCNC: 0.6 MG/DL
BUN SERPL-MCNC: 16 MG/DL
CALCIUM SERPL-MCNC: 8.9 MG/DL
CHLORIDE SERPL-SCNC: 107 MMOL/L
CO2 SERPL-SCNC: 26 MMOL/L
CREAT SERPL-MCNC: 0.6 MG/DL
EGFR: 103 ML/MIN/1.73M2
GLUCOSE SERPL-MCNC: 89 MG/DL
POTASSIUM SERPL-SCNC: 4.8 MMOL/L
PROT SERPL-MCNC: 5.7 G/DL
SODIUM SERPL-SCNC: 143 MMOL/L

## 2024-06-06 LAB
ALBUMIN MFR SERPL ELPH: 65 %
ALBUMIN SERPL-MCNC: 3.7 G/DL
ALBUMIN/GLOB SERPL: 1.8 RATIO
ALPHA1 GLOB MFR SERPL ELPH: 5 %
ALPHA1 GLOB SERPL ELPH-MCNC: 0.3 G/DL
ALPHA2 GLOB MFR SERPL ELPH: 11.9 %
ALPHA2 GLOB SERPL ELPH-MCNC: 0.7 G/DL
B-GLOBULIN MFR SERPL ELPH: 12.1 %
B-GLOBULIN SERPL ELPH-MCNC: 0.7 G/DL
DEPRECATED KAPPA LC FREE/LAMBDA SER: 0.49 RATIO
GAMMA GLOB FLD ELPH-MCNC: 0.3 G/DL
GAMMA GLOB MFR SERPL ELPH: 6 %
IGA SER QL IEP: 25 MG/DL
IGG SER QL IEP: 340 MG/DL
IGM SER QL IEP: <10 MG/DL
INTERPRETATION SERPL IEP-IMP: NORMAL
KAPPA LC CSF-MCNC: 0.7 MG/DL
KAPPA LC SERPL-MCNC: 0.34 MG/DL
M PROTEIN MFR SERPL ELPH: NORMAL
M PROTEIN SPEC IFE-MCNC: NORMAL
MONOCLON BAND OBS SERPL: NORMAL
PROT SERPL-MCNC: 5.7 G/DL
PROT SERPL-MCNC: 5.7 G/DL

## 2024-06-11 PROBLEM — R22.30: Status: ACTIVE | Noted: 2024-06-04

## 2024-06-11 PROBLEM — E85.81: Status: ACTIVE | Noted: 2022-02-26

## 2024-06-11 NOTE — REVIEW OF SYSTEMS
[Fainting] : fainting [Negative] : Heme/Lymph [FreeTextEntry7] : + diarrhea  [FreeTextEntry9] : + palmar nodule

## 2024-06-11 NOTE — ASSESSMENT
[FreeTextEntry1] : 59 y/o F previously healthy but with development of chronic postural orthostatic tachycardia syndrome (POTS) and autonomic postural hypotension, found to have systemic lambda light chain amyloidosis presented to and subsequently discharged from [facility] for cardiac complications likely related to amyloid involvement now s/p PPM. Cycle 1 of CyBorD was initiated on 4/1/22 Completed 7 full cycles of poncho-CyBorD on 10/28/22. She is now on maintenance with daratumumab monthly with Velcade [id number]. She has had a complete response from the standpoint of her amyloidosis. Repeat BM bx on 2/8/23 showed minimal involvement by patients known plasma cell neoplasm/myeloma (less than 5% of the cellularity). Still with evidence of FISH positive 11;14 translocation at that point in 2.5% of cells. Venetoclax may be suitable agent if serum FLC uptrending.  On diagnosis Lambda serum FLCs 8.73 and kappa 0.92, for a ratio of 0.11 (or 9.5). Subsequent fat pad biopsy was done and was POSITIVE for congo red, with positive polarization. We were unable to send for mass spectrometry to confirm amyloid type. However, BMBx which showed ~35% plasmacytosis (monoclonal) c/w plasma cell neoplasm. This further confirmed the presence of systemic light chain amyloidosis. It did NOT meet MM criteria.  Patient has multiple end organ difficulties related to her amyloidosis. 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 Her urine showing proteinuria on diagnosis (although not nephrotic range). She had peripheral neuropathy. She has chronic diarrhea with pathology from colonoscopy confirming amyloid deposition. Has poor taste sensation. Much of this has improved in severity, but this is still an ongoing issue which needs management.    Plan: - Patient has met with the BMT team but does not plan to pursue autologous BMT at this point.  -Continue follow up with Cardiology with goal of titrating off Midodrine as BP permits. Repeat echocardiogram showing some improvement in May 2023. Had advised against green tea extract as this has the potential to interfere with the antineoplastic effect of her Velcade.  -Continue with supportive care for diarrhea. -Treating with maintenance daratumumab monthly (5/21/24 with next dose due 6/18/24) and velcade every other week (6/4/24), dose reduced for neuropathy. We will continue with this regimen for now and continue to monitor serum FLC closely. This has been stable. -Discussed possible IVIG given hypogammaglobulinemia, but holding off as only has had one URI within last 2 years.  -Wants to see hand specialist for palmar nodule in left palm. Referral sent on 6/4/24.  -CBC reviewed, stable today. -Patient understands and agrees with plan. All information explained to the best of my ability. -RTC in 3 months but continue monthly labs.

## 2024-06-11 NOTE — HISTORY OF PRESENT ILLNESS
[de-identified] : 59 y/o F with hx of thyroidectomy in 2011 (benign pathology) on thyroid replacement since then, and with COVID pneumonia back in March of 2020 ultimately signed herself out AMA, chronic issues thereafter, ended up going back to work in July 2020. In January-February 2021 had Pfizer vaccines. Started having issues starting in March 2021, she was having GI issues, endoscopy showed chronic gastritis. Additionally diagnosed with IBS at that point. She had been diagnosed with Postural Orthostatic Tachycardia Syndrome - her blood pressure went down when she stands up from a seated position (very severe hypotension). She had fainted "too many times to count." She ended up with bruises here and there but nothing severe where she would need to go to the hospital. She saw two neurologists who did extensive evaluations without any success in finding diagnostic lesions. She reported seeing specialist for dysautonomia, Dr. Colón at [facility] (neurology) and started fludrocortisone in September 2021. Also at that point c/o burning skin, muscle aches, burning mouth and altered taste. She had lost 47 pounds from March 2021 to when I first saw her. She was ultimatley sent to me for evaluation of elevated free light chains. 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 She was found with no monoclonal protein on SPEP and hemoglobin was normal, with normal renal function, and no hypercalcemia. Subsequent fat pad biopsy showed positive congo red staining in rare blood vessels and positive for polarization. This was diagnostic for primary light chain amyloidosis.  In March 2022 patient was admitted to [facility] for multiple recurrent syncopal episodes. While admitted course was complicated by multiple unresponsive episodes prompting RRTs, during which pt found to be hypotensive and bradycardic. She initially required levophed for BP support but ultimately BP remained stable while on Midodrine 20mg q8 and Fludrocortisone 0.2mg q24. Pt noted to intermittently have sinus pauses (2-3.8 seconds) thought to be 2/2 vagal episodes, and asymptomatic bradycardia overnight on telemetry. ECG noted NSR but 1st degree AV block. TTE 3/16/22 showed normal LV size and function with EF 55%-60%. No regional wall motion abnormalities. Moderate concentric LVH. Treated for C. diff and UTI during hospitalization as well. Ultimately at that point she was transferred to [facility] for consideration of inpatient chemotherapy.   A PPM placement was recommended, as cardiac MRI showed evidence of definitive amyloid involvement. Pt had another RRT for hypotension and symptomatic bradycardia to 30s, was on pressor support until she received a dual chamber PPM. Pt also continued her C.diff treatment and chemotherapy was placed on hold until the diarrhea cleared. She was then started on Daratumumab + CyBorD inpatient, and tolerated it very well ; she completed 2 treatments of chemotherapy inpatient. She subseqently continued with this regimen as outpatient. Has been complicated by diarrhea which on evaluation by GI was deemed likely amyloid related. This was biopsy proven on colonoscopic / endoscopic evaluation in September 2022. Treated with supportive care since then.   She has completed 7 full cycles of Danyell + CyBorD in October 2022. Based on serum FLC ratio she was in a complete response.  Of note, patient reported that she is NOT interested in hematopoietic transplant at this time but she is interested in collecting her stem cells for a later date.  At this point she is on maintenance Velcade and Darzalex Faspro.    ****************************************************************************************************** Disease: AL amyloidosis  Pathology: Final Diagnosis (2/8/22).  Tumor/ Prognostic Markers: Not enough tissue for mass spectrometry testing.   Final Diagnosis (3/1/22) 1, 2. Bone marrow biopsy and bone marrow aspirate  - Plasma cell myeloma, 25-35% of cellularity by immunohistochemistry  - Trilineage hematopoiesis with maturation, mild eosinophilia, serous   atrophy, and iron stores present  See note and description.  Diagnostic note: As per chart review, the patient was referred for elevated free light  chains. Her light chain ratio was found to be very low at 0.11 and was  found to have hypogammaglobulinemia with no monoclonal protein on SPEP,  hemoglobin normal, normal renal function, and no hypercalcemia. Fat  pad biopsy showed positive congo red staining in rare blood vessels  and positive for polarization. Bone marrow to evaluate for plasma cell  myeloma. The bone marrow shows a significant plasma cell infiltrate by  immunohistochemistry (25-35% of cellularity, also positive with cyclin  D1, with monotypic plasma cells by flow cytometry. Congo red stain is  negative.  Normal female karyotype seen on bone marrow.  FISH Result: ABNORMAL FISH MULTIPLE MYELOMA PANEL - Atypical CCND1/IGH fusion pattern detected (3.5%).    Current Treatment Status: Therapy: Danyell + Velcade Maintenance . Kleber + CyBorD started on 4/8/2022, held cytoxan from 4/18-5/12 for hematuria, held again 6/24 for worsening diarrhea Now s/p 7 cycles of Danyell+CyBorD (ending 10/28/22).    [de-identified] : Patient seen for follow up on 06/04/2024. She is post her velcade treatment and feeling slightly fatigued. Still having chronic diarrhea weight is stable. She stillhas a lump in her left palm which she first noticed around a month ago, at times its slightly painful but not severe very mild. More of a burning. Also developing more neuropathy on the pads of her feet (has always had in the toes). Denies any significant neuropathy in the hands and fingers. She had episode of syncope a few days ago after a very active day and midodrine being spaced out with her forgetting her am dose. Since then she had to increase the midodrine to 10 mg twice a day (7 am and 2 PM).

## 2024-06-13 ENCOUNTER — NON-APPOINTMENT (OUTPATIENT)
Age: 59
End: 2024-06-13

## 2024-06-18 ENCOUNTER — APPOINTMENT (OUTPATIENT)
Dept: INFUSION THERAPY | Facility: HOSPITAL | Age: 59
End: 2024-06-18

## 2024-06-18 ENCOUNTER — APPOINTMENT (OUTPATIENT)
Dept: HEMATOLOGY ONCOLOGY | Facility: CLINIC | Age: 59
End: 2024-06-18

## 2024-06-18 ENCOUNTER — RESULT REVIEW (OUTPATIENT)
Age: 59
End: 2024-06-18

## 2024-06-18 LAB
BASOPHILS # BLD AUTO: 0.04 K/UL — SIGNIFICANT CHANGE UP (ref 0–0.2)
BASOPHILS NFR BLD AUTO: 0.7 % — SIGNIFICANT CHANGE UP (ref 0–2)
EOSINOPHIL # BLD AUTO: 0.12 K/UL — SIGNIFICANT CHANGE UP (ref 0–0.5)
EOSINOPHIL NFR BLD AUTO: 2.1 % — SIGNIFICANT CHANGE UP (ref 0–6)
HCT VFR BLD CALC: 31.8 % — LOW (ref 34.5–45)
HGB BLD-MCNC: 10.8 G/DL — LOW (ref 11.5–15.5)
IMM GRANULOCYTES NFR BLD AUTO: 1.2 % — HIGH (ref 0–0.9)
LYMPHOCYTES # BLD AUTO: 1.46 K/UL — SIGNIFICANT CHANGE UP (ref 1–3.3)
LYMPHOCYTES # BLD AUTO: 25.4 % — SIGNIFICANT CHANGE UP (ref 13–44)
MCHC RBC-ENTMCNC: 32.4 PG — SIGNIFICANT CHANGE UP (ref 27–34)
MCHC RBC-ENTMCNC: 34 G/DL — SIGNIFICANT CHANGE UP (ref 32–36)
MCV RBC AUTO: 95.5 FL — SIGNIFICANT CHANGE UP (ref 80–100)
MONOCYTES # BLD AUTO: 0.38 K/UL — SIGNIFICANT CHANGE UP (ref 0–0.9)
MONOCYTES NFR BLD AUTO: 6.6 % — SIGNIFICANT CHANGE UP (ref 2–14)
NEUTROPHILS # BLD AUTO: 3.68 K/UL — SIGNIFICANT CHANGE UP (ref 1.8–7.4)
NEUTROPHILS NFR BLD AUTO: 64 % — SIGNIFICANT CHANGE UP (ref 43–77)
NRBC # BLD: 0 /100 WBCS — SIGNIFICANT CHANGE UP (ref 0–0)
PLATELET # BLD AUTO: 160 K/UL — SIGNIFICANT CHANGE UP (ref 150–400)
RBC # BLD: 3.33 M/UL — LOW (ref 3.8–5.2)
RBC # FLD: 12.2 % — SIGNIFICANT CHANGE UP (ref 10.3–14.5)
WBC # BLD: 5.75 K/UL — SIGNIFICANT CHANGE UP (ref 3.8–10.5)
WBC # FLD AUTO: 5.75 K/UL — SIGNIFICANT CHANGE UP (ref 3.8–10.5)

## 2024-07-02 ENCOUNTER — APPOINTMENT (OUTPATIENT)
Dept: INFUSION THERAPY | Facility: HOSPITAL | Age: 59
End: 2024-07-02

## 2024-07-02 ENCOUNTER — RESULT REVIEW (OUTPATIENT)
Age: 59
End: 2024-07-02

## 2024-07-02 ENCOUNTER — APPOINTMENT (OUTPATIENT)
Dept: HEMATOLOGY ONCOLOGY | Facility: CLINIC | Age: 59
End: 2024-07-02

## 2024-07-09 ENCOUNTER — APPOINTMENT (OUTPATIENT)
Dept: ORTHOPEDIC SURGERY | Facility: CLINIC | Age: 59
End: 2024-07-09
Payer: MEDICARE

## 2024-07-09 VITALS — HEIGHT: 63 IN | WEIGHT: 120 LBS | BODY MASS INDEX: 21.26 KG/M2

## 2024-07-09 DIAGNOSIS — M72.0 PALMAR FASCIAL FIBROMATOSIS [DUPUYTREN]: ICD-10-CM

## 2024-07-09 PROCEDURE — 99203 OFFICE O/P NEW LOW 30 MIN: CPT

## 2024-07-16 ENCOUNTER — APPOINTMENT (OUTPATIENT)
Dept: HEMATOLOGY ONCOLOGY | Facility: CLINIC | Age: 59
End: 2024-07-16

## 2024-07-16 ENCOUNTER — RESULT REVIEW (OUTPATIENT)
Age: 59
End: 2024-07-16

## 2024-07-16 ENCOUNTER — APPOINTMENT (OUTPATIENT)
Dept: INFUSION THERAPY | Facility: HOSPITAL | Age: 59
End: 2024-07-16

## 2024-07-17 DIAGNOSIS — E85.9 AMYLOIDOSIS, UNSPECIFIED: ICD-10-CM

## 2024-07-17 LAB
ALBUMIN SERPL ELPH-MCNC: 4.1 G/DL
ALP BLD-CCNC: 79 U/L
ALT SERPL-CCNC: 22 U/L
ANION GAP SERPL CALC-SCNC: 13 MMOL/L
AST SERPL-CCNC: 21 U/L
BILIRUB SERPL-MCNC: 0.5 MG/DL
BUN SERPL-MCNC: 23 MG/DL
CALCIUM SERPL-MCNC: 9.1 MG/DL
CHLORIDE SERPL-SCNC: 104 MMOL/L
CO2 SERPL-SCNC: 24 MMOL/L
CREAT SERPL-MCNC: 0.79 MG/DL
EGFR: 86 ML/MIN/1.73M2
GLUCOSE SERPL-MCNC: 100 MG/DL
POTASSIUM SERPL-SCNC: 5.9 MMOL/L
PROT SERPL-MCNC: 5.8 G/DL
SODIUM SERPL-SCNC: 142 MMOL/L

## 2024-07-18 LAB
ALBUMIN MFR SERPL ELPH: 64.7 %
ALBUMIN SERPL-MCNC: 3.8 G/DL
ALBUMIN/GLOB SERPL: 1.9 RATIO
ALPHA1 GLOB MFR SERPL ELPH: 5.2 %
ALPHA1 GLOB SERPL ELPH-MCNC: 0.3 G/DL
ALPHA2 GLOB MFR SERPL ELPH: 12.3 %
ALPHA2 GLOB SERPL ELPH-MCNC: 0.7 G/DL
B-GLOBULIN MFR SERPL ELPH: 12.3 %
B-GLOBULIN SERPL ELPH-MCNC: 0.7 G/DL
DEPRECATED KAPPA LC FREE/LAMBDA SER: 0.39 RATIO
GAMMA GLOB FLD ELPH-MCNC: 0.3 G/DL
GAMMA GLOB MFR SERPL ELPH: 5.5 %
IGA SER QL IEP: 25 MG/DL
IGG SER QL IEP: 343 MG/DL
IGM SER QL IEP: 11 MG/DL
INTERPRETATION SERPL IEP-IMP: NORMAL
KAPPA LC CSF-MCNC: 0.96 MG/DL
KAPPA LC SERPL-MCNC: 0.37 MG/DL
M PROTEIN MFR SERPL ELPH: NORMAL
M PROTEIN SPEC IFE-MCNC: NORMAL
MONOCLON BAND OBS SERPL: NORMAL
PROT SERPL-MCNC: 5.8 G/DL
PROT SERPL-MCNC: 5.8 G/DL

## 2024-07-19 ENCOUNTER — RX RENEWAL (OUTPATIENT)
Age: 59
End: 2024-07-19

## 2024-07-20 ENCOUNTER — LABORATORY RESULT (OUTPATIENT)
Age: 59
End: 2024-07-20

## 2024-07-25 ENCOUNTER — TRANSCRIPTION ENCOUNTER (OUTPATIENT)
Age: 59
End: 2024-07-25

## 2024-07-30 ENCOUNTER — RESULT REVIEW (OUTPATIENT)
Age: 59
End: 2024-07-30

## 2024-07-30 ENCOUNTER — APPOINTMENT (OUTPATIENT)
Dept: ELECTROPHYSIOLOGY | Facility: CLINIC | Age: 59
End: 2024-07-30
Payer: MEDICARE

## 2024-07-30 ENCOUNTER — APPOINTMENT (OUTPATIENT)
Dept: INFUSION THERAPY | Facility: HOSPITAL | Age: 59
End: 2024-07-30

## 2024-07-30 ENCOUNTER — APPOINTMENT (OUTPATIENT)
Dept: HEMATOLOGY ONCOLOGY | Facility: CLINIC | Age: 59
End: 2024-07-30

## 2024-07-30 ENCOUNTER — NON-APPOINTMENT (OUTPATIENT)
Age: 59
End: 2024-07-30

## 2024-07-30 LAB
ALBUMIN SERPL ELPH-MCNC: 4.2 G/DL
ALP BLD-CCNC: 71 U/L
ALT SERPL-CCNC: 22 U/L
ANION GAP SERPL CALC-SCNC: 10 MMOL/L
AST SERPL-CCNC: 20 U/L
BILIRUB SERPL-MCNC: 0.6 MG/DL
BUN SERPL-MCNC: 27 MG/DL
CALCIUM SERPL-MCNC: 9.7 MG/DL
CHLORIDE SERPL-SCNC: 107 MMOL/L
CO2 SERPL-SCNC: 25 MMOL/L
CREAT SERPL-MCNC: 0.78 MG/DL
EGFR: 87 ML/MIN/1.73M2
GLUCOSE SERPL-MCNC: 101 MG/DL
POTASSIUM SERPL-SCNC: 5.5 MMOL/L
PROT SERPL-MCNC: 5.8 G/DL
SODIUM SERPL-SCNC: 142 MMOL/L

## 2024-07-30 PROCEDURE — 93294 REM INTERROG EVL PM/LDLS PM: CPT

## 2024-07-30 PROCEDURE — 93296 REM INTERROG EVL PM/IDS: CPT

## 2024-08-01 LAB
ALBUMIN MFR SERPL ELPH: 64 %
ALBUMIN SERPL-MCNC: 3.6 G/DL
ALBUMIN/GLOB SERPL: 1.8 RATIO
ALPHA1 GLOB MFR SERPL ELPH: 5 %
ALPHA1 GLOB SERPL ELPH-MCNC: 0.3 G/DL
ALPHA2 GLOB MFR SERPL ELPH: 12.5 %
ALPHA2 GLOB SERPL ELPH-MCNC: 0.7 G/DL
B-GLOBULIN MFR SERPL ELPH: 12.6 %
B-GLOBULIN SERPL ELPH-MCNC: 0.7 G/DL
DEPRECATED KAPPA LC FREE/LAMBDA SER: 0.34 RATIO
GAMMA GLOB FLD ELPH-MCNC: 0.3 G/DL
GAMMA GLOB MFR SERPL ELPH: 5.9 %
IGA SER QL IEP: 23 MG/DL
IGG SER QL IEP: 336 MG/DL
IGM SER QL IEP: <10 MG/DL
INTERPRETATION SERPL IEP-IMP: NORMAL
KAPPA LC CSF-MCNC: 1.04 MG/DL
KAPPA LC SERPL-MCNC: 0.35 MG/DL
M PROTEIN MFR SERPL ELPH: 1.7 %
M PROTEIN SPEC IFE-MCNC: NORMAL
MONOCLON BAND OBS SERPL: 0.1 G/DL
PROT SERPL-MCNC: 5.6 G/DL
PROT SERPL-MCNC: 5.6 G/DL

## 2024-08-13 ENCOUNTER — RESULT REVIEW (OUTPATIENT)
Age: 59
End: 2024-08-13

## 2024-08-13 ENCOUNTER — APPOINTMENT (OUTPATIENT)
Dept: HEMATOLOGY ONCOLOGY | Facility: CLINIC | Age: 59
End: 2024-08-13

## 2024-08-13 ENCOUNTER — APPOINTMENT (OUTPATIENT)
Dept: INFUSION THERAPY | Facility: HOSPITAL | Age: 59
End: 2024-08-13

## 2024-08-14 LAB
ALBUMIN SERPL ELPH-MCNC: 4.1 G/DL
ALP BLD-CCNC: 71 U/L
ALT SERPL-CCNC: 21 U/L
ANION GAP SERPL CALC-SCNC: 11 MMOL/L
AST SERPL-CCNC: 20 U/L
BILIRUB SERPL-MCNC: 0.7 MG/DL
BUN SERPL-MCNC: 19 MG/DL
CALCIUM SERPL-MCNC: 9.5 MG/DL
CHLORIDE SERPL-SCNC: 103 MMOL/L
CO2 SERPL-SCNC: 27 MMOL/L
CREAT SERPL-MCNC: 0.7 MG/DL
EGFR: 99 ML/MIN/1.73M2
GLUCOSE SERPL-MCNC: 105 MG/DL
POTASSIUM SERPL-SCNC: 5.1 MMOL/L
PROT SERPL-MCNC: 5.9 G/DL
SODIUM SERPL-SCNC: 140 MMOL/L

## 2024-08-17 ENCOUNTER — RX RENEWAL (OUTPATIENT)
Age: 59
End: 2024-08-17

## 2024-08-20 ENCOUNTER — TRANSCRIPTION ENCOUNTER (OUTPATIENT)
Age: 59
End: 2024-08-20

## 2024-08-20 RX ORDER — DIPHENOXYLATE HYDROCHLORIDE AND ATROPINE SULFATE 2.5; .025 MG/1; MG/1
2.5-0.025 TABLET ORAL
Qty: 120 | Refills: 3 | Status: ACTIVE | COMMUNITY
Start: 2024-08-20 | End: 1900-01-01

## 2024-08-21 LAB
ALBUMIN MFR SERPL ELPH: 64.6 %
ALBUMIN SERPL-MCNC: 3.8 G/DL
ALBUMIN/GLOB SERPL: 1.8 RATIO
ALPHA1 GLOB MFR SERPL ELPH: 5 %
ALPHA1 GLOB SERPL ELPH-MCNC: 0.3 G/DL
ALPHA2 GLOB MFR SERPL ELPH: 12.3 %
ALPHA2 GLOB SERPL ELPH-MCNC: 0.7 G/DL
B-GLOBULIN MFR SERPL ELPH: 12.4 %
B-GLOBULIN SERPL ELPH-MCNC: 0.7 G/DL
DEPRECATED KAPPA LC FREE/LAMBDA SER: 0.46 RATIO
GAMMA GLOB FLD ELPH-MCNC: 0.3 G/DL
GAMMA GLOB MFR SERPL ELPH: 5.7 %
IGA SER QL IEP: 23 MG/DL
IGG SER QL IEP: 335 MG/DL
IGM SER QL IEP: 11 MG/DL
INTERPRETATION SERPL IEP-IMP: NORMAL
KAPPA LC CSF-MCNC: 0.97 MG/DL
KAPPA LC SERPL-MCNC: 0.45 MG/DL
M PROTEIN MFR SERPL ELPH: 1.6 %
M PROTEIN SPEC IFE-MCNC: NORMAL
MONOCLON BAND OBS SERPL: 0.1 G/DL
PROT SERPL-MCNC: 5.9 G/DL
PROT SERPL-MCNC: 5.9 G/DL

## 2024-08-27 ENCOUNTER — RESULT REVIEW (OUTPATIENT)
Age: 59
End: 2024-08-27

## 2024-08-27 ENCOUNTER — APPOINTMENT (OUTPATIENT)
Dept: HEMATOLOGY ONCOLOGY | Facility: CLINIC | Age: 59
End: 2024-08-27

## 2024-08-27 ENCOUNTER — APPOINTMENT (OUTPATIENT)
Dept: INFUSION THERAPY | Facility: HOSPITAL | Age: 59
End: 2024-08-27

## 2024-08-27 LAB
ALBUMIN SERPL ELPH-MCNC: 3.9 G/DL
ALP BLD-CCNC: 65 U/L
ALT SERPL-CCNC: 23 U/L
ANION GAP SERPL CALC-SCNC: 7 MMOL/L
AST SERPL-CCNC: 22 U/L
BILIRUB SERPL-MCNC: 0.4 MG/DL
BUN SERPL-MCNC: 21 MG/DL
CALCIUM SERPL-MCNC: 9.1 MG/DL
CHLORIDE SERPL-SCNC: 106 MMOL/L
CO2 SERPL-SCNC: 28 MMOL/L
CREAT SERPL-MCNC: 0.65 MG/DL
EGFR: 101 ML/MIN/1.73M2
GLUCOSE SERPL-MCNC: 108 MG/DL
POTASSIUM SERPL-SCNC: 5.5 MMOL/L
PROT SERPL-MCNC: 5.6 G/DL
SODIUM SERPL-SCNC: 141 MMOL/L

## 2024-08-28 ENCOUNTER — OUTPATIENT (OUTPATIENT)
Dept: OUTPATIENT SERVICES | Facility: HOSPITAL | Age: 59
LOS: 1 days | Discharge: HOME | End: 2024-08-28

## 2024-08-28 DIAGNOSIS — D47.2 MONOCLONAL GAMMOPATHY: ICD-10-CM

## 2024-08-29 LAB
ALBUMIN MFR SERPL ELPH: 65.2 %
ALBUMIN SERPL-MCNC: 3.5 G/DL
ALBUMIN/GLOB SERPL: 1.9 RATIO
ALPHA1 GLOB MFR SERPL ELPH: 4.7 %
ALPHA1 GLOB SERPL ELPH-MCNC: 0.2 G/DL
ALPHA2 GLOB MFR SERPL ELPH: 12.1 %
ALPHA2 GLOB SERPL ELPH-MCNC: 0.6 G/DL
B-GLOBULIN MFR SERPL ELPH: 12.3 %
B-GLOBULIN SERPL ELPH-MCNC: 0.7 G/DL
DEPRECATED KAPPA LC FREE/LAMBDA SER: 0.39 RATIO
GAMMA GLOB FLD ELPH-MCNC: 0.3 G/DL
GAMMA GLOB MFR SERPL ELPH: 5.7 %
IGA SER QL IEP: 17 MG/DL
IGG SER QL IEP: 319 MG/DL
IGM SER QL IEP: <10 MG/DL
INTERPRETATION SERPL IEP-IMP: NORMAL
KAPPA LC CSF-MCNC: 1.02 MG/DL
KAPPA LC SERPL-MCNC: 0.4 MG/DL
M PROTEIN MFR SERPL ELPH: 1.6 %
M PROTEIN SPEC IFE-MCNC: NORMAL
MONOCLON BAND OBS SERPL: 0.1 G/DL
PROT SERPL-MCNC: 5.3 G/DL
PROT SERPL-MCNC: 5.3 G/DL

## 2024-09-10 ENCOUNTER — APPOINTMENT (OUTPATIENT)
Dept: HEMATOLOGY ONCOLOGY | Facility: CLINIC | Age: 59
End: 2024-09-10

## 2024-09-10 ENCOUNTER — RESULT REVIEW (OUTPATIENT)
Age: 59
End: 2024-09-10

## 2024-09-10 ENCOUNTER — APPOINTMENT (OUTPATIENT)
Dept: INFUSION THERAPY | Facility: HOSPITAL | Age: 59
End: 2024-09-10

## 2024-09-10 LAB
BASOPHILS # BLD AUTO: 0.03 K/UL — SIGNIFICANT CHANGE UP (ref 0–0.2)
BASOPHILS NFR BLD AUTO: 0.6 % — SIGNIFICANT CHANGE UP (ref 0–2)
EOSINOPHIL # BLD AUTO: 0.11 K/UL — SIGNIFICANT CHANGE UP (ref 0–0.5)
EOSINOPHIL NFR BLD AUTO: 2.1 % — SIGNIFICANT CHANGE UP (ref 0–6)
HCT VFR BLD CALC: 30.9 % — LOW (ref 34.5–45)
HGB BLD-MCNC: 10 G/DL — LOW (ref 11.5–15.5)
IMM GRANULOCYTES NFR BLD AUTO: 0.4 % — SIGNIFICANT CHANGE UP (ref 0–0.9)
LYMPHOCYTES # BLD AUTO: 1.26 K/UL — SIGNIFICANT CHANGE UP (ref 1–3.3)
LYMPHOCYTES # BLD AUTO: 23.6 % — SIGNIFICANT CHANGE UP (ref 13–44)
MCHC RBC-ENTMCNC: 32.4 G/DL — SIGNIFICANT CHANGE UP (ref 32–36)
MCHC RBC-ENTMCNC: 32.7 PG — SIGNIFICANT CHANGE UP (ref 27–34)
MCV RBC AUTO: 101 FL — HIGH (ref 80–100)
MONOCYTES # BLD AUTO: 0.4 K/UL — SIGNIFICANT CHANGE UP (ref 0–0.9)
MONOCYTES NFR BLD AUTO: 7.5 % — SIGNIFICANT CHANGE UP (ref 2–14)
NEUTROPHILS # BLD AUTO: 3.51 K/UL — SIGNIFICANT CHANGE UP (ref 1.8–7.4)
NEUTROPHILS NFR BLD AUTO: 65.8 % — SIGNIFICANT CHANGE UP (ref 43–77)
NRBC # BLD: 0 /100 WBCS — SIGNIFICANT CHANGE UP (ref 0–0)
PLATELET # BLD AUTO: 171 K/UL — SIGNIFICANT CHANGE UP (ref 150–400)
RBC # BLD: 3.06 M/UL — LOW (ref 3.8–5.2)
RBC # FLD: 12.4 % — SIGNIFICANT CHANGE UP (ref 10.3–14.5)
WBC # BLD: 5.33 K/UL — SIGNIFICANT CHANGE UP (ref 3.8–10.5)
WBC # FLD AUTO: 5.33 K/UL — SIGNIFICANT CHANGE UP (ref 3.8–10.5)

## 2024-09-11 DIAGNOSIS — R11.2 NAUSEA WITH VOMITING, UNSPECIFIED: ICD-10-CM

## 2024-09-11 DIAGNOSIS — E85.9 AMYLOIDOSIS, UNSPECIFIED: ICD-10-CM

## 2024-09-11 DIAGNOSIS — Z51.11 ENCOUNTER FOR ANTINEOPLASTIC CHEMOTHERAPY: ICD-10-CM

## 2024-09-11 LAB
ALBUMIN SERPL ELPH-MCNC: 4 G/DL
ALP BLD-CCNC: 66 U/L
ALT SERPL-CCNC: 16 U/L
ANION GAP SERPL CALC-SCNC: 9 MMOL/L
AST SERPL-CCNC: 16 U/L
BILIRUB SERPL-MCNC: 0.6 MG/DL
BUN SERPL-MCNC: 19 MG/DL
CALCIUM SERPL-MCNC: 9.2 MG/DL
CHLORIDE SERPL-SCNC: 110 MMOL/L
CO2 SERPL-SCNC: 25 MMOL/L
CREAT SERPL-MCNC: 0.6 MG/DL
EGFR: 103 ML/MIN/1.73M2
GLUCOSE SERPL-MCNC: 100 MG/DL
POTASSIUM SERPL-SCNC: 4.5 MMOL/L
PROT SERPL-MCNC: 5.5 G/DL
SODIUM SERPL-SCNC: 144 MMOL/L

## 2024-09-16 ENCOUNTER — RX RENEWAL (OUTPATIENT)
Age: 59
End: 2024-09-16

## 2024-09-18 LAB
ALBUMIN MFR SERPL ELPH: 66.4 %
ALBUMIN SERPL-MCNC: 3.7 G/DL
ALBUMIN/GLOB SERPL: 2.1 RATIO
ALPHA1 GLOB MFR SERPL ELPH: 4.6 %
ALPHA1 GLOB SERPL ELPH-MCNC: 0.3 G/DL
ALPHA2 GLOB MFR SERPL ELPH: 11.3 %
ALPHA2 GLOB SERPL ELPH-MCNC: 0.6 G/DL
B-GLOBULIN MFR SERPL ELPH: 12.2 %
B-GLOBULIN SERPL ELPH-MCNC: 0.7 G/DL
DEPRECATED KAPPA LC FREE/LAMBDA SER: 0.41 RATIO
GAMMA GLOB FLD ELPH-MCNC: 0.3 G/DL
GAMMA GLOB MFR SERPL ELPH: 5.5 %
IGA SER QL IEP: 25 MG/DL
IGG SER QL IEP: 316 MG/DL
IGM SER QL IEP: <10 MG/DL
INTERPRETATION SERPL IEP-IMP: NORMAL
KAPPA LC CSF-MCNC: 0.94 MG/DL
KAPPA LC SERPL-MCNC: 0.39 MG/DL
M PROTEIN MFR SERPL ELPH: 1.4 %
M PROTEIN SPEC IFE-MCNC: NORMAL
MONOCLON BAND OBS SERPL: 0.1 G/DL
PROT SERPL-MCNC: 5.5 G/DL
PROT SERPL-MCNC: 5.5 G/DL

## 2024-09-19 ENCOUNTER — NON-APPOINTMENT (OUTPATIENT)
Age: 59
End: 2024-09-19

## 2024-09-24 ENCOUNTER — APPOINTMENT (OUTPATIENT)
Dept: INFUSION THERAPY | Facility: HOSPITAL | Age: 59
End: 2024-09-24

## 2024-09-24 ENCOUNTER — APPOINTMENT (OUTPATIENT)
Dept: HEMATOLOGY ONCOLOGY | Facility: CLINIC | Age: 59
End: 2024-09-24

## 2024-09-24 ENCOUNTER — RESULT REVIEW (OUTPATIENT)
Age: 59
End: 2024-09-24

## 2024-09-24 ENCOUNTER — APPOINTMENT (OUTPATIENT)
Dept: HEMATOLOGY ONCOLOGY | Facility: CLINIC | Age: 59
End: 2024-09-24
Payer: MEDICARE

## 2024-09-24 VITALS
DIASTOLIC BLOOD PRESSURE: 65 MMHG | BODY MASS INDEX: 22.14 KG/M2 | SYSTOLIC BLOOD PRESSURE: 108 MMHG | HEART RATE: 78 BPM | TEMPERATURE: 97.3 F | RESPIRATION RATE: 16 BRPM | OXYGEN SATURATION: 97 % | WEIGHT: 125 LBS

## 2024-09-24 DIAGNOSIS — E85.9 AMYLOIDOSIS, UNSPECIFIED: ICD-10-CM

## 2024-09-24 LAB
ALBUMIN SERPL ELPH-MCNC: 3.9 G/DL
ALP BLD-CCNC: 64 U/L
ALT SERPL-CCNC: 22 U/L
ANION GAP SERPL CALC-SCNC: 11 MMOL/L
AST SERPL-CCNC: 17 U/L
BASOPHILS # BLD AUTO: 0.02 K/UL — SIGNIFICANT CHANGE UP (ref 0–0.2)
BASOPHILS NFR BLD AUTO: 0.4 % — SIGNIFICANT CHANGE UP (ref 0–2)
BILIRUB SERPL-MCNC: 0.4 MG/DL
BUN SERPL-MCNC: 26 MG/DL
CALCIUM SERPL-MCNC: 9.4 MG/DL
CHLORIDE SERPL-SCNC: 108 MMOL/L
CO2 SERPL-SCNC: 25 MMOL/L
CREAT SERPL-MCNC: 0.67 MG/DL
EGFR: 100 ML/MIN/1.73M2
EOSINOPHIL # BLD AUTO: 0.09 K/UL — SIGNIFICANT CHANGE UP (ref 0–0.5)
EOSINOPHIL NFR BLD AUTO: 1.8 % — SIGNIFICANT CHANGE UP (ref 0–6)
GLUCOSE SERPL-MCNC: 98 MG/DL
HCT VFR BLD CALC: 32.2 % — LOW (ref 34.5–45)
HGB BLD-MCNC: 10.5 G/DL — LOW (ref 11.5–15.5)
IMM GRANULOCYTES NFR BLD AUTO: 0.2 % — SIGNIFICANT CHANGE UP (ref 0–0.9)
LYMPHOCYTES # BLD AUTO: 1.08 K/UL — SIGNIFICANT CHANGE UP (ref 1–3.3)
LYMPHOCYTES # BLD AUTO: 21.7 % — SIGNIFICANT CHANGE UP (ref 13–44)
MCHC RBC-ENTMCNC: 32.6 G/DL — SIGNIFICANT CHANGE UP (ref 32–36)
MCHC RBC-ENTMCNC: 32.7 PG — SIGNIFICANT CHANGE UP (ref 27–34)
MCV RBC AUTO: 100.3 FL — HIGH (ref 80–100)
MONOCYTES # BLD AUTO: 0.38 K/UL — SIGNIFICANT CHANGE UP (ref 0–0.9)
MONOCYTES NFR BLD AUTO: 7.6 % — SIGNIFICANT CHANGE UP (ref 2–14)
NEUTROPHILS # BLD AUTO: 3.39 K/UL — SIGNIFICANT CHANGE UP (ref 1.8–7.4)
NEUTROPHILS NFR BLD AUTO: 68.3 % — SIGNIFICANT CHANGE UP (ref 43–77)
NRBC # BLD: 0 /100 WBCS — SIGNIFICANT CHANGE UP (ref 0–0)
PLATELET # BLD AUTO: 172 K/UL — SIGNIFICANT CHANGE UP (ref 150–400)
POTASSIUM SERPL-SCNC: 5.2 MMOL/L
PROT SERPL-MCNC: 5.6 G/DL
RBC # BLD: 3.21 M/UL — LOW (ref 3.8–5.2)
RBC # FLD: 12.7 % — SIGNIFICANT CHANGE UP (ref 10.3–14.5)
SODIUM SERPL-SCNC: 144 MMOL/L
WBC # BLD: 4.97 K/UL — SIGNIFICANT CHANGE UP (ref 3.8–10.5)
WBC # FLD AUTO: 4.97 K/UL — SIGNIFICANT CHANGE UP (ref 3.8–10.5)

## 2024-09-24 PROCEDURE — G2211 COMPLEX E/M VISIT ADD ON: CPT

## 2024-09-24 PROCEDURE — 99213 OFFICE O/P EST LOW 20 MIN: CPT

## 2024-09-24 NOTE — HISTORY OF PRESENT ILLNESS
[de-identified] : 59 y/o F with hx of thyroidectomy in 2011 (benign pathology) on thyroid replacement since then, and with COVID pneumonia back in March of 2020 ultimately signed herself out AMA, chronic issues thereafter, ended up going back to work in July 2020. In January-February 2021 had Pfizer vaccines. Started having issues starting in March 2021, she was having GI issues, endoscopy showed chronic gastritis. Additionally diagnosed with IBS at that point. She had been diagnosed with Postural Orthostatic Tachycardia Syndrome - her blood pressure went down when she stands up from a seated position (very severe hypotension). She had fainted "too many times to count." She ended up with bruises here and there but nothing severe where she would need to go to the hospital. She saw two neurologists who did extensive evaluations without any success in finding diagnostic lesions. She reported seeing specialist for dysautonomia, Dr. Colón at [place] (neurology) and started fludrocortisone in September 2021. Also at that point c/o burning skin, muscle aches, burning mouth and altered taste. She had lost 47 pounds from March 2021 to when I first saw her. She was ultimatley sent to me for evaluation of elevated free light chains. 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 She was found with no monoclonal protein on SPEP and hemoglobin was normal, with normal renal function, and no hypercalcemia. Subsequent fat pad biopsy showed positive congo red staining in rare blood vessels and positive for polarization. This was diagnostic for primary light chain amyloidosis.  In March 2022 patient was admitted to [facility] for multiple recurrent syncopal episodes. While admitted course was complicated by multiple unresponsive episodes prompting RRTs, during which pt found to be hypotensive and bradycardic. She initially required levophed for BP support but ultimately BP remained stable while on Midodrine 20mg q8 and Fludrocortisone 0.2mg q24. Pt noted to intermittently have sinus pauses (2-3.8 seconds) thought to be 2/2 vagal episodes, and asymptomatic bradycardia overnight on telemetry. ECG noted NSR but 1st degree AV block. TTE 3/16/22 showed normal LV size and function with EF 55%-60%. No regional wall motion abnormalities. Moderate concentric LVH. Treated for C. diff and UTI during hospitalization as well. Ultimately at that point she was transferred to [facility] for consideration of inpatient chemotherapy.   A PPM placement was recommended, as cardiac MRI showed evidence of definitive amyloid involvement. Pt had another RRT for hypotension and symptomatic bradycardia to 30s, was on pressor support until she received a dual chamber PPM. Pt also continued her C.diff treatment and chemotherapy was placed on hold until the diarrhea cleared. She was then started on Daratumumab + CyBorD inpatient, and tolerated it very well ; she completed 2 treatments of chemotherapy inpatient. She subseqently continued with this regimen as outpatient. Has been complicated by diarrhea which on evaluation by GI was deemed likely amyloid related. This was biopsy proven on colonoscopic / endoscopic evaluation in September 2022. Treated with supportive care since then.   She has completed 7 full cycles of Danyell + CyBorD in October 2022. Based on serum FLC ratio she was in a complete response.  Of note, patient reported that she is NOT interested in hematopoietic transplant at this time but she is interested in collecting her stem cells for a later date.  At this point she is on maintenance Velcade and Darzalex Faspro.    ****************************************************************************************************** Disease: AL amyloidosis  Pathology: Final Diagnosis (2/8/22).  Tumor/ Prognostic Markers: Not enough tissue for mass spectrometry testing.   Final Diagnosis (3/1/22) 1, 2. Bone marrow biopsy and bone marrow aspirate  - Plasma cell myeloma, 25-35% of cellularity by immunohistochemistry  - Trilineage hematopoiesis with maturation, mild eosinophilia, serous   atrophy, and iron stores present  See note and description.  Diagnostic note: As per chart review, the patient was referred for elevated free light  chains. Her light chain ratio was found to be very low at 0.11 and was  found to have hypogammaglobulinemia with no monoclonal protein on SPEP,  hemoglobin normal, normal renal function, and no hypercalcemia. Fat  pad biopsy showed positive congo red staining in rare blood vessels  and positive for polarization. Bone marrow to evaluate for plasma cell  myeloma. The bone marrow shows a significant plasma cell infiltrate by  immunohistochemistry (25-35% of cellularity, also positive with cyclin  D1, with monotypic plasma cells by flow cytometry. Congo red stain is  negative.  Normal female karyotype seen on bone marrow.  FISH Result: ABNORMAL FISH MULTIPLE MYELOMA PANEL - Atypical CCND1/IGH fusion pattern detected (3.5%).    Current Treatment Status: Therapy: Danyell + Velcade Maintenance . Kleber + CyBorD started on 4/8/2022, held cytoxan from 4/18-5/12 for hematuria, held again 6/24 for worsening diarrhea Now s/p 7 cycles of Danyell+CyBorD (ending 10/28/22).    [de-identified] : Patient seen for follow up. Overall, she is feeling well and in her usual state of health. She has titrated her Midodrine to 5mg PO TID and tolerating well. She has been more active, walking and is now able to walk up to 7000 steps a day. Still having chronic diarrhea weight is stable. Neuropathy is stable since last visit and continues to deny any significant neuropathy in the hands and fingers. Denies headaches, CP, palpitations, SOB, abdominal pian, n/v.

## 2024-09-24 NOTE — ASSESSMENT
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 Cycle 1 of CyBorD was initiated on 4/1/22 Completed 7 full cycles of poncho-CyBorD on 10/28/22. She is now on maintenance with daratumumab monthly with Velcade [id number]. She has had a complete response from the standpoint of her amyloidosis. Repeat BM bx on 2/8/23 showed minimal involvement by patients known plasma cell neoplasm/myeloma (less than 5% of the cellularity). Still with evidence of FISH positive 11;14 translocation at that point in 2.5% of cells. Venetoclax may be suitable agent if serum FLC uptrending.  On diagnosis Lambda serum FLCs 8.73 and kappa 0.92, for a ratio of 0.11 (or 9.5). Subsequent fat pad biopsy was done and was POSITIVE for congo red, with positive polarization. We were unable to send for mass spectrometry to confirm amyloid type. However, BMBx which showed ~35% plasmacytosis (monoclonal) c/w plasma cell neoplasm. This further confirmed the presence of systemic light chain amyloidosis. It did NOT meet MM criteria.  Patient has multiple end organ difficulties related to her amyloidosis. 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 Her urine showing proteinuria on diagnosis (although not nephrotic range). She had peripheral neuropathy. She has chronic diarrhea with pathology from colonoscopy confirming amyloid deposition. Has poor taste sensation. Much of this has improved in severity, but this is still an ongoing issue which needs management.    Plan: - Patient has met with the BMT team but does not plan to pursue autologous BMT at this point.  -Continue follow up with Cardiology with goal of titrating off Midodrine as BP permits. Repeat echocardiogram showing some improvement in May 2023. Had advised against green tea extract as this has the potential to interfere with the antineoplastic effect of her Velcade.  -Continue with supportive care for diarrhea. -Treating with maintenance daratumumab monthly and velcade every other week dose reduced for neuropathy. We will continue with this regimen for now and continue to monitor serum FLC closely. This has been stable. -Discussed possible IVIG given hypogammaglobulinemia, but holding off as only has had one URI within last 2 years.  -CBC reviewed, stable today. -Patient understands and agrees with plan. All information explained to the best of my ability. -RTC in 3 months but continue monthly labs.

## 2024-09-24 NOTE — PHYSICAL EXAM
[Restricted in physically strenuous activity but ambulatory and able to carry out work of a light or sedentary nature] : Status 1- Restricted in physically strenuous activity but ambulatory and able to carry out work of a light or sedentary nature, e.g., light house work, office work [Normal] : no peripheral adenopathy appreciated [de-identified] : supple [de-identified] : (+) S1S2 RRR [de-identified] : no edema [de-identified] : warm/dry

## 2024-09-24 NOTE — REVIEW OF SYSTEMS
[Vision Problems] : no vision problems [Dysphagia] : no dysphagia [Nosebleeds] : no nosebleeds [Odynophagia] : no odynophagia [Confused] : no confusion [Dizziness] : dizziness [Fainting] : no fainting [Difficulty Walking] : no difficulty walking [Insomnia] : no insomnia [Anxiety] : no anxiety [Hot Flashes] : no hot flashes [Negative] : Integumentary [FreeTextEntry7] : + diarrhea

## 2024-09-24 NOTE — ASSESSMENT
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 Cycle 1 of CyBorD was initiated on 4/1/22 Completed 7 full cycles of poncho-CyBorD on 10/28/22. She is now on maintenance with daratumumab monthly with Velcade [id number]. She has had a complete response from the standpoint of her amyloidosis. Repeat BM bx on 2/8/23 showed minimal involvement by patients known plasma cell neoplasm/myeloma (less than 5% of the cellularity). Still with evidence of FISH positive 11;14 translocation at that point in 2.5% of cells. Venetoclax may be suitable agent if serum FLC uptrending.  On diagnosis Lambda serum FLCs 8.73 and kappa 0.92, for a ratio of 0.11 (or 9.5). Subsequent fat pad biopsy was done and was POSITIVE for congo red, with positive polarization. We were unable to send for mass spectrometry to confirm amyloid type. However, BMBx which showed ~35% plasmacytosis (monoclonal) c/w plasma cell neoplasm. This further confirmed the presence of systemic light chain amyloidosis. It did NOT meet MM criteria.  Patient has multiple end organ difficulties related to her amyloidosis. 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 Her urine showing proteinuria on diagnosis (although not nephrotic range). She had peripheral neuropathy. She has chronic diarrhea with pathology from colonoscopy confirming amyloid deposition. Has poor taste sensation. Much of this has improved in severity, but this is still an ongoing issue which needs management.    Plan: - Patient has met with the BMT team but does not plan to pursue autologous BMT at this point.  -Continue follow up with Cardiology with goal of titrating off Midodrine as BP permits. Repeat echocardiogram showing some improvement in May 2023. Had advised against green tea extract as this has the potential to interfere with the antineoplastic effect of her Velcade.  -Continue with supportive care for diarrhea. -Treating with maintenance daratumumab monthly and velcade every other week dose reduced for neuropathy. We will continue with this regimen for now and continue to monitor serum FLC closely. This has been stable. -Discussed possible IVIG given hypogammaglobulinemia, but holding off as only has had one URI within last 2 years.  -CBC reviewed, stable today. -Patient understands and agrees with plan. All information explained to the best of my ability. -RTC in 3 months but continue monthly labs.

## 2024-09-24 NOTE — HISTORY OF PRESENT ILLNESS
[de-identified] : 57 y/o F with hx of thyroidectomy in 2011 (benign pathology) on thyroid replacement since then, and with COVID pneumonia back in March of 2020 ultimately signed herself out AMA, chronic issues thereafter, ended up going back to work in July 2020. In January-February 2021 had Pfizer vaccines. Started having issues starting in March 2021, she was having GI issues, endoscopy showed chronic gastritis. Additionally diagnosed with IBS at that point. She had been diagnosed with Postural Orthostatic Tachycardia Syndrome - her blood pressure went down when she stands up from a seated position (very severe hypotension). She had fainted "too many times to count." She ended up with bruises here and there but nothing severe where she would need to go to the hospital. She saw two neurologists who did extensive evaluations without any success in finding diagnostic lesions. She reported seeing specialist for dysautonomia, Dr. Colón at [place] (neurology) and started fludrocortisone in September 2021. Also at that point c/o burning skin, muscle aches, burning mouth and altered taste. She had lost 47 pounds from March 2021 to when I first saw her. She was ultimatley sent to me for evaluation of elevated free light chains. 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 She was found with no monoclonal protein on SPEP and hemoglobin was normal, with normal renal function, and no hypercalcemia. Subsequent fat pad biopsy showed positive congo red staining in rare blood vessels and positive for polarization. This was diagnostic for primary light chain amyloidosis.  In March 2022 patient was admitted to [facility] for multiple recurrent syncopal episodes. While admitted course was complicated by multiple unresponsive episodes prompting RRTs, during which pt found to be hypotensive and bradycardic. She initially required levophed for BP support but ultimately BP remained stable while on Midodrine 20mg q8 and Fludrocortisone 0.2mg q24. Pt noted to intermittently have sinus pauses (2-3.8 seconds) thought to be 2/2 vagal episodes, and asymptomatic bradycardia overnight on telemetry. ECG noted NSR but 1st degree AV block. TTE 3/16/22 showed normal LV size and function with EF 55%-60%. No regional wall motion abnormalities. Moderate concentric LVH. Treated for C. diff and UTI during hospitalization as well. Ultimately at that point she was transferred to [facility] for consideration of inpatient chemotherapy.   A PPM placement was recommended, as cardiac MRI showed evidence of definitive amyloid involvement. Pt had another RRT for hypotension and symptomatic bradycardia to 30s, was on pressor support until she received a dual chamber PPM. Pt also continued her C.diff treatment and chemotherapy was placed on hold until the diarrhea cleared. She was then started on Daratumumab + CyBorD inpatient, and tolerated it very well ; she completed 2 treatments of chemotherapy inpatient. She subseqently continued with this regimen as outpatient. Has been complicated by diarrhea which on evaluation by GI was deemed likely amyloid related. This was biopsy proven on colonoscopic / endoscopic evaluation in September 2022. Treated with supportive care since then.   She has completed 7 full cycles of Danyell + CyBorD in October 2022. Based on serum FLC ratio she was in a complete response.  Of note, patient reported that she is NOT interested in hematopoietic transplant at this time but she is interested in collecting her stem cells for a later date.  At this point she is on maintenance Velcade and Darzalex Faspro.    ****************************************************************************************************** Disease: AL amyloidosis  Pathology: Final Diagnosis (2/8/22).  Tumor/ Prognostic Markers: Not enough tissue for mass spectrometry testing.   Final Diagnosis (3/1/22) 1, 2. Bone marrow biopsy and bone marrow aspirate  - Plasma cell myeloma, 25-35% of cellularity by immunohistochemistry  - Trilineage hematopoiesis with maturation, mild eosinophilia, serous   atrophy, and iron stores present  See note and description.  Diagnostic note: As per chart review, the patient was referred for elevated free light  chains. Her light chain ratio was found to be very low at 0.11 and was  found to have hypogammaglobulinemia with no monoclonal protein on SPEP,  hemoglobin normal, normal renal function, and no hypercalcemia. Fat  pad biopsy showed positive congo red staining in rare blood vessels  and positive for polarization. Bone marrow to evaluate for plasma cell  myeloma. The bone marrow shows a significant plasma cell infiltrate by  immunohistochemistry (25-35% of cellularity, also positive with cyclin  D1, with monotypic plasma cells by flow cytometry. Congo red stain is  negative.  Normal female karyotype seen on bone marrow.  FISH Result: ABNORMAL FISH MULTIPLE MYELOMA PANEL - Atypical CCND1/IGH fusion pattern detected (3.5%).    Current Treatment Status: Therapy: Danyell + Velcade Maintenance . Kleber + CyBorD started on 4/8/2022, held cytoxan from 4/18-5/12 for hematuria, held again 6/24 for worsening diarrhea Now s/p 7 cycles of Danyell+CyBorD (ending 10/28/22).    [de-identified] : Patient seen for follow up. Overall, she is feeling well and in her usual state of health. She has titrated her Midodrine to 5mg PO TID and tolerating well. She has been more active, walking and is now able to walk up to 7000 steps a day. Still having chronic diarrhea weight is stable. Neuropathy is stable since last visit and continues to deny any significant neuropathy in the hands and fingers. Denies headaches, CP, palpitations, SOB, abdominal pian, n/v.

## 2024-09-24 NOTE — PHYSICAL EXAM
[Restricted in physically strenuous activity but ambulatory and able to carry out work of a light or sedentary nature] : Status 1- Restricted in physically strenuous activity but ambulatory and able to carry out work of a light or sedentary nature, e.g., light house work, office work [Normal] : no peripheral adenopathy appreciated [de-identified] : supple [de-identified] : (+) S1S2 RRR [de-identified] : no edema [de-identified] : warm/dry

## 2024-09-26 LAB
ALBUMIN MFR SERPL ELPH: 66.7 %
ALBUMIN SERPL-MCNC: 3.7 G/DL
ALBUMIN/GLOB SERPL: 1.9 RATIO
ALPHA1 GLOB MFR SERPL ELPH: 4.7 %
ALPHA1 GLOB SERPL ELPH-MCNC: 0.3 G/DL
ALPHA2 GLOB MFR SERPL ELPH: 11.1 %
ALPHA2 GLOB SERPL ELPH-MCNC: 0.6 G/DL
B-GLOBULIN MFR SERPL ELPH: 12.2 %
B-GLOBULIN SERPL ELPH-MCNC: 0.7 G/DL
DEPRECATED KAPPA LC FREE/LAMBDA SER: 0.46 RATIO
GAMMA GLOB FLD ELPH-MCNC: 0.3 G/DL
GAMMA GLOB MFR SERPL ELPH: 5.3 %
IGA SER QL IEP: 24 MG/DL
IGG SER QL IEP: 326 MG/DL
IGM SER QL IEP: <10 MG/DL
INTERPRETATION SERPL IEP-IMP: NORMAL
KAPPA LC CSF-MCNC: 0.79 MG/DL
KAPPA LC SERPL-MCNC: 0.36 MG/DL
M PROTEIN MFR SERPL ELPH: 1.9 %
M PROTEIN SPEC IFE-MCNC: NORMAL
MONOCLON BAND OBS SERPL: 0.1 G/DL
PROT SERPL-MCNC: 5.6 G/DL
PROT SERPL-MCNC: 5.6 G/DL

## 2024-10-01 ENCOUNTER — TRANSCRIPTION ENCOUNTER (OUTPATIENT)
Age: 59
End: 2024-10-01

## 2024-10-02 ENCOUNTER — TRANSCRIPTION ENCOUNTER (OUTPATIENT)
Age: 59
End: 2024-10-02

## 2024-10-03 ENCOUNTER — TRANSCRIPTION ENCOUNTER (OUTPATIENT)
Age: 59
End: 2024-10-03

## 2024-10-08 ENCOUNTER — RESULT REVIEW (OUTPATIENT)
Age: 59
End: 2024-10-08

## 2024-10-08 ENCOUNTER — APPOINTMENT (OUTPATIENT)
Dept: INFUSION THERAPY | Facility: HOSPITAL | Age: 59
End: 2024-10-08

## 2024-10-08 ENCOUNTER — APPOINTMENT (OUTPATIENT)
Dept: HEMATOLOGY ONCOLOGY | Facility: CLINIC | Age: 59
End: 2024-10-08

## 2024-10-08 LAB
BASOPHILS # BLD AUTO: 0.02 K/UL — SIGNIFICANT CHANGE UP (ref 0–0.2)
BASOPHILS NFR BLD AUTO: 0.4 % — SIGNIFICANT CHANGE UP (ref 0–2)
EOSINOPHIL # BLD AUTO: 0.09 K/UL — SIGNIFICANT CHANGE UP (ref 0–0.5)
EOSINOPHIL NFR BLD AUTO: 1.9 % — SIGNIFICANT CHANGE UP (ref 0–6)
HCT VFR BLD CALC: 32.6 % — LOW (ref 34.5–45)
HGB BLD-MCNC: 11 G/DL — LOW (ref 11.5–15.5)
IMM GRANULOCYTES NFR BLD AUTO: 0.8 % — SIGNIFICANT CHANGE UP (ref 0–0.9)
LYMPHOCYTES # BLD AUTO: 1.22 K/UL — SIGNIFICANT CHANGE UP (ref 1–3.3)
LYMPHOCYTES # BLD AUTO: 25.4 % — SIGNIFICANT CHANGE UP (ref 13–44)
MCHC RBC-ENTMCNC: 32.9 PG — SIGNIFICANT CHANGE UP (ref 27–34)
MCHC RBC-ENTMCNC: 33.7 G/DL — SIGNIFICANT CHANGE UP (ref 32–36)
MCV RBC AUTO: 97.6 FL — SIGNIFICANT CHANGE UP (ref 80–100)
MONOCYTES # BLD AUTO: 0.36 K/UL — SIGNIFICANT CHANGE UP (ref 0–0.9)
MONOCYTES NFR BLD AUTO: 7.5 % — SIGNIFICANT CHANGE UP (ref 2–14)
NEUTROPHILS # BLD AUTO: 3.08 K/UL — SIGNIFICANT CHANGE UP (ref 1.8–7.4)
NEUTROPHILS NFR BLD AUTO: 64 % — SIGNIFICANT CHANGE UP (ref 43–77)
NRBC # BLD: 0 /100 WBCS — SIGNIFICANT CHANGE UP (ref 0–0)
PLATELET # BLD AUTO: 181 K/UL — SIGNIFICANT CHANGE UP (ref 150–400)
RBC # BLD: 3.34 M/UL — LOW (ref 3.8–5.2)
RBC # FLD: 12.2 % — SIGNIFICANT CHANGE UP (ref 10.3–14.5)
WBC # BLD: 4.81 K/UL — SIGNIFICANT CHANGE UP (ref 3.8–10.5)
WBC # FLD AUTO: 4.81 K/UL — SIGNIFICANT CHANGE UP (ref 3.8–10.5)

## 2024-10-15 ENCOUNTER — APPOINTMENT (OUTPATIENT)
Dept: CARDIOLOGY | Facility: CLINIC | Age: 59
End: 2024-10-15
Payer: MEDICARE

## 2024-10-15 ENCOUNTER — APPOINTMENT (OUTPATIENT)
Dept: ENDOCRINOLOGY | Facility: CLINIC | Age: 59
End: 2024-10-15
Payer: MEDICARE

## 2024-10-15 ENCOUNTER — NON-APPOINTMENT (OUTPATIENT)
Age: 59
End: 2024-10-15

## 2024-10-15 VITALS
WEIGHT: 121 LBS | HEIGHT: 63 IN | HEART RATE: 84 BPM | BODY MASS INDEX: 21.44 KG/M2 | SYSTOLIC BLOOD PRESSURE: 109 MMHG | OXYGEN SATURATION: 96 % | DIASTOLIC BLOOD PRESSURE: 70 MMHG

## 2024-10-15 VITALS
DIASTOLIC BLOOD PRESSURE: 80 MMHG | WEIGHT: 124 LBS | OXYGEN SATURATION: 97 % | SYSTOLIC BLOOD PRESSURE: 122 MMHG | HEART RATE: 75 BPM | BODY MASS INDEX: 21.97 KG/M2

## 2024-10-15 DIAGNOSIS — R00.2 PALPITATIONS: ICD-10-CM

## 2024-10-15 DIAGNOSIS — R73.03 PREDIABETES.: ICD-10-CM

## 2024-10-15 DIAGNOSIS — I95.1 ORTHOSTATIC HYPOTENSION: ICD-10-CM

## 2024-10-15 DIAGNOSIS — E03.9 HYPOTHYROIDISM, UNSPECIFIED: ICD-10-CM

## 2024-10-15 LAB — HBA1C MFR BLD HPLC: 5.9

## 2024-10-15 PROCEDURE — G2211 COMPLEX E/M VISIT ADD ON: CPT

## 2024-10-15 PROCEDURE — 36415 COLL VENOUS BLD VENIPUNCTURE: CPT

## 2024-10-15 PROCEDURE — 99214 OFFICE O/P EST MOD 30 MIN: CPT

## 2024-10-15 PROCEDURE — 93000 ELECTROCARDIOGRAM COMPLETE: CPT

## 2024-10-15 PROCEDURE — 83036 HEMOGLOBIN GLYCOSYLATED A1C: CPT | Mod: QW

## 2024-10-15 PROCEDURE — 99215 OFFICE O/P EST HI 40 MIN: CPT

## 2024-10-16 LAB
CHOLEST SERPL-MCNC: 241 MG/DL
HDLC SERPL-MCNC: 78 MG/DL
LDLC SERPL CALC-MCNC: 133 MG/DL
NONHDLC SERPL-MCNC: 163 MG/DL
T4 FREE SERPL-MCNC: 1.6 NG/DL
TRIGL SERPL-MCNC: 168 MG/DL
TSH SERPL-ACNC: 0.37 UIU/ML

## 2024-10-17 ENCOUNTER — TRANSCRIPTION ENCOUNTER (OUTPATIENT)
Age: 59
End: 2024-10-17

## 2024-10-18 ENCOUNTER — TRANSCRIPTION ENCOUNTER (OUTPATIENT)
Age: 59
End: 2024-10-18

## 2024-10-18 ENCOUNTER — NON-APPOINTMENT (OUTPATIENT)
Age: 59
End: 2024-10-18

## 2024-10-18 DIAGNOSIS — E78.5 HYPERLIPIDEMIA, UNSPECIFIED: ICD-10-CM

## 2024-10-21 ENCOUNTER — TRANSCRIPTION ENCOUNTER (OUTPATIENT)
Age: 59
End: 2024-10-21

## 2024-10-22 ENCOUNTER — RESULT REVIEW (OUTPATIENT)
Age: 59
End: 2024-10-22

## 2024-10-22 ENCOUNTER — APPOINTMENT (OUTPATIENT)
Dept: INFUSION THERAPY | Facility: HOSPITAL | Age: 59
End: 2024-10-22

## 2024-10-22 ENCOUNTER — APPOINTMENT (OUTPATIENT)
Dept: HEMATOLOGY ONCOLOGY | Facility: CLINIC | Age: 59
End: 2024-10-22

## 2024-10-22 LAB
BASOPHILS # BLD AUTO: 0.04 K/UL — SIGNIFICANT CHANGE UP (ref 0–0.2)
BASOPHILS NFR BLD AUTO: 0.8 % — SIGNIFICANT CHANGE UP (ref 0–2)
EOSINOPHIL # BLD AUTO: 0.08 K/UL — SIGNIFICANT CHANGE UP (ref 0–0.5)
EOSINOPHIL NFR BLD AUTO: 1.6 % — SIGNIFICANT CHANGE UP (ref 0–6)
HCT VFR BLD CALC: 32.7 % — LOW (ref 34.5–45)
HGB BLD-MCNC: 10.7 G/DL — LOW (ref 11.5–15.5)
IMM GRANULOCYTES NFR BLD AUTO: 0.2 % — SIGNIFICANT CHANGE UP (ref 0–0.9)
LYMPHOCYTES # BLD AUTO: 1.31 K/UL — SIGNIFICANT CHANGE UP (ref 1–3.3)
LYMPHOCYTES # BLD AUTO: 26.6 % — SIGNIFICANT CHANGE UP (ref 13–44)
MCHC RBC-ENTMCNC: 32.6 PG — SIGNIFICANT CHANGE UP (ref 27–34)
MCHC RBC-ENTMCNC: 32.7 G/DL — SIGNIFICANT CHANGE UP (ref 32–36)
MCV RBC AUTO: 99.7 FL — SIGNIFICANT CHANGE UP (ref 80–100)
MONOCYTES # BLD AUTO: 0.33 K/UL — SIGNIFICANT CHANGE UP (ref 0–0.9)
MONOCYTES NFR BLD AUTO: 6.7 % — SIGNIFICANT CHANGE UP (ref 2–14)
NEUTROPHILS # BLD AUTO: 3.16 K/UL — SIGNIFICANT CHANGE UP (ref 1.8–7.4)
NEUTROPHILS NFR BLD AUTO: 64.1 % — SIGNIFICANT CHANGE UP (ref 43–77)
NRBC # BLD: 0 /100 WBCS — SIGNIFICANT CHANGE UP (ref 0–0)
PLATELET # BLD AUTO: 172 K/UL — SIGNIFICANT CHANGE UP (ref 150–400)
RBC # BLD: 3.28 M/UL — LOW (ref 3.8–5.2)
RBC # FLD: 12 % — SIGNIFICANT CHANGE UP (ref 10.3–14.5)
WBC # BLD: 4.93 K/UL — SIGNIFICANT CHANGE UP (ref 3.8–10.5)
WBC # FLD AUTO: 4.93 K/UL — SIGNIFICANT CHANGE UP (ref 3.8–10.5)

## 2024-10-25 ENCOUNTER — NON-APPOINTMENT (OUTPATIENT)
Age: 59
End: 2024-10-25

## 2024-10-27 LAB
ALBUMIN MFR SERPL ELPH: 66.2 %
ALBUMIN SERPL ELPH-MCNC: 3.8 G/DL
ALBUMIN SERPL-MCNC: 3.8 G/DL
ALBUMIN/GLOB SERPL: 2 RATIO
ALP BLD-CCNC: 63 U/L
ALPHA1 GLOB MFR SERPL ELPH: 4.6 %
ALPHA1 GLOB SERPL ELPH-MCNC: 0.3 G/DL
ALPHA2 GLOB MFR SERPL ELPH: 11.9 %
ALPHA2 GLOB SERPL ELPH-MCNC: 0.7 G/DL
ALT SERPL-CCNC: 21 U/L
ANION GAP SERPL CALC-SCNC: 7 MMOL/L
AST SERPL-CCNC: 20 U/L
B-GLOBULIN MFR SERPL ELPH: 11.8 %
B-GLOBULIN SERPL ELPH-MCNC: 0.7 G/DL
BILIRUB SERPL-MCNC: 0.4 MG/DL
BUN SERPL-MCNC: 22 MG/DL
CALCIUM SERPL-MCNC: 9.4 MG/DL
CHLORIDE SERPL-SCNC: 106 MMOL/L
CO2 SERPL-SCNC: 29 MMOL/L
CREAT SERPL-MCNC: 0.63 MG/DL
DEPRECATED KAPPA LC FREE/LAMBDA SER: 0.34 RATIO
EGFR: 101 ML/MIN/1.73M2
GAMMA GLOB FLD ELPH-MCNC: 0.3 G/DL
GAMMA GLOB MFR SERPL ELPH: 5.5 %
GLUCOSE SERPL-MCNC: 100 MG/DL
IGA SER QL IEP: 23 MG/DL
IGG SER QL IEP: 333 MG/DL
IGM SER QL IEP: <10 MG/DL
INTERPRETATION SERPL IEP-IMP: NORMAL
KAPPA LC CSF-MCNC: 1.13 MG/DL
KAPPA LC SERPL-MCNC: 0.38 MG/DL
M PROTEIN MFR SERPL ELPH: 1.6 %
M PROTEIN SPEC IFE-MCNC: NORMAL
MONOCLON BAND OBS SERPL: 0.1 G/DL
POTASSIUM SERPL-SCNC: 5 MMOL/L
PROT SERPL-MCNC: 5.6 G/DL
PROT SERPL-MCNC: 5.7 G/DL
PROT SERPL-MCNC: 5.7 G/DL
SODIUM SERPL-SCNC: 142 MMOL/L

## 2024-10-29 ENCOUNTER — NON-APPOINTMENT (OUTPATIENT)
Age: 59
End: 2024-10-29

## 2024-10-29 ENCOUNTER — APPOINTMENT (OUTPATIENT)
Dept: ELECTROPHYSIOLOGY | Facility: CLINIC | Age: 59
End: 2024-10-29
Payer: MEDICARE

## 2024-10-29 PROCEDURE — 93296 REM INTERROG EVL PM/IDS: CPT

## 2024-10-29 PROCEDURE — 93294 REM INTERROG EVL PM/LDLS PM: CPT

## 2024-10-31 ENCOUNTER — OUTPATIENT (OUTPATIENT)
Dept: OUTPATIENT SERVICES | Facility: HOSPITAL | Age: 59
LOS: 1 days | Discharge: HOME | End: 2024-10-31

## 2024-10-31 DIAGNOSIS — D47.2 MONOCLONAL GAMMOPATHY: ICD-10-CM

## 2024-11-05 ENCOUNTER — RESULT REVIEW (OUTPATIENT)
Age: 59
End: 2024-11-05

## 2024-11-05 ENCOUNTER — APPOINTMENT (OUTPATIENT)
Dept: INFUSION THERAPY | Facility: HOSPITAL | Age: 59
End: 2024-11-05

## 2024-11-05 ENCOUNTER — APPOINTMENT (OUTPATIENT)
Dept: HEMATOLOGY ONCOLOGY | Facility: CLINIC | Age: 59
End: 2024-11-05

## 2024-11-05 LAB
BASOPHILS # BLD AUTO: 0.03 K/UL — SIGNIFICANT CHANGE UP (ref 0–0.2)
BASOPHILS NFR BLD AUTO: 0.5 % — SIGNIFICANT CHANGE UP (ref 0–2)
EOSINOPHIL # BLD AUTO: 0.09 K/UL — SIGNIFICANT CHANGE UP (ref 0–0.5)
EOSINOPHIL NFR BLD AUTO: 1.6 % — SIGNIFICANT CHANGE UP (ref 0–6)
HCT VFR BLD CALC: 36.5 % — SIGNIFICANT CHANGE UP (ref 34.5–45)
HGB BLD-MCNC: 11.8 G/DL — SIGNIFICANT CHANGE UP (ref 11.5–15.5)
IMM GRANULOCYTES NFR BLD AUTO: 0.4 % — SIGNIFICANT CHANGE UP (ref 0–0.9)
LYMPHOCYTES # BLD AUTO: 1.13 K/UL — SIGNIFICANT CHANGE UP (ref 1–3.3)
LYMPHOCYTES # BLD AUTO: 19.8 % — SIGNIFICANT CHANGE UP (ref 13–44)
MCHC RBC-ENTMCNC: 32.3 G/DL — SIGNIFICANT CHANGE UP (ref 32–36)
MCHC RBC-ENTMCNC: 32.3 PG — SIGNIFICANT CHANGE UP (ref 27–34)
MCV RBC AUTO: 100 FL — SIGNIFICANT CHANGE UP (ref 80–100)
MONOCYTES # BLD AUTO: 0.4 K/UL — SIGNIFICANT CHANGE UP (ref 0–0.9)
MONOCYTES NFR BLD AUTO: 7 % — SIGNIFICANT CHANGE UP (ref 2–14)
NEUTROPHILS # BLD AUTO: 4.03 K/UL — SIGNIFICANT CHANGE UP (ref 1.8–7.4)
NEUTROPHILS NFR BLD AUTO: 70.7 % — SIGNIFICANT CHANGE UP (ref 43–77)
NRBC # BLD: 0 /100 WBCS — SIGNIFICANT CHANGE UP (ref 0–0)
PLATELET # BLD AUTO: 189 K/UL — SIGNIFICANT CHANGE UP (ref 150–400)
RBC # BLD: 3.65 M/UL — LOW (ref 3.8–5.2)
RBC # FLD: 11.9 % — SIGNIFICANT CHANGE UP (ref 10.3–14.5)
WBC # BLD: 5.7 K/UL — SIGNIFICANT CHANGE UP (ref 3.8–10.5)
WBC # FLD AUTO: 5.7 K/UL — SIGNIFICANT CHANGE UP (ref 3.8–10.5)

## 2024-11-06 ENCOUNTER — NON-APPOINTMENT (OUTPATIENT)
Age: 59
End: 2024-11-06

## 2024-11-06 DIAGNOSIS — R11.2 NAUSEA WITH VOMITING, UNSPECIFIED: ICD-10-CM

## 2024-11-06 DIAGNOSIS — Z51.11 ENCOUNTER FOR ANTINEOPLASTIC CHEMOTHERAPY: ICD-10-CM

## 2024-11-06 LAB
ALBUMIN SERPL ELPH-MCNC: 4 G/DL
ALP BLD-CCNC: 65 U/L
ALT SERPL-CCNC: 18 U/L
ANION GAP SERPL CALC-SCNC: 10 MMOL/L
AST SERPL-CCNC: 17 U/L
BILIRUB SERPL-MCNC: 0.6 MG/DL
BUN SERPL-MCNC: 17 MG/DL
CALCIUM SERPL-MCNC: 9.1 MG/DL
CHLORIDE SERPL-SCNC: 104 MMOL/L
CO2 SERPL-SCNC: 28 MMOL/L
CREAT SERPL-MCNC: 0.87 MG/DL
EGFR: 76 ML/MIN/1.73M2
GLUCOSE SERPL-MCNC: 92 MG/DL
POTASSIUM SERPL-SCNC: 5.2 MMOL/L
PROT SERPL-MCNC: 5.6 G/DL
SODIUM SERPL-SCNC: 143 MMOL/L

## 2024-11-12 ENCOUNTER — APPOINTMENT (OUTPATIENT)
Dept: CARDIOLOGY | Facility: CLINIC | Age: 59
End: 2024-11-12
Payer: MEDICARE

## 2024-11-12 ENCOUNTER — NON-APPOINTMENT (OUTPATIENT)
Age: 59
End: 2024-11-12

## 2024-11-12 VITALS
BODY MASS INDEX: 22.14 KG/M2 | HEART RATE: 71 BPM | SYSTOLIC BLOOD PRESSURE: 110 MMHG | OXYGEN SATURATION: 100 % | WEIGHT: 125 LBS | DIASTOLIC BLOOD PRESSURE: 80 MMHG

## 2024-11-12 DIAGNOSIS — R00.2 PALPITATIONS: ICD-10-CM

## 2024-11-12 DIAGNOSIS — E78.5 HYPERLIPIDEMIA, UNSPECIFIED: ICD-10-CM

## 2024-11-12 DIAGNOSIS — F41.9 ANXIETY DISORDER, UNSPECIFIED: ICD-10-CM

## 2024-11-12 DIAGNOSIS — F32.A ANXIETY DISORDER, UNSPECIFIED: ICD-10-CM

## 2024-11-12 DIAGNOSIS — E85.9 AMYLOIDOSIS, UNSPECIFIED: ICD-10-CM

## 2024-11-12 PROCEDURE — 93000 ELECTROCARDIOGRAM COMPLETE: CPT

## 2024-11-12 PROCEDURE — 99215 OFFICE O/P EST HI 40 MIN: CPT

## 2024-11-12 PROCEDURE — G2211 COMPLEX E/M VISIT ADD ON: CPT

## 2024-11-13 LAB
ALBUMIN MFR SERPL ELPH: 66 %
ALBUMIN SERPL-MCNC: 3.7 G/DL
ALBUMIN/GLOB SERPL: 1.9 RATIO
ALPHA1 GLOB MFR SERPL ELPH: 4.6 %
ALPHA1 GLOB SERPL ELPH-MCNC: 0.3 G/DL
ALPHA2 GLOB MFR SERPL ELPH: 11.9 %
ALPHA2 GLOB SERPL ELPH-MCNC: 0.7 G/DL
B-GLOBULIN MFR SERPL ELPH: 12 %
B-GLOBULIN SERPL ELPH-MCNC: 0.7 G/DL
DEPRECATED KAPPA LC FREE/LAMBDA SER: 0.42 RATIO
GAMMA GLOB FLD ELPH-MCNC: 0.3 G/DL
GAMMA GLOB MFR SERPL ELPH: 5.5 %
IGA SER QL IEP: 28 MG/DL
IGG SER QL IEP: 341 MG/DL
IGM SER QL IEP: 13 MG/DL
INTERPRETATION SERPL IEP-IMP: NORMAL
KAPPA LC CSF-MCNC: 0.99 MG/DL
KAPPA LC SERPL-MCNC: 0.42 MG/DL
M PROTEIN MFR SERPL ELPH: 1.9 %
M PROTEIN SPEC IFE-MCNC: NORMAL
MONOCLON BAND OBS SERPL: 0.1 G/DL
PROT SERPL-MCNC: 5.6 G/DL
PROT SERPL-MCNC: 5.6 G/DL

## 2024-11-13 NOTE — PROGRESS NOTE ADULT - PROBLEM SELECTOR PLAN 7
Abilio Cruz, Spoke with Duane today to check in on medications. Few thoughts: -a new referral to cardiology MTM is needed -- happy to place for you if that is helpful. -does he need to be on aspirin given history of STEMI s/p PCI in 10/2023? I know he is on warfarin and higher bleed risk given LVAD, but wanted to ask about that.  -do you think his dig level should be rechecked 1 week after starting? 
S/p thyroidectomy for hyperthyroidism  - Outpt TSH elevated but FT4 wnl   - Will c/w Levothyroxine 137 mcg qD  - Repeat TFTs 6-8 wks (May 2022)

## 2024-11-15 ENCOUNTER — RX RENEWAL (OUTPATIENT)
Age: 59
End: 2024-11-15

## 2024-11-19 ENCOUNTER — NON-APPOINTMENT (OUTPATIENT)
Age: 59
End: 2024-11-19

## 2024-11-19 ENCOUNTER — APPOINTMENT (OUTPATIENT)
Dept: HEMATOLOGY ONCOLOGY | Facility: CLINIC | Age: 59
End: 2024-11-19

## 2024-11-19 ENCOUNTER — RESULT REVIEW (OUTPATIENT)
Age: 59
End: 2024-11-19

## 2024-11-19 ENCOUNTER — APPOINTMENT (OUTPATIENT)
Dept: INFUSION THERAPY | Facility: HOSPITAL | Age: 59
End: 2024-11-19

## 2024-11-19 LAB
BASOPHILS # BLD AUTO: 0 K/UL — SIGNIFICANT CHANGE UP (ref 0–0.2)
BASOPHILS NFR BLD AUTO: 0 % — SIGNIFICANT CHANGE UP (ref 0–2)
EOSINOPHIL # BLD AUTO: 0.12 K/UL — SIGNIFICANT CHANGE UP (ref 0–0.5)
EOSINOPHIL NFR BLD AUTO: 2 % — SIGNIFICANT CHANGE UP (ref 0–6)
HCT VFR BLD CALC: 33 % — LOW (ref 34.5–45)
HGB BLD-MCNC: 11 G/DL — LOW (ref 11.5–15.5)
LYMPHOCYTES # BLD AUTO: 1.42 K/UL — SIGNIFICANT CHANGE UP (ref 1–3.3)
LYMPHOCYTES # BLD AUTO: 23 % — SIGNIFICANT CHANGE UP (ref 13–44)
MCHC RBC-ENTMCNC: 32 PG — SIGNIFICANT CHANGE UP (ref 27–34)
MCHC RBC-ENTMCNC: 33.3 G/DL — SIGNIFICANT CHANGE UP (ref 32–36)
MCV RBC AUTO: 95.9 FL — SIGNIFICANT CHANGE UP (ref 80–100)
MONOCYTES # BLD AUTO: 0.49 K/UL — SIGNIFICANT CHANGE UP (ref 0–0.9)
MONOCYTES NFR BLD AUTO: 8 % — SIGNIFICANT CHANGE UP (ref 2–14)
NEUTROPHILS # BLD AUTO: 4.13 K/UL — SIGNIFICANT CHANGE UP (ref 1.8–7.4)
NEUTROPHILS NFR BLD AUTO: 67 % — SIGNIFICANT CHANGE UP (ref 43–77)
NRBC # BLD: 0 /100 WBCS — SIGNIFICANT CHANGE UP (ref 0–0)
NRBC # BLD: SIGNIFICANT CHANGE UP /100 WBCS (ref 0–0)
PLAT MORPH BLD: NORMAL — SIGNIFICANT CHANGE UP
PLATELET # BLD AUTO: 177 K/UL — SIGNIFICANT CHANGE UP (ref 150–400)
RBC # BLD: 3.44 M/UL — LOW (ref 3.8–5.2)
RBC # FLD: 11.9 % — SIGNIFICANT CHANGE UP (ref 10.3–14.5)
RBC BLD AUTO: SIGNIFICANT CHANGE UP
WBC # BLD: 6.16 K/UL — SIGNIFICANT CHANGE UP (ref 3.8–10.5)
WBC # FLD AUTO: 6.16 K/UL — SIGNIFICANT CHANGE UP (ref 3.8–10.5)

## 2024-11-29 ENCOUNTER — APPOINTMENT (OUTPATIENT)
Dept: CT IMAGING | Facility: CLINIC | Age: 59
End: 2024-11-29
Payer: MEDICARE

## 2024-11-29 PROCEDURE — 75571 CT HRT W/O DYE W/CA TEST: CPT

## 2024-12-02 ENCOUNTER — TRANSCRIPTION ENCOUNTER (OUTPATIENT)
Age: 59
End: 2024-12-02

## 2024-12-03 ENCOUNTER — RESULT REVIEW (OUTPATIENT)
Age: 59
End: 2024-12-03

## 2024-12-03 ENCOUNTER — APPOINTMENT (OUTPATIENT)
Dept: INFUSION THERAPY | Facility: HOSPITAL | Age: 59
End: 2024-12-03

## 2024-12-03 ENCOUNTER — APPOINTMENT (OUTPATIENT)
Dept: HEMATOLOGY ONCOLOGY | Facility: CLINIC | Age: 59
End: 2024-12-03

## 2024-12-03 ENCOUNTER — TRANSCRIPTION ENCOUNTER (OUTPATIENT)
Age: 59
End: 2024-12-03

## 2024-12-03 LAB
BASOPHILS # BLD AUTO: 0.03 K/UL — SIGNIFICANT CHANGE UP (ref 0–0.2)
BASOPHILS NFR BLD AUTO: 0.6 % — SIGNIFICANT CHANGE UP (ref 0–2)
EOSINOPHIL # BLD AUTO: 0.09 K/UL — SIGNIFICANT CHANGE UP (ref 0–0.5)
EOSINOPHIL NFR BLD AUTO: 1.8 % — SIGNIFICANT CHANGE UP (ref 0–6)
HCT VFR BLD CALC: 32.8 % — LOW (ref 34.5–45)
HGB BLD-MCNC: 10.8 G/DL — LOW (ref 11.5–15.5)
IMM GRANULOCYTES NFR BLD AUTO: 0.4 % — SIGNIFICANT CHANGE UP (ref 0–0.9)
LYMPHOCYTES # BLD AUTO: 1.14 K/UL — SIGNIFICANT CHANGE UP (ref 1–3.3)
LYMPHOCYTES # BLD AUTO: 22.8 % — SIGNIFICANT CHANGE UP (ref 13–44)
MCHC RBC-ENTMCNC: 32.4 PG — SIGNIFICANT CHANGE UP (ref 27–34)
MCHC RBC-ENTMCNC: 32.9 G/DL — SIGNIFICANT CHANGE UP (ref 32–36)
MCV RBC AUTO: 98.5 FL — SIGNIFICANT CHANGE UP (ref 80–100)
MONOCYTES # BLD AUTO: 0.43 K/UL — SIGNIFICANT CHANGE UP (ref 0–0.9)
MONOCYTES NFR BLD AUTO: 8.6 % — SIGNIFICANT CHANGE UP (ref 2–14)
NEUTROPHILS # BLD AUTO: 3.28 K/UL — SIGNIFICANT CHANGE UP (ref 1.8–7.4)
NEUTROPHILS NFR BLD AUTO: 65.8 % — SIGNIFICANT CHANGE UP (ref 43–77)
NRBC # BLD: 0 /100 WBCS — SIGNIFICANT CHANGE UP (ref 0–0)
PLATELET # BLD AUTO: 165 K/UL — SIGNIFICANT CHANGE UP (ref 150–400)
RBC # BLD: 3.33 M/UL — LOW (ref 3.8–5.2)
RBC # FLD: 12.1 % — SIGNIFICANT CHANGE UP (ref 10.3–14.5)
WBC # BLD: 4.99 K/UL — SIGNIFICANT CHANGE UP (ref 3.8–10.5)
WBC # FLD AUTO: 4.99 K/UL — SIGNIFICANT CHANGE UP (ref 3.8–10.5)

## 2024-12-04 LAB
ALBUMIN MFR SERPL ELPH: 67.3 %
ALBUMIN SERPL ELPH-MCNC: 3.8 G/DL
ALBUMIN SERPL-MCNC: 3.8 G/DL
ALBUMIN/GLOB SERPL: 2.1 RATIO
ALP BLD-CCNC: 62 U/L
ALPHA1 GLOB MFR SERPL ELPH: 4.6 %
ALPHA1 GLOB SERPL ELPH-MCNC: 0.3 G/DL
ALPHA2 GLOB MFR SERPL ELPH: 11.2 %
ALPHA2 GLOB SERPL ELPH-MCNC: 0.6 G/DL
ALT SERPL-CCNC: 13 U/L
ANION GAP SERPL CALC-SCNC: 11 MMOL/L
AST SERPL-CCNC: 15 U/L
B-GLOBULIN MFR SERPL ELPH: 11.7 %
B-GLOBULIN SERPL ELPH-MCNC: 0.7 G/DL
BILIRUB SERPL-MCNC: 0.5 MG/DL
BUN SERPL-MCNC: 21 MG/DL
CALCIUM SERPL-MCNC: 9.3 MG/DL
CHLORIDE SERPL-SCNC: 105 MMOL/L
CO2 SERPL-SCNC: 27 MMOL/L
CREAT SERPL-MCNC: 0.89 MG/DL
DEPRECATED KAPPA LC FREE/LAMBDA SER: 0.36 RATIO
EGFR: 74 ML/MIN/1.73M2
GAMMA GLOB FLD ELPH-MCNC: 0.3 G/DL
GAMMA GLOB MFR SERPL ELPH: 5.2 %
GLUCOSE SERPL-MCNC: 98 MG/DL
IGA SER QL IEP: 31 MG/DL
IGG SER QL IEP: 340 MG/DL
IGM SER QL IEP: 21 MG/DL
INTERPRETATION SERPL IEP-IMP: NORMAL
KAPPA LC CSF-MCNC: 1.1 MG/DL
KAPPA LC SERPL-MCNC: 0.4 MG/DL
M PROTEIN MFR SERPL ELPH: 1.3 %
M PROTEIN SPEC IFE-MCNC: NORMAL
MONOCLON BAND OBS SERPL: 0.1 G/DL
POTASSIUM SERPL-SCNC: 5.3 MMOL/L
PROT SERPL-MCNC: 5.4 G/DL
PROT SERPL-MCNC: 5.6 G/DL
PROT SERPL-MCNC: 5.6 G/DL
SODIUM SERPL-SCNC: 143 MMOL/L

## 2024-12-16 ENCOUNTER — RX RENEWAL (OUTPATIENT)
Age: 59
End: 2024-12-16

## 2024-12-17 ENCOUNTER — RESULT REVIEW (OUTPATIENT)
Age: 59
End: 2024-12-17

## 2024-12-17 ENCOUNTER — APPOINTMENT (OUTPATIENT)
Dept: HEMATOLOGY ONCOLOGY | Facility: CLINIC | Age: 59
End: 2024-12-17
Payer: MEDICARE

## 2024-12-17 ENCOUNTER — APPOINTMENT (OUTPATIENT)
Dept: INFUSION THERAPY | Facility: HOSPITAL | Age: 59
End: 2024-12-17

## 2024-12-17 VITALS
TEMPERATURE: 97.7 F | DIASTOLIC BLOOD PRESSURE: 57 MMHG | HEART RATE: 72 BPM | BODY MASS INDEX: 22.49 KG/M2 | WEIGHT: 126.99 LBS | SYSTOLIC BLOOD PRESSURE: 95 MMHG | OXYGEN SATURATION: 98 % | RESPIRATION RATE: 16 BRPM

## 2024-12-17 DIAGNOSIS — E85.9 AMYLOIDOSIS, UNSPECIFIED: ICD-10-CM

## 2024-12-17 LAB
BASOPHILS # BLD AUTO: 0.03 K/UL — SIGNIFICANT CHANGE UP (ref 0–0.2)
BASOPHILS NFR BLD AUTO: 0.6 % — SIGNIFICANT CHANGE UP (ref 0–2)
EOSINOPHIL # BLD AUTO: 0.07 K/UL — SIGNIFICANT CHANGE UP (ref 0–0.5)
EOSINOPHIL NFR BLD AUTO: 1.4 % — SIGNIFICANT CHANGE UP (ref 0–6)
HCT VFR BLD CALC: 31.5 % — LOW (ref 34.5–45)
HGB BLD-MCNC: 10.6 G/DL — LOW (ref 11.5–15.5)
IMM GRANULOCYTES NFR BLD AUTO: 0.6 % — SIGNIFICANT CHANGE UP (ref 0–0.9)
LYMPHOCYTES # BLD AUTO: 1.36 K/UL — SIGNIFICANT CHANGE UP (ref 1–3.3)
LYMPHOCYTES # BLD AUTO: 27.4 % — SIGNIFICANT CHANGE UP (ref 13–44)
MCHC RBC-ENTMCNC: 32.4 PG — SIGNIFICANT CHANGE UP (ref 27–34)
MCHC RBC-ENTMCNC: 33.7 G/DL — SIGNIFICANT CHANGE UP (ref 32–36)
MCV RBC AUTO: 96.3 FL — SIGNIFICANT CHANGE UP (ref 80–100)
MONOCYTES # BLD AUTO: 0.32 K/UL — SIGNIFICANT CHANGE UP (ref 0–0.9)
MONOCYTES NFR BLD AUTO: 6.5 % — SIGNIFICANT CHANGE UP (ref 2–14)
NEUTROPHILS # BLD AUTO: 3.15 K/UL — SIGNIFICANT CHANGE UP (ref 1.8–7.4)
NEUTROPHILS NFR BLD AUTO: 63.5 % — SIGNIFICANT CHANGE UP (ref 43–77)
NRBC # BLD: 0 /100 WBCS — SIGNIFICANT CHANGE UP (ref 0–0)
PLATELET # BLD AUTO: 157 K/UL — SIGNIFICANT CHANGE UP (ref 150–400)
RBC # BLD: 3.27 M/UL — LOW (ref 3.8–5.2)
RBC # FLD: 12.2 % — SIGNIFICANT CHANGE UP (ref 10.3–14.5)
WBC # BLD: 4.96 K/UL — SIGNIFICANT CHANGE UP (ref 3.8–10.5)
WBC # FLD AUTO: 4.96 K/UL — SIGNIFICANT CHANGE UP (ref 3.8–10.5)

## 2024-12-17 PROCEDURE — 99214 OFFICE O/P EST MOD 30 MIN: CPT

## 2024-12-17 PROCEDURE — G2211 COMPLEX E/M VISIT ADD ON: CPT

## 2024-12-31 ENCOUNTER — APPOINTMENT (OUTPATIENT)
Dept: INFUSION THERAPY | Facility: HOSPITAL | Age: 59
End: 2024-12-31

## 2024-12-31 ENCOUNTER — APPOINTMENT (OUTPATIENT)
Dept: HEMATOLOGY ONCOLOGY | Facility: CLINIC | Age: 59
End: 2024-12-31

## 2025-01-13 ENCOUNTER — OUTPATIENT (OUTPATIENT)
Dept: OUTPATIENT SERVICES | Facility: HOSPITAL | Age: 60
LOS: 1 days | Discharge: HOME | End: 2025-01-13

## 2025-01-13 DIAGNOSIS — D47.2 MONOCLONAL GAMMOPATHY: ICD-10-CM

## 2025-01-14 ENCOUNTER — RESULT REVIEW (OUTPATIENT)
Age: 60
End: 2025-01-14

## 2025-01-14 ENCOUNTER — APPOINTMENT (OUTPATIENT)
Dept: HEMATOLOGY ONCOLOGY | Facility: CLINIC | Age: 60
End: 2025-01-14

## 2025-01-14 ENCOUNTER — APPOINTMENT (OUTPATIENT)
Dept: INFUSION THERAPY | Facility: HOSPITAL | Age: 60
End: 2025-01-14

## 2025-01-14 LAB
BASOPHILS # BLD AUTO: 0.04 K/UL — SIGNIFICANT CHANGE UP (ref 0–0.2)
BASOPHILS NFR BLD AUTO: 0.7 % — SIGNIFICANT CHANGE UP (ref 0–2)
EOSINOPHIL # BLD AUTO: 0.12 K/UL — SIGNIFICANT CHANGE UP (ref 0–0.5)
EOSINOPHIL NFR BLD AUTO: 2.2 % — SIGNIFICANT CHANGE UP (ref 0–6)
HCT VFR BLD CALC: 33.9 % — LOW (ref 34.5–45)
HGB BLD-MCNC: 11.5 G/DL — SIGNIFICANT CHANGE UP (ref 11.5–15.5)
IMM GRANULOCYTES NFR BLD AUTO: 0.5 % — SIGNIFICANT CHANGE UP (ref 0–0.9)
LYMPHOCYTES # BLD AUTO: 1.56 K/UL — SIGNIFICANT CHANGE UP (ref 1–3.3)
LYMPHOCYTES # BLD AUTO: 28.4 % — SIGNIFICANT CHANGE UP (ref 13–44)
MCHC RBC-ENTMCNC: 32.7 PG — SIGNIFICANT CHANGE UP (ref 27–34)
MCHC RBC-ENTMCNC: 33.9 G/DL — SIGNIFICANT CHANGE UP (ref 32–36)
MCV RBC AUTO: 96.3 FL — SIGNIFICANT CHANGE UP (ref 80–100)
MONOCYTES # BLD AUTO: 0.41 K/UL — SIGNIFICANT CHANGE UP (ref 0–0.9)
MONOCYTES NFR BLD AUTO: 7.5 % — SIGNIFICANT CHANGE UP (ref 2–14)
NEUTROPHILS # BLD AUTO: 3.34 K/UL — SIGNIFICANT CHANGE UP (ref 1.8–7.4)
NEUTROPHILS NFR BLD AUTO: 60.7 % — SIGNIFICANT CHANGE UP (ref 43–77)
NRBC # BLD: 0 /100 WBCS — SIGNIFICANT CHANGE UP (ref 0–0)
NRBC BLD-RTO: 0 /100 WBCS — SIGNIFICANT CHANGE UP (ref 0–0)
PLATELET # BLD AUTO: 214 K/UL — SIGNIFICANT CHANGE UP (ref 150–400)
RBC # BLD: 3.52 M/UL — LOW (ref 3.8–5.2)
RBC # FLD: 12.4 % — SIGNIFICANT CHANGE UP (ref 10.3–14.5)
WBC # BLD: 5.5 K/UL — SIGNIFICANT CHANGE UP (ref 3.8–10.5)
WBC # FLD AUTO: 5.5 K/UL — SIGNIFICANT CHANGE UP (ref 3.8–10.5)

## 2025-01-15 DIAGNOSIS — R11.2 NAUSEA WITH VOMITING, UNSPECIFIED: ICD-10-CM

## 2025-01-15 DIAGNOSIS — Z51.11 ENCOUNTER FOR ANTINEOPLASTIC CHEMOTHERAPY: ICD-10-CM

## 2025-01-15 DIAGNOSIS — E85.9 AMYLOIDOSIS, UNSPECIFIED: ICD-10-CM

## 2025-01-28 ENCOUNTER — RESULT REVIEW (OUTPATIENT)
Age: 60
End: 2025-01-28

## 2025-01-28 ENCOUNTER — APPOINTMENT (OUTPATIENT)
Dept: INFUSION THERAPY | Facility: HOSPITAL | Age: 60
End: 2025-01-28

## 2025-01-28 ENCOUNTER — APPOINTMENT (OUTPATIENT)
Dept: HEMATOLOGY ONCOLOGY | Facility: CLINIC | Age: 60
End: 2025-01-28

## 2025-01-28 LAB
BASOPHILS # BLD AUTO: 0.04 K/UL — SIGNIFICANT CHANGE UP (ref 0–0.2)
BASOPHILS NFR BLD AUTO: 0.8 % — SIGNIFICANT CHANGE UP (ref 0–2)
EOSINOPHIL # BLD AUTO: 0.08 K/UL — SIGNIFICANT CHANGE UP (ref 0–0.5)
EOSINOPHIL NFR BLD AUTO: 1.5 % — SIGNIFICANT CHANGE UP (ref 0–6)
HCT VFR BLD CALC: 32 % — LOW (ref 34.5–45)
HGB BLD-MCNC: 10.5 G/DL — LOW (ref 11.5–15.5)
IMM GRANULOCYTES NFR BLD AUTO: 0.4 % — SIGNIFICANT CHANGE UP (ref 0–0.9)
LYMPHOCYTES # BLD AUTO: 1.3 K/UL — SIGNIFICANT CHANGE UP (ref 1–3.3)
LYMPHOCYTES # BLD AUTO: 24.5 % — SIGNIFICANT CHANGE UP (ref 13–44)
MCHC RBC-ENTMCNC: 32.5 PG — SIGNIFICANT CHANGE UP (ref 27–34)
MCHC RBC-ENTMCNC: 32.8 G/DL — SIGNIFICANT CHANGE UP (ref 32–36)
MCV RBC AUTO: 99.1 FL — SIGNIFICANT CHANGE UP (ref 80–100)
MONOCYTES # BLD AUTO: 0.39 K/UL — SIGNIFICANT CHANGE UP (ref 0–0.9)
MONOCYTES NFR BLD AUTO: 7.3 % — SIGNIFICANT CHANGE UP (ref 2–14)
NEUTROPHILS # BLD AUTO: 3.48 K/UL — SIGNIFICANT CHANGE UP (ref 1.8–7.4)
NEUTROPHILS NFR BLD AUTO: 65.5 % — SIGNIFICANT CHANGE UP (ref 43–77)
NRBC # BLD: 0 /100 WBCS — SIGNIFICANT CHANGE UP (ref 0–0)
NRBC BLD-RTO: 0 /100 WBCS — SIGNIFICANT CHANGE UP (ref 0–0)
PLATELET # BLD AUTO: 161 K/UL — SIGNIFICANT CHANGE UP (ref 150–400)
RBC # BLD: 3.23 M/UL — LOW (ref 3.8–5.2)
RBC # FLD: 12.9 % — SIGNIFICANT CHANGE UP (ref 10.3–14.5)
WBC # BLD: 5.31 K/UL — SIGNIFICANT CHANGE UP (ref 3.8–10.5)
WBC # FLD AUTO: 5.31 K/UL — SIGNIFICANT CHANGE UP (ref 3.8–10.5)

## 2025-01-29 LAB
ALBUMIN SERPL ELPH-MCNC: 3.8 G/DL
ALP BLD-CCNC: 62 U/L
ALT SERPL-CCNC: 10 U/L
ANION GAP SERPL CALC-SCNC: 10 MMOL/L
AST SERPL-CCNC: 12 U/L
BILIRUB SERPL-MCNC: 0.6 MG/DL
BUN SERPL-MCNC: 16 MG/DL
CALCIUM SERPL-MCNC: 8.9 MG/DL
CHLORIDE SERPL-SCNC: 107 MMOL/L
CO2 SERPL-SCNC: 26 MMOL/L
CREAT SERPL-MCNC: 0.69 MG/DL
EGFR: 99 ML/MIN/1.73M2
GLUCOSE SERPL-MCNC: 95 MG/DL
POTASSIUM SERPL-SCNC: 4.7 MMOL/L
PROT SERPL-MCNC: 5.2 G/DL
SODIUM SERPL-SCNC: 143 MMOL/L

## 2025-01-30 ENCOUNTER — APPOINTMENT (OUTPATIENT)
Dept: ELECTROPHYSIOLOGY | Facility: CLINIC | Age: 60
End: 2025-01-30
Payer: MEDICARE

## 2025-01-30 ENCOUNTER — NON-APPOINTMENT (OUTPATIENT)
Age: 60
End: 2025-01-30

## 2025-01-30 PROCEDURE — 93294 REM INTERROG EVL PM/LDLS PM: CPT

## 2025-01-30 PROCEDURE — 93296 REM INTERROG EVL PM/IDS: CPT

## 2025-01-31 LAB
ALBUMIN MFR SERPL ELPH: 66.8 %
ALBUMIN SERPL-MCNC: 3.7 G/DL
ALBUMIN/GLOB SERPL: 2.1 RATIO
ALPHA1 GLOB MFR SERPL ELPH: 4.9 %
ALPHA1 GLOB SERPL ELPH-MCNC: 0.3 G/DL
ALPHA2 GLOB MFR SERPL ELPH: 11.5 %
ALPHA2 GLOB SERPL ELPH-MCNC: 0.6 G/DL
B-GLOBULIN MFR SERPL ELPH: 11.5 %
B-GLOBULIN SERPL ELPH-MCNC: 0.6 G/DL
DEPRECATED KAPPA LC FREE/LAMBDA SER: 0.41 RATIO
GAMMA GLOB FLD ELPH-MCNC: 0.3 G/DL
GAMMA GLOB MFR SERPL ELPH: 5.3 %
IGA SER QL IEP: 26 MG/DL
IGG SER QL IEP: 310 MG/DL
IGM SER QL IEP: 13 MG/DL
INTERPRETATION SERPL IEP-IMP: NORMAL
KAPPA LC CSF-MCNC: 1.12 MG/DL
KAPPA LC SERPL-MCNC: 0.46 MG/DL
M PROTEIN MFR SERPL ELPH: NORMAL
M PROTEIN SPEC IFE-MCNC: NORMAL
MONOCLON BAND OBS SERPL: NORMAL
PROT SERPL-MCNC: 5.5 G/DL
PROT SERPL-MCNC: 5.5 G/DL

## 2025-02-03 ENCOUNTER — TRANSCRIPTION ENCOUNTER (OUTPATIENT)
Age: 60
End: 2025-02-03

## 2025-02-03 DIAGNOSIS — E85.9 AMYLOIDOSIS, UNSPECIFIED: ICD-10-CM

## 2025-02-05 ENCOUNTER — TRANSCRIPTION ENCOUNTER (OUTPATIENT)
Age: 60
End: 2025-02-05

## 2025-02-10 ENCOUNTER — RX RENEWAL (OUTPATIENT)
Age: 60
End: 2025-02-10

## 2025-02-11 ENCOUNTER — APPOINTMENT (OUTPATIENT)
Dept: HEMATOLOGY ONCOLOGY | Facility: CLINIC | Age: 60
End: 2025-02-11

## 2025-02-11 ENCOUNTER — RX RENEWAL (OUTPATIENT)
Age: 60
End: 2025-02-11

## 2025-02-11 ENCOUNTER — RESULT REVIEW (OUTPATIENT)
Age: 60
End: 2025-02-11

## 2025-02-11 ENCOUNTER — APPOINTMENT (OUTPATIENT)
Dept: INFUSION THERAPY | Facility: HOSPITAL | Age: 60
End: 2025-02-11

## 2025-02-11 LAB
BASOPHILS # BLD AUTO: 0.06 K/UL — SIGNIFICANT CHANGE UP (ref 0–0.2)
BASOPHILS NFR BLD AUTO: 0.9 % — SIGNIFICANT CHANGE UP (ref 0–2)
EOSINOPHIL # BLD AUTO: 0.09 K/UL — SIGNIFICANT CHANGE UP (ref 0–0.5)
EOSINOPHIL NFR BLD AUTO: 1.3 % — SIGNIFICANT CHANGE UP (ref 0–6)
HCT VFR BLD CALC: 33.5 % — LOW (ref 34.5–45)
HGB BLD-MCNC: 11.3 G/DL — LOW (ref 11.5–15.5)
IMM GRANULOCYTES NFR BLD AUTO: 1.7 % — HIGH (ref 0–0.9)
LYMPHOCYTES # BLD AUTO: 1.62 K/UL — SIGNIFICANT CHANGE UP (ref 1–3.3)
LYMPHOCYTES # BLD AUTO: 23.5 % — SIGNIFICANT CHANGE UP (ref 13–44)
MCHC RBC-ENTMCNC: 32.8 PG — SIGNIFICANT CHANGE UP (ref 27–34)
MCHC RBC-ENTMCNC: 33.7 G/DL — SIGNIFICANT CHANGE UP (ref 32–36)
MCV RBC AUTO: 97.1 FL — SIGNIFICANT CHANGE UP (ref 80–100)
MONOCYTES # BLD AUTO: 0.41 K/UL — SIGNIFICANT CHANGE UP (ref 0–0.9)
MONOCYTES NFR BLD AUTO: 5.9 % — SIGNIFICANT CHANGE UP (ref 2–14)
NEUTROPHILS # BLD AUTO: 4.6 K/UL — SIGNIFICANT CHANGE UP (ref 1.8–7.4)
NEUTROPHILS NFR BLD AUTO: 66.7 % — SIGNIFICANT CHANGE UP (ref 43–77)
NRBC BLD AUTO-RTO: 0 /100 WBCS — SIGNIFICANT CHANGE UP (ref 0–0)
PLAT MORPH BLD: NORMAL — SIGNIFICANT CHANGE UP
PLATELET # BLD AUTO: 174 K/UL — SIGNIFICANT CHANGE UP (ref 150–400)
RBC # BLD: 3.45 M/UL — LOW (ref 3.8–5.2)
RBC # FLD: 12.9 % — SIGNIFICANT CHANGE UP (ref 10.3–14.5)
RBC BLD AUTO: SIGNIFICANT CHANGE UP
WBC # BLD: 6.9 K/UL — SIGNIFICANT CHANGE UP (ref 3.8–10.5)
WBC # FLD AUTO: 6.9 K/UL — SIGNIFICANT CHANGE UP (ref 3.8–10.5)

## 2025-02-12 ENCOUNTER — APPOINTMENT (OUTPATIENT)
Dept: UROLOGY | Facility: CLINIC | Age: 60
End: 2025-02-12
Payer: MEDICARE

## 2025-02-12 DIAGNOSIS — N39.0 URINARY TRACT INFECTION, SITE NOT SPECIFIED: ICD-10-CM

## 2025-02-12 PROCEDURE — 99202 OFFICE O/P NEW SF 15 MIN: CPT | Mod: 93

## 2025-02-12 RX ORDER — SULFAMETHOXAZOLE AND TRIMETHOPRIM 800; 160 MG/1; MG/1
800-160 TABLET ORAL TWICE DAILY
Qty: 10 | Refills: 1 | Status: ACTIVE | COMMUNITY
Start: 2025-02-12 | End: 1900-01-01

## 2025-02-14 LAB
ALBUMIN SERPL ELPH-MCNC: 4.1 G/DL
ALP BLD-CCNC: 67 U/L
ALT SERPL-CCNC: 15 U/L
ANION GAP SERPL CALC-SCNC: 11 MMOL/L
AST SERPL-CCNC: 16 U/L
BASOPHILS # BLD AUTO: 0.04 K/UL
BASOPHILS NFR BLD AUTO: 0.7 %
BILIRUB SERPL-MCNC: 0.6 MG/DL
BUN SERPL-MCNC: 24 MG/DL
CALCIUM SERPL-MCNC: 9.3 MG/DL
CHLORIDE SERPL-SCNC: 101 MMOL/L
CO2 SERPL-SCNC: 27 MMOL/L
CREAT SERPL-MCNC: 0.93 MG/DL
EGFR: 70 ML/MIN/1.73M2
EOSINOPHIL # BLD AUTO: 0.07 K/UL
EOSINOPHIL NFR BLD AUTO: 1.2 %
GLUCOSE SERPL-MCNC: 100 MG/DL
HCT VFR BLD CALC: 35.9 %
HGB BLD-MCNC: 11.5 G/DL
IMM GRANULOCYTES NFR BLD AUTO: 0.2 %
LYMPHOCYTES # BLD AUTO: 1.47 K/UL
LYMPHOCYTES NFR BLD AUTO: 24.4 %
MAN DIFF?: NORMAL
MCHC RBC-ENTMCNC: 31.8 PG
MCHC RBC-ENTMCNC: 32 G/DL
MCV RBC AUTO: 99.2 FL
MONOCYTES # BLD AUTO: 0.41 K/UL
MONOCYTES NFR BLD AUTO: 6.8 %
NEUTROPHILS # BLD AUTO: 4.03 K/UL
NEUTROPHILS NFR BLD AUTO: 66.7 %
NT-PROBNP SERPL-MCNC: 1125 PG/ML
PLATELET # BLD AUTO: 222 K/UL
POTASSIUM SERPL-SCNC: 4.7 MMOL/L
PROT SERPL-MCNC: 5.9 G/DL
RBC # BLD: 3.62 M/UL
RBC # FLD: 13.1 %
SODIUM SERPL-SCNC: 139 MMOL/L
TROPONIN-T, HIGH SENSITIVITY: 32 NG/L
WBC # FLD AUTO: 6.03 K/UL

## 2025-02-16 LAB
APPEARANCE: CLEAR
BACTERIA: ABNORMAL /HPF
BILIRUBIN URINE: NEGATIVE
BLOOD URINE: ABNORMAL
CALCIUM OXALATE CRYSTALS: PRESENT
CAST: 8 /LPF
COLOR: YELLOW
DEPRECATED KAPPA LC FREE/LAMBDA SER: 0.35 RATIO
EPITHELIAL CELLS: 1 /HPF
GLUCOSE QUALITATIVE U: NEGATIVE MG/DL
HYALINE CASTS: PRESENT
KAPPA LC CSF-MCNC: 1.19 MG/DL
KAPPA LC SERPL-MCNC: 0.42 MG/DL
KETONES URINE: NEGATIVE MG/DL
LEUKOCYTE ESTERASE URINE: ABNORMAL
MICROSCOPIC-UA: NORMAL
NITRITE URINE: POSITIVE
PH URINE: 6
PROTEIN URINE: NEGATIVE MG/DL
RED BLOOD CELLS URINE: 0 /HPF
REVIEW: NORMAL
SPECIFIC GRAVITY URINE: 1.01
UROBILINOGEN URINE: 0.2 MG/DL
WHITE BLOOD CELLS URINE: 8 /HPF

## 2025-02-17 LAB — BACTERIA UR CULT: ABNORMAL

## 2025-02-18 ENCOUNTER — APPOINTMENT (OUTPATIENT)
Dept: CARDIOLOGY | Facility: CLINIC | Age: 60
End: 2025-02-18
Payer: MEDICARE

## 2025-02-18 ENCOUNTER — NON-APPOINTMENT (OUTPATIENT)
Age: 60
End: 2025-02-18

## 2025-02-18 VITALS
WEIGHT: 128 LBS | DIASTOLIC BLOOD PRESSURE: 66 MMHG | BODY MASS INDEX: 22.68 KG/M2 | HEART RATE: 87 BPM | HEIGHT: 63 IN | SYSTOLIC BLOOD PRESSURE: 120 MMHG

## 2025-02-18 DIAGNOSIS — E85.9 AMYLOIDOSIS, UNSPECIFIED: ICD-10-CM

## 2025-02-18 DIAGNOSIS — E85.81 LIGHT CHAIN (AL) AMYLOIDOSIS: ICD-10-CM

## 2025-02-18 PROCEDURE — 93000 ELECTROCARDIOGRAM COMPLETE: CPT

## 2025-02-18 PROCEDURE — 99215 OFFICE O/P EST HI 40 MIN: CPT

## 2025-02-18 PROCEDURE — G2211 COMPLEX E/M VISIT ADD ON: CPT

## 2025-02-19 ENCOUNTER — TRANSCRIPTION ENCOUNTER (OUTPATIENT)
Age: 60
End: 2025-02-19

## 2025-02-19 RX ORDER — NITROFURANTOIN (MONOHYDRATE/MACROCRYSTALS) 25; 75 MG/1; MG/1
100 CAPSULE ORAL
Qty: 14 | Refills: 0 | Status: ACTIVE | COMMUNITY
Start: 2025-02-19 | End: 1900-01-01

## 2025-02-25 ENCOUNTER — RESULT REVIEW (OUTPATIENT)
Age: 60
End: 2025-02-25

## 2025-02-25 ENCOUNTER — APPOINTMENT (OUTPATIENT)
Dept: HEMATOLOGY ONCOLOGY | Facility: CLINIC | Age: 60
End: 2025-02-25

## 2025-02-25 ENCOUNTER — APPOINTMENT (OUTPATIENT)
Dept: INFUSION THERAPY | Facility: HOSPITAL | Age: 60
End: 2025-02-25

## 2025-02-25 LAB
ALBUMIN SERPL ELPH-MCNC: 4 G/DL
ALP BLD-CCNC: 69 U/L
ALT SERPL-CCNC: 19 U/L
ANION GAP SERPL CALC-SCNC: 8 MMOL/L
AST SERPL-CCNC: 19 U/L
BASOPHILS # BLD AUTO: 0.04 K/UL — SIGNIFICANT CHANGE UP (ref 0–0.2)
BASOPHILS NFR BLD AUTO: 0.8 % — SIGNIFICANT CHANGE UP (ref 0–2)
BILIRUB SERPL-MCNC: 0.7 MG/DL
BUN SERPL-MCNC: 14 MG/DL
CALCIUM SERPL-MCNC: 9.2 MG/DL
CHLORIDE SERPL-SCNC: 106 MMOL/L
CO2 SERPL-SCNC: 30 MMOL/L
CREAT SERPL-MCNC: 0.77 MG/DL
EGFR: 88 ML/MIN/1.73M2
EOSINOPHIL # BLD AUTO: 0.09 K/UL — SIGNIFICANT CHANGE UP (ref 0–0.5)
EOSINOPHIL NFR BLD AUTO: 1.7 % — SIGNIFICANT CHANGE UP (ref 0–6)
GLUCOSE SERPL-MCNC: 110 MG/DL
HCT VFR BLD CALC: 33.9 % — LOW (ref 34.5–45)
HGB BLD-MCNC: 11.1 G/DL — LOW (ref 11.5–15.5)
IMM GRANULOCYTES NFR BLD AUTO: 0.4 % — SIGNIFICANT CHANGE UP (ref 0–0.9)
LYMPHOCYTES # BLD AUTO: 0.89 K/UL — LOW (ref 1–3.3)
LYMPHOCYTES # BLD AUTO: 16.8 % — SIGNIFICANT CHANGE UP (ref 13–44)
MCHC RBC-ENTMCNC: 32.6 PG — SIGNIFICANT CHANGE UP (ref 27–34)
MCHC RBC-ENTMCNC: 32.7 G/DL — SIGNIFICANT CHANGE UP (ref 32–36)
MCV RBC AUTO: 99.4 FL — SIGNIFICANT CHANGE UP (ref 80–100)
MONOCYTES # BLD AUTO: 0.36 K/UL — SIGNIFICANT CHANGE UP (ref 0–0.9)
MONOCYTES NFR BLD AUTO: 6.8 % — SIGNIFICANT CHANGE UP (ref 2–14)
NEUTROPHILS # BLD AUTO: 3.89 K/UL — SIGNIFICANT CHANGE UP (ref 1.8–7.4)
NEUTROPHILS NFR BLD AUTO: 73.5 % — SIGNIFICANT CHANGE UP (ref 43–77)
NRBC BLD AUTO-RTO: 0 /100 WBCS — SIGNIFICANT CHANGE UP (ref 0–0)
PLATELET # BLD AUTO: 179 K/UL — SIGNIFICANT CHANGE UP (ref 150–400)
POTASSIUM SERPL-SCNC: 5.2 MMOL/L
PROT SERPL-MCNC: 5.5 G/DL
RBC # BLD: 3.41 M/UL — LOW (ref 3.8–5.2)
RBC # FLD: 12.3 % — SIGNIFICANT CHANGE UP (ref 10.3–14.5)
SODIUM SERPL-SCNC: 144 MMOL/L
WBC # BLD: 5.29 K/UL — SIGNIFICANT CHANGE UP (ref 3.8–10.5)
WBC # FLD AUTO: 5.29 K/UL — SIGNIFICANT CHANGE UP (ref 3.8–10.5)

## 2025-02-28 ENCOUNTER — TRANSCRIPTION ENCOUNTER (OUTPATIENT)
Age: 60
End: 2025-02-28

## 2025-03-03 ENCOUNTER — TRANSCRIPTION ENCOUNTER (OUTPATIENT)
Age: 60
End: 2025-03-03

## 2025-03-04 LAB
ALBUMIN MFR SERPL ELPH: 68.8 %
ALBUMIN SERPL-MCNC: 3.9 G/DL
ALBUMIN/GLOB SERPL: 2.3 RATIO
ALPHA1 GLOB MFR SERPL ELPH: 4.6 %
ALPHA1 GLOB SERPL ELPH-MCNC: 0.3 G/DL
ALPHA2 GLOB MFR SERPL ELPH: 10.5 %
ALPHA2 GLOB SERPL ELPH-MCNC: 0.6 G/DL
B-GLOBULIN MFR SERPL ELPH: 11.1 %
B-GLOBULIN SERPL ELPH-MCNC: 0.6 G/DL
DEPRECATED KAPPA LC FREE/LAMBDA SER: 0.32 RATIO
GAMMA GLOB FLD ELPH-MCNC: 0.3 G/DL
GAMMA GLOB MFR SERPL ELPH: 5 %
IGA SER QL IEP: 25 MG/DL
IGG SER QL IEP: 340 MG/DL
IGM SER QL IEP: 12 MG/DL
INTERPRETATION SERPL IEP-IMP: NORMAL
KAPPA LC CSF-MCNC: 1.34 MG/DL
KAPPA LC SERPL-MCNC: 0.43 MG/DL
M PROTEIN MFR SERPL ELPH: NORMAL
M PROTEIN SPEC IFE-MCNC: NORMAL
MONOCLON BAND OBS SERPL: NORMAL
PROT SERPL-MCNC: 5.6 G/DL
PROT SERPL-MCNC: 5.6 G/DL

## 2025-03-11 ENCOUNTER — RESULT REVIEW (OUTPATIENT)
Age: 60
End: 2025-03-11

## 2025-03-11 ENCOUNTER — APPOINTMENT (OUTPATIENT)
Dept: HEMATOLOGY ONCOLOGY | Facility: CLINIC | Age: 60
End: 2025-03-11

## 2025-03-11 ENCOUNTER — APPOINTMENT (OUTPATIENT)
Dept: INFUSION THERAPY | Facility: HOSPITAL | Age: 60
End: 2025-03-11

## 2025-03-11 DIAGNOSIS — E85.81 LIGHT CHAIN (AL) AMYLOIDOSIS: ICD-10-CM

## 2025-03-11 LAB
BASOPHILS # BLD AUTO: 0.04 K/UL — SIGNIFICANT CHANGE UP (ref 0–0.2)
BASOPHILS NFR BLD AUTO: 0.6 % — SIGNIFICANT CHANGE UP (ref 0–2)
EOSINOPHIL # BLD AUTO: 0.06 K/UL — SIGNIFICANT CHANGE UP (ref 0–0.5)
EOSINOPHIL NFR BLD AUTO: 0.8 % — SIGNIFICANT CHANGE UP (ref 0–6)
HCT VFR BLD CALC: 31.1 % — LOW (ref 34.5–45)
HGB BLD-MCNC: 10.4 G/DL — LOW (ref 11.5–15.5)
IMM GRANULOCYTES NFR BLD AUTO: 0.3 % — SIGNIFICANT CHANGE UP (ref 0–0.9)
LYMPHOCYTES # BLD AUTO: 1.1 K/UL — SIGNIFICANT CHANGE UP (ref 1–3.3)
LYMPHOCYTES # BLD AUTO: 15.4 % — SIGNIFICANT CHANGE UP (ref 13–44)
MCHC RBC-ENTMCNC: 32.9 PG — SIGNIFICANT CHANGE UP (ref 27–34)
MCHC RBC-ENTMCNC: 33.4 G/DL — SIGNIFICANT CHANGE UP (ref 32–36)
MCV RBC AUTO: 98.4 FL — SIGNIFICANT CHANGE UP (ref 80–100)
MONOCYTES # BLD AUTO: 0.47 K/UL — SIGNIFICANT CHANGE UP (ref 0–0.9)
MONOCYTES NFR BLD AUTO: 6.6 % — SIGNIFICANT CHANGE UP (ref 2–14)
NEUTROPHILS # BLD AUTO: 5.47 K/UL — SIGNIFICANT CHANGE UP (ref 1.8–7.4)
NEUTROPHILS NFR BLD AUTO: 76.3 % — SIGNIFICANT CHANGE UP (ref 43–77)
NRBC BLD AUTO-RTO: 0 /100 WBCS — SIGNIFICANT CHANGE UP (ref 0–0)
PLATELET # BLD AUTO: 162 K/UL — SIGNIFICANT CHANGE UP (ref 150–400)
RBC # BLD: 3.16 M/UL — LOW (ref 3.8–5.2)
RBC # FLD: 12.4 % — SIGNIFICANT CHANGE UP (ref 10.3–14.5)
WBC # BLD: 7.16 K/UL — SIGNIFICANT CHANGE UP (ref 3.8–10.5)
WBC # FLD AUTO: 7.16 K/UL — SIGNIFICANT CHANGE UP (ref 3.8–10.5)

## 2025-03-21 ENCOUNTER — OUTPATIENT (OUTPATIENT)
Dept: OUTPATIENT SERVICES | Facility: HOSPITAL | Age: 60
LOS: 1 days | Discharge: HOME | End: 2025-03-21

## 2025-03-21 DIAGNOSIS — D47.2 MONOCLONAL GAMMOPATHY: ICD-10-CM

## 2025-03-24 ENCOUNTER — NON-APPOINTMENT (OUTPATIENT)
Age: 60
End: 2025-03-24

## 2025-03-25 ENCOUNTER — APPOINTMENT (OUTPATIENT)
Dept: HEMATOLOGY ONCOLOGY | Facility: CLINIC | Age: 60
End: 2025-03-25
Payer: MEDICARE

## 2025-03-25 ENCOUNTER — RESULT REVIEW (OUTPATIENT)
Age: 60
End: 2025-03-25

## 2025-03-25 ENCOUNTER — APPOINTMENT (OUTPATIENT)
Dept: INFUSION THERAPY | Facility: HOSPITAL | Age: 60
End: 2025-03-25

## 2025-03-25 ENCOUNTER — APPOINTMENT (OUTPATIENT)
Dept: HEMATOLOGY ONCOLOGY | Facility: CLINIC | Age: 60
End: 2025-03-25

## 2025-03-25 VITALS
SYSTOLIC BLOOD PRESSURE: 125 MMHG | BODY MASS INDEX: 22.49 KG/M2 | DIASTOLIC BLOOD PRESSURE: 72 MMHG | HEART RATE: 69 BPM | WEIGHT: 126.99 LBS | TEMPERATURE: 97.9 F | RESPIRATION RATE: 16 BRPM | OXYGEN SATURATION: 99 %

## 2025-03-25 DIAGNOSIS — R19.7 DIARRHEA, UNSPECIFIED: ICD-10-CM

## 2025-03-25 LAB
BASOPHILS # BLD AUTO: 0.04 K/UL — SIGNIFICANT CHANGE UP (ref 0–0.2)
BASOPHILS NFR BLD AUTO: 0.8 % — SIGNIFICANT CHANGE UP (ref 0–2)
EOSINOPHIL # BLD AUTO: 0.09 K/UL — SIGNIFICANT CHANGE UP (ref 0–0.5)
EOSINOPHIL NFR BLD AUTO: 1.8 % — SIGNIFICANT CHANGE UP (ref 0–6)
HCT VFR BLD CALC: 32.5 % — LOW (ref 34.5–45)
HGB BLD-MCNC: 10.7 G/DL — LOW (ref 11.5–15.5)
IMM GRANULOCYTES NFR BLD AUTO: 0.2 % — SIGNIFICANT CHANGE UP (ref 0–0.9)
LYMPHOCYTES # BLD AUTO: 1.27 K/UL — SIGNIFICANT CHANGE UP (ref 1–3.3)
LYMPHOCYTES # BLD AUTO: 25.6 % — SIGNIFICANT CHANGE UP (ref 13–44)
MCHC RBC-ENTMCNC: 32.9 G/DL — SIGNIFICANT CHANGE UP (ref 32–36)
MCHC RBC-ENTMCNC: 32.9 PG — SIGNIFICANT CHANGE UP (ref 27–34)
MCV RBC AUTO: 100 FL — SIGNIFICANT CHANGE UP (ref 80–100)
MONOCYTES # BLD AUTO: 0.34 K/UL — SIGNIFICANT CHANGE UP (ref 0–0.9)
MONOCYTES NFR BLD AUTO: 6.8 % — SIGNIFICANT CHANGE UP (ref 2–14)
NEUTROPHILS # BLD AUTO: 3.22 K/UL — SIGNIFICANT CHANGE UP (ref 1.8–7.4)
NEUTROPHILS NFR BLD AUTO: 64.8 % — SIGNIFICANT CHANGE UP (ref 43–77)
NRBC BLD AUTO-RTO: 0 /100 WBCS — SIGNIFICANT CHANGE UP (ref 0–0)
PLATELET # BLD AUTO: 172 K/UL — SIGNIFICANT CHANGE UP (ref 150–400)
RBC # BLD: 3.25 M/UL — LOW (ref 3.8–5.2)
RBC # FLD: 12.5 % — SIGNIFICANT CHANGE UP (ref 10.3–14.5)
WBC # BLD: 4.97 K/UL — SIGNIFICANT CHANGE UP (ref 3.8–10.5)
WBC # FLD AUTO: 4.97 K/UL — SIGNIFICANT CHANGE UP (ref 3.8–10.5)

## 2025-03-25 PROCEDURE — 99214 OFFICE O/P EST MOD 30 MIN: CPT

## 2025-03-25 PROCEDURE — G2211 COMPLEX E/M VISIT ADD ON: CPT

## 2025-03-26 DIAGNOSIS — Z51.11 ENCOUNTER FOR ANTINEOPLASTIC CHEMOTHERAPY: ICD-10-CM

## 2025-03-26 DIAGNOSIS — R11.2 NAUSEA WITH VOMITING, UNSPECIFIED: ICD-10-CM

## 2025-03-26 LAB
ALBUMIN SERPL ELPH-MCNC: 4 G/DL
ALP BLD-CCNC: 57 U/L
ALT SERPL-CCNC: 15 U/L
ANION GAP SERPL CALC-SCNC: 4 MMOL/L
AST SERPL-CCNC: 17 U/L
BILIRUB SERPL-MCNC: 0.3 MG/DL
BUN SERPL-MCNC: 20 MG/DL
CALCIUM SERPL-MCNC: 8.9 MG/DL
CHLORIDE SERPL-SCNC: 108 MMOL/L
CO2 SERPL-SCNC: 31 MMOL/L
CREAT SERPL-MCNC: 0.68 MG/DL
EGFRCR SERPLBLD CKD-EPI 2021: 99 ML/MIN/1.73M2
GLUCOSE SERPL-MCNC: 100 MG/DL
POTASSIUM SERPL-SCNC: 5.4 MMOL/L
PROT SERPL-MCNC: 5.5 G/DL
SODIUM SERPL-SCNC: 144 MMOL/L

## 2025-03-31 ENCOUNTER — LABORATORY RESULT (OUTPATIENT)
Age: 60
End: 2025-03-31

## 2025-03-31 ENCOUNTER — RESULT REVIEW (OUTPATIENT)
Age: 60
End: 2025-03-31

## 2025-03-31 ENCOUNTER — APPOINTMENT (OUTPATIENT)
Dept: HEMATOLOGY ONCOLOGY | Facility: CLINIC | Age: 60
End: 2025-03-31
Payer: MEDICARE

## 2025-03-31 VITALS
SYSTOLIC BLOOD PRESSURE: 125 MMHG | WEIGHT: 126.55 LBS | HEART RATE: 76 BPM | BODY MASS INDEX: 22.42 KG/M2 | DIASTOLIC BLOOD PRESSURE: 78 MMHG | RESPIRATION RATE: 16 BRPM | OXYGEN SATURATION: 99 % | TEMPERATURE: 98.6 F

## 2025-03-31 DIAGNOSIS — R76.8 OTHER SPECIFIED ABNORMAL IMMUNOLOGICAL FINDINGS IN SERUM: ICD-10-CM

## 2025-03-31 DIAGNOSIS — E85.9 AMYLOIDOSIS, UNSPECIFIED: ICD-10-CM

## 2025-03-31 LAB
BASOPHILS # BLD AUTO: 0.03 K/UL — SIGNIFICANT CHANGE UP (ref 0–0.2)
BASOPHILS NFR BLD AUTO: 0.6 % — SIGNIFICANT CHANGE UP (ref 0–2)
EOSINOPHIL # BLD AUTO: 0.11 K/UL — SIGNIFICANT CHANGE UP (ref 0–0.5)
EOSINOPHIL NFR BLD AUTO: 2 % — SIGNIFICANT CHANGE UP (ref 0–6)
HCT VFR BLD CALC: 31.2 % — LOW (ref 34.5–45)
HGB BLD-MCNC: 10.5 G/DL — LOW (ref 11.5–15.5)
IMM GRANULOCYTES NFR BLD AUTO: 1.1 % — HIGH (ref 0–0.9)
LYMPHOCYTES # BLD AUTO: 1.44 K/UL — SIGNIFICANT CHANGE UP (ref 1–3.3)
LYMPHOCYTES # BLD AUTO: 26.6 % — SIGNIFICANT CHANGE UP (ref 13–44)
MCHC RBC-ENTMCNC: 32.9 PG — SIGNIFICANT CHANGE UP (ref 27–34)
MCHC RBC-ENTMCNC: 33.7 G/DL — SIGNIFICANT CHANGE UP (ref 32–36)
MCV RBC AUTO: 97.8 FL — SIGNIFICANT CHANGE UP (ref 80–100)
MONOCYTES # BLD AUTO: 0.45 K/UL — SIGNIFICANT CHANGE UP (ref 0–0.9)
MONOCYTES NFR BLD AUTO: 8.3 % — SIGNIFICANT CHANGE UP (ref 2–14)
NEUTROPHILS # BLD AUTO: 3.32 K/UL — SIGNIFICANT CHANGE UP (ref 1.8–7.4)
NEUTROPHILS NFR BLD AUTO: 61.4 % — SIGNIFICANT CHANGE UP (ref 43–77)
NRBC BLD AUTO-RTO: 0 /100 WBCS — SIGNIFICANT CHANGE UP (ref 0–0)
PLATELET # BLD AUTO: 167 K/UL — SIGNIFICANT CHANGE UP (ref 150–400)
RBC # BLD: 3.19 M/UL — LOW (ref 3.8–5.2)
RBC # FLD: 12.5 % — SIGNIFICANT CHANGE UP (ref 10.3–14.5)
WBC # BLD: 5.41 K/UL — SIGNIFICANT CHANGE UP (ref 3.8–10.5)
WBC # FLD AUTO: 5.41 K/UL — SIGNIFICANT CHANGE UP (ref 3.8–10.5)

## 2025-03-31 PROCEDURE — 38222 DX BONE MARROW BX & ASPIR: CPT | Mod: RT

## 2025-04-02 LAB
ALBUMIN MFR SERPL ELPH: 67.1 %
ALBUMIN SERPL-MCNC: 3.9 G/DL
ALBUMIN/GLOB SERPL: 2.1 RATIO
ALPHA1 GLOB MFR SERPL ELPH: 4.7 %
ALPHA1 GLOB SERPL ELPH-MCNC: 0.3 G/DL
ALPHA2 GLOB MFR SERPL ELPH: 11.2 %
ALPHA2 GLOB SERPL ELPH-MCNC: 0.6 G/DL
B-GLOBULIN MFR SERPL ELPH: 11.7 %
B-GLOBULIN SERPL ELPH-MCNC: 0.7 G/DL
DEPRECATED KAPPA LC FREE/LAMBDA SER: 0.35 RATIO
GAMMA GLOB FLD ELPH-MCNC: 0.3 G/DL
GAMMA GLOB MFR SERPL ELPH: 5.3 %
IGA SER QL IEP: 25 MG/DL
IGG SER QL IEP: 326 MG/DL
IGM SER QL IEP: 12 MG/DL
INTERPRETATION SERPL IEP-IMP: NORMAL
KAPPA LC CSF-MCNC: 1.24 MG/DL
KAPPA LC SERPL-MCNC: 0.43 MG/DL
M PROTEIN MFR SERPL ELPH: NORMAL
M PROTEIN SPEC IFE-MCNC: NORMAL
MONOCLON BAND OBS SERPL: NORMAL
PROT SERPL-MCNC: 5.8 G/DL
PROT SERPL-MCNC: 5.8 G/DL

## 2025-04-08 ENCOUNTER — RESULT REVIEW (OUTPATIENT)
Age: 60
End: 2025-04-08

## 2025-04-08 ENCOUNTER — APPOINTMENT (OUTPATIENT)
Dept: HEMATOLOGY ONCOLOGY | Facility: CLINIC | Age: 60
End: 2025-04-08

## 2025-04-08 ENCOUNTER — APPOINTMENT (OUTPATIENT)
Dept: INFUSION THERAPY | Facility: HOSPITAL | Age: 60
End: 2025-04-08

## 2025-04-08 ENCOUNTER — NON-APPOINTMENT (OUTPATIENT)
Age: 60
End: 2025-04-08

## 2025-04-08 LAB
BASOPHILS # BLD AUTO: 0.04 K/UL — SIGNIFICANT CHANGE UP (ref 0–0.2)
BASOPHILS NFR BLD AUTO: 0.6 % — SIGNIFICANT CHANGE UP (ref 0–2)
EOSINOPHIL # BLD AUTO: 0.04 K/UL — SIGNIFICANT CHANGE UP (ref 0–0.5)
EOSINOPHIL NFR BLD AUTO: 0.6 % — SIGNIFICANT CHANGE UP (ref 0–6)
HCT VFR BLD CALC: 31.3 % — LOW (ref 34.5–45)
HGB BLD-MCNC: 10.4 G/DL — LOW (ref 11.5–15.5)
IMM GRANULOCYTES NFR BLD AUTO: 0.6 % — SIGNIFICANT CHANGE UP (ref 0–0.9)
LYMPHOCYTES # BLD AUTO: 0.5 K/UL — LOW (ref 1–3.3)
LYMPHOCYTES # BLD AUTO: 7.4 % — LOW (ref 13–44)
MCHC RBC-ENTMCNC: 32.3 PG — SIGNIFICANT CHANGE UP (ref 27–34)
MCHC RBC-ENTMCNC: 33.2 G/DL — SIGNIFICANT CHANGE UP (ref 32–36)
MCV RBC AUTO: 97.2 FL — SIGNIFICANT CHANGE UP (ref 80–100)
MONOCYTES # BLD AUTO: 0.44 K/UL — SIGNIFICANT CHANGE UP (ref 0–0.9)
MONOCYTES NFR BLD AUTO: 6.5 % — SIGNIFICANT CHANGE UP (ref 2–14)
NEUTROPHILS # BLD AUTO: 5.66 K/UL — SIGNIFICANT CHANGE UP (ref 1.8–7.4)
NEUTROPHILS NFR BLD AUTO: 84.3 % — HIGH (ref 43–77)
NRBC BLD AUTO-RTO: 0 /100 WBCS — SIGNIFICANT CHANGE UP (ref 0–0)
PLATELET # BLD AUTO: 144 K/UL — LOW (ref 150–400)
RBC # BLD: 3.22 M/UL — LOW (ref 3.8–5.2)
RBC # FLD: 12.8 % — SIGNIFICANT CHANGE UP (ref 10.3–14.5)
WBC # BLD: 6.72 K/UL — SIGNIFICANT CHANGE UP (ref 3.8–10.5)
WBC # FLD AUTO: 6.72 K/UL — SIGNIFICANT CHANGE UP (ref 3.8–10.5)

## 2025-04-09 ENCOUNTER — RX RENEWAL (OUTPATIENT)
Age: 60
End: 2025-04-09

## 2025-04-10 ENCOUNTER — RX RENEWAL (OUTPATIENT)
Age: 60
End: 2025-04-10

## 2025-04-11 ENCOUNTER — RX RENEWAL (OUTPATIENT)
Age: 60
End: 2025-04-11

## 2025-04-12 ENCOUNTER — APPOINTMENT (OUTPATIENT)
Dept: AFTER HOURS CARE | Facility: EMERGENCY ROOM | Age: 60
End: 2025-04-12
Payer: MEDICARE

## 2025-04-12 PROCEDURE — 99204 OFFICE O/P NEW MOD 45 MIN: CPT | Mod: 2W

## 2025-04-17 ENCOUNTER — APPOINTMENT (OUTPATIENT)
Dept: ENDOCRINOLOGY | Facility: CLINIC | Age: 60
End: 2025-04-17
Payer: MEDICARE

## 2025-04-17 VITALS
WEIGHT: 123 LBS | RESPIRATION RATE: 16 BRPM | DIASTOLIC BLOOD PRESSURE: 45 MMHG | HEIGHT: 63 IN | SYSTOLIC BLOOD PRESSURE: 74 MMHG | BODY MASS INDEX: 21.79 KG/M2 | HEART RATE: 86 BPM | OXYGEN SATURATION: 99 %

## 2025-04-17 DIAGNOSIS — E85.9 AMYLOIDOSIS, UNSPECIFIED: ICD-10-CM

## 2025-04-17 DIAGNOSIS — E03.9 HYPOTHYROIDISM, UNSPECIFIED: ICD-10-CM

## 2025-04-17 DIAGNOSIS — R73.03 PREDIABETES.: ICD-10-CM

## 2025-04-17 PROCEDURE — 99214 OFFICE O/P EST MOD 30 MIN: CPT

## 2025-04-18 LAB
25(OH)D3 SERPL-MCNC: 10.6 NG/ML
ALBUMIN SERPL ELPH-MCNC: 3.9 G/DL
ALP BLD-CCNC: 68 U/L
ALT SERPL-CCNC: 13 U/L
ANION GAP SERPL CALC-SCNC: 9 MMOL/L
AST SERPL-CCNC: 16 U/L
BILIRUB SERPL-MCNC: 0.4 MG/DL
BUN SERPL-MCNC: 21 MG/DL
CALCIUM SERPL-MCNC: 9.4 MG/DL
CHLORIDE SERPL-SCNC: 103 MMOL/L
CHOLEST SERPL-MCNC: 267 MG/DL
CO2 SERPL-SCNC: 28 MMOL/L
CREAT SERPL-MCNC: 0.95 MG/DL
EGFRCR SERPLBLD CKD-EPI 2021: 68 ML/MIN/1.73M2
ESTIMATED AVERAGE GLUCOSE: 134 MG/DL
GLUCOSE SERPL-MCNC: 109 MG/DL
HBA1C MFR BLD HPLC: 6.3 %
HDLC SERPL-MCNC: 56 MG/DL
LDLC SERPL-MCNC: 159 MG/DL
NONHDLC SERPL-MCNC: 211 MG/DL
POTASSIUM SERPL-SCNC: 4.7 MMOL/L
PROT SERPL-MCNC: 5.9 G/DL
SODIUM SERPL-SCNC: 140 MMOL/L
T4 FREE SERPL-MCNC: 1.7 NG/DL
TRIGL SERPL-MCNC: 283 MG/DL
TSH SERPL-ACNC: 0.38 UIU/ML

## 2025-04-19 ENCOUNTER — TRANSCRIPTION ENCOUNTER (OUTPATIENT)
Age: 60
End: 2025-04-19

## 2025-04-22 ENCOUNTER — RESULT REVIEW (OUTPATIENT)
Age: 60
End: 2025-04-22

## 2025-04-22 ENCOUNTER — APPOINTMENT (OUTPATIENT)
Dept: HEMATOLOGY ONCOLOGY | Facility: CLINIC | Age: 60
End: 2025-04-22

## 2025-04-22 ENCOUNTER — APPOINTMENT (OUTPATIENT)
Dept: INFUSION THERAPY | Facility: HOSPITAL | Age: 60
End: 2025-04-22

## 2025-04-22 LAB
BASOPHILS # BLD AUTO: 0.05 K/UL — SIGNIFICANT CHANGE UP (ref 0–0.2)
BASOPHILS NFR BLD AUTO: 0.8 % — SIGNIFICANT CHANGE UP (ref 0–2)
EOSINOPHIL # BLD AUTO: 0.1 K/UL — SIGNIFICANT CHANGE UP (ref 0–0.5)
EOSINOPHIL NFR BLD AUTO: 1.6 % — SIGNIFICANT CHANGE UP (ref 0–6)
HCT VFR BLD CALC: 32.4 % — LOW (ref 34.5–45)
HGB BLD-MCNC: 10.5 G/DL — LOW (ref 11.5–15.5)
IMM GRANULOCYTES NFR BLD AUTO: 0.3 % — SIGNIFICANT CHANGE UP (ref 0–0.9)
LYMPHOCYTES # BLD AUTO: 1.29 K/UL — SIGNIFICANT CHANGE UP (ref 1–3.3)
LYMPHOCYTES # BLD AUTO: 20.7 % — SIGNIFICANT CHANGE UP (ref 13–44)
MCHC RBC-ENTMCNC: 32.3 PG — SIGNIFICANT CHANGE UP (ref 27–34)
MCHC RBC-ENTMCNC: 32.4 G/DL — SIGNIFICANT CHANGE UP (ref 32–36)
MCV RBC AUTO: 99.7 FL — SIGNIFICANT CHANGE UP (ref 80–100)
MONOCYTES # BLD AUTO: 0.41 K/UL — SIGNIFICANT CHANGE UP (ref 0–0.9)
MONOCYTES NFR BLD AUTO: 6.6 % — SIGNIFICANT CHANGE UP (ref 2–14)
NEUTROPHILS # BLD AUTO: 4.37 K/UL — SIGNIFICANT CHANGE UP (ref 1.8–7.4)
NEUTROPHILS NFR BLD AUTO: 70 % — SIGNIFICANT CHANGE UP (ref 43–77)
NRBC BLD AUTO-RTO: 0 /100 WBCS — SIGNIFICANT CHANGE UP (ref 0–0)
PLATELET # BLD AUTO: 234 K/UL — SIGNIFICANT CHANGE UP (ref 150–400)
RBC # BLD: 3.25 M/UL — LOW (ref 3.8–5.2)
RBC # FLD: 12.3 % — SIGNIFICANT CHANGE UP (ref 10.3–14.5)
WBC # BLD: 6.24 K/UL — SIGNIFICANT CHANGE UP (ref 3.8–10.5)
WBC # FLD AUTO: 6.24 K/UL — SIGNIFICANT CHANGE UP (ref 3.8–10.5)

## 2025-04-23 LAB
ALBUMIN SERPL ELPH-MCNC: 4 G/DL
ALP BLD-CCNC: 63 U/L
ALT SERPL-CCNC: 19 U/L
ANION GAP SERPL CALC-SCNC: 11 MMOL/L
AST SERPL-CCNC: 21 U/L
BILIRUB SERPL-MCNC: 0.4 MG/DL
BUN SERPL-MCNC: 17 MG/DL
CALCIUM SERPL-MCNC: 9 MG/DL
CHLORIDE SERPL-SCNC: 106 MMOL/L
CO2 SERPL-SCNC: 27 MMOL/L
CREAT SERPL-MCNC: 0.72 MG/DL
EGFRCR SERPLBLD CKD-EPI 2021: 95 ML/MIN/1.73M2
GLUCOSE SERPL-MCNC: 105 MG/DL
POTASSIUM SERPL-SCNC: 5.2 MMOL/L
PROT SERPL-MCNC: 5.6 G/DL
SODIUM SERPL-SCNC: 143 MMOL/L

## 2025-04-24 LAB
ALBUMIN MFR SERPL ELPH: 64.9 %
ALBUMIN SERPL-MCNC: 3.6 G/DL
ALBUMIN/GLOB SERPL: 1.8 RATIO
ALPHA1 GLOB MFR SERPL ELPH: 5.1 %
ALPHA1 GLOB SERPL ELPH-MCNC: 0.3 G/DL
ALPHA2 GLOB MFR SERPL ELPH: 12.3 %
ALPHA2 GLOB SERPL ELPH-MCNC: 0.7 G/DL
B-GLOBULIN MFR SERPL ELPH: 12 %
B-GLOBULIN SERPL ELPH-MCNC: 0.7 G/DL
DEPRECATED KAPPA LC FREE/LAMBDA SER: 0.35 RATIO
GAMMA GLOB FLD ELPH-MCNC: 0.3 G/DL
GAMMA GLOB MFR SERPL ELPH: 5.7 %
IGA SER QL IEP: 26 MG/DL
IGG SER QL IEP: 343 MG/DL
IGM SER QL IEP: <10 MG/DL
INTERPRETATION SERPL IEP-IMP: NORMAL
KAPPA LC CSF-MCNC: 1.38 MG/DL
KAPPA LC SERPL-MCNC: 0.48 MG/DL
M PROTEIN MFR SERPL ELPH: 1.6 %
M PROTEIN SPEC IFE-MCNC: NORMAL
MONOCLON BAND OBS SERPL: 0.1 G/DL
PROT SERPL-MCNC: 5.6 G/DL
PROT SERPL-MCNC: 5.6 G/DL

## 2025-04-28 ENCOUNTER — RX RENEWAL (OUTPATIENT)
Age: 60
End: 2025-04-28

## 2025-04-29 ENCOUNTER — RESULT CHARGE (OUTPATIENT)
Age: 60
End: 2025-04-29

## 2025-04-29 ENCOUNTER — NON-APPOINTMENT (OUTPATIENT)
Age: 60
End: 2025-04-29

## 2025-04-29 ENCOUNTER — TRANSCRIPTION ENCOUNTER (OUTPATIENT)
Age: 60
End: 2025-04-29

## 2025-04-29 ENCOUNTER — APPOINTMENT (OUTPATIENT)
Dept: ELECTROPHYSIOLOGY | Facility: CLINIC | Age: 60
End: 2025-04-29

## 2025-04-29 VITALS
WEIGHT: 123 LBS | SYSTOLIC BLOOD PRESSURE: 125 MMHG | HEIGHT: 63 IN | OXYGEN SATURATION: 100 % | BODY MASS INDEX: 21.79 KG/M2 | DIASTOLIC BLOOD PRESSURE: 77 MMHG | HEART RATE: 67 BPM

## 2025-04-29 PROCEDURE — 93000 ELECTROCARDIOGRAM COMPLETE: CPT | Mod: XU

## 2025-04-29 PROCEDURE — 93280 PM DEVICE PROGR EVAL DUAL: CPT

## 2025-05-01 ENCOUNTER — TRANSCRIPTION ENCOUNTER (OUTPATIENT)
Age: 60
End: 2025-05-01

## 2025-05-07 ENCOUNTER — APPOINTMENT (OUTPATIENT)
Dept: INFUSION THERAPY | Facility: HOSPITAL | Age: 60
End: 2025-05-07

## 2025-05-07 ENCOUNTER — RESULT REVIEW (OUTPATIENT)
Age: 60
End: 2025-05-07

## 2025-05-07 ENCOUNTER — APPOINTMENT (OUTPATIENT)
Dept: HEMATOLOGY ONCOLOGY | Facility: CLINIC | Age: 60
End: 2025-05-07

## 2025-05-07 LAB
BASOPHILS # BLD AUTO: 0.03 K/UL — SIGNIFICANT CHANGE UP (ref 0–0.2)
BASOPHILS NFR BLD AUTO: 0.6 % — SIGNIFICANT CHANGE UP (ref 0–2)
EOSINOPHIL # BLD AUTO: 0.12 K/UL — SIGNIFICANT CHANGE UP (ref 0–0.5)
EOSINOPHIL NFR BLD AUTO: 2.3 % — SIGNIFICANT CHANGE UP (ref 0–6)
HCT VFR BLD CALC: 31.6 % — LOW (ref 34.5–45)
HGB BLD-MCNC: 10.3 G/DL — LOW (ref 11.5–15.5)
IMM GRANULOCYTES NFR BLD AUTO: 0.8 % — SIGNIFICANT CHANGE UP (ref 0–0.9)
LYMPHOCYTES # BLD AUTO: 1.39 K/UL — SIGNIFICANT CHANGE UP (ref 1–3.3)
LYMPHOCYTES # BLD AUTO: 26.9 % — SIGNIFICANT CHANGE UP (ref 13–44)
MCHC RBC-ENTMCNC: 32.4 PG — SIGNIFICANT CHANGE UP (ref 27–34)
MCHC RBC-ENTMCNC: 32.6 G/DL — SIGNIFICANT CHANGE UP (ref 32–36)
MCV RBC AUTO: 99.4 FL — SIGNIFICANT CHANGE UP (ref 80–100)
MONOCYTES # BLD AUTO: 0.33 K/UL — SIGNIFICANT CHANGE UP (ref 0–0.9)
MONOCYTES NFR BLD AUTO: 6.4 % — SIGNIFICANT CHANGE UP (ref 2–14)
NEUTROPHILS # BLD AUTO: 3.25 K/UL — SIGNIFICANT CHANGE UP (ref 1.8–7.4)
NEUTROPHILS NFR BLD AUTO: 63 % — SIGNIFICANT CHANGE UP (ref 43–77)
NRBC BLD AUTO-RTO: 0 /100 WBCS — SIGNIFICANT CHANGE UP (ref 0–0)
PLATELET # BLD AUTO: 144 K/UL — LOW (ref 150–400)
RBC # BLD: 3.18 M/UL — LOW (ref 3.8–5.2)
RBC # FLD: 13.2 % — SIGNIFICANT CHANGE UP (ref 10.3–14.5)
WBC # BLD: 5.16 K/UL — SIGNIFICANT CHANGE UP (ref 3.8–10.5)
WBC # FLD AUTO: 5.16 K/UL — SIGNIFICANT CHANGE UP (ref 3.8–10.5)

## 2025-05-14 ENCOUNTER — APPOINTMENT (OUTPATIENT)
Dept: CARDIOLOGY | Facility: CLINIC | Age: 60
End: 2025-05-14
Payer: MEDICARE

## 2025-05-14 PROCEDURE — 93356 MYOCRD STRAIN IMG SPCKL TRCK: CPT

## 2025-05-14 PROCEDURE — 93306 TTE W/DOPPLER COMPLETE: CPT

## 2025-05-19 ENCOUNTER — OUTPATIENT (OUTPATIENT)
Dept: OUTPATIENT SERVICES | Facility: HOSPITAL | Age: 60
LOS: 1 days | Discharge: HOME | End: 2025-05-19

## 2025-05-19 DIAGNOSIS — D47.2 MONOCLONAL GAMMOPATHY: ICD-10-CM

## 2025-05-21 ENCOUNTER — APPOINTMENT (OUTPATIENT)
Dept: HEMATOLOGY ONCOLOGY | Facility: CLINIC | Age: 60
End: 2025-05-21

## 2025-05-21 ENCOUNTER — RESULT REVIEW (OUTPATIENT)
Age: 60
End: 2025-05-21

## 2025-05-21 ENCOUNTER — APPOINTMENT (OUTPATIENT)
Dept: INFUSION THERAPY | Facility: HOSPITAL | Age: 60
End: 2025-05-21

## 2025-05-21 DIAGNOSIS — R11.2 NAUSEA WITH VOMITING, UNSPECIFIED: ICD-10-CM

## 2025-05-21 DIAGNOSIS — Z51.11 ENCOUNTER FOR ANTINEOPLASTIC CHEMOTHERAPY: ICD-10-CM

## 2025-05-21 LAB
BASOPHILS # BLD AUTO: 0.03 K/UL — SIGNIFICANT CHANGE UP (ref 0–0.2)
BASOPHILS NFR BLD AUTO: 0.6 % — SIGNIFICANT CHANGE UP (ref 0–2)
EOSINOPHIL # BLD AUTO: 0.05 K/UL — SIGNIFICANT CHANGE UP (ref 0–0.5)
EOSINOPHIL NFR BLD AUTO: 1.1 % — SIGNIFICANT CHANGE UP (ref 0–6)
HCT VFR BLD CALC: 32.4 % — LOW (ref 34.5–45)
HGB BLD-MCNC: 10.6 G/DL — LOW (ref 11.5–15.5)
IMM GRANULOCYTES NFR BLD AUTO: 0 % — SIGNIFICANT CHANGE UP (ref 0–0.9)
LYMPHOCYTES # BLD AUTO: 1.28 K/UL — SIGNIFICANT CHANGE UP (ref 1–3.3)
LYMPHOCYTES # BLD AUTO: 26.9 % — SIGNIFICANT CHANGE UP (ref 13–44)
MCHC RBC-ENTMCNC: 32.6 PG — SIGNIFICANT CHANGE UP (ref 27–34)
MCHC RBC-ENTMCNC: 32.7 G/DL — SIGNIFICANT CHANGE UP (ref 32–36)
MCV RBC AUTO: 99.7 FL — SIGNIFICANT CHANGE UP (ref 80–100)
MONOCYTES # BLD AUTO: 0.37 K/UL — SIGNIFICANT CHANGE UP (ref 0–0.9)
MONOCYTES NFR BLD AUTO: 7.8 % — SIGNIFICANT CHANGE UP (ref 2–14)
NEUTROPHILS # BLD AUTO: 3.03 K/UL — SIGNIFICANT CHANGE UP (ref 1.8–7.4)
NEUTROPHILS NFR BLD AUTO: 63.6 % — SIGNIFICANT CHANGE UP (ref 43–77)
NRBC BLD AUTO-RTO: 0 /100 WBCS — SIGNIFICANT CHANGE UP (ref 0–0)
PLATELET # BLD AUTO: 193 K/UL — SIGNIFICANT CHANGE UP (ref 150–400)
RBC # BLD: 3.25 M/UL — LOW (ref 3.8–5.2)
RBC # FLD: 13 % — SIGNIFICANT CHANGE UP (ref 10.3–14.5)
WBC # BLD: 4.76 K/UL — SIGNIFICANT CHANGE UP (ref 3.8–10.5)
WBC # FLD AUTO: 4.76 K/UL — SIGNIFICANT CHANGE UP (ref 3.8–10.5)

## 2025-05-23 ENCOUNTER — RX RENEWAL (OUTPATIENT)
Age: 60
End: 2025-05-23

## 2025-05-28 ENCOUNTER — APPOINTMENT (OUTPATIENT)
Dept: GASTROENTEROLOGY | Facility: CLINIC | Age: 60
End: 2025-05-28

## 2025-05-28 LAB
ALBUMIN MFR SERPL ELPH: 66.8 %
ALBUMIN SERPL ELPH-MCNC: 4.1 G/DL
ALBUMIN SERPL-MCNC: 3.8 G/DL
ALBUMIN/GLOB SERPL: 2 RATIO
ALP BLD-CCNC: 60 U/L
ALPHA1 GLOB MFR SERPL ELPH: 5.2 %
ALPHA1 GLOB SERPL ELPH-MCNC: 0.3 G/DL
ALPHA2 GLOB MFR SERPL ELPH: 11.9 %
ALPHA2 GLOB SERPL ELPH-MCNC: 0.7 G/DL
ALT SERPL-CCNC: 16 U/L
ANION GAP SERPL CALC-SCNC: 8 MMOL/L
AST SERPL-CCNC: 17 U/L
B-GLOBULIN MFR SERPL ELPH: 10.9 %
B-GLOBULIN SERPL ELPH-MCNC: 0.6 G/DL
BILIRUB SERPL-MCNC: 0.5 MG/DL
BUN SERPL-MCNC: 21 MG/DL
CALCIUM SERPL-MCNC: 9.1 MG/DL
CHLORIDE SERPL-SCNC: 106 MMOL/L
CO2 SERPL-SCNC: 28 MMOL/L
CREAT SERPL-MCNC: 0.91 MG/DL
DEPRECATED KAPPA LC FREE/LAMBDA SER: 0.4 RATIO
EGFRCR SERPLBLD CKD-EPI 2021: 72 ML/MIN/1.73M2
GAMMA GLOB FLD ELPH-MCNC: 0.3 G/DL
GAMMA GLOB MFR SERPL ELPH: 5.2 %
GLUCOSE SERPL-MCNC: 105 MG/DL
IGA SERPL-MCNC: 18 MG/DL
IGG SERPL-MCNC: 248 MG/DL
IGM SERPL-MCNC: 11 MG/DL
INTERPRETATION SERPL IEP-IMP: NORMAL
KAPPA LC CSF-MCNC: 1 MG/DL
KAPPA LC SERPL-MCNC: 0.4 MG/DL
M PROTEIN MFR SERPL ELPH: 1.9 %
M PROTEIN SPEC IFE-MCNC: NORMAL
MONOCLON BAND OBS SERPL: 0.1 G/DL
POTASSIUM SERPL-SCNC: 5.6 MMOL/L
PROT SERPL-MCNC: 5.7 G/DL
SODIUM SERPL-SCNC: 142 MMOL/L

## 2025-05-29 ENCOUNTER — APPOINTMENT (OUTPATIENT)
Dept: GASTROENTEROLOGY | Facility: CLINIC | Age: 60
End: 2025-05-29
Payer: MEDICARE

## 2025-05-29 VITALS
DIASTOLIC BLOOD PRESSURE: 69 MMHG | BODY MASS INDEX: 22.15 KG/M2 | WEIGHT: 125 LBS | SYSTOLIC BLOOD PRESSURE: 114 MMHG | HEART RATE: 74 BPM | HEIGHT: 63 IN

## 2025-05-29 DIAGNOSIS — K21.9 GASTRO-ESOPHAGEAL REFLUX DISEASE W/OUT ESOPHAGITIS: ICD-10-CM

## 2025-05-29 DIAGNOSIS — E85.9 AMYLOIDOSIS, UNSPECIFIED: ICD-10-CM

## 2025-05-29 DIAGNOSIS — R63.4 ABNORMAL WEIGHT LOSS: ICD-10-CM

## 2025-05-29 DIAGNOSIS — K58.9 IRRITABLE BOWEL SYNDROME, UNSPECIFIED: ICD-10-CM

## 2025-05-29 DIAGNOSIS — R19.7 DIARRHEA, UNSPECIFIED: ICD-10-CM

## 2025-05-29 PROCEDURE — G2211 COMPLEX E/M VISIT ADD ON: CPT

## 2025-05-29 PROCEDURE — 99205 OFFICE O/P NEW HI 60 MIN: CPT

## 2025-06-04 ENCOUNTER — RESULT REVIEW (OUTPATIENT)
Age: 60
End: 2025-06-04

## 2025-06-04 ENCOUNTER — APPOINTMENT (OUTPATIENT)
Dept: INFUSION THERAPY | Facility: HOSPITAL | Age: 60
End: 2025-06-04

## 2025-06-04 ENCOUNTER — APPOINTMENT (OUTPATIENT)
Dept: HEMATOLOGY ONCOLOGY | Facility: CLINIC | Age: 60
End: 2025-06-04

## 2025-06-04 LAB
BASOPHILS # BLD AUTO: 0.04 K/UL — SIGNIFICANT CHANGE UP (ref 0–0.2)
BASOPHILS NFR BLD AUTO: 0.7 % — SIGNIFICANT CHANGE UP (ref 0–2)
EOSINOPHIL # BLD AUTO: 0.08 K/UL — SIGNIFICANT CHANGE UP (ref 0–0.5)
EOSINOPHIL NFR BLD AUTO: 1.5 % — SIGNIFICANT CHANGE UP (ref 0–6)
HCT VFR BLD CALC: 31.6 % — LOW (ref 34.5–45)
HGB BLD-MCNC: 10.5 G/DL — LOW (ref 11.5–15.5)
IMM GRANULOCYTES NFR BLD AUTO: 0.6 % — SIGNIFICANT CHANGE UP (ref 0–0.9)
LYMPHOCYTES # BLD AUTO: 1.3 K/UL — SIGNIFICANT CHANGE UP (ref 1–3.3)
LYMPHOCYTES # BLD AUTO: 24.3 % — SIGNIFICANT CHANGE UP (ref 13–44)
MCHC RBC-ENTMCNC: 32.9 PG — SIGNIFICANT CHANGE UP (ref 27–34)
MCHC RBC-ENTMCNC: 33.2 G/DL — SIGNIFICANT CHANGE UP (ref 32–36)
MCV RBC AUTO: 99.1 FL — SIGNIFICANT CHANGE UP (ref 80–100)
MONOCYTES # BLD AUTO: 0.44 K/UL — SIGNIFICANT CHANGE UP (ref 0–0.9)
MONOCYTES NFR BLD AUTO: 8.2 % — SIGNIFICANT CHANGE UP (ref 2–14)
NEUTROPHILS # BLD AUTO: 3.47 K/UL — SIGNIFICANT CHANGE UP (ref 1.8–7.4)
NEUTROPHILS NFR BLD AUTO: 64.7 % — SIGNIFICANT CHANGE UP (ref 43–77)
NRBC BLD AUTO-RTO: 0 /100 WBCS — SIGNIFICANT CHANGE UP (ref 0–0)
PLATELET # BLD AUTO: 179 K/UL — SIGNIFICANT CHANGE UP (ref 150–400)
RBC # BLD: 3.19 M/UL — LOW (ref 3.8–5.2)
RBC # FLD: 12.8 % — SIGNIFICANT CHANGE UP (ref 10.3–14.5)
WBC # BLD: 5.36 K/UL — SIGNIFICANT CHANGE UP (ref 3.8–10.5)
WBC # FLD AUTO: 5.36 K/UL — SIGNIFICANT CHANGE UP (ref 3.8–10.5)

## 2025-06-10 DIAGNOSIS — E85.9 AMYLOIDOSIS, UNSPECIFIED: ICD-10-CM

## 2025-06-17 ENCOUNTER — APPOINTMENT (OUTPATIENT)
Dept: HEMATOLOGY ONCOLOGY | Facility: CLINIC | Age: 60
End: 2025-06-17

## 2025-06-17 ENCOUNTER — APPOINTMENT (OUTPATIENT)
Dept: HEMATOLOGY ONCOLOGY | Facility: CLINIC | Age: 60
End: 2025-06-17
Payer: MEDICARE

## 2025-06-17 ENCOUNTER — RESULT REVIEW (OUTPATIENT)
Age: 60
End: 2025-06-17

## 2025-06-17 ENCOUNTER — APPOINTMENT (OUTPATIENT)
Dept: INFUSION THERAPY | Facility: HOSPITAL | Age: 60
End: 2025-06-17

## 2025-06-17 VITALS
SYSTOLIC BLOOD PRESSURE: 115 MMHG | HEART RATE: 70 BPM | DIASTOLIC BLOOD PRESSURE: 70 MMHG | HEIGHT: 63.39 IN | WEIGHT: 127.65 LBS | OXYGEN SATURATION: 100 % | BODY MASS INDEX: 22.34 KG/M2 | RESPIRATION RATE: 18 BRPM | TEMPERATURE: 97.5 F

## 2025-06-17 LAB
BASOPHILS # BLD AUTO: 0.04 K/UL — SIGNIFICANT CHANGE UP (ref 0–0.2)
BASOPHILS NFR BLD AUTO: 0.6 % — SIGNIFICANT CHANGE UP (ref 0–2)
EOSINOPHIL # BLD AUTO: 0.1 K/UL — SIGNIFICANT CHANGE UP (ref 0–0.5)
EOSINOPHIL NFR BLD AUTO: 1.6 % — SIGNIFICANT CHANGE UP (ref 0–6)
HCT VFR BLD CALC: 33.3 % — LOW (ref 34.5–45)
HGB BLD-MCNC: 10.6 G/DL — LOW (ref 11.5–15.5)
IMM GRANULOCYTES NFR BLD AUTO: 0.5 % — SIGNIFICANT CHANGE UP (ref 0–0.9)
LYMPHOCYTES # BLD AUTO: 1.17 K/UL — SIGNIFICANT CHANGE UP (ref 1–3.3)
LYMPHOCYTES # BLD AUTO: 18.2 % — SIGNIFICANT CHANGE UP (ref 13–44)
MCHC RBC-ENTMCNC: 31.8 G/DL — LOW (ref 32–36)
MCHC RBC-ENTMCNC: 32.2 PG — SIGNIFICANT CHANGE UP (ref 27–34)
MCV RBC AUTO: 101.2 FL — HIGH (ref 80–100)
MONOCYTES # BLD AUTO: 0.42 K/UL — SIGNIFICANT CHANGE UP (ref 0–0.9)
MONOCYTES NFR BLD AUTO: 6.5 % — SIGNIFICANT CHANGE UP (ref 2–14)
NEUTROPHILS # BLD AUTO: 4.67 K/UL — SIGNIFICANT CHANGE UP (ref 1.8–7.4)
NEUTROPHILS NFR BLD AUTO: 72.6 % — SIGNIFICANT CHANGE UP (ref 43–77)
NRBC BLD AUTO-RTO: 0 /100 WBCS — SIGNIFICANT CHANGE UP (ref 0–0)
PLATELET # BLD AUTO: 187 K/UL — SIGNIFICANT CHANGE UP (ref 150–400)
RBC # BLD: 3.29 M/UL — LOW (ref 3.8–5.2)
RBC # FLD: 12.7 % — SIGNIFICANT CHANGE UP (ref 10.3–14.5)
WBC # BLD: 6.43 K/UL — SIGNIFICANT CHANGE UP (ref 3.8–10.5)
WBC # FLD AUTO: 6.43 K/UL — SIGNIFICANT CHANGE UP (ref 3.8–10.5)

## 2025-06-17 PROCEDURE — 99214 OFFICE O/P EST MOD 30 MIN: CPT

## 2025-06-17 PROCEDURE — G2211 COMPLEX E/M VISIT ADD ON: CPT

## 2025-06-20 LAB
ALBUMIN MFR SERPL ELPH: 65.6 %
ALBUMIN SERPL ELPH-MCNC: 4 G/DL
ALBUMIN SERPL-MCNC: 3.6 G/DL
ALBUMIN/GLOB SERPL: 1.9 RATIO
ALP BLD-CCNC: 63 U/L
ALPHA1 GLOB MFR SERPL ELPH: 4.8 %
ALPHA1 GLOB SERPL ELPH-MCNC: 0.3 G/DL
ALPHA2 GLOB MFR SERPL ELPH: 11.9 %
ALPHA2 GLOB SERPL ELPH-MCNC: 0.7 G/DL
ALT SERPL-CCNC: 20 U/L
ANION GAP SERPL CALC-SCNC: 11 MMOL/L
AST SERPL-CCNC: 19 U/L
B-GLOBULIN MFR SERPL ELPH: 11.8 %
B-GLOBULIN SERPL ELPH-MCNC: 0.6 G/DL
BILIRUB SERPL-MCNC: 0.6 MG/DL
BUN SERPL-MCNC: 20 MG/DL
CALCIUM SERPL-MCNC: 9.2 MG/DL
CHLORIDE SERPL-SCNC: 106 MMOL/L
CO2 SERPL-SCNC: 25 MMOL/L
CREAT SERPL-MCNC: 0.83 MG/DL
DEPRECATED KAPPA LC FREE/LAMBDA SER: 0.6 RATIO
EGFRCR SERPLBLD CKD-EPI 2021: 80 ML/MIN/1.73M2
GAMMA GLOB FLD ELPH-MCNC: 0.3 G/DL
GAMMA GLOB MFR SERPL ELPH: 5.9 %
GLUCOSE SERPL-MCNC: 104 MG/DL
IGA SERPL-MCNC: 27 MG/DL
IGG SERPL-MCNC: 363 MG/DL
IGM SERPL-MCNC: 12 MG/DL
INTERPRETATION SERPL IEP-IMP: NORMAL
KAPPA LC CSF-MCNC: 1.29 MG/DL
KAPPA LC SERPL-MCNC: 0.78 MG/DL
M PROTEIN MFR SERPL ELPH: 1.4 %
M PROTEIN SPEC IFE-MCNC: NORMAL
MONOCLON BAND OBS SERPL: 0.1 G/DL
POTASSIUM SERPL-SCNC: 5.3 MMOL/L
PROT SERPL-MCNC: 5.5 G/DL
SODIUM SERPL-SCNC: 142 MMOL/L

## 2025-07-02 ENCOUNTER — APPOINTMENT (OUTPATIENT)
Dept: INFUSION THERAPY | Facility: HOSPITAL | Age: 60
End: 2025-07-02

## 2025-07-02 ENCOUNTER — RESULT REVIEW (OUTPATIENT)
Age: 60
End: 2025-07-02

## 2025-07-02 ENCOUNTER — APPOINTMENT (OUTPATIENT)
Dept: HEMATOLOGY ONCOLOGY | Facility: CLINIC | Age: 60
End: 2025-07-02

## 2025-07-02 LAB
BASOPHILS # BLD AUTO: 0.04 K/UL — SIGNIFICANT CHANGE UP (ref 0–0.2)
BASOPHILS NFR BLD AUTO: 0.8 % — SIGNIFICANT CHANGE UP (ref 0–2)
EOSINOPHIL # BLD AUTO: 0.09 K/UL — SIGNIFICANT CHANGE UP (ref 0–0.5)
EOSINOPHIL NFR BLD AUTO: 1.9 % — SIGNIFICANT CHANGE UP (ref 0–6)
HCT VFR BLD CALC: 31.5 % — LOW (ref 34.5–45)
HGB BLD-MCNC: 10.3 G/DL — LOW (ref 11.5–15.5)
IMM GRANULOCYTES NFR BLD AUTO: 0.6 % — SIGNIFICANT CHANGE UP (ref 0–0.9)
LYMPHOCYTES # BLD AUTO: 1.27 K/UL — SIGNIFICANT CHANGE UP (ref 1–3.3)
LYMPHOCYTES # BLD AUTO: 26.8 % — SIGNIFICANT CHANGE UP (ref 13–44)
MCHC RBC-ENTMCNC: 32.6 PG — SIGNIFICANT CHANGE UP (ref 27–34)
MCHC RBC-ENTMCNC: 32.7 G/DL — SIGNIFICANT CHANGE UP (ref 32–36)
MCV RBC AUTO: 99.7 FL — SIGNIFICANT CHANGE UP (ref 80–100)
MONOCYTES # BLD AUTO: 0.39 K/UL — SIGNIFICANT CHANGE UP (ref 0–0.9)
MONOCYTES NFR BLD AUTO: 8.2 % — SIGNIFICANT CHANGE UP (ref 2–14)
NEUTROPHILS # BLD AUTO: 2.91 K/UL — SIGNIFICANT CHANGE UP (ref 1.8–7.4)
NEUTROPHILS NFR BLD AUTO: 61.7 % — SIGNIFICANT CHANGE UP (ref 43–77)
NRBC BLD AUTO-RTO: 0 /100 WBCS — SIGNIFICANT CHANGE UP (ref 0–0)
PLATELET # BLD AUTO: 170 K/UL — SIGNIFICANT CHANGE UP (ref 150–400)
RBC # BLD: 3.16 M/UL — LOW (ref 3.8–5.2)
RBC # FLD: 12.5 % — SIGNIFICANT CHANGE UP (ref 10.3–14.5)
WBC # BLD: 4.73 K/UL — SIGNIFICANT CHANGE UP (ref 3.8–10.5)
WBC # FLD AUTO: 4.73 K/UL — SIGNIFICANT CHANGE UP (ref 3.8–10.5)

## 2025-07-08 ENCOUNTER — APPOINTMENT (OUTPATIENT)
Dept: OBGYN | Facility: CLINIC | Age: 60
End: 2025-07-08

## 2025-07-08 VITALS
BODY MASS INDEX: 21.97 KG/M2 | WEIGHT: 124 LBS | DIASTOLIC BLOOD PRESSURE: 54 MMHG | SYSTOLIC BLOOD PRESSURE: 96 MMHG | HEIGHT: 63 IN

## 2025-07-08 PROBLEM — Z01.419 ENCOUNTER FOR GYNECOLOGICAL EXAMINATION WITHOUT ABNORMAL FINDING: Status: ACTIVE | Noted: 2025-07-08 | Resolved: 2025-07-22

## 2025-07-08 PROCEDURE — G0101: CPT

## 2025-07-14 LAB — CYTOLOGY CVX/VAG DOC THIN PREP: ABNORMAL

## 2025-07-16 ENCOUNTER — RESULT REVIEW (OUTPATIENT)
Age: 60
End: 2025-07-16

## 2025-07-16 ENCOUNTER — APPOINTMENT (OUTPATIENT)
Dept: INFUSION THERAPY | Facility: HOSPITAL | Age: 60
End: 2025-07-16

## 2025-07-16 ENCOUNTER — APPOINTMENT (OUTPATIENT)
Dept: HEMATOLOGY ONCOLOGY | Facility: CLINIC | Age: 60
End: 2025-07-16

## 2025-07-16 LAB
ALBUMIN SERPL ELPH-MCNC: 4.1 G/DL
ALP BLD-CCNC: 65 U/L
ALT SERPL-CCNC: 17 U/L
ANION GAP SERPL CALC-SCNC: 12 MMOL/L
AST SERPL-CCNC: 16 U/L
BASOPHILS # BLD AUTO: 0.03 K/UL — SIGNIFICANT CHANGE UP (ref 0–0.2)
BASOPHILS NFR BLD AUTO: 0.6 % — SIGNIFICANT CHANGE UP (ref 0–2)
BILIRUB SERPL-MCNC: 0.5 MG/DL
BUN SERPL-MCNC: 24 MG/DL
CALCIUM SERPL-MCNC: 9.3 MG/DL
CHLORIDE SERPL-SCNC: 105 MMOL/L
CO2 SERPL-SCNC: 26 MMOL/L
CREAT SERPL-MCNC: 0.7 MG/DL
EGFRCR SERPLBLD CKD-EPI 2021: 98 ML/MIN/1.73M2
EOSINOPHIL # BLD AUTO: 0.06 K/UL — SIGNIFICANT CHANGE UP (ref 0–0.5)
EOSINOPHIL NFR BLD AUTO: 1.2 % — SIGNIFICANT CHANGE UP (ref 0–6)
GLUCOSE SERPL-MCNC: 96 MG/DL
HCT VFR BLD CALC: 32.7 % — LOW (ref 34.5–45)
HGB BLD-MCNC: 10.3 G/DL — LOW (ref 11.5–15.5)
IMM GRANULOCYTES NFR BLD AUTO: 0.2 % — SIGNIFICANT CHANGE UP (ref 0–0.9)
LYMPHOCYTES # BLD AUTO: 1.28 K/UL — SIGNIFICANT CHANGE UP (ref 1–3.3)
LYMPHOCYTES # BLD AUTO: 25.4 % — SIGNIFICANT CHANGE UP (ref 13–44)
MCHC RBC-ENTMCNC: 31.5 G/DL — LOW (ref 32–36)
MCHC RBC-ENTMCNC: 31.8 PG — SIGNIFICANT CHANGE UP (ref 27–34)
MCV RBC AUTO: 100.9 FL — HIGH (ref 80–100)
MONOCYTES # BLD AUTO: 0.43 K/UL — SIGNIFICANT CHANGE UP (ref 0–0.9)
MONOCYTES NFR BLD AUTO: 8.5 % — SIGNIFICANT CHANGE UP (ref 2–14)
NEUTROPHILS # BLD AUTO: 3.22 K/UL — SIGNIFICANT CHANGE UP (ref 1.8–7.4)
NEUTROPHILS NFR BLD AUTO: 64.1 % — SIGNIFICANT CHANGE UP (ref 43–77)
NRBC BLD AUTO-RTO: 0 /100 WBCS — SIGNIFICANT CHANGE UP (ref 0–0)
PLATELET # BLD AUTO: 173 K/UL — SIGNIFICANT CHANGE UP (ref 150–400)
POTASSIUM SERPL-SCNC: 5.1 MMOL/L
PROT SERPL-MCNC: 5.7 G/DL
RBC # BLD: 3.24 M/UL — LOW (ref 3.8–5.2)
RBC # FLD: 12.4 % — SIGNIFICANT CHANGE UP (ref 10.3–14.5)
SODIUM SERPL-SCNC: 143 MMOL/L
WBC # BLD: 5.03 K/UL — SIGNIFICANT CHANGE UP (ref 3.8–10.5)
WBC # FLD AUTO: 5.03 K/UL — SIGNIFICANT CHANGE UP (ref 3.8–10.5)

## 2025-07-17 ENCOUNTER — TRANSCRIPTION ENCOUNTER (OUTPATIENT)
Age: 60
End: 2025-07-17

## 2025-07-17 PROBLEM — D21.9 FIBROID: Status: ACTIVE | Noted: 2025-07-17

## 2025-07-19 PROBLEM — Z80.0 FAMILY HISTORY OF MALIGNANT NEOPLASM OF STOMACH: Status: ACTIVE | Noted: 2025-07-08

## 2025-07-19 PROBLEM — Z80.0 FAMILY HISTORY OF PANCREATIC CANCER: Status: ACTIVE | Noted: 2025-07-08

## 2025-07-19 PROBLEM — Z82.49 FAMILY HISTORY OF CORONARY ARTERY DISEASE: Status: ACTIVE | Noted: 2025-07-08

## 2025-07-21 ENCOUNTER — TRANSCRIPTION ENCOUNTER (OUTPATIENT)
Age: 60
End: 2025-07-21

## 2025-07-25 LAB
ALBUMIN MFR SERPL ELPH: 65.4 %
ALBUMIN SERPL-MCNC: 3.7 G/DL
ALBUMIN/GLOB SERPL: 1.8 RATIO
ALPHA1 GLOB MFR SERPL ELPH: 5.4 %
ALPHA1 GLOB SERPL ELPH-MCNC: 0.3 G/DL
ALPHA2 GLOB MFR SERPL ELPH: 11.6 %
ALPHA2 GLOB SERPL ELPH-MCNC: 0.7 G/DL
B-GLOBULIN MFR SERPL ELPH: 11.9 %
B-GLOBULIN SERPL ELPH-MCNC: 0.7 G/DL
DEPRECATED KAPPA LC FREE/LAMBDA SER: 0.35 RATIO
GAMMA GLOB FLD ELPH-MCNC: 0.3 G/DL
GAMMA GLOB MFR SERPL ELPH: 5.7 %
IGA SERPL-MCNC: 23 MG/DL
IGG SERPL-MCNC: 318 MG/DL
IGM SERPL-MCNC: <10 MG/DL
INTERPRETATION SERPL IEP-IMP: NORMAL
KAPPA LC CSF-MCNC: 1.33 MG/DL
KAPPA LC SERPL-MCNC: 0.46 MG/DL
M PROTEIN MFR SERPL ELPH: 2.1 %
M PROTEIN SPEC IFE-MCNC: NORMAL
MONOCLON BAND OBS SERPL: 0.1 G/DL
PROT SERPL-MCNC: 5.7 G/DL
PROT SERPL-MCNC: 5.7 G/DL

## 2025-07-29 ENCOUNTER — APPOINTMENT (OUTPATIENT)
Dept: ELECTROPHYSIOLOGY | Facility: CLINIC | Age: 60
End: 2025-07-29
Payer: MEDICARE

## 2025-07-29 ENCOUNTER — NON-APPOINTMENT (OUTPATIENT)
Age: 60
End: 2025-07-29

## 2025-07-29 PROCEDURE — 93294 REM INTERROG EVL PM/LDLS PM: CPT

## 2025-07-29 PROCEDURE — 93296 REM INTERROG EVL PM/IDS: CPT

## 2025-07-30 ENCOUNTER — RESULT REVIEW (OUTPATIENT)
Age: 60
End: 2025-07-30

## 2025-07-30 ENCOUNTER — APPOINTMENT (OUTPATIENT)
Dept: INFUSION THERAPY | Facility: HOSPITAL | Age: 60
End: 2025-07-30

## 2025-07-30 ENCOUNTER — APPOINTMENT (OUTPATIENT)
Dept: HEMATOLOGY ONCOLOGY | Facility: CLINIC | Age: 60
End: 2025-07-30

## 2025-08-02 ENCOUNTER — NON-APPOINTMENT (OUTPATIENT)
Age: 60
End: 2025-08-02

## 2025-08-04 ENCOUNTER — TRANSCRIPTION ENCOUNTER (OUTPATIENT)
Age: 60
End: 2025-08-04

## 2025-08-04 ENCOUNTER — NON-APPOINTMENT (OUTPATIENT)
Age: 60
End: 2025-08-04

## 2025-08-04 ENCOUNTER — APPOINTMENT (OUTPATIENT)
Dept: DERMATOLOGY | Facility: CLINIC | Age: 60
End: 2025-08-04
Payer: MEDICARE

## 2025-08-04 DIAGNOSIS — D22.9 MELANOCYTIC NEVI, UNSPECIFIED: ICD-10-CM

## 2025-08-04 DIAGNOSIS — D18.01 HEMANGIOMA OF SKIN AND SUBCUTANEOUS TISSUE: ICD-10-CM

## 2025-08-04 DIAGNOSIS — Z76.89 PERSONS ENCOUNTERING HEALTH SERVICES IN OTHER SPECIFIED CIRCUMSTANCES: ICD-10-CM

## 2025-08-04 DIAGNOSIS — Z12.83 ENCOUNTER FOR SCREENING FOR MALIGNANT NEOPLASM OF SKIN: ICD-10-CM

## 2025-08-04 PROCEDURE — 99203 OFFICE O/P NEW LOW 30 MIN: CPT

## 2025-08-07 ENCOUNTER — RX RENEWAL (OUTPATIENT)
Age: 60
End: 2025-08-07

## 2025-08-08 ENCOUNTER — RX RENEWAL (OUTPATIENT)
Age: 60
End: 2025-08-08

## 2025-08-13 ENCOUNTER — RESULT REVIEW (OUTPATIENT)
Age: 60
End: 2025-08-13

## 2025-08-13 ENCOUNTER — APPOINTMENT (OUTPATIENT)
Dept: INFUSION THERAPY | Facility: HOSPITAL | Age: 60
End: 2025-08-13

## 2025-08-13 ENCOUNTER — APPOINTMENT (OUTPATIENT)
Dept: HEMATOLOGY ONCOLOGY | Facility: CLINIC | Age: 60
End: 2025-08-13

## 2025-08-14 LAB
ALBUMIN SERPL ELPH-MCNC: 3.9 G/DL
ALP BLD-CCNC: 63 U/L
ALT SERPL-CCNC: 21 U/L
ANION GAP SERPL CALC-SCNC: 10 MMOL/L
AST SERPL-CCNC: 19 U/L
BILIRUB SERPL-MCNC: 0.4 MG/DL
BUN SERPL-MCNC: 17 MG/DL
CALCIUM SERPL-MCNC: 8.9 MG/DL
CHLORIDE SERPL-SCNC: 105 MMOL/L
CO2 SERPL-SCNC: 26 MMOL/L
CREAT SERPL-MCNC: 0.66 MG/DL
EGFRCR SERPLBLD CKD-EPI 2021: 100 ML/MIN/1.73M2
GLUCOSE SERPL-MCNC: 97 MG/DL
POTASSIUM SERPL-SCNC: 4.9 MMOL/L
PROT SERPL-MCNC: 5.5 G/DL
SODIUM SERPL-SCNC: 142 MMOL/L

## 2025-08-15 LAB
ALBUMIN MFR SERPL ELPH: 64.2 %
ALBUMIN SERPL-MCNC: 3.6 G/DL
ALBUMIN/GLOB SERPL: 1.8 RATIO
ALPHA1 GLOB MFR SERPL ELPH: 5.5 %
ALPHA1 GLOB SERPL ELPH-MCNC: 0.3 G/DL
ALPHA2 GLOB MFR SERPL ELPH: 12.6 %
ALPHA2 GLOB SERPL ELPH-MCNC: 0.7 G/DL
B-GLOBULIN MFR SERPL ELPH: 11.9 %
B-GLOBULIN SERPL ELPH-MCNC: 0.7 G/DL
DEPRECATED KAPPA LC FREE/LAMBDA SER: 0.23 RATIO
GAMMA GLOB FLD ELPH-MCNC: 0.3 G/DL
GAMMA GLOB MFR SERPL ELPH: 5.8 %
IGA SERPL-MCNC: 28 MG/DL
IGG SERPL-MCNC: 314 MG/DL
IGM SERPL-MCNC: 11 MG/DL
INTERPRETATION SERPL IEP-IMP: NORMAL
KAPPA LC CSF-MCNC: 1.76 MG/DL
KAPPA LC SERPL-MCNC: 0.4 MG/DL
M PROTEIN MFR SERPL ELPH: 1.6 %
M PROTEIN SPEC IFE-MCNC: NORMAL
MONOCLON BAND OBS SERPL: 0.1 G/DL
PROT SERPL-MCNC: 5.6 G/DL
PROT SERPL-MCNC: 5.6 G/DL

## 2025-08-17 LAB
CHOLEST SERPL-MCNC: 234 MG/DL
ESTIMATED AVERAGE GLUCOSE: 126 MG/DL
HBA1C MFR BLD HPLC: 6 %
HDLC SERPL-MCNC: 71 MG/DL
LDLC SERPL-MCNC: 135 MG/DL
NONHDLC SERPL-MCNC: 163 MG/DL
TRIGL SERPL-MCNC: 161 MG/DL

## 2025-08-21 ENCOUNTER — TRANSCRIPTION ENCOUNTER (OUTPATIENT)
Age: 60
End: 2025-08-21

## 2025-08-21 ENCOUNTER — APPOINTMENT (OUTPATIENT)
Dept: CARDIOLOGY | Facility: CLINIC | Age: 60
End: 2025-08-21
Payer: MEDICARE

## 2025-08-21 VITALS
HEART RATE: 66 BPM | SYSTOLIC BLOOD PRESSURE: 130 MMHG | DIASTOLIC BLOOD PRESSURE: 80 MMHG | BODY MASS INDEX: 22.14 KG/M2 | OXYGEN SATURATION: 98 % | WEIGHT: 125 LBS

## 2025-08-21 DIAGNOSIS — E85.9 AMYLOIDOSIS, UNSPECIFIED: ICD-10-CM

## 2025-08-21 DIAGNOSIS — R19.7 DIARRHEA, UNSPECIFIED: ICD-10-CM

## 2025-08-21 DIAGNOSIS — E78.5 HYPERLIPIDEMIA, UNSPECIFIED: ICD-10-CM

## 2025-08-21 PROCEDURE — G2211 COMPLEX E/M VISIT ADD ON: CPT

## 2025-08-21 PROCEDURE — 99215 OFFICE O/P EST HI 40 MIN: CPT

## 2025-08-21 PROCEDURE — 93000 ELECTROCARDIOGRAM COMPLETE: CPT

## 2025-08-21 RX ORDER — ROSUVASTATIN CALCIUM 5 MG/1
5 TABLET, FILM COATED ORAL
Qty: 90 | Refills: 3 | Status: ACTIVE | COMMUNITY
Start: 2025-08-21 | End: 1900-01-01

## 2025-08-24 LAB — BACTERIA UR CULT: NORMAL

## 2025-08-26 LAB
AMORPHOUS PHOSPHATE CRYSTALS: PRESENT
APPEARANCE: ABNORMAL
BACTERIA: ABNORMAL /HPF
BILIRUBIN URINE: NEGATIVE
BLOOD URINE: ABNORMAL
CAST: NORMAL /LPF
COLOR: YELLOW
EPITHELIAL CELLS: 0 /HPF
GLUCOSE QUALITATIVE U: NEGATIVE MG/DL
KETONES URINE: NEGATIVE MG/DL
LEUKOCYTE ESTERASE URINE: ABNORMAL
MICROSCOPIC-UA: NORMAL
NITRITE URINE: NEGATIVE
PH URINE: >=9
PROTEIN URINE: 30 MG/DL
RED BLOOD CELLS URINE: 0 /HPF
REVIEW: NORMAL
SPECIFIC GRAVITY URINE: 1.01
TRIPLE PHOSPHATE CRYSTALS: PRESENT
UROBILINOGEN URINE: 0.2 MG/DL
WHITE BLOOD CELLS URINE: 4 /HPF

## 2025-08-27 ENCOUNTER — APPOINTMENT (OUTPATIENT)
Dept: HEMATOLOGY ONCOLOGY | Facility: CLINIC | Age: 60
End: 2025-08-27

## 2025-08-27 ENCOUNTER — APPOINTMENT (OUTPATIENT)
Dept: INFUSION THERAPY | Facility: HOSPITAL | Age: 60
End: 2025-08-27

## 2025-08-27 ENCOUNTER — RESULT REVIEW (OUTPATIENT)
Age: 60
End: 2025-08-27

## 2025-08-29 ENCOUNTER — APPOINTMENT (OUTPATIENT)
Dept: UROLOGY | Facility: CLINIC | Age: 60
End: 2025-08-29
Payer: MEDICARE

## 2025-08-29 DIAGNOSIS — N39.0 URINARY TRACT INFECTION, SITE NOT SPECIFIED: ICD-10-CM

## 2025-08-29 LAB — BACTERIA UR CULT: ABNORMAL

## 2025-08-29 PROCEDURE — 99212 OFFICE O/P EST SF 10 MIN: CPT | Mod: 93

## 2025-08-29 RX ORDER — CEFPODOXIME PROXETIL 200 MG/1
200 TABLET, FILM COATED ORAL
Qty: 10 | Refills: 0 | Status: ACTIVE | COMMUNITY
Start: 2025-08-29 | End: 1900-01-01

## 2025-09-02 ENCOUNTER — TRANSCRIPTION ENCOUNTER (OUTPATIENT)
Age: 60
End: 2025-09-02

## 2025-09-03 LAB
ALBUMIN MFR SERPL ELPH: 64 %
ALBUMIN SERPL ELPH-MCNC: 4 G/DL
ALBUMIN SERPL-MCNC: 3.6 G/DL
ALBUMIN/GLOB SERPL: 1.7 RATIO
ALP BLD-CCNC: 68 U/L
ALPHA1 GLOB MFR SERPL ELPH: 5.8 %
ALPHA1 GLOB SERPL ELPH-MCNC: 0.3 G/DL
ALPHA2 GLOB MFR SERPL ELPH: 12.5 %
ALPHA2 GLOB SERPL ELPH-MCNC: 0.7 G/DL
ALT SERPL-CCNC: 22 U/L
ANION GAP SERPL CALC-SCNC: 12 MMOL/L
AST SERPL-CCNC: 21 U/L
B-GLOBULIN MFR SERPL ELPH: 12 %
B-GLOBULIN SERPL ELPH-MCNC: 0.7 G/DL
BILIRUB SERPL-MCNC: 0.4 MG/DL
BUN SERPL-MCNC: 15 MG/DL
CALCIUM SERPL-MCNC: 9 MG/DL
CHLORIDE SERPL-SCNC: 106 MMOL/L
CO2 SERPL-SCNC: 26 MMOL/L
CREAT SERPL-MCNC: 0.83 MG/DL
DEPRECATED KAPPA LC FREE/LAMBDA SER: 0.32 RATIO
EGFRCR SERPLBLD CKD-EPI 2021: 80 ML/MIN/1.73M2
GAMMA GLOB FLD ELPH-MCNC: 0.3 G/DL
GAMMA GLOB MFR SERPL ELPH: 5.7 %
GLUCOSE SERPL-MCNC: 97 MG/DL
IGA SERPL-MCNC: 28 MG/DL
IGG SERPL-MCNC: 371 MG/DL
IGM SERPL-MCNC: <10 MG/DL
INTERPRETATION SERPL IEP-IMP: NORMAL
KAPPA LC CSF-MCNC: 1.36 MG/DL
KAPPA LC SERPL-MCNC: 0.44 MG/DL
M PROTEIN MFR SERPL ELPH: 1.8 %
M PROTEIN SPEC IFE-MCNC: NORMAL
MONOCLON BAND OBS SERPL: 0.1 G/DL
POTASSIUM SERPL-SCNC: 5 MMOL/L
PROT SERPL-MCNC: 5.5 G/DL
PROT SERPL-MCNC: 5.7 G/DL
PROT SERPL-MCNC: 5.7 G/DL
SODIUM SERPL-SCNC: 144 MMOL/L

## 2025-09-10 ENCOUNTER — RESULT REVIEW (OUTPATIENT)
Age: 60
End: 2025-09-10

## 2025-09-10 ENCOUNTER — APPOINTMENT (OUTPATIENT)
Dept: INFUSION THERAPY | Facility: HOSPITAL | Age: 60
End: 2025-09-10

## 2025-09-10 ENCOUNTER — APPOINTMENT (OUTPATIENT)
Dept: HEMATOLOGY ONCOLOGY | Facility: CLINIC | Age: 60
End: 2025-09-10

## 2025-09-10 LAB
ALBUMIN SERPL ELPH-MCNC: 4 G/DL
ALP BLD-CCNC: 71 U/L
ALT SERPL-CCNC: 24 U/L
ANION GAP SERPL CALC-SCNC: 13 MMOL/L
AST SERPL-CCNC: 20 U/L
BILIRUB SERPL-MCNC: 0.4 MG/DL
BUN SERPL-MCNC: 17 MG/DL
CALCIUM SERPL-MCNC: 8.6 MG/DL
CHLORIDE SERPL-SCNC: 108 MMOL/L
CO2 SERPL-SCNC: 23 MMOL/L
CREAT SERPL-MCNC: 0.69 MG/DL
EGFRCR SERPLBLD CKD-EPI 2021: 99 ML/MIN/1.73M2
GLUCOSE SERPL-MCNC: 101 MG/DL
POTASSIUM SERPL-SCNC: 5.2 MMOL/L
PROT SERPL-MCNC: 5.7 G/DL
SODIUM SERPL-SCNC: 144 MMOL/L

## 2025-09-12 LAB
ALBUMIN MFR SERPL ELPH: 64.8 %
ALBUMIN SERPL-MCNC: 3.7 G/DL
ALBUMIN/GLOB SERPL: 1.8 RATIO
ALPHA1 GLOB MFR SERPL ELPH: 5.4 %
ALPHA1 GLOB SERPL ELPH-MCNC: 0.3 G/DL
ALPHA2 GLOB MFR SERPL ELPH: 12.3 %
ALPHA2 GLOB SERPL ELPH-MCNC: 0.7 G/DL
B-GLOBULIN MFR SERPL ELPH: 11.8 %
B-GLOBULIN SERPL ELPH-MCNC: 0.7 G/DL
DEPRECATED KAPPA LC FREE/LAMBDA SER: 0.27 RATIO
GAMMA GLOB FLD ELPH-MCNC: 0.3 G/DL
GAMMA GLOB MFR SERPL ELPH: 5.7 %
IGA SERPL-MCNC: 23 MG/DL
IGG SERPL-MCNC: 356 MG/DL
IGM SERPL-MCNC: 11 MG/DL
INTERPRETATION SERPL IEP-IMP: NORMAL
KAPPA LC CSF-MCNC: 1.44 MG/DL
KAPPA LC SERPL-MCNC: 0.39 MG/DL
M PROTEIN MFR SERPL ELPH: 1.7 %
M PROTEIN SPEC IFE-MCNC: NORMAL
MONOCLON BAND OBS SERPL: 0.1 G/DL
PROT SERPL-MCNC: 5.7 G/DL
PROT SERPL-MCNC: 5.7 G/DL

## 2025-09-17 ENCOUNTER — APPOINTMENT (OUTPATIENT)
Dept: INFUSION THERAPY | Facility: HOSPITAL | Age: 60
End: 2025-09-17

## 2025-09-17 ENCOUNTER — APPOINTMENT (OUTPATIENT)
Dept: HEMATOLOGY ONCOLOGY | Facility: CLINIC | Age: 60
End: 2025-09-17